# Patient Record
Sex: MALE | Race: WHITE | Employment: OTHER | URBAN - METROPOLITAN AREA
[De-identification: names, ages, dates, MRNs, and addresses within clinical notes are randomized per-mention and may not be internally consistent; named-entity substitution may affect disease eponyms.]

---

## 2017-05-17 ENCOUNTER — HOSPITAL ENCOUNTER (EMERGENCY)
Facility: HOSPITAL | Age: 82
Discharge: HOME | End: 2017-05-17
Attending: EMERGENCY MEDICINE
Payer: MEDICARE

## 2017-05-17 ENCOUNTER — APPOINTMENT (OUTPATIENT)
Dept: GENERAL RADIOLOGY | Facility: HOSPITAL | Age: 82
End: 2017-05-17
Attending: EMERGENCY MEDICINE
Payer: MEDICARE

## 2017-05-17 VITALS
HEART RATE: 60 BPM | SYSTOLIC BLOOD PRESSURE: 128 MMHG | TEMPERATURE: 99 F | DIASTOLIC BLOOD PRESSURE: 53 MMHG | BODY MASS INDEX: 28.14 KG/M2 | RESPIRATION RATE: 18 BRPM | WEIGHT: 190 LBS | HEIGHT: 69 IN | OXYGEN SATURATION: 95 %

## 2017-05-17 DIAGNOSIS — R50.81 FEVER IN OTHER DISEASES: Primary | ICD-10-CM

## 2017-05-17 DIAGNOSIS — R05.9 COUGH: ICD-10-CM

## 2017-05-17 PROCEDURE — 71020 XR CHEST PA + LAT CHEST (CPT=71020): CPT | Performed by: EMERGENCY MEDICINE

## 2017-05-17 PROCEDURE — 99283 EMERGENCY DEPT VISIT LOW MDM: CPT

## 2017-05-17 NOTE — ED INITIAL ASSESSMENT (HPI)
Fever 99.6 at home pta. Pt was told by PMD to come to ED if temp is elevated. Intermittent nonproductive cough for the past two days.

## 2017-05-17 NOTE — ED PROVIDER NOTES
Patient Seen in: [facility] AND [facility] Emergency Department    History   Patient presents with:  Fever    Stated Complaint: elevated temp, cough    HPI    Patient is an 27-year-old male who presents with fever of 99.5 at home today.   Patient denies any body Alcohol Use: Yes                Comment: occasionally      Review of Systems    Positive for stated complaint: elevated temp, cough  Other systems are as noted in HPI. Constitutional and vital signs reviewed.       All other systems reviewed and negative e Plan     Clinical Impression:  Fever in other diseases  (primary encounter diagnosis)  Cough    Disposition:  Discharge    Follow-up:  [facility] AND [facility] Emergency Department  [address]   [address] [address] [phone]    If symptoms worse

## 2017-07-07 ENCOUNTER — HOSPITAL ENCOUNTER (OUTPATIENT)
Age: 82
Discharge: HOME | End: 2017-07-07
Attending: EMERGENCY MEDICINE
Payer: MEDICARE

## 2017-07-07 VITALS
HEART RATE: 76 BPM | SYSTOLIC BLOOD PRESSURE: 129 MMHG | TEMPERATURE: 98 F | WEIGHT: 204 LBS | OXYGEN SATURATION: 96 % | RESPIRATION RATE: 20 BRPM | HEIGHT: 70 IN | DIASTOLIC BLOOD PRESSURE: 51 MMHG | BODY MASS INDEX: 29.2 KG/M2

## 2017-07-07 DIAGNOSIS — L03.019 ONYCHIA AND PARONYCHIA OF FINGER: Primary | ICD-10-CM

## 2017-07-07 PROCEDURE — 26010 DRAINAGE OF FINGER ABSCESS: CPT

## 2017-07-07 PROCEDURE — 99213 OFFICE O/P EST LOW 20 MIN: CPT

## 2017-07-07 PROCEDURE — 99212 OFFICE O/P EST SF 10 MIN: CPT

## 2017-07-07 NOTE — ED PROVIDER NOTES
[facility]    History   Patient presents with:  Rash Skin Problem (integumentary)    Stated Complaint: rt. thumb redness    HPI    16-year-old male with history of BPH, kidney stones, hyperlipidemia, hype Associated Father    • Heart Disorder Father    • Depression Father    • Heart Disorder Mother        Smoking status: Former Smoker                                                              Packs/day: 0.00      Years: 3.00         Quit date: 8/20/1960 Cardiovascular: Normal rate and regular rhythm. No murmur heard. 2+ radial and DP pulses b/l, no leg swelling   Pulmonary/Chest: Effort normal and breath sounds normal. No stridor. No respiratory distress. He has no wheezes. He has no rales.    Abdomi Patient's condition was stable during Emergency Department evaluation.      82yoM with paronychia  - afebrile, hemodynamically stable  - digital block offered to pt - he declined  - I&D performed without complication  - counseled pt on importance of complet

## 2019-10-04 ENCOUNTER — HOSPITAL ENCOUNTER (OUTPATIENT)
Age: 84
Discharge: HOME | End: 2019-10-04
Attending: EMERGENCY MEDICINE
Payer: MEDICARE

## 2019-10-04 VITALS
SYSTOLIC BLOOD PRESSURE: 151 MMHG | WEIGHT: 204 LBS | BODY MASS INDEX: 29.2 KG/M2 | TEMPERATURE: 97 F | RESPIRATION RATE: 20 BRPM | OXYGEN SATURATION: 96 % | HEART RATE: 78 BPM | DIASTOLIC BLOOD PRESSURE: 64 MMHG | HEIGHT: 70 IN

## 2019-10-04 DIAGNOSIS — L03.011 ACUTE PARONYCHIA OF RIGHT THUMB: Primary | ICD-10-CM

## 2019-10-04 PROCEDURE — 99214 OFFICE O/P EST MOD 30 MIN: CPT

## 2019-10-04 PROCEDURE — 10060 I&D ABSCESS SIMPLE/SINGLE: CPT

## 2019-10-04 PROCEDURE — 87205 SMEAR GRAM STAIN: CPT | Performed by: EMERGENCY MEDICINE

## 2019-10-04 PROCEDURE — 87106 FUNGI IDENTIFICATION YEAST: CPT | Performed by: EMERGENCY MEDICINE

## 2019-10-04 PROCEDURE — 87077 CULTURE AEROBIC IDENTIFY: CPT | Performed by: EMERGENCY MEDICINE

## 2019-10-04 PROCEDURE — 87070 CULTURE OTHR SPECIMN AEROBIC: CPT | Performed by: EMERGENCY MEDICINE

## 2019-10-04 RX ORDER — CEPHALEXIN 250 MG/1
250 CAPSULE ORAL 3 TIMES DAILY
Qty: 21 CAPSULE | Refills: 0 | Status: SHIPPED | OUTPATIENT
Start: 2019-10-04 | End: 2019-10-11

## 2019-10-04 NOTE — ED PROVIDER NOTES
[facility]      History   Patient presents with:  Rash Skin Problem (integumentary)    Stated Complaint: THUMB INFECTION    HPI    The patient is an 73-year-old male with past history of hypertension who sclera, no conjunctival injection  ENT: TMs are clear and flat bilaterally.   There is no posterior pharyngeal erythema  Chest: Clear to auscultation, no tenderness  Cardiovascular: Regular rate and rhythm without murmur  Abdomen: Soft, nontender and nondis

## 2019-10-04 NOTE — ED INITIAL ASSESSMENT (HPI)
Patient's wife noted redness around right thumb nail bed today. Patient denies drainage/discharge from site.

## 2019-12-05 NOTE — LETTER
[facility], [place]  Authorization for Invasive Procedures  1. I hereby authorize Dr. Vikas Cartwright , my physician and whomever may be designated as the doctor's assistant, to perform the following operation and/or procedure:  Esophagogastroduodenoscopy with Botox injection on Teressa Walters. at [facility].    2. My physician has explained to me the nature and purpose of the operation or other procedure, possible alternative methods of treatment, the risks involved and the possibility of complications to me. I understand the probable consequences of declining the recommended procedure and the alternative methods of treatment. I acknowledge that no guarantee has been made as to the result that may be obtained. 3. I recognize that during the course of this operation or other procedure, unforeseen conditions may necessitate additional or different procedures than those listed above. I, therefore, further authorize and request that the above-named physician, his/her physician assistants, or designees perform such procedures as are, in his/her professional opinion, necessary and desirable. If I have a Do Not Attempt Resuscitation (DNAR) order in place, that status will be suspended while in the operating room, procedural suite, and during the recovery period unless otherwise explicitly stated by me (or a person authorized to consent on my behalf). The surgeon or my attending physician will determine when the applicable recovery period ends for purposes of reinstating the DNAR order. 4. Should the need arise during my operation or immediate post-operative period; I also consent to the administration of blood and/or blood products.  Further, I understand that despite careful testing and screening of blood and blood products, I may still be subject to ill effects as a result of recieving a blood transfusion an/or blood producst. The following are some, but not all, of the potential risks that can occur: fever and allergic reactions, hemolytic reactions, transmission of disease such as hepatitis, AIDS, cytomegalovirus (CMV), and flluid overload. In the event that I wish to have autologous transfusions of my own blood, or a directed donor transfusion, I will discuss this with my physician. 5. I consent to the photographing of the operations or procedures to be performed for the purposes of advancing medicine, science, and/or education, provided my identity is not revealed. If the procedure has been videotaped, the physician/surgeon will obtain the original videotape. The [facility] will not be responsible for storage or maintenance of this tape. 6. I consent to the presence of a  or observer as deemed necessary by my physician or his designee. 7. Any tissues or organs removed in the operation or other procedure may be disposed of by and at the discretion of [facility].    8. I understand that the physician and his/her physician assistants may not be employees or agents of [facility], [facility], nor [facility], but are independent medical practitioners who have been permitted to use its facilities for the care and treatment of their patients. 9. Patients having a sterilization procedure: I understand that if the procedure is successful the results will be permanent and it will therefore be impossible for me to inseminate, conceive or bear children. I also understand that the procedure is intended to result in sterility, although the result has not been guaranteed. 10. I CERTIFY THAT I HAVE READ AND FULLY UNDERSTAND THE ABOVE CONSENT TO OPERATION and/or OTHER PROCEDURE. 11. I acknowledge that my physician has explained sedation/analgesia administration to me including the risks and benefits.  I consent to the administration of sedation/analgesia as may be necessary or desirable in the judgment of my physician. Signature of Patient:  ________________________________________________ Date: _________Time: _________    Responsible person in case of minor or unconscious: _____________________________Relationship: ____________     Witness Signature: ____________________________________________ Date: __________ Time: ___________    Statement of Physician  My signature below affirms that prior to the time of the procedure, I have explained to the patient and/or his legal representative, the risks and benefits involved in the proposed treatment and any reasonable alternative to the proposed treatment. I have also explained the risks and benefits involved in the refusal of the proposed treatment and have answered the patient's questions. If I have a significant financial interest in this procedure/surgery, I have disclosed this and had a discussion with my patient. Signature of Physician:   ________________________________________Date: _________Time:_______ Patient Name: Sarah Rojas.   : 1935   Printed: May 18, 2022    Medical Record #: [id number] Sepsis, due to unspecified organism, unspecified whether acute organ dysfunction present

## 2020-02-29 ENCOUNTER — HOSPITAL ENCOUNTER (INPATIENT)
Facility: HOSPITAL | Age: 85
LOS: 2 days | Discharge: HOME | DRG: 195 | End: 2020-03-02
Attending: EMERGENCY MEDICINE | Admitting: HOSPITALIST
Payer: MEDICARE

## 2020-02-29 ENCOUNTER — APPOINTMENT (OUTPATIENT)
Dept: GENERAL RADIOLOGY | Facility: HOSPITAL | Age: 85
DRG: 195 | End: 2020-02-29
Attending: EMERGENCY MEDICINE
Payer: MEDICARE

## 2020-02-29 DIAGNOSIS — J18.9 COMMUNITY ACQUIRED PNEUMONIA OF RIGHT LOWER LOBE OF LUNG: Primary | ICD-10-CM

## 2020-02-29 LAB
ANION GAP SERPL CALC-SCNC: 6 MMOL/L (ref 0–18)
BASOPHILS # BLD AUTO: 0.04 X10(3) UL (ref 0–0.2)
BASOPHILS NFR BLD AUTO: 0.3 %
BUN BLD-MCNC: 18 MG/DL (ref 7–18)
BUN/CREAT SERPL: 20 (ref 10–20)
CALCIUM BLD-MCNC: 9.6 MG/DL (ref 8.5–10.1)
CHLORIDE SERPL-SCNC: 104 MMOL/L (ref 98–112)
CO2 SERPL-SCNC: 28 MMOL/L (ref 21–32)
CREAT BLD-MCNC: 0.9 MG/DL (ref 0.7–1.3)
DEPRECATED RDW RBC AUTO: 48.1 FL (ref 35.1–46.3)
EOSINOPHIL # BLD AUTO: 0.05 X10(3) UL (ref 0–0.7)
EOSINOPHIL NFR BLD AUTO: 0.4 %
ERYTHROCYTE [DISTWIDTH] IN BLOOD BY AUTOMATED COUNT: 13 % (ref 11–15)
FLUAV + FLUBV RNA SPEC NAA+PROBE: NEGATIVE
GLUCOSE BLD-MCNC: 145 MG/DL (ref 70–99)
HCT VFR BLD AUTO: 39 % (ref 39–53)
HGB BLD-MCNC: 13.3 G/DL (ref 13–17.5)
IMM GRANULOCYTES # BLD AUTO: 0.06 X10(3) UL (ref 0–1)
IMM GRANULOCYTES NFR BLD: 0.4 %
LYMPHOCYTES # BLD AUTO: 1.52 X10(3) UL (ref 1–4)
LYMPHOCYTES NFR BLD AUTO: 11.2 %
MCH RBC QN AUTO: 34.5 PG (ref 26–34)
MCHC RBC AUTO-ENTMCNC: 34.1 G/DL (ref 31–37)
MCV RBC AUTO: 101.3 FL (ref 80–100)
MONOCYTES # BLD AUTO: 0.95 X10(3) UL (ref 0.1–1)
MONOCYTES NFR BLD AUTO: 7 %
NEUTROPHILS # BLD AUTO: 10.96 X10 (3) UL (ref 1.5–7.7)
NEUTROPHILS # BLD AUTO: 10.96 X10(3) UL (ref 1.5–7.7)
NEUTROPHILS NFR BLD AUTO: 80.7 %
OSMOLALITY SERPL CALC.SUM OF ELEC: 290 MOSM/KG (ref 275–295)
PLATELET # BLD AUTO: 162 10(3)UL (ref 150–450)
POTASSIUM SERPL-SCNC: 4.2 MMOL/L (ref 3.5–5.1)
PROCALCITONIN SERPL-MCNC: 0.3 NG/ML
RBC # BLD AUTO: 3.85 X10(6)UL (ref 3.8–5.8)
SODIUM SERPL-SCNC: 138 MMOL/L (ref 136–145)
WBC # BLD AUTO: 13.6 X10(3) UL (ref 4–11)

## 2020-02-29 PROCEDURE — 87631 RESP VIRUS 3-5 TARGETS: CPT | Performed by: EMERGENCY MEDICINE

## 2020-02-29 PROCEDURE — 71045 X-RAY EXAM CHEST 1 VIEW: CPT | Performed by: EMERGENCY MEDICINE

## 2020-02-29 PROCEDURE — 97162 PT EVAL MOD COMPLEX 30 MIN: CPT

## 2020-02-29 PROCEDURE — 96365 THER/PROPH/DIAG IV INF INIT: CPT

## 2020-02-29 PROCEDURE — 97110 THERAPEUTIC EXERCISES: CPT

## 2020-02-29 PROCEDURE — 85025 COMPLETE CBC W/AUTO DIFF WBC: CPT | Performed by: EMERGENCY MEDICINE

## 2020-02-29 PROCEDURE — 92610 EVALUATE SWALLOWING FUNCTION: CPT

## 2020-02-29 PROCEDURE — 80048 BASIC METABOLIC PNL TOTAL CA: CPT | Performed by: EMERGENCY MEDICINE

## 2020-02-29 PROCEDURE — 99285 EMERGENCY DEPT VISIT HI MDM: CPT

## 2020-02-29 PROCEDURE — 84145 PROCALCITONIN (PCT): CPT | Performed by: EMERGENCY MEDICINE

## 2020-02-29 RX ORDER — ENOXAPARIN SODIUM 100 MG/ML
40 INJECTION SUBCUTANEOUS DAILY
Status: DISCONTINUED | OUTPATIENT
Start: 2020-02-29 | End: 2020-03-02

## 2020-02-29 RX ORDER — DONEPEZIL HYDROCHLORIDE 10 MG/1
10 TABLET, FILM COATED ORAL DAILY
Status: DISCONTINUED | OUTPATIENT
Start: 2020-02-29 | End: 2020-03-02

## 2020-02-29 RX ORDER — MIRTAZAPINE 15 MG/1
15 TABLET, FILM COATED ORAL NIGHTLY
Status: DISCONTINUED | OUTPATIENT
Start: 2020-02-29 | End: 2020-03-02

## 2020-02-29 RX ORDER — METOCLOPRAMIDE HYDROCHLORIDE 5 MG/ML
10 INJECTION INTRAMUSCULAR; INTRAVENOUS EVERY 8 HOURS PRN
Status: DISCONTINUED | OUTPATIENT
Start: 2020-02-29 | End: 2020-03-02

## 2020-02-29 RX ORDER — ARIPIPRAZOLE 2 MG/1
2 TABLET ORAL EVERY OTHER DAY
Status: DISCONTINUED | OUTPATIENT
Start: 2020-03-01 | End: 2020-03-02

## 2020-02-29 RX ORDER — ATORVASTATIN CALCIUM 20 MG/1
20 TABLET, FILM COATED ORAL NIGHTLY
Status: DISCONTINUED | OUTPATIENT
Start: 2020-02-29 | End: 2020-03-02

## 2020-02-29 RX ORDER — SODIUM CHLORIDE 9 MG/ML
INJECTION, SOLUTION INTRAVENOUS CONTINUOUS
Status: DISCONTINUED | OUTPATIENT
Start: 2020-02-29 | End: 2020-02-29

## 2020-02-29 RX ORDER — SODIUM CHLORIDE 0.9 % (FLUSH) 0.9 %
3 SYRINGE (ML) INJECTION AS NEEDED
Status: DISCONTINUED | OUTPATIENT
Start: 2020-02-29 | End: 2020-03-02

## 2020-02-29 RX ORDER — VENLAFAXINE 75 MG/1
75 TABLET ORAL DAILY
Status: DISCONTINUED | OUTPATIENT
Start: 2020-02-29 | End: 2020-03-02

## 2020-02-29 RX ORDER — POTASSIUM CHLORIDE 750 MG/1
10 TABLET, EXTENDED RELEASE ORAL EVERY OTHER DAY
COMMUNITY

## 2020-02-29 RX ORDER — ONDANSETRON 2 MG/ML
4 INJECTION INTRAMUSCULAR; INTRAVENOUS EVERY 4 HOURS PRN
Status: DISCONTINUED | OUTPATIENT
Start: 2020-02-29 | End: 2020-02-29

## 2020-02-29 RX ORDER — ASPIRIN 81 MG/1
81 TABLET ORAL DAILY
Status: DISCONTINUED | OUTPATIENT
Start: 2020-02-29 | End: 2020-03-02

## 2020-02-29 RX ORDER — ONDANSETRON 2 MG/ML
4 INJECTION INTRAMUSCULAR; INTRAVENOUS EVERY 6 HOURS PRN
Status: DISCONTINUED | OUTPATIENT
Start: 2020-02-29 | End: 2020-03-02

## 2020-02-29 RX ORDER — ACETAMINOPHEN 325 MG/1
650 TABLET ORAL EVERY 6 HOURS PRN
Status: DISCONTINUED | OUTPATIENT
Start: 2020-02-29 | End: 2020-03-02

## 2020-02-29 NOTE — PHYSICAL THERAPY NOTE
PHYSICAL THERAPY EVALUATION - INPATIENT     Room Number: 529/529-A  Evaluation Date: 2/29/2020  Type of Evaluation: Initial   Physician Order: PT Eval and Treat    Presenting Problem: pneumonia, weakness, congestion, fever  Reason for Therapy: Mobility Dy NOT safe to return home. He is unsteady on his feet and demonstrates ataxic gait. He is weak. He has 12 stairs he needs to do to get into his house. He is at a high fall risk. At this moment, recommend sub-acute rehab.  Will continue to monitor pt's progres had to be corrected by wife as initially pt said he was independent w/ ADLs and amb w/o AD. Wife clarified that she has to help him get into shower and assists with showers. She has to help with shoes/socks sometimes. He amb w/o AD.  However, in the communi on the side of the bed?: A Little   How much help from another person does the patient currently need. ..   -   Moving to and from a bed to a chair (including a wheelchair)?: A Little   -   Need to walk in hospital room?: A Little   -   Climbing 3-5 steps w

## 2020-02-29 NOTE — SLP NOTE
I. ADULT SWALLOWING EVALUATION    ASSESSMENT    ASSESSMENT/OVERALL IMPRESSION:    This BSE was ordered d/t Pt with admission of CAP (R) lower lobe.       No PMH of dysphagia at [facility]; however, per spouse Pt had \"video swallow three years ago at [facility] with RN. RECOMMENDATIONS   Diet Recommendations - Solids: Regular  Diet Recommendations - Liquid: Thin      Compensatory Strategies Recommended: No straws  Aspiration Precautions: Upright position; Slow rate;Small bites and sips; No straw  Medication A Pharyngeal Phase of Swallow: Impaired  Laryngeal Elevation Timing: Appears impaired        (Please note: Silent aspiration cannot be evaluated clinically.  Videofluoroscopic Swallow Study is required to rule-out silent aspiration.)    GOALS  Goal #1 The

## 2020-02-29 NOTE — ED PROVIDER NOTES
Patient Seen in: [facility] AND [facility] Emergency Department    History   Patient presents with:  Fever      HPI    Patient presents to the ED complaining of a cough with congestion and fever starting last night. Increasing fatigue since onset of symptoms. and Family History elements reviewed from today, pertinent positives to the presenting problem noted.     Physical Exam     ED Triage Vitals [02/29/20 0415]   /59   Pulse 66   Resp 18   Temp 99 °F (37.2 °C)   Temp src Oral   SpO2 93 %   O2 Device None Please view results for these tests on the individual orders.    BASIC METABOLIC PANEL (8)   PROCALCITONIN         Imaging Results Available and Reviewed while in ED:    Preliminary Radiology Report  Vision Radio influenza, RSV    Medical Record Review: I personally reviewed available prior medical records for any recent pertinent discharge summaries, testing, and procedures and reviewed those reports. Complicating Factors:  The patient already has does not have

## 2020-02-29 NOTE — ED NOTES
Orders for admission, patient is aware of plan and ready to go upstairs.   Any questions, please call ED BRYANT Schneider at extension [phone]

## 2020-02-29 NOTE — H&P
[facility] Hospitalist H&P     CC: Patient presents with:  Fever       PCP: Wendy Ruelas    Admission Date: 2/29/2020    ASSESSMENT / PLAN:    is a 81year old male with PMH of BPH, HTN, dementia, HLD who presented with cough starting he morning of arriv Next dose due 3/1/20, Disp: , Rfl:   ARIPIPRAZOLE 2 MG Oral Tab, TAKE ONE TABLET BY MOUTH ONE TIME DAILY  (Patient taking differently: Take 2 mg by mouth every other day.  Next dose due 3/1/20 ), Disp: 90 tablet, Rfl: 0  mirtazapine 15 MG Oral Tab, TAKE ONE edema    Diagnostic Data:    CBC/Chem    Recent Labs   Lab 02/29/20  0525   RBC 3.85   HGB 13.3   HCT 39.0   .3*   MCH 34.5*   MCHC 34.1   RDW 13.0   NEPRELIM 10.96*   WBC 13.6*   .0         Recent Labs   Lab 02/29/20  0525   *   BUN 1

## 2020-02-29 NOTE — ED NOTES
Orders for admission, patient is aware of plan and ready to go upstairs. Any questions, please call ED RN wade at extension [phone]  Pt comes from home, lives with his wife, is aox4, continent of bowel and bladder.  Comes to ER with c/o fever and generalize

## 2020-03-01 LAB
ANION GAP SERPL CALC-SCNC: 5 MMOL/L (ref 0–18)
BUN BLD-MCNC: 11 MG/DL (ref 7–18)
BUN/CREAT SERPL: 15.5 (ref 10–20)
CALCIUM BLD-MCNC: 9.1 MG/DL (ref 8.5–10.1)
CHLORIDE SERPL-SCNC: 107 MMOL/L (ref 98–112)
CO2 SERPL-SCNC: 28 MMOL/L (ref 21–32)
CREAT BLD-MCNC: 0.71 MG/DL (ref 0.7–1.3)
DEPRECATED RDW RBC AUTO: 50.5 FL (ref 35.1–46.3)
ERYTHROCYTE [DISTWIDTH] IN BLOOD BY AUTOMATED COUNT: 13.2 % (ref 11–15)
GLUCOSE BLD-MCNC: 136 MG/DL (ref 70–99)
HAV IGM SER QL: 2.3 MG/DL (ref 1.6–2.6)
HCT VFR BLD AUTO: 38.2 % (ref 39–53)
HGB BLD-MCNC: 12.9 G/DL (ref 13–17.5)
MCH RBC QN AUTO: 34.6 PG (ref 26–34)
MCHC RBC AUTO-ENTMCNC: 33.8 G/DL (ref 31–37)
MCV RBC AUTO: 102.4 FL (ref 80–100)
OSMOLALITY SERPL CALC.SUM OF ELEC: 291 MOSM/KG (ref 275–295)
PLATELET # BLD AUTO: 151 10(3)UL (ref 150–450)
POTASSIUM SERPL-SCNC: 4 MMOL/L (ref 3.5–5.1)
RBC # BLD AUTO: 3.73 X10(6)UL (ref 3.8–5.8)
SODIUM SERPL-SCNC: 140 MMOL/L (ref 136–145)
WBC # BLD AUTO: 6 X10(3) UL (ref 4–11)

## 2020-03-01 PROCEDURE — 85027 COMPLETE CBC AUTOMATED: CPT | Performed by: INTERNAL MEDICINE

## 2020-03-01 PROCEDURE — 97166 OT EVAL MOD COMPLEX 45 MIN: CPT

## 2020-03-01 PROCEDURE — 80048 BASIC METABOLIC PNL TOTAL CA: CPT | Performed by: INTERNAL MEDICINE

## 2020-03-01 PROCEDURE — 92526 ORAL FUNCTION THERAPY: CPT

## 2020-03-01 PROCEDURE — 97530 THERAPEUTIC ACTIVITIES: CPT

## 2020-03-01 PROCEDURE — 83735 ASSAY OF MAGNESIUM: CPT | Performed by: INTERNAL MEDICINE

## 2020-03-01 NOTE — SLP NOTE
SPEECH DAILY NOTE - INPATIENT    ASSESSMENT & PLAN   ASSESSMENT  Per RN, pt is tolerating current diet without overt clinical signs or symptoms of aspiration (e.g., wet vocal quality, coughing, throat clear).  Pt seen sitting upright in bed to monitor marycruz of Visits to Meet Established Goals: 2    Session: 1 following BSE on 2/29/20    If you have any questions, please contact Marbella Sanchez M.S., Mary [place]  Speech-Language Pathologist  Ext. [phone]

## 2020-03-01 NOTE — PROGRESS NOTES
[facility] Hospitalist Progress Note     Reason for Admission:   PCP: Maryse Russ     Assessment/Plan:     Principal Problem:    Community acquired pneumonia of right lower lobe of lung   is a 81year old male with PMH of BPH, HTN, dementia, HLD who prese MCH 34.5* 34.6*   MCHC 34.1 33.8   RDW 13.0 13.2   NEPRELIM 10.96*  --    WBC 13.6* 6.0   .0 151.0         Recent Labs   Lab 02/29/20  0525 03/01/20  0703   * 136*   BUN 18 11   CREATSERUM 0.90 0.71   GFRAA 91 100   GFRNAA 79 87   CA 9.6 9.

## 2020-03-01 NOTE — OCCUPATIONAL THERAPY NOTE
OCCUPATIONAL THERAPY EVALUATION - INPATIENT     Room Number: 529/529-A  Evaluation Date: 3/1/2020  Type of Evaluation: Initial  Presenting Problem: (weakness;pneumonia)    Physician Order: IP Consult to Occupational Therapy  Reason for Therapy: ADL/IADL Dy Daily Activity Short Form. Research supports that patients with this level of impairment may benefit from Home w/ HH.   Pending progress pt may benefit from continued IP rehab in a subacute setting to maximize independence and mobility prior to returning h Bed/chair alarm  Fall Risk: High fall risk    PAIN ASSESSMENT  Ratin    ACTIVITY TOLERANCE  Good    COGNITION  Alert and oriented    Behavioral/Emotional/Social: pt pleasant and cooperative    RANGE OF MOTION   Upper extremity ROM is within functional functional transfer with mod i  Comment:     Patient will complete toileting with mod i  Comment:     Patient will tolerate standing for 3 minutes w/ supervision to complete self care task at sink level  Comment:              Goals  on:3/8/20   Frequ

## 2020-03-01 NOTE — CM/SW NOTE
SW self-referred to meet w/ pt due to case finding and diagnosis. Pt's wife also present. Pt lives at home w/ his wife in [place]. Pt lives in a condo w/ 12 stairs. Pt reports to be independent w/ most ADL's, but has not driven in awhile.  Pt h

## 2020-03-01 NOTE — PLAN OF CARE
Pt alert. VS monitored. Pt denies pain, sob, chest pain, n/v. Medication reviewed. Safety maintained, call light in reach. We will continue to monitor.   Problem: Patient Centered Care  Goal: Patient preferences are identified and integrated in the patient' FALL  Goal: Free from fall injury  Description  INTERVENTIONS:  - Assess pt frequently for physical needs  - Identify cognitive and physical deficits and behaviors that affect risk of falls.   - [place] fall precautions as indicated by assessment.  - Educ responsible for managing their own health  - Refer to Case Management Department for coordinating discharge planning if the patient needs post-hospital services based on physician/LIP order or complex needs related to functional status, cognitive ability o

## 2020-03-01 NOTE — PLAN OF CARE
Problem: Patient Centered Care  Goal: Patient preferences are identified and integrated in the patient's plan of care  Description  Interventions:  - What would you like us to know as we care for you?  \"I live at home with my wife\"  - Provide timely, co appropriate pain scale  - Administer analgesics based on type and severity of pain and evaluate response  - Implement non-pharmacological measures as appropriate and evaluate response  - Consider cultural and social influences on pain and pain management

## 2020-03-02 ENCOUNTER — APPOINTMENT (OUTPATIENT)
Dept: GENERAL RADIOLOGY | Facility: HOSPITAL | Age: 85
DRG: 195 | End: 2020-03-02
Attending: INTERNAL MEDICINE
Payer: MEDICARE

## 2020-03-02 VITALS
TEMPERATURE: 98 F | WEIGHT: 201.81 LBS | OXYGEN SATURATION: 94 % | HEIGHT: 70 IN | DIASTOLIC BLOOD PRESSURE: 64 MMHG | BODY MASS INDEX: 28.89 KG/M2 | RESPIRATION RATE: 18 BRPM | HEART RATE: 63 BPM | SYSTOLIC BLOOD PRESSURE: 132 MMHG

## 2020-03-02 PROCEDURE — 97530 THERAPEUTIC ACTIVITIES: CPT

## 2020-03-02 PROCEDURE — 97116 GAIT TRAINING THERAPY: CPT

## 2020-03-02 PROCEDURE — 74230 X-RAY XM SWLNG FUNCJ C+: CPT | Performed by: INTERNAL MEDICINE

## 2020-03-02 PROCEDURE — 92611 MOTION FLUOROSCOPY/SWALLOW: CPT

## 2020-03-02 RX ORDER — AMLODIPINE BESYLATE 5 MG/1
5 TABLET ORAL DAILY
Status: DISCONTINUED | OUTPATIENT
Start: 2020-03-02 | End: 2020-03-02

## 2020-03-02 RX ORDER — CEFUROXIME AXETIL 500 MG/1
500 TABLET ORAL 2 TIMES DAILY
Qty: 8 TABLET | Refills: 0 | Status: SHIPPED | OUTPATIENT
Start: 2020-03-02 | End: 2020-03-06

## 2020-03-02 NOTE — PLAN OF CARE
Pt's wife by the bedside. Pt denies pain, dizziness, N/V. VS monitored, Medication reviewed. Call light in reach. We will continue to monitor.   Problem: Patient Centered Care  Goal: Patient preferences are identified and integrated in the patient's plan of level or patient's stated pain goal  Description  INTERVENTIONS:  - Encourage pt to monitor pain and request assistance  - Assess pain using appropriate pain scale  - Administer analgesics based on type and severity of pain and evaluate response  - Impleme

## 2020-03-02 NOTE — SLP NOTE
ADULT VIDEOFLUOROSCOPIC SWALLOWING STUDY    Admission Date: 2/29/2020  Evaluation Date: 03/02/20  Radiologist: Dr. Mecca Sheikh: Regular  Diet Recommendations - Liquid: Thin(no straws)    Further Follow-up:  Jelly Ballard assess for compensatory strategies to improve safe swallow function. THIN LIQUIDS  Method of Presentation: Teaspoon;Cup  Oral Phase of Swallow (VFSS - Thin Liquids):  Within Functional Limits  Triggered at: Valleculae  Premature Spillage to: Valleculae to: Valleculae  Delay (seconds): 2-3  Residue Severity, Location: Mild;Valleculae  Cleared/Reduced with: Secondary swallow  Laryngeal Penetration: None  Tracheal Aspiration: None  Cough/Throat Clear Response: No  Strategy(ies) Implemented (Puree):  Slow rat FCM Score: 6         GOALS  Goal #1 The patient will tolerate solid consistency and thin liquids without overt signs or symptoms of aspiration with 100 % accuracy over 1-2 session(s).   In Progress   Goal #2 VFSS to be completed if MD desires to r/o summer

## 2020-03-02 NOTE — DISCHARGE SUMMARY
[facility] Hospitalist Discharge Summary   Patient ID:  Cyrus Wilson [id number]  58 year old 4/13/1935    Admit date: 2/29/2020  Discharge date: 3/2/2020  Risk of Readmission Lace+ Score: 53  59-90 High Risk  29-58 Medium Risk  0-28   Low Risk    Primary Care P Portable  (cpt=71045)    Result Date: 2/29/2020  CONCLUSION:  1. Patchy bibasilar airspace disease may suggest bibasilar pneumonia or atelectasis. Correlate clinically and follow-up studies are advised.  The examination was read on call by [facility] radiology week.    Specialties:  Cardiovascular Diseases, CARDIOLOGY                 Disposition: home  Discharged Condition: good      Patient had opportunity to ask questions and state understand and agree with therapeutic plan as outlined    Candice Morrow MD  [facility]

## 2020-03-02 NOTE — PHYSICAL THERAPY NOTE
PHYSICAL THERAPY TREATMENT NOTE - INPATIENT     Room Number: 529/529-A       Presenting Problem: pneumonia, weakness, congestion, fever    Problem List  Principal Problem:    Community acquired pneumonia of right lower lobe of lung      PHYSICAL THERAPY AS +    ACTIVITY TOLERANCE                         O2 WALK                  AM-PAC '6-Clicks' INPATIENT SHORT FORM - BASIC MOBILITY  How much difficulty does the patient currently have. ..  -   Turning over in bed (including adjusting bedclothes, sheets and bl

## 2020-03-02 NOTE — CM/SW NOTE
SW met with pt and wife at bedside to discuss d/c planning and PT/OT SNF recommendation.   Pt see's a  1x a [facility] and feels as though it is working well for him  Pt wife states that if pt requires more training, they will o

## 2020-03-02 NOTE — PROGRESS NOTES
Patient dc home. IV removed. Understands to follow up with PCP in 1 week. Patient understands that he needs to get script from pharmacy. Pt wife at bedside and aware.  Per video swallow recs pt to have no straws, wife is also a speech therapist and aware an

## 2020-03-04 ENCOUNTER — PATIENT OUTREACH (OUTPATIENT)
Dept: CASE MANAGEMENT | Age: 85
End: 2020-03-04

## 2020-08-05 ENCOUNTER — HOSPITAL ENCOUNTER (EMERGENCY)
Facility: HOSPITAL | Age: 85
Discharge: HOME | End: 2020-08-05
Attending: EMERGENCY MEDICINE
Payer: MEDICARE

## 2020-08-05 ENCOUNTER — APPOINTMENT (OUTPATIENT)
Dept: CT IMAGING | Facility: HOSPITAL | Age: 85
End: 2020-08-05
Attending: EMERGENCY MEDICINE
Payer: MEDICARE

## 2020-08-05 VITALS
RESPIRATION RATE: 20 BRPM | HEART RATE: 69 BPM | DIASTOLIC BLOOD PRESSURE: 70 MMHG | SYSTOLIC BLOOD PRESSURE: 124 MMHG | OXYGEN SATURATION: 98 % | TEMPERATURE: 98 F

## 2020-08-05 DIAGNOSIS — R25.1 TREMOR: Primary | ICD-10-CM

## 2020-08-05 DIAGNOSIS — K62.89 IRRITATION OF PERIRECTAL SKIN: ICD-10-CM

## 2020-08-05 LAB
ANION GAP SERPL CALC-SCNC: 6 MMOL/L (ref 0–18)
BASOPHILS # BLD AUTO: 0.05 X10(3) UL (ref 0–0.2)
BASOPHILS NFR BLD AUTO: 0.7 %
BUN BLD-MCNC: 12 MG/DL (ref 7–18)
BUN/CREAT SERPL: 13.8 (ref 10–20)
CALCIUM BLD-MCNC: 9.6 MG/DL (ref 8.5–10.1)
CHLORIDE SERPL-SCNC: 106 MMOL/L (ref 98–112)
CO2 SERPL-SCNC: 29 MMOL/L (ref 21–32)
CREAT BLD-MCNC: 0.87 MG/DL (ref 0.7–1.3)
DEPRECATED RDW RBC AUTO: 47.8 FL (ref 35.1–46.3)
EOSINOPHIL # BLD AUTO: 0.16 X10(3) UL (ref 0–0.7)
EOSINOPHIL NFR BLD AUTO: 2.3 %
ERYTHROCYTE [DISTWIDTH] IN BLOOD BY AUTOMATED COUNT: 12.7 % (ref 11–15)
GLUCOSE BLD-MCNC: 143 MG/DL (ref 70–99)
HCT VFR BLD AUTO: 40.8 % (ref 39–53)
HGB BLD-MCNC: 14.1 G/DL (ref 13–17.5)
IMM GRANULOCYTES # BLD AUTO: 0.03 X10(3) UL (ref 0–1)
IMM GRANULOCYTES NFR BLD: 0.4 %
LYMPHOCYTES # BLD AUTO: 2.37 X10(3) UL (ref 1–4)
LYMPHOCYTES NFR BLD AUTO: 33.9 %
MCH RBC QN AUTO: 34.9 PG (ref 26–34)
MCHC RBC AUTO-ENTMCNC: 34.6 G/DL (ref 31–37)
MCV RBC AUTO: 101 FL (ref 80–100)
MONOCYTES # BLD AUTO: 0.66 X10(3) UL (ref 0.1–1)
MONOCYTES NFR BLD AUTO: 9.4 %
NEUTROPHILS # BLD AUTO: 3.73 X10 (3) UL (ref 1.5–7.7)
NEUTROPHILS # BLD AUTO: 3.73 X10(3) UL (ref 1.5–7.7)
NEUTROPHILS NFR BLD AUTO: 53.3 %
OSMOLALITY SERPL CALC.SUM OF ELEC: 294 MOSM/KG (ref 275–295)
PLATELET # BLD AUTO: 179 10(3)UL (ref 150–450)
POTASSIUM SERPL-SCNC: 3.8 MMOL/L (ref 3.5–5.1)
RBC # BLD AUTO: 4.04 X10(6)UL (ref 3.8–5.8)
SODIUM SERPL-SCNC: 141 MMOL/L (ref 136–145)
WBC # BLD AUTO: 7 X10(3) UL (ref 4–11)

## 2020-08-05 PROCEDURE — 36415 COLL VENOUS BLD VENIPUNCTURE: CPT

## 2020-08-05 PROCEDURE — 99284 EMERGENCY DEPT VISIT MOD MDM: CPT

## 2020-08-05 PROCEDURE — 85025 COMPLETE CBC W/AUTO DIFF WBC: CPT | Performed by: EMERGENCY MEDICINE

## 2020-08-05 PROCEDURE — 80048 BASIC METABOLIC PNL TOTAL CA: CPT | Performed by: EMERGENCY MEDICINE

## 2020-08-05 PROCEDURE — 70450 CT HEAD/BRAIN W/O DYE: CPT | Performed by: EMERGENCY MEDICINE

## 2020-08-05 NOTE — ED INITIAL ASSESSMENT (HPI)
Shaking episode lasting about 3 seconds today. Wife concerned may be related to depression meds. Pt denies complaints reports otherwise in his normal state of health.

## 2020-08-05 NOTE — CM/SW NOTE
Per Dr Jacky Boyer order placed for wound care clinic    Order given to patients wife, wound clinic should call patient for appointment time and date    CM team phone number provided to patients wife if needs assistance

## 2020-08-05 NOTE — ED PROVIDER NOTES
Patient Seen in: [facility] AND [facility] Emergency Department      History   Patient presents with:  Tremors    Stated Complaint: Seizure episodes    HPI  81-year-old male with depression, BPH, hypertension, dementia, presenting for evaluation of tremors.   In Physical Exam  Vitals signs and nursing note reviewed. Constitutional:       Appearance: He is well-developed. HENT:      Head: Normocephalic and atraumatic. Neck:      Musculoskeletal: Normal range of motion and neck supple.  No muscular tend RAINBOW DRAW BLUE   RAINBOW DRAW LAVENDER   RAINBOW DRAW LIGHT GREEN   RAINBOW DRAW GOLD          Ct Brain Or Head (24811)    Result Date: 8/5/2020  CONCLUSION:   No evidence of acute intracranial abnormality.   Moderate chronic microvascular white matter

## 2020-08-18 ENCOUNTER — APPOINTMENT (OUTPATIENT)
Dept: WOUND CARE | Facility: HOSPITAL | Age: 85
End: 2020-08-18
Attending: NURSE PRACTITIONER
Payer: MEDICARE

## 2020-08-21 ENCOUNTER — OFFICE VISIT (OUTPATIENT)
Dept: WOUND CARE | Facility: HOSPITAL | Age: 85
End: 2020-08-21
Attending: NURSE PRACTITIONER
Payer: MEDICARE

## 2020-08-21 DIAGNOSIS — R21 RASH AND NONSPECIFIC SKIN ERUPTION: ICD-10-CM

## 2020-08-21 DIAGNOSIS — K62.89 ANAL OR RECTAL PAIN: Primary | ICD-10-CM

## 2020-08-21 DIAGNOSIS — L89.301 PRESSURE INJURY OF BUTTOCK, STAGE 1, UNSPECIFIED LATERALITY: ICD-10-CM

## 2020-08-21 PROCEDURE — 99214 OFFICE O/P EST MOD 30 MIN: CPT

## 2020-08-21 NOTE — PROGRESS NOTES
Subjective    Chief Complaint  This information was obtained from the patient  The patient is new to the [address] [facility] here for an initial visit for the evaluation and management of non-healing wound(s).  Patient have incontient breakdown and shearing    All Neurological: Abnormal Gait (needs assistance with spouse)  Endocrine:  Other (HX; Type 2 DM)  Hematologic/Lymphatic: Other (Denies HX of anemia)  Psychiatric: Depression (Hx: Depression)    Patient denies complaints or symptoms related to:  Constitutional Wound #2 Left Ischial is a Partial Thickness Abrasion and has received a status of Not Healed. Initial wound encounter measurements are 0.5cm length x 1cm width x 0.1cm depth, with an area of 0.5 sq cm and a volume of 0.05 cubic cm.  There is a scant amount Patient presents to outpatient wound clinic with spouse. Patient here for incontinence issues that has caused erythema rash and shearing when scooting to standing position. No open wound present. Patient wears incontinence pads.  Spouse uses wipes to cleans

## 2020-09-11 ENCOUNTER — OFFICE VISIT (OUTPATIENT)
Dept: WOUND CARE | Facility: HOSPITAL | Age: 85
End: 2020-09-11
Attending: CLINICAL NURSE SPECIALIST
Payer: MEDICARE

## 2020-09-11 DIAGNOSIS — R21 RASH AND OTHER NONSPECIFIC SKIN ERUPTION: Primary | ICD-10-CM

## 2020-09-11 PROCEDURE — 99213 OFFICE O/P EST LOW 20 MIN: CPT

## 2020-09-11 NOTE — PROGRESS NOTES
Subjective    Chief Complaint  This information was obtained from the patient  The patient is new to the [address] [facility] here for an initial visit for the evaluation and management of non-healing wound(s).  Patient have incontient breakdown and shearing 9/11/2 normal. The periwound skin color is normal.    Wound #2 Left Ischial is an Abrasion and has received an outcome of Resolved.   The periwound skin texture is normal. The periwound skin moisture is normal. The periwound skin color is normal.    Vitals  Height for pressure prevention. Follow-Up Appointments:  A follow-up appointment should be scheduled.             Entered By: Ruby Pino NP on 09/11/2020 2:54:43 PM  Signature(s): Date(s):

## 2020-10-07 ENCOUNTER — APPOINTMENT (OUTPATIENT)
Dept: GENERAL RADIOLOGY | Facility: HOSPITAL | Age: 85
DRG: 690 | End: 2020-10-07
Attending: EMERGENCY MEDICINE
Payer: MEDICARE

## 2020-10-07 ENCOUNTER — HOSPITAL ENCOUNTER (INPATIENT)
Facility: HOSPITAL | Age: 85
LOS: 3 days | Discharge: NURSING, REHAB OR LONG-TERM CARE | DRG: 690 | End: 2020-10-10
Attending: EMERGENCY MEDICINE | Admitting: INTERNAL MEDICINE
Payer: MEDICARE

## 2020-10-07 ENCOUNTER — APPOINTMENT (OUTPATIENT)
Dept: MRI IMAGING | Facility: HOSPITAL | Age: 85
DRG: 690 | End: 2020-10-07
Attending: EMERGENCY MEDICINE
Payer: MEDICARE

## 2020-10-07 DIAGNOSIS — R29.898 WEAKNESS OF BOTH LOWER EXTREMITIES: Primary | ICD-10-CM

## 2020-10-07 DIAGNOSIS — N30.90 CYSTITIS: ICD-10-CM

## 2020-10-07 PROCEDURE — 99223 1ST HOSP IP/OBS HIGH 75: CPT | Performed by: OTHER

## 2020-10-07 PROCEDURE — 71045 X-RAY EXAM CHEST 1 VIEW: CPT | Performed by: EMERGENCY MEDICINE

## 2020-10-07 RX ORDER — DONEPEZIL HYDROCHLORIDE 10 MG/1
10 TABLET, FILM COATED ORAL NIGHTLY
Status: DISCONTINUED | OUTPATIENT
Start: 2020-10-08 | End: 2020-10-10

## 2020-10-07 RX ORDER — SULFAMETHOXAZOLE AND TRIMETHOPRIM 800; 160 MG/1; MG/1
1 TABLET ORAL ONCE
Status: COMPLETED | OUTPATIENT
Start: 2020-10-07 | End: 2020-10-07

## 2020-10-07 RX ORDER — SODIUM CHLORIDE 0.9 % (FLUSH) 0.9 %
3 SYRINGE (ML) INJECTION AS NEEDED
Status: DISCONTINUED | OUTPATIENT
Start: 2020-10-07 | End: 2020-10-10

## 2020-10-07 RX ORDER — HYDROCHLOROTHIAZIDE 12.5 MG/1
12.5 CAPSULE, GELATIN COATED ORAL DAILY
Status: DISCONTINUED | OUTPATIENT
Start: 2020-10-08 | End: 2020-10-10

## 2020-10-07 RX ORDER — HEPARIN SODIUM 5000 [USP'U]/ML
5000 INJECTION, SOLUTION INTRAVENOUS; SUBCUTANEOUS EVERY 8 HOURS SCHEDULED
Status: DISCONTINUED | OUTPATIENT
Start: 2020-10-07 | End: 2020-10-10

## 2020-10-07 RX ORDER — MORPHINE SULFATE 2 MG/ML
2 INJECTION, SOLUTION INTRAMUSCULAR; INTRAVENOUS EVERY 2 HOUR PRN
Status: DISCONTINUED | OUTPATIENT
Start: 2020-10-07 | End: 2020-10-10

## 2020-10-07 RX ORDER — MORPHINE SULFATE 2 MG/ML
1 INJECTION, SOLUTION INTRAMUSCULAR; INTRAVENOUS EVERY 2 HOUR PRN
Status: DISCONTINUED | OUTPATIENT
Start: 2020-10-07 | End: 2020-10-10

## 2020-10-07 RX ORDER — MIRTAZAPINE 15 MG/1
15 TABLET, FILM COATED ORAL NIGHTLY
Status: DISCONTINUED | OUTPATIENT
Start: 2020-10-07 | End: 2020-10-10

## 2020-10-07 RX ORDER — AMLODIPINE BESYLATE 5 MG/1
5 TABLET ORAL DAILY
Status: DISCONTINUED | OUTPATIENT
Start: 2020-10-08 | End: 2020-10-10

## 2020-10-07 RX ORDER — ACETAMINOPHEN 325 MG/1
650 TABLET ORAL EVERY 6 HOURS PRN
Status: DISCONTINUED | OUTPATIENT
Start: 2020-10-07 | End: 2020-10-10

## 2020-10-07 RX ORDER — ATORVASTATIN CALCIUM 20 MG/1
20 TABLET, FILM COATED ORAL NIGHTLY
Status: DISCONTINUED | OUTPATIENT
Start: 2020-10-07 | End: 2020-10-09

## 2020-10-07 RX ORDER — HYDROCHLOROTHIAZIDE 12.5 MG/1
12.5 CAPSULE, GELATIN COATED ORAL DAILY
COMMUNITY

## 2020-10-07 RX ORDER — ASPIRIN 81 MG/1
81 TABLET ORAL DAILY
Status: DISCONTINUED | OUTPATIENT
Start: 2020-10-07 | End: 2020-10-10

## 2020-10-07 RX ORDER — ARIPIPRAZOLE 2 MG/1
2 TABLET ORAL DAILY
Status: DISCONTINUED | OUTPATIENT
Start: 2020-10-08 | End: 2020-10-10

## 2020-10-07 RX ORDER — MORPHINE SULFATE 4 MG/ML
4 INJECTION, SOLUTION INTRAMUSCULAR; INTRAVENOUS EVERY 2 HOUR PRN
Status: DISCONTINUED | OUTPATIENT
Start: 2020-10-07 | End: 2020-10-10

## 2020-10-07 RX ORDER — VENLAFAXINE 75 MG/1
75 TABLET ORAL DAILY
Status: DISCONTINUED | OUTPATIENT
Start: 2020-10-08 | End: 2020-10-10

## 2020-10-07 RX ORDER — METOCLOPRAMIDE HYDROCHLORIDE 5 MG/ML
10 INJECTION INTRAMUSCULAR; INTRAVENOUS EVERY 8 HOURS PRN
Status: DISCONTINUED | OUTPATIENT
Start: 2020-10-07 | End: 2020-10-10

## 2020-10-07 RX ORDER — ENALAPRIL MALEATE 10 MG/1
20 TABLET ORAL DAILY
Status: DISCONTINUED | OUTPATIENT
Start: 2020-10-08 | End: 2020-10-10

## 2020-10-07 RX ORDER — ONDANSETRON 2 MG/ML
4 INJECTION INTRAMUSCULAR; INTRAVENOUS EVERY 6 HOURS PRN
Status: DISCONTINUED | OUTPATIENT
Start: 2020-10-07 | End: 2020-10-10

## 2020-10-07 RX ORDER — POTASSIUM CHLORIDE 750 MG/1
10 TABLET, EXTENDED RELEASE ORAL EVERY OTHER DAY
Status: DISCONTINUED | OUTPATIENT
Start: 2020-10-09 | End: 2020-10-10

## 2020-10-07 NOTE — ED NOTES
Pt and pt's wife requesting to have MRI tomorrow because pt is \"getting very tired\". Ok for MRI to be done tomorrow per ER MD DR Aleksey Aguayo.

## 2020-10-07 NOTE — ED PROVIDER NOTES
Patient Seen in: [facility] AND [facility] Emergency Department      History   Patient presents with:  Weakness    Stated Complaint: leg weakness    HPI    Patient is an 56-year-old man with history listed below who has had problems with lower extremity weakness 142/46   Pulse 64   Resp 15   Temp 98.1 °F (36.7 °C)   Temp src Oral   SpO2 98 %   O2 Device None (Room air)       Current:/70   Pulse 75   Temp 98.1 °F (36.7 °C) (Oral)   Resp 16   SpO2 97%         Physical Exam    Constitutional: Oriented to person other components within normal limits   HEPATIC FUNCTION PANEL (7) - Abnormal; Notable for the following components:    AST 94 (*)     All other components within normal limits   CBC W/ DIFFERENTIAL - Abnormal; Notable for the following components:    RBC three months to obtain basic health screening including reassessment of your blood pressure.       Medications Prescribed:  Current Discharge Medication List                       Present on Admission  Date Reviewed: 10/4/2019          ICD-10-CM Noted POA

## 2020-10-07 NOTE — ED NOTES
Orders for admission, patient is aware of plan and ready to go upstairs. Any questions, please call ED RN Mayra Castaneda  at extension [phone]. Pt comes from home with c/o alyssia leg weakness x 1 week and frequent falls.  Pt lives with his wife, is aox3 and forgetful,

## 2020-10-08 ENCOUNTER — APPOINTMENT (OUTPATIENT)
Dept: MRI IMAGING | Facility: HOSPITAL | Age: 85
DRG: 690 | End: 2020-10-08
Attending: EMERGENCY MEDICINE
Payer: MEDICARE

## 2020-10-08 ENCOUNTER — APPOINTMENT (OUTPATIENT)
Dept: MRI IMAGING | Facility: HOSPITAL | Age: 85
DRG: 690 | End: 2020-10-08
Attending: Other
Payer: MEDICARE

## 2020-10-08 PROCEDURE — 72146 MRI CHEST SPINE W/O DYE: CPT | Performed by: OTHER

## 2020-10-08 PROCEDURE — 72148 MRI LUMBAR SPINE W/O DYE: CPT | Performed by: OTHER

## 2020-10-08 PROCEDURE — 99233 SBSQ HOSP IP/OBS HIGH 50: CPT | Performed by: OTHER

## 2020-10-08 RX ORDER — CHOLECALCIFEROL (VITAMIN D3) 50 MCG
TABLET ORAL NIGHTLY
COMMUNITY

## 2020-10-08 RX ORDER — LORAZEPAM 1 MG/1
2 TABLET ORAL ONCE
Status: COMPLETED | OUTPATIENT
Start: 2020-10-08 | End: 2020-10-08

## 2020-10-08 RX ORDER — POTASSIUM CHLORIDE 20 MEQ/1
40 TABLET, EXTENDED RELEASE ORAL EVERY 4 HOURS
Status: COMPLETED | OUTPATIENT
Start: 2020-10-08 | End: 2020-10-08

## 2020-10-08 NOTE — OCCUPATIONAL THERAPY NOTE
OCCUPATIONAL THERAPY EVALUATION - INPATIENT     Room Number: 555/555-A  Evaluation Date: 10/8/2020  Type of Evaluation: Initial  Presenting Problem: ble weakness,uti,frequent falls    Physician Order: IP Consult to Occupational Therapy  Reason for Therapy: at this time is for continued IP rehab in a subacute setting     The patient's Approx Degree of Impairment: 85.69% has been calculated based on documentation in the [facility] '6 clicks' Inpatient Daily Activity Short Form.   Research supports that patients with grossly fair-    COORDINATION  Gross Motor:significantly limited   Fine Motor: moderately limited    ACTIVITIES OF DAILY LIVING ASSESSMENT  AM-PAC ‘6-Clicks’ Inpatient Daily Activity Short Form  How much help from another person does the patient currently

## 2020-10-08 NOTE — CONSULTS
This is a pleasant 52-year-old gentleman who relates several month history of lower extremity weakness. The weakness in the legs is much more severe in the last week. He denies upper extremity weakness. No headache. No visual symptoms.   No cranial nerv

## 2020-10-08 NOTE — PLAN OF CARE
Problem: Patient Centered Care  Goal: Patient preferences are identified and integrated in the patient's plan of care  Description: Interventions:  - What would you like us to know as we care for you? \"I live with my wife. \"  - Provide timely, complete, Progressing     Problem: Impaired Functional Mobility  Goal: Achieve highest/safest level of mobility/gait  Description: Interventions:  - Assess patient's functional ability and stability  - Promote increasing activity/tolerance for mobility and gait  - E

## 2020-10-08 NOTE — PHYSICAL THERAPY NOTE
PHYSICAL THERAPY EVALUATION - INPATIENT     Room Number: 555/555-A  Evaluation Date: 10/8/2020  Type of Evaluation: Initial   Physician Order: PT Eval and Treat    Presenting Problem: weakness, fall, UTI  Reason for Therapy: Mobility Dysfunction and Disch decline in function d/t quarantine. Pt was ambulatory with wife assist until recently d/t weakness. Anticipate pt would progress in a rehab setting. PT recommendation sub acute rehab at d/c.      Patient will benefit from continued IP PT services to address distances. \"    SUBJECTIVE  \"We need to fix the 3 doors on the car. \"    PHYSICAL THERAPY EXAMINATION     OBJECTIVE  Precautions: Bed/chair alarm  Fall Risk: High fall risk    WEIGHT BEARING RESTRICTION  Weight Bearing Restriction: None    PAIN ASSESSMENT lying on back to sitting on the side of the bed?: A Lot   How much help from another person does the patient currently need. ..   -   Moving to and from a bed to a chair (including a wheelchair)?: A Lot   -   Need to walk in hospital room?: Total   -   Clim #6  Current Status

## 2020-10-08 NOTE — PROGRESS NOTES
[facility]    Progress Note    Omar Lafleur Patient Status:  Inpatient    1935 MRN [id number]   Location [facility] 5SW/SE Attending Raj Steele MD   Hosp Day # 1 PCP Jose Pulse     SUBJECTIVE:  Pt seen and exami mg, 4 mg, Intravenous, Q2H PRN    •  ondansetron HCl (ZOFRAN) injection 4 mg, 4 mg, Intravenous, Q6H PRN    •  Metoclopramide HCl (REGLAN) injection 10 mg, 10 mg, Intravenous, Q8H PRN    •  amLODIPine Besylate (NORVASC) tab 5 mg, 5 mg, Oral, Daily    •  AR

## 2020-10-08 NOTE — H&P
[address] Patient Status:  Inpatient    1935 MRN [id number]   Location [facility] 5SW/SE Attending Zoë Mac MD   Hosp Day # 0 TRACI Keen     Date:  10/7/2020  Date of Tab, Take 81 mg by mouth daily. , Disp: , Rfl: , 10/6/2020    •  Potassium Chloride ER 10 MEQ Oral Tab CR, Take 10 mEq by mouth every other day.  Next dose due 3/1/20, Disp: , Rfl: , 10/7/2020 at Unknown time    •  ARIPIPRAZOLE 2 MG Oral Tab, TAKE ONE TABLET General Appearance:  Alert, Awake, not in acute distress.   HEENT: PERRLA  Neck: Supple, no carotid bruit or JVD  Lungs: Clear to auscultation bilaterally, No wheezes, crackles  Heart: Regular rate and rhythm, S1 and S2 normal, no murmur, rub or gallop

## 2020-10-08 NOTE — PLAN OF CARE
Problem: Patient Centered Care  Goal: Patient preferences are identified and integrated in the patient's plan of care  Description: Interventions:  - What would you like us to know as we care for you? \"I live with my wife. \"  - Provide timely, complete, Progressing     Problem: Impaired Functional Mobility  Goal: Achieve highest/safest level of mobility/gait  Description: Interventions:  - Assess patient's functional ability and stability  - Promote increasing activity/tolerance for mobility and gait  - E precautions  - See additional Care Plan goals for specific interventions  Outcome: Progressing  Goal: Patient/Family Short Term Goal  Description: Patient's Short Term Goal: Improvement in strength    Interventions:   - OT/OT eval  - MRI of LS spine  - See

## 2020-10-08 NOTE — SLP NOTE
ADULT SWALLOWING EVALUATION    ASSESSMENT    ASSESSMENT/OVERALL IMPRESSION:      PPE REQUIRED. THIS SLP WORE GLOVES AND DROPLET MASK. HANDS SANITIZED/WASHED UPON ENTRANCE/EXIT.     This BSE was ordered d/t Pt on modified consistency with pills at home (take \"softer foods\")/NECTAR-thick liquids/no straw. BSE results/recommendations discussed with Pt and spouse; good understanding (spouse is SLP). Swallowing precautions written on white board in Pt room.             PLAN:    To f/u x3 sessions/meals for dyspha Limits  Range of Motion: Within Functional Limits  Rate of Motion: Within Functional Limits    Voice Quality: Weak  Respiratory Status: Unlabored  Consistencies Trialed: Thin liquids; Nectar thick liquids; Hard solid  Method of Presentation: Staff/Clinician

## 2020-10-08 NOTE — PROGRESS NOTES
[facility]    Progress Note    Juliet Beltran Patient Status:  Inpatient    1935 MRN [id number]   Location [facility] 5SW/SE Attending Linda Bains MD   Hosp Day # 1 PCP Maryse Russ       Subjective:   Mary Lott injection 1 mg, 1 mg, Intravenous, Q2H PRN    Or    •  morphINE sulfate (PF) 2 MG/ML injection 2 mg, 2 mg, Intravenous, Q2H PRN    Or    •  morphINE sulfate (PF) 4 MG/ML injection 4 mg, 4 mg, Intravenous, Q2H PRN    •  ondansetron HCl (ZOFRAN) injection 4 10/08/2020    ALB 3.3 (L) 10/08/2020    ALKPHO 81 10/08/2020    BILT 0.5 10/08/2020    TP 7.2 10/08/2020     (H) 10/08/2020    ALT 56 10/08/2020    TSH 2.540 10/08/2020    MG 2.4 10/08/2020       Mri Spine Thoracic (cpt=72146)    Result Date: 10/8/2 cardiopulmonary disease.     Dictated by (CST): Villa Gilbert MD on 10/07/2020 at 4:11 PM     Finalized by (CST): Villa Gilbert MD on 10/07/2020 at 4:12 PM          Ekg 12-lead    Result Date: 10/7/2020  ECG Report  Interpretation  -----------------

## 2020-10-08 NOTE — CM/SW NOTE
10/08/20 1200   CM/SW Screening   Referral [id number] staff; Chart review;Nursing rounds   Patient's Mental Status Confused   Patient's Home Environment Condo/Apt no elevator   Patient lives with Spouse   MDO for AD.

## 2020-10-08 NOTE — PROGRESS NOTES
Pharmacy Note: Dietary Supplement Discontinuation Per Policy    BWA-68 has been discontinued on Adebayo Rendon per policy. This supplement may be restarted upon discharge using the medication reconciliation process.     Thank you,   Mk Chavarria, PharmD

## 2020-10-09 PROCEDURE — 99233 SBSQ HOSP IP/OBS HIGH 50: CPT | Performed by: OTHER

## 2020-10-09 NOTE — CM/SW NOTE
CM spoke with wife to provide choice. She is agreeable with PP. Per MD anticipate dc tm, wife aware, is agreeable to ambulance for xfer. / to remain available for support and/or discharge planning.      Anu Covarrubias RN  Ca

## 2020-10-09 NOTE — PROGRESS NOTES
[facility]    Progress Note    Adebayo Rendon Patient Status:  Inpatient    1935 MRN [id number]   Location [facility] 5SW/SE Attending Brett Aase, MD   Hosp Day # 2 PCP Reshma Melton     SUBJECTIVE:  Pt seen and exami Donepezil HCl (ARICEPT) tab 10 mg, 10 mg, Oral, Nightly    •  Enalapril Maleate (VASOTEC) tab 20 mg, 20 mg, Oral, Daily    •  hydrochlorothiazide (MICROZIDE) cap 12.5 mg, 12.5 mg, Oral, Daily    •  mirtazapine (REMERON) tab 15 mg, 15 mg, Oral, Nightly    •

## 2020-10-09 NOTE — PROGRESS NOTES
[facility]    Progress Note    Chris Giles Patient Status:  Inpatient    1935 MRN [id number]   Location [facility] 5SW/SE Attending Antonieta Page MD   Hosp Day # 2 PCP Kerri Lira       Subjective:   Mamadou Chanel Subcutaneous, Q8H [address]    •  acetaminophen (TYLENOL) tab 650 mg, 650 mg, Oral, Q6H PRN    •  morphINE sulfate (PF) 2 MG/ML injection 1 mg, 1 mg, Intravenous, Q2H PRN    Or    •  morphINE sulfate (PF) 2 MG/ML injection 2 mg, 2 mg, Intravenous, Q2H PRN    Or K 4.7 10/08/2020     10/08/2020    CO2 28.0 10/08/2020     (H) 10/08/2020    CA 9.6 10/08/2020    ALB 3.3 (L) 10/08/2020    ALKPHO 81 10/08/2020    BILT 0.5 10/08/2020    TP 7.2 10/08/2020     (H) 10/08/2020    ALT 56 10/08/2020    T (CST): Segre Breen MD on 10/08/2020 at 9:25 AM                  Betsy Mohan MD, MD  10/9/2020

## 2020-10-10 VITALS
OXYGEN SATURATION: 92 % | WEIGHT: 195.81 LBS | BODY MASS INDEX: 28 KG/M2 | HEART RATE: 81 BPM | DIASTOLIC BLOOD PRESSURE: 72 MMHG | TEMPERATURE: 98 F | RESPIRATION RATE: 18 BRPM | SYSTOLIC BLOOD PRESSURE: 147 MMHG

## 2020-10-10 RX ORDER — ARIPIPRAZOLE 2 MG/1
2 TABLET ORAL EVERY OTHER DAY
Qty: 1 TABLET | Refills: 0 | Status: SHIPPED | OUTPATIENT
Start: 2020-10-10

## 2020-10-10 NOTE — DISCHARGE SUMMARY
[facility]    Discharge Summary    Carmelina Villeda Patient Status:  Inpatient    1935 MRN [id number]   Location [facility] 5SW/SE Attending Reddy Kent MD   [facility] Day # 3 TRACI Hurley     Date of Admission: 10/7/ by neurology service. MRI of lumbar, thoracic spine was done which was without acute changes. Recommended to have PT/OT, and discharged to subacute rehab. If strength does not return to normal EMG was recommended.   Patient was discharged to [facility] s Visits: Follow-up with PCP in 1 to 2 weeks.       Marielos Saldivar  10/10/2020  9:09 AM

## 2020-10-10 NOTE — CM/SW NOTE
10/10/20 0900   Discharge disposition   Expected discharge disposition Skilled Nurs   Name of Facillity/Home Care/Hospice PP SNF   Discharge transportation [facility]   MDO for dc.     Per Brenda Gavin RN requesting 1:30PM. DC le for liaison Kayla Chen

## 2020-10-10 NOTE — PLAN OF CARE
Problem: Patient Centered Care  Goal: Patient preferences are identified and integrated in the patient's plan of care  Description: Interventions:  - What would you like us to know as we care for you? \"I live with my wife. \"  - Provide timely, complete, Progressing     Problem: Impaired Functional Mobility  Goal: Achieve highest/safest level of mobility/gait  Description: Interventions:  - Assess patient's functional ability and stability  - Promote increasing activity/tolerance for mobility and gait  - E consult  - PT/OT eval and treat  - fall risk precautions  - See additional Care Plan goals for specific interventions  Outcome: Progressing  Goal: Patient/Family Short Term Goal  Description: Patient's Short Term Goal: Improvement in strength    Interventi

## 2020-10-10 NOTE — PLAN OF CARE
A/Ox2-3: confused. Fall risk precautions appropriate for pt: bed in lowest position, call light within reach, spouse at bedside. Q2 turns. Pt ok'd to be discharged: discharge instructions provided to pt and spouse( Serena)-voice understanding.    Report mobilization  - Obtain PT/OT consults as needed  - Advance activity as appropriate  - Communicate ordered activity level and limitations with patient/family  Outcome: Adequate for Discharge     Problem: NEUROLOGICAL - ADULT  Goal: Achieves maximal function Identify cognitive and physical deficits and behaviors that affect risk of falls.   - [place] fall precautions as indicated by assessment.  - Educate pt/family on patient safety including physical limitations  - Instruct pt to call for assistance with act

## 2020-10-13 ENCOUNTER — SNF ADMIT/H&P (OUTPATIENT)
Dept: INTERNAL MEDICINE CLINIC | Facility: SKILLED NURSING FACILITY | Age: 85
End: 2020-10-13

## 2020-10-13 DIAGNOSIS — R29.898 WEAKNESS OF BOTH LOWER EXTREMITIES: ICD-10-CM

## 2020-10-13 DIAGNOSIS — N30.90 CYSTITIS: ICD-10-CM

## 2020-10-13 DIAGNOSIS — I10 ESSENTIAL HYPERTENSION: ICD-10-CM

## 2020-10-13 PROCEDURE — 1111F DSCHRG MED/CURRENT MED MERGE: CPT | Performed by: NURSE PRACTITIONER

## 2020-10-13 PROCEDURE — 1123F ACP DISCUSS/DSCN MKR DOCD: CPT | Performed by: NURSE PRACTITIONER

## 2020-10-13 PROCEDURE — 99309 SBSQ NF CARE MODERATE MDM 30: CPT | Performed by: NURSE PRACTITIONER

## 2020-10-13 NOTE — PROGRESS NOTES
HPI: Daphne Lindo  Is an 80 yo male with HTN, BPH, hyperlipidemia, depression   Who was admitted to [facility] with UTI and ADL dysfunction, gait imbalance, generalized weakness. Neurology consulted. Treated with IV ceftriaxone.  MRI of thoracic and lumbar spi rehab 10/27       1.  UTI. Resolved.   S/p IV ceftriaxone  2.  ADL dysfunction, gait imbalance, generalized weakness  PT/OT  MRI thoracic, lumbar spine- negative for acute finding  3.  History of depression  Psych consult  On mirtazapine, venlafaxine  4.  H

## 2020-10-16 ENCOUNTER — SNF VISIT (OUTPATIENT)
Dept: INTERNAL MEDICINE CLINIC | Facility: SKILLED NURSING FACILITY | Age: 85
End: 2020-10-16

## 2020-10-16 DIAGNOSIS — R29.898 WEAKNESS OF BOTH LOWER EXTREMITIES: ICD-10-CM

## 2020-10-16 DIAGNOSIS — F03.90 DEMENTIA WITHOUT BEHAVIORAL DISTURBANCE, UNSPECIFIED DEMENTIA TYPE (HCC): ICD-10-CM

## 2020-10-16 DIAGNOSIS — I10 ESSENTIAL HYPERTENSION: ICD-10-CM

## 2020-10-16 PROCEDURE — 99308 SBSQ NF CARE LOW MDM 20: CPT | Performed by: NURSE PRACTITIONER

## 2020-10-16 NOTE — PROGRESS NOTES
HPI: Norm Burroughs  Is an 82 yo male with HTN, BPH, hyperlipidemia, depression   Who was admitted to [address] [place] [address] with UTI and ADL dysfunction, gait imbalance, generalized weakness. Neurology consulted. Treated with IV ceftriaxone.  MRI of thoracic and lumbar spi venlafaxine  4.  Hypertension/controlled  Cont lisinopril, norvasc, hctz  5.  Hyperlipidemia  Cont simvastatin   6.  Dementia   Cont donepezil,   On aripiprazole  Psych consult

## 2020-10-19 ENCOUNTER — SNF VISIT (OUTPATIENT)
Dept: INTERNAL MEDICINE CLINIC | Facility: SKILLED NURSING FACILITY | Age: 85
End: 2020-10-19

## 2020-10-19 DIAGNOSIS — R29.898 WEAKNESS OF BOTH LOWER EXTREMITIES: ICD-10-CM

## 2020-10-19 DIAGNOSIS — I10 ESSENTIAL HYPERTENSION: ICD-10-CM

## 2020-10-19 DIAGNOSIS — N30.90 CYSTITIS: ICD-10-CM

## 2020-10-19 PROCEDURE — 99308 SBSQ NF CARE LOW MDM 20: CPT | Performed by: NURSE PRACTITIONER

## 2020-10-19 NOTE — PROGRESS NOTES
HPI: Linsey Chen  Is an 82 yo male with HTN, BPH, hyperlipidemia, depression   Who was admitted to [facility] with UTI and ADL dysfunction, gait imbalance, generalized weakness. Neurology consulted. Treated with IV ceftriaxone.  MRI of thoracic and lumbar spi  Hypertension/controlled  Cont lisinopril, norvasc, hctz  5.  Hyperlipidemia  Cont simvastatin   6.  Dementia   Cont donepezil,   On aripiprazole  Psych consult

## 2020-10-23 ENCOUNTER — SNF VISIT (OUTPATIENT)
Dept: INTERNAL MEDICINE CLINIC | Facility: SKILLED NURSING FACILITY | Age: 85
End: 2020-10-23

## 2020-10-23 DIAGNOSIS — R29.898 WEAKNESS OF BOTH LOWER EXTREMITIES: ICD-10-CM

## 2020-10-23 DIAGNOSIS — R53.1 GENERALIZED WEAKNESS: Primary | ICD-10-CM
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
## 2020-10-23 PROCEDURE — 99309 SBSQ NF CARE MODERATE MDM 30: CPT | Performed by: NURSE PRACTITIONER

## 2020-10-26 ENCOUNTER — SNF VISIT (OUTPATIENT)
Dept: INTERNAL MEDICINE CLINIC | Facility: SKILLED NURSING FACILITY | Age: 85
End: 2020-10-26

## 2020-10-26 DIAGNOSIS — R29.898 WEAKNESS OF BOTH LOWER EXTREMITIES: ICD-10-CM
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
## 2020-10-26 PROCEDURE — 99308 SBSQ NF CARE LOW MDM 20: CPT | Performed by: NURSE PRACTITIONER

## 2020-10-26 NOTE — PROGRESS NOTES
HPI: Evonne Cortes  Is an 82 yo male with HTN, BPH, hyperlipidemia, depression   Who was admitted to [address] [place] [address] with UTI and ADL dysfunction, gait imbalance, generalized weakness. Neurology consulted. Treated with IV ceftriaxone.  MRI of thoracic and lumbar spi simvastatin   6. Dementia   Cont donepezil,   On aripiprazole  Psych consult in rehab managing medicaitons  7.  Fall  Neuro checks, fall precautions

## 2020-11-04 ENCOUNTER — HOSPITAL ENCOUNTER (OUTPATIENT)
Dept: PHYSICAL MEDICINE AND REHAB | Age: 85
Discharge: STILL ADMITTED OR ADMINISTRATIVE | End: 2020-11-04
Attending: INTERNAL MEDICINE

## 2020-11-04 DIAGNOSIS — R53.1 GENERALIZED WEAKNESS: ICD-10-CM

## 2020-11-04 PROCEDURE — 97110 THERAPEUTIC EXERCISES: CPT | Performed by: PHYSICAL THERAPIST

## 2020-11-04 PROCEDURE — 97162 PT EVAL MOD COMPLEX 30 MIN: CPT | Performed by: PHYSICAL THERAPIST

## 2020-11-05 ENCOUNTER — HOSPITAL ENCOUNTER (OUTPATIENT)
Age: 85
Discharge: HOME | End: 2020-11-05
Payer: MEDICARE

## 2020-11-05 VITALS
SYSTOLIC BLOOD PRESSURE: 130 MMHG | HEART RATE: 71 BPM | RESPIRATION RATE: 18 BRPM | DIASTOLIC BLOOD PRESSURE: 58 MMHG | OXYGEN SATURATION: 100 % | TEMPERATURE: 98 F

## 2020-11-05 DIAGNOSIS — L03.019 ONYCHIA AND PARONYCHIA OF FINGER: Primary | ICD-10-CM

## 2020-11-05 PROCEDURE — 10060 I&D ABSCESS SIMPLE/SINGLE: CPT

## 2020-11-05 PROCEDURE — 99212 OFFICE O/P EST SF 10 MIN: CPT

## 2020-11-05 PROCEDURE — 99213 OFFICE O/P EST LOW 20 MIN: CPT

## 2020-11-05 NOTE — ED PROVIDER NOTES
Patient Seen in: [facility]    History   Patient presents with:  Skin    Stated Complaint: left thumb infected    HPI    27-year-old male with a history of paronychia as to the emergency department with a paronychia to the left thumb noted 2 Smoker        Years: 3.00        Quit date: 1960        Years since quittin.2      Smokeless tobacco: Never Used    Alcohol use: Yes      Comment: occasionally    Drug use: Not on file      Review of Systems    Positive for stated complaint: left encounter diagnosis)    Disposition:  Discharge    Follow-up:  Stacey Mckinney    In 2 days        Medications Prescribed:  Discharge Medication List as of 11/5/2020  2:09 PM

## 2020-11-11 ASSESSMENT — ENCOUNTER SYMPTOMS
PAIN SCALE AT HIGHEST: 2
QUALITY: STIFF
PAIN LOCATION: LOW BACK PAIN
PAIN SEVERITY NOW: 2
PAIN SCALE AT LOWEST: 2
ALLEVIATING FACTORS: REST
QUALITY: THROBBING

## 2020-11-12 ENCOUNTER — HOSPITAL ENCOUNTER (OUTPATIENT)
Dept: PHYSICAL MEDICINE AND REHAB | Age: 85
Discharge: STILL ADMITTED OR ADMINISTRATIVE | End: 2020-11-12
Attending: INTERNAL MEDICINE

## 2020-11-12 DIAGNOSIS — R26.89 BALANCE PROBLEM: ICD-10-CM

## 2020-11-12 DIAGNOSIS — M54.50 ACUTE BILATERAL LOW BACK PAIN WITHOUT SCIATICA: ICD-10-CM

## 2020-11-12 DIAGNOSIS — R26.9 GAIT ABNORMALITY: ICD-10-CM

## 2020-11-12 PROCEDURE — 97140 MANUAL THERAPY 1/> REGIONS: CPT | Performed by: PHYSICAL THERAPIST

## 2020-11-12 PROCEDURE — 97530 THERAPEUTIC ACTIVITIES: CPT | Performed by: PHYSICAL THERAPIST

## 2020-11-16 ENCOUNTER — HOSPITAL ENCOUNTER (OUTPATIENT)
Dept: PHYSICAL MEDICINE AND REHAB | Age: 85
Discharge: STILL ADMITTED OR ADMINISTRATIVE | End: 2020-11-16

## 2020-11-16 PROCEDURE — 97110 THERAPEUTIC EXERCISES: CPT | Performed by: PHYSICAL THERAPIST

## 2020-11-16 PROCEDURE — 97116 GAIT TRAINING THERAPY: CPT | Performed by: PHYSICAL THERAPIST

## 2020-11-16 PROCEDURE — 97140 MANUAL THERAPY 1/> REGIONS: CPT | Performed by: PHYSICAL THERAPIST

## 2020-11-20 ENCOUNTER — HOSPITAL ENCOUNTER (OUTPATIENT)
Dept: PHYSICAL MEDICINE AND REHAB | Age: 85
Discharge: STILL ADMITTED OR ADMINISTRATIVE | End: 2020-11-20

## 2020-11-20 PROCEDURE — 97110 THERAPEUTIC EXERCISES: CPT | Performed by: PHYSICAL THERAPY ASSISTANT

## 2020-11-23 ENCOUNTER — HOSPITAL ENCOUNTER (OUTPATIENT)
Dept: PHYSICAL MEDICINE AND REHAB | Age: 85
Discharge: STILL ADMITTED OR ADMINISTRATIVE | End: 2020-11-23

## 2020-11-23 PROCEDURE — 97140 MANUAL THERAPY 1/> REGIONS: CPT | Performed by: PHYSICAL THERAPIST

## 2020-11-23 PROCEDURE — 97110 THERAPEUTIC EXERCISES: CPT | Performed by: PHYSICAL THERAPIST

## 2020-11-27 ENCOUNTER — HOSPITAL ENCOUNTER (OUTPATIENT)
Dept: PHYSICAL MEDICINE AND REHAB | Age: 85
Discharge: STILL ADMITTED OR ADMINISTRATIVE | End: 2020-11-27

## 2020-11-27 PROCEDURE — 97140 MANUAL THERAPY 1/> REGIONS: CPT | Performed by: PHYSICAL THERAPIST

## 2020-11-27 PROCEDURE — 97110 THERAPEUTIC EXERCISES: CPT | Performed by: PHYSICAL THERAPIST

## 2020-11-30 ENCOUNTER — HOSPITAL ENCOUNTER (OUTPATIENT)
Dept: PHYSICAL MEDICINE AND REHAB | Age: 85
Discharge: STILL ADMITTED OR ADMINISTRATIVE | End: 2020-11-30

## 2020-11-30 PROCEDURE — 97110 THERAPEUTIC EXERCISES: CPT | Performed by: PHYSICAL THERAPY ASSISTANT

## 2020-12-03 ENCOUNTER — HOSPITAL ENCOUNTER (OUTPATIENT)
Dept: PHYSICAL MEDICINE AND REHAB | Age: 85
Discharge: STILL ADMITTED OR ADMINISTRATIVE | End: 2020-12-03
Attending: INTERNAL MEDICINE

## 2020-12-03 PROCEDURE — 97110 THERAPEUTIC EXERCISES: CPT | Performed by: PHYSICAL THERAPIST

## 2020-12-03 ASSESSMENT — ENCOUNTER SYMPTOMS: PAIN SEVERITY NOW: 0

## 2020-12-07 ENCOUNTER — HOSPITAL ENCOUNTER (OUTPATIENT)
Dept: PHYSICAL MEDICINE AND REHAB | Age: 85
Discharge: STILL ADMITTED OR ADMINISTRATIVE | End: 2020-12-07

## 2020-12-07 PROCEDURE — 97110 THERAPEUTIC EXERCISES: CPT | Performed by: PHYSICAL THERAPY ASSISTANT

## 2020-12-10 ENCOUNTER — HOSPITAL ENCOUNTER (OUTPATIENT)
Dept: PHYSICAL MEDICINE AND REHAB | Age: 85
Discharge: STILL ADMITTED OR ADMINISTRATIVE | End: 2020-12-10

## 2020-12-10 PROCEDURE — 97110 THERAPEUTIC EXERCISES: CPT | Performed by: PHYSICAL THERAPIST

## 2020-12-10 ASSESSMENT — ENCOUNTER SYMPTOMS
PAIN SCALE AT LOWEST: 0
PAIN SCALE AT HIGHEST: 0
PAIN SEVERITY NOW: 0

## 2021-02-09 DIAGNOSIS — R53.1 WEAKNESS: Primary | ICD-10-CM

## 2021-02-13 ENCOUNTER — APPOINTMENT (OUTPATIENT)
Dept: GENERAL RADIOLOGY | Facility: HOSPITAL | Age: 86
End: 2021-02-13
Attending: EMERGENCY MEDICINE
Payer: MEDICARE

## 2021-02-13 ENCOUNTER — HOSPITAL ENCOUNTER (OUTPATIENT)
Facility: HOSPITAL | Age: 86
Setting detail: OBSERVATION
Discharge: HOME | End: 2021-02-14
Attending: EMERGENCY MEDICINE | Admitting: INTERNAL MEDICINE
Payer: MEDICARE

## 2021-02-13 DIAGNOSIS — R53.1 WEAKNESS GENERALIZED: Primary | ICD-10-CM

## 2021-02-13 DIAGNOSIS — R50.9 FEBRILE ILLNESS: ICD-10-CM

## 2021-02-13 PROBLEM — I10 HTN (HYPERTENSION): Status: ACTIVE | Noted: 2021-02-13

## 2021-02-13 PROBLEM — R73.9 HYPERGLYCEMIA: Status: ACTIVE | Noted: 2021-02-13

## 2021-02-13 LAB
ADENOVIRUS PCR:: NEGATIVE
ANION GAP SERPL CALC-SCNC: 6 MMOL/L (ref 0–18)
B PERT DNA SPEC QL NAA+PROBE: NEGATIVE
BACTERIA UR QL AUTO: NEGATIVE /HPF
BASOPHILS # BLD AUTO: 0.03 X10(3) UL (ref 0–0.2)
BASOPHILS NFR BLD AUTO: 0.3 %
BILIRUB UR QL: NEGATIVE
BUN BLD-MCNC: 12 MG/DL (ref 7–18)
BUN/CREAT SERPL: 16.2 (ref 10–20)
C PNEUM DNA SPEC QL NAA+PROBE: NEGATIVE
CALCIUM BLD-MCNC: 9.4 MG/DL (ref 8.5–10.1)
CHLORIDE SERPL-SCNC: 106 MMOL/L (ref 98–112)
CO2 SERPL-SCNC: 27 MMOL/L (ref 21–32)
COLOR UR: YELLOW
CORONAVIRUS 229E PCR:: NEGATIVE
CORONAVIRUS HKU1 PCR:: NEGATIVE
CORONAVIRUS NL63 PCR:: NEGATIVE
CORONAVIRUS OC43 PCR:: NEGATIVE
CREAT BLD-MCNC: 0.74 MG/DL
DEPRECATED RDW RBC AUTO: 47.7 FL (ref 35.1–46.3)
EOSINOPHIL # BLD AUTO: 0.03 X10(3) UL (ref 0–0.7)
EOSINOPHIL NFR BLD AUTO: 0.3 %
ERYTHROCYTE [DISTWIDTH] IN BLOOD BY AUTOMATED COUNT: 12.9 % (ref 11–15)
FLUAV RNA SPEC QL NAA+PROBE: NEGATIVE
FLUBV RNA SPEC QL NAA+PROBE: NEGATIVE
GLUCOSE BLD-MCNC: 157 MG/DL (ref 70–99)
GLUCOSE UR-MCNC: NEGATIVE MG/DL
HCT VFR BLD AUTO: 39.2 %
HGB BLD-MCNC: 13.3 G/DL
IMM GRANULOCYTES # BLD AUTO: 0.05 X10(3) UL (ref 0–1)
IMM GRANULOCYTES NFR BLD: 0.4 %
LEUKOCYTE ESTERASE UR QL STRIP.AUTO: NEGATIVE
LYMPHOCYTES # BLD AUTO: 0.68 X10(3) UL (ref 1–4)
LYMPHOCYTES NFR BLD AUTO: 6.1 %
MCH RBC QN AUTO: 34.1 PG (ref 26–34)
MCHC RBC AUTO-ENTMCNC: 33.9 G/DL (ref 31–37)
MCV RBC AUTO: 100.5 FL
METAPNEUMOVIRUS PCR:: NEGATIVE
MONOCYTES # BLD AUTO: 0.77 X10(3) UL (ref 0.1–1)
MONOCYTES NFR BLD AUTO: 6.9 %
MYCOPLASMA PNEUMONIA PCR:: NEGATIVE
NEUTROPHILS # BLD AUTO: 9.58 X10 (3) UL (ref 1.5–7.7)
NEUTROPHILS # BLD AUTO: 9.58 X10(3) UL (ref 1.5–7.7)
NEUTROPHILS NFR BLD AUTO: 86 %
NITRITE UR QL STRIP.AUTO: NEGATIVE
OSMOLALITY SERPL CALC.SUM OF ELEC: 291 MOSM/KG (ref 275–295)
PARAINFLUENZA 1 PCR:: NEGATIVE
PARAINFLUENZA 2 PCR:: NEGATIVE
PARAINFLUENZA 3 PCR:: NEGATIVE
PARAINFLUENZA 4 PCR:: NEGATIVE
PH UR: 5 [PH] (ref 5–8)
PLATELET # BLD AUTO: 168 10(3)UL (ref 150–450)
POTASSIUM SERPL-SCNC: 3.5 MMOL/L (ref 3.5–5.1)
PROCALCITONIN SERPL-MCNC: 0.61 NG/ML (ref ?–0.16)
PROT UR-MCNC: 30 MG/DL
RBC # BLD AUTO: 3.9 X10(6)UL
RBC #/AREA URNS AUTO: 89 /HPF
RHINOVIRUS/ENTERO PCR:: NEGATIVE
RSV RNA SPEC QL NAA+PROBE: NEGATIVE
SARS-COV-2 RNA RESP QL NAA+PROBE: NOT DETECTED
SODIUM SERPL-SCNC: 139 MMOL/L (ref 136–145)
SP GR UR STRIP: 1.01 (ref 1–1.03)
UROBILINOGEN UR STRIP-ACNC: <2
WBC # BLD AUTO: 11.1 X10(3) UL (ref 4–11)
WBC #/AREA URNS AUTO: 6 /HPF

## 2021-02-13 PROCEDURE — 71045 X-RAY EXAM CHEST 1 VIEW: CPT | Performed by: EMERGENCY MEDICINE

## 2021-02-13 PROCEDURE — 99220 INITIAL OBSERVATION CARE,LEVL III: CPT | Performed by: INTERNAL MEDICINE

## 2021-02-13 RX ORDER — ARIPIPRAZOLE 2 MG/1
2 TABLET ORAL EVERY OTHER DAY
Status: DISCONTINUED | OUTPATIENT
Start: 2021-02-14 | End: 2021-02-14

## 2021-02-13 RX ORDER — VENLAFAXINE 75 MG/1
75 TABLET ORAL 2 TIMES DAILY
Status: DISCONTINUED | OUTPATIENT
Start: 2021-02-13 | End: 2021-02-14

## 2021-02-13 RX ORDER — ACETAMINOPHEN 325 MG/1
650 TABLET ORAL EVERY 6 HOURS PRN
Status: DISCONTINUED | OUTPATIENT
Start: 2021-02-13 | End: 2021-02-14

## 2021-02-13 RX ORDER — NITROGLYCERIN 0.4 MG/1
0.4 TABLET SUBLINGUAL EVERY 5 MIN PRN
Status: DISCONTINUED | OUTPATIENT
Start: 2021-02-13 | End: 2021-02-14

## 2021-02-13 RX ORDER — ATORVASTATIN CALCIUM 20 MG/1
20 TABLET, FILM COATED ORAL NIGHTLY
Status: DISCONTINUED | OUTPATIENT
Start: 2021-02-13 | End: 2021-02-14

## 2021-02-13 RX ORDER — HYDROCHLOROTHIAZIDE 12.5 MG/1
12.5 CAPSULE, GELATIN COATED ORAL DAILY
Status: DISCONTINUED | OUTPATIENT
Start: 2021-02-14 | End: 2021-02-14

## 2021-02-13 RX ORDER — AMLODIPINE BESYLATE 5 MG/1
5 TABLET ORAL DAILY
Status: DISCONTINUED | OUTPATIENT
Start: 2021-02-14 | End: 2021-02-14

## 2021-02-13 RX ORDER — MIRTAZAPINE 7.5 MG/1
7.5 TABLET, FILM COATED ORAL NIGHTLY
Status: DISCONTINUED | OUTPATIENT
Start: 2021-02-13 | End: 2021-02-14

## 2021-02-13 RX ORDER — ENALAPRIL MALEATE 20 MG/1
20 TABLET ORAL DAILY
Status: DISCONTINUED | OUTPATIENT
Start: 2021-02-14 | End: 2021-02-14

## 2021-02-13 RX ORDER — ASPIRIN 81 MG/1
81 TABLET ORAL DAILY
Status: DISCONTINUED | OUTPATIENT
Start: 2021-02-14 | End: 2021-02-14

## 2021-02-13 RX ORDER — DONEPEZIL HYDROCHLORIDE 10 MG/1
10 TABLET, FILM COATED ORAL NIGHTLY
Status: DISCONTINUED | OUTPATIENT
Start: 2021-02-13 | End: 2021-02-14

## 2021-02-13 RX ORDER — HEPARIN SODIUM 5000 [USP'U]/ML
5000 INJECTION, SOLUTION INTRAVENOUS; SUBCUTANEOUS EVERY 8 HOURS SCHEDULED
Status: DISCONTINUED | OUTPATIENT
Start: 2021-02-13 | End: 2021-02-14

## 2021-02-13 NOTE — ED PROVIDER NOTES
Patient Seen in: [facility] AND [facility] Emergency Department      History   Patient presents with:  Fever    Stated Complaint: fever     HPI/Subjective:   HPI    81-year-old male from home reportedly with some dementia his wife called 46 today with concerns normal. Pupils are equal, round, and reactive to light. Neck: Normal range of motion. Neck supple. No evidence of neck stiffness  Cardiovascular: Normal rate, regular rhythm and intact distal pulses.     Pulmonary/Chest: Effort normal. No respiratory dis ------                     CBC W/ DIFFERENTIAL[[id number]]          Abnormal            Final result                 Please view results for these tests on the individual orders.    RAINBOW DRAW BLUE   RAINBOW DRAW LAVENDER   RAINBOW DRAW LIGHT GREEN pm    Follow-up:  No follow-up provider specified. We recommend that you schedule follow up care with a primary care provider within the next three months to obtain basic health screening including reassessment of your blood pressure.       Medications Pre

## 2021-02-14 VITALS
SYSTOLIC BLOOD PRESSURE: 135 MMHG | DIASTOLIC BLOOD PRESSURE: 65 MMHG | WEIGHT: 197.38 LBS | RESPIRATION RATE: 18 BRPM | TEMPERATURE: 98 F | HEART RATE: 64 BPM | HEIGHT: 68 IN | BODY MASS INDEX: 29.91 KG/M2 | OXYGEN SATURATION: 99 %

## 2021-02-14 LAB
ALBUMIN SERPL-MCNC: 3 G/DL (ref 3.4–5)
ALBUMIN/GLOB SERPL: 0.8 {RATIO} (ref 1–2)
ALP LIVER SERPL-CCNC: 82 U/L
ALT SERPL-CCNC: 43 U/L
ANION GAP SERPL CALC-SCNC: 3 MMOL/L (ref 0–18)
AST SERPL-CCNC: 36 U/L (ref 15–37)
BASOPHILS # BLD AUTO: 0.03 X10(3) UL (ref 0–0.2)
BASOPHILS NFR BLD AUTO: 0.3 %
BILIRUB SERPL-MCNC: 0.6 MG/DL (ref 0.1–2)
BUN BLD-MCNC: 14 MG/DL (ref 7–18)
BUN/CREAT SERPL: 19.4 (ref 10–20)
CALCIUM BLD-MCNC: 9.2 MG/DL (ref 8.5–10.1)
CHLORIDE SERPL-SCNC: 107 MMOL/L (ref 98–112)
CO2 SERPL-SCNC: 30 MMOL/L (ref 21–32)
CREAT BLD-MCNC: 0.72 MG/DL
DEPRECATED RDW RBC AUTO: 50 FL (ref 35.1–46.3)
EOSINOPHIL # BLD AUTO: 0.04 X10(3) UL (ref 0–0.7)
EOSINOPHIL NFR BLD AUTO: 0.3 %
ERYTHROCYTE [DISTWIDTH] IN BLOOD BY AUTOMATED COUNT: 13.2 % (ref 11–15)
GLOBULIN PLAS-MCNC: 3.7 G/DL (ref 2.8–4.4)
GLUCOSE BLD-MCNC: 111 MG/DL (ref 70–99)
HCT VFR BLD AUTO: 36 %
HGB BLD-MCNC: 12.2 G/DL
IMM GRANULOCYTES # BLD AUTO: 0.06 X10(3) UL (ref 0–1)
IMM GRANULOCYTES NFR BLD: 0.5 %
LYMPHOCYTES # BLD AUTO: 1.57 X10(3) UL (ref 1–4)
LYMPHOCYTES NFR BLD AUTO: 13.5 %
M PROTEIN MFR SERPL ELPH: 6.7 G/DL (ref 6.4–8.2)
MCH RBC QN AUTO: 34.3 PG (ref 26–34)
MCHC RBC AUTO-ENTMCNC: 33.9 G/DL (ref 31–37)
MCV RBC AUTO: 101.1 FL
MONOCYTES # BLD AUTO: 0.89 X10(3) UL (ref 0.1–1)
MONOCYTES NFR BLD AUTO: 7.7 %
NEUTROPHILS # BLD AUTO: 9 X10 (3) UL (ref 1.5–7.7)
NEUTROPHILS # BLD AUTO: 9 X10(3) UL (ref 1.5–7.7)
NEUTROPHILS NFR BLD AUTO: 77.7 %
OSMOLALITY SERPL CALC.SUM OF ELEC: 291 MOSM/KG (ref 275–295)
PLATELET # BLD AUTO: 157 10(3)UL (ref 150–450)
POTASSIUM SERPL-SCNC: 3.8 MMOL/L (ref 3.5–5.1)
RBC # BLD AUTO: 3.56 X10(6)UL
SARS-COV-2 RNA RESP QL NAA+PROBE: NOT DETECTED
SODIUM SERPL-SCNC: 140 MMOL/L (ref 136–145)
WBC # BLD AUTO: 11.6 X10(3) UL (ref 4–11)

## 2021-02-14 PROCEDURE — 99217 OBSERVATION CARE DISCHARGE: CPT | Performed by: INTERNAL MEDICINE

## 2021-02-14 RX ORDER — POTASSIUM CHLORIDE 20 MEQ/1
40 TABLET, EXTENDED RELEASE ORAL ONCE
Status: COMPLETED | OUTPATIENT
Start: 2021-02-14 | End: 2021-02-14

## 2021-02-14 NOTE — H&P
[address] Jennifer Ramos Patient Status:  Emergency    1935  81year old [facility] [id number]   Location  Attending MD TRACI Ty OF ADMISSION: 21    C CAP    Take by mouth 2 (two) times a day. DONEPEZIL HCL 10 MG ORAL TAB    TAKE ONE TABLET BY MOUTH EVERY MORNING    ENALAPRIL MALEATE 20 MG ORAL TAB    Take 20 mg by mouth daily. HYDROCHLOROTHIAZIDE 12.5 MG ORAL CAP    Take 12.5 mg by mouth daily. non-tender. Bowel sounds were present. MUSCULOSKELETAL:  No obvious abnormalities noted  EXTREMITIES: Trace bilateral lower extremity edema appreciated  NEUROLOGICAL: AAO x2 (person, hospital ) CAM negative, generalized weakness, but no focal deficit. exist

## 2021-02-14 NOTE — CONSULTS
Kemar Ramos Patient Status:  Observation    1935 MRN [id number]   Location [facility] 5SW/SE Attending Robin Parmar MD   Hosp Day # 0 PCP Brijesh Bajwa       Reason f Daily  •  Donepezil HCl (ARICEPT) tab 10 mg, 10 mg, Oral, Nightly  •  Enalapril Maleate (VASOTEC) tab 20 mg, 20 mg, Oral, Daily  •  hydrochlorothiazide (MICROZIDE) cap 12.5 mg, 12.5 mg, Oral, Daily  •  mirtazapine (REMERON) tab 7.5 mg, 7.5 mg, Oral, Nightl ND. RVP ND. Blood and ucx sent. ID consulted. # Febrile illness- ?  Source   - Blood cx pending   - UA w/6 wbc, cx pending   - Rapid COVID, RVP ND, COVID PCR ordered  # Weakness  # Lymphopenia  # BPH, nephrolithasis  # Dementia    PLAN:    -  Continue of

## 2021-02-14 NOTE — PROGRESS NOTES
PHYSICAL THERAPY EVALUATION - INPATIENT     Room Number: 526/526-A  Evaluation Date: 2/14/2021  Type of Evaluation: initial  Physician Order: PT Eval and Treat    Presenting Problem: Pt admitted to ER w/ progressive generalized weakness, fever and chills. activity jovita, which are near his baseline. The patient's Approx Degree of Impairment: 46.58% has been calculated based on documentation in the [facility] '6 clicks' Inpatient Basic Mobility Short Form.   Research supports that patients with this level of impa speech therapist, available/ able to provide A )  Drives: No  Patient Owned Equipment: Rolling walker;Cane(Pt did not like to use ad, per spouse. )  Patient Regularly Uses: Glasses    Prior Level of [place]: Pt lived w/ spouse who was speech therapist AM-PAC Score:  Raw Score: 18   Approx Degree of Impairment: 46.58%   Standardized Score (AM-PAC Scale): 43.63   CMS Modifier (G-Code): CK    FUNCTIONAL ABILITY STATUS  Gait Assessment   Gait Assistance: Contact guard assist;Minimum assistance  Distance

## 2021-02-14 NOTE — PLAN OF CARE
Pt denies pain, sob, chills, fever, nausea, vomiting,. Medication reviewed. Vital signs monitored. Safety maintained. Call light in reach. We will continue to monitor.   Problem: Patient Centered Care  Goal: Patient preferences are identified and integrated

## 2021-02-14 NOTE — OCCUPATIONAL THERAPY NOTE
OCCUPATIONAL THERAPY EVALUATION - INPATIENT     Room Number: 526/526-A  Evaluation Date: 2/14/2021  Type of Evaluation: Initial  Presenting Problem: (Fever, weakness)    Physician Order: IP Consult to Occupational Therapy  Reason for Therapy: ADL/IADL Dysf calculated based on documentation in the [facility] '6 clicks' Inpatient Daily Activity Short Form. Research supports that patients with this level of impairment may benefit from SIVA - however he has a safe 24/7 caregiver setup for home.  They are hopeful that want to go home\"    OCCUPATIONAL THERAPY EXAMINATION      OBJECTIVE     Fall Risk: High fall risk    PAIN ASSESSMENT  Ratin          ACTIVITY TOLERANCE   WNL                      O2 SATURATIONS      WNL          COGNITION  Orientation Level:  oriented Dressing: NT  Lower Extremity Dressing: mod a     Education Provided: Educated patient and wife in role of OT and POC  Patient End of Session: Up in chair;Needs met;Call light within reach;RN aware of session/findings; All patient questions and concerns add

## 2021-02-14 NOTE — ED NOTES
Orders for admission, patient is aware of plan and ready to go upstairs. Any questions, please call ED RN violeta  at extension [phone].    Type of COVID test sent: rapid  COVID Suspicion level: negative    LOC at time of transport: a/o x2/3

## 2021-02-14 NOTE — DISCHARGE SUMMARY
[facility]    Discharge Summary    Jackson Jamison Patient Status:  Observation    1935 MRN [id number]   Location [facility] 5SW/SE Attending Paul Garcia MD   Hosp Day # 0 PCP Josh Reese     Date of Admission: Oral    SpO2:  96% 94%    Weight:       Height:         Patient Weight for the past 72 hrs:   Weight   02/13/21 1611 194 lb 0.1 oz (88 kg)   02/13/21 2003 197 lb 6.4 oz (89.5 kg)     No intake or output data in the 24 hours ending 02/14/21 1500      GENERA Lab 02/13/21  1619 02/14/21  0550   RBC 3.90 3.56*   HGB 13.3 12.2*   HCT 39.2 36.0*   .5* 101.1*   MCH 34.1* 34.3*   MCHC 33.9 33.9   RDW 12.9 13.2   NEPRELIM 9.58* 9.00*   WBC 11.1* 11.6*   .0 157.0     Recent Labs   Lab 02/13/21  1619 02 as: K-DUR      Take 10 mEq by mouth every other day. Next dose due 02/14/21   Refills: 0     simvastatin 40 MG Tabs  Commonly known as: ZOCOR      Take 40 mg by mouth nightly. Refills: 0     Vitamin D 50 MCG (2000 UT) Tabs      Take by mouth nightly.    R

## 2021-02-14 NOTE — PLAN OF CARE
Patient ready for discharge. Vital signs stable. Afebrile. No pain.  Wife at bedside    Problem: Patient Centered Care  Goal: Patient preferences are identified and integrated in the patient's plan of care  Description: Interventions:  - What would you like

## 2021-02-14 NOTE — PLAN OF CARE
Patient alert, oriented with some confusion. Wife at bedside. Worked with PT. Up to chair. Denies pain. No further fevers. Anticipating discharge to home today.     Problem: Patient Centered Care  Goal: Patient preferences are identified and integrated in t

## 2021-02-18 ENCOUNTER — HOSPITAL ENCOUNTER (OUTPATIENT)
Dept: PHYSICAL MEDICINE AND REHAB | Age: 86
Discharge: STILL ADMITTED OR ADMINISTRATIVE | End: 2021-02-18
Attending: INTERNAL MEDICINE

## 2021-02-18 PROCEDURE — 97110 THERAPEUTIC EXERCISES: CPT | Performed by: PHYSICAL THERAPIST

## 2021-02-18 PROCEDURE — 97162 PT EVAL MOD COMPLEX 30 MIN: CPT | Performed by: PHYSICAL THERAPIST

## 2021-02-18 ASSESSMENT — ENCOUNTER SYMPTOMS
ALLEVIATING FACTORS: AVOIDING MOVEMENT IN INVOLVED AREA
PAIN SCALE AT HIGHEST: 3
PAIN SCALE AT LOWEST: 0
ALLEVIATING FACTORS: REST
QUALITY: ACHE
PAIN SEVERITY NOW: 0
QUALITY: SORE

## 2021-02-18 ASSESSMENT — MOVEMENT AND STRENGTH ASSESSMENTS: LEFS TYPE SCORE: 35

## 2021-02-26 ENCOUNTER — HOSPITAL ENCOUNTER (OUTPATIENT)
Dept: PHYSICAL MEDICINE AND REHAB | Age: 86
Discharge: STILL ADMITTED OR ADMINISTRATIVE | End: 2021-02-26
Attending: INTERNAL MEDICINE

## 2021-02-26 PROCEDURE — 97140 MANUAL THERAPY 1/> REGIONS: CPT | Performed by: PHYSICAL THERAPIST

## 2021-02-26 PROCEDURE — 97110 THERAPEUTIC EXERCISES: CPT | Performed by: PHYSICAL THERAPIST

## 2021-03-05 ENCOUNTER — HOSPITAL ENCOUNTER (OUTPATIENT)
Dept: PHYSICAL MEDICINE AND REHAB | Age: 86
Discharge: STILL ADMITTED OR ADMINISTRATIVE | End: 2021-03-05
Attending: INTERNAL MEDICINE

## 2021-03-05 PROCEDURE — 97110 THERAPEUTIC EXERCISES: CPT | Performed by: PHYSICAL THERAPIST

## 2021-04-18 ENCOUNTER — IMMUNIZATION (OUTPATIENT)
Dept: LAB | Age: 86
End: 2021-04-18

## 2021-04-18 DIAGNOSIS — Z23 NEED FOR VACCINATION: Primary | ICD-10-CM

## 2021-04-18 PROCEDURE — 0011A COVID-19 MODERNA VACCINE: CPT

## 2021-04-18 PROCEDURE — 91301 COVID-19 MODERNA VACCINE: CPT

## 2021-05-03 ENCOUNTER — LAB ENCOUNTER (OUTPATIENT)
Dept: LAB | Facility: HOSPITAL | Age: 86
End: 2021-05-03
Attending: CLINICAL NURSE SPECIALIST
Payer: MEDICARE

## 2021-05-03 ENCOUNTER — OFFICE VISIT (OUTPATIENT)
Dept: WOUND CARE | Facility: HOSPITAL | Age: 86
End: 2021-05-03
Attending: CLINICAL NURSE SPECIALIST
Payer: MEDICARE

## 2021-05-03 ENCOUNTER — HOSPITAL ENCOUNTER (OUTPATIENT)
Dept: GENERAL RADIOLOGY | Facility: HOSPITAL | Age: 86
Discharge: HOME | End: 2021-05-03
Attending: CLINICAL NURSE SPECIALIST
Payer: MEDICARE

## 2021-05-03 DIAGNOSIS — L89.624 PRESSURE INJURY OF LEFT HEEL, STAGE 4 (HCC): ICD-10-CM

## 2021-05-03 DIAGNOSIS — L89.624 PRESSURE INJURY OF LEFT HEEL, STAGE 4 (HCC): Primary | ICD-10-CM

## 2021-05-03 PROCEDURE — 73630 X-RAY EXAM OF FOOT: CPT | Performed by: CLINICAL NURSE SPECIALIST

## 2021-05-03 PROCEDURE — 85652 RBC SED RATE AUTOMATED: CPT

## 2021-05-03 PROCEDURE — 36415 COLL VENOUS BLD VENIPUNCTURE: CPT

## 2021-05-03 PROCEDURE — 86140 C-REACTIVE PROTEIN: CPT

## 2021-05-03 PROCEDURE — 99215 OFFICE O/P EST HI 40 MIN: CPT

## 2021-05-05 ENCOUNTER — TELEPHONE (OUTPATIENT)
Dept: WOUND CARE | Facility: HOSPITAL | Age: 86
End: 2021-05-05

## 2021-05-05 NOTE — TELEPHONE ENCOUNTER
Notified patient's spouse regarding patient's Xray results right foot on 5-3-21 Negative for osteomyelitis and Labs: Sed Rate elevated and CRP elevated informed to follow up with Primary Care Physician. Also reviewed wound care/dressing changed.  Patient to

## 2021-05-06 ENCOUNTER — TELEPHONE (OUTPATIENT)
Dept: WOUND CARE | Facility: HOSPITAL | Age: 86
End: 2021-05-06

## 2021-05-16 ENCOUNTER — IMMUNIZATION (OUTPATIENT)
Dept: LAB | Age: 86
End: 2021-05-16
Attending: HOSPITALIST

## 2021-05-16 DIAGNOSIS — Z23 NEED FOR VACCINATION: Primary | ICD-10-CM

## 2021-05-16 PROCEDURE — 0012A COVID-19 MODERNA VACCINE: CPT | Performed by: HOSPITALIST

## 2021-05-16 PROCEDURE — 91301 COVID-19 MODERNA VACCINE: CPT | Performed by: HOSPITALIST

## 2021-05-21 ENCOUNTER — OFFICE VISIT (OUTPATIENT)
Dept: WOUND CARE | Facility: HOSPITAL | Age: 86
End: 2021-05-21
Attending: CLINICAL NURSE SPECIALIST
Payer: MEDICARE

## 2021-05-21 VITALS
TEMPERATURE: 98 F | SYSTOLIC BLOOD PRESSURE: 112 MMHG | DIASTOLIC BLOOD PRESSURE: 68 MMHG | RESPIRATION RATE: 18 BRPM | OXYGEN SATURATION: 96 %

## 2021-05-21 DIAGNOSIS — L89.614 PRESSURE INJURY OF RIGHT HEEL, STAGE 4 (HCC): Primary | ICD-10-CM

## 2021-05-21 PROCEDURE — 99213 OFFICE O/P EST LOW 20 MIN: CPT | Performed by: CLINICAL NURSE SPECIALIST

## 2021-05-21 NOTE — PROGRESS NOTES
Scarlet Beavers. is a 81year old male. Patient presents with:  Wound Recheck: Patient is here for a follow-up and management of his right heel pressure ulcer and right dorsal foot wound.      8/21/2020: Patient is an 81-year-old male wi erythema, lesion, rash or wound. Neurological:      Mental Status: He is alert. Motor: Weakness present.       Gait: Gait abnormal.         Wound Assessment  Wound 05/03/21 1 Pressure Injury Heel Right (Active)   Wound Image   05/21/21 0667   Site As Vital Signs   05/21/21  1355   BP: 112/68   Resp: 18   Temp: 98 °F (36.7 °C)       Allergies  No Known Allergies    Assessment     Patient's right posterior heel pressure ulcer healed with epithelialization present.      Encounter Diagnosis  Pressure injury

## 2021-06-03 ENCOUNTER — APPOINTMENT (OUTPATIENT)
Dept: WOUND CARE | Facility: HOSPITAL | Age: 86
End: 2021-06-03
Attending: CLINICAL NURSE SPECIALIST
Payer: MEDICARE

## 2021-07-23 ENCOUNTER — ANESTHESIA (OUTPATIENT)
Dept: ENDOSCOPY | Facility: HOSPITAL | Age: 86
End: 2021-07-23
Payer: MEDICARE

## 2021-07-23 ENCOUNTER — HOSPITAL ENCOUNTER (OUTPATIENT)
Facility: HOSPITAL | Age: 86
Setting detail: OBSERVATION
Discharge: HOME | End: 2021-07-26
Attending: EMERGENCY MEDICINE | Admitting: HOSPITALIST
Payer: MEDICARE

## 2021-07-23 ENCOUNTER — APPOINTMENT (OUTPATIENT)
Dept: CT IMAGING | Facility: HOSPITAL | Age: 86
End: 2021-07-23
Attending: EMERGENCY MEDICINE
Payer: MEDICARE

## 2021-07-23 ENCOUNTER — ANESTHESIA EVENT (OUTPATIENT)
Dept: ENDOSCOPY | Facility: HOSPITAL | Age: 86
End: 2021-07-23
Payer: MEDICARE

## 2021-07-23 DIAGNOSIS — K22.2 ESOPHAGEAL OBSTRUCTION DUE TO FOOD IMPACTION: Primary | ICD-10-CM

## 2021-07-23 DIAGNOSIS — T18.128A ESOPHAGEAL OBSTRUCTION DUE TO FOOD IMPACTION: Primary | ICD-10-CM
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
## 2021-07-23 LAB
ANION GAP SERPL CALC-SCNC: 4 MMOL/L (ref 0–18)
BASOPHILS # BLD AUTO: 0.03 X10(3) UL (ref 0–0.2)
BASOPHILS NFR BLD AUTO: 0.2 %
BUN BLD-MCNC: 16 MG/DL (ref 7–18)
BUN/CREAT SERPL: 19.3 (ref 10–20)
CALCIUM BLD-MCNC: 10.2 MG/DL (ref 8.5–10.1)
CHLORIDE SERPL-SCNC: 107 MMOL/L (ref 98–112)
CO2 SERPL-SCNC: 30 MMOL/L (ref 21–32)
CREAT BLD-MCNC: 0.83 MG/DL
DEPRECATED RDW RBC AUTO: 51.1 FL (ref 35.1–46.3)
EOSINOPHIL # BLD AUTO: 0.05 X10(3) UL (ref 0–0.7)
EOSINOPHIL NFR BLD AUTO: 0.4 %
ERYTHROCYTE [DISTWIDTH] IN BLOOD BY AUTOMATED COUNT: 13.3 % (ref 11–15)
GLUCOSE BLD-MCNC: 127 MG/DL (ref 70–99)
HCT VFR BLD AUTO: 41.6 %
HGB BLD-MCNC: 13.7 G/DL
IMM GRANULOCYTES # BLD AUTO: 0.05 X10(3) UL (ref 0–1)
IMM GRANULOCYTES NFR BLD: 0.4 %
LYMPHOCYTES # BLD AUTO: 1.44 X10(3) UL (ref 1–4)
LYMPHOCYTES NFR BLD AUTO: 10.5 %
MCH RBC QN AUTO: 34.1 PG (ref 26–34)
MCHC RBC AUTO-ENTMCNC: 32.9 G/DL (ref 31–37)
MCV RBC AUTO: 103.5 FL
MONOCYTES # BLD AUTO: 1.13 X10(3) UL (ref 0.1–1)
MONOCYTES NFR BLD AUTO: 8.2 %
NEUTROPHILS # BLD AUTO: 11.04 X10 (3) UL (ref 1.5–7.7)
NEUTROPHILS # BLD AUTO: 11.04 X10(3) UL (ref 1.5–7.7)
NEUTROPHILS NFR BLD AUTO: 80.3 %
OSMOLALITY SERPL CALC.SUM OF ELEC: 295 MOSM/KG (ref 275–295)
PLATELET # BLD AUTO: 175 10(3)UL (ref 150–450)
POTASSIUM SERPL-SCNC: 4.2 MMOL/L (ref 3.5–5.1)
RBC # BLD AUTO: 4.02 X10(6)UL
SODIUM SERPL-SCNC: 141 MMOL/L (ref 136–145)
WBC # BLD AUTO: 13.7 X10(3) UL (ref 4–11)

## 2021-07-23 PROCEDURE — 43247 EGD REMOVE FOREIGN BODY: CPT | Performed by: INTERNAL MEDICINE

## 2021-07-23 PROCEDURE — 70491 CT SOFT TISSUE NECK W/DYE: CPT | Performed by: EMERGENCY MEDICINE

## 2021-07-23 PROCEDURE — 0DC18ZZ EXTIRPATION OF MATTER FROM UPPER ESOPHAGUS, VIA NATURAL OR ARTIFICIAL OPENING ENDOSCOPIC: ICD-10-PCS | Performed by: INTERNAL MEDICINE

## 2021-07-23 PROCEDURE — 99204 OFFICE O/P NEW MOD 45 MIN: CPT | Performed by: INTERNAL MEDICINE

## 2021-07-23 RX ORDER — DEXAMETHASONE SODIUM PHOSPHATE 4 MG/ML
VIAL (ML) INJECTION AS NEEDED
Status: DISCONTINUED | OUTPATIENT
Start: 2021-07-23 | End: 2021-07-24 | Stop reason: SURG

## 2021-07-23 RX ORDER — ONDANSETRON 2 MG/ML
INJECTION INTRAMUSCULAR; INTRAVENOUS AS NEEDED
Status: DISCONTINUED | OUTPATIENT
Start: 2021-07-23 | End: 2021-07-24 | Stop reason: SURG

## 2021-07-23 RX ORDER — SODIUM CHLORIDE, SODIUM LACTATE, POTASSIUM CHLORIDE, CALCIUM CHLORIDE 600; 310; 30; 20 MG/100ML; MG/100ML; MG/100ML; MG/100ML
INJECTION, SOLUTION INTRAVENOUS CONTINUOUS PRN
Status: DISCONTINUED | OUTPATIENT
Start: 2021-07-23 | End: 2021-07-24 | Stop reason: SURG

## 2021-07-23 RX ORDER — SODIUM CHLORIDE 9 MG/ML
INJECTION, SOLUTION INTRAVENOUS CONTINUOUS
Status: DISCONTINUED | OUTPATIENT
Start: 2021-07-23 | End: 2021-07-25

## 2021-07-23 RX ADMIN — SODIUM CHLORIDE, SODIUM LACTATE, POTASSIUM CHLORIDE, CALCIUM CHLORIDE: 600; 310; 30; 20 INJECTION, SOLUTION INTRAVENOUS at 21:39:00

## 2021-07-23 RX ADMIN — ONDANSETRON 4 MG: 2 INJECTION INTRAMUSCULAR; INTRAVENOUS at 21:56:00

## 2021-07-23 RX ADMIN — DEXAMETHASONE SODIUM PHOSPHATE 4 MG: 4 MG/ML VIAL (ML) INJECTION at 21:56:00

## 2021-07-23 NOTE — ED INITIAL ASSESSMENT (HPI)
Patient complains of having steak a few days ago, choked on it and states he did not get it back out, states he is unable to take in food at this time, tolerating oral secretions

## 2021-07-23 NOTE — ED PROVIDER NOTES
[facility]en in: [facility]      History   Patient presents with:  Swallowing Problem    Stated Complaint: Food Bolus    HPI/Subjective:   HPI    28-year-old male with past medical history significant for high blood pressure, 98 °F (36.7 °C)   Resp 20   Wt 81.6 kg   SpO2 93%   BMI 27.37 kg/m²         Physical Exam    Physical Exam   Constitutional: AAOx3, well nourished, NAD  Head: Normocephalic and atraumatic.    Ears: TM's clear b/l  Nose: Nose normal.   Mouth/Throat: MMM, pos 7/23/2021  CONCLUSION:  1. Moderate to severe distention of the upper thoracic esophagus with partially imaged ingested contents/debris in the midthoracic esophagus at and just above the level of the maris.   These findings are compatible with clinical his

## 2021-07-24 ENCOUNTER — APPOINTMENT (OUTPATIENT)
Dept: GENERAL RADIOLOGY | Facility: HOSPITAL | Age: 86
End: 2021-07-24
Attending: HOSPITALIST
Payer: MEDICARE

## 2021-07-24 LAB
ANION GAP SERPL CALC-SCNC: 5 MMOL/L (ref 0–18)
BASOPHILS # BLD AUTO: 0.04 X10(3) UL (ref 0–0.2)
BASOPHILS NFR BLD AUTO: 0.3 %
BILIRUB UR QL: NEGATIVE
BUN BLD-MCNC: 13 MG/DL (ref 7–18)
BUN/CREAT SERPL: 20.3 (ref 10–20)
CALCIUM BLD-MCNC: 9.3 MG/DL (ref 8.5–10.1)
CHLORIDE SERPL-SCNC: 109 MMOL/L (ref 98–112)
CO2 SERPL-SCNC: 29 MMOL/L (ref 21–32)
COLOR UR: YELLOW
CREAT BLD-MCNC: 0.64 MG/DL
DEPRECATED RDW RBC AUTO: 52.3 FL (ref 35.1–46.3)
EOSINOPHIL # BLD AUTO: 0.05 X10(3) UL (ref 0–0.7)
EOSINOPHIL NFR BLD AUTO: 0.4 %
ERYTHROCYTE [DISTWIDTH] IN BLOOD BY AUTOMATED COUNT: 13.6 % (ref 11–15)
GLUCOSE BLD-MCNC: 131 MG/DL (ref 70–99)
GLUCOSE UR-MCNC: NEGATIVE MG/DL
HCT VFR BLD AUTO: 37.2 %
HGB BLD-MCNC: 12.3 G/DL
IMM GRANULOCYTES # BLD AUTO: 0.1 X10(3) UL (ref 0–1)
IMM GRANULOCYTES NFR BLD: 0.7 %
LYMPHOCYTES # BLD AUTO: 2.11 X10(3) UL (ref 1–4)
LYMPHOCYTES NFR BLD AUTO: 14.9 %
MCH RBC QN AUTO: 34.1 PG (ref 26–34)
MCHC RBC AUTO-ENTMCNC: 33.1 G/DL (ref 31–37)
MCV RBC AUTO: 103 FL
MONOCYTES # BLD AUTO: 1.05 X10(3) UL (ref 0.1–1)
MONOCYTES NFR BLD AUTO: 7.4 %
NEUTROPHILS # BLD AUTO: 10.82 X10 (3) UL (ref 1.5–7.7)
NEUTROPHILS # BLD AUTO: 10.82 X10(3) UL (ref 1.5–7.7)
NEUTROPHILS NFR BLD AUTO: 76.3 %
NITRITE UR QL STRIP.AUTO: NEGATIVE
OSMOLALITY SERPL CALC.SUM OF ELEC: 298 MOSM/KG (ref 275–295)
PH UR: 6 [PH] (ref 5–8)
PLATELET # BLD AUTO: 168 10(3)UL (ref 150–450)
POTASSIUM SERPL-SCNC: 3.6 MMOL/L (ref 3.5–5.1)
PROCALCITONIN SERPL-MCNC: 0.09 NG/ML (ref ?–0.16)
PROT UR-MCNC: 30 MG/DL
RBC # BLD AUTO: 3.61 X10(6)UL
RBC #/AREA URNS AUTO: >10 /HPF
SODIUM SERPL-SCNC: 143 MMOL/L (ref 136–145)
SP GR UR STRIP: 1.03 (ref 1–1.03)
UROBILINOGEN UR STRIP-ACNC: <2
WBC # BLD AUTO: 14.2 X10(3) UL (ref 4–11)

## 2021-07-24 PROCEDURE — 99226 SUBSEQUENT OBSERVATION CARE: CPT | Performed by: HOSPITALIST

## 2021-07-24 PROCEDURE — 99213 OFFICE O/P EST LOW 20 MIN: CPT | Performed by: INTERNAL MEDICINE

## 2021-07-24 PROCEDURE — 71045 X-RAY EXAM CHEST 1 VIEW: CPT | Performed by: HOSPITALIST

## 2021-07-24 RX ORDER — HEPARIN SODIUM 5000 [USP'U]/ML
5000 INJECTION, SOLUTION INTRAVENOUS; SUBCUTANEOUS EVERY 12 HOURS SCHEDULED
Status: DISCONTINUED | OUTPATIENT
Start: 2021-07-24 | End: 2021-07-26

## 2021-07-24 RX ORDER — NALOXONE HYDROCHLORIDE 0.4 MG/ML
80 INJECTION, SOLUTION INTRAMUSCULAR; INTRAVENOUS; SUBCUTANEOUS AS NEEDED
Status: DISCONTINUED | OUTPATIENT
Start: 2021-07-24 | End: 2021-07-24 | Stop reason: HOSPADM

## 2021-07-24 RX ORDER — HYDROMORPHONE HYDROCHLORIDE 1 MG/ML
0.4 INJECTION, SOLUTION INTRAMUSCULAR; INTRAVENOUS; SUBCUTANEOUS EVERY 5 MIN PRN
Status: DISCONTINUED | OUTPATIENT
Start: 2021-07-24 | End: 2021-07-24 | Stop reason: HOSPADM

## 2021-07-24 RX ORDER — SODIUM CHLORIDE, SODIUM LACTATE, POTASSIUM CHLORIDE, CALCIUM CHLORIDE 600; 310; 30; 20 MG/100ML; MG/100ML; MG/100ML; MG/100ML
INJECTION, SOLUTION INTRAVENOUS CONTINUOUS
Status: DISCONTINUED | OUTPATIENT
Start: 2021-07-24 | End: 2021-07-24 | Stop reason: HOSPADM

## 2021-07-24 RX ORDER — MORPHINE SULFATE 10 MG/ML
6 INJECTION, SOLUTION INTRAMUSCULAR; INTRAVENOUS EVERY 10 MIN PRN
Status: DISCONTINUED | OUTPATIENT
Start: 2021-07-24 | End: 2021-07-24 | Stop reason: HOSPADM

## 2021-07-24 RX ORDER — VENLAFAXINE 75 MG/1
75 TABLET ORAL DAILY
Status: DISCONTINUED | OUTPATIENT
Start: 2021-07-24 | End: 2021-07-26

## 2021-07-24 RX ORDER — DONEPEZIL HYDROCHLORIDE 10 MG/1
10 TABLET, FILM COATED ORAL NIGHTLY
Status: DISCONTINUED | OUTPATIENT
Start: 2021-07-24 | End: 2021-07-26

## 2021-07-24 RX ORDER — ONDANSETRON 2 MG/ML
4 INJECTION INTRAMUSCULAR; INTRAVENOUS EVERY 6 HOURS PRN
Status: DISCONTINUED | OUTPATIENT
Start: 2021-07-24 | End: 2021-07-26

## 2021-07-24 RX ORDER — SODIUM CHLORIDE 9 MG/ML
INJECTION, SOLUTION INTRAVENOUS CONTINUOUS
Status: DISCONTINUED | OUTPATIENT
Start: 2021-07-24 | End: 2021-07-25

## 2021-07-24 RX ORDER — MORPHINE SULFATE 4 MG/ML
2 INJECTION, SOLUTION INTRAMUSCULAR; INTRAVENOUS EVERY 10 MIN PRN
Status: DISCONTINUED | OUTPATIENT
Start: 2021-07-24 | End: 2021-07-24 | Stop reason: HOSPADM

## 2021-07-24 RX ORDER — HYDROCODONE BITARTRATE AND ACETAMINOPHEN 5; 325 MG/1; MG/1
2 TABLET ORAL AS NEEDED
Status: DISCONTINUED | OUTPATIENT
Start: 2021-07-24 | End: 2021-07-24 | Stop reason: HOSPADM

## 2021-07-24 RX ORDER — HYDROMORPHONE HYDROCHLORIDE 1 MG/ML
0.6 INJECTION, SOLUTION INTRAMUSCULAR; INTRAVENOUS; SUBCUTANEOUS EVERY 5 MIN PRN
Status: DISCONTINUED | OUTPATIENT
Start: 2021-07-24 | End: 2021-07-24 | Stop reason: HOSPADM

## 2021-07-24 RX ORDER — MAGNESIUM OXIDE 400 MG (241.3 MG MAGNESIUM) TABLET
1 TABLET NIGHTLY
Status: DISCONTINUED | OUTPATIENT
Start: 2021-07-24 | End: 2021-07-26

## 2021-07-24 RX ORDER — MAGNESIUM OXIDE 400 MG (241.3 MG MAGNESIUM) TABLET
2 TABLET NIGHTLY
COMMUNITY

## 2021-07-24 RX ORDER — PROCHLORPERAZINE EDISYLATE 5 MG/ML
5 INJECTION INTRAMUSCULAR; INTRAVENOUS ONCE AS NEEDED
Status: DISCONTINUED | OUTPATIENT
Start: 2021-07-24 | End: 2021-07-24 | Stop reason: HOSPADM

## 2021-07-24 RX ORDER — HYDROCODONE BITARTRATE AND ACETAMINOPHEN 5; 325 MG/1; MG/1
1 TABLET ORAL AS NEEDED
Status: DISCONTINUED | OUTPATIENT
Start: 2021-07-24 | End: 2021-07-24 | Stop reason: HOSPADM

## 2021-07-24 RX ORDER — ONDANSETRON 2 MG/ML
4 INJECTION INTRAMUSCULAR; INTRAVENOUS ONCE AS NEEDED
Status: DISCONTINUED | OUTPATIENT
Start: 2021-07-24 | End: 2021-07-24 | Stop reason: HOSPADM

## 2021-07-24 RX ORDER — MORPHINE SULFATE 4 MG/ML
4 INJECTION, SOLUTION INTRAMUSCULAR; INTRAVENOUS EVERY 10 MIN PRN
Status: DISCONTINUED | OUTPATIENT
Start: 2021-07-24 | End: 2021-07-24 | Stop reason: HOSPADM

## 2021-07-24 RX ORDER — HALOPERIDOL 5 MG/ML
0.25 INJECTION INTRAMUSCULAR ONCE AS NEEDED
Status: DISCONTINUED | OUTPATIENT
Start: 2021-07-24 | End: 2021-07-24 | Stop reason: HOSPADM

## 2021-07-24 RX ORDER — HYDROMORPHONE HYDROCHLORIDE 1 MG/ML
0.2 INJECTION, SOLUTION INTRAMUSCULAR; INTRAVENOUS; SUBCUTANEOUS EVERY 5 MIN PRN
Status: DISCONTINUED | OUTPATIENT
Start: 2021-07-24 | End: 2021-07-24 | Stop reason: HOSPADM

## 2021-07-24 RX ORDER — ATORVASTATIN CALCIUM 20 MG/1
20 TABLET, FILM COATED ORAL NIGHTLY
Status: DISCONTINUED | OUTPATIENT
Start: 2021-07-24 | End: 2021-07-26

## 2021-07-24 NOTE — INTERVAL H&P NOTE
Pre-op Diagnosis: Food Impaction    The above referenced H&P was reviewed by Carlos Moore MD on 7/23/2021, the patient was examined and no significant changes have occurred in the patient's condition since the H&P was performed.   I discussed with the pat

## 2021-07-24 NOTE — PROGRESS NOTES
[facility] HOSP - [facility]    Progress Note    Ji Plana.  Patient Status:  Observation    1935 MRN [id number]   Location [facility] 1W Attending Joseph Goldstein MD   Hosp Day # 0 PCP Maryse Russ       Subjective:   Jolly Osler tab 75 mg, 75 mg, Oral, Daily  •  [COMPLETED] sodium chloride 0.9% IV bolus 500 mL, 500 mL, Intravenous, Once **FOLLOWED BY** 0.9% NaCl infusion, , Intravenous, Continuous    Assessment and Plan:     Esophageal obstruction due to food impaction  Evaluated cervical lymphadenopathy. 3. Small subcentimeter cutaneous/subcutaneous lesion along the right lateral neck at the level of the mandibular angle. This most likely relates to a sebaceous cyst or other dermatologic abnormality.  4. Lesser incidental findings

## 2021-07-24 NOTE — PROGRESS NOTES
Fat[facility] Patel 89     Gastroenterology Progress Note    Renee Cortes.  Patient Status:  Observation    1935 MRN [id number]   Location [facility] 1W Attending Fabiana Gramajo MD   Hosp Day # 0 PCP Haresh Boo 07/24/2021    CREATSERUM 0.64 (L) 07/24/2021    BUN 13 07/24/2021     07/24/2021    K 3.6 07/24/2021     07/24/2021    CO2 29.0 07/24/2021     (H) 07/24/2021    CA 9.3 07/24/2021    ALB 3.0 (L) 02/14/2021    ALKPHO 82 02/14/2021    BILT

## 2021-07-24 NOTE — ANESTHESIA PREPROCEDURE EVALUATION
Anesthesia PreOp Note    HPI:     Natalya George. is a 81year old male who presents for preoperative consultation requested by:  Shade Morrissey MD    Date of Surgery: 7/23/2021    Procedure(s):  ESOPHAGOGASTRODUODENOSCOPY (EGD)  Indication: Food Impa MG Oral Cap, Take 12.5 mg by mouth daily. , Disp: , Rfl:   aspirin 81 MG Oral Tab, Take 81 mg by mouth daily. , Disp: , Rfl:   Potassium Chloride ER 10 MEQ Oral Tab CR, Take 10 mEq by mouth every other day.  Next dose due 02/14/21 , Disp: , Rfl:   mirtazapine use: Yes        Comment: occasionally      Drug use: Never      Sexual activity: Not on file    Other Topics      Concerns:        Not on file    Social History Narrative      Not on file    Social Determinants of Health  Financial Resource Strain:       D Pulse: 69 70 72 69   Resp:   20 18   Temp:       SpO2: 94%  93% 95%   Weight:    79.4 kg (175 lb)   Height:    1.727 m (5' 8\")        Anesthesia Evaluation     Patient summary reviewed and Nursing notes reviewed    Airway   Mallampati: II  TM distance: >3

## 2021-07-24 NOTE — SLP NOTE
ADULT SWALLOWING EVALUATION    ASSESSMENT    ASSESSMENT/OVERALL IMPRESSION:    Proper PPE worn. Hands sanitized upon entrance/exit Pt room. This BSE was ordered d/t RN dysphagia screen. Pt admitted with esophageal obstruction d/t food impaction.  Spou presents with mild to moderate oral dysphagia and probable pharyngeal dysfunction. Per [facility] - ROSHAN Scale, Pt's swallow function is Level 4 of 7. Collaborated with RN regarding Pt's swallowing plan of care.  RN to place diet orders for minced & moist/mildly thick li reduced ROM)    Voice Quality: Weak  Respiratory Status: Supplemental O2;Nasal cannula  Consistencies Trialed: Thin liquids; Nectar thick liquids;Puree;Hard solid  Method of Presentation: Staff/Clinician assistance;Spoon;Cup  Patient Positioning: Upright;Mi

## 2021-07-24 NOTE — OCCUPATIONAL THERAPY NOTE
OCCUPATIONAL THERAPY EVALUATION - INPATIENT     Room Number: 103/103-A  Evaluation Date: 7/24/2021  Type of Evaluation: Initial  Presenting Problem: food impaction    Physician Order: IP Consult to Occupational Therapy  Reason for Therapy: ADL/IADL Dysfunc subacute rehab. Pt does not appear safe to DC home today. Wife reports she can take him to a nearby hotel if needed. At this time, rec SIVA as pt is significantly below his baseline.  Pt may improve once he becomes more alert -therefore OT recs pending progr Toilet and Equipment: Comfort height toilet  Shower/Tub and Equipment: Tub-shower combo;Grab bar; Shower chair  Other Equipment:  (RW, transport w/c)       Hand Dominance: Right  Drives: No       Stairs in Home: 14 stairs  Use of Assistive Device(s): flower urinal? : A Lot  -   Putting on and taking off regular upper body clothing?: A Lot  -   Taking care of personal grooming such as brushing teeth?: A Little  -   Eating meals?: A Little    AM-PAC Score:  Score: 14  Approx Degree of Impairment: 59.67%  Lorri Valdez

## 2021-07-24 NOTE — OPERATIVE REPORT
ESOPHAGOGASTRODUODENOSCOPY (EGD) REPORT    Lin Lauren.      1935 Age 81year old   PCP Devendra Crane MD     Date of procedure: 21    Procedure: EGD w/removal of foreign body    Pre-operative diagnosis: food im There was a medium sized diverticulum in the distal esophagus. No focal stricture or ring or web seen. There was tightness when advancing the scope through the GE junction which may be suggestive of achalasia. 2. Stomach: We then entered the stomach.  G

## 2021-07-24 NOTE — PLAN OF CARE
Pt received from PACU. He is oriented to self and time. He is drowsy and lethargic. He frequently clears throat and coughs up small amounts of thin clear sputum. He swallows sputum easily. IVF infusing as ordered. Call light within easy reach.   Bed a

## 2021-07-24 NOTE — PLAN OF CARE
Patient drowsy but easily arousable. Seen by PT/OT and speech therapist today. VSS within normal range. Aspiration precaution maintained. No nausea or vomiting noted. Able to tolerate nectar thick liquid and moist, minced diet.  Bed in low position, call li (undernourished)  Description: INTERVENTIONS:  - Monitor percentage of each meal consumed  - Identify factors contributing to decreased intake, treat as appropriate  - Assist with meals as needed  - Monitor I&O, WT and lab values  - Obtain nutritional cons

## 2021-07-24 NOTE — ANESTHESIA PROCEDURE NOTES
Airway  Date/Time: 7/23/2021 9:54 PM  Urgency: elective    Airway not difficult    General Information and Staff    Patient location during procedure: OR  Anesthesiologist: Syl Martines MD  Performed: anesthesiologist     Indications and Patient Condition

## 2021-07-24 NOTE — ANESTHESIA PREPROCEDURE EVALUATION
Anesthesia PreOp Note    HPI:     Natalya George. is a 81year old male who presents for preoperative consultation requested by:  Shade Morrissey MD    Date of Surgery: 7/23/2021    Procedure(s):  ESOPHAGOGASTRODUODENOSCOPY (EGD)  Indication: Food Impa MG Oral Cap, Take 12.5 mg by mouth daily. , Disp: , Rfl:   aspirin 81 MG Oral Tab, Take 81 mg by mouth daily. , Disp: , Rfl:   Potassium Chloride ER 10 MEQ Oral Tab CR, Take 10 mEq by mouth every other day.  Next dose due 02/14/21 , Disp: , Rfl:   mirtazapine use: Yes        Comment: occasionally      Drug use: Never      Sexual activity: Not on file    Other Topics      Concerns:        Not on file    Social History Narrative      Not on file    Social Determinants of Health  Financial Resource Strain:       D 20   Temp: 98 °F (36.7 °C)      SpO2: 92% 94%  93%   Weight: 81.6 kg (180 lb)           Anesthesia Evaluation     Patient summary reviewed and Nursing notes reviewed    Airway   Dental      Pulmonary - negative ROS   Cardiovascular   Exercise tolerance:

## 2021-07-24 NOTE — CONSULTS
Fat[facility] Patel 89   Gastroenterology Consultation Note    Juanis Pederson.   Patient Status:    Emergency  Date of Admission:         7/23/2021, Hospital day #0  Attending:   Antonio Khanna MD  PCP:     Reshma Melton    [facility], [facility] ([facility]) rigors  EYES:  negative for diplopia   RESPIRATORY:  negative for severe shortness of breath  CARDIOVASCULAR:  negative for crushing sub-sternal chest pain  GASTROINTESTINAL:  see HPI  GENITOURINARY:  negative for dysuria or gross hematuria  INTEGUMENT/SHERIE 3. Small subcentimeter cutaneous/subcutaneous lesion along the right lateral neck at the level of the mandibular angle. This most likely relates to a sebaceous cyst or other dermatologic abnormality. 4. Lesser incidental findings as above.    Dictated by (

## 2021-07-24 NOTE — PHYSICAL THERAPY NOTE
PHYSICAL THERAPY EVALUATION - INPATIENT     Room Number: 103/103-A  Evaluation Date: 7/24/2021  Type of Evaluation: Initial   Physician Order: PT Eval and Treat    Presenting Problem: Esophageal obstruction due to food impaction  Reason for Therapy: Mobil Form.  Research supports that patients with this level of impairment may benefit from SIVA. Wife has preference for OP PT services if pt is able to progress with PT however she is open to SIVA if this is necessary.     Patient will benefit from continued IP Impaired  · Arousal/Alertness:  delayed responses to stimuli  · Orientation Level:  oriented to person, disoriented to place, disoriented to time and disoriented to situation  · Following Commands:  follows one step commands with increased time and follows Recommendations of IP PT, safety precautions. Patient End of Session: In bed;Needs met;Call light within reach;RN aware of session/findings; All patient questions and concerns addressed; Family present    CURRENT GOALS    Goals to be met by: 7/31/21  Jessica Etienne

## 2021-07-24 NOTE — ANESTHESIA POSTPROCEDURE EVALUATION
Patient: Manas Slaughter.     Procedure Summary     Date: 07/23/21 Room / Location: [facility] ENDOSCOPY 01 / [facility] ENDOSCOPY    Anesthesia Start: 2142 Anesthesia Stop:     Procedure: ESOPHAGOGASTRODUODENOSCOPY (EGD) (N/A ) Diagnosis:       Food impaction of eso

## 2021-07-24 NOTE — H&P (VIEW-ONLY)
Fat[facility] Patel 89   Gastroenterology Consultation Note    Reyna Landa.   Patient Status:    Emergency  Date of Admission:         7/23/2021, Hospital day #0  Attending:   Emile Thomas MD  PCP:     Axel Zamorano    [facility], [facility] ([facility]) rigors  EYES:  negative for diplopia   RESPIRATORY:  negative for severe shortness of breath  CARDIOVASCULAR:  negative for crushing sub-sternal chest pain  GASTROINTESTINAL:  see HPI  GENITOURINARY:  negative for dysuria or gross hematuria  INTEGUMENT/SHERIE 3. Small subcentimeter cutaneous/subcutaneous lesion along the right lateral neck at the level of the mandibular angle. This most likely relates to a sebaceous cyst or other dermatologic abnormality. 4. Lesser incidental findings as above.    Dictated by (

## 2021-07-25 LAB
ALBUMIN SERPL-MCNC: 3.1 G/DL (ref 3.4–5)
ANION GAP SERPL CALC-SCNC: 5 MMOL/L (ref 0–18)
BASOPHILS # BLD AUTO: 0.03 X10(3) UL (ref 0–0.2)
BASOPHILS NFR BLD AUTO: 0.4 %
BUN BLD-MCNC: 13 MG/DL (ref 7–18)
BUN/CREAT SERPL: 22.4 (ref 10–20)
CALCIUM BLD-MCNC: 8.9 MG/DL (ref 8.5–10.1)
CHLORIDE SERPL-SCNC: 112 MMOL/L (ref 98–112)
CO2 SERPL-SCNC: 30 MMOL/L (ref 21–32)
CREAT BLD-MCNC: 0.58 MG/DL
DEPRECATED RDW RBC AUTO: 51 FL (ref 35.1–46.3)
EOSINOPHIL # BLD AUTO: 0.23 X10(3) UL (ref 0–0.7)
EOSINOPHIL NFR BLD AUTO: 3.3 %
ERYTHROCYTE [DISTWIDTH] IN BLOOD BY AUTOMATED COUNT: 13.3 % (ref 11–15)
GLUCOSE BLD-MCNC: 118 MG/DL (ref 70–99)
HCT VFR BLD AUTO: 36 %
HGB BLD-MCNC: 11.8 G/DL
IMM GRANULOCYTES # BLD AUTO: 0.03 X10(3) UL (ref 0–1)
IMM GRANULOCYTES NFR BLD: 0.4 %
LYMPHOCYTES # BLD AUTO: 1.88 X10(3) UL (ref 1–4)
LYMPHOCYTES NFR BLD AUTO: 27.3 %
MCH RBC QN AUTO: 34.2 PG (ref 26–34)
MCHC RBC AUTO-ENTMCNC: 32.8 G/DL (ref 31–37)
MCV RBC AUTO: 104.3 FL
MONOCYTES # BLD AUTO: 0.69 X10(3) UL (ref 0.1–1)
MONOCYTES NFR BLD AUTO: 10 %
NEUTROPHILS # BLD AUTO: 4.02 X10 (3) UL (ref 1.5–7.7)
NEUTROPHILS # BLD AUTO: 4.02 X10(3) UL (ref 1.5–7.7)
NEUTROPHILS NFR BLD AUTO: 58.6 %
OSMOLALITY SERPL CALC.SUM OF ELEC: 305 MOSM/KG (ref 275–295)
PHOSPHATE SERPL-MCNC: 2 MG/DL (ref 2.5–4.9)
PLATELET # BLD AUTO: 147 10(3)UL (ref 150–450)
POTASSIUM SERPL-SCNC: 3.5 MMOL/L (ref 3.5–5.1)
PROCALCITONIN SERPL-MCNC: 0.06 NG/ML (ref ?–0.16)
RBC # BLD AUTO: 3.45 X10(6)UL
SODIUM SERPL-SCNC: 147 MMOL/L (ref 136–145)
WBC # BLD AUTO: 6.9 X10(3) UL (ref 4–11)

## 2021-07-25 PROCEDURE — 99225 SUBSEQUENT OBSERVATION CARE: CPT | Performed by: HOSPITALIST

## 2021-07-25 RX ORDER — PANTOPRAZOLE SODIUM 40 MG/1
40 TABLET, DELAYED RELEASE ORAL
Status: DISCONTINUED | OUTPATIENT
Start: 2021-07-26 | End: 2021-07-26

## 2021-07-25 NOTE — PROGRESS NOTES
[facility]    Progress Note    Lin Lauren.  Patient Status:  Observation    1935 MRN [id number]   Location [facility] 1W Attending Cristo Villanueva MD   Hosp Day # 0 PCP Dulce Arango       Subjective:   Marie Vazquez Nightly  •  atorvastatin (LIPITOR) tab 20 mg, 20 mg, Oral, Nightly  •  Venlafaxine HCl (EFFEXOR) tab 75 mg, 75 mg, Oral, Daily    Assessment and Plan:     Esophageal obstruction due to food impaction  Evaluated by GI and underwent EGD  Found to have large Gastroenterology evaluation is suggested. 2. No suspicious cervical lymphadenopathy. 3. Small subcentimeter cutaneous/subcutaneous lesion along the right lateral neck at the level of the mandibular angle.   This most likely relates to a sebaceous cyst or ot

## 2021-07-25 NOTE — PLAN OF CARE
Received patient from [facility], no complaints of pain wife at bedside, VSS, IV fluids infusing.     Problem: Patient Centered Care  Goal: Patient preferences are identified and integrated in the patient's plan of care  Description: Interventions:  - What would y Monitor I&O, WT and lab values  - Obtain nutritional consult as needed  - Optimize oral hygiene and moisture  - Encourage food from home; allow for food preferences  - Enhance eating environment  Outcome: Progressing

## 2021-07-25 NOTE — PLAN OF CARE
Problem: Patient Centered Care  Goal: Patient preferences are identified and integrated in the patient's plan of care  Description: Interventions:  - What would you like us to know as we care for you? Live at home with wife. Use wheelchair at times.   - and moisture  - Encourage food from home; allow for food preferences  - Enhance eating environment  Outcome: Progressing     Problem: SAFETY ADULT - FALL  Goal: Free from fall injury  Description: INTERVENTIONS:  - Assess pt frequently for physical needs precautions in place , frequent rounding by nursing staff , fall precautions in place

## 2021-07-25 NOTE — CM/SW NOTE
SW received notification from RN/Tea, family is interested in SNF placement for [facility] as pt has history there, possible DC on 7/26 if medically stable. PT/OT evaluations entered into Epic, recommending SNF.      ECTOR sent a number of referrals via

## 2021-07-25 NOTE — PLAN OF CARE
VSS, IV fluids discontinued, IV sodium phosphate given, patient is 2-person max assist. Wife at bedside, no acute changes. Spoke with social work, patient possible discharge to [facility] tomorrow. PT/OT reevaluation tomorrow.     Problem: Patient Claudia percentage of each meal consumed  - Identify factors contributing to decreased intake, treat as appropriate  - Assist with meals as needed  - Monitor I&O, WT and lab values  - Obtain nutritional consult as needed  - Optimize oral hygiene and moisture  - En preventative oral hygiene regimen  - Implement oral medicated treatments as ordered  Outcome: Progressing

## 2021-07-26 VITALS
HEIGHT: 68 IN | DIASTOLIC BLOOD PRESSURE: 61 MMHG | BODY MASS INDEX: 26.52 KG/M2 | HEART RATE: 73 BPM | WEIGHT: 175 LBS | RESPIRATION RATE: 18 BRPM | TEMPERATURE: 98 F | SYSTOLIC BLOOD PRESSURE: 160 MMHG | OXYGEN SATURATION: 94 %

## 2021-07-26 LAB
PHOSPHATE SERPL-MCNC: 2.2 MG/DL (ref 2.5–4.9)
SARS-COV-2 RNA RESP QL NAA+PROBE: NOT DETECTED

## 2021-07-26 PROCEDURE — 99217 OBSERVATION CARE DISCHARGE: CPT | Performed by: HOSPITALIST

## 2021-07-26 RX ORDER — PANTOPRAZOLE SODIUM 40 MG/1
40 TABLET, DELAYED RELEASE ORAL
Qty: 30 TABLET | Refills: 0 | Status: SHIPPED | OUTPATIENT
Start: 2021-07-27

## 2021-07-26 NOTE — CM/SW NOTE
SW following for discharge planning. Per chart review, plan is SIVA. SW attempted to contact patient's wife to provide list of available SIVA. Left  w/ SW contact information for return call. SW met with patient and his wife at bedside.  SW provided A

## 2021-07-26 NOTE — DISCHARGE SUMMARY
[facility]    Discharge Summary    Andrés Whiting.  Patient Status:  Observation    1935 MRN [id number]   Location [facility] 5SW/SE Attending Kobi Samayoa MD   Hosp Day # 0 PCP Noah Kraus     Date of Admis edema  NEUROLOGIC: Cranial nerves II-XII intact, no focal defecits      History of Present Illness:   Per Dr. Paul Mcneill. is a a(n) 81year old male w/ a history of HTN, BPH, kidney stones, who presents with a food impaction.  States he ate priya Instructions Prescription details   Donepezil HCl 10 MG Tabs  Commonly known as: ARICEPT  What changed:   · how much to take  · how to take this  · when to take this      TAKE ONE TABLET BY MOUTH EVERY MORNING   Quantity: 90 tablet  Refills: 3     Venl treatment plan and workup    Marisela Etienne  7/26/2021

## 2021-07-26 NOTE — PLAN OF CARE
Problem: Patient Centered Care  Goal: Patient preferences are identified and integrated in the patient's plan of care  Description: Interventions:  - What would you like us to know as we care for you? Live at home with wife. Use wheelchair at times.   - and moisture  - Encourage food from home; allow for food preferences  - Enhance eating environment  Outcome: Progressing     Problem: SKIN/TISSUE INTEGRITY - ADULT  Goal: Skin integrity remains intact  Description: INTERVENTIONS  - Assess and document risk

## 2021-07-26 NOTE — PLAN OF CARE
Problem: Patient Centered Care  Goal: Patient preferences are identified and integrated in the patient's plan of care  Description: Interventions:  - What would you like us to know as we care for you? Live at home with wife. Use wheelchair at times.   - consult as needed  - Optimize oral hygiene and moisture  - Encourage food from home; allow for food preferences  - Enhance eating environment  Outcome: Adequate for Discharge     Problem: SAFETY ADULT - FALL  Goal: Free from fall injury  Description: INTER

## 2021-07-27 ENCOUNTER — INITIAL APN SNF VISIT (OUTPATIENT)
Dept: INTERNAL MEDICINE CLINIC | Facility: SKILLED NURSING FACILITY | Age: 86
End: 2021-07-27

## 2021-07-27 DIAGNOSIS — I10 ESSENTIAL HYPERTENSION: ICD-10-CM

## 2021-07-27 DIAGNOSIS — R53.1 WEAKNESS GENERALIZED: ICD-10-CM

## 2021-07-27 DIAGNOSIS — K22.2 ESOPHAGEAL OBSTRUCTION DUE TO FOOD IMPACTION: ICD-10-CM

## 2021-07-27 DIAGNOSIS — T18.128A ESOPHAGEAL OBSTRUCTION DUE TO FOOD IMPACTION: ICD-10-CM

## 2021-07-27 PROCEDURE — 99310 SBSQ NF CARE HIGH MDM 45: CPT | Performed by: NURSE PRACTITIONER

## 2021-07-27 PROCEDURE — 1111F DSCHRG MED/CURRENT MED MERGE: CPT | Performed by: NURSE PRACTITIONER

## 2021-07-27 PROCEDURE — 1123F ACP DISCUSS/DSCN MKR DOCD: CPT | Performed by: NURSE PRACTITIONER

## 2021-07-27 NOTE — PROGRESS NOTES
HPI: Odell Vickers Is an 80 yo male with HTN, BPH, kidney stones hyperlipidemia, depression, dementia, chronic intermittent dysphagia to solids who was admitted to [address] [place] [address] with a food impaction.  CT neck in ER with debris in midthoracic esophagus with p diverticulum.   -ST consult  -nectar thick liquids/pureed diet   -Likely will need VFSS, esophageal manometry evaluation as OP  - Cont PPI     L pleural effusion  -Also with bibasilar patchy infiltrates on inpatient cxr  Not on antibiotics  Monitor   No cou

## 2021-08-03 ENCOUNTER — SNF VISIT (OUTPATIENT)
Dept: INTERNAL MEDICINE CLINIC | Facility: SKILLED NURSING FACILITY | Age: 86
End: 2021-08-03

## 2021-08-03 DIAGNOSIS — I10 ESSENTIAL HYPERTENSION: ICD-10-CM

## 2021-08-03 DIAGNOSIS — T18.128A ESOPHAGEAL OBSTRUCTION DUE TO FOOD IMPACTION: ICD-10-CM

## 2021-08-03 DIAGNOSIS — R53.1 WEAKNESS GENERALIZED: ICD-10-CM

## 2021-08-03 DIAGNOSIS — K22.2 ESOPHAGEAL OBSTRUCTION DUE TO FOOD IMPACTION: ICD-10-CM

## 2021-08-03 PROCEDURE — 99308 SBSQ NF CARE LOW MDM 20: CPT | Performed by: NURSE PRACTITIONER

## 2021-08-03 NOTE — PROGRESS NOTES
HPI: Timothy Mendez. Is an 80 yo male with HTN, BPH, kidney stones hyperlipidemia, depression, dementia, chronic intermittent dysphagia to solids who was admitted to [address] [place] [address] with a food impaction.  CT neck in ER with debris in midthoracic esophagus with p underwent EGD  -Found to have large food impaction in mid and distal esophagus which was cleared. GE junction with findings suggestive of achalasia. A found to have distal esophageal diverticulum.   -ST consult  -nectar thick liquids/pureed diet   -Likely wi

## 2021-08-17 ENCOUNTER — TELEPHONE (OUTPATIENT)
Dept: GASTROENTEROLOGY | Facility: CLINIC | Age: 86
End: 2021-08-17

## 2021-08-17 NOTE — TELEPHONE ENCOUNTER
Pts wife Flory Read called to ask Dr. Melyssa Sabillon which doctor pt should see for esophagus issues that he is having. Please call.

## 2021-08-24 NOTE — TELEPHONE ENCOUNTER
I spoke to the pt's wife Shanda Thomson and we scheduled a f/u appt w/Natalie.  Date, time and location verified with the pt's wife    Future Appointments   Date Time Provider Frederick De La Torre   10/6/2021 11:00 AM [facility] Bhargav RAMOS

## 2021-09-11 ENCOUNTER — HOSPITAL ENCOUNTER (EMERGENCY)
Facility: HOSPITAL | Age: 86
Discharge: HOME | End: 2021-09-11
Attending: EMERGENCY MEDICINE
Payer: MEDICARE

## 2021-09-11 VITALS
HEART RATE: 66 BPM | HEIGHT: 70 IN | DIASTOLIC BLOOD PRESSURE: 84 MMHG | RESPIRATION RATE: 18 BRPM | SYSTOLIC BLOOD PRESSURE: 131 MMHG | WEIGHT: 180 LBS | OXYGEN SATURATION: 98 % | TEMPERATURE: 98 F | BODY MASS INDEX: 25.77 KG/M2

## 2021-09-11 DIAGNOSIS — R13.19 ESOPHAGEAL DYSPHAGIA: Primary | ICD-10-CM

## 2021-09-11 PROCEDURE — 99282 EMERGENCY DEPT VISIT SF MDM: CPT

## 2021-09-11 NOTE — ED QUICK NOTES
Pt tolerating pureed chicken, mashed potatoes, and applesauce w/o difficulty. No coughing noted. Dr. Etelvina Fuentes notified.

## 2021-09-11 NOTE — ED INITIAL ASSESSMENT (HPI)
Pt to ED with c/o possible FB in throat since yesterday. Pt unable to tolerate po intake at home today. No respiratory distress noted.
home

## 2021-09-11 NOTE — ED QUICK NOTES
Discharge instructions given to pt. Pt verbalized understanding of home care, and to follow up with GI. Pt denied further questions or concerns. Pt ambulatory out of ED with wife, discharged in stable condition.

## 2021-11-17 ENCOUNTER — ORDER TRANSCRIPTION (OUTPATIENT)
Dept: PHYSICAL THERAPY | Facility: HOSPITAL | Age: 86
End: 2021-11-17

## 2021-11-17 DIAGNOSIS — R13.10 PROBLEMS WITH SWALLOWING AND MASTICATION: Primary | ICD-10-CM

## 2021-12-07 NOTE — H&P
[facility] - Gastroenterology                                                                                                               Reason for consult: f/u    Requesting physician or provider: Cinthia Miguel foods/liquids should be.      NSAIDS: asa 81 mg  Tobacco: no  Alcohol: no  Marijuana: no  Illicit drugs: no    No FH GI malignancy    No history of adverse reaction to sedation  No ESMER  No anticoagulants  No pacemaker/defibrillator  No pain medications and/ 12.5 MG Oral Cap Take 12.5 mg by mouth daily. • aspirin 81 MG Oral Tab Take 81 mg by mouth daily. • Potassium Chloride ER 10 MEQ Oral Tab CR Take 10 mEq by mouth every other day.  Next dose due 02/14/21      • mirtazapine 15 MG Oral Tab TAKE ONE TAB hepatomegaly  MSK: No redness, no warmth, no swelling of joints  SKIN: No jaundice, no erythema, no rashes  HEMATOLOGIC: No bleeding, no bruising  NEURO: Alert and interactive, normal gait    Labs/Imaging/Procedures:     Recent Results (from the past 4380 0.70 x10(3) uL    Basophil Absolute 0.04 0.00 - 0.20 x10(3) uL    Immature Granulocyte Absolute 0.02 0.00 - 1.00 x10(3) uL    Neutrophil % 44.1 %    Lymphocyte % 39.8 %    Monocyte % 10.2 %    Eosinophil % 4.8 %    Basophil % 0.7 %    Immature Granulocyte htn:    #gerd  Reflux well controlled with use of pantoprazole. Continue current dose. #dysphagia  He is here today for f/u. S/p VFSS 2020. EGD 7/2021 w/o focal stricture, web, mass.  Notable for resistance at ge junction and recommendation to consid to correct errors during dictation, discrepancies may still exist.

## 2021-12-14 ENCOUNTER — OFFICE VISIT (OUTPATIENT)
Dept: GASTROENTEROLOGY | Facility: CLINIC | Age: 86
End: 2021-12-14
Payer: MEDICARE

## 2021-12-14 VITALS
WEIGHT: 176 LBS | BODY MASS INDEX: 25.2 KG/M2 | TEMPERATURE: 99 F | HEART RATE: 87 BPM | DIASTOLIC BLOOD PRESSURE: 84 MMHG | SYSTOLIC BLOOD PRESSURE: 130 MMHG | HEIGHT: 70 IN

## 2021-12-14 DIAGNOSIS — K21.9 GASTROESOPHAGEAL REFLUX DISEASE, UNSPECIFIED WHETHER ESOPHAGITIS PRESENT: ICD-10-CM

## 2021-12-14 DIAGNOSIS — K59.00 CONSTIPATION, UNSPECIFIED CONSTIPATION TYPE: ICD-10-CM

## 2021-12-14 DIAGNOSIS — R13.19 ESOPHAGEAL DYSPHAGIA: Primary | ICD-10-CM

## 2021-12-14 PROCEDURE — 99214 OFFICE O/P EST MOD 30 MIN: CPT | Performed by: NURSE PRACTITIONER

## 2021-12-14 PROCEDURE — 1111F DSCHRG MED/CURRENT MED MERGE: CPT | Performed by: NURSE PRACTITIONER

## 2021-12-14 NOTE — PATIENT INSTRUCTIONS
-speech therapy referral  -er if condition decline  -current dose of pantoprazole  -prune juice or kiwi for constipation  -consider manometry vs vfss if needed

## 2021-12-27 ENCOUNTER — TELEPHONE (OUTPATIENT)
Dept: PHYSICAL THERAPY | Facility: HOSPITAL | Age: 86
End: 2021-12-27

## 2022-01-04 ENCOUNTER — ORDER TRANSCRIPTION (OUTPATIENT)
Dept: ADMINISTRATIVE | Facility: HOSPITAL | Age: 87
End: 2022-01-04

## 2022-01-04 DIAGNOSIS — Z01.818 PREOP EXAMINATION: Primary | ICD-10-CM

## 2022-01-11 ENCOUNTER — HOSPITAL ENCOUNTER (EMERGENCY)
Facility: HOSPITAL | Age: 87
Discharge: HOME | End: 2022-01-11
Attending: EMERGENCY MEDICINE
Payer: MEDICARE

## 2022-01-11 ENCOUNTER — APPOINTMENT (OUTPATIENT)
Dept: MRI IMAGING | Facility: HOSPITAL | Age: 87
End: 2022-01-11
Attending: EMERGENCY MEDICINE
Payer: MEDICARE

## 2022-01-11 VITALS
SYSTOLIC BLOOD PRESSURE: 110 MMHG | TEMPERATURE: 97 F | OXYGEN SATURATION: 96 % | RESPIRATION RATE: 14 BRPM | HEIGHT: 70 IN | BODY MASS INDEX: 23.62 KG/M2 | DIASTOLIC BLOOD PRESSURE: 69 MMHG | WEIGHT: 165 LBS | HEART RATE: 58 BPM

## 2022-01-11 DIAGNOSIS — N30.00 ACUTE CYSTITIS WITHOUT HEMATURIA: ICD-10-CM

## 2022-01-11 DIAGNOSIS — G25.2 COARSE TREMORS: Primary | ICD-10-CM

## 2022-01-11 LAB
ANION GAP SERPL CALC-SCNC: 2 MMOL/L (ref 0–18)
BASOPHILS # BLD AUTO: 0.04 X10(3) UL (ref 0–0.2)
BASOPHILS NFR BLD AUTO: 0.8 %
BILIRUB UR QL: NEGATIVE
BUN BLD-MCNC: 10 MG/DL (ref 7–18)
BUN/CREAT SERPL: 11.4 (ref 10–20)
CALCIUM BLD-MCNC: 10.1 MG/DL (ref 8.5–10.1)
CHLORIDE SERPL-SCNC: 111 MMOL/L (ref 98–112)
CLARITY UR: CLEAR
CO2 SERPL-SCNC: 28 MMOL/L (ref 21–32)
COLOR UR: YELLOW
CREAT BLD-MCNC: 0.88 MG/DL
DEPRECATED RDW RBC AUTO: 51.9 FL (ref 35.1–46.3)
EOSINOPHIL # BLD AUTO: 0.22 X10(3) UL (ref 0–0.7)
EOSINOPHIL NFR BLD AUTO: 4.3 %
ERYTHROCYTE [DISTWIDTH] IN BLOOD BY AUTOMATED COUNT: 13.4 % (ref 11–15)
GLUCOSE BLD-MCNC: 127 MG/DL (ref 70–99)
GLUCOSE UR-MCNC: NEGATIVE MG/DL
HCT VFR BLD AUTO: 41.1 %
HGB BLD-MCNC: 13.2 G/DL
IMM GRANULOCYTES # BLD AUTO: 0.02 X10(3) UL (ref 0–1)
IMM GRANULOCYTES NFR BLD: 0.4 %
KETONES UR-MCNC: NEGATIVE MG/DL
LYMPHOCYTES # BLD AUTO: 1.75 X10(3) UL (ref 1–4)
LYMPHOCYTES NFR BLD AUTO: 34 %
MCH RBC QN AUTO: 33.4 PG (ref 26–34)
MCHC RBC AUTO-ENTMCNC: 32.1 G/DL (ref 31–37)
MCV RBC AUTO: 104.1 FL
MONOCYTES # BLD AUTO: 0.76 X10(3) UL (ref 0.1–1)
MONOCYTES NFR BLD AUTO: 14.8 %
NEUTROPHILS # BLD AUTO: 2.36 X10 (3) UL (ref 1.5–7.7)
NEUTROPHILS # BLD AUTO: 2.36 X10(3) UL (ref 1.5–7.7)
NEUTROPHILS NFR BLD AUTO: 45.7 %
NITRITE UR QL STRIP.AUTO: NEGATIVE
OSMOLALITY SERPL CALC.SUM OF ELEC: 293 MOSM/KG (ref 275–295)
PH UR: 5 [PH] (ref 5–8)
PLATELET # BLD AUTO: 188 10(3)UL (ref 150–450)
POTASSIUM SERPL-SCNC: 4.3 MMOL/L (ref 3.5–5.1)
PROT UR-MCNC: NEGATIVE MG/DL
RBC # BLD AUTO: 3.95 X10(6)UL
RBC #/AREA URNS AUTO: >10 /HPF
SODIUM SERPL-SCNC: 141 MMOL/L (ref 136–145)
SP GR UR STRIP: 1.01 (ref 1–1.03)
TROPONIN I HIGH SENSITIVITY: 20 NG/L
UROBILINOGEN UR STRIP-ACNC: <2
WBC # BLD AUTO: 5.2 X10(3) UL (ref 4–11)

## 2022-01-11 PROCEDURE — 84484 ASSAY OF TROPONIN QUANT: CPT | Performed by: EMERGENCY MEDICINE

## 2022-01-11 PROCEDURE — 70551 MRI BRAIN STEM W/O DYE: CPT | Performed by: EMERGENCY MEDICINE

## 2022-01-11 PROCEDURE — 80048 BASIC METABOLIC PNL TOTAL CA: CPT | Performed by: EMERGENCY MEDICINE

## 2022-01-11 PROCEDURE — 87086 URINE CULTURE/COLONY COUNT: CPT | Performed by: EMERGENCY MEDICINE

## 2022-01-11 PROCEDURE — 36415 COLL VENOUS BLD VENIPUNCTURE: CPT

## 2022-01-11 PROCEDURE — 93005 ELECTROCARDIOGRAM TRACING: CPT

## 2022-01-11 PROCEDURE — 85025 COMPLETE CBC W/AUTO DIFF WBC: CPT | Performed by: EMERGENCY MEDICINE

## 2022-01-11 PROCEDURE — 81001 URINALYSIS AUTO W/SCOPE: CPT | Performed by: EMERGENCY MEDICINE

## 2022-01-11 PROCEDURE — 93010 ELECTROCARDIOGRAM REPORT: CPT | Performed by: EMERGENCY MEDICINE

## 2022-01-11 PROCEDURE — 99285 EMERGENCY DEPT VISIT HI MDM: CPT

## 2022-01-11 RX ORDER — CEPHALEXIN 500 MG/1
500 CAPSULE ORAL 2 TIMES DAILY
Qty: 10 CAPSULE | Refills: 0 | Status: SHIPPED | OUTPATIENT
Start: 2022-01-11 | End: 2022-01-16

## 2022-01-11 NOTE — ED INITIAL ASSESSMENT (HPI)
Patient presents from Home with wife, wife as historian- states patient is more unresponsive and confused than normal  \"woke up like this\" +tremors

## 2022-01-11 NOTE — ED PROVIDER NOTES
Patient Seen in: [facility] AND [facility] Emergency Department      History   Patient presents with:  Altered Mental Status    Stated Complaint: AMS, tremors    Subjective:   HPI  81 yoM with BPH, HTN, anxiety, presenting for evaluation of altered mental status tremors  Other systems are as noted in HPI. Constitutional and vital signs reviewed. All other systems reviewed and negative except as noted above.     Physical Exam     ED Triage Vitals [01/11/22 1147]   /67   Pulse 120   Resp 18   Temp 97.3 °F 127 (*)     All other components within normal limits   URINALYSIS WITH CULTURE REFLEX - Abnormal; Notable for the following components:    Blood Urine Moderate (*)     Leukocyte Esterase Urine Trace (*)     WBC Urine 6-10 (*)     RBC Urine >10 (*)     Remberto Urinalysis with mild infection, will treat with antibiotics as this may be worsening his tremors and causing some drowsiness. In addition MRI negative for acute stroke.  involved  To help facilitate close neurology follow-up.   Return precauti

## 2022-01-11 NOTE — ED QUICK NOTES
Patient discharged home in no acute distress in care of spouse. A&Ox4, skin p/w/d, denies cp/sob. Ambulating with steady gait and verbalized understanding of d/c instructions and prescriptions. Patient provided wheelchair to ED exit.

## 2022-01-11 NOTE — CM/SW NOTE
The pt's wife is asking for assistance in getting an appointment with neurology at [facility]. The pt has had to wait months to get into neurology with [facility] and they would rath come to [address] [place] [address] Road Sw will contact neurology group with [facility] tomorro

## 2022-01-11 NOTE — ED QUICK NOTES
Rounding Completed    Plan of Care reviewed. Waiting for urine sample. Elimination needs assessed. Provided urinal    Bed is locked and in lowest position. Call light within reach.

## 2022-01-12 NOTE — CM/SW NOTE
CM contacted Neurology/EMH to arranged an appointment f/u post ER visit.   Per office able to give appointment with Dr. Jesica Edward on 1/13/22 at 8:15am.    [facility]  Anitra Avery [phone][phone]

## 2022-01-13 ENCOUNTER — OFFICE VISIT (OUTPATIENT)
Dept: NEUROLOGY | Facility: CLINIC | Age: 87
End: 2022-01-13
Payer: MEDICARE

## 2022-01-13 VITALS — HEIGHT: 70 IN | WEIGHT: 165 LBS | BODY MASS INDEX: 23.62 KG/M2

## 2022-01-13 DIAGNOSIS — R25.1 TREMOR: Primary | ICD-10-CM

## 2022-01-13 PROCEDURE — 99214 OFFICE O/P EST MOD 30 MIN: CPT | Performed by: OTHER

## 2022-01-13 RX ORDER — VENLAFAXINE HYDROCHLORIDE 75 MG/1
75 CAPSULE, EXTENDED RELEASE ORAL DAILY
COMMUNITY
Start: 2022-01-03

## 2022-01-13 NOTE — PROGRESS NOTES
Neurology Initial Visit     Referred By: Dr. Dobbins ref. provider found    Chief Complaint: Patient presents with:  Neurologic Problem: Patient presents today with wife who states that patient has a history of depression and is on some anti-depressants.   Wif Smokeless tobacco: Never Used       Alcohol use Yes   Comment: occasionally       Drug use: Unknown       Family History   Problem Relation Age of Onset   • Alcohol and Other Disorders Associated Father    • Heart Disorder Father    • Depression Father was done and was negative      Physical Exam:   01/13/22  0842   Weight: 165 lb (74.8 kg)   Height: 70\"       General: No apparent distress, well nourished, well groomed.   Head- Normocephalic, atraumatic  Eyes- No redness or swelling  ENT- Hearing intake, disease as well. Does not appear to have any evidence of Parkinson's disease itself at this point. However some secondary parkinsonism versus drug-induced tremors could definitely consider. Does not appear to be dyskinesia either.   We discussed antipsyc

## 2022-02-08 ENCOUNTER — LAB ENCOUNTER (OUTPATIENT)
Dept: LAB | Facility: HOSPITAL | Age: 87
End: 2022-02-08
Attending: TRANSPLANT SURGERY
Payer: MEDICARE

## 2022-02-08 DIAGNOSIS — Z01.818 PREOP EXAMINATION: ICD-10-CM

## 2022-02-08 LAB — SARS-COV-2 RNA RESP QL NAA+PROBE: NOT DETECTED

## 2022-02-12 ENCOUNTER — APPOINTMENT (OUTPATIENT)
Dept: CT IMAGING | Facility: HOSPITAL | Age: 87
End: 2022-02-12
Attending: EMERGENCY MEDICINE
Payer: MEDICARE

## 2022-02-12 ENCOUNTER — HOSPITAL ENCOUNTER (EMERGENCY)
Facility: HOSPITAL | Age: 87
Discharge: HOME | End: 2022-02-12
Attending: EMERGENCY MEDICINE
Payer: MEDICARE

## 2022-02-12 VITALS
BODY MASS INDEX: 22.9 KG/M2 | SYSTOLIC BLOOD PRESSURE: 124 MMHG | HEIGHT: 70 IN | RESPIRATION RATE: 12 BRPM | DIASTOLIC BLOOD PRESSURE: 96 MMHG | WEIGHT: 160 LBS | TEMPERATURE: 98 F | HEART RATE: 71 BPM | OXYGEN SATURATION: 100 %

## 2022-02-12 DIAGNOSIS — R19.7 DIARRHEA, UNSPECIFIED TYPE: ICD-10-CM

## 2022-02-12 DIAGNOSIS — N30.00 ACUTE CYSTITIS WITHOUT HEMATURIA: ICD-10-CM

## 2022-02-12 DIAGNOSIS — R55 SYNCOPE, NEAR: Primary | ICD-10-CM

## 2022-02-12 LAB
ANION GAP SERPL CALC-SCNC: 11 MMOL/L (ref 0–18)
BASOPHILS # BLD AUTO: 0.02 X10(3) UL (ref 0–0.2)
BASOPHILS NFR BLD AUTO: 0.4 %
BILIRUB UR QL: NEGATIVE
BUN BLD-MCNC: 18 MG/DL (ref 7–18)
BUN/CREAT SERPL: 19.1 (ref 10–20)
CALCIUM BLD-MCNC: 9.9 MG/DL (ref 8.5–10.1)
CHLORIDE SERPL-SCNC: 106 MMOL/L (ref 98–112)
CO2 SERPL-SCNC: 21 MMOL/L (ref 21–32)
COLOR UR: YELLOW
CREAT BLD-MCNC: 0.94 MG/DL
DEPRECATED RDW RBC AUTO: 47.5 FL (ref 35.1–46.3)
EOSINOPHIL # BLD AUTO: 0.06 X10(3) UL (ref 0–0.7)
EOSINOPHIL NFR BLD AUTO: 1.1 %
ERYTHROCYTE [DISTWIDTH] IN BLOOD BY AUTOMATED COUNT: 13.2 % (ref 11–15)
GLUCOSE BLD-MCNC: 158 MG/DL (ref 70–99)
GLUCOSE UR-MCNC: NEGATIVE MG/DL
HCT VFR BLD AUTO: 36.2 %
HGB BLD-MCNC: 12.6 G/DL
HYALINE CASTS #/AREA URNS AUTO: PRESENT /LPF
IMM GRANULOCYTES # BLD AUTO: 0.02 X10(3) UL (ref 0–1)
IMM GRANULOCYTES NFR BLD: 0.4 %
KETONES UR-MCNC: 80 MG/DL
LYMPHOCYTES # BLD AUTO: 1.69 X10(3) UL (ref 1–4)
LYMPHOCYTES NFR BLD AUTO: 32.1 %
MCH RBC QN AUTO: 34.5 PG (ref 26–34)
MCHC RBC AUTO-ENTMCNC: 34.8 G/DL (ref 31–37)
MCV RBC AUTO: 99.2 FL
MONOCYTES NFR BLD AUTO: 11.4 %
NEUTROPHILS # BLD AUTO: 2.87 X10 (3) UL (ref 1.5–7.7)
NEUTROPHILS # BLD AUTO: 2.87 X10(3) UL (ref 1.5–7.7)
NEUTROPHILS NFR BLD AUTO: 54.6 %
NITRITE UR QL STRIP.AUTO: NEGATIVE
OSMOLALITY SERPL CALC.SUM OF ELEC: 291 MOSM/KG (ref 275–295)
PH UR: 6 [PH] (ref 5–8)
PLATELET # BLD AUTO: 192 10(3)UL (ref 150–450)
POTASSIUM SERPL-SCNC: 3.1 MMOL/L (ref 3.5–5.1)
PROT UR-MCNC: 100 MG/DL
RBC # BLD AUTO: 3.65 X10(6)UL
RBC #/AREA URNS AUTO: >10 /HPF
SARS-COV-2 RNA RESP QL NAA+PROBE: NOT DETECTED
SODIUM SERPL-SCNC: 138 MMOL/L (ref 136–145)
TROPONIN I HIGH SENSITIVITY: 20 NG/L
UROBILINOGEN UR STRIP-ACNC: <2
WBC # BLD AUTO: 5.3 X10(3) UL (ref 4–11)
WBC #/AREA URNS AUTO: >50 /HPF

## 2022-02-12 PROCEDURE — 96360 HYDRATION IV INFUSION INIT: CPT

## 2022-02-12 PROCEDURE — 93005 ELECTROCARDIOGRAM TRACING: CPT

## 2022-02-12 PROCEDURE — 96361 HYDRATE IV INFUSION ADD-ON: CPT

## 2022-02-12 PROCEDURE — 93010 ELECTROCARDIOGRAM REPORT: CPT | Performed by: EMERGENCY MEDICINE

## 2022-02-12 PROCEDURE — 80048 BASIC METABOLIC PNL TOTAL CA: CPT | Performed by: EMERGENCY MEDICINE

## 2022-02-12 PROCEDURE — 84484 ASSAY OF TROPONIN QUANT: CPT | Performed by: EMERGENCY MEDICINE

## 2022-02-12 PROCEDURE — 99285 EMERGENCY DEPT VISIT HI MDM: CPT

## 2022-02-12 PROCEDURE — 87086 URINE CULTURE/COLONY COUNT: CPT | Performed by: EMERGENCY MEDICINE

## 2022-02-12 PROCEDURE — 70450 CT HEAD/BRAIN W/O DYE: CPT | Performed by: EMERGENCY MEDICINE

## 2022-02-12 PROCEDURE — 85025 COMPLETE CBC W/AUTO DIFF WBC: CPT | Performed by: EMERGENCY MEDICINE

## 2022-02-12 PROCEDURE — 81001 URINALYSIS AUTO W/SCOPE: CPT | Performed by: EMERGENCY MEDICINE

## 2022-02-12 RX ORDER — CEPHALEXIN 500 MG/1
500 CAPSULE ORAL 2 TIMES DAILY
Qty: 10 CAPSULE | Refills: 0 | Status: SHIPPED | OUTPATIENT
Start: 2022-02-12 | End: 2022-02-17

## 2022-02-12 NOTE — ED QUICK NOTES
Patient had one episode of diarrhea. Per patient's wife, he has diarrhea on and off with constipation, she gave him a stool softener yesterday. Patient is on a thickened soft diet for swallowing difficulty.

## 2022-02-12 NOTE — Clinical Note
Discharge instruction provided and reviewed with patient. All questions answered. Patient instructed to return with worsening symptoms.  Patient verbalized understanding

## 2022-02-12 NOTE — ED INITIAL ASSESSMENT (HPI)
Patient brought in via [address] [place] [address] for syncope. Patient had episode while using the rest room. Patient from home lives with his wife. Aaox1.  Seen 2 weeks ago for bladder infection

## 2022-02-14 ENCOUNTER — ORDER TRANSCRIPTION (OUTPATIENT)
Dept: ADMINISTRATIVE | Facility: HOSPITAL | Age: 87
End: 2022-02-14

## 2022-02-22 ENCOUNTER — LAB ENCOUNTER (OUTPATIENT)
Dept: LAB | Facility: HOSPITAL | Age: 87
End: 2022-02-22
Payer: MEDICARE

## 2022-02-22 ENCOUNTER — HOSPITAL ENCOUNTER (EMERGENCY)
Facility: HOSPITAL | Age: 87
Discharge: HOME | End: 2022-02-22
Attending: EMERGENCY MEDICINE
Payer: MEDICARE

## 2022-02-22 ENCOUNTER — APPOINTMENT (OUTPATIENT)
Dept: GENERAL RADIOLOGY | Facility: HOSPITAL | Age: 87
End: 2022-02-22
Attending: EMERGENCY MEDICINE
Payer: MEDICARE

## 2022-02-22 VITALS
WEIGHT: 182 LBS | TEMPERATURE: 97 F | SYSTOLIC BLOOD PRESSURE: 117 MMHG | HEART RATE: 72 BPM | HEIGHT: 70 IN | OXYGEN SATURATION: 97 % | DIASTOLIC BLOOD PRESSURE: 62 MMHG | RESPIRATION RATE: 14 BRPM | BODY MASS INDEX: 26.05 KG/M2

## 2022-02-22 DIAGNOSIS — Z01.818 PRE-PROCEDURAL EXAMINATION: ICD-10-CM

## 2022-02-22 DIAGNOSIS — Z11.59 ENCOUNTER FOR SCREENING FOR OTHER VIRAL DISEASES: ICD-10-CM

## 2022-02-22 DIAGNOSIS — I95.1 ORTHOSTATIC HYPOTENSION: Primary | ICD-10-CM

## 2022-02-22 DIAGNOSIS — K59.00 CONSTIPATION, UNSPECIFIED CONSTIPATION TYPE: ICD-10-CM

## 2022-02-22 LAB
ANION GAP SERPL CALC-SCNC: 7 MMOL/L (ref 0–18)
BASOPHILS # BLD AUTO: 0.02 X10(3) UL (ref 0–0.2)
BASOPHILS NFR BLD AUTO: 0.4 %
BILIRUB UR QL: NEGATIVE
BUN/CREAT SERPL: 12.5 (ref 10–20)
CALCIUM BLD-MCNC: 10.2 MG/DL (ref 8.5–10.1)
CHLORIDE SERPL-SCNC: 108 MMOL/L (ref 98–112)
CO2 SERPL-SCNC: 25 MMOL/L (ref 21–32)
COLOR UR: YELLOW
CREAT BLD-MCNC: 0.96 MG/DL
DEPRECATED RDW RBC AUTO: 53.6 FL (ref 35.1–46.3)
EOSINOPHIL # BLD AUTO: 0.1 X10(3) UL (ref 0–0.7)
EOSINOPHIL NFR BLD AUTO: 1.9 %
ERYTHROCYTE [DISTWIDTH] IN BLOOD BY AUTOMATED COUNT: 14.3 % (ref 11–15)
GLUCOSE BLD-MCNC: 111 MG/DL (ref 70–99)
GLUCOSE UR-MCNC: NEGATIVE MG/DL
HCT VFR BLD AUTO: 40.8 %
HGB BLD-MCNC: 13.6 G/DL
HGB UR QL STRIP.AUTO: NEGATIVE
HYALINE CASTS #/AREA URNS AUTO: PRESENT /LPF
IMM GRANULOCYTES # BLD AUTO: 0.02 X10(3) UL (ref 0–1)
IMM GRANULOCYTES NFR BLD: 0.4 %
KETONES UR-MCNC: NEGATIVE MG/DL
LEUKOCYTE ESTERASE UR QL STRIP.AUTO: NEGATIVE
LYMPHOCYTES # BLD AUTO: 1.01 X10(3) UL (ref 1–4)
LYMPHOCYTES NFR BLD AUTO: 19.2 %
MAGNESIUM SERPL-MCNC: 2.3 MG/DL (ref 1.6–2.6)
MCH RBC QN AUTO: 34.3 PG (ref 26–34)
MCHC RBC AUTO-ENTMCNC: 33.3 G/DL (ref 31–37)
MCV RBC AUTO: 102.8 FL
MONOCYTES # BLD AUTO: 0.67 X10(3) UL (ref 0.1–1)
MONOCYTES NFR BLD AUTO: 12.7 %
NEUTROPHILS # BLD AUTO: 3.44 X10 (3) UL (ref 1.5–7.7)
NEUTROPHILS # BLD AUTO: 3.44 X10(3) UL (ref 1.5–7.7)
NEUTROPHILS NFR BLD AUTO: 65.4 %
NITRITE UR QL STRIP.AUTO: NEGATIVE
OSMOLALITY SERPL CALC.SUM OF ELEC: 290 MOSM/KG (ref 275–295)
PH UR: 5 [PH] (ref 5–8)
PLATELET # BLD AUTO: 183 10(3)UL (ref 150–450)
POTASSIUM SERPL-SCNC: 4.2 MMOL/L (ref 3.5–5.1)
PROT UR-MCNC: 30 MG/DL
RBC # BLD AUTO: 3.97 X10(6)UL
RBC #/AREA URNS AUTO: >10 /HPF
SODIUM SERPL-SCNC: 140 MMOL/L (ref 136–145)
SP GR UR STRIP: 1.02 (ref 1–1.03)
UROBILINOGEN UR STRIP-ACNC: <2
WBC # BLD AUTO: 5.3 X10(3) UL (ref 4–11)

## 2022-02-22 PROCEDURE — 99284 EMERGENCY DEPT VISIT MOD MDM: CPT

## 2022-02-22 PROCEDURE — 81001 URINALYSIS AUTO W/SCOPE: CPT | Performed by: EMERGENCY MEDICINE

## 2022-02-22 PROCEDURE — 83735 ASSAY OF MAGNESIUM: CPT | Performed by: EMERGENCY MEDICINE

## 2022-02-22 PROCEDURE — 96360 HYDRATION IV INFUSION INIT: CPT

## 2022-02-22 PROCEDURE — 74018 RADEX ABDOMEN 1 VIEW: CPT | Performed by: EMERGENCY MEDICINE

## 2022-02-22 PROCEDURE — 85025 COMPLETE CBC W/AUTO DIFF WBC: CPT | Performed by: EMERGENCY MEDICINE

## 2022-02-22 PROCEDURE — 80048 BASIC METABOLIC PNL TOTAL CA: CPT | Performed by: EMERGENCY MEDICINE

## 2022-02-22 RX ORDER — DOCUSATE SODIUM 100 MG/1
100 CAPSULE, LIQUID FILLED ORAL 2 TIMES DAILY
Qty: 60 CAPSULE | Refills: 0 | Status: ON HOLD | OUTPATIENT
Start: 2022-02-22 | End: 2022-03-25

## 2022-02-22 RX ORDER — POLYETHYLENE GLYCOL 3350 17 G/17G
17 POWDER, FOR SOLUTION ORAL DAILY PRN
Qty: 12 EACH | Refills: 0 | Status: SHIPPED | OUTPATIENT
Start: 2022-02-22 | End: 2022-03-25

## 2022-02-22 NOTE — ED QUICK NOTES
Wife also reports pt will be seen for dysphagia study soon and at this time she is managing his dietary needs at home.   Pt is incontinent of urine, straight cath order placed for obtaining urine sample

## 2022-02-22 NOTE — ED INITIAL ASSESSMENT (HPI)
Pt has weakness bilaterally in lower extremities. Pt was trying to get into vehicle and legs began to give out. Pt was seen on Friday and completed antibiotics for bladder infection.

## 2022-02-22 NOTE — ED QUICK NOTES
Per wife, pt was attempting to get in the car this afternoon and was unable to lift his legs enough leading him to slump down to his knees before she could cath him. Pt has abrasions/redness noted to both knees. Denies pain anywhere. Pt is oriented to baseline, per wife. Was recently treated for bladder infection x 2 and completed antibiotics. Denies v/d or fevers at home.  Wife states pt often becomes weak and has episodes like this but she was concerned his bladder infection may not be fully treated and would like him evaluated today

## 2022-02-23 LAB — SARS-COV-2 RNA RESP QL NAA+PROBE: NOT DETECTED

## 2022-03-11 ENCOUNTER — TELEPHONE (OUTPATIENT)
Dept: GASTROENTEROLOGY | Facility: CLINIC | Age: 87
End: 2022-03-11

## 2022-03-11 NOTE — TELEPHONE ENCOUNTER
Dr. Timothy Chilel spoke with patients wife 2/2 below message. Refer to alternative telephone encounter dated 03/11/2022.

## 2022-03-11 NOTE — TELEPHONE ENCOUNTER
Discussed with patient's wife. Ongoing dysphagia to solids. Ok with liquids and supplements. He is losing weight however. Was advised by pcp of botox. Suspect he is referring to EGD with botox of LES which is reasonable given suspected achalasia based on last EGD findings in 7/2021 when he presented with a food impaction. GI schedulers --   Please schedule for EGD/MAC with botox injection. Please make sure endo has it available that day. NPO after midnight    After this EGD he needs follow-up with [facility] in clinic.

## 2022-03-11 NOTE — TELEPHONE ENCOUNTER
Wife called stating she called a few times requesting a page from Dr. Joie Nam but received a call from someone else. Wife asking for Dr. Joie Nam to call her back regarding patient. Explained Dr. Joie Nam preformed emergency procedure a few months ago and is requesting a recommendation for a GI provider that also does Botox.

## 2022-03-17 ENCOUNTER — HOSPITAL ENCOUNTER (EMERGENCY)
Facility: HOSPITAL | Age: 87
Discharge: HOME | End: 2022-03-17
Attending: EMERGENCY MEDICINE
Payer: MEDICARE

## 2022-03-17 VITALS
DIASTOLIC BLOOD PRESSURE: 60 MMHG | RESPIRATION RATE: 20 BRPM | WEIGHT: 140 LBS | BODY MASS INDEX: 20 KG/M2 | OXYGEN SATURATION: 99 % | TEMPERATURE: 99 F | SYSTOLIC BLOOD PRESSURE: 112 MMHG | HEART RATE: 97 BPM

## 2022-03-17 DIAGNOSIS — R13.19 ESOPHAGEAL DYSPHAGIA: Primary | ICD-10-CM

## 2022-03-17 DIAGNOSIS — R53.1 WEAKNESS GENERALIZED: ICD-10-CM

## 2022-03-17 LAB
ANION GAP SERPL CALC-SCNC: 4 MMOL/L (ref 0–18)
BASOPHILS # BLD AUTO: 0.02 X10(3) UL (ref 0–0.2)
BASOPHILS NFR BLD AUTO: 0.3 %
BILIRUB UR QL: NEGATIVE
BUN BLD-MCNC: 21 MG/DL (ref 7–18)
BUN/CREAT SERPL: 21.4 (ref 10–20)
CALCIUM BLD-MCNC: 9.8 MG/DL (ref 8.5–10.1)
CHLORIDE SERPL-SCNC: 111 MMOL/L (ref 98–112)
CO2 SERPL-SCNC: 29 MMOL/L (ref 21–32)
COLOR UR: YELLOW
CREAT BLD-MCNC: 0.98 MG/DL
DEPRECATED RDW RBC AUTO: 56.7 FL (ref 35.1–46.3)
EOSINOPHIL # BLD AUTO: 0.06 X10(3) UL (ref 0–0.7)
EOSINOPHIL NFR BLD AUTO: 1 %
ERYTHROCYTE [DISTWIDTH] IN BLOOD BY AUTOMATED COUNT: 14.9 % (ref 11–15)
GLUCOSE BLD-MCNC: 83 MG/DL (ref 70–99)
GLUCOSE UR-MCNC: NEGATIVE MG/DL
HCT VFR BLD AUTO: 36.7 %
HGB BLD-MCNC: 11.9 G/DL
IMM GRANULOCYTES # BLD AUTO: 0.03 X10(3) UL (ref 0–1)
IMM GRANULOCYTES NFR BLD: 0.5 %
KETONES UR-MCNC: NEGATIVE MG/DL
LYMPHOCYTES # BLD AUTO: 0.97 X10(3) UL (ref 1–4)
LYMPHOCYTES NFR BLD AUTO: 15.4 %
MCH RBC QN AUTO: 33.8 PG (ref 26–34)
MCHC RBC AUTO-ENTMCNC: 32.4 G/DL (ref 31–37)
MCV RBC AUTO: 104.3 FL
MONOCYTES # BLD AUTO: 0.64 X10(3) UL (ref 0.1–1)
MONOCYTES NFR BLD AUTO: 10.1 %
NEUTROPHILS # BLD AUTO: 4.59 X10 (3) UL (ref 1.5–7.7)
NEUTROPHILS # BLD AUTO: 4.59 X10(3) UL (ref 1.5–7.7)
NEUTROPHILS NFR BLD AUTO: 72.7 %
NITRITE UR QL STRIP.AUTO: NEGATIVE
OSMOLALITY SERPL CALC.SUM OF ELEC: 300 MOSM/KG (ref 275–295)
PH UR: 7 [PH] (ref 5–8)
PLATELET # BLD AUTO: 153 10(3)UL (ref 150–450)
POTASSIUM SERPL-SCNC: 4.3 MMOL/L (ref 3.5–5.1)
PROT UR-MCNC: 100 MG/DL
RBC # BLD AUTO: 3.52 X10(6)UL
RBC #/AREA URNS AUTO: >10 /HPF
SARS-COV-2 RNA RESP QL NAA+PROBE: NOT DETECTED
SP GR UR STRIP: 1.02 (ref 1–1.03)
TSI SER-ACNC: 3.3 MIU/ML (ref 0.36–3.74)
UROBILINOGEN UR STRIP-ACNC: <2
VIT C UR-MCNC: 40 MG/DL
WBC # BLD AUTO: 6.3 X10(3) UL (ref 4–11)

## 2022-03-17 PROCEDURE — 87086 URINE CULTURE/COLONY COUNT: CPT | Performed by: EMERGENCY MEDICINE

## 2022-03-17 PROCEDURE — 93010 ELECTROCARDIOGRAM REPORT: CPT | Performed by: EMERGENCY MEDICINE

## 2022-03-17 PROCEDURE — 93005 ELECTROCARDIOGRAM TRACING: CPT

## 2022-03-17 PROCEDURE — 84443 ASSAY THYROID STIM HORMONE: CPT | Performed by: EMERGENCY MEDICINE

## 2022-03-17 PROCEDURE — 80048 BASIC METABOLIC PNL TOTAL CA: CPT | Performed by: EMERGENCY MEDICINE

## 2022-03-17 PROCEDURE — 96360 HYDRATION IV INFUSION INIT: CPT

## 2022-03-17 PROCEDURE — 81001 URINALYSIS AUTO W/SCOPE: CPT | Performed by: EMERGENCY MEDICINE

## 2022-03-17 PROCEDURE — 96361 HYDRATE IV INFUSION ADD-ON: CPT

## 2022-03-17 PROCEDURE — 85025 COMPLETE CBC W/AUTO DIFF WBC: CPT | Performed by: EMERGENCY MEDICINE

## 2022-03-17 PROCEDURE — 99285 EMERGENCY DEPT VISIT HI MDM: CPT

## 2022-03-17 RX ORDER — SODIUM CHLORIDE 9 MG/ML
INJECTION, SOLUTION INTRAVENOUS CONTINUOUS
Status: DISCONTINUED | OUTPATIENT
Start: 2022-03-17 | End: 2022-03-18

## 2022-03-17 NOTE — CM/SW NOTE
Per DR Brenda Reyes request to assist with [facility] referrals. Pt has hx at [facility]. CM met w/patient and spouse at bedside. Per spouse pt is unable to ambulate on own or with assistance. Per wife pt's legs give out and needs to use wheelchair. Pt has weakness to lower extremities. CM spoke w/[place] liaison of [address] who stated has not beds until next week and has a waiting list.    CM informed wife of no beds at [facility] but, possibly [facility] in [place] may have beds. Per wife is open to [facility] and CM will also provide SNF listing upon availability. Pt is vaccinated and received booster for covid, per wife . CM uploaded SNF referral in aidin.

## 2022-03-18 NOTE — CM/SW NOTE
JAYY called [address] @ [facility] to check on their bed availability for SNF placement tonight. Per [address] \"I will have to check and call you back\" - JAYY provided [address] at [facility] with [facility] direct # - will await Mckinley's call back. Dr. Babita Fitzpatrick updated on the above.

## 2022-03-18 NOTE — CM/SW NOTE
BLS arranged to [address] [place] [address]. PCS completed in [facility]. CADEN Renae RN will update patient and wife on BLS ETA.

## 2022-03-18 NOTE — CM/SW NOTE
CM informed wife that [facility] in [address] are available however, wife declined. Wife wants to wait for [facility]. CM left [facility] message to call back with availability as pt is awaiting in the ER. CM updated [place]    CM to remain available for support and/or discharge planning.     Garfield Jha RN, [facility]  ER   Ext. [phone]

## 2022-03-18 NOTE — CM/SW NOTE
[facility]. sent ER Encounter Summary, IP Transfer Report, pt's COVID vaccine record, pt's 3/17 negative COVID and all ERCM notes via Aidin with pt's SNF referral.    [facility] reserved in [employer].

## 2022-03-18 NOTE — CM/SW NOTE
[address] from [facility] called [facility], [facility]. @ [phone] requesting pt's med list be faxed to her @ [phone]. ERCM faxed pt's IP Transfer Report which includes pt's med list to Sushant Nguyen @ [phone] - confirmation hang.

## 2022-03-18 NOTE — CM/SW NOTE
This ERCM received hand off report from [facility] patient requiring SNF placement. Per JAYY Eldridge pt's wife would only like for patient to go to [facility]. Per JAYY Eldridge she spoke with [facility] and [facility] does not have any availability until next week, [facility] is checking on [facility] availability and is to call ERCM back. Per JAYY Eldridge she completed PASRR screen on patient. This ERCM checked pt's SNF referral in [facility] and [address] [facility] both available. JAYY met with pt and wife at bedside - informed wife of the above - wife declines patient going to either of the [facility] locations. Informed pt's wife JAYY can contact [facility] to check on their bed availability. Wife agreeable to [facility].

## 2022-03-18 NOTE — ED QUICK NOTES
Pt given report to Mckinley HURTADO at [facility]. [facility] ambulance at bedside for transport. Pt in no distress, wife at bedside.

## 2022-03-18 NOTE — ED QUICK NOTES
Pt waiting for acceptance from [facility]. Pt resting comfortable in bed, pt in not distress. Wife at bedside  Case Management working on placement.

## 2022-03-18 NOTE — CM/SW NOTE
[address] from [facility] called ERCM back - they have a bed for patient and are able to take patient tonight. Per Minnie RN to call RN to RN report to [phone]. ER RN Amber Farah notified of all of the above. Notified Jane Ear will arrange BLS to [facility] and complete PCS. Wife updated on the above. Dr. Javier Mondragon updated on the above.

## 2022-03-19 NOTE — TELEPHONE ENCOUNTER
Scheduled for:  EGD w/botox injection [phone]  Provider Name:  Dr. Gust Galeazzi  Date:  4/21/22  Location[facility]    [facility]  Sedation:  MAC  Time:  2828 (pt is aware to arrive at 1030)   Prep:  NPO after midnight, sent via [employer] on 3/19/22  Meds/Allergies Reconciled?:  Physician reviewed   Diagnosis with codes:  Dysphagia R13.10  Was patient informed to call insurance with codes (Y/N):  Yes, I confirmed Medicare/BCBS insurance with the patient. Referral sent?:  Referral was sent at the time of electronic surgical scheduling. [facility] or [facility] notified?:  I sent an electronic request to Endo Scheduling and received a confirmation today. Medication Orders: This patient verbally confirmed that he is not taking:   Iron, blood thinners and is not diabetic   Not latex allergy, Not PCN allergy and does not have a pacemaker   This patient is aware to HOLD his BP meds (Enalapril--PM) the night before the procedure     This patient is aware to HOLD all supplements x 7 days before the procedure. Misc Orders:       Further instructions given by staff:   I discussed the prep instructions with the patients wife which she verbally understood and is aware that I will send the instructions via [employer].

## 2022-03-21 ENCOUNTER — APPOINTMENT (OUTPATIENT)
Dept: GENERAL RADIOLOGY | Facility: HOSPITAL | Age: 87
DRG: 522 | End: 2022-03-21
Attending: EMERGENCY MEDICINE
Payer: MEDICARE

## 2022-03-21 ENCOUNTER — APPOINTMENT (OUTPATIENT)
Dept: CT IMAGING | Facility: HOSPITAL | Age: 87
DRG: 522 | End: 2022-03-21
Attending: EMERGENCY MEDICINE
Payer: MEDICARE

## 2022-03-21 ENCOUNTER — HOSPITAL ENCOUNTER (INPATIENT)
Facility: HOSPITAL | Age: 87
LOS: 4 days | Discharge: NURSING, REHAB OR LONG-TERM CARE | DRG: 522 | End: 2022-03-25
Attending: EMERGENCY MEDICINE | Admitting: INTERNAL MEDICINE
Payer: MEDICARE

## 2022-03-21 DIAGNOSIS — S72.009A HIP FX (HCC): ICD-10-CM

## 2022-03-21 DIAGNOSIS — S72.001A CLOSED FRACTURE OF NECK OF RIGHT FEMUR, INITIAL ENCOUNTER (HCC): Primary | ICD-10-CM

## 2022-03-21 LAB
ANION GAP SERPL CALC-SCNC: 1 MMOL/L (ref 0–18)
ANTIBODY SCREEN: NEGATIVE
BASOPHILS # BLD AUTO: 0.03 X10(3) UL (ref 0–0.2)
BASOPHILS NFR BLD AUTO: 0.4 %
BUN BLD-MCNC: 20 MG/DL (ref 7–18)
BUN/CREAT SERPL: 27.4 (ref 10–20)
CALCIUM BLD-MCNC: 9.5 MG/DL (ref 8.5–10.1)
CHLORIDE SERPL-SCNC: 112 MMOL/L (ref 98–112)
CO2 SERPL-SCNC: 32 MMOL/L (ref 21–32)
CREAT BLD-MCNC: 0.73 MG/DL
DEPRECATED RDW RBC AUTO: 58.4 FL (ref 35.1–46.3)
EOSINOPHIL # BLD AUTO: 0.35 X10(3) UL (ref 0–0.7)
EOSINOPHIL NFR BLD AUTO: 4.3 %
ERYTHROCYTE [DISTWIDTH] IN BLOOD BY AUTOMATED COUNT: 14.9 % (ref 11–15)
GLUCOSE BLD-MCNC: 82 MG/DL (ref 70–99)
HCT VFR BLD AUTO: 35.6 %
HGB BLD-MCNC: 11.3 G/DL
IMM GRANULOCYTES # BLD AUTO: 0.04 X10(3) UL (ref 0–1)
IMM GRANULOCYTES NFR BLD: 0.5 %
INR BLD: 1.12 (ref 0.8–1.2)
LYMPHOCYTES # BLD AUTO: 1.26 X10(3) UL (ref 1–4)
LYMPHOCYTES NFR BLD AUTO: 15.5 %
MCH RBC QN AUTO: 33.9 PG (ref 26–34)
MCHC RBC AUTO-ENTMCNC: 31.7 G/DL (ref 31–37)
MCV RBC AUTO: 106.9 FL
MONOCYTES # BLD AUTO: 1.2 X10(3) UL (ref 0.1–1)
NEUTROPHILS # BLD AUTO: 5.27 X10 (3) UL (ref 1.5–7.7)
NEUTROPHILS # BLD AUTO: 5.27 X10(3) UL (ref 1.5–7.7)
NEUTROPHILS NFR BLD AUTO: 64.6 %
OSMOLALITY SERPL CALC.SUM OF ELEC: 302 MOSM/KG (ref 275–295)
PLATELET # BLD AUTO: 147 10(3)UL (ref 150–450)
POTASSIUM SERPL-SCNC: 4.1 MMOL/L (ref 3.5–5.1)
PROTHROMBIN TIME: 14.6 SECONDS (ref 11.6–14.8)
RBC # BLD AUTO: 3.33 X10(6)UL
RH BLOOD TYPE: POSITIVE
RH BLOOD TYPE: POSITIVE
SARS-COV-2 RNA RESP QL NAA+PROBE: NOT DETECTED
SODIUM SERPL-SCNC: 145 MMOL/L (ref 136–145)
WBC # BLD AUTO: 8.2 X10(3) UL (ref 4–11)

## 2022-03-21 PROCEDURE — 36415 COLL VENOUS BLD VENIPUNCTURE: CPT

## 2022-03-21 PROCEDURE — 85025 COMPLETE CBC W/AUTO DIFF WBC: CPT | Performed by: EMERGENCY MEDICINE

## 2022-03-21 PROCEDURE — 72170 X-RAY EXAM OF PELVIS: CPT | Performed by: EMERGENCY MEDICINE

## 2022-03-21 PROCEDURE — 86850 RBC ANTIBODY SCREEN: CPT | Performed by: EMERGENCY MEDICINE

## 2022-03-21 PROCEDURE — 70450 CT HEAD/BRAIN W/O DYE: CPT | Performed by: EMERGENCY MEDICINE

## 2022-03-21 PROCEDURE — 99285 EMERGENCY DEPT VISIT HI MDM: CPT

## 2022-03-21 PROCEDURE — 71045 X-RAY EXAM CHEST 1 VIEW: CPT | Performed by: EMERGENCY MEDICINE

## 2022-03-21 PROCEDURE — 80048 BASIC METABOLIC PNL TOTAL CA: CPT | Performed by: EMERGENCY MEDICINE

## 2022-03-21 PROCEDURE — 86900 BLOOD TYPING SEROLOGIC ABO: CPT | Performed by: EMERGENCY MEDICINE

## 2022-03-21 PROCEDURE — 86920 COMPATIBILITY TEST SPIN: CPT

## 2022-03-21 PROCEDURE — 86901 BLOOD TYPING SEROLOGIC RH(D): CPT | Performed by: EMERGENCY MEDICINE

## 2022-03-21 PROCEDURE — 87641 MR-STAPH DNA AMP PROBE: CPT | Performed by: EMERGENCY MEDICINE

## 2022-03-21 PROCEDURE — 72192 CT PELVIS W/O DYE: CPT | Performed by: EMERGENCY MEDICINE

## 2022-03-21 PROCEDURE — 85610 PROTHROMBIN TIME: CPT | Performed by: EMERGENCY MEDICINE

## 2022-03-21 RX ORDER — POLYETHYLENE GLYCOL 3350 17 G/17G
17 POWDER, FOR SOLUTION ORAL DAILY PRN
Status: DISCONTINUED | OUTPATIENT
Start: 2022-03-21 | End: 2022-03-22

## 2022-03-21 RX ORDER — ACETAMINOPHEN 325 MG/1
650 TABLET ORAL EVERY 4 HOURS PRN
Status: DISCONTINUED | OUTPATIENT
Start: 2022-03-21 | End: 2022-03-25

## 2022-03-21 RX ORDER — AMLODIPINE BESYLATE 5 MG/1
5 TABLET ORAL DAILY
Status: DISCONTINUED | OUTPATIENT
Start: 2022-03-21 | End: 2022-03-22

## 2022-03-21 RX ORDER — PANTOPRAZOLE SODIUM 40 MG/1
40 TABLET, DELAYED RELEASE ORAL
Status: DISCONTINUED | OUTPATIENT
Start: 2022-03-22 | End: 2022-03-25

## 2022-03-21 RX ORDER — MAGNESIUM OXIDE 400 MG (241.3 MG MAGNESIUM) TABLET
2 TABLET NIGHTLY
Status: DISCONTINUED | OUTPATIENT
Start: 2022-03-21 | End: 2022-03-25

## 2022-03-21 RX ORDER — BISACODYL 10 MG
10 SUPPOSITORY, RECTAL RECTAL
Status: DISCONTINUED | OUTPATIENT
Start: 2022-03-21 | End: 2022-03-22

## 2022-03-21 RX ORDER — ONDANSETRON 2 MG/ML
4 INJECTION INTRAMUSCULAR; INTRAVENOUS EVERY 6 HOURS PRN
Status: DISCONTINUED | OUTPATIENT
Start: 2022-03-21 | End: 2022-03-22

## 2022-03-21 RX ORDER — SENNOSIDES 8.6 MG
17.2 TABLET ORAL NIGHTLY PRN
Status: DISCONTINUED | OUTPATIENT
Start: 2022-03-21 | End: 2022-03-25

## 2022-03-21 RX ORDER — DEXTROSE, SODIUM CHLORIDE, AND POTASSIUM CHLORIDE 5; .9; .15 G/100ML; G/100ML; G/100ML
INJECTION INTRAVENOUS CONTINUOUS
Status: DISCONTINUED | OUTPATIENT
Start: 2022-03-21 | End: 2022-03-22

## 2022-03-21 RX ORDER — ARIPIPRAZOLE 2 MG/1
2 TABLET ORAL EVERY OTHER DAY
Status: DISCONTINUED | OUTPATIENT
Start: 2022-03-22 | End: 2022-03-25

## 2022-03-21 RX ORDER — MIRTAZAPINE 7.5 MG/1
15 TABLET, FILM COATED ORAL NIGHTLY
Status: DISCONTINUED | OUTPATIENT
Start: 2022-03-21 | End: 2022-03-25

## 2022-03-21 RX ORDER — VENLAFAXINE HYDROCHLORIDE 37.5 MG/1
37.5 CAPSULE, EXTENDED RELEASE ORAL 2 TIMES DAILY
Status: DISCONTINUED | OUTPATIENT
Start: 2022-03-21 | End: 2022-03-25

## 2022-03-21 RX ORDER — CEFAZOLIN SODIUM/WATER 2 G/20 ML
2 SYRINGE (ML) INTRAVENOUS ONCE
Status: COMPLETED | OUTPATIENT
Start: 2022-03-22 | End: 2022-03-22

## 2022-03-21 RX ORDER — CEFAZOLIN SODIUM/WATER 2 G/20 ML
2 SYRINGE (ML) INTRAVENOUS ONCE
Status: DISCONTINUED | OUTPATIENT
Start: 2022-03-22 | End: 2022-03-21

## 2022-03-21 RX ORDER — ATORVASTATIN CALCIUM 40 MG/1
40 TABLET, FILM COATED ORAL NIGHTLY
Status: DISCONTINUED | OUTPATIENT
Start: 2022-03-21 | End: 2022-03-25

## 2022-03-21 RX ORDER — ENALAPRIL MALEATE 10 MG/1
20 TABLET ORAL NIGHTLY
Status: DISCONTINUED | OUTPATIENT
Start: 2022-03-21 | End: 2022-03-22

## 2022-03-21 RX ORDER — SODIUM PHOSPHATE, DIBASIC AND SODIUM PHOSPHATE, MONOBASIC 7; 19 G/133ML; G/133ML
1 ENEMA RECTAL ONCE AS NEEDED
Status: DISCONTINUED | OUTPATIENT
Start: 2022-03-21 | End: 2022-03-22

## 2022-03-21 RX ORDER — METOCLOPRAMIDE HYDROCHLORIDE 5 MG/ML
10 INJECTION INTRAMUSCULAR; INTRAVENOUS EVERY 8 HOURS PRN
Status: DISCONTINUED | OUTPATIENT
Start: 2022-03-21 | End: 2022-03-21

## 2022-03-21 RX ORDER — DONEPEZIL HYDROCHLORIDE 10 MG/1
10 TABLET, FILM COATED ORAL NIGHTLY
Status: DISCONTINUED | OUTPATIENT
Start: 2022-03-21 | End: 2022-03-25

## 2022-03-21 RX ORDER — HYDROCODONE BITARTRATE AND ACETAMINOPHEN 5; 325 MG/1; MG/1
2 TABLET ORAL EVERY 4 HOURS PRN
Status: DISCONTINUED | OUTPATIENT
Start: 2022-03-21 | End: 2022-03-22

## 2022-03-21 RX ORDER — HYDROCODONE BITARTRATE AND ACETAMINOPHEN 5; 325 MG/1; MG/1
1 TABLET ORAL EVERY 4 HOURS PRN
Status: DISCONTINUED | OUTPATIENT
Start: 2022-03-21 | End: 2022-03-22

## 2022-03-22 ENCOUNTER — APPOINTMENT (OUTPATIENT)
Dept: GENERAL RADIOLOGY | Facility: HOSPITAL | Age: 87
DRG: 522 | End: 2022-03-22
Attending: STUDENT IN AN ORGANIZED HEALTH CARE EDUCATION/TRAINING PROGRAM
Payer: MEDICARE

## 2022-03-22 ENCOUNTER — ANESTHESIA (OUTPATIENT)
Dept: SURGERY | Facility: HOSPITAL | Age: 87
DRG: 522 | End: 2022-03-22
Payer: MEDICARE

## 2022-03-22 ENCOUNTER — ANESTHESIA EVENT (OUTPATIENT)
Dept: SURGERY | Facility: HOSPITAL | Age: 87
DRG: 522 | End: 2022-03-22
Payer: MEDICARE

## 2022-03-22 LAB
ANION GAP SERPL CALC-SCNC: 1 MMOL/L (ref 0–18)
BASOPHILS # BLD AUTO: 0.03 X10(3) UL (ref 0–0.2)
BASOPHILS NFR BLD AUTO: 0.4 %
BUN BLD-MCNC: 21 MG/DL (ref 7–18)
BUN/CREAT SERPL: 32.8 (ref 10–20)
CALCIUM BLD-MCNC: 9 MG/DL (ref 8.5–10.1)
CHLORIDE SERPL-SCNC: 113 MMOL/L (ref 98–112)
CO2 SERPL-SCNC: 32 MMOL/L (ref 21–32)
CREAT BLD-MCNC: 0.64 MG/DL
DEPRECATED HBV CORE AB SER IA-ACNC: 1312.3 NG/ML
DEPRECATED RDW RBC AUTO: 56.4 FL (ref 35.1–46.3)
DEPRECATED RDW RBC AUTO: 57.6 FL (ref 35.1–46.3)
DEPRECATED RDW RBC AUTO: 59.3 FL (ref 35.1–46.3)
EOSINOPHIL # BLD AUTO: 0.23 X10(3) UL (ref 0–0.7)
EOSINOPHIL NFR BLD AUTO: 3.3 %
ERYTHROCYTE [DISTWIDTH] IN BLOOD BY AUTOMATED COUNT: 14.8 % (ref 11–15)
ERYTHROCYTE [DISTWIDTH] IN BLOOD BY AUTOMATED COUNT: 14.9 % (ref 11–15)
ERYTHROCYTE [DISTWIDTH] IN BLOOD BY AUTOMATED COUNT: 15 % (ref 11–15)
HCT VFR BLD AUTO: 31.2 %
HCT VFR BLD AUTO: 32.7 %
HCT VFR BLD AUTO: 35.8 %
HGB BLD-MCNC: 10.2 G/DL
HGB BLD-MCNC: 10.5 G/DL
HGB BLD-MCNC: 11.4 G/DL
IMM GRANULOCYTES # BLD AUTO: 0.03 X10(3) UL (ref 0–1)
IMM GRANULOCYTES NFR BLD: 0.4 %
INR BLD: 1.14 (ref 0.8–1.2)
IRON SATN MFR SERPL: 18 %
IRON SERPL-MCNC: 39 UG/DL
LYMPHOCYTES # BLD AUTO: 1.06 X10(3) UL (ref 1–4)
LYMPHOCYTES NFR BLD AUTO: 15.1 %
MAGNESIUM SERPL-MCNC: 2.3 MG/DL (ref 1.6–2.6)
MCH RBC QN AUTO: 34.2 PG (ref 26–34)
MCH RBC QN AUTO: 34.7 PG (ref 26–34)
MCH RBC QN AUTO: 34.8 PG (ref 26–34)
MCHC RBC AUTO-ENTMCNC: 31.8 G/DL (ref 31–37)
MCHC RBC AUTO-ENTMCNC: 32.1 G/DL (ref 31–37)
MCHC RBC AUTO-ENTMCNC: 32.7 G/DL (ref 31–37)
MCV RBC AUTO: 106.5 FL
MCV RBC AUTO: 106.5 FL
MCV RBC AUTO: 108.8 FL
MONOCYTES # BLD AUTO: 0.84 X10(3) UL (ref 0.1–1)
MONOCYTES NFR BLD AUTO: 11.9 %
NEUTROPHILS # BLD AUTO: 4.85 X10 (3) UL (ref 1.5–7.7)
NEUTROPHILS # BLD AUTO: 4.85 X10(3) UL (ref 1.5–7.7)
NEUTROPHILS NFR BLD AUTO: 68.9 %
OSMOLALITY SERPL CALC.SUM OF ELEC: 307 MOSM/KG (ref 275–295)
PLATELET # BLD AUTO: 140 10(3)UL (ref 150–450)
PLATELET # BLD AUTO: 144 10(3)UL (ref 150–450)
PLATELET # BLD AUTO: 150 10(3)UL (ref 150–450)
POTASSIUM SERPL-SCNC: 3.6 MMOL/L (ref 3.5–5.1)
PROTHROMBIN TIME: 14.8 SECONDS (ref 11.6–14.8)
RBC # BLD AUTO: 2.93 X10(6)UL
RBC # BLD AUTO: 3.07 X10(6)UL
RBC # BLD AUTO: 3.29 X10(6)UL
SODIUM SERPL-SCNC: 146 MMOL/L (ref 136–145)
TIBC SERPL-MCNC: 219 UG/DL (ref 240–450)
TRANSFERRIN SERPL-MCNC: 147 MG/DL (ref 200–360)
WBC # BLD AUTO: 6.3 X10(3) UL (ref 4–11)
WBC # BLD AUTO: 7 X10(3) UL (ref 4–11)

## 2022-03-22 PROCEDURE — 82728 ASSAY OF FERRITIN: CPT | Performed by: INTERNAL MEDICINE

## 2022-03-22 PROCEDURE — 88305 TISSUE EXAM BY PATHOLOGIST: CPT | Performed by: STUDENT IN AN ORGANIZED HEALTH CARE EDUCATION/TRAINING PROGRAM

## 2022-03-22 PROCEDURE — BQ13ZZZ FLUOROSCOPY OF RIGHT FEMUR: ICD-10-PCS | Performed by: STUDENT IN AN ORGANIZED HEALTH CARE EDUCATION/TRAINING PROGRAM

## 2022-03-22 PROCEDURE — 80048 BASIC METABOLIC PNL TOTAL CA: CPT | Performed by: INTERNAL MEDICINE

## 2022-03-22 PROCEDURE — 83540 ASSAY OF IRON: CPT | Performed by: INTERNAL MEDICINE

## 2022-03-22 PROCEDURE — 36415 COLL VENOUS BLD VENIPUNCTURE: CPT | Performed by: STUDENT IN AN ORGANIZED HEALTH CARE EDUCATION/TRAINING PROGRAM

## 2022-03-22 PROCEDURE — 84466 ASSAY OF TRANSFERRIN: CPT | Performed by: INTERNAL MEDICINE

## 2022-03-22 PROCEDURE — 76000 FLUOROSCOPY <1 HR PHYS/QHP: CPT | Performed by: STUDENT IN AN ORGANIZED HEALTH CARE EDUCATION/TRAINING PROGRAM

## 2022-03-22 PROCEDURE — 99212 OFFICE O/P EST SF 10 MIN: CPT

## 2022-03-22 PROCEDURE — 85025 COMPLETE CBC W/AUTO DIFF WBC: CPT | Performed by: INTERNAL MEDICINE

## 2022-03-22 PROCEDURE — 85027 COMPLETE CBC AUTOMATED: CPT | Performed by: INTERNAL MEDICINE

## 2022-03-22 PROCEDURE — 85610 PROTHROMBIN TIME: CPT | Performed by: INTERNAL MEDICINE

## 2022-03-22 PROCEDURE — 85027 COMPLETE CBC AUTOMATED: CPT | Performed by: STUDENT IN AN ORGANIZED HEALTH CARE EDUCATION/TRAINING PROGRAM

## 2022-03-22 PROCEDURE — 0SRR0J9 REPLACEMENT OF RIGHT HIP JOINT, FEMORAL SURFACE WITH SYNTHETIC SUBSTITUTE, CEMENTED, OPEN APPROACH: ICD-10-PCS | Performed by: STUDENT IN AN ORGANIZED HEALTH CARE EDUCATION/TRAINING PROGRAM

## 2022-03-22 PROCEDURE — 73552 X-RAY EXAM OF FEMUR 2/>: CPT | Performed by: STUDENT IN AN ORGANIZED HEALTH CARE EDUCATION/TRAINING PROGRAM

## 2022-03-22 PROCEDURE — 72170 X-RAY EXAM OF PELVIS: CPT | Performed by: STUDENT IN AN ORGANIZED HEALTH CARE EDUCATION/TRAINING PROGRAM

## 2022-03-22 PROCEDURE — 88311 DECALCIFY TISSUE: CPT | Performed by: STUDENT IN AN ORGANIZED HEALTH CARE EDUCATION/TRAINING PROGRAM

## 2022-03-22 PROCEDURE — 83735 ASSAY OF MAGNESIUM: CPT | Performed by: INTERNAL MEDICINE

## 2022-03-22 DEVICE — CEMENT BONE RADIOPAQ HOWMEDICA: Type: IMPLANTABLE DEVICE | Site: HIP | Status: FUNCTIONAL

## 2022-03-22 DEVICE — M-SPEC METAL FEMORAL HEAD 12/14 TAPER DIAMETER 28MM +5: Type: IMPLANTABLE DEVICE | Site: HIP | Status: FUNCTIONAL

## 2022-03-22 RX ORDER — CELECOXIB 200 MG/1
200 CAPSULE ORAL 2 TIMES DAILY
Status: DISCONTINUED | OUTPATIENT
Start: 2022-03-22 | End: 2022-03-25

## 2022-03-22 RX ORDER — MORPHINE SULFATE 4 MG/ML
2 INJECTION, SOLUTION INTRAMUSCULAR; INTRAVENOUS EVERY 10 MIN PRN
Status: DISCONTINUED | OUTPATIENT
Start: 2022-03-22 | End: 2022-03-22 | Stop reason: HOSPADM

## 2022-03-22 RX ORDER — ONDANSETRON 2 MG/ML
4 INJECTION INTRAMUSCULAR; INTRAVENOUS EVERY 4 HOURS PRN
Status: DISCONTINUED | OUTPATIENT
Start: 2022-03-22 | End: 2022-03-25

## 2022-03-22 RX ORDER — FAMOTIDINE 10 MG/ML
20 INJECTION, SOLUTION INTRAVENOUS 2 TIMES DAILY
Status: DISCONTINUED | OUTPATIENT
Start: 2022-03-22 | End: 2022-03-22 | Stop reason: ALTCHOICE

## 2022-03-22 RX ORDER — DOCUSATE SODIUM 100 MG/1
100 CAPSULE, LIQUID FILLED ORAL 2 TIMES DAILY
Status: DISCONTINUED | OUTPATIENT
Start: 2022-03-22 | End: 2022-03-22

## 2022-03-22 RX ORDER — PROCHLORPERAZINE EDISYLATE 5 MG/ML
5 INJECTION INTRAMUSCULAR; INTRAVENOUS ONCE AS NEEDED
Status: DISCONTINUED | OUTPATIENT
Start: 2022-03-22 | End: 2022-03-22 | Stop reason: HOSPADM

## 2022-03-22 RX ORDER — BISACODYL 10 MG
10 SUPPOSITORY, RECTAL RECTAL
Status: DISCONTINUED | OUTPATIENT
Start: 2022-03-22 | End: 2022-03-25

## 2022-03-22 RX ORDER — SODIUM CHLORIDE 9 MG/ML
INJECTION, SOLUTION INTRAVENOUS CONTINUOUS
Status: DISCONTINUED | OUTPATIENT
Start: 2022-03-22 | End: 2022-03-23

## 2022-03-22 RX ORDER — HYDROCODONE BITARTRATE AND ACETAMINOPHEN 5; 325 MG/1; MG/1
2 TABLET ORAL AS NEEDED
Status: DISCONTINUED | OUTPATIENT
Start: 2022-03-22 | End: 2022-03-22 | Stop reason: HOSPADM

## 2022-03-22 RX ORDER — HYDROMORPHONE HYDROCHLORIDE 1 MG/ML
0.4 INJECTION, SOLUTION INTRAMUSCULAR; INTRAVENOUS; SUBCUTANEOUS EVERY 5 MIN PRN
Status: DISCONTINUED | OUTPATIENT
Start: 2022-03-22 | End: 2022-03-22 | Stop reason: HOSPADM

## 2022-03-22 RX ORDER — SODIUM CHLORIDE, SODIUM LACTATE, POTASSIUM CHLORIDE, CALCIUM CHLORIDE 600; 310; 30; 20 MG/100ML; MG/100ML; MG/100ML; MG/100ML
INJECTION, SOLUTION INTRAVENOUS CONTINUOUS
Status: DISCONTINUED | OUTPATIENT
Start: 2022-03-22 | End: 2022-03-22 | Stop reason: HOSPADM

## 2022-03-22 RX ORDER — HYDROMORPHONE HYDROCHLORIDE 1 MG/ML
0.2 INJECTION, SOLUTION INTRAMUSCULAR; INTRAVENOUS; SUBCUTANEOUS EVERY 5 MIN PRN
Status: DISCONTINUED | OUTPATIENT
Start: 2022-03-22 | End: 2022-03-22 | Stop reason: HOSPADM

## 2022-03-22 RX ORDER — MORPHINE SULFATE 4 MG/ML
4 INJECTION, SOLUTION INTRAMUSCULAR; INTRAVENOUS EVERY 10 MIN PRN
Status: DISCONTINUED | OUTPATIENT
Start: 2022-03-22 | End: 2022-03-22 | Stop reason: HOSPADM

## 2022-03-22 RX ORDER — FAMOTIDINE 20 MG/1
20 TABLET, FILM COATED ORAL 2 TIMES DAILY
Status: DISCONTINUED | OUTPATIENT
Start: 2022-03-22 | End: 2022-03-22 | Stop reason: ALTCHOICE

## 2022-03-22 RX ORDER — HYDROCODONE BITARTRATE AND ACETAMINOPHEN 5; 325 MG/1; MG/1
1 TABLET ORAL AS NEEDED
Status: DISCONTINUED | OUTPATIENT
Start: 2022-03-22 | End: 2022-03-22 | Stop reason: HOSPADM

## 2022-03-22 RX ORDER — VANCOMYCIN HYDROCHLORIDE 1 G/20ML
INJECTION, POWDER, LYOPHILIZED, FOR SOLUTION INTRAVENOUS AS NEEDED
Status: DISCONTINUED | OUTPATIENT
Start: 2022-03-22 | End: 2022-03-22 | Stop reason: HOSPADM

## 2022-03-22 RX ORDER — DEXAMETHASONE SODIUM PHOSPHATE 4 MG/ML
VIAL (ML) INJECTION AS NEEDED
Status: DISCONTINUED | OUTPATIENT
Start: 2022-03-22 | End: 2022-03-22 | Stop reason: SURG

## 2022-03-22 RX ORDER — HALOPERIDOL 5 MG/ML
0.25 INJECTION INTRAMUSCULAR ONCE AS NEEDED
Status: DISCONTINUED | OUTPATIENT
Start: 2022-03-22 | End: 2022-03-22 | Stop reason: HOSPADM

## 2022-03-22 RX ORDER — DOCUSATE SODIUM 100 MG/1
100 CAPSULE, LIQUID FILLED ORAL 2 TIMES DAILY
Status: DISCONTINUED | OUTPATIENT
Start: 2022-03-22 | End: 2022-03-25

## 2022-03-22 RX ORDER — SODIUM CHLORIDE, SODIUM LACTATE, POTASSIUM CHLORIDE, CALCIUM CHLORIDE 600; 310; 30; 20 MG/100ML; MG/100ML; MG/100ML; MG/100ML
INJECTION, SOLUTION INTRAVENOUS CONTINUOUS PRN
Status: DISCONTINUED | OUTPATIENT
Start: 2022-03-22 | End: 2022-03-22 | Stop reason: SURG

## 2022-03-22 RX ORDER — TRANEXAMIC ACID 10 MG/ML
INJECTION, SOLUTION INTRAVENOUS AS NEEDED
Status: DISCONTINUED | OUTPATIENT
Start: 2022-03-22 | End: 2022-03-22 | Stop reason: SURG

## 2022-03-22 RX ORDER — MORPHINE SULFATE 10 MG/ML
6 INJECTION, SOLUTION INTRAMUSCULAR; INTRAVENOUS EVERY 10 MIN PRN
Status: DISCONTINUED | OUTPATIENT
Start: 2022-03-22 | End: 2022-03-22 | Stop reason: HOSPADM

## 2022-03-22 RX ORDER — ONDANSETRON 2 MG/ML
4 INJECTION INTRAMUSCULAR; INTRAVENOUS ONCE AS NEEDED
Status: DISCONTINUED | OUTPATIENT
Start: 2022-03-22 | End: 2022-03-22 | Stop reason: HOSPADM

## 2022-03-22 RX ORDER — CEFAZOLIN SODIUM/WATER 2 G/20 ML
2 SYRINGE (ML) INTRAVENOUS ONCE
Status: DISCONTINUED | OUTPATIENT
Start: 2022-03-22 | End: 2022-03-22 | Stop reason: ALTCHOICE

## 2022-03-22 RX ORDER — NALOXONE HYDROCHLORIDE 0.4 MG/ML
80 INJECTION, SOLUTION INTRAMUSCULAR; INTRAVENOUS; SUBCUTANEOUS AS NEEDED
Status: DISCONTINUED | OUTPATIENT
Start: 2022-03-22 | End: 2022-03-22 | Stop reason: HOSPADM

## 2022-03-22 RX ORDER — EPHEDRINE SULFATE 50 MG/ML
INJECTION, SOLUTION INTRAVENOUS AS NEEDED
Status: DISCONTINUED | OUTPATIENT
Start: 2022-03-22 | End: 2022-03-22 | Stop reason: SURG

## 2022-03-22 RX ORDER — ONDANSETRON 2 MG/ML
INJECTION INTRAMUSCULAR; INTRAVENOUS AS NEEDED
Status: DISCONTINUED | OUTPATIENT
Start: 2022-03-22 | End: 2022-03-22 | Stop reason: SURG

## 2022-03-22 RX ORDER — HYDROMORPHONE HYDROCHLORIDE 1 MG/ML
0.6 INJECTION, SOLUTION INTRAMUSCULAR; INTRAVENOUS; SUBCUTANEOUS EVERY 5 MIN PRN
Status: DISCONTINUED | OUTPATIENT
Start: 2022-03-22 | End: 2022-03-22 | Stop reason: HOSPADM

## 2022-03-22 RX ORDER — SENNOSIDES 8.6 MG
17.2 TABLET ORAL NIGHTLY
Status: DISCONTINUED | OUTPATIENT
Start: 2022-03-22 | End: 2022-03-25

## 2022-03-22 RX ORDER — CEFAZOLIN SODIUM/WATER 2 G/20 ML
2 SYRINGE (ML) INTRAVENOUS EVERY 8 HOURS
Status: COMPLETED | OUTPATIENT
Start: 2022-03-23 | End: 2022-03-23

## 2022-03-22 RX ORDER — MIDAZOLAM HYDROCHLORIDE 1 MG/ML
INJECTION INTRAMUSCULAR; INTRAVENOUS AS NEEDED
Status: DISCONTINUED | OUTPATIENT
Start: 2022-03-22 | End: 2022-03-22 | Stop reason: SURG

## 2022-03-22 RX ORDER — OXYCODONE HYDROCHLORIDE 5 MG/1
5 TABLET ORAL EVERY 4 HOURS PRN
Status: DISCONTINUED | OUTPATIENT
Start: 2022-03-22 | End: 2022-03-25

## 2022-03-22 RX ORDER — PROCHLORPERAZINE EDISYLATE 5 MG/ML
10 INJECTION INTRAMUSCULAR; INTRAVENOUS EVERY 6 HOURS PRN
Status: ACTIVE | OUTPATIENT
Start: 2022-03-22 | End: 2022-03-24

## 2022-03-22 RX ORDER — SODIUM PHOSPHATE, DIBASIC AND SODIUM PHOSPHATE, MONOBASIC 7; 19 G/133ML; G/133ML
1 ENEMA RECTAL ONCE AS NEEDED
Status: DISCONTINUED | OUTPATIENT
Start: 2022-03-22 | End: 2022-03-25

## 2022-03-22 RX ORDER — POLYETHYLENE GLYCOL 3350 17 G/17G
17 POWDER, FOR SOLUTION ORAL DAILY PRN
Status: DISCONTINUED | OUTPATIENT
Start: 2022-03-22 | End: 2022-03-25

## 2022-03-22 RX ORDER — ENOXAPARIN SODIUM 100 MG/ML
40 INJECTION SUBCUTANEOUS DAILY
Status: DISCONTINUED | OUTPATIENT
Start: 2022-03-23 | End: 2022-03-25

## 2022-03-22 RX ADMIN — MIDAZOLAM HYDROCHLORIDE 2 MG: 1 INJECTION INTRAMUSCULAR; INTRAVENOUS at 18:42:00

## 2022-03-22 RX ADMIN — EPHEDRINE SULFATE 5 MG: 50 INJECTION, SOLUTION INTRAVENOUS at 19:22:00

## 2022-03-22 RX ADMIN — TRANEXAMIC ACID 1000 MG: 10 INJECTION, SOLUTION INTRAVENOUS at 20:29:00

## 2022-03-22 RX ADMIN — EPHEDRINE SULFATE 5 MG: 50 INJECTION, SOLUTION INTRAVENOUS at 19:16:00

## 2022-03-22 RX ADMIN — EPHEDRINE SULFATE 5 MG: 50 INJECTION, SOLUTION INTRAVENOUS at 19:19:00

## 2022-03-22 RX ADMIN — TRANEXAMIC ACID 1000 MG: 10 INJECTION, SOLUTION INTRAVENOUS at 19:03:00

## 2022-03-22 RX ADMIN — DEXAMETHASONE SODIUM PHOSPHATE 4 MG: 4 MG/ML VIAL (ML) INJECTION at 19:03:00

## 2022-03-22 RX ADMIN — SODIUM CHLORIDE, SODIUM LACTATE, POTASSIUM CHLORIDE, CALCIUM CHLORIDE: 600; 310; 30; 20 INJECTION, SOLUTION INTRAVENOUS at 18:38:00

## 2022-03-22 RX ADMIN — CEFAZOLIN SODIUM/WATER 2 G: 2 G/20 ML SYRINGE (ML) INTRAVENOUS at 19:03:00

## 2022-03-22 RX ADMIN — ONDANSETRON 4 MG: 2 INJECTION INTRAMUSCULAR; INTRAVENOUS at 20:38:00

## 2022-03-22 NOTE — PLAN OF CARE
Pt fell on and sustained a right femoral neck fracture. Plan is OR this evening for right hip hemiarthroplasty. He has been NPO since midnight and IV fluids. Speech will evaluate tomorrow. Pt is A&Ox1. He has been bedrest. Primofit and tele are on. Wound nurse saw pt and changed bilateral heel wound dressing. Heel elevators and SCDs are on. Q2 turns to help prevent any skin breakdowns. Problem: Patient Centered Care  Goal: Patient preferences are identified and integrated in the patient's plan of care  Description: Interventions:  - What would you like us to know as we care for you? Lubna Kendall has dementia & has esophageal dysphagia. He is on a pureed diet with nectar thick liquids. - Provide timely, complete, and accurate information to patient/family  - Incorporate patient and family knowledge, values, beliefs, and cultural backgrounds into the planning and delivery of care  - Encourage patient/family to participate in care and decision-making at the level they choose  - Honor patient and family perspectives and choices  Outcome: Progressing     Problem: Patient/Family Goals  Goal: Patient/Family Long Term Goal  Description: Patient's [address] [place] [address] home. Interventions:    - See additional Care Plan goals for specific interventions  Outcome: Progressing  Goal: Patient/Family Short Term Goal  Description: Patient's Short Term Goal: To get the fractures hip repaired & start rehab. Interventions:   NPO for possible surgery today.   Pt's wife would like pt to go to [facility] for rehab  Social service to start discharge planning.  - See additional Care Plan goals for specific interventions  Outcome: Progressing     Problem: PAIN - ADULT  Goal: Verbalizes/displays adequate comfort level or patient's stated pain goal  Description: INTERVENTIONS:  - Encourage pt to monitor pain and request assistance  - Assess pain using appropriate pain scale  - Administer analgesics based on type and severity of pain and evaluate response  - Implement non-pharmacological measures as appropriate and evaluate response  - Consider cultural and social influences on pain and pain management  - Manage/alleviate anxiety  - Utilize distraction and/or relaxation techniques  - Monitor for opioid side effects  - Notify MD/LIP if interventions unsuccessful or patient reports new pain  - Anticipate increased pain with activity and pre-medicate as appropriate  Outcome: Progressing     Problem: RISK FOR INFECTION - ADULT  Goal: Absence of fever/infection during anticipated neutropenic period  Description: INTERVENTIONS  - Monitor WBC  - Administer growth factors as ordered  - Implement neutropenic guidelines  Outcome: Progressing     Problem: SAFETY ADULT - FALL  Goal: Free from fall injury  Description: INTERVENTIONS:  - Assess pt frequently for physical needs  - Identify cognitive and physical deficits and behaviors that affect risk of falls.   - [place] fall precautions as indicated by assessment.  - Educate pt/family on patient safety including physical limitations  - Instruct pt to call for assistance with activity based on assessment  - Modify environment to reduce risk of injury  - Provide assistive devices as appropriate  - Consider OT/PT consult to assist with strengthening/mobility  - Encourage toileting schedule  Outcome: Progressing     Problem: DISCHARGE PLANNING  Goal: Discharge to home or other facility with appropriate resources  Description: INTERVENTIONS:  - Identify barriers to discharge w/pt and caregiver  - Include patient/family/discharge partner in discharge planning  - Arrange for needed discharge resources and transportation as appropriate  - Identify discharge learning needs (meds, wound care, etc)  - Arrange for interpreters to assist at discharge as needed  - Consider post-discharge preferences of patient/family/discharge partner  - Complete POLST form as appropriate  - Assess patient's ability to be responsible for managing their own health  - Refer to Case Management Department for coordinating discharge planning if the patient needs post-hospital services based on physician/LIP order or complex needs related to functional status, cognitive ability or social support system  Outcome: Progressing     Problem: Integumentary status not within defined limits  Goal: Pt's integumentary status will be adequate for discharge  Outcome: Progressing

## 2022-03-22 NOTE — PHYSICAL THERAPY NOTE
Chart reviewed, patient to have surgery today. Discussed with RN, will need new orders and activity allowances from surgical MD once patient is appropriate for skilled physical therapy. RN aware and agreeable. Patient placed on hold, current orders acknowledged and completed.

## 2022-03-22 NOTE — PROGRESS NOTES
Speech Pathology Service    Orders received, chart reviewed, clinical bedside swallow evaluation deferred pending decision re: possible hip surgery today. Discussed with RN. Addendum 11:00 Patient to remain NPO for hip surgery today per Dr. Joyce Mace. Will return 3/23/22 as appropriate.      Casie Molina M.S. Theodora Stroud Pathologist  [id number]

## 2022-03-22 NOTE — OPERATIVE REPORT
[facility] [address]: 3/22/2022    PREOPERATIVE DIAGNOSIS:   1. Right Displaced Femoral Neck Fracture    POST-OPERATIVE DIAGNOSIS:   1. Right Displaced Femoral Neck Fracture    PROCEDURE:  1. Right Cemented Hemiarthroplasty  2. Intra-operative Interpretation of Fluoroscopy    Location: [address]: Shameka Hernandez MD  Assistant(s): none    Anesthesia type: General  Estimated Blood Loss: 250cc    Drains: None    Findings: preserved acetabular cartilage. Superolateral acetabular ossific fragment. Diminutive labrum with some areas of ossification. Osteoporotic bone quality. Posteromedial neck comminution down to the level of the lesser trochanter. Complications: None immediately apparent    Implants:  Femoral Component: Cemented size 1, hi offset, depuy C-stem femoral component  28 mm + 5 inner head, with a 54 mm metal outer femoral head    Indication: This is an 81year old man, complex medical history, who presented on 3/21/2022 with hip pain following an unwitnessed fall 3 days prior at [facility]. Radiographic evaluation demonstrated a displaced femoral neck fracture with mild osteoarthritic changes but no antecedent hip pain. Surgical verus non-surgical options were discussed with the patient's wife, and together we decided it is best to proceed with a hip hemiarthroplasty with the goals of pain control, hip stability, and early mobilization, accepting the risks of arthritis progression and possible pain in the future, given his minimally ambulatory functional status at baseline. The medical service was consulted for pre-operative risk stratification, optimization and co-management.  All risks and benefits of surgery including bleeding, infection, dislocation, liliane-prosthetic fracture, neurovascular injury, leg length or offset discrepancy, as well as medical and anesthesia-related complications were discussed with the patient / family, who elected to proceed with surgery. Procedure in detail:    The patient was brought to the operating room, and underwent the above anesthesia. The patient was placed in a supine position with both feet secured in boots on the [place] table. The operative hip was sterilely prepped and draped in a usual orthopedic fashion. A surgical pause was conducted to reconfirm the correct patient, site, side, and procedure to be performed. Perioperative antibiotics were given within one hour prior to the procedure. An approximately  8 cm long incision was made beginning proximally 2 cm lateral and 2 cm distal to the anterior-superior iliac spine and extending distally 10 cm toward the ipsilateral lateral epicondyle of the knee. Electrocautery was used to dissect through the subcutaneous tissue. The fascia overlying the TFL was incised in line with its fibers. A Rose elevator was used to separate the fascia from the TFL. A finger then was placed along the superior femoral neck, and a cobra retractor was placed. A cerebellar retractor was placed distally between the rectus and the tensor muscle. Using electrocautery and a Schnidt tonsil clamp, the ascending branches of the lateral circumflex artery were ligated. The investing fascia over the capsule was divided, and another cobra retractor was placed inferior to the femoral neck. The pericapsular fat was then excised from around the anterior hip capsule. The anterior hip capsule was then incised with electrocautery starting at the edge of the acetabulum in line with the anterosuperior edge of the femoral neck and extending distally to the anterior intertrochanteric line, dividing the capsule at about a 135-degree angle. The labrum was spared. The medial and lateral capsule flaps were elevated off the of the proximal femur intertrochanteric line, and No. 5 Ethibond tagging sutures were placed in the both flaps of the capsule.  A Hohmann retractor was placed posterosuperiorly over the acetabulum and the capsule was elevated a few millimeters off of the acetabular rim and ilium. The hip was externally rotated to 90 degrees, allowing an inferomedial split of the capsule. The hip was rotated back and a double pronged retractor was placed inferomedially instead of the cobra. The superior cobra was placed inside the capsule. A ibis was made on the femoral neck in line with the planned femoral neck resection at the level of the fracture. A reciprocating saw was used to complete the femoral neck osteotomy. The hip was externally rotated to 70 degrees, and traction was applied. A spiral femoral hip screw was placed in the femoral head, and the femoral head was removed from the wound. A No. 1 retractor over the anterior wall of the acetabulum and the No. 2 retractor was placed postero-superiorly. This exposed the acetabulum. The labrum and cartilage of the acetabulum were examined and noted to be intact. Head trials were used to size the acetabulum and a size 54mm head achieved appropriate size and suction seal. The retractors were removed. Traction was released, and then we turned our attention to the femur. The femoral hook from the [place] [address] bed was placed around the vastus ridge. The leg was externally rotated 120 degrees. An asymmetric double pronged retractor was placed distally medially over the calcar, and the leg was then extended and adducted. A #1 retractor was placed posteriorly between the superolateral capsule and the gluteus minimus. The lateral capsule was then released off the femur, allowing for improved elevation of the femur. The conjoined and piriformis tendons were not released. The hook was elevated, a double pronged retractor was placed over the greater trochanter and appropriate exposure of the femur was achieved. A box osteotome was used to identify the starting location.  A rongeur was used to remove posterolateral cortical bone to allow neutral broaching. The femoral canal was then broached sequentially to a size 1 cemented stem. We then elected to implant these components. The hip was re-dislocated. The trial components were removed. The femur was exposed again, and the femoral canal was irrigated and suctioned. An appropriately-sized cement restrictor was placed at the appropriate depth. A size 1, high offset femoral component was cemented into place with 3 batches of cement (simplex with 1g vanco total) using vacuum mixing. Correct version was ensured throughout the entire process. A trial 28+5 head was reduced and  fluoroscopic imaging demonstrated appropriate leg length and offset. The hip was found to have appropriate soft tissue tension. Hip range of motion was tested and noted to be stable throughout. The hip was dislocated and the trials head remove. The final 28 mm + 5  femoral head was mated to a 54 outer head then impacted onto a cleaned/dried trunnion. The hip was then reduced. The wound was thoroughly irrigated with normal saline. Pain cocktail injection was delivered to the soft tissues. The capsule was closed with No. 1 Vicryl interrupted sutures. The fascia over the TFL was closed with a running Quill suture. The subcutaneous tissues were then closed with 2-0 vicryl. The skin was closed with a running 3-0 monocryl suture. Dermabond was applied to the skin edges and a dry sterile dressing was placed. The patient was awakened from anesthesia and taken to the PACU in stable condition. There were no immediate complications. I was scrubbed throughout the entire surgery    POST-OPERATIVE PLAN  1. The patient will be permitted weight bearing as tolerated on the operative extremity  2. The patient will be permitted range of motion as tolerated  3. The patient will receive 24 hours of perioperative antibiotics.   4. Venous thromboembolic prophylaxis will consist of early mobilization, mechanical compressive devices, and lovenox. 5. The dressing may be removed at 12 days post-operatively  6. The patient should be scheduled for follow up in 2 weeks for wound and radiographic evaluation.

## 2022-03-22 NOTE — PLAN OF CARE
Pt admitted from ER with right hip fracture. He is oriented to self & place. Follows commands. Fall precautions in place. Call light in reach ,bed alarm on & fall risk band on. NPO for possible surgery. today,  Primofit placed for incontinence. Tip of penis red. Sacrum & elbows also red. Mepilex applied. Has bruising & scabs on legs from previous falls per wife. Remote tele shows NSR. Problem: Patient Centered Care  Goal: Patient preferences are identified and integrated in the patient's plan of care  Description: Interventions:  - What would you like us to know as we care for you? Antonino Ennis has dementia & has esophageal dysphagia. He is on a pureed diet with nectar thick liquids. - Provide timely, complete, and accurate information to patient/family  - Incorporate patient and family knowledge, values, beliefs, and cultural backgrounds into the planning and delivery of care  - Encourage patient/family to participate in care and decision-making at the level they choose  - Honor patient and family perspectives and choices  Outcome: Progressing     Problem: Patient/Family Goals  Goal: Patient/Family Long Term Goal  Description: Patient's [address] [place] [address] home. Interventions:    - See additional Care Plan goals for specific interventions  Outcome: Progressing  Goal: Patient/Family Short Term Goal  Description: Patient's Short Term Goal: To get the fractures hip repaired & start rehab. Interventions:   NPO for possible surgery today.   Pt's wife would like pt to go to [facility] for rehab  Social service to start discharge planning.  - See additional Care Plan goals for specific interventions  Outcome: Progressing     Problem: PAIN - ADULT  Goal: Verbalizes/displays adequate comfort level or patient's stated pain goal  Description: INTERVENTIONS:  - Encourage pt to monitor pain and request assistance  - Assess pain using appropriate pain scale  - Administer analgesics based on type and severity of pain and evaluate response  - Implement non-pharmacological measures as appropriate and evaluate response  - Consider cultural and social influences on pain and pain management  - Manage/alleviate anxiety  - Utilize distraction and/or relaxation techniques  - Monitor for opioid side effects  - Notify MD/LIP if interventions unsuccessful or patient reports new pain  - Anticipate increased pain with activity and pre-medicate as appropriate  Outcome: Progressing     Problem: RISK FOR INFECTION - ADULT  Goal: Absence of fever/infection during anticipated neutropenic period  Description: INTERVENTIONS  - Monitor WBC  - Administer growth factors as ordered  - Implement neutropenic guidelines  Outcome: Progressing     Problem: SAFETY ADULT - FALL  Goal: Free from fall injury  Description: INTERVENTIONS:  - Assess pt frequently for physical needs  - Identify cognitive and physical deficits and behaviors that affect risk of falls.   - [place] fall precautions as indicated by assessment.  - Educate pt/family on patient safety including physical limitations  - Instruct pt to call for assistance with activity based on assessment  - Modify environment to reduce risk of injury  - Provide assistive devices as appropriate  - Consider OT/PT consult to assist with strengthening/mobility  - Encourage toileting schedule  Outcome: Progressing     Problem: DISCHARGE PLANNING  Goal: Discharge to home or other facility with appropriate resources  Description: INTERVENTIONS:  - Identify barriers to discharge w/pt and caregiver  - Include patient/family/discharge partner in discharge planning  - Arrange for needed discharge resources and transportation as appropriate  - Identify discharge learning needs (meds, wound care, etc)  - Arrange for interpreters to assist at discharge as needed  - Consider post-discharge preferences of patient/family/discharge partner  - Complete POLST form as appropriate  - Assess patient's ability to be responsible for managing their own health  - Refer to Case Management Department for coordinating discharge planning if the patient needs post-hospital services based on physician/LIP order or complex needs related to functional status, cognitive ability or social support system  Outcome: Progressing     Problem: Integumentary status not within defined limits  Goal: Pt's integumentary status will be adequate for discharge  Outcome: Progressing

## 2022-03-22 NOTE — ED INITIAL ASSESSMENT (HPI)
Pt presents via [facility] critical care transport for right hip fracture. Per ems, pt was admitted to [facility] on 03/17. Staff endorsed that pt had unwitnessed fall on the 17th (unknown time or mechanism). Ems state that staff allege that at that time the pt appeared fine, picked him up. Thereafter, pt started to endorse pain to right hip area. Outpatient xr was taken today (?) which noted a right hip fracture. Pt brought [facility] ed for additional treatment and continuity of care.

## 2022-03-22 NOTE — ED QUICK NOTES
Orders for admission, patient is aware of plan and ready to go upstairs. Any questions, please call ED RN dottie at extension [phone]. Patient Covid vaccination status: Fully vaccinated     COVID Test Ordered in ED: Rapid SARS-CoV-2 by PCR    COVID Suspicion at Admission: N/A    Running Infusions:  None    Mental Status/LOC at time of transport: a&ox1 per his normal    Other pertinent information: pt has hx of alzheimer's.    CIWA score: N/A   NIH score:  N/A

## 2022-03-23 LAB
ANION GAP SERPL CALC-SCNC: 3 MMOL/L (ref 0–18)
BUN BLD-MCNC: 21 MG/DL (ref 7–18)
BUN/CREAT SERPL: 33.3 (ref 10–20)
CALCIUM BLD-MCNC: 8.9 MG/DL (ref 8.5–10.1)
CHLORIDE SERPL-SCNC: 115 MMOL/L (ref 98–112)
CO2 SERPL-SCNC: 29 MMOL/L (ref 21–32)
CREAT BLD-MCNC: 0.63 MG/DL
DEPRECATED RDW RBC AUTO: 57.6 FL (ref 35.1–46.3)
ERYTHROCYTE [DISTWIDTH] IN BLOOD BY AUTOMATED COUNT: 14.7 % (ref 11–15)
GLUCOSE BLD-MCNC: 124 MG/DL (ref 70–99)
HCT VFR BLD AUTO: 31.4 %
HCT VFR BLD AUTO: 31.4 %
HGB BLD-MCNC: 9.8 G/DL
HGB BLD-MCNC: 9.8 G/DL
MCH RBC QN AUTO: 34 PG (ref 26–34)
MCHC RBC AUTO-ENTMCNC: 31.2 G/DL (ref 31–37)
MCV RBC AUTO: 109 FL
OSMOLALITY SERPL CALC.SUM OF ELEC: 308 MOSM/KG (ref 275–295)
PLATELET # BLD AUTO: 148 10(3)UL (ref 150–450)
POTASSIUM SERPL-SCNC: 4.5 MMOL/L (ref 3.5–5.1)
RBC # BLD AUTO: 2.88 X10(6)UL
SODIUM SERPL-SCNC: 147 MMOL/L (ref 136–145)
WBC # BLD AUTO: 11.9 X10(3) UL (ref 4–11)

## 2022-03-23 PROCEDURE — 97110 THERAPEUTIC EXERCISES: CPT

## 2022-03-23 PROCEDURE — 92526 ORAL FUNCTION THERAPY: CPT

## 2022-03-23 PROCEDURE — 85027 COMPLETE CBC AUTOMATED: CPT | Performed by: STUDENT IN AN ORGANIZED HEALTH CARE EDUCATION/TRAINING PROGRAM

## 2022-03-23 PROCEDURE — 97530 THERAPEUTIC ACTIVITIES: CPT

## 2022-03-23 PROCEDURE — 92610 EVALUATE SWALLOWING FUNCTION: CPT

## 2022-03-23 PROCEDURE — 85018 HEMOGLOBIN: CPT | Performed by: STUDENT IN AN ORGANIZED HEALTH CARE EDUCATION/TRAINING PROGRAM

## 2022-03-23 PROCEDURE — 97162 PT EVAL MOD COMPLEX 30 MIN: CPT

## 2022-03-23 PROCEDURE — 97166 OT EVAL MOD COMPLEX 45 MIN: CPT

## 2022-03-23 PROCEDURE — 80048 BASIC METABOLIC PNL TOTAL CA: CPT | Performed by: STUDENT IN AN ORGANIZED HEALTH CARE EDUCATION/TRAINING PROGRAM

## 2022-03-23 PROCEDURE — 85014 HEMATOCRIT: CPT | Performed by: STUDENT IN AN ORGANIZED HEALTH CARE EDUCATION/TRAINING PROGRAM

## 2022-03-23 RX ORDER — DEXTROSE AND SODIUM CHLORIDE 5; .45 G/100ML; G/100ML
INJECTION, SOLUTION INTRAVENOUS CONTINUOUS
Status: DISCONTINUED | OUTPATIENT
Start: 2022-03-23 | End: 2022-03-24

## 2022-03-23 RX ORDER — AMLODIPINE BESYLATE 5 MG/1
5 TABLET ORAL DAILY
Status: DISCONTINUED | OUTPATIENT
Start: 2022-03-24 | End: 2022-03-25

## 2022-03-23 NOTE — PHYSICAL THERAPY NOTE
RN approves participation, patient presents in chair with wife at bedside. Pt is nodding off in chair and wife reports he just finished lunch and is getting sleepy. Will follow up later today.

## 2022-03-23 NOTE — PHYSICAL THERAPY NOTE
RN approves participation in therapy session, wife present throughout. Patient presents sleeping in chair and agreeable to therapy, needs encouragement to participate in standing as hip is painful. He performs seated LAQ, AP, QS, hip abd/add, heel slides with min A. LLE exs as well with cues only. He needs mod A x 2 for sit to stand x 2 reps, stands for 90 seconds each time. He has mild knee instability so worked on standing quad sets and mini squats w/focus on extension x 10 reps. He is returned to chair with all needs in reach. Wife has questions about repair discussed what gopal-arthroplasty means and that there are no hip precautions. Wife in room, RN/PCT aware of session.

## 2022-03-23 NOTE — PROGRESS NOTES
[facility] note: Duplicate Proton Pump Inhibitor with Histamine 2 blocker. Dennise Jacob. is a 81year old patient who has been prescribed both famotidine (Pepcid)  And pantoprazole (Protonix). Pepcid was discontinued per [employer] approved protocol for duplicate therapy in adult patients for medications not ordered by gastroenterology.     Thank you,  Yvette Carroll, PharmD  3/22/2022

## 2022-03-23 NOTE — CM/SW NOTE
Department  notified of request for cesar PANIAGUA referrals started. Assigned CM/SW to follow up with pt/family on further discharge planning.      Todd Liu   March 23, 2022   14:55

## 2022-03-23 NOTE — PLAN OF CARE
Pt A&O X1-2. Puree and nectar thick per speech. 1x1 feed. Good appetite. Stand up by 2 max assist or lift. Premofit. Tylenol for pain. Wound care per orders completed. Wife visited pt. Bed/chair alarm. Plan to DC to [facility] in process. Problem: Patient Centered Care  Goal: Patient preferences are identified and integrated in the patient's plan of care  Description: Interventions:  - What would you like us to know as we care for you? Rashard has dementia & has esophageal dysphagia. He is on a pureed diet with nectar thick liquids. - Provide timely, complete, and accurate information to patient/family  - Incorporate patient and family knowledge, values, beliefs, and cultural backgrounds into the planning and delivery of care  - Encourage patient/family to participate in care and decision-making at the level they choose  - Honor patient and family perspectives and choices  Outcome: Progressing     Problem: Patient/Family Goals  Goal: Patient/Family Long Term Goal  Description: Patient's [address] [place] [address] home. Interventions:    - See additional Care Plan goals for specific interventions  Outcome: Progressing  Goal: Patient/Family Short Term Goal  Description: Patient's Short Term Goal: To get the fractures hip repaired & start rehab. Interventions:   NPO for possible surgery today.   Pt's wife would like pt to go to [facility] for rehab  Social service to start discharge planning.  - See additional Care Plan goals for specific interventions  Outcome: Progressing     Problem: PAIN - ADULT  Goal: Verbalizes/displays adequate comfort level or patient's stated pain goal  Description: INTERVENTIONS:  - Encourage pt to monitor pain and request assistance  - Assess pain using appropriate pain scale  - Administer analgesics based on type and severity of pain and evaluate response  - Implement non-pharmacological measures as appropriate and evaluate response  - Consider cultural and social influences on pain and pain management  - Manage/alleviate anxiety  - Utilize distraction and/or relaxation techniques  - Monitor for opioid side effects  - Notify MD/LIP if interventions unsuccessful or patient reports new pain  - Anticipate increased pain with activity and pre-medicate as appropriate  Outcome: Progressing     Problem: RISK FOR INFECTION - ADULT  Goal: Absence of fever/infection during anticipated neutropenic period  Description: INTERVENTIONS  - Monitor WBC  - Administer growth factors as ordered  - Implement neutropenic guidelines  Outcome: Progressing     Problem: SAFETY ADULT - FALL  Goal: Free from fall injury  Description: INTERVENTIONS:  - Assess pt frequently for physical needs  - Identify cognitive and physical deficits and behaviors that affect risk of falls.   - [place] fall precautions as indicated by assessment.  - Educate pt/family on patient safety including physical limitations  - Instruct pt to call for assistance with activity based on assessment  - Modify environment to reduce risk of injury  - Provide assistive devices as appropriate  - Consider OT/PT consult to assist with strengthening/mobility  - Encourage toileting schedule  Outcome: Progressing     Problem: DISCHARGE PLANNING  Goal: Discharge to home or other facility with appropriate resources  Description: INTERVENTIONS:  - Identify barriers to discharge w/pt and caregiver  - Include patient/family/discharge partner in discharge planning  - Arrange for needed discharge resources and transportation as appropriate  - Identify discharge learning needs (meds, wound care, etc)  - Arrange for interpreters to assist at discharge as needed  - Consider post-discharge preferences of patient/family/discharge partner  - Complete POLST form as appropriate  - Assess patient's ability to be responsible for managing their own health  - Refer to Case Management Department for coordinating discharge planning if the patient needs post-hospital services based on physician/LIP order or complex needs related to functional status, cognitive ability or social support system  Outcome: Progressing     Problem: Integumentary status not within defined limits  Goal: Pt's integumentary status will be adequate for discharge  Outcome: Progressing

## 2022-03-23 NOTE — CM/SW NOTE
CM followed up with pt and wife at bedside to discuss dc plan. Pt admitted to [address] [place] [address] from [facility]. Per pts wife she does not want pt to return there. She is requesting pt go to [facility]. [facility] referrals sent via [facility]. Don screen w/in 90 days. Pts wife willing to pay private room rate of $75 a day and [facility]. [facility] liaison Mena notified via Telephone call that she is 1st  Choice. Plan: [facility] pending bed availability. / to remain available for support and/or discharge planning. Willie Raygoza.  Jennifer Del Rio RN, BSN  Nurse   [phone]

## 2022-03-23 NOTE — ANESTHESIA PROCEDURE NOTES
Airway  Date/Time: 3/22/2022 6:50 PM  Urgency: elective    Airway not difficult    General Information and Staff    Patient location during procedure: OR  Anesthesiologist: [place] MD  Performed: anesthesiologist     Indications and Patient Condition  Indications for airway management: anesthesia  Spontaneous Ventilation: absent  Sedation level: deep  Preoxygenated: yes  Patient position: sniffing  MILS maintained throughout  Mask difficulty assessment: 2 - vent by mask + OA or adjuvant +/- NMBA  Planned trial extubation    Final Airway Details  Final airway type: endotracheal airway      Successful airway: ETT  Cuffed: yes   Successful intubation technique: direct laryngoscopy  Facilitating devices/methods: intubating stylet, cricoid pressure and rapid sequence intubation  Endotracheal tube insertion site: oral  Blade: Jocelynn  Blade size: #4  ETT size (mm): 7.0    Cormack-Lehane Classification: grade IIA - partial view of glottis  Placement verified by: chest auscultation and capnometry   Measured from: gums  ETT to gums (cm): 23  Number of attempts at approach: 1  Ventilation between attempts: none  Number of other approaches attempted: 0    Additional Comments  Teeth are intact  Neck is stiff so no extension applied

## 2022-03-23 NOTE — CM/SW NOTE
Department  notified of request for cesar PANIAGUA referrals started. Assigned CM/SW to follow up with pt/family on further discharge planning.      Irvin Galicia   March 23, 2022   14:53

## 2022-03-23 NOTE — PLAN OF CARE
POD#1. Oriented to self only. Slept most of the night. Easily arousable to name. Denies pain. Aquacel dressing to anterior right thigh. Primofit applied. Tip of penis remains red. Remote tele in place. Discharge planning in progress. Pt's wife would like him to go to [facility] for hehab. Problem: Patient Centered Care  Goal: Patient preferences are identified and integrated in the patient's plan of care  Description: Interventions:  - What would you like us to know as we care for you? Kendal Knowles has dementia & has esophageal dysphagia. He is on a pureed diet with nectar thick liquids. - Provide timely, complete, and accurate information to patient/family  - Incorporate patient and family knowledge, values, beliefs, and cultural backgrounds into the planning and delivery of care  - Encourage patient/family to participate in care and decision-making at the level they choose  - Honor patient and family perspectives and choices  Outcome: Progressing     Problem: Patient/Family Goals  Goal: Patient/Family Long Term Goal  Description: Patient's [address] [place] [address] home. Interventions:    - See additional Care Plan goals for specific interventions  Outcome: Progressing  Goal: Patient/Family Short Term Goal  Description: Patient's Short Term Goal: To get the fractures hip repaired & start rehab. Interventions:   NPO for possible surgery today.   Pt's wife would like pt to go to [facility] for rehab  Social service to start discharge planning.  - See additional Care Plan goals for specific interventions  Outcome: Progressing     Problem: PAIN - ADULT  Goal: Verbalizes/displays adequate comfort level or patient's stated pain goal  Description: INTERVENTIONS:  - Encourage pt to monitor pain and request assistance  - Assess pain using appropriate pain scale  - Administer analgesics based on type and severity of pain and evaluate response  - Implement non-pharmacological measures as appropriate and evaluate response  - Consider cultural and social influences on pain and pain management  - Manage/alleviate anxiety  - Utilize distraction and/or relaxation techniques  - Monitor for opioid side effects  - Notify MD/LIP if interventions unsuccessful or patient reports new pain  - Anticipate increased pain with activity and pre-medicate as appropriate  Outcome: Progressing     Problem: RISK FOR INFECTION - ADULT  Goal: Absence of fever/infection during anticipated neutropenic period  Description: INTERVENTIONS  - Monitor WBC  - Administer growth factors as ordered  - Implement neutropenic guidelines  Outcome: Progressing     Problem: SAFETY ADULT - FALL  Goal: Free from fall injury  Description: INTERVENTIONS:  - Assess pt frequently for physical needs  - Identify cognitive and physical deficits and behaviors that affect risk of falls.   - [place] fall precautions as indicated by assessment.  - Educate pt/family on patient safety including physical limitations  - Instruct pt to call for assistance with activity based on assessment  - Modify environment to reduce risk of injury  - Provide assistive devices as appropriate  - Consider OT/PT consult to assist with strengthening/mobility  - Encourage toileting schedule  Outcome: Progressing     Problem: DISCHARGE PLANNING  Goal: Discharge to home or other facility with appropriate resources  Description: INTERVENTIONS:  - Identify barriers to discharge w/pt and caregiver  - Include patient/family/discharge partner in discharge planning  - Arrange for needed discharge resources and transportation as appropriate  - Identify discharge learning needs (meds, wound care, etc)  - Arrange for interpreters to assist at discharge as needed  - Consider post-discharge preferences of patient/family/discharge partner  - Complete POLST form as appropriate  - Assess patient's ability to be responsible for managing their own health  - Refer to Case Management Department for coordinating discharge planning if the patient needs post-hospital services based on physician/LIP order or complex needs related to functional status, cognitive ability or social support system  Outcome: Progressing     Problem: Integumentary status not within defined limits  Goal: Pt's integumentary status will be adequate for discharge  Outcome: Progressing

## 2022-03-24 LAB
ANION GAP SERPL CALC-SCNC: 3 MMOL/L (ref 0–18)
ANION GAP SERPL CALC-SCNC: 5 MMOL/L (ref 0–18)
BUN BLD-MCNC: 25 MG/DL (ref 7–18)
BUN BLD-MCNC: 29 MG/DL (ref 7–18)
BUN/CREAT SERPL: 41.7 (ref 10–20)
BUN/CREAT SERPL: 63 (ref 10–20)
CALCIUM BLD-MCNC: 8.8 MG/DL (ref 8.5–10.1)
CALCIUM BLD-MCNC: 9.1 MG/DL (ref 8.5–10.1)
CHLORIDE SERPL-SCNC: 113 MMOL/L (ref 98–112)
CHLORIDE SERPL-SCNC: 117 MMOL/L (ref 98–112)
CO2 SERPL-SCNC: 27 MMOL/L (ref 21–32)
CO2 SERPL-SCNC: 30 MMOL/L (ref 21–32)
CREAT BLD-MCNC: 0.46 MG/DL
CREAT BLD-MCNC: 0.6 MG/DL
DEPRECATED RDW RBC AUTO: 55.9 FL (ref 35.1–46.3)
ERYTHROCYTE [DISTWIDTH] IN BLOOD BY AUTOMATED COUNT: 14.9 % (ref 11–15)
GLUCOSE BLD-MCNC: 141 MG/DL (ref 70–99)
GLUCOSE BLD-MCNC: 91 MG/DL (ref 70–99)
HCT VFR BLD AUTO: 25 %
HGB BLD-MCNC: 8.2 G/DL
MCH RBC QN AUTO: 34.7 PG (ref 26–34)
MCHC RBC AUTO-ENTMCNC: 32.8 G/DL (ref 31–37)
OSMOLALITY SERPL CALC.SUM OF ELEC: 304 MOSM/KG (ref 275–295)
OSMOLALITY SERPL CALC.SUM OF ELEC: 318 MOSM/KG (ref 275–295)
PLATELET # BLD AUTO: 147 10(3)UL (ref 150–450)
POTASSIUM SERPL-SCNC: 3.4 MMOL/L (ref 3.5–5.1)
POTASSIUM SERPL-SCNC: 3.5 MMOL/L (ref 3.5–5.1)
RBC # BLD AUTO: 2.36 X10(6)UL
SODIUM SERPL-SCNC: 145 MMOL/L (ref 136–145)
SODIUM SERPL-SCNC: 150 MMOL/L (ref 136–145)
WBC # BLD AUTO: 8.4 X10(3) UL (ref 4–11)

## 2022-03-24 PROCEDURE — 92526 ORAL FUNCTION THERAPY: CPT

## 2022-03-24 PROCEDURE — 97530 THERAPEUTIC ACTIVITIES: CPT

## 2022-03-24 PROCEDURE — 80048 BASIC METABOLIC PNL TOTAL CA: CPT | Performed by: STUDENT IN AN ORGANIZED HEALTH CARE EDUCATION/TRAINING PROGRAM

## 2022-03-24 PROCEDURE — 80048 BASIC METABOLIC PNL TOTAL CA: CPT | Performed by: INTERNAL MEDICINE

## 2022-03-24 PROCEDURE — 85027 COMPLETE CBC AUTOMATED: CPT | Performed by: STUDENT IN AN ORGANIZED HEALTH CARE EDUCATION/TRAINING PROGRAM

## 2022-03-24 RX ORDER — DEXTROSE MONOHYDRATE 50 MG/ML
INJECTION, SOLUTION INTRAVENOUS CONTINUOUS
Status: DISCONTINUED | OUTPATIENT
Start: 2022-03-24 | End: 2022-03-25

## 2022-03-24 NOTE — PLAN OF CARE
Plan is SNF. [facility]st choice was [facility], but no beds. Pt needs choice, leaning towards [facility]. Baseline OX1-2, but follows commands. Dentures. RA. Tele maintained. [place] and scd. Primofit in place. 2 person mod/max assist to stand pivot. Heel injuries redresses per orders. Elevated w/ pillow in bed. Change of positions and/or turning maintained. Problem: PAIN - ADULT  Goal: Verbalizes/displays adequate comfort level or patient's stated pain goal  Description: INTERVENTIONS:  - Encourage pt to monitor pain and request assistance  - Assess pain using appropriate pain scale  - Administer analgesics based on type and severity of pain and evaluate response  - Implement non-pharmacological measures as appropriate and evaluate response  - Consider cultural and social influences on pain and pain management  - Manage/alleviate anxiety  - Utilize distraction and/or relaxation techniques  - Monitor for opioid side effects  - Notify MD/LIP if interventions unsuccessful or patient reports new pain  - Anticipate increased pain with activity and pre-medicate as appropriate  Outcome: Progressing     Problem: RISK FOR INFECTION - ADULT  Goal: Absence of fever/infection during anticipated neutropenic period  Description: INTERVENTIONS  - Monitor WBC  - Administer growth factors as ordered  - Implement neutropenic guidelines  Outcome: Progressing     Problem: SAFETY ADULT - FALL  Goal: Free from fall injury  Description: INTERVENTIONS:  - Assess pt frequently for physical needs  - Identify cognitive and physical deficits and behaviors that affect risk of falls.   - [place] fall precautions as indicated by assessment.  - Educate pt/family on patient safety including physical limitations  - Instruct pt to call for assistance with activity based on assessment  - Modify environment to reduce risk of injury  - Provide assistive devices as appropriate  - Consider OT/PT consult to assist with strengthening/mobility  - Encourage toileting schedule  Outcome: Progressing     Problem: DISCHARGE PLANNING  Goal: Discharge to home or other facility with appropriate resources  Description: INTERVENTIONS:  - Identify barriers to discharge w/pt and caregiver  - Include patient/family/discharge partner in discharge planning  - Arrange for needed discharge resources and transportation as appropriate  - Identify discharge learning needs (meds, wound care, etc)  - Arrange for interpreters to assist at discharge as needed  - Consider post-discharge preferences of patient/family/discharge partner  - Complete POLST form as appropriate  - Assess patient's ability to be responsible for managing their own health  - Refer to Case Management Department for coordinating discharge planning if the patient needs post-hospital services based on physician/LIP order or complex needs related to functional status, cognitive ability or social support system  Outcome: Progressing     Problem: Integumentary status not within defined limits  Goal: Pt's integumentary status will be adequate for discharge  Outcome: Progressing

## 2022-03-24 NOTE — SLP NOTE
SPEECH DAILY NOTE - INPATIENT    ASSESSMENT & PLAN   ASSESSMENT  Pt seen for dysphagia therapy to f/u on recommendations from initial clinical evaluation completed on 3/21/22. Pt's spouse is a retired SLP who requested to be present for this therapy session. Spouse called and informed of prospective therapy time, and was able to attend the entire session. The pt demonstrated fatigue and fluctuations in alertness, benefiting from verbal cues to maintain awake/alert state. PO trials completed with pt sitting in chair next to hospital bed. Pillow props used to assisting to maintain midline upright position. Trials of puree and moderate thick liquids provided in alternating fashion with no overt clinical s/s of aspiration or respiratory compromise. Pt demonstrated clear vocalizations between trials. He requested additional trials and preparedness by opening mouth in anticipation of PO trial vs asking verbally. Pt demonstrated a weak cough response, nonproductive in nature. Suspect delayed pharyngeal swallow response given hyolaryngeal elevation palpation across trials and consistencies. Trials of mild thick liquid provided via tsp in alternating fashion with purees. Pt reported no sensation of pharyngeal residue, and there was no evidence or clinical s/s of aspiration assessed across 3 oz.  1:1 assistance and slow rate of presentation were essential in maintaining safety with these trials. Attempted to trial solids and assess oral phase mastication and bolus preparation. Pt's spouse reported no desire for solid trials at this time. She feels pt is eating more quantity of nutrition at meals and better quality. She feels she is learning from the meal trays and finding foods and recipes she wants to try at home. Solid trials deferred at this time.       Recommend upgrade in diet to Puree foods with Mild thick liquids    -alternate consistencies   -1:1 assistance with PO   -small sips/bites   -no straws    Recommend f/u to assess candidacy for diet upgrade or need for objective VFSS. Diet Recommendations - Solids: Puree  Diet Recommendations - Liquids: Nectar thick liquids/ Mildly thick    Compensatory Strategies Recommended: No straws; Slow rate;Small bites and sips;Multiple swallows (2)  Aspiration Precautions: Upright position; Slow rate;Small bites and sips; No straw  Medication Administration Recommendations: Crushed in puree    Patient Experiencing Pain: No           Discharge Recommendations  Discharge Recommendations/Plan: Skilled nursing facility    Treatment Plan  Treatment Plan/Recommendations: Dysphagia therapy; Aspiration precautions    Interdisciplinary Communication: Discussed with RN  Disussed with other staff              GOALS  Goal #1 The patient will tolerate Puree consistency and Moderately thick liquids without overt signs or symptoms of aspiration with 100 % accuracy over 2 session(s). Pt tolerated x10 trials of puree and x10 trials of moderate thick cranberry juice via tsp in alternating fashion with no overt clinical s/s of aspiration or respiratory compromise. Secondary swallows were assessed inconsistently. Clear vocalizations assessed after each trials    New goal as of 3/24: The patient will tolerate a Puree diet with Mild thick liquids without overt s/s of aspiration with 100% accuracy over 2 sessions. Partially met. Goal upgraded 3/24   Goal #2 The patient/family/caregiver will demonstrate understanding and implementation of aspiration precautions and swallow strategies independently over 2 session(s). Pt's wife reported adequate comprehension of aspiration precautions and swallowing guidelines/recommendations given verbal education and handouts from SLP. Pt required 1:1 assistance with all PO, therefore strategies and compensations are in place per spouse.      In Progress   Goal #3 The patient will tolerate trial upgrade of minced moist consistency and mildly thick liquids without overt signs or symptoms of aspiration with 100 % accuracy over 2 session(s). Pt tolerated x15 trials of mild thick liquid via tsp and open cup sips with no s/s of aspiration and no changes in RR or WOB. Oxygen saturation rates remained at 96% across all trials presented. Recommend upgrade to mild thick liquids. Pt's wife reported no desire to upgrade to solids, as pt demonstrates significant motor and coordination deficits with oral preparation phase, and he is tolerating it well and giving her ideas for home. New goal as of 3/24: The patient will tolerate trial upgrade of minced and moist consistency and mild thick liquids via cup or spoon without overt s/s of aspiration with 100% accuracy over 2 sessions. Partially met and upgraded on 3/24   Goal #4 The patient will utilize compensatory strategies as outlined by  BSSE (clinical evaluation) including Slow rate, Small bites, Small sips, Multiple swallows, Alternate liquids/solids, No straws, Upright 90 degrees, Feed patient, medicaitons crushed in puree with moderate-max assistance 90 % of the time across 2 sessions. SLP provided verbal review of aspiration precautions and recommendations for dysphagia management and rehabilitation. Pt and spouse demonstrated comprehension by employing strategies for a number of PO trials. RN reported medication toleration.    In Progress       FOLLOW UP  Follow Up Needed (Documentation Required): Yes  SLP Follow-up Date: 03/25/22  Number of Visits to Meet Established Goals: 2        If you have any questions, please contact Shan Worley, SLP

## 2022-03-24 NOTE — PLAN OF CARE
VSS, no acute events overnight. Pt is aox1-2 at baseline, ambulating x2a to stand-pivot or Lift. Voiding per primofit. SCD's and Lovenox for DVT prophylaxis. Dressing Aquacel, CDI. Tylenol for pain. Meds crushed in applesauce. 1:1 feed assistance. Pureed/nectar thick diet. Pt plans to d/c to rehab [facility] pending bed availability. Bed in lowest position, call light and personal possessions within reach, bed alarm and chair alarm activated. Pt instructed to call for assistance before getting up. Problem: Patient Centered Care  Goal: Patient preferences are identified and integrated in the patient's plan of care  Description: Interventions:  - What would you like us to know as we care for you? Anna Miller has dementia & has esophageal dysphagia. He is on a pureed diet with nectar thick liquids. - Provide timely, complete, and accurate information to patient/family  - Incorporate patient and family knowledge, values, beliefs, and cultural backgrounds into the planning and delivery of care  - Encourage patient/family to participate in care and decision-making at the level they choose  - Honor patient and family perspectives and choices  Outcome: Progressing     Problem: Patient/Family Goals  Goal: Patient/Family Long Term Goal  Description: Patient's [address] [facility] home. Interventions:    - See additional Care Plan goals for specific interventions  Outcome: Progressing  Goal: Patient/Family Short Term Goal  Description: Patient's Short Term Goal: To get the fractures hip repaired & start rehab. Interventions:   NPO for possible surgery today.   Pt's wife would like pt to go to [facility] for rehab  Social service to start discharge planning.  - See additional Care Plan goals for specific interventions  Outcome: Progressing     Problem: PAIN - ADULT  Goal: Verbalizes/displays adequate comfort level or patient's stated pain goal  Description: INTERVENTIONS:  - Encourage pt to monitor pain and request assistance  - Assess pain using appropriate pain scale  - Administer analgesics based on type and severity of pain and evaluate response  - Implement non-pharmacological measures as appropriate and evaluate response  - Consider cultural and social influences on pain and pain management  - Manage/alleviate anxiety  - Utilize distraction and/or relaxation techniques  - Monitor for opioid side effects  - Notify MD/LIP if interventions unsuccessful or patient reports new pain  - Anticipate increased pain with activity and pre-medicate as appropriate  Outcome: Progressing     Problem: RISK FOR INFECTION - ADULT  Goal: Absence of fever/infection during anticipated neutropenic period  Description: INTERVENTIONS  - Monitor WBC  - Administer growth factors as ordered  - Implement neutropenic guidelines  Outcome: Progressing     Problem: SAFETY ADULT - FALL  Goal: Free from fall injury  Description: INTERVENTIONS:  - Assess pt frequently for physical needs  - Identify cognitive and physical deficits and behaviors that affect risk of falls.   - [place] fall precautions as indicated by assessment.  - Educate pt/family on patient safety including physical limitations  - Instruct pt to call for assistance with activity based on assessment  - Modify environment to reduce risk of injury  - Provide assistive devices as appropriate  - Consider OT/PT consult to assist with strengthening/mobility  - Encourage toileting schedule  Outcome: Progressing     Problem: DISCHARGE PLANNING  Goal: Discharge to home or other facility with appropriate resources  Description: INTERVENTIONS:  - Identify barriers to discharge w/pt and caregiver  - Include patient/family/discharge partner in discharge planning  - Arrange for needed discharge resources and transportation as appropriate  - Identify discharge learning needs (meds, wound care, etc)  - Arrange for interpreters to assist at discharge as needed  - Consider post-discharge preferences of patient/family/discharge partner  - Complete POLST form as appropriate  - Assess patient's ability to be responsible for managing their own health  - Refer to Case Management Department for coordinating discharge planning if the patient needs post-hospital services based on physician/LIP order or complex needs related to functional status, cognitive ability or social support system  Outcome: Progressing     Problem: Integumentary status not within defined limits  Goal: Pt's integumentary status will be adequate for discharge  Outcome: Progressing

## 2022-03-24 NOTE — CM/SW NOTE
SW followed up on DC planning. SW spoke with pt's spouse at bedside to present SNF list. Spouse aware [facility] currently has no beds    Spouse will pick back up choice for pt    PLAN: DC SNF pending choice/ med clear    JULIA CarmichaelW, MSW ext.  [phone]

## 2022-03-24 NOTE — PHYSICAL THERAPY NOTE
PHYSICAL THERAPY TREATMENT NOTE - INPATIENT     Room Number: 198/802-C       Presenting Problem: fall with hip fracture s/p right anterior approach hemiarthroplasty, WBAT;  NO HIP PREC    Problem List  Principal Problem:    Closed fracture of neck of right femur, initial encounter ([facility] [address]  Active Problems:    Displaced fracture of right femoral neck (HCC)      PHYSICAL THERAPY ASSESSMENT   Chart reviewed. RN Elton Barone approved participation in physical therapy. PPE worn by therapist: mask and gloves. Patient was not wearing a mask during session. Patient presented in bed and alarm activated with 0/10 pain. Patient with fair  progress towards goals during this session. Education provided on Physical therapy plan of care and physiological benefits of out of bed mobility. Patient with poor carryover. Pt is received in the bed and was cleared for therapy session. Pt is A&O x1-2 but is able to follow some directions but with little carryover. Pt is max A with bed mobility and to transfer to the EOB. Pt required assist with his B LE's and his upper trunk to sit up. Pt was able to sit EOB for a few minutes and denied any dizziness. Pt then was able to perform sit<>stand transfers while holding onto the therapist with mod A. Pt was then able to perform a few shuffling steps from the bed to the chair while holding onto the therapist. Pt then was able to stand for about 1 minute with min A for balance and safety. Returned pt to sitting in the chair. Pt is repositioned and left in the chair with all needs within reach and alarm system activated. Reported to the RN on the status of the pt. Pt is on track to dc to a rehab facility once medically cleared. Bed mobility: Mod assist and Max assist  Transfers: Mod assist  Gait Assistance: Moderate assistance  Distance (ft): few steps to the chair  Assistive Device: None  Pattern: Shuffle          .  Patient was left in bedside chair and alarm activated at end of session with all needs in reach. The patient's Approx Degree of Impairment: 72.57% has been calculated based on documentation in the [facility] '6 clicks' Inpatient Basic Mobility Short Form. Research supports that patients with this level of impairment may benefit from Subacute Rehab. RN aware of patient status post session. DISCHARGE RECOMMENDATIONS  PT Discharge Recommendations: Sub-acute rehabilitation     PLAN  PT Treatment Plan: Bed mobility; Body mechanics; Coordination;Patient education;Gait training;Strengthening;Transfer training;Balance training    SUBJECTIVE  Pt was agreeable to therapy session. OBJECTIVE  Precautions: Limb alert - right;Aspiration;Bed/chair alarm    WEIGHT BEARING RESTRICTION  Weight Bearing Restriction: R lower extremity        R Lower Extremity: Weight Bearing as Tolerated       PAIN ASSESSMENT   Ratin  Location: right hip during transitional movements  Management Techniques: Activity promotion; Body mechanics; Relaxation;Repositioning    BALANCE                                                                                                                       Static Sitting: Fair +  Dynamic Sitting: Fair -           Static Standing: Poor  Dynamic Standing: Poor -    ACTIVITY TOLERANCE                         O2 WALK       AM-PAC '6-Clicks' INPATIENT SHORT FORM - BASIC MOBILITY  How much difficulty does the patient currently have. .. Patient Difficulty: Turning over in bed (including adjusting bedclothes, sheets and blankets)?: A Lot   Patient Difficulty: Sitting down on and standing up from a chair with arms (e.g., wheelchair, bedside commode, etc.): A Lot   Patient Difficulty: Moving from lying on back to sitting on the side of the bed?: A Lot   How much help from another person does the patient currently need. ..    Help from Another: Moving to and from a bed to a chair (including a wheelchair)?: A Lot   Help from Another: Need to walk in hospital room?: A Lot   Help from Another: Climbing 3-5 steps with a railing?: Total     AM-PAC Score:  Raw Score: 11   Approx Degree of Impairment: 72.57%   Standardized Score (AM-PAC Scale): 33.86   CMS Modifier (G-Code): CL      Patient End of Session: Up in chair;Needs met;Call light within reach;RN aware of session/findings; All patient questions and concerns addressed; Alarm set    CURRENT GOALS     Goals to be met by: 4/5/22  Patient Goal Patient's self-stated goal is: not stated.  Wife would like patient to go to [facility] Patient is able to demonstrate supine - sit EOB @ level: minimal assistance   Goal #1   Current Status Max A    Goal #2 Patient is able to demonstrate transfers Sit to/from Stand at assistance level: minimal assistance   Goal #2  Current Status Mod A while holding onto the therapist   Goal #3 Patient is able to ambulate 50 feet with assistive device at assistance level: minimal assistance   Goal #3   Current Status SPT mod A while holding onto the therapist   Goal #4 Patient will negotiate bed to chair transfers with RW with minimal assistance   Goal #4   Current Status Mod A    Goal #5 Patient verbalizes and/or demonstrates all precautions and safety concerns with cues and assistance   Goal #5   Current Status IN PROGRESS   Goal #6 Patient performs home exercise program for ROM/strengthening per the instructions provided in preparation for discharge with assistance   Goal #6  Current Status IN PROGRESS

## 2022-03-25 VITALS
RESPIRATION RATE: 18 BRPM | TEMPERATURE: 98 F | OXYGEN SATURATION: 96 % | DIASTOLIC BLOOD PRESSURE: 51 MMHG | HEIGHT: 69 IN | SYSTOLIC BLOOD PRESSURE: 114 MMHG | HEART RATE: 72 BPM | BODY MASS INDEX: 20.9 KG/M2 | WEIGHT: 141.13 LBS

## 2022-03-25 LAB
ANION GAP SERPL CALC-SCNC: 1 MMOL/L (ref 0–18)
BLOOD TYPE BARCODE: 5100
BUN BLD-MCNC: 18 MG/DL (ref 7–18)
BUN/CREAT SERPL: 46.2 (ref 10–20)
CALCIUM BLD-MCNC: 8.7 MG/DL (ref 8.5–10.1)
CHLORIDE SERPL-SCNC: 111 MMOL/L (ref 98–112)
CO2 SERPL-SCNC: 32 MMOL/L (ref 21–32)
CREAT BLD-MCNC: 0.39 MG/DL
DEPRECATED RDW RBC AUTO: 56.3 FL (ref 35.1–46.3)
ERYTHROCYTE [DISTWIDTH] IN BLOOD BY AUTOMATED COUNT: 14.7 % (ref 11–15)
GLUCOSE BLD-MCNC: 115 MG/DL (ref 70–99)
HGB BLD-MCNC: 8.3 G/DL
MCH RBC QN AUTO: 34.6 PG (ref 26–34)
MCHC RBC AUTO-ENTMCNC: 32.8 G/DL (ref 31–37)
MCV RBC AUTO: 105.4 FL
OSMOLALITY SERPL CALC.SUM OF ELEC: 301 MOSM/KG (ref 275–295)
PLATELET # BLD AUTO: 163 10(3)UL (ref 150–450)
POTASSIUM SERPL-SCNC: 3.7 MMOL/L (ref 3.5–5.1)
POTASSIUM SERPL-SCNC: 3.7 MMOL/L (ref 3.5–5.1)
SARS-COV-2 RNA RESP QL NAA+PROBE: NOT DETECTED
SODIUM SERPL-SCNC: 144 MMOL/L (ref 136–145)
WBC # BLD AUTO: 7.1 X10(3) UL (ref 4–11)

## 2022-03-25 PROCEDURE — 97530 THERAPEUTIC ACTIVITIES: CPT

## 2022-03-25 PROCEDURE — 85027 COMPLETE CBC AUTOMATED: CPT | Performed by: STUDENT IN AN ORGANIZED HEALTH CARE EDUCATION/TRAINING PROGRAM

## 2022-03-25 PROCEDURE — 84132 ASSAY OF SERUM POTASSIUM: CPT | Performed by: INTERNAL MEDICINE

## 2022-03-25 PROCEDURE — 80048 BASIC METABOLIC PNL TOTAL CA: CPT | Performed by: STUDENT IN AN ORGANIZED HEALTH CARE EDUCATION/TRAINING PROGRAM

## 2022-03-25 PROCEDURE — 92526 ORAL FUNCTION THERAPY: CPT

## 2022-03-25 RX ORDER — POTASSIUM CHLORIDE 1.5 G/1.77G
40 POWDER, FOR SOLUTION ORAL ONCE
Status: COMPLETED | OUTPATIENT
Start: 2022-03-25 | End: 2022-03-25

## 2022-03-25 RX ORDER — DOCUSATE SODIUM 100 MG/1
100 CAPSULE, LIQUID FILLED ORAL 2 TIMES DAILY
Qty: 60 CAPSULE | Refills: 0 | Status: SHIPPED | OUTPATIENT
Start: 2022-03-25 | End: 2022-04-24

## 2022-03-25 RX ORDER — ENOXAPARIN SODIUM 100 MG/ML
40 INJECTION SUBCUTANEOUS DAILY
Qty: 7 EACH | Refills: 0 | Status: SHIPPED | COMMUNITY
Start: 2022-03-26 | End: 2022-04-01

## 2022-03-25 NOTE — PHYSICAL THERAPY NOTE
PHYSICAL THERAPY TREATMENT NOTE - INPATIENT     Room Number: 265/554-J       Presenting Problem: fall with hip fracture s/p right anterior approach hemiarthroplasty, WBAT;  NO HIP PREC    Problem List  Principal Problem:    Closed fracture of neck of right femur, initial encounter ([facility] [address]  Active Problems:    Displaced fracture of right femoral neck (HCC)      PHYSICAL THERAPY ASSESSMENT   Chart reviewed. RN Jade Bryan approved participation in physical therapy. PPE worn by therapist: mask and gloves. Patient was not wearing a mask during session. Patient presented in bed and alarm activated with 0/10 pain. Patient with fair  progress towards goals during this session. Education provided on Physical therapy plan of care and physiological benefits of out of bed mobility. Patient with poor carryover. Pt is received in the bed and was cleared for therapy session. Co treat session with OT Ju. Pt's wife Beth Rivera was also present throughout the session. Pt is max A with bed mobility and to transfer to the EOB. Pt with cognitive deficits but is able to follow some directions. Pt required assist with his upper trunk to sit up. Pt was able to sit EOB for a few minutes and denied any dizziness and light headedness. Pt is mod A of 1-2 with the RW for sit<>stand transfers. Pt was able take a few steps from the bed to the bs chair with the RW mod A for balance and safety. Pt with forward flexed posture and shuffling steps. Pt then sat in the chair for a few minutes then performed another sit<>stand transfer with the RW mod A x2. Pt was able to static stand for about 1 minute with min A for balance and safety. Pt reported no pain during activity. Pt is repositioned and left in the chair with all needs within reach and alarm system activated. Reported to the RN on the status of the pt. Bed mobility: Max assist  Transfers: Mod assist 1-2  Gait Assistance:  Moderate assistance  Distance (ft): few steps ton the chair   Assistive Device: Rolling walker  Pattern: Shuffle          . Patient was left in bedside chair and alarm activated at end of session with all needs in reach. The patient's Approx Degree of Impairment: 72.57% has been calculated based on documentation in the [facility] '6 clicks' Inpatient Basic Mobility Short Form. Research supports that patients with this level of impairment may benefit from Subacute Rehab. RN aware of patient status post session. DISCHARGE RECOMMENDATIONS  PT Discharge Recommendations: Sub-acute rehabilitation     PLAN  PT Treatment Plan: Bed mobility; Body mechanics; Coordination; Endurance; Patient education;Gait training;Strengthening;Transfer training;Balance training    SUBJECTIVE  Pt was agreeable to therapy session. OBJECTIVE  Precautions: Limb alert - right;Aspiration;Bed/chair alarm    WEIGHT BEARING RESTRICTION  Weight Bearing Restriction: R lower extremity        R Lower Extremity: Weight Bearing as Tolerated       PAIN ASSESSMENT   Ratin  Location: right hip during transitional movements  Management Techniques: Activity promotion; Body mechanics; Relaxation;Repositioning    BALANCE                                                                                                                       Static Sitting: Fair +  Dynamic Sitting: Fair           Static Standing: Poor  Dynamic Standing: Poor    ACTIVITY TOLERANCE                         O2 WALK       AM-PAC '6-Clicks' INPATIENT SHORT FORM - BASIC MOBILITY  How much difficulty does the patient currently have. .. Patient Difficulty: Turning over in bed (including adjusting bedclothes, sheets and blankets)?: A Lot   Patient Difficulty: Sitting down on and standing up from a chair with arms (e.g., wheelchair, bedside commode, etc.): A Lot   Patient Difficulty: Moving from lying on back to sitting on the side of the bed?: A Lot   How much help from another person does the patient currently need. ..    Help from Another: Moving to and from a bed to a chair (including a wheelchair)?: A Lot   Help from Another: Need to walk in hospital room?: A Lot   Help from Another: Climbing 3-5 steps with a railing?: Total     AM-PAC Score:  Raw Score: 11   Approx Degree of Impairment: 72.57%   Standardized Score (AM-PAC Scale): 33.86   CMS Modifier (G-Code): CL      Patient End of Session: Up in chair;Needs met;Call light within reach;RN aware of session/findings; All patient questions and concerns addressed; Alarm set    CURRENT GOALS   Goals to be met by: 4/5/22  Patient Goal Patient's self-stated goal is: not stated.  Wife would like patient to go to [facility] #1 Patient is able to demonstrate supine - sit EOB @ level: minimal assistance   Goal #1   Current Status Max A     Goal #2 Patient is able to demonstrate transfers Sit to/from Stand at assistance level: minimal assistance   Goal #2  Current Status Mod A x1-2 with the RW   Goal #3 Patient is able to ambulate 50 feet with assistive device at assistance level: minimal assistance   Goal #3   Current Status Took a few steps to the chair with the RW mod A    Goal #4 Patient will negotiate bed to chair transfers with RW with minimal assistance   Goal #4   Current Status Mod A with the RW    Goal #5 Patient verbalizes and/or demonstrates all precautions and safety concerns with cues and assistance   Goal #5   Current Status IN PROGRESS   Goal #6 Patient performs home exercise program for ROM/strengthening per the instructions provided in preparation for discharge with assistance   Goal #6  Current Status IN PROGRESS

## 2022-03-25 NOTE — SLP NOTE
SPEECH DAILY NOTE - INPATIENT    ASSESSMENT & PLAN   ASSESSMENT  Pt seen for dysphagia tx to assess tolerance with recommended diet, ensure proper utilization of aspiration precautions and provide pt/family education. Contacted patient with wife at the bedside feeding him lunch. His wife was providing verbal cues to patient to utilize aspiration precautions. Therapist reviewed aspiration precautions and diet recommendations with patient and wife with reasoning. They expressed an understanding and agreed. Patient seated upright and midline in chair. He required cues for oral retrieval of liquids as patient would slurp liquids from cup, encouraged patient to take small amount, use bolus hold and then effortful swallow in order to improve the oral control of liquids. Patient able to demonstrate bolus hold and effortful swallow following several trials and verbal cues. Patient utilized alternating liquids and solids, small sips and bites, no straws, and slow rate with cues and therapist/wife facilitating utilization of aspiration precautions. Educated wife on need for continued dysphagia therapy at [facility] with focus on swallowing driven exercises to improve oral and pharyngeal musculature strengthening. Patient and wife were in agreement and expressed an understanding. Skilled dysphagia therapy to continue at [facility] targeting oral and pharyngeal musculature exercises to help strengthen and coordinate the muscles involved in swallowing. Diet Recommendations - Solids: Puree  Diet Recommendations - Liquids: Nectar thick liquids/ Mildly thick    Compensatory Strategies Recommended: No straws; Slow rate;Small bites and sips;Multiple swallows  Aspiration Precautions: Upright position; Slow rate;Small bites and sips; No straw  Medication Administration Recommendations: Crushed in puree    Patient Experiencing Pain: No                Discharge Recommendations  Discharge Recommendations/Plan: Skilled nursing facility    Treatment Plan  Treatment Plan/Recommendations: Dysphagia therapy; Aspiration precautions    Interdisciplinary Communication: Discussed with RN        GOALS  Goal #1 The patient will tolerate Puree consistency and Moderately thick liquids without overt signs or symptoms of aspiration with 100 % accuracy over 2 session(s). New goal as of 3/24: The patient will tolerate a Puree diet with Mild thick liquids without overt s/s of aspiration with 100% accuracy over 2 sessions. Pt tolerated x10 trials of puree and x10 trials of mildly thick cranberry juice via tsp in alternating fashion with no overt clinical s/s of aspiration or respiratory compromise. Clear vocalizations assessed after each trials     Ongoing progress   Goal #2 The patient/family/caregiver will demonstrate understanding and implementation of aspiration precautions and swallow strategies independently over 2 session(s). Pt's wife reported adequate comprehension of aspiration precautions and swallowing guidelines/recommendations given verbal education and handouts from SLP. Pt required 1:1 assistance with all PO, therefore strategies and compensations are in place per spouse. In Progress   Goal #3 The patient will tolerate trial upgrade of minced moist consistency and mildly thick liquids without overt signs or symptoms of aspiration with 100 % accuracy over 2 session(s). Pt tolerated x15 trials of mild thick liquid via tsp and open cup sips with no s/s of aspiration and no changes in RR or WOB. Oxygen saturation rates remained at 96% across all trials presented. Recommend upgrade to mild thick liquids. New goal as of 3/24: The patient will tolerate trial upgrade of minced and moist consistency and mild thick liquids via cup or spoon without overt s/s of aspiration with 100% accuracy over 2 sessions. Did not address as patient with increased oral transit times therefore demonstrates efficiency with pureed. Partially met and upgraded on 3/24   Goal #4 The patient will utilize compensatory strategies as outlined by  BSSE (clinical evaluation) including Slow rate, Small bites, Small sips, Multiple swallows, Alternate liquids/solids, No straws, Upright 90 degrees, Feed patient, medicaitons crushed in puree with moderate-max assistance 90 % of the time across 2 sessions. SLP provided verbal review of aspiration precautions and recommendations for dysphagia management and rehabilitation. Pt and spouse demonstrated comprehension by employing strategies for a number of PO trials. RN reported medication toleration. In Progress             FOLLOW UP  Follow Up Needed (Documentation Required): Yes  SLP Follow-up Date: 03/27/22  Number of Visits to Meet Established Goals: 1    Session: 2 of 2 Recommend ongoing therapy at [facility].      If you have any questions, please contact SULEMA Mcdonald

## 2022-03-25 NOTE — PLAN OF CARE
Patient cleared for discharge. Problem: Patient Centered Care  Goal: Patient preferences are identified and integrated in the patient's plan of care  Description: Interventions:  - What would you like us to know as we care for you? Stefania Romo has dementia & has esophageal dysphagia. He is on a pureed diet with nectar thick liquids. - Provide timely, complete, and accurate information to patient/family  - Incorporate patient and family knowledge, values, beliefs, and cultural backgrounds into the planning and delivery of care  - Encourage patient/family to participate in care and decision-making at the level they choose  - Honor patient and family perspectives and choices  Outcome: Adequate for Discharge     Problem: Patient/Family Goals  Goal: Patient/Family Long Term Goal  Description: Patient's [address] [facility] home. Interventions:    - See additional Care Plan goals for specific interventions  Outcome: Adequate for Discharge  Goal: Patient/Family Short Term Goal  Description: Patient's Short Term Goal: To get the fractures hip repaired & start rehab. Interventions:   NPO for possible surgery today.   Pt's wife would like pt to go to [facility] for rehab  Social service to start discharge planning.  - See additional Care Plan goals for specific interventions  Outcome: Adequate for Discharge     Problem: PAIN - ADULT  Goal: Verbalizes/displays adequate comfort level or patient's stated pain goal  Description: INTERVENTIONS:  - Encourage pt to monitor pain and request assistance  - Assess pain using appropriate pain scale  - Administer analgesics based on type and severity of pain and evaluate response  - Implement non-pharmacological measures as appropriate and evaluate response  - Consider cultural and social influences on pain and pain management  - Manage/alleviate anxiety  - Utilize distraction and/or relaxation techniques  - Monitor for opioid side effects  - Notify MD/LIP if interventions unsuccessful or patient reports new pain  - Anticipate increased pain with activity and pre-medicate as appropriate  Outcome: Adequate for Discharge     Problem: RISK FOR INFECTION - ADULT  Goal: Absence of fever/infection during anticipated neutropenic period  Description: INTERVENTIONS  - Monitor WBC  - Administer growth factors as ordered  - Implement neutropenic guidelines  Outcome: Adequate for Discharge     Problem: SAFETY ADULT - FALL  Goal: Free from fall injury  Description: INTERVENTIONS:  - Assess pt frequently for physical needs  - Identify cognitive and physical deficits and behaviors that affect risk of falls.   - [place] fall precautions as indicated by assessment.  - Educate pt/family on patient safety including physical limitations  - Instruct pt to call for assistance with activity based on assessment  - Modify environment to reduce risk of injury  - Provide assistive devices as appropriate  - Consider OT/PT consult to assist with strengthening/mobility  - Encourage toileting schedule  Outcome: Adequate for Discharge     Problem: DISCHARGE PLANNING  Goal: Discharge to home or other facility with appropriate resources  Description: INTERVENTIONS:  - Identify barriers to discharge w/pt and caregiver  - Include patient/family/discharge partner in discharge planning  - Arrange for needed discharge resources and transportation as appropriate  - Identify discharge learning needs (meds, wound care, etc)  - Arrange for interpreters to assist at discharge as needed  - Consider post-discharge preferences of patient/family/discharge partner  - Complete POLST form as appropriate  - Assess patient's ability to be responsible for managing their own health  - Refer to Case Management Department for coordinating discharge planning if the patient needs post-hospital services based on physician/LIP order or complex needs related to functional status, cognitive ability or social support system  Outcome: Adequate for Discharge     Problem: Integumentary status not within defined limits  Goal: Pt's integumentary status will be adequate for discharge  Outcome: Adequate for Discharge

## 2022-03-25 NOTE — CM/SW NOTE
ECTOR followed up on DC planning. SW received message from RN stating pt's choice is [facility]. SW received confirmation of insurance authorization. SW confirmed with RN that pt is medically ready for discharge today. SW called and spoke with   to arrange a time for discharge. RN is aware of discharge time and location and will inform family. RN to attach IP Transfer Report to After Visit Summary packet for transfer to [facility]. SW left  for DCSS at [id number] to inform of discharge location. No PASR screen needed as it was current from 90 days. Pt requires an ambulance according to the RN due to being confused, only alert to self ECTOR called and ordered an Ambulance from [facility] to transport pt to [facility]  at 2:00 PM.  PCS flow sheet completed. RN to attached to AVS and print out. ECTOR/DC to remain available for support and/or discharge planning. PLAN: DC to [facility] via [facility] at 2:00 PM     RN to call report to [address] [place] [address], [facility], MSW ext.  [phone]

## 2022-03-25 NOTE — PLAN OF CARE
VSS, no acute events overnight. Pt is aox1-2, ambulating x2a with RW and gait belt. Voiding per primofit. SCD's and Lovenox for DVT prophylaxis. Dressing Aquacel to incision, CDI. Aquacel to R elbow removed for site care, skin beneath aquacel intact at the time of this writing. No complaints of pain. Pt pulled IV's several times overnight, likely due to IV infusion of potassium causing discomfort at the site. Bed in lowest position, call light and personal possessions within reach. Pt instructed to call for assistance before getting up. Problem: Patient Centered Care  Goal: Patient preferences are identified and integrated in the patient's plan of care  Description: Interventions:  - What would you like us to know as we care for you? Rashard has dementia & has esophageal dysphagia. He is on a pureed diet with nectar thick liquids. - Provide timely, complete, and accurate information to patient/family  - Incorporate patient and family knowledge, values, beliefs, and cultural backgrounds into the planning and delivery of care  - Encourage patient/family to participate in care and decision-making at the level they choose  - Honor patient and family perspectives and choices  Outcome: Progressing     Problem: Patient/Family Goals  Goal: Patient/Family Long Term Goal  Description: Patient's [address] [facility] home. Interventions:    - See additional Care Plan goals for specific interventions  Outcome: Progressing  Goal: Patient/Family Short Term Goal  Description: Patient's Short Term Goal: To get the fractures hip repaired & start rehab. Interventions:   NPO for possible surgery today.   Pt's wife would like pt to go to [facility] for rehab  Social service to start discharge planning.  - See additional Care Plan goals for specific interventions  Outcome: Progressing     Problem: PAIN - ADULT  Goal: Verbalizes/displays adequate comfort level or patient's stated pain goal  Description: INTERVENTIONS:  - Encourage pt to monitor pain and request assistance  - Assess pain using appropriate pain scale  - Administer analgesics based on type and severity of pain and evaluate response  - Implement non-pharmacological measures as appropriate and evaluate response  - Consider cultural and social influences on pain and pain management  - Manage/alleviate anxiety  - Utilize distraction and/or relaxation techniques  - Monitor for opioid side effects  - Notify MD/LIP if interventions unsuccessful or patient reports new pain  - Anticipate increased pain with activity and pre-medicate as appropriate  Outcome: Progressing     Problem: RISK FOR INFECTION - ADULT  Goal: Absence of fever/infection during anticipated neutropenic period  Description: INTERVENTIONS  - Monitor WBC  - Administer growth factors as ordered  - Implement neutropenic guidelines  Outcome: Progressing     Problem: SAFETY ADULT - FALL  Goal: Free from fall injury  Description: INTERVENTIONS:  - Assess pt frequently for physical needs  - Identify cognitive and physical deficits and behaviors that affect risk of falls.   - [place] fall precautions as indicated by assessment.  - Educate pt/family on patient safety including physical limitations  - Instruct pt to call for assistance with activity based on assessment  - Modify environment to reduce risk of injury  - Provide assistive devices as appropriate  - Consider OT/PT consult to assist with strengthening/mobility  - Encourage toileting schedule  Outcome: Progressing     Problem: DISCHARGE PLANNING  Goal: Discharge to home or other facility with appropriate resources  Description: INTERVENTIONS:  - Identify barriers to discharge w/pt and caregiver  - Include patient/family/discharge partner in discharge planning  - Arrange for needed discharge resources and transportation as appropriate  - Identify discharge learning needs (meds, wound care, etc)  - Arrange for interpreters to assist at discharge as needed  - Consider post-discharge preferences of patient/family/discharge partner  - Complete POLST form as appropriate  - Assess patient's ability to be responsible for managing their own health  - Refer to Case Management Department for coordinating discharge planning if the patient needs post-hospital services based on physician/LIP order or complex needs related to functional status, cognitive ability or social support system  Outcome: Progressing     Problem: Integumentary status not within defined limits  Goal: Pt's integumentary status will be adequate for discharge  Outcome: Progressing

## 2022-03-25 NOTE — PROGRESS NOTES
Patient discharged to [facility]. Voiding freely and tolerating diet. Pain managed. IV and tele removed. RN report given to Heriberto. Discharge instructions printed and placed in envelope given to . Surgery scheduled for 9/13/2021 with Dr Burns in [place]  Patient will go to [facility] for Covid Testing /Orders faxed per RN

## 2022-03-28 NOTE — CM/SW NOTE
SW received a call from the pt's wife Maryana Roqeu regarding how crowded it is at [facility]. SW spoke with Maryana Roque via telephone and she stated she would like to see if there is any availability at [facility]. SW notified Susana Adair via Aidin to follow up with the pt's wife regarding the possibility of a transfer. Maryana Roque is aware SW could not guarantee bed availability at [facility]. Reynolds to follow up with the wife directly. Maryana Roque is aware and agreeable.      Rosalva Diamond, [facility] ext [phone]

## 2022-03-29 ENCOUNTER — SNF ADMIT/H&P (OUTPATIENT)
Dept: INTERNAL MEDICINE CLINIC | Facility: SKILLED NURSING FACILITY | Age: 87
End: 2022-03-29

## 2022-03-29 PROCEDURE — 99310 SBSQ NF CARE HIGH MDM 45: CPT | Performed by: NURSE PRACTITIONER

## 2022-03-29 RX ORDER — ACETAMINOPHEN 500 MG
1000 TABLET ORAL EVERY 8 HOURS PRN
COMMUNITY

## 2022-03-29 RX ORDER — POLYETHYLENE GLYCOL 3350 17 G/17G
17 POWDER, FOR SOLUTION ORAL EVERY OTHER DAY
COMMUNITY

## 2022-04-01 ENCOUNTER — SNF VISIT (OUTPATIENT)
Dept: INTERNAL MEDICINE CLINIC | Facility: SKILLED NURSING FACILITY | Age: 87
End: 2022-04-01

## 2022-04-01 PROCEDURE — 1123F ACP DISCUSS/DSCN MKR DOCD: CPT | Performed by: NURSE PRACTITIONER

## 2022-04-01 PROCEDURE — 99309 SBSQ NF CARE MODERATE MDM 30: CPT | Performed by: NURSE PRACTITIONER

## 2022-04-04 ENCOUNTER — SNF VISIT (OUTPATIENT)
Dept: INTERNAL MEDICINE CLINIC | Facility: SKILLED NURSING FACILITY | Age: 87
End: 2022-04-04

## 2022-04-04 PROCEDURE — 99308 SBSQ NF CARE LOW MDM 20: CPT | Performed by: NURSE PRACTITIONER

## 2022-04-06 ENCOUNTER — SNF VISIT (OUTPATIENT)
Dept: INTERNAL MEDICINE CLINIC | Facility: SKILLED NURSING FACILITY | Age: 87
End: 2022-04-06

## 2022-04-06 PROCEDURE — 99309 SBSQ NF CARE MODERATE MDM 30: CPT | Performed by: NURSE PRACTITIONER

## 2022-04-11 ENCOUNTER — ORDER TRANSCRIPTION (OUTPATIENT)
Dept: ADMINISTRATIVE | Facility: HOSPITAL | Age: 87
End: 2022-04-11

## 2022-04-11 ENCOUNTER — SNF VISIT (OUTPATIENT)
Dept: INTERNAL MEDICINE CLINIC | Facility: SKILLED NURSING FACILITY | Age: 87
End: 2022-04-11

## 2022-04-11 PROCEDURE — 99308 SBSQ NF CARE LOW MDM 20: CPT | Performed by: NURSE PRACTITIONER

## 2022-04-18 ENCOUNTER — TELEPHONE (OUTPATIENT)
Dept: GASTROENTEROLOGY | Facility: CLINIC | Age: 87
End: 2022-04-18

## 2022-04-18 NOTE — TELEPHONE ENCOUNTER
Received call from Endo PAT Dept regarding patient's upcoming EGD w/ botox scheduled for 4-21-22 w/ Dr. Joie Nam.    Notes that patient is s/p R hemiarthroplasty 3/22/22 w/ Dr. Michael Mulligan. Inquiring if patient should be placed on antibiotic therapy prior to procedure.

## 2022-04-18 NOTE — PAT NURSING NOTE
This RN spoke with patient's wife about upcoming EGD with Dr. Shahid Mcnamara on 4/21/22. Patient just got home yesterday (4/17) from rehab after having hip surgery on 3/22/22. He is still using a walker and is unable to move around fully, so to limit the amount of times in and out of cars, down stairs, ect. We will do a rapid covid test day of procedure. Arrival time will now be 10:00 AM.     This RN contacted MD office to see if we would also need any abx since it has been less than a month since ortho sx was completed. Office stated they will contact MD to find out.

## 2022-04-19 NOTE — TELEPHONE ENCOUNTER
I spoke to Chloe in [place]. I let her know that Dr Shanika Camarillo said no pre op antibiotic needed.  She voiced understanding

## 2022-04-20 NOTE — TELEPHONE ENCOUNTER
Pt's wife wondering if she can give him hydroxyzine prior to his procedure. This is a new medication for him. This was prescribed for anxiety.     I advised the wife it is ok to give him this medication    She voices understanding

## 2022-04-20 NOTE — TELEPHONE ENCOUNTER
Patients wife Selma Mouton calling regarding medication question for tomorrows EGD, please call at [phone].

## 2022-04-21 ENCOUNTER — HOSPITAL ENCOUNTER (OUTPATIENT)
Facility: HOSPITAL | Age: 87
Setting detail: HOSPITAL OUTPATIENT SURGERY
End: 2022-04-21
Attending: INTERNAL MEDICINE | Admitting: INTERNAL MEDICINE
Payer: MEDICARE

## 2022-04-21 ENCOUNTER — TELEPHONE (OUTPATIENT)
Dept: GASTROENTEROLOGY | Facility: CLINIC | Age: 87
End: 2022-04-21

## 2022-04-21 DIAGNOSIS — Z01.818 PRE-OP TESTING: Primary | ICD-10-CM

## 2022-04-21 DIAGNOSIS — R26.2 DIFFICULTY IN WALKING: Primary | ICD-10-CM

## 2022-04-21 DIAGNOSIS — R13.10 DYSPHAGIA, UNSPECIFIED TYPE: ICD-10-CM

## 2022-04-21 RX ORDER — SODIUM CHLORIDE, SODIUM LACTATE, POTASSIUM CHLORIDE, CALCIUM CHLORIDE 600; 310; 30; 20 MG/100ML; MG/100ML; MG/100ML; MG/100ML
INJECTION, SOLUTION INTRAVENOUS CONTINUOUS
Status: ACTIVE | OUTPATIENT
Start: 2022-04-21

## 2022-04-21 NOTE — TELEPHONE ENCOUNTER
Schedule for egd/botox today  Arrived in endo  Refused rapid covid test  Discussed with charge nurse, case cancelled    GI RN -- please reach out to them to schedule f/u office appt

## 2022-04-21 NOTE — TELEPHONE ENCOUNTER
Dr León Petersen,    I spoke to the pt's wife    She would like to go ahead and reschedule the EGD    She thinks if her  gets the COVID test 3 days before the procedure and she gives him his antianxiety medication beforehand he will do much better

## 2022-04-21 NOTE — TELEPHONE ENCOUNTER
Dr Timothy Chilel,    These are your orders from 03/11/2022 TE    [id number] Yuni Soliz to use these orders again? GI schedulers --   Please schedule for EGD/MAC with botox injection. Please make sure endo has it available that day.    NPO after midnight    Dx R13.10 dysphagia

## 2022-04-22 RX ORDER — ENALAPRIL MALEATE 20 MG/1
20 TABLET ORAL DAILY
Status: ON HOLD | COMMUNITY
Start: 2022-03-30

## 2022-04-22 RX ORDER — VENLAFAXINE HYDROCHLORIDE 37.5 MG/1
37.5 CAPSULE, EXTENDED RELEASE ORAL EVERY OTHER DAY
Status: ON HOLD | COMMUNITY
Start: 2022-01-19

## 2022-04-22 RX ORDER — HYDROXYZINE PAMOATE 25 MG/1
CAPSULE ORAL
Status: ON HOLD | COMMUNITY

## 2022-04-22 NOTE — TELEPHONE ENCOUNTER
Scheduled for: EGD 12136 w/ botox injection  Provider Name: Dr Nima Orta   Date: Mon 5/23/2022   Location: [facility]   Sedation: MAC   Time: 9 am  Prep: Nothing after midnight the day before procedure and NPO 3 hrs prior procedure   Meds/Allergies Reconciled?: NKDA   Diagnosis with codes: Dysphagia R13.10  Was patient informed to call insurance with codes (Y/N): Yes   Referral sent?:  Yes  [facility] or [facility] [address] [place] [address] notified?: I sent an electronic request to Endo Scheduling and received a confirmation today. Medication Orders: Per ACE Inhibitors and ARBs protocol, do not take the night before and/or day of procedure: Enalapril    Pt is aware to NOT take iron pills, herbal meds and diet supplements for 7 days before exam. Also to NOT take any form of alcohol, recreational drugs and any forms of ED meds 24 hours before exam.      Misc Orders: Patient was informed that they will need a COVID 19 test prior to their procedure. Patient verbally understood & will await a phone call from [facility] to schedule. Further instructions given by staff:   Instructions given and pt verbalized understanding    [facility]

## 2022-05-02 ENCOUNTER — HOSPITAL ENCOUNTER (OUTPATIENT)
Dept: PHYSICAL MEDICINE AND REHAB | Age: 87
Discharge: STILL ADMITTED OR ADMINISTRATIVE | End: 2022-05-02
Attending: INTERNAL MEDICINE

## 2022-05-02 PROCEDURE — 97162 PT EVAL MOD COMPLEX 30 MIN: CPT

## 2022-05-02 PROCEDURE — 97530 THERAPEUTIC ACTIVITIES: CPT

## 2022-05-02 ASSESSMENT — MOVEMENT AND STRENGTH ASSESSMENTS
SQUATTING: QUITE A BIT OF DIFFICULTY
RUNNING ON EVEN GROUND: EXTREME DIFFICULTY OR UNABLE TO PERFORM ACTIVITY
WALKING 2 BLOCKS: EXTREME DIFFICULTY OR UNABLE TO PERFORM ACTIVITY
GETTING INTO OR OUT OF A CAR: MODERATE DIFFICULTY
YOUR USUAL HOBBIES, RECREATIONAL OR SPORTING ACTIVIITIES: EXTREME DIFFICULTY OR UNABLE TO PERFORM ACTIVITY
WALKING A MILE: EXTREME DIFFICULTY OR UNABLE TO PERFORM ACTIVITY
PUTTING ON YOUR SHOES OR SOCKS: MODERATE DIFFICULTY
SITTING FOR 1 HOUR: NO DIFFICULTY
PERFORMING HEAVY ACTIVITIES AROUND YOUR HOME: QUITE A BIT OF DIFFICULTY
ANY OF YOUR USUAL WORK, HOUSEWORK OR SCHOOL ACTIVITIES: QUITE A BIT OF DIFFICULTY
RUNNING ON UNEVEN GROUND: EXTREME DIFFICULTY OR UNABLE TO PERFORM ACTIVITY
HOPPING: EXTREME DIFFICULTY OR UNABLE TO PERFORM ACTIVITY
GOING UP OR DOWN 10 STAIRS (ABOUT 1 FLIGHT OF STAIRS): A LITTLE BIT OF DIFFICULTY
ROLLING OVER IN BED: A LITTLE BIT OF DIFFICULTY
STANDING FOR 1 HOUR: EXTREME DIFFICULTY OR UNABLE TO PERFORM ACTIVITY
MAKING SHARP TURNS WHILE RUNNING FAST: EXTREME DIFFICULTY OR UNABLE TO PERFORM ACTIVITY
TOTAL SCORE: 26.25
PERFORMING LIGHT ACTIVITES AROUND YOUR HOME: MODERATE DIFFICULTY
GETTING INTO OR OUT OF THE BATH: EXTREME DIFFICULTY OR UNABLE TO PERFORM ACTIVITY
WALKING BETWEEN ROOMS: MODERATE DIFFICULTY
LIFTING AN OBJECT, LIKE A BAG OF GROCERIES, FROM THE FLOOR: EXTREME DIFFICULTY OR UNABLE TO PERFORM ACTIVITY

## 2022-05-02 ASSESSMENT — GAIT ASSESSMENTS
TUG TIME: 80-89 YEARS MALE 10 SECONDS, FEMALE 11 SECONDS
TUG TURNS: UNSTABLE ON TURNS
TUG TIME: GAIT BELT USED
TUG TIME: 33

## 2022-05-02 ASSESSMENT — ENCOUNTER SYMPTOMS
PAIN SCALE AT LOWEST: 0
ALLEVIATING FACTORS: UNABLE TO STATE ANYTHING THAT HELPS REDUCE THE PAIN
SUBJECTIVE PAIN PROGRESSION: IMPROVED
PAIN SEVERITY NOW: 0
PAIN SCALE AT HIGHEST: 7

## 2022-05-09 ENCOUNTER — HOSPITAL ENCOUNTER (OUTPATIENT)
Dept: PHYSICAL MEDICINE AND REHAB | Age: 87
Discharge: STILL ADMITTED OR ADMINISTRATIVE | End: 2022-05-09
Attending: INTERNAL MEDICINE

## 2022-05-09 PROCEDURE — 97110 THERAPEUTIC EXERCISES: CPT

## 2022-05-09 PROCEDURE — 97112 NEUROMUSCULAR REEDUCATION: CPT

## 2022-05-13 ENCOUNTER — APPOINTMENT (OUTPATIENT)
Dept: PHYSICAL MEDICINE AND REHAB | Age: 87
End: 2022-05-13
Attending: INTERNAL MEDICINE

## 2022-05-16 ENCOUNTER — HOSPITAL ENCOUNTER (OUTPATIENT)
Dept: PHYSICAL MEDICINE AND REHAB | Age: 87
Discharge: STILL ADMITTED OR ADMINISTRATIVE | End: 2022-05-16
Attending: INTERNAL MEDICINE

## 2022-05-16 PROCEDURE — 97110 THERAPEUTIC EXERCISES: CPT

## 2022-05-16 PROCEDURE — 97112 NEUROMUSCULAR REEDUCATION: CPT

## 2022-05-20 ENCOUNTER — LAB ENCOUNTER (OUTPATIENT)
Dept: LAB | Age: 87
End: 2022-05-20
Attending: INTERNAL MEDICINE
Payer: MEDICARE

## 2022-05-20 DIAGNOSIS — Z01.818 PRE-OP TESTING: ICD-10-CM

## 2022-05-21 LAB — SARS-COV-2 RNA RESP QL NAA+PROBE: NOT DETECTED

## 2022-05-23 ENCOUNTER — ANESTHESIA (OUTPATIENT)
Dept: ENDOSCOPY | Facility: HOSPITAL | Age: 87
End: 2022-05-23
Payer: MEDICARE

## 2022-05-23 ENCOUNTER — ANESTHESIA EVENT (OUTPATIENT)
Dept: ENDOSCOPY | Facility: HOSPITAL | Age: 87
End: 2022-05-23
Payer: MEDICARE

## 2022-05-23 ENCOUNTER — HOSPITAL ENCOUNTER (OUTPATIENT)
Facility: HOSPITAL | Age: 87
Setting detail: HOSPITAL OUTPATIENT SURGERY
Discharge: HOME | End: 2022-05-23
Attending: INTERNAL MEDICINE | Admitting: INTERNAL MEDICINE
Payer: MEDICARE

## 2022-05-23 VITALS
HEIGHT: 69 IN | OXYGEN SATURATION: 100 % | TEMPERATURE: 97 F | WEIGHT: 140 LBS | RESPIRATION RATE: 20 BRPM | BODY MASS INDEX: 20.73 KG/M2 | SYSTOLIC BLOOD PRESSURE: 122 MMHG | DIASTOLIC BLOOD PRESSURE: 73 MMHG | HEART RATE: 54 BPM

## 2022-05-23 DIAGNOSIS — Z01.818 PRE-OP TESTING: Primary | ICD-10-CM

## 2022-05-23 DIAGNOSIS — R13.10 DYSPHAGIA, UNSPECIFIED TYPE: ICD-10-CM

## 2022-05-23 LAB
ANION GAP SERPL CALC-SCNC: 1 MMOL/L (ref 0–18)
BUN BLD-MCNC: 14 MG/DL (ref 7–18)
BUN/CREAT SERPL: 22.6 (ref 10–20)
CALCIUM BLD-MCNC: 9.6 MG/DL (ref 8.5–10.1)
CHLORIDE SERPL-SCNC: 109 MMOL/L (ref 98–112)
CO2 SERPL-SCNC: 34 MMOL/L (ref 21–32)
CREAT BLD-MCNC: 0.62 MG/DL
GLUCOSE BLD-MCNC: 83 MG/DL (ref 70–99)
MAGNESIUM SERPL-MCNC: 2.4 MG/DL (ref 1.6–2.6)
OSMOLALITY SERPL CALC.SUM OF ELEC: 298 MOSM/KG (ref 275–295)
PHOSPHATE SERPL-MCNC: 2.3 MG/DL (ref 2.5–4.9)
POTASSIUM SERPL-SCNC: 4 MMOL/L (ref 3.5–5.1)
SODIUM SERPL-SCNC: 144 MMOL/L (ref 136–145)

## 2022-05-23 PROCEDURE — 3E0G8GC INTRODUCTION OF OTHER THERAPEUTIC SUBSTANCE INTO UPPER GI, VIA NATURAL OR ARTIFICIAL OPENING ENDOSCOPIC: ICD-10-PCS | Performed by: INTERNAL MEDICINE

## 2022-05-23 PROCEDURE — 43236 UPPR GI SCOPE W/SUBMUC INJ: CPT | Performed by: INTERNAL MEDICINE

## 2022-05-23 RX ORDER — SODIUM CHLORIDE, SODIUM LACTATE, POTASSIUM CHLORIDE, CALCIUM CHLORIDE 600; 310; 30; 20 MG/100ML; MG/100ML; MG/100ML; MG/100ML
INJECTION, SOLUTION INTRAVENOUS CONTINUOUS
OUTPATIENT
Start: 2022-05-23

## 2022-05-23 RX ORDER — SODIUM CHLORIDE, SODIUM LACTATE, POTASSIUM CHLORIDE, CALCIUM CHLORIDE 600; 310; 30; 20 MG/100ML; MG/100ML; MG/100ML; MG/100ML
INJECTION, SOLUTION INTRAVENOUS CONTINUOUS
Status: DISCONTINUED | OUTPATIENT
Start: 2022-05-23 | End: 2022-05-23

## 2022-05-23 RX ORDER — NALOXONE HYDROCHLORIDE 0.4 MG/ML
80 INJECTION, SOLUTION INTRAMUSCULAR; INTRAVENOUS; SUBCUTANEOUS AS NEEDED
OUTPATIENT
Start: 2022-05-23 | End: 2022-05-23

## 2022-05-23 RX ADMIN — SODIUM CHLORIDE, SODIUM LACTATE, POTASSIUM CHLORIDE, CALCIUM CHLORIDE: 600; 310; 30; 20 INJECTION, SOLUTION INTRAVENOUS at 12:07:00

## 2022-05-23 RX ADMIN — SODIUM CHLORIDE, SODIUM LACTATE, POTASSIUM CHLORIDE, CALCIUM CHLORIDE: 600; 310; 30; 20 INJECTION, SOLUTION INTRAVENOUS at 11:49:00

## 2022-05-23 RX ADMIN — Medication 0.5 MG: at 11:50:00

## 2022-05-23 NOTE — ANESTHESIA POSTPROCEDURE EVALUATION
Patient: Arsalan Andrea. Procedure Summary     Date: 05/23/22 Room / Location: [address] [place] [address] ENDOSCOPY 04 / 300 [facility] ENDOSCOPY    Anesthesia Start: 3143 Anesthesia Stop: 4185    Procedure: ESOPHAGOGASTRODUODENOSCOPY with Botox injection (N/A ) Diagnosis:       Dysphagia, unspecified type      (Esophageal diverticulum)    Surgeons: Tanya Cottrell MD Anesthesiologist: Pito Perez MD    Anesthesia Type: MAC ASA Status: 3          Anesthesia Type: MAC    Vitals Value Taken Time   /79 05/23/22 1213   Temp  05/23/22 1214   Pulse 59 05/23/22 1213   Resp 20 05/23/22 1213   SpO2 100 % 05/23/22 1213       [facility] AN Post Evaluation:   Patient Evaluated in Patient location: GI recovery.   Patient Participation: complete - patient participated  Level of Consciousness: sleepy but conscious  Pain Management: adequate  Airway Patency:patent  Dental exam unchanged from preop  Yes    Cardiovascular Status: acceptable and hemodynamically stable  Respiratory Status: acceptable, nasal cannula, nonlabored ventilation and spontaneous ventilation  Postoperative Hydration acceptable      Ricky Mast MD  5/23/2022 12:14 PM

## 2022-05-23 NOTE — OPERATIVE REPORT
ESOPHAGOGASTRODUODENOSCOPY (EGD) REPORT    Teresa Andersen.  1935 Age 81year old   PCP Abdi Shea Endoscopist Leida Eagle MD     Date of procedure: 22    Procedure: EGD w/submucosal injection    Pre-operative diagnosis: dysphagia, weight loss    Post-operative diagnosis: see impression    Medications: MAC    Complications: none    Procedure:  Informed consent was obtained from the patient after the risks of the procedure were discussed, including but not limited to bleeding, perforation, aspiration, infection, or possibility of a missed lesion. After discussions of the risks/benefits and alternatives to this procedure, as well as the planned sedation, the patient was placed in the left lateral decubitus position and begun on continuous blood pressure pulse oximetry and EKG monitoring and this was maintained throughout the procedure. Once an adequate level of sedation was obtained a bite block was placed. Then the lubricated tip of the [id number] diagnostic video upper endoscope was inserted and advanced using direct visualization into the posterior pharynx and ultimately into the esophagus, stomach, and duodenum. Complications: None    Findings:      1. Esophagus: Moderate amount of clear secretions throughout esophagus tract, suctioned upon entry. Mild dilation of entire esophagus with tapering in the distal esophagus. The squamocolumnar junction was noted at 40 cm and appeared normal. The diaphragmatic pinch was noted at 40 cm from the incisors. A medium-large sized diverticulum with a 1 cm mouth, seen in the distal esophagus, at about 36-37 cm from the incisors. Mild resistance in passage of scope through GE junction without focal stricture or mass. No evidence of rings, webs or luminal narrowing. There was no evidence of erosions, ulcerations, varices, or masses.  A total of 100 units of botulinum toxin A (BOTOX) was injected via sclerotherapy needle, 25 units in each quadrants, approximately 1 cm above the Z line. 2. Stomach: We then entered the stomach. Patchy diffuse gastric erythema. No erosions, or ulcerations. There was no evidence of any luminal or submucosal masses. A retroflexed view allowed examination of the angularis, cardia and fundus and these areas also appeared normal with a non-patulous cardia. No hiatal hernia seen. 3. Duodenum: The duodenal mucosa appeared normal in the 1st and 2nd portion of the duodenum. We then withdrew the instrument from the patient who tolerated the procedure well. Impression:   1. Retained clear secretions in esophagus, dilated proximal/mid esophagus, distal esophageal diverticulum, resistance at GE junction without focal stricture or mass. 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 As he was previously deemed poor candidate for aggressive or invasive therapies, and deferred formal esophageal manometry, opted for BOTOX 100 units injection to GE junction via sclerotherapy needle. Recommend:  1. Resume soft diet as tolerated  2. Consider VFSS as outpatient  3. Monitor response to above therapy. A gastrostomy tube can be considered for supplemental enteral nutrition. 4. Follow up gi clinic    >>>If tissue was sampled/removed and you have not received your pathology results either by phone or letter within 2 weeks, please call our office at [phone].     Specimens:  none    Blood loss: <1 ml      Rose Paredes MD  [facility], [facility]

## 2022-05-25 ENCOUNTER — HOSPITAL ENCOUNTER (OUTPATIENT)
Dept: PHYSICAL MEDICINE AND REHAB | Age: 87
Discharge: STILL ADMITTED OR ADMINISTRATIVE | End: 2022-05-25
Attending: INTERNAL MEDICINE

## 2022-05-25 PROCEDURE — 97542 WHEELCHAIR MNGMENT TRAINING: CPT

## 2022-05-27 ENCOUNTER — HOSPITAL ENCOUNTER (OUTPATIENT)
Dept: PHYSICAL MEDICINE AND REHAB | Age: 87
Discharge: STILL ADMITTED OR ADMINISTRATIVE | End: 2022-05-27
Attending: INTERNAL MEDICINE

## 2022-05-27 PROCEDURE — 97110 THERAPEUTIC EXERCISES: CPT

## 2022-06-03 ENCOUNTER — HOSPITAL ENCOUNTER (OUTPATIENT)
Dept: PHYSICAL MEDICINE AND REHAB | Age: 87
Discharge: STILL ADMITTED OR ADMINISTRATIVE | End: 2022-06-03
Attending: INTERNAL MEDICINE

## 2022-06-03 PROCEDURE — 97110 THERAPEUTIC EXERCISES: CPT

## 2022-06-03 PROCEDURE — 97116 GAIT TRAINING THERAPY: CPT

## 2022-06-06 ENCOUNTER — APPOINTMENT (OUTPATIENT)
Dept: PHYSICAL MEDICINE AND REHAB | Age: 87
End: 2022-06-06
Attending: INTERNAL MEDICINE

## 2022-06-13 ENCOUNTER — HOSPITAL ENCOUNTER (OUTPATIENT)
Dept: PHYSICAL MEDICINE AND REHAB | Age: 87
Discharge: STILL ADMITTED OR ADMINISTRATIVE | End: 2022-06-13
Attending: INTERNAL MEDICINE

## 2022-06-13 PROCEDURE — 97116 GAIT TRAINING THERAPY: CPT

## 2022-06-13 PROCEDURE — 97110 THERAPEUTIC EXERCISES: CPT

## 2022-06-20 ENCOUNTER — HOSPITAL ENCOUNTER (OUTPATIENT)
Dept: PHYSICAL MEDICINE AND REHAB | Age: 87
Discharge: STILL ADMITTED OR ADMINISTRATIVE | End: 2022-06-20
Attending: INTERNAL MEDICINE

## 2022-06-20 PROCEDURE — 97116 GAIT TRAINING THERAPY: CPT

## 2022-06-20 PROCEDURE — 97110 THERAPEUTIC EXERCISES: CPT

## 2022-07-18 ENCOUNTER — HOSPITAL ENCOUNTER (OUTPATIENT)
Dept: PHYSICAL MEDICINE AND REHAB | Age: 87
Discharge: STILL ADMITTED OR ADMINISTRATIVE | End: 2022-07-18
Attending: INTERNAL MEDICINE

## 2022-07-18 PROCEDURE — 97116 GAIT TRAINING THERAPY: CPT

## 2022-07-18 PROCEDURE — 97110 THERAPEUTIC EXERCISES: CPT

## 2022-07-18 ASSESSMENT — MOVEMENT AND STRENGTH ASSESSMENTS
GOING UP OR DOWN 10 STAIRS (ABOUT 1 FLIGHT OF STAIRS): A LITTLE BIT OF DIFFICULTY
ANY OF YOUR USUAL WORK, HOUSEWORK OR SCHOOL ACTIVITIES: QUITE A BIT OF DIFFICULTY
GETTING INTO OR OUT OF THE BATH: EXTREME DIFFICULTY OR UNABLE TO PERFORM ACTIVITY
ROLLING OVER IN BED: A LITTLE BIT OF DIFFICULTY
PUTTING ON YOUR SHOES OR SOCKS: MODERATE DIFFICULTY
HOPPING: EXTREME DIFFICULTY OR UNABLE TO PERFORM ACTIVITY
PERFORMING LIGHT ACTIVITES AROUND YOUR HOME: MODERATE DIFFICULTY
LIFTING AN OBJECT, LIKE A BAG OF GROCERIES, FROM THE FLOOR: EXTREME DIFFICULTY OR UNABLE TO PERFORM ACTIVITY
STANDING FOR 1 HOUR: EXTREME DIFFICULTY OR UNABLE TO PERFORM ACTIVITY
WALKING A MILE: EXTREME DIFFICULTY OR UNABLE TO PERFORM ACTIVITY
MAKING SHARP TURNS WHILE RUNNING FAST: EXTREME DIFFICULTY OR UNABLE TO PERFORM ACTIVITY
WALKING 2 BLOCKS: EXTREME DIFFICULTY OR UNABLE TO PERFORM ACTIVITY
TOTAL SCORE: 28.75
PERFORMING HEAVY ACTIVITIES AROUND YOUR HOME: QUITE A BIT OF DIFFICULTY
SQUATTING: MODERATE DIFFICULTY
RUNNING ON UNEVEN GROUND: EXTREME DIFFICULTY OR UNABLE TO PERFORM ACTIVITY
RUNNING ON EVEN GROUND: EXTREME DIFFICULTY OR UNABLE TO PERFORM ACTIVITY
GETTING INTO OR OUT OF A CAR: MODERATE DIFFICULTY
WALKING BETWEEN ROOMS: A LITTLE BIT OF DIFFICULTY
SITTING FOR 1 HOUR: NO DIFFICULTY
YOUR USUAL HOBBIES, RECREATIONAL OR SPORTING ACTIVIITIES: EXTREME DIFFICULTY OR UNABLE TO PERFORM ACTIVITY

## 2022-07-18 ASSESSMENT — GAIT ASSESSMENTS
TUG TIME: GAIT BELT USED
TUG TIME: 26
TUG TURNS: STABLE ON TURNS

## 2022-07-18 ASSESSMENT — ENCOUNTER SYMPTOMS: PAIN: 1

## 2022-08-02 ENCOUNTER — LAB ENCOUNTER (OUTPATIENT)
Dept: LAB | Age: 87
End: 2022-08-02
Attending: INTERNAL MEDICINE
Payer: MEDICARE

## 2022-08-02 DIAGNOSIS — Z01.818 PREOP EXAMINATION: ICD-10-CM

## 2022-08-03 LAB — SARS-COV-2 RNA RESP QL NAA+PROBE: NOT DETECTED

## 2022-08-05 ENCOUNTER — HOSPITAL ENCOUNTER (OUTPATIENT)
Dept: GENERAL RADIOLOGY | Facility: HOSPITAL | Age: 87
Discharge: HOME | End: 2022-08-05
Payer: MEDICARE

## 2022-08-05 DIAGNOSIS — K22.0 ESOPHAGEAL ACHALASIA: ICD-10-CM

## 2022-08-05 PROCEDURE — 92611 MOTION FLUOROSCOPY/SWALLOW: CPT

## 2022-08-05 PROCEDURE — 74230 X-RAY XM SWLNG FUNCJ C+: CPT

## 2022-08-05 NOTE — PATIENT INSTRUCTIONS
Diet Recommendations:  Solids: soft easy to chew  Liquids: mildly thick liquids  Pt may have small amounts of plain water or ice chips via spoon.     Recommended compensatory strategies:   Sit upright  Small sips  Small bites  Eat slowly  Alternate liquids and solids  No straw    Medication Administration:  Take whole in pureed    Further Follow-up:  Recommend 4 sessions of dysphagia therapy, once a week for 4 weeks    Rock Will ZAVALA/GOPAL-SLP  Speech Language Pathologist  [address] [place] [address]  [phone]

## 2022-08-15 ENCOUNTER — ORDER TRANSCRIPTION (OUTPATIENT)
Dept: PHYSICAL THERAPY | Facility: HOSPITAL | Age: 87
End: 2022-08-15

## 2022-08-15 DIAGNOSIS — K22.0 ACHALASIA OF CARDIA: Primary | ICD-10-CM

## 2022-09-26 ENCOUNTER — APPOINTMENT (OUTPATIENT)
Dept: CT IMAGING | Age: 87
End: 2022-09-26
Attending: EMERGENCY MEDICINE

## 2022-09-26 ENCOUNTER — HOSPITAL ENCOUNTER (EMERGENCY)
Age: 87
Discharge: HOME | End: 2022-09-26
Attending: EMERGENCY MEDICINE

## 2022-09-26 VITALS
TEMPERATURE: 97.5 F | RESPIRATION RATE: 16 BRPM | SYSTOLIC BLOOD PRESSURE: 129 MMHG | WEIGHT: 148.81 LBS | DIASTOLIC BLOOD PRESSURE: 62 MMHG | OXYGEN SATURATION: 100 % | HEART RATE: 55 BPM

## 2022-09-26 DIAGNOSIS — S00.81XA ABRASION OF FOREHEAD, INITIAL ENCOUNTER: ICD-10-CM

## 2022-09-26 DIAGNOSIS — S09.90XA INJURY OF HEAD, INITIAL ENCOUNTER: Primary | ICD-10-CM

## 2022-09-26 DIAGNOSIS — S16.1XXA ACUTE STRAIN OF NECK MUSCLE, INITIAL ENCOUNTER: ICD-10-CM

## 2022-09-26 LAB
ATRIAL RATE (BPM): 57
P AXIS (DEGREES): 50
PR-INTERVAL (MSEC): 180
QRS-INTERVAL (MSEC): 88
QT-INTERVAL (MSEC): 438
QTC: 426
R AXIS (DEGREES): -6
RAINBOW EXTRA TUBES HOLD SPECIMEN: NORMAL
REPORT TEXT: NORMAL
T AXIS (DEGREES): 53
VENTRICULAR RATE EKG/MIN (BPM): 57

## 2022-09-26 PROCEDURE — 99285 EMERGENCY DEPT VISIT HI MDM: CPT

## 2022-09-26 PROCEDURE — 36415 COLL VENOUS BLD VENIPUNCTURE: CPT | Performed by: EMERGENCY MEDICINE

## 2022-09-26 PROCEDURE — 10002800 HB RX 250 W HCPCS: Performed by: EMERGENCY MEDICINE

## 2022-09-26 PROCEDURE — G1004 CDSM NDSC: HCPCS

## 2022-09-26 PROCEDURE — 90471 IMMUNIZATION ADMIN: CPT | Performed by: EMERGENCY MEDICINE

## 2022-09-26 PROCEDURE — 70450 CT HEAD/BRAIN W/O DYE: CPT

## 2022-09-26 PROCEDURE — 90715 TDAP VACCINE 7 YRS/> IM: CPT | Performed by: EMERGENCY MEDICINE

## 2022-09-26 PROCEDURE — 72125 CT NECK SPINE W/O DYE: CPT

## 2022-09-26 PROCEDURE — 93005 ELECTROCARDIOGRAM TRACING: CPT | Performed by: EMERGENCY MEDICINE

## 2022-09-26 RX ADMIN — TETANUS TOXOID, REDUCED DIPHTHERIA TOXOID AND ACELLULAR PERTUSSIS VACCINE, ADSORBED 0.5 ML: 5; 2.5; 8; 8; 2.5 SUSPENSION INTRAMUSCULAR at 15:59

## 2022-09-26 ASSESSMENT — PAIN SCALES - GENERAL: PAINLEVEL_OUTOF10: 0

## 2022-09-26 ASSESSMENT — ENCOUNTER SYMPTOMS
FEVER: 0
RESPIRATORY NEGATIVE: 1
HEADACHES: 0
NUMBNESS: 0
CHILLS: 0
GASTROINTESTINAL NEGATIVE: 1

## 2022-10-06 ENCOUNTER — OFFICE VISIT (OUTPATIENT)
Dept: SPEECH THERAPY | Age: 87
End: 2022-10-06
Payer: MEDICARE

## 2022-10-06 DIAGNOSIS — K22.0 ACHALASIA OF CARDIA: ICD-10-CM

## 2022-10-06 PROCEDURE — 92526 ORAL FUNCTION THERAPY: CPT

## 2022-10-06 PROCEDURE — 92610 EVALUATE SWALLOWING FUNCTION: CPT

## 2022-10-07 ENCOUNTER — APPOINTMENT (OUTPATIENT)
Dept: SPEECH THERAPY | Age: 87
End: 2022-10-07
Payer: MEDICARE

## 2022-10-11 ENCOUNTER — OFFICE VISIT (OUTPATIENT)
Dept: SPEECH THERAPY | Age: 87
End: 2022-10-11
Payer: MEDICARE

## 2022-10-11 PROCEDURE — 92526 ORAL FUNCTION THERAPY: CPT

## 2022-10-11 NOTE — PROGRESS NOTES
Diagnosis:   Oropharyngeal dysphagia R13.12      Referring Provider: Uriah Rose  Date of Evaluation:   10/6/2022    Precautions:  Aspiration Next MD visit:   none scheduled  Date of Surgery: n/a   Insurance Primary/Secondary: MEDICARE / Cherelle Gale PPO       # Auth Visits: 10 visits     Date POC Expires: 1/4/2022     Total Treatment time: 40 min  Charges: [id number]         Treatment Number: 2    Subjective: patient attended therapy with his wife. They wore PPE until they were asked to remove it to practice the exercises. The wife does all exercises with him to ensure that he is doing them properly. Pain: 0/10     Objective/Goals: (to be met in 12 visits)   1) Patient will perform oral exercises with 100% accuracy independently. Progress - Patient was able to complete all exercises with 90% accuracy. He needed some adjustment to increase ROM and coordination. 2) Patient will perform pharyngeal exercises with 100% accuracy independently. Progress - all exercises were able to be performed with 70% accuracy. He could not do the Mendelson maneuver so they were encouraged to just keep swallowing. 3) Patient will use aspiration precautions with all oral intake. Progress - he is able to use aspiration precautions with all oral intake. HEP: given to target oral and pharyngeal exercises  Education: provided on aspiration precautions, oral exercises, and pharyngeal exercises. Assessment: Patient was able to improve with all exercises that were given last week. We adjusted some of the exercises to increase ROM, strength, and coordination. Plan: target oral and pharyngeal exercises.

## 2022-10-12 ENCOUNTER — APPOINTMENT (OUTPATIENT)
Dept: PHYSICAL MEDICINE AND REHAB | Age: 87
End: 2022-10-12
Attending: INTERNAL MEDICINE

## 2022-10-12 ENCOUNTER — HOSPITAL ENCOUNTER (OUTPATIENT)
Dept: PHYSICAL MEDICINE AND REHAB | Age: 87
Discharge: STILL ADMITTED OR ADMINISTRATIVE | End: 2022-10-12
Attending: STUDENT IN AN ORGANIZED HEALTH CARE EDUCATION/TRAINING PROGRAM

## 2022-10-12 DIAGNOSIS — Z96.641 PRESENCE OF RIGHT ARTIFICIAL HIP JOINT: Primary | ICD-10-CM

## 2022-10-12 PROCEDURE — 97162 PT EVAL MOD COMPLEX 30 MIN: CPT

## 2022-10-12 PROCEDURE — 97530 THERAPEUTIC ACTIVITIES: CPT

## 2022-10-12 ASSESSMENT — MOVEMENT AND STRENGTH ASSESSMENTS
LIFTING AN OBJECT, LIKE A BAG OF GROCERIES, FROM THE FLOOR: EXTREME DIFFICULTY OR UNABLE TO PERFORM ACTIVITY
GETTING INTO OR OUT OF A CAR: MODERATE DIFFICULTY
WALKING A MILE: EXTREME DIFFICULTY OR UNABLE TO PERFORM ACTIVITY
SQUATTING: QUITE A BIT OF DIFFICULTY
SITTING FOR 1 HOUR: NO DIFFICULTY
PERFORMING HEAVY ACTIVITIES AROUND YOUR HOME: EXTREME DIFFICULTY OR UNABLE TO PERFORM ACTIVITY
GOING UP OR DOWN 10 STAIRS (ABOUT 1 FLIGHT OF STAIRS): A LITTLE BIT OF DIFFICULTY
YOUR USUAL HOBBIES, RECREATIONAL OR SPORTING ACTIVIITIES: EXTREME DIFFICULTY OR UNABLE TO PERFORM ACTIVITY
RUNNING ON UNEVEN GROUND: EXTREME DIFFICULTY OR UNABLE TO PERFORM ACTIVITY
STANDING FOR 1 HOUR: EXTREME DIFFICULTY OR UNABLE TO PERFORM ACTIVITY
HOPPING: EXTREME DIFFICULTY OR UNABLE TO PERFORM ACTIVITY
ANY OF YOUR USUAL WORK, HOUSEWORK OR SCHOOL ACTIVITIES: QUITE A BIT OF DIFFICULTY
MAKING SHARP TURNS WHILE RUNNING FAST: EXTREME DIFFICULTY OR UNABLE TO PERFORM ACTIVITY
WALKING BETWEEN ROOMS: MODERATE DIFFICULTY
TOTAL SCORE: 21.25
GETTING INTO OR OUT OF THE BATH: EXTREME DIFFICULTY OR UNABLE TO PERFORM ACTIVITY
ROLLING OVER IN BED: A LITTLE BIT OF DIFFICULTY
WALKING 2 BLOCKS: EXTREME DIFFICULTY OR UNABLE TO PERFORM ACTIVITY
PUTTING ON YOUR SHOES OR SOCKS: EXTREME DIFFICULTY OR UNABLE TO PERFORM ACTIVITY
RUNNING ON EVEN GROUND: EXTREME DIFFICULTY OR UNABLE TO PERFORM ACTIVITY
PERFORMING LIGHT ACTIVITES AROUND YOUR HOME: QUITE A BIT OF DIFFICULTY

## 2022-10-12 ASSESSMENT — 6 MINUTE WALK TEST (6MWT): TOTAL DISTANCE WALKED (FT): 260

## 2022-10-12 ASSESSMENT — ENCOUNTER SYMPTOMS
ALLEVIATING FACTORS: REST
PAIN SCALE AT LOWEST: 0
PAIN: 1
QUALITY: STIFF
PAIN SEVERITY NOW: 0
PAIN SCALE AT HIGHEST: 0

## 2022-10-13 ENCOUNTER — APPOINTMENT (OUTPATIENT)
Dept: SPEECH THERAPY | Age: 87
End: 2022-10-13
Payer: MEDICARE

## 2022-10-14 ENCOUNTER — APPOINTMENT (OUTPATIENT)
Dept: SPEECH THERAPY | Age: 87
End: 2022-10-14
Payer: MEDICARE

## 2022-10-19 ENCOUNTER — HOSPITAL ENCOUNTER (OUTPATIENT)
Dept: PHYSICAL MEDICINE AND REHAB | Age: 87
Discharge: STILL ADMITTED OR ADMINISTRATIVE | End: 2022-10-19

## 2022-10-19 PROCEDURE — 97116 GAIT TRAINING THERAPY: CPT

## 2022-10-19 PROCEDURE — 97530 THERAPEUTIC ACTIVITIES: CPT

## 2022-10-19 ASSESSMENT — ENCOUNTER SYMPTOMS: PAIN: 1

## 2022-10-20 ENCOUNTER — OFFICE VISIT (OUTPATIENT)
Dept: SPEECH THERAPY | Age: 87
End: 2022-10-20
Payer: MEDICARE

## 2022-10-20 DIAGNOSIS — K22.0 ACHALASIA OF CARDIA: ICD-10-CM

## 2022-10-20 PROCEDURE — 92526 ORAL FUNCTION THERAPY: CPT

## 2022-10-20 NOTE — PROGRESS NOTES
Diagnosis:   Oropharyngeal dysphagia R13.12      Referring Provider: Burt Crockett  Date of Evaluation:   10/6/2022    Precautions:  Aspiration Next MD visit:   none scheduled  Date of Surgery: n/a   Insurance Primary/Secondary: MEDICARE / [employer] PPO       # Auth Visits: 10 visits     Date POC Expires: 1/4/2022     Total Treatment time: 40 min  Charges: [id number]         Treatment Number: 3    Subjective: patient attended therapy with his wife. They wore PPE until they were asked to remove it to practice the exercises. The wife does all exercises with him to ensure that he is doing them properly. Pain: 0/10     Objective/Goals: (to be met in 12 visits)   1) Patient will perform oral exercises with 100% accuracy independently. Progress - Patient was able to complete all exercises with 90% accuracy. He needed some adjustment to increase ROM and coordination. 2) Patient will perform pharyngeal exercises with 100% accuracy independently. Progress - all exercises were able to be performed with 70% accuracy. He could not do the Mendelson maneuver so they were encouraged to just keep swallowing. 3) Patient will use aspiration precautions with all oral intake. Progress - he is able to use aspiration precautions with all oral intake. He ate pudding today and was able to drink an ensure with no s/s of aspiration. Wife was worried that when he slurps the liquid that he is swallowing wrong. He was just getting the liquid out of the cup and it is not a sign of aspiration. HEP: given to target oral and pharyngeal exercises  Education: provided on aspiration precautions, oral exercises, and pharyngeal exercises. Assessment: Patient was able to improve with all exercises. We adjusted some of the exercises to increase ROM, strength, and coordination. He was able to eat pudding and drink ensure with no s/s of aspiration. Plan: target oral and pharyngeal exercises.

## 2022-10-21 ENCOUNTER — APPOINTMENT (OUTPATIENT)
Dept: SPEECH THERAPY | Age: 87
End: 2022-10-21
Payer: MEDICARE

## 2022-10-26 ENCOUNTER — APPOINTMENT (OUTPATIENT)
Dept: PHYSICAL MEDICINE AND REHAB | Age: 87
End: 2022-10-26

## 2022-10-27 ENCOUNTER — APPOINTMENT (OUTPATIENT)
Dept: SPEECH THERAPY | Age: 87
End: 2022-10-27
Payer: MEDICARE

## 2022-10-28 ENCOUNTER — APPOINTMENT (OUTPATIENT)
Dept: SPEECH THERAPY | Age: 87
End: 2022-10-28
Payer: MEDICARE

## 2022-11-02 ENCOUNTER — APPOINTMENT (OUTPATIENT)
Dept: PHYSICAL MEDICINE AND REHAB | Age: 87
End: 2022-11-02

## 2022-11-03 ENCOUNTER — APPOINTMENT (OUTPATIENT)
Dept: SPEECH THERAPY | Age: 87
End: 2022-11-03
Payer: MEDICARE

## 2022-11-04 ENCOUNTER — APPOINTMENT (OUTPATIENT)
Dept: SPEECH THERAPY | Age: 87
End: 2022-11-04
Payer: MEDICARE

## 2022-11-09 ENCOUNTER — APPOINTMENT (OUTPATIENT)
Dept: PHYSICAL MEDICINE AND REHAB | Age: 87
End: 2022-11-09

## 2022-11-10 ENCOUNTER — APPOINTMENT (OUTPATIENT)
Dept: SPEECH THERAPY | Age: 87
End: 2022-11-10
Payer: MEDICARE

## 2022-11-11 ENCOUNTER — APPOINTMENT (OUTPATIENT)
Dept: SPEECH THERAPY | Age: 87
End: 2022-11-11
Payer: MEDICARE

## 2022-12-01 ENCOUNTER — APPOINTMENT (OUTPATIENT)
Dept: SPEECH THERAPY | Age: 87
End: 2022-12-01
Payer: MEDICARE

## 2022-12-02 ENCOUNTER — OFFICE VISIT (OUTPATIENT)
Dept: SPEECH THERAPY | Age: 87
End: 2022-12-02
Attending: INTERNAL MEDICINE
Payer: MEDICARE

## 2022-12-02 PROCEDURE — 92526 ORAL FUNCTION THERAPY: CPT

## 2022-12-02 NOTE — PROGRESS NOTES
Diagnosis:   Oropharyngeal dysphagia R13.12      Referring Provider: Amaris Pelaez  Date of Evaluation:   10/6/2022    Precautions:  Aspiration Next MD visit:   none scheduled  Date of Surgery: n/a   Insurance Primary/Secondary: MEDICARE / [facility] PPO       # Auth Visits: 10 visits     Date POC Expires: 1/4/2022     Total Treatment time: 40 min  Charges: [id number]         Treatment Number: 4    Subjective: patient attended therapy with his wife. They wore PPE until they were asked to remove it to practice the exercises. Pain: 0/10     Objective/Goals: (to be met in 12 visits)   1) Patient will perform oral exercises with 100% accuracy independently. Progress - Patient was able to complete all exercises with 100% accuracy. He needed some adjustment to increase ROM and coordination. 2) Patient will perform pharyngeal exercises with 100% accuracy independently. Progress - all exercises were able to be performed with 100% accuracy. He could not do the Mendelson maneuver so they were encouraged to just keep swallowing. 3) Patient will use aspiration precautions with all oral intake. Progress - he is able to use aspiration precautions with all oral intake. Wife reports less coughing and no choking over the last month. HEP: given to target oral and pharyngeal exercises  Education: provided on aspiration precautions, oral exercises, and pharyngeal exercises. Assessment: Patient was able to improve with all exercises. We adjusted some of the exercises to increase ROM, strength, and coordination. He was able to eat all foods/liquids well over the last month. He coughs occasionally but no choking was reported. Plan: discontinue therapy at this time. His swallow is functional using aspiration precautions and swallowing exercises. He and his wife were encouraged to contact this department in the future if warranted.

## 2022-12-07 ENCOUNTER — HOSPITAL ENCOUNTER (OUTPATIENT)
Dept: PHYSICAL MEDICINE AND REHAB | Age: 87
Discharge: STILL ADMITTED OR ADMINISTRATIVE | End: 2022-12-07

## 2022-12-07 PROCEDURE — 97530 THERAPEUTIC ACTIVITIES: CPT

## 2022-12-07 PROCEDURE — 97116 GAIT TRAINING THERAPY: CPT

## 2022-12-07 ASSESSMENT — ENCOUNTER SYMPTOMS: PAIN: 1

## 2022-12-08 ENCOUNTER — APPOINTMENT (OUTPATIENT)
Dept: SPEECH THERAPY | Age: 87
End: 2022-12-08
Payer: MEDICARE

## 2022-12-14 ENCOUNTER — HOSPITAL ENCOUNTER (OUTPATIENT)
Dept: PHYSICAL MEDICINE AND REHAB | Age: 87
Discharge: STILL ADMITTED OR ADMINISTRATIVE | End: 2022-12-14

## 2022-12-14 PROCEDURE — 97530 THERAPEUTIC ACTIVITIES: CPT

## 2022-12-14 ASSESSMENT — ENCOUNTER SYMPTOMS: PAIN: 1

## 2022-12-21 ENCOUNTER — HOSPITAL ENCOUNTER (OUTPATIENT)
Dept: PHYSICAL MEDICINE AND REHAB | Age: 87
Discharge: STILL ADMITTED OR ADMINISTRATIVE | End: 2022-12-21

## 2022-12-21 PROCEDURE — 97530 THERAPEUTIC ACTIVITIES: CPT

## 2022-12-21 ASSESSMENT — MOVEMENT AND STRENGTH ASSESSMENTS
HOPPING: EXTREME DIFFICULTY OR UNABLE TO PERFORM ACTIVITY
RUNNING ON UNEVEN GROUND: EXTREME DIFFICULTY OR UNABLE TO PERFORM ACTIVITY
RUNNING ON EVEN GROUND: EXTREME DIFFICULTY OR UNABLE TO PERFORM ACTIVITY
SQUATTING: A LITTLE BIT OF DIFFICULTY
TOTAL SCORE: 40
GETTING INTO OR OUT OF THE BATH: A LITTLE BIT OF DIFFICULTY
GOING UP OR DOWN 10 STAIRS (ABOUT 1 FLIGHT OF STAIRS): A LITTLE BIT OF DIFFICULTY
MAKING SHARP TURNS WHILE RUNNING FAST: EXTREME DIFFICULTY OR UNABLE TO PERFORM ACTIVITY
ROLLING OVER IN BED: A LITTLE BIT OF DIFFICULTY
LIFTING AN OBJECT, LIKE A BAG OF GROCERIES, FROM THE FLOOR: QUITE A BIT OF DIFFICULTY
ANY OF YOUR USUAL WORK, HOUSEWORK OR SCHOOL ACTIVITIES: QUITE A BIT OF DIFFICULTY
GETTING INTO OR OUT OF A CAR: A LITTLE BIT OF DIFFICULTY
PERFORMING LIGHT ACTIVITES AROUND YOUR HOME: QUITE A BIT OF DIFFICULTY
WALKING 2 BLOCKS: A LITTLE BIT OF DIFFICULTY
SITTING FOR 1 HOUR: NO DIFFICULTY
WALKING A MILE: QUITE A BIT OF DIFFICULTY
PUTTING ON YOUR SHOES OR SOCKS: QUITE A BIT OF DIFFICULTY
PERFORMING HEAVY ACTIVITIES AROUND YOUR HOME: QUITE A BIT OF DIFFICULTY
YOUR USUAL HOBBIES, RECREATIONAL OR SPORTING ACTIVIITIES: QUITE A BIT OF DIFFICULTY
WALKING BETWEEN ROOMS: A LITTLE BIT OF DIFFICULTY
STANDING FOR 1 HOUR: EXTREME DIFFICULTY OR UNABLE TO PERFORM ACTIVITY

## 2022-12-21 ASSESSMENT — ENCOUNTER SYMPTOMS: PAIN: 1

## 2022-12-22 ENCOUNTER — APPOINTMENT (OUTPATIENT)
Dept: SPEECH THERAPY | Age: 87
End: 2022-12-22
Payer: MEDICARE

## 2022-12-27 ENCOUNTER — APPOINTMENT (OUTPATIENT)
Dept: GENERAL RADIOLOGY | Facility: HOSPITAL | Age: 87
End: 2022-12-27
Attending: EMERGENCY MEDICINE
Payer: MEDICARE

## 2022-12-27 ENCOUNTER — HOSPITAL ENCOUNTER (INPATIENT)
Facility: HOSPITAL | Age: 87
LOS: 6 days | Discharge: NURSING, REHAB OR LONG-TERM CARE | End: 2023-01-03
Attending: EMERGENCY MEDICINE | Admitting: HOSPITALIST
Payer: MEDICARE

## 2022-12-27 DIAGNOSIS — R50.9 ACUTE FEBRILE ILLNESS: ICD-10-CM

## 2022-12-27 DIAGNOSIS — R53.1 WEAKNESS GENERALIZED: Primary | ICD-10-CM

## 2022-12-27 DIAGNOSIS — A41.9 SEPSIS DUE TO URINARY TRACT INFECTION (HCC): ICD-10-CM

## 2022-12-27 DIAGNOSIS — N39.0 SEPSIS DUE TO URINARY TRACT INFECTION (HCC): ICD-10-CM

## 2022-12-27 LAB
ALBUMIN SERPL-MCNC: 3.6 G/DL (ref 3.4–5)
ALBUMIN/GLOB SERPL: 0.9 {RATIO} (ref 1–2)
ALP LIVER SERPL-CCNC: 99 U/L
ALT SERPL-CCNC: 32 U/L
ANION GAP SERPL CALC-SCNC: 12 MMOL/L (ref 0–18)
AST SERPL-CCNC: 17 U/L (ref 15–37)
BASOPHILS # BLD AUTO: 0.02 X10(3) UL (ref 0–0.2)
BASOPHILS NFR BLD AUTO: 0.3 %
BILIRUB SERPL-MCNC: 0.5 MG/DL (ref 0.1–2)
BILIRUB UR QL: NEGATIVE
BUN BLD-MCNC: 13 MG/DL (ref 7–18)
BUN/CREAT SERPL: 14.6 (ref 10–20)
CALCIUM BLD-MCNC: 10 MG/DL (ref 8.5–10.1)
CHLORIDE SERPL-SCNC: 105 MMOL/L (ref 98–112)
CO2 SERPL-SCNC: 26 MMOL/L (ref 21–32)
COLOR UR: YELLOW
CREAT BLD-MCNC: 0.89 MG/DL
DEPRECATED RDW RBC AUTO: 47.4 FL (ref 35.1–46.3)
EOSINOPHIL # BLD AUTO: 0 X10(3) UL (ref 0–0.7)
EOSINOPHIL NFR BLD AUTO: 0 %
ERYTHROCYTE [DISTWIDTH] IN BLOOD BY AUTOMATED COUNT: 12.6 % (ref 11–15)
FLUAV + FLUBV RNA SPEC NAA+PROBE: NEGATIVE
FLUAV + FLUBV RNA SPEC NAA+PROBE: NEGATIVE
GFR SERPLBLD BASED ON 1.73 SQ M-ARVRAT: 83 ML/MIN/1.73M2 (ref 60–?)
GLOBULIN PLAS-MCNC: 4 G/DL (ref 2.8–4.4)
GLUCOSE BLD-MCNC: 142 MG/DL (ref 70–99)
GLUCOSE UR-MCNC: NEGATIVE MG/DL
HCT VFR BLD AUTO: 38.6 %
HGB BLD-MCNC: 12.9 G/DL
IMM GRANULOCYTES # BLD AUTO: 0.02 X10(3) UL (ref 0–1)
IMM GRANULOCYTES NFR BLD: 0.3 %
LACTATE SERPL-SCNC: 1.4 MMOL/L (ref 0.4–2)
LACTATE SERPL-SCNC: 6.3 MMOL/L (ref 0.4–2)
LYMPHOCYTES # BLD AUTO: 0.57 X10(3) UL (ref 1–4)
LYMPHOCYTES NFR BLD AUTO: 9.6 %
MCH RBC QN AUTO: 34 PG (ref 26–34)
MCHC RBC AUTO-ENTMCNC: 33.4 G/DL (ref 31–37)
MCV RBC AUTO: 101.8 FL
MONOCYTES # BLD AUTO: 0.25 X10(3) UL (ref 0.1–1)
MONOCYTES NFR BLD AUTO: 4.2 %
NEUTROPHILS # BLD AUTO: 5.07 X10 (3) UL (ref 1.5–7.7)
NEUTROPHILS # BLD AUTO: 5.07 X10(3) UL (ref 1.5–7.7)
NEUTROPHILS NFR BLD AUTO: 85.6 %
NITRITE UR QL STRIP.AUTO: NEGATIVE
OSMOLALITY SERPL CALC.SUM OF ELEC: 299 MOSM/KG (ref 275–295)
PH UR: 7 [PH] (ref 5–8)
PLATELET # BLD AUTO: 187 10(3)UL (ref 150–450)
POTASSIUM SERPL-SCNC: 4.8 MMOL/L (ref 3.5–5.1)
PROT SERPL-MCNC: 7.6 G/DL (ref 6.4–8.2)
PROT UR-MCNC: 100 MG/DL
RBC # BLD AUTO: 3.79 X10(6)UL
RBC #/AREA URNS AUTO: >10 /HPF
RSV RNA SPEC NAA+PROBE: NEGATIVE
SARS-COV-2 RNA RESP QL NAA+PROBE: NOT DETECTED
SODIUM SERPL-SCNC: 143 MMOL/L (ref 136–145)
SP GR UR STRIP: 1.02 (ref 1–1.03)
UROBILINOGEN UR STRIP-ACNC: 2
VIT C UR-MCNC: NEGATIVE MG/DL
WBC # BLD AUTO: 5.9 X10(3) UL (ref 4–11)

## 2022-12-27 PROCEDURE — 99223 1ST HOSP IP/OBS HIGH 75: CPT | Performed by: INTERNAL MEDICINE

## 2022-12-27 PROCEDURE — 71045 X-RAY EXAM CHEST 1 VIEW: CPT | Performed by: EMERGENCY MEDICINE

## 2022-12-27 RX ORDER — ACETAMINOPHEN 325 MG/1
650 TABLET ORAL ONCE
Status: COMPLETED | OUTPATIENT
Start: 2022-12-27 | End: 2022-12-27

## 2022-12-27 NOTE — ED INITIAL ASSESSMENT (HPI)
Per EMS, patient reported to be weaker with increased agitation. Patient sensitive to touch. Baseline: alert and oriented x4. Patient unable to recall  or where he is. Unsure of why his wife called EMS.

## 2022-12-28 PROBLEM — A41.9 SEPSIS DUE TO URINARY TRACT INFECTION  (HCC): Status: ACTIVE | Noted: 2022-12-28

## 2022-12-28 PROBLEM — N39.0 SEPSIS DUE TO URINARY TRACT INFECTION  (HCC): Status: ACTIVE | Noted: 2022-12-28

## 2022-12-28 PROBLEM — N39.0 SEPSIS DUE TO URINARY TRACT INFECTION (HCC): Status: ACTIVE | Noted: 2022-12-28

## 2022-12-28 PROBLEM — R50.9 ACUTE FEBRILE ILLNESS: Status: ACTIVE | Noted: 2022-12-28

## 2022-12-28 PROBLEM — N39.0 SEPSIS DUE TO URINARY TRACT INFECTION: Status: ACTIVE | Noted: 2022-12-28

## 2022-12-28 PROBLEM — A41.9 SEPSIS DUE TO URINARY TRACT INFECTION (HCC): Status: ACTIVE | Noted: 2022-12-28

## 2022-12-28 PROBLEM — A41.9 SEPSIS DUE TO URINARY TRACT INFECTION: Status: ACTIVE | Noted: 2022-12-28

## 2022-12-28 PROCEDURE — 99233 SBSQ HOSP IP/OBS HIGH 50: CPT | Performed by: HOSPITALIST

## 2022-12-28 RX ORDER — SIMVASTATIN 40 MG
40 TABLET ORAL NIGHTLY
Status: DISCONTINUED | OUTPATIENT
Start: 2022-12-28 | End: 2022-12-28 | Stop reason: RX

## 2022-12-28 RX ORDER — ACETAMINOPHEN 500 MG
1000 TABLET ORAL EVERY 8 HOURS PRN
Status: DISCONTINUED | OUTPATIENT
Start: 2022-12-28 | End: 2023-01-03

## 2022-12-28 RX ORDER — HEPARIN SODIUM 5000 [USP'U]/ML
5000 INJECTION, SOLUTION INTRAVENOUS; SUBCUTANEOUS EVERY 12 HOURS SCHEDULED
Status: DISCONTINUED | OUTPATIENT
Start: 2022-12-28 | End: 2023-01-03

## 2022-12-28 RX ORDER — ATORVASTATIN CALCIUM 10 MG/1
10 TABLET, FILM COATED ORAL NIGHTLY
Status: DISCONTINUED | OUTPATIENT
Start: 2022-12-28 | End: 2023-01-03

## 2022-12-28 RX ORDER — ASPIRIN 81 MG/1
81 TABLET ORAL DAILY
Status: DISCONTINUED | OUTPATIENT
Start: 2022-12-28 | End: 2023-01-03

## 2022-12-28 RX ORDER — MAGNESIUM OXIDE 400 MG (241.3 MG MAGNESIUM) TABLET
2 TABLET NIGHTLY
Status: DISCONTINUED | OUTPATIENT
Start: 2022-12-28 | End: 2023-01-03

## 2022-12-28 RX ORDER — MIRTAZAPINE 15 MG/1
15 TABLET, FILM COATED ORAL NIGHTLY
Status: DISCONTINUED | OUTPATIENT
Start: 2022-12-28 | End: 2023-01-03

## 2022-12-28 RX ORDER — ARIPIPRAZOLE 2 MG/1
2 TABLET ORAL EVERY OTHER DAY
Status: DISCONTINUED | OUTPATIENT
Start: 2022-12-28 | End: 2023-01-03

## 2022-12-28 RX ORDER — ACETAMINOPHEN 500 MG
500 TABLET ORAL EVERY 4 HOURS PRN
Status: DISCONTINUED | OUTPATIENT
Start: 2022-12-28 | End: 2023-01-03

## 2022-12-28 RX ORDER — DONEPEZIL HYDROCHLORIDE 10 MG/1
10 TABLET, FILM COATED ORAL NIGHTLY
Status: DISCONTINUED | OUTPATIENT
Start: 2022-12-28 | End: 2023-01-03

## 2022-12-28 RX ORDER — SODIUM CHLORIDE 9 MG/ML
INJECTION, SOLUTION INTRAVENOUS CONTINUOUS
Status: DISCONTINUED | OUTPATIENT
Start: 2022-12-28 | End: 2023-01-03

## 2022-12-28 RX ORDER — VENLAFAXINE HYDROCHLORIDE 75 MG/1
75 CAPSULE, EXTENDED RELEASE ORAL DAILY
Status: DISCONTINUED | OUTPATIENT
Start: 2022-12-28 | End: 2023-01-03

## 2022-12-28 RX ORDER — PANTOPRAZOLE SODIUM 40 MG/1
40 TABLET, DELAYED RELEASE ORAL
Status: DISCONTINUED | OUTPATIENT
Start: 2022-12-28 | End: 2023-01-03

## 2022-12-28 NOTE — PROGRESS NOTES
Therapeutic interchange from Simvastatin 20 mg  to Atorvasttin 10 mg per P&T approved protocol.     Danyel AdlerD

## 2022-12-28 NOTE — ED INITIAL ASSESSMENT (HPI)
Per wife, patient had increased weakness and was not answering questions appropriately. Slid off the couch and to the floor. Typically able to walk with assistance.

## 2022-12-28 NOTE — CM/SW NOTE
12/28/22 1700   CM/SW Referral Data   Referral Source Social Work (self-referral)   Reason for Referral Discharge planning   Informant Patient;Spouse/Significant Other   Pertinent Medical Hx   Does patient have an established PCP? Yes  (Dr. Jaylon Holguin)   Patient Info   Advanced directives? Yes   Patient's Current Mental Status at Time of Assessment Alert;Oriented   Patient's Home Environment Condo/Apt no elevator   Patient lives with Spouse/Significant other   Patient Status Prior to Admission   Independent with ADLs and Mobility Yes  (For the most part, able to do things, wife is near by to help/assist)   Discharge Needs   Anticipated D/C needs Home health care       SW initiated self referral for discharge planning. SW introduced self and role. SW met with pt and wife at bedside. Pt is alert and oriented at time of assessment. Pt and wife provided information in above assessment. Wife was informed of PT recs for [facility] referrals. Wife asked if it was possible for MD to write a script for outpt therapy services for PT OT. Wife reported that pt was receiving outpt therapy services at [facility], and she wishes to have pt return there due to [facility] (outpt therapist) knows pt very well and knows his baseline. SW informed MD of above request. Wife informed that pt has transport chair, primarily used for long distances. Pt has a walker, which he does not used due to his tremors per wife. Pt has a commode. Plan: Pending medical clearance, DC to Home with outpt therapy services, *need outpt script    SW/CM to remain available for support and/or discharge planning.      Rolly Pair, MSW, LSW   x [phone]

## 2022-12-29 LAB
ALBUMIN SERPL-MCNC: 3 G/DL (ref 3.4–5)
ALBUMIN/GLOB SERPL: 0.9 {RATIO} (ref 1–2)
ALP LIVER SERPL-CCNC: 86 U/L
ALT SERPL-CCNC: 34 U/L
ANION GAP SERPL CALC-SCNC: 7 MMOL/L (ref 0–18)
AST SERPL-CCNC: 25 U/L (ref 15–37)
BASOPHILS # BLD AUTO: 0.04 X10(3) UL (ref 0–0.2)
BASOPHILS NFR BLD AUTO: 0.8 %
BILIRUB SERPL-MCNC: 0.4 MG/DL (ref 0.1–2)
BUN BLD-MCNC: 12 MG/DL (ref 7–18)
BUN/CREAT SERPL: 16.4 (ref 10–20)
CALCIUM BLD-MCNC: 8.8 MG/DL (ref 8.5–10.1)
CHLORIDE SERPL-SCNC: 114 MMOL/L (ref 98–112)
CO2 SERPL-SCNC: 24 MMOL/L (ref 21–32)
CREAT BLD-MCNC: 0.73 MG/DL
DEPRECATED RDW RBC AUTO: 46.9 FL (ref 35.1–46.3)
EOSINOPHIL # BLD AUTO: 0.17 X10(3) UL (ref 0–0.7)
EOSINOPHIL NFR BLD AUTO: 3.4 %
ERYTHROCYTE [DISTWIDTH] IN BLOOD BY AUTOMATED COUNT: 12.9 % (ref 11–15)
GFR SERPLBLD BASED ON 1.73 SQ M-ARVRAT: 89 ML/MIN/1.73M2 (ref 60–?)
GLOBULIN PLAS-MCNC: 3.2 G/DL (ref 2.8–4.4)
GLUCOSE BLD-MCNC: 95 MG/DL (ref 70–99)
HCT VFR BLD AUTO: 31.3 %
HGB BLD-MCNC: 10.7 G/DL
IMM GRANULOCYTES # BLD AUTO: 0.02 X10(3) UL (ref 0–1)
IMM GRANULOCYTES NFR BLD: 0.4 %
LYMPHOCYTES # BLD AUTO: 1.49 X10(3) UL (ref 1–4)
LYMPHOCYTES NFR BLD AUTO: 29.9 %
MAGNESIUM SERPL-MCNC: 2.2 MG/DL (ref 1.6–2.6)
MCH RBC QN AUTO: 34.1 PG (ref 26–34)
MCHC RBC AUTO-ENTMCNC: 34.2 G/DL (ref 31–37)
MCV RBC AUTO: 99.7 FL
MONOCYTES # BLD AUTO: 0.61 X10(3) UL (ref 0.1–1)
MONOCYTES NFR BLD AUTO: 12.2 %
NEUTROPHILS # BLD AUTO: 2.65 X10 (3) UL (ref 1.5–7.7)
NEUTROPHILS # BLD AUTO: 2.65 X10(3) UL (ref 1.5–7.7)
NEUTROPHILS NFR BLD AUTO: 53.3 %
OSMOLALITY SERPL CALC.SUM OF ELEC: 300 MOSM/KG (ref 275–295)
PHOSPHATE SERPL-MCNC: 3.9 MG/DL (ref 2.5–4.9)
PLATELET # BLD AUTO: 143 10(3)UL (ref 150–450)
POTASSIUM SERPL-SCNC: 3.2 MMOL/L (ref 3.5–5.1)
PROT SERPL-MCNC: 6.2 G/DL (ref 6.4–8.2)
RBC # BLD AUTO: 3.14 X10(6)UL
SODIUM SERPL-SCNC: 145 MMOL/L (ref 136–145)
WBC # BLD AUTO: 5 X10(3) UL (ref 4–11)

## 2022-12-29 PROCEDURE — 99233 SBSQ HOSP IP/OBS HIGH 50: CPT | Performed by: HOSPITALIST

## 2022-12-29 RX ORDER — POTASSIUM CHLORIDE 20 MEQ/1
40 TABLET, EXTENDED RELEASE ORAL ONCE
Status: COMPLETED | OUTPATIENT
Start: 2022-12-29 | End: 2022-12-29

## 2022-12-30 LAB
C DIFF TOX B STL QL: NEGATIVE
POTASSIUM SERPL-SCNC: 3.7 MMOL/L (ref 3.5–5.1)

## 2022-12-30 PROCEDURE — 99232 SBSQ HOSP IP/OBS MODERATE 35: CPT | Performed by: HOSPITALIST

## 2022-12-31 LAB
ANION GAP SERPL CALC-SCNC: 4 MMOL/L (ref 0–18)
BASOPHILS # BLD AUTO: 0.04 X10(3) UL (ref 0–0.2)
BASOPHILS NFR BLD AUTO: 0.9 %
BUN BLD-MCNC: 8 MG/DL (ref 7–18)
BUN/CREAT SERPL: 16.7 (ref 10–20)
CALCIUM BLD-MCNC: 9.1 MG/DL (ref 8.5–10.1)
CHLORIDE SERPL-SCNC: 115 MMOL/L (ref 98–112)
CO2 SERPL-SCNC: 27 MMOL/L (ref 21–32)
CREAT BLD-MCNC: 0.48 MG/DL
DEPRECATED RDW RBC AUTO: 49.1 FL (ref 35.1–46.3)
EOSINOPHIL # BLD AUTO: 0.3 X10(3) UL (ref 0–0.7)
EOSINOPHIL NFR BLD AUTO: 6.4 %
ERYTHROCYTE [DISTWIDTH] IN BLOOD BY AUTOMATED COUNT: 13.1 % (ref 11–15)
GFR SERPLBLD BASED ON 1.73 SQ M-ARVRAT: 101 ML/MIN/1.73M2 (ref 60–?)
GLUCOSE BLD-MCNC: 96 MG/DL (ref 70–99)
HCT VFR BLD AUTO: 33 %
HGB BLD-MCNC: 10.9 G/DL
IMM GRANULOCYTES # BLD AUTO: 0.01 X10(3) UL (ref 0–1)
IMM GRANULOCYTES NFR BLD: 0.2 %
LYMPHOCYTES # BLD AUTO: 1.69 X10(3) UL (ref 1–4)
LYMPHOCYTES NFR BLD AUTO: 36 %
MCH RBC QN AUTO: 33.6 PG (ref 26–34)
MCHC RBC AUTO-ENTMCNC: 33 G/DL (ref 31–37)
MCV RBC AUTO: 101.9 FL
MONOCYTES # BLD AUTO: 0.43 X10(3) UL (ref 0.1–1)
MONOCYTES NFR BLD AUTO: 9.2 %
NEUTROPHILS # BLD AUTO: 2.22 X10 (3) UL (ref 1.5–7.7)
NEUTROPHILS # BLD AUTO: 2.22 X10(3) UL (ref 1.5–7.7)
NEUTROPHILS NFR BLD AUTO: 47.3 %
OSMOLALITY SERPL CALC.SUM OF ELEC: 300 MOSM/KG (ref 275–295)
PLATELET # BLD AUTO: 144 10(3)UL (ref 150–450)
POTASSIUM SERPL-SCNC: 3.5 MMOL/L (ref 3.5–5.1)
RBC # BLD AUTO: 3.24 X10(6)UL
SODIUM SERPL-SCNC: 146 MMOL/L (ref 136–145)
WBC # BLD AUTO: 4.7 X10(3) UL (ref 4–11)

## 2022-12-31 PROCEDURE — 99232 SBSQ HOSP IP/OBS MODERATE 35: CPT | Performed by: HOSPITALIST

## 2022-12-31 NOTE — CM/SW NOTE
PT now recommending [facility] at [place], pt and spouse also now agreeable to recommendation. [facility] referrals sent in [address]. List of accepting facilities will be needed for choice. PASRR level 1 screen submitted, 2nd level review pending. 1640: [facility] list provided to patient and spouse at bedside. Both agreeable to review and provide choice tomorrow morning. No 2nd level PASRR needed, screen sent in [address]. Plan: [facility] pending facility choice and medical clearance.     GURJIT Chilel    [phone]

## 2022-12-31 NOTE — PHYSICAL THERAPY NOTE
PHYSICAL THERAPY TREATMENT NOTE - INPATIENT     Room Number: 551/551-A       Presenting Problem: UTI, weakness  Co-Morbidities : tardive dyskinesia, siezure history, dementia, htn    Problem List  Principal Problem:    Weakness generalized  Active Problems:    Acute febrile illness    Sepsis due to urinary tract infection ([facility] [address]      PHYSICAL THERAPY ASSESSMENT     Pt admitted 12/27/22 due to weakness, diagnosed with UTI. Pt seen today to re assess strength, mobility, balance. Pt demonstrating decline in mobility and safety since previous PT visit. Patient will benefit from continued inpatient physical therapy to address above issues so that patient may achieve highest functional mobility level. The patient's Approx Degree of Impairment: 54.16% has been calculated based on documentation in the [facility] '6 clicks' Inpatient Basic Mobility Short Form. Research supports that patients with this level of impairment may benefit from [facility]. This is a change in dc recommendation, pt will need to be at supervision level to return home with supportive spouse. Discussed with pt and spouse, both are in agreement with [facility] recommendation. Pt ok to see per rn. Pt recd in supine, spouse present, educated in goals for session. Functional Mobility:    Bed mobility:  Min a supine to sit with good sitting balance, denies dizziness. Sit to stand: Pt stood without assistive device, unable to fully extend bilat knees, kyphotic posture. Gait:  Ambulated to restroom with mod a, poor balance without device, spouse reporting he cannot use rw due to his tremors. Pt had had significant BM, pct assisting with pericare in restroom. Discussed with spouse recommendation for rw trial due to pt poor balance. Gait training with rw with emphasis on sequencing, upright posture, safe rw use. Pt cont with inability to fully extend bilat knees,                   making him a high fall risk.   Also discussed with spouse weighting rw to assist with use due to pt tremors. Spouse expressed good understanding. Stairs:  Pt educated in use of bilat railings, ascended/descedned 4 stairs with mod a, manual assist to control knee flexion to prevent falling, spouse educated about this. Pt returned to recliner, chair alarm activated, discussed with pt and spouse [facility] recommendation.  alerted. DISCHARGE RECOMMENDATIONS  PT Discharge Recommendations: Sub-acute rehabilitation     PLAN  PT Treatment Plan: Bed mobility; Endurance; Energy conservation;Patient education; Family education;Gait training;Range of motion;Strengthening;Transfer training;Balance training;Stair training;Stoop training  Frequency (Obs): 5x/week    SUBJECTIVE  Pt agreeable to anything therapist suggests, very pleasant    OBJECTIVE       WEIGHT BEARING RESTRICTION                PAIN ASSESSMENT   Ratin  Location: denies at this time  Management Techniques: Activity promotion    BALANCE  Static Sitting: Good  Dynamic Sitting: Fair -  Static Standing: Poor  Dynamic Standing: Poor    AM-PAC '6-Clicks' INPATIENT SHORT FORM - BASIC MOBILITY  How much difficulty does the patient currently have. .. Patient Difficulty: Turning over in bed (including adjusting bedclothes, sheets and blankets)?: A Little   Patient Difficulty: Sitting down on and standing up from a chair with arms (e.g., wheelchair, bedside commode, etc.): A Little   Patient Difficulty: Moving from lying on back to sitting on the side of the bed?: A Little   How much help from another person does the patient currently need. ..    Help from Another: Moving to and from a bed to a chair (including a wheelchair)?: A Little   Help from Another: Need to walk in hospital room?: A Lot   Help from Another: Climbing 3-5 steps with a railing?: A Lot     AM-PAC Score:  Raw Score: 16   Approx Degree of Impairment: 54.16%   Standardized Score (AM-PAC Scale): 40.78   CMS Modifier (G-Code): CK    FUNCTIONAL ABILITY STATUS  Functional Mobility/Gait Assessment  Gait Assistance: Moderate assistance  Distance (ft): 60  Assistive Device: Rolling walker  Pattern: Shuffle;L Flexed knee;R Flexed knee (unable to fully extend bilat knees)  Stairs: Stairs  How Many Stairs: 4  Device: 2 Rails  Assist: Moderate assist        Patient End of Session: Up in chair;Needs met;Call light within reach;RN aware of session/findings; All patient questions and concerns addressed; Alarm set; Family present    CURRENT GOALS   Goals to be met by: 1/20/2023  Patient Goal Patient's self-stated goal is: Return home with spouse assist   Goal #1 Patient is able to demonstrate supine - sit EOB @ level: independent      Goal #1   Current Status Min a   Goal #2 Patient is able to demonstrate transfers Sit to/from Stand at assistance level: minimum assistance with none      Goal #2  Current Status Min A   Goal #3 Patient is able to ambulate 150 feet with assist device: none at assistance level: minimum assistance   Goal #3   Current Status Mod a with rw   Goal #4 Patient will negotiate 14 stairs/one curb w/ assistive device and supervision   Goal #4   Current Status Mod a   Goal #5 Patient to demonstrate independence with home activity/exercise instructions provided to patient in preparation for discharge.    Goal #5   Current Status     Goal #6     Goal #6  Current Status

## 2023-01-01 PROCEDURE — 99232 SBSQ HOSP IP/OBS MODERATE 35: CPT | Performed by: HOSPITALIST

## 2023-01-02 PROCEDURE — 99232 SBSQ HOSP IP/OBS MODERATE 35: CPT | Performed by: HOSPITALIST

## 2023-01-02 NOTE — CM/SW NOTE
Pt discussed during RN rounds. SW followed up with wife in regards to choice. Wife informed SW that pt has been to [facility] before and asked if it is on the referral. SW reviewed the referral. SW extended referral for [facility] to review. In meantime, Wife informed SW that she will be visiting the [facility], which is a possible 2nd choice if [facility] has no beds. 455pm- PP informed SW that there is no beds avail at this time. SW called wife to inform her. SW was informed that wife visited [address] [place] [address]. Wife informed SW that there is one more facility she wishes to visit - [place] [address]- since they are more close by and local. Wife stop into the facility first thing. SW reminded wife that pt is medically cleared, Wife informed SW that she will have a decision by 10am tomorrow. RNs informed. Plan: Pending medical clearance, DC to [facility]    SW/CM to remain available for support and/or discharge planning.      Raciel Escobedo, MSW, LSW   x [phone]

## 2023-01-03 VITALS
HEART RATE: 58 BPM | OXYGEN SATURATION: 100 % | HEIGHT: 69 IN | TEMPERATURE: 97 F | SYSTOLIC BLOOD PRESSURE: 135 MMHG | BODY MASS INDEX: 21.15 KG/M2 | DIASTOLIC BLOOD PRESSURE: 57 MMHG | RESPIRATION RATE: 18 BRPM | WEIGHT: 142.81 LBS

## 2023-01-03 LAB — SARS-COV-2 RNA RESP QL NAA+PROBE: NOT DETECTED

## 2023-01-03 PROCEDURE — 99239 HOSP IP/OBS DSCHRG MGMT >30: CPT | Performed by: HOSPITALIST

## 2023-01-03 NOTE — PROGRESS NOTES
This RN & David Arredondo RN ambulated pt in hallway with x2 assist, gait belt, walker, and wife followed in hallway with rolling chair. Pt ambulated 1/2 lap around SE unit, taking one break just prior to returning to room. Pt returned to chair with chair alarm on, pt's wife at bedside.

## 2023-01-03 NOTE — DISCHARGE PLANNING
MDO for discharge today. Patient chart reviewed for discharge: Medication Reconciliation completed, Specialist/PCP follow up listed, and disease specific Instructions/Education included in After Visit Summary. Discharge RN notified patient's RN of AVS completion and verified all consultants have signed off. Patient's RN to notify DC RN if discharge status changes.       Amanda Mckeon RN, Discharge Leader

## 2023-01-03 NOTE — CM/SW NOTE
Pt discussed in RN rounds. RN informed that pt's wife has picked a place- [facility]. ECTOR called  [facility] admission's office to see if pt is able to be discharged today, *pending bed avail. Left admissions vm, contact information. 1115am- ECTOR confirmed [facility] is willing to accept pt today. SW met with wife at bedside. Sw explained that pt will be going via [address]. SW called [facility] ambulance for [facility]. [facility] arranged for pickup 1/3/23 at 1pm. PCS form completed in Epic, RN to print with AVS. Patient/family notified transport is not covered by insurance. Pt/family are agreeable to the charges. Rn aware of rapid covid test need. 1130am- ECTOR was informed by RN that pt is max a 2, and confused- ambulance would be better. ECTOR called [facility] to change from [facility] to ambulance, PCS updated and change. Wife aware. RN provided report number. [phone]       Plan:  DC to [facility], PCS completed    SW/CM to remain available for support and/or discharge planning.      Claire Jones, MSW, LSW   x [phone]

## 2023-01-05 ENCOUNTER — INITIAL APN SNF VISIT (OUTPATIENT)
Dept: INTERNAL MEDICINE CLINIC | Facility: SKILLED NURSING FACILITY | Age: 88
End: 2023-01-05

## 2023-01-05 DIAGNOSIS — Z79.899 ENCOUNTER FOR MEDICATION REVIEW: ICD-10-CM

## 2023-01-05 DIAGNOSIS — F02.818 ALZHEIMER'S DEMENTIA WITH OTHER BEHAVIORAL DISTURBANCE, UNSPECIFIED DEMENTIA SEVERITY, UNSPECIFIED TIMING OF DEMENTIA ONSET (HCC): ICD-10-CM

## 2023-01-05 DIAGNOSIS — R41.82 ALTERED MENTAL STATUS, UNSPECIFIED ALTERED MENTAL STATUS TYPE: ICD-10-CM

## 2023-01-05 DIAGNOSIS — G30.9 ALZHEIMER'S DEMENTIA WITH OTHER BEHAVIORAL DISTURBANCE, UNSPECIFIED DEMENTIA SEVERITY, UNSPECIFIED TIMING OF DEMENTIA ONSET (HCC): ICD-10-CM

## 2023-01-05 DIAGNOSIS — N30.00 ACUTE CYSTITIS WITHOUT HEMATURIA: ICD-10-CM

## 2023-01-05 DIAGNOSIS — R13.12 OROPHARYNGEAL DYSPHAGIA: ICD-10-CM

## 2023-01-05 DIAGNOSIS — R50.9 ACUTE FEBRILE ILLNESS: ICD-10-CM

## 2023-01-05 DIAGNOSIS — R53.81 PHYSICAL DECONDITIONING: ICD-10-CM

## 2023-01-05 PROCEDURE — 1123F ACP DISCUSS/DSCN MKR DOCD: CPT | Performed by: CLINICAL NURSE SPECIALIST

## 2023-01-05 PROCEDURE — 1111F DSCHRG MED/CURRENT MED MERGE: CPT | Performed by: CLINICAL NURSE SPECIALIST

## 2023-01-05 PROCEDURE — 99309 SBSQ NF CARE MODERATE MDM 30: CPT | Performed by: CLINICAL NURSE SPECIALIST

## 2023-01-09 ENCOUNTER — SNF VISIT (OUTPATIENT)
Dept: INTERNAL MEDICINE CLINIC | Facility: SKILLED NURSING FACILITY | Age: 88
End: 2023-01-09

## 2023-01-09 DIAGNOSIS — R53.81 PHYSICAL DECONDITIONING: ICD-10-CM

## 2023-01-09 DIAGNOSIS — N30.90 CYSTITIS: ICD-10-CM

## 2023-01-09 DIAGNOSIS — F02.818 ALZHEIMER'S DEMENTIA WITH OTHER BEHAVIORAL DISTURBANCE, UNSPECIFIED DEMENTIA SEVERITY, UNSPECIFIED TIMING OF DEMENTIA ONSET (HCC): ICD-10-CM

## 2023-01-09 DIAGNOSIS — R41.82 ALTERED MENTAL STATUS, UNSPECIFIED ALTERED MENTAL STATUS TYPE: ICD-10-CM

## 2023-01-09 DIAGNOSIS — Z09 ENCOUNTER FOR FOLLOW-UP: ICD-10-CM

## 2023-01-09 DIAGNOSIS — R13.10 DYSPHAGIA, UNSPECIFIED TYPE: ICD-10-CM

## 2023-01-09 DIAGNOSIS — G30.9 ALZHEIMER'S DEMENTIA WITH OTHER BEHAVIORAL DISTURBANCE, UNSPECIFIED DEMENTIA SEVERITY, UNSPECIFIED TIMING OF DEMENTIA ONSET (HCC): ICD-10-CM

## 2023-01-09 PROCEDURE — 99308 SBSQ NF CARE LOW MDM 20: CPT | Performed by: CLINICAL NURSE SPECIALIST

## 2023-01-09 PROCEDURE — 1111F DSCHRG MED/CURRENT MED MERGE: CPT | Performed by: CLINICAL NURSE SPECIALIST

## 2023-03-14 ENCOUNTER — APPOINTMENT (OUTPATIENT)
Dept: GENERAL RADIOLOGY | Facility: HOSPITAL | Age: 88
End: 2023-03-14
Attending: EMERGENCY MEDICINE
Payer: MEDICARE

## 2023-03-14 ENCOUNTER — HOSPITAL ENCOUNTER (EMERGENCY)
Facility: HOSPITAL | Age: 88
Discharge: HOME | End: 2023-03-14
Attending: EMERGENCY MEDICINE
Payer: MEDICARE

## 2023-03-14 VITALS
OXYGEN SATURATION: 98 % | BODY MASS INDEX: 21 KG/M2 | RESPIRATION RATE: 16 BRPM | DIASTOLIC BLOOD PRESSURE: 84 MMHG | TEMPERATURE: 98 F | WEIGHT: 142.88 LBS | HEART RATE: 65 BPM | SYSTOLIC BLOOD PRESSURE: 134 MMHG

## 2023-03-14 DIAGNOSIS — S89.92XA LEFT KNEE INJURY, INITIAL ENCOUNTER: ICD-10-CM
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
## 2023-03-14 LAB
ANION GAP SERPL CALC-SCNC: 9 MMOL/L (ref 0–18)
BASOPHILS # BLD AUTO: 0.03 X10(3) UL (ref 0–0.2)
BASOPHILS NFR BLD AUTO: 0.4 %
BILIRUB UR QL: NEGATIVE
BUN BLD-MCNC: 11 MG/DL (ref 7–18)
BUN/CREAT SERPL: 16.2 (ref 10–20)
CALCIUM BLD-MCNC: 10 MG/DL (ref 8.5–10.1)
CHLORIDE SERPL-SCNC: 105 MMOL/L (ref 98–112)
CK SERPL-CCNC: 159 U/L
CO2 SERPL-SCNC: 28 MMOL/L (ref 21–32)
COLOR UR: YELLOW
CREAT BLD-MCNC: 0.68 MG/DL
DEPRECATED RDW RBC AUTO: 51.5 FL (ref 35.1–46.3)
EOSINOPHIL # BLD AUTO: 0.12 X10(3) UL (ref 0–0.7)
EOSINOPHIL NFR BLD AUTO: 1.8 %
ERYTHROCYTE [DISTWIDTH] IN BLOOD BY AUTOMATED COUNT: 13.3 % (ref 11–15)
GFR SERPLBLD BASED ON 1.73 SQ M-ARVRAT: 91 ML/MIN/1.73M2 (ref 60–?)
GLUCOSE BLD-MCNC: 85 MG/DL (ref 70–99)
GLUCOSE UR-MCNC: NEGATIVE MG/DL
HCT VFR BLD AUTO: 38.8 %
HGB BLD-MCNC: 12.9 G/DL
HGB UR QL STRIP.AUTO: NEGATIVE
IMM GRANULOCYTES # BLD AUTO: 0.03 X10(3) UL (ref 0–1)
IMM GRANULOCYTES NFR BLD: 0.4 %
LYMPHOCYTES # BLD AUTO: 1.2 X10(3) UL (ref 1–4)
LYMPHOCYTES NFR BLD AUTO: 18 %
MCH RBC QN AUTO: 34.4 PG (ref 26–34)
MCHC RBC AUTO-ENTMCNC: 33.2 G/DL (ref 31–37)
MCV RBC AUTO: 103.5 FL
MONOCYTES # BLD AUTO: 0.92 X10(3) UL (ref 0.1–1)
MONOCYTES NFR BLD AUTO: 13.8 %
NEUTROPHILS # BLD AUTO: 4.38 X10 (3) UL (ref 1.5–7.7)
NEUTROPHILS # BLD AUTO: 4.38 X10(3) UL (ref 1.5–7.7)
NEUTROPHILS NFR BLD AUTO: 65.6 %
NITRITE UR QL STRIP.AUTO: NEGATIVE
OSMOLALITY SERPL CALC.SUM OF ELEC: 293 MOSM/KG (ref 275–295)
PH UR: 6 [PH] (ref 5–8)
PLATELET # BLD AUTO: 176 10(3)UL (ref 150–450)
POTASSIUM SERPL-SCNC: 5.2 MMOL/L (ref 3.5–5.1)
PROT UR-MCNC: 30 MG/DL
RBC # BLD AUTO: 3.75 X10(6)UL
SODIUM SERPL-SCNC: 142 MMOL/L (ref 136–145)
SP GR UR STRIP: 1.02 (ref 1–1.03)
UROBILINOGEN UR STRIP-ACNC: <2
VIT C UR-MCNC: 40 MG/DL
WBC # BLD AUTO: 6.7 X10(3) UL (ref 4–11)

## 2023-03-14 PROCEDURE — 80048 BASIC METABOLIC PNL TOTAL CA: CPT | Performed by: EMERGENCY MEDICINE

## 2023-03-14 PROCEDURE — 99284 EMERGENCY DEPT VISIT MOD MDM: CPT

## 2023-03-14 PROCEDURE — 87086 URINE CULTURE/COLONY COUNT: CPT | Performed by: EMERGENCY MEDICINE

## 2023-03-14 PROCEDURE — 99285 EMERGENCY DEPT VISIT HI MDM: CPT

## 2023-03-14 PROCEDURE — 81001 URINALYSIS AUTO W/SCOPE: CPT | Performed by: EMERGENCY MEDICINE

## 2023-03-14 PROCEDURE — 73560 X-RAY EXAM OF KNEE 1 OR 2: CPT | Performed by: EMERGENCY MEDICINE

## 2023-03-14 PROCEDURE — 82550 ASSAY OF CK (CPK): CPT | Performed by: EMERGENCY MEDICINE

## 2023-03-14 PROCEDURE — 82272 OCCULT BLD FECES 1-3 TESTS: CPT

## 2023-03-14 PROCEDURE — 73502 X-RAY EXAM HIP UNI 2-3 VIEWS: CPT | Performed by: EMERGENCY MEDICINE

## 2023-03-14 PROCEDURE — 85025 COMPLETE CBC W/AUTO DIFF WBC: CPT | Performed by: EMERGENCY MEDICINE

## 2023-03-14 RX ORDER — LIDOCAINE HYDROCHLORIDE 20 MG/ML
10 JELLY TOPICAL ONCE
Status: COMPLETED | OUTPATIENT
Start: 2023-03-14 | End: 2023-03-14

## 2023-03-14 RX ORDER — LIDOCAINE HYDROCHLORIDE 20 MG/ML
JELLY TOPICAL
Status: COMPLETED
Start: 2023-03-14 | End: 2023-03-14

## 2023-03-14 RX ORDER — NITROFURANTOIN 25; 75 MG/1; MG/1
100 CAPSULE ORAL ONCE
Status: COMPLETED | OUTPATIENT
Start: 2023-03-14 | End: 2023-03-14

## 2023-03-14 RX ORDER — LIDOCAINE 50 MG/G
1 PATCH TOPICAL EVERY 24 HOURS
Qty: 7 PATCH | Refills: 0 | Status: SHIPPED | OUTPATIENT
Start: 2023-03-14 | End: 2023-03-21

## 2023-03-14 RX ORDER — NITROFURANTOIN 25; 75 MG/1; MG/1
100 CAPSULE ORAL 2 TIMES DAILY
Qty: 10 CAPSULE | Refills: 0 | Status: SHIPPED | OUTPATIENT
Start: 2023-03-14 | End: 2023-03-19

## 2023-03-14 RX ORDER — ACETAMINOPHEN 500 MG
1000 TABLET ORAL ONCE
Status: COMPLETED | OUTPATIENT
Start: 2023-03-14 | End: 2023-03-14

## 2023-03-14 NOTE — ED INITIAL ASSESSMENT (HPI)
Ao4. Complaints of fall Sunday night (2 days ago). Wife states she found him on carpeted floor after he attempted to ambulate to bathroom without assistance. Unsure of how long he was down. Unsure of LOC. Now with left knee pain. Wife also reports small amt of blood found in diaper-unsure if urinary or rectal. Denies UTI symptoms or confusion.

## 2023-03-14 NOTE — ED QUICK NOTES
Received care of pt. Pt with hx of dementia. Normally does not know year however familiar with self and wife who is to the bedside of pt. Pt with rash noted to bottom that is recent according to wife. Redness/abrasion also noted to left knee. No open areas however. Pain to left knee only per pt. Pt will tolerate some procedures with some resistance however easily redirected afterwards. Repeitious in speaking. Pleasant demeanor. Safety/gait belt in place. Wife states this is how she walks without her  usually. Test and results to follow. Care ongoing.

## 2023-03-21 ENCOUNTER — APPOINTMENT (OUTPATIENT)
Dept: CT IMAGING | Facility: HOSPITAL | Age: 88
End: 2023-03-21
Attending: EMERGENCY MEDICINE
Payer: MEDICARE

## 2023-03-21 ENCOUNTER — HOSPITAL ENCOUNTER (INPATIENT)
Facility: HOSPITAL | Age: 88
LOS: 2 days | Discharge: NURSING, REHAB OR LONG-TERM CARE | DRG: 872 | End: 2023-03-24
Attending: EMERGENCY MEDICINE | Admitting: FAMILY MEDICINE
Payer: MEDICARE

## 2023-03-21 ENCOUNTER — APPOINTMENT (OUTPATIENT)
Dept: CT IMAGING | Facility: HOSPITAL | Age: 88
DRG: 872 | End: 2023-03-21
Attending: EMERGENCY MEDICINE
Payer: MEDICARE

## 2023-03-21 ENCOUNTER — HOSPITAL ENCOUNTER (INPATIENT)
Facility: HOSPITAL | Age: 88
LOS: 2 days | Discharge: NURSING, REHAB OR LONG-TERM CARE | End: 2023-03-24
Attending: EMERGENCY MEDICINE | Admitting: FAMILY MEDICINE
Payer: MEDICARE

## 2023-03-21 ENCOUNTER — APPOINTMENT (OUTPATIENT)
Dept: GENERAL RADIOLOGY | Facility: HOSPITAL | Age: 88
DRG: 872 | End: 2023-03-21
Attending: EMERGENCY MEDICINE
Payer: MEDICARE

## 2023-03-21 ENCOUNTER — APPOINTMENT (OUTPATIENT)
Dept: GENERAL RADIOLOGY | Facility: HOSPITAL | Age: 88
End: 2023-03-21
Attending: EMERGENCY MEDICINE
Payer: MEDICARE

## 2023-03-21 DIAGNOSIS — R53.1 WEAKNESS: Primary | ICD-10-CM

## 2023-03-21 DIAGNOSIS — N39.0 URINARY TRACT INFECTION WITHOUT HEMATURIA, SITE UNSPECIFIED: ICD-10-CM

## 2023-03-21 LAB
ALBUMIN SERPL-MCNC: 3.4 G/DL (ref 3.4–5)
ALBUMIN/GLOB SERPL: 0.8 {RATIO} (ref 1–2)
ALP LIVER SERPL-CCNC: 113 U/L
ALT SERPL-CCNC: 31 U/L
ANION GAP SERPL CALC-SCNC: 4 MMOL/L (ref 0–18)
AST SERPL-CCNC: 20 U/L (ref 15–37)
BASOPHILS # BLD AUTO: 0.02 X10(3) UL (ref 0–0.2)
BASOPHILS NFR BLD AUTO: 0.1 %
BILIRUB SERPL-MCNC: 0.3 MG/DL (ref 0.1–2)
BILIRUB UR QL: NEGATIVE
BUN BLD-MCNC: 15 MG/DL (ref 7–18)
BUN/CREAT SERPL: 19.7 (ref 10–20)
CALCIUM BLD-MCNC: 9.9 MG/DL (ref 8.5–10.1)
CHLORIDE SERPL-SCNC: 106 MMOL/L (ref 98–112)
CLARITY UR: CLEAR
CO2 SERPL-SCNC: 32 MMOL/L (ref 21–32)
COLOR UR: YELLOW
CREAT BLD-MCNC: 0.76 MG/DL
DEPRECATED RDW RBC AUTO: 50.5 FL (ref 35.1–46.3)
EOSINOPHIL # BLD AUTO: 0.01 X10(3) UL (ref 0–0.7)
EOSINOPHIL NFR BLD AUTO: 0.1 %
ERYTHROCYTE [DISTWIDTH] IN BLOOD BY AUTOMATED COUNT: 13.2 % (ref 11–15)
GFR SERPLBLD BASED ON 1.73 SQ M-ARVRAT: 88 ML/MIN/1.73M2 (ref 60–?)
GLOBULIN PLAS-MCNC: 4.5 G/DL (ref 2.8–4.4)
GLUCOSE BLD-MCNC: 83 MG/DL (ref 70–99)
GLUCOSE BLDC GLUCOMTR-MCNC: 111 MG/DL (ref 70–99)
GLUCOSE UR-MCNC: NEGATIVE MG/DL
HCT VFR BLD AUTO: 37.9 %
HGB BLD-MCNC: 12.3 G/DL
IMM GRANULOCYTES # BLD AUTO: 0.07 X10(3) UL (ref 0–1)
IMM GRANULOCYTES NFR BLD: 0.5 %
KETONES UR-MCNC: NEGATIVE MG/DL
LACTATE SERPL-SCNC: 5.5 MMOL/L (ref 0.4–2)
LEUKOCYTE ESTERASE UR QL STRIP.AUTO: NEGATIVE
LYMPHOCYTES # BLD AUTO: 0.44 X10(3) UL (ref 1–4)
LYMPHOCYTES NFR BLD AUTO: 3 %
MCH RBC QN AUTO: 33.8 PG (ref 26–34)
MCHC RBC AUTO-ENTMCNC: 32.5 G/DL (ref 31–37)
MCV RBC AUTO: 104.1 FL
MONOCYTES # BLD AUTO: 0.77 X10(3) UL (ref 0.1–1)
MONOCYTES NFR BLD AUTO: 5.2 %
NEUTROPHILS # BLD AUTO: 13.38 X10 (3) UL (ref 1.5–7.7)
NEUTROPHILS # BLD AUTO: 13.38 X10(3) UL (ref 1.5–7.7)
NEUTROPHILS NFR BLD AUTO: 91.1 %
NITRITE UR QL STRIP.AUTO: NEGATIVE
OSMOLALITY SERPL CALC.SUM OF ELEC: 294 MOSM/KG (ref 275–295)
PH UR: 6 [PH] (ref 5–8)
PLATELET # BLD AUTO: 227 10(3)UL (ref 150–450)
POTASSIUM SERPL-SCNC: 4.6 MMOL/L (ref 3.5–5.1)
PROT SERPL-MCNC: 7.9 G/DL (ref 6.4–8.2)
PROT UR-MCNC: 30 MG/DL
RBC # BLD AUTO: 3.64 X10(6)UL
RBC #/AREA URNS AUTO: >10 /HPF
SARS-COV-2 RNA RESP QL NAA+PROBE: NOT DETECTED
SODIUM SERPL-SCNC: 142 MMOL/L (ref 136–145)
SP GR UR STRIP: 1.01 (ref 1–1.03)
UROBILINOGEN UR STRIP-ACNC: <2
VIT C UR-MCNC: 40 MG/DL
WBC # BLD AUTO: 14.7 X10(3) UL (ref 4–11)

## 2023-03-21 PROCEDURE — 71045 X-RAY EXAM CHEST 1 VIEW: CPT | Performed by: EMERGENCY MEDICINE

## 2023-03-21 PROCEDURE — 70450 CT HEAD/BRAIN W/O DYE: CPT | Performed by: EMERGENCY MEDICINE

## 2023-03-21 RX ORDER — SODIUM CHLORIDE 9 MG/ML
125 INJECTION, SOLUTION INTRAVENOUS CONTINUOUS
Status: DISCONTINUED | OUTPATIENT
Start: 2023-03-21 | End: 2023-03-23

## 2023-03-22 PROBLEM — E87.20 LACTIC ACIDOSIS: Status: ACTIVE | Noted: 2023-03-22

## 2023-03-22 PROBLEM — N39.0 URINARY TRACT INFECTION WITHOUT HEMATURIA, SITE UNSPECIFIED: Status: ACTIVE | Noted: 2023-03-22

## 2023-03-22 PROBLEM — R65.10 SIRS (SYSTEMIC INFLAMMATORY RESPONSE SYNDROME) (HCC): Status: ACTIVE | Noted: 2023-03-22

## 2023-03-22 PROBLEM — N39.0 UTI (URINARY TRACT INFECTION): Status: ACTIVE | Noted: 2023-03-22

## 2023-03-22 LAB
ANION GAP SERPL CALC-SCNC: 4 MMOL/L (ref 0–18)
ATRIAL RATE: 87 BPM
BASOPHILS # BLD AUTO: 0.04 X10(3) UL (ref 0–0.2)
BASOPHILS NFR BLD AUTO: 0.7 %
BUN BLD-MCNC: 20 MG/DL (ref 7–18)
BUN/CREAT SERPL: 27.4 (ref 10–20)
CALCIUM BLD-MCNC: 9.2 MG/DL (ref 8.5–10.1)
CHLORIDE SERPL-SCNC: 111 MMOL/L (ref 98–112)
CO2 SERPL-SCNC: 29 MMOL/L (ref 21–32)
CREAT BLD-MCNC: 0.73 MG/DL
DEPRECATED RDW RBC AUTO: 54 FL (ref 35.1–46.3)
EOSINOPHIL # BLD AUTO: 0.08 X10(3) UL (ref 0–0.7)
EOSINOPHIL NFR BLD AUTO: 1.5 %
ERYTHROCYTE [DISTWIDTH] IN BLOOD BY AUTOMATED COUNT: 13.7 % (ref 11–15)
GFR SERPLBLD BASED ON 1.73 SQ M-ARVRAT: 89 ML/MIN/1.73M2 (ref 60–?)
GLUCOSE BLD-MCNC: 67 MG/DL (ref 70–99)
HCT VFR BLD AUTO: 31.1 %
HGB BLD-MCNC: 10.1 G/DL
IMM GRANULOCYTES # BLD AUTO: 0.02 X10(3) UL (ref 0–1)
IMM GRANULOCYTES NFR BLD: 0.4 %
LACTATE SERPL-SCNC: 1.4 MMOL/L (ref 0.4–2)
LYMPHOCYTES # BLD AUTO: 1.65 X10(3) UL (ref 1–4)
LYMPHOCYTES NFR BLD AUTO: 30.6 %
MAGNESIUM SERPL-MCNC: 2.3 MG/DL (ref 1.6–2.6)
MCH RBC QN AUTO: 34.7 PG (ref 26–34)
MCHC RBC AUTO-ENTMCNC: 32.5 G/DL (ref 31–37)
MCV RBC AUTO: 106.9 FL
MONOCYTES # BLD AUTO: 1.04 X10(3) UL (ref 0.1–1)
MONOCYTES NFR BLD AUTO: 19.3 %
NEUTROPHILS # BLD AUTO: 2.57 X10 (3) UL (ref 1.5–7.7)
NEUTROPHILS # BLD AUTO: 2.57 X10(3) UL (ref 1.5–7.7)
NEUTROPHILS NFR BLD AUTO: 47.5 %
OSMOLALITY SERPL CALC.SUM OF ELEC: 299 MOSM/KG (ref 275–295)
P AXIS: 13 DEGREES
P-R INTERVAL: 190 MS
PLATELET # BLD AUTO: 174 10(3)UL (ref 150–450)
POTASSIUM SERPL-SCNC: 3.9 MMOL/L (ref 3.5–5.1)
Q-T INTERVAL: 356 MS
QRS DURATION: 84 MS
QTC CALCULATION (BEZET): 428 MS
R AXIS: -8 DEGREES
RBC # BLD AUTO: 2.91 X10(6)UL
SODIUM SERPL-SCNC: 144 MMOL/L (ref 136–145)
STAPHYLOCOCCUS AUREUS, NOT MRSA BY PCR: DETECTED
T AXIS: 44 DEGREES
VENTRICULAR RATE: 87 BPM
WBC # BLD AUTO: 5.4 X10(3) UL (ref 4–11)

## 2023-03-22 PROCEDURE — 99223 1ST HOSP IP/OBS HIGH 75: CPT | Performed by: FAMILY MEDICINE

## 2023-03-22 RX ORDER — ACETAMINOPHEN 500 MG
1000 TABLET ORAL EVERY 8 HOURS PRN
Status: DISCONTINUED | OUTPATIENT
Start: 2023-03-22 | End: 2023-03-25

## 2023-03-22 RX ORDER — BENZONATATE 100 MG/1
200 CAPSULE ORAL 3 TIMES DAILY PRN
Status: DISCONTINUED | OUTPATIENT
Start: 2023-03-22 | End: 2023-03-25

## 2023-03-22 RX ORDER — CEFAZOLIN SODIUM/WATER 2 G/20 ML
2 SYRINGE (ML) INTRAVENOUS EVERY 8 HOURS
Status: DISCONTINUED | OUTPATIENT
Start: 2023-03-22 | End: 2023-03-25

## 2023-03-22 RX ORDER — MAGNESIUM OXIDE 400 MG (241.3 MG MAGNESIUM) TABLET
2 TABLET NIGHTLY
Status: DISCONTINUED | OUTPATIENT
Start: 2023-03-22 | End: 2023-03-22

## 2023-03-22 RX ORDER — IBUPROFEN 200 MG
CAPSULE ORAL 3 TIMES DAILY
Status: DISCONTINUED | OUTPATIENT
Start: 2023-03-22 | End: 2023-03-25

## 2023-03-22 RX ORDER — ACETAMINOPHEN 500 MG
500 TABLET ORAL EVERY 4 HOURS PRN
Status: DISCONTINUED | OUTPATIENT
Start: 2023-03-22 | End: 2023-03-25

## 2023-03-22 RX ORDER — SENNOSIDES 8.6 MG
17.2 TABLET ORAL NIGHTLY PRN
Status: DISCONTINUED | OUTPATIENT
Start: 2023-03-22 | End: 2023-03-25

## 2023-03-22 RX ORDER — POLYETHYLENE GLYCOL 3350 17 G/17G
17 POWDER, FOR SOLUTION ORAL DAILY PRN
Status: DISCONTINUED | OUTPATIENT
Start: 2023-03-22 | End: 2023-03-25

## 2023-03-22 RX ORDER — ENOXAPARIN SODIUM 100 MG/ML
40 INJECTION SUBCUTANEOUS DAILY
Status: DISCONTINUED | OUTPATIENT
Start: 2023-03-22 | End: 2023-03-25

## 2023-03-22 RX ORDER — ATORVASTATIN CALCIUM 20 MG/1
20 TABLET, FILM COATED ORAL NIGHTLY
Status: DISCONTINUED | OUTPATIENT
Start: 2023-03-22 | End: 2023-03-25

## 2023-03-22 RX ORDER — PANTOPRAZOLE SODIUM 40 MG/1
40 TABLET, DELAYED RELEASE ORAL
Status: DISCONTINUED | OUTPATIENT
Start: 2023-03-22 | End: 2023-03-25

## 2023-03-22 RX ORDER — ARIPIPRAZOLE 2 MG/1
2 TABLET ORAL EVERY OTHER DAY
Status: DISCONTINUED | OUTPATIENT
Start: 2023-03-22 | End: 2023-03-25

## 2023-03-22 RX ORDER — ATORVASTATIN CALCIUM 20 MG/1
20 TABLET, FILM COATED ORAL NIGHTLY
Status: DISCONTINUED | OUTPATIENT
Start: 2023-03-23 | End: 2023-03-22

## 2023-03-22 RX ORDER — DONEPEZIL HYDROCHLORIDE 10 MG/1
10 TABLET, FILM COATED ORAL NIGHTLY
Status: DISCONTINUED | OUTPATIENT
Start: 2023-03-22 | End: 2023-03-25

## 2023-03-22 RX ORDER — DONEPEZIL HYDROCHLORIDE 10 MG/1
10 TABLET, FILM COATED ORAL NIGHTLY
Status: DISCONTINUED | OUTPATIENT
Start: 2023-03-22 | End: 2023-03-22

## 2023-03-22 RX ORDER — ASPIRIN 81 MG/1
81 TABLET ORAL DAILY
Status: DISCONTINUED | OUTPATIENT
Start: 2023-03-22 | End: 2023-03-25

## 2023-03-22 RX ORDER — POTASSIUM CHLORIDE 750 MG/1
10 TABLET, EXTENDED RELEASE ORAL EVERY OTHER DAY
Status: DISCONTINUED | OUTPATIENT
Start: 2023-03-22 | End: 2023-03-25

## 2023-03-22 RX ORDER — VENLAFAXINE HYDROCHLORIDE 75 MG/1
75 CAPSULE, EXTENDED RELEASE ORAL DAILY
Status: DISCONTINUED | OUTPATIENT
Start: 2023-03-22 | End: 2023-03-25

## 2023-03-22 RX ORDER — MELATONIN
NIGHTLY
Status: DISCONTINUED | OUTPATIENT
Start: 2023-03-22 | End: 2023-03-25

## 2023-03-22 RX ORDER — SIMVASTATIN 40 MG
40 TABLET ORAL NIGHTLY
Status: DISCONTINUED | OUTPATIENT
Start: 2023-03-22 | End: 2023-03-22 | Stop reason: DRUGHIGH

## 2023-03-22 RX ORDER — MAGNESIUM OXIDE 400 MG (241.3 MG MAGNESIUM) TABLET
2 TABLET NIGHTLY
Status: DISCONTINUED | OUTPATIENT
Start: 2023-03-22 | End: 2023-03-25

## 2023-03-22 RX ORDER — POLYETHYLENE GLYCOL 3350 17 G/17G
17 POWDER, FOR SOLUTION ORAL EVERY OTHER DAY
Status: DISCONTINUED | OUTPATIENT
Start: 2023-03-22 | End: 2023-03-25

## 2023-03-22 RX ORDER — MIRTAZAPINE 15 MG/1
15 TABLET, FILM COATED ORAL NIGHTLY
Status: DISCONTINUED | OUTPATIENT
Start: 2023-03-22 | End: 2023-03-25

## 2023-03-22 RX ORDER — SODIUM CHLORIDE 9 MG/ML
150 INJECTION, SOLUTION INTRAVENOUS CONTINUOUS
Status: DISCONTINUED | OUTPATIENT
Start: 2023-03-22 | End: 2023-03-25

## 2023-03-22 NOTE — ED NOTES
Orders for admission, patient is aware of plan and ready to go upstairs. Any questions, please call ED RN Steph Nelson  at extension [phone].    Type of COVID test sent: rapid  COVID Suspicion level: Low    Titratable drug(s) infusing:n/a  Rate:n/a    LOC at time of transport:x1    Other pertinent information:    dementia, can become very easily agitated  Can be uncooperative, nonambulatory    CIWA score=n/a  NIH score=n/a

## 2023-03-22 NOTE — PROGRESS NOTES
1.  [facility] has accepted for [facility]. 2. Patient will need Skilled Nursing and therapy services under CMS 1135 waiver due to urinary tract infection and weakness.

## 2023-03-22 NOTE — PLAN OF CARE
Bed locked in low position, belongings in reach, bed alarm on, video monitoring in place, call light in reach. Frequent rounding done, all patient needs met. Non-slip socks on/at bedside. IV ABX continue, IVF continues. Repeat lactic 1.4, WNL. Daily lovenox ordered. Minced/NTL diet ordered. Gave medications this morning with apple sauce, all 4 tabs went down easily. A/O 2-3, has extremely sensitive skin (from taking venlafexine BID) so Anna Miller appears crabby but it is just because he is very sore from medication side effects. Per wife patient is able to ambulate with her assistance, they do not have a walker at home yet. There are 14 stairs up to their condo, and because there is a railing on both sides Anna Miller is able to navigate the stairs with her near by. He is medical overflow, no tele ordered a this time. Problem: Patient Centered Care  Goal: Patient preferences are identified and integrated in the patient's plan of care  Description: Interventions:  - What would you like us to know as we care for you? I live with my wife, who helps take care of me.   - Provide timely, complete, and accurate information to patient/family  - Incorporate patient and family knowledge, values, beliefs, and cultural backgrounds into the planning and delivery of care  - Encourage patient/family to participate in care and decision-making at the level they choose  - Honor patient and family perspectives and choices  Outcome: Progressing     Problem: Patient/Family Goals  Goal: Patient/Family Long Term Goal  Description: Patient's Long Term Goal: get stronger to ambulate better    Interventions:  - PT/OT eval  - take medications as ordered  - complete testing as ordered  - See additional Care Plan goals for specific interventions  Outcome: Progressing  Goal: Patient/Family Short Term Goal  Description: Patient's Short Term Goal: clear up this UTI    Interventions:   - rocephin IV daily  - take medications as ordered  - frequent checking to be sure patient is dry/unsoiled  - See additional Care Plan goals for specific interventions  Outcome: Progressing     Problem: CARDIOVASCULAR - ADULT  Goal: Maintains optimal cardiac output and hemodynamic stability  Description: INTERVENTIONS:  - Monitor vital signs, rhythm, and trends  - Monitor for bleeding, hypotension and signs of decreased cardiac output  - Evaluate effectiveness of vasoactive medications to optimize hemodynamic stability  - Monitor arterial and/or venous puncture sites for bleeding and/or hematoma  - Assess quality of pulses, skin color and temperature  - Assess for signs of decreased coronary artery perfusion - ex.  Angina  - Evaluate fluid balance, assess for edema, trend weights  Outcome: Progressing  Goal: Absence of cardiac arrhythmias or at baseline  Description: INTERVENTIONS:  - Continuous cardiac monitoring, monitor vital signs, obtain 12 lead EKG if indicated  - Evaluate effectiveness of antiarrhythmic and heart rate control medications as ordered  - Initiate emergency measures for life threatening arrhythmias  - Monitor electrolytes and administer replacement therapy as ordered  Outcome: Progressing     Problem: METABOLIC/FLUID AND ELECTROLYTES - ADULT  Goal: Electrolytes maintained within normal limits  Description: INTERVENTIONS:  - Monitor labs and rhythm and assess patient for signs and symptoms of electrolyte imbalances  - Administer electrolyte replacement as ordered  - Monitor response to electrolyte replacements, including rhythm and repeat lab results as appropriate  - Fluid restriction as ordered  - Instruct patient on fluid and nutrition restrictions as appropriate  Outcome: Progressing     Problem: SKIN/TISSUE INTEGRITY - ADULT  Goal: Incision(s), wounds(s) or drain site(s) healing without S/S of infection  Description: INTERVENTIONS:  - Assess and document risk factors for pressure ulcer development  - Assess and document skin integrity  - Assess and document dressing/incision, wound bed, drain sites and surrounding tissue  - Implement wound care per orders  - Initiate isolation precautions as appropriate  - Initiate Pressure Ulcer prevention bundle as indicated  Outcome: Progressing     Problem: MUSCULOSKELETAL - ADULT  Goal: Return mobility to safest level of function  Description: INTERVENTIONS:  - Assess patient stability and activity tolerance for standing, transferring and ambulating w/ or w/o assistive devices  - Assist with transfers and ambulation using safe patient handling equipment as needed  - Ensure adequate protection for wounds/incisions during mobilization  - Obtain PT/OT consults as needed  - Advance activity as appropriate  - Communicate ordered activity level and limitations with patient/family  Outcome: Progressing

## 2023-03-22 NOTE — CM/SW NOTE
Therapy recommendations are for [facility]. [facility] has accepted. Reserved facility in [address] [place] [address] per wife's choice. Wife Munir Rodriguez notified of bed availability. Will require COVID waiver documentation-  aware. PASRR screening completed- PASRR queued for review. SW to follow-up on PASRR status 3/23/23. PLAN:  [facility] at Discharge pending medical clearance. / to remain available for support and/or discharge planning.      Dasha Thibodeaux MBA BSN RN [address]  RN Case Manager  [phone]

## 2023-03-22 NOTE — ED INITIAL ASSESSMENT (HPI)
Pt arrives via EMS from home, per wife, pt has had AMS x2 hours, stating pt is confused, weak and unable to walk. Pt recently diagnosed with dementia. Pt is alert to person and place.

## 2023-03-22 NOTE — ED QUICK NOTES
Patient is alert and oriented x 2; hx of dementia. Uncooperative when attempting to do patient care. Showing no signs or symptoms of any respiratory distress. Upon providing patient care- two medium sores noted to bilateral buttocks- unable to measure at that moment. Comfort measures in place. Wife at bedside.

## 2023-03-22 NOTE — PLAN OF CARE
Lubna Kendall is resting comfortably in bed, alert and oriented to self, on room air, incontinent, ambulating with 2 assist x walker, on Lovenox subcutaneous for DVT prophylaxis, on IV ceftriaxone for abx, IVF infusing, wife at the bedside, safety precautions in place, call light within easy reach. Plan to discharge to [facility] pending choice and medical clearance. Problem: Patient Centered Care  Goal: Patient preferences are identified and integrated in the patient's plan of care  Description: Interventions:  - What would you like us to know as we care for you? I live with my wife, who helps take care of me.   - Provide timely, complete, and accurate information to patient/family  - Incorporate patient and family knowledge, values, beliefs, and cultural backgrounds into the planning and delivery of care  - Encourage patient/family to participate in care and decision-making at the level they choose  - Honor patient and family perspectives and choices  Outcome: Progressing     Problem: Patient/Family Goals  Goal: Patient/Family Long Term Goal  Description: Patient's Long Term Goal: get stronger to ambulate better    Interventions:  - PT/OT eval  - take medications as ordered  - complete testing as ordered  - See additional Care Plan goals for specific interventions  Outcome: Progressing  Goal: Patient/Family Short Term Goal  Description: Patient's Short Term Goal: clear up this UTI    Interventions:   - rocephin IV daily  - take medications as ordered  - frequent checking to be sure patient is dry/unsoiled  - See additional Care Plan goals for specific interventions  Outcome: Progressing     Problem: CARDIOVASCULAR - ADULT  Goal: Maintains optimal cardiac output and hemodynamic stability  Description: INTERVENTIONS:  - Monitor vital signs, rhythm, and trends  - Monitor for bleeding, hypotension and signs of decreased cardiac output  - Evaluate effectiveness of vasoactive medications to optimize hemodynamic stability  - Monitor arterial and/or venous puncture sites for bleeding and/or hematoma  - Assess quality of pulses, skin color and temperature  - Assess for signs of decreased coronary artery perfusion - ex.  Angina  - Evaluate fluid balance, assess for edema, trend weights  Outcome: Progressing  Goal: Absence of cardiac arrhythmias or at baseline  Description: INTERVENTIONS:  - Continuous cardiac monitoring, monitor vital signs, obtain 12 lead EKG if indicated  - Evaluate effectiveness of antiarrhythmic and heart rate control medications as ordered  - Initiate emergency measures for life threatening arrhythmias  - Monitor electrolytes and administer replacement therapy as ordered  Outcome: Progressing     Problem: METABOLIC/FLUID AND ELECTROLYTES - ADULT  Goal: Electrolytes maintained within normal limits  Description: INTERVENTIONS:  - Monitor labs and rhythm and assess patient for signs and symptoms of electrolyte imbalances  - Administer electrolyte replacement as ordered  - Monitor response to electrolyte replacements, including rhythm and repeat lab results as appropriate  - Fluid restriction as ordered  - Instruct patient on fluid and nutrition restrictions as appropriate  Outcome: Progressing     Problem: SKIN/TISSUE INTEGRITY - ADULT  Goal: Incision(s), wounds(s) or drain site(s) healing without S/S of infection  Description: INTERVENTIONS:  - Assess and document risk factors for pressure ulcer development  - Assess and document skin integrity  - Assess and document dressing/incision, wound bed, drain sites and surrounding tissue  - Implement wound care per orders  - Initiate isolation precautions as appropriate  - Initiate Pressure Ulcer prevention bundle as indicated  Outcome: Progressing     Problem: MUSCULOSKELETAL - ADULT  Goal: Return mobility to safest level of function  Description: INTERVENTIONS:  - Assess patient stability and activity tolerance for standing, transferring and ambulating w/ or w/o assistive devices  - Assist with transfers and ambulation using safe patient handling equipment as needed  - Ensure adequate protection for wounds/incisions during mobilization  - Obtain PT/OT consults as needed  - Advance activity as appropriate  - Communicate ordered activity level and limitations with patient/family  Outcome: Progressing

## 2023-03-22 NOTE — PROGRESS NOTES
Therapeutic interchange from Simvastatin 40 mg oral daily to atorvastatin 20 mg oral daily per P&T approved protocol.     Danyel LevinD

## 2023-03-23 ENCOUNTER — APPOINTMENT (OUTPATIENT)
Dept: PICC SERVICES | Facility: HOSPITAL | Age: 88
DRG: 872 | End: 2023-03-23
Attending: INTERNAL MEDICINE
Payer: MEDICARE

## 2023-03-23 ENCOUNTER — APPOINTMENT (OUTPATIENT)
Dept: GENERAL RADIOLOGY | Facility: HOSPITAL | Age: 88
DRG: 872 | End: 2023-03-23
Attending: STUDENT IN AN ORGANIZED HEALTH CARE EDUCATION/TRAINING PROGRAM
Payer: MEDICARE

## 2023-03-23 ENCOUNTER — APPOINTMENT (OUTPATIENT)
Dept: GENERAL RADIOLOGY | Facility: HOSPITAL | Age: 88
End: 2023-03-23
Attending: STUDENT IN AN ORGANIZED HEALTH CARE EDUCATION/TRAINING PROGRAM
Payer: MEDICARE

## 2023-03-23 ENCOUNTER — APPOINTMENT (OUTPATIENT)
Dept: PICC SERVICES | Facility: HOSPITAL | Age: 88
End: 2023-03-23
Attending: INTERNAL MEDICINE
Payer: MEDICARE

## 2023-03-23 LAB
ALBUMIN SERPL-MCNC: 3 G/DL (ref 3.4–5)
ALBUMIN/GLOB SERPL: 0.8 {RATIO} (ref 1–2)
ALP LIVER SERPL-CCNC: 90 U/L
ALT SERPL-CCNC: 32 U/L
ANION GAP SERPL CALC-SCNC: 9 MMOL/L (ref 0–18)
AST SERPL-CCNC: 32 U/L (ref 15–37)
BASOPHILS # BLD AUTO: 0.04 X10(3) UL (ref 0–0.2)
BASOPHILS NFR BLD AUTO: 0.8 %
BILIRUB SERPL-MCNC: 0.3 MG/DL (ref 0.1–2)
BUN BLD-MCNC: 13 MG/DL (ref 7–18)
BUN/CREAT SERPL: 19.4 (ref 10–20)
CALCIUM BLD-MCNC: 9.8 MG/DL (ref 8.5–10.1)
CHLORIDE SERPL-SCNC: 109 MMOL/L (ref 98–112)
CO2 SERPL-SCNC: 26 MMOL/L (ref 21–32)
CREAT BLD-MCNC: 0.67 MG/DL
DEPRECATED RDW RBC AUTO: 50.9 FL (ref 35.1–46.3)
EOSINOPHIL # BLD AUTO: 0.16 X10(3) UL (ref 0–0.7)
EOSINOPHIL NFR BLD AUTO: 3 %
ERYTHROCYTE [DISTWIDTH] IN BLOOD BY AUTOMATED COUNT: 13.3 % (ref 11–15)
GFR SERPLBLD BASED ON 1.73 SQ M-ARVRAT: 91 ML/MIN/1.73M2 (ref 60–?)
GLOBULIN PLAS-MCNC: 3.9 G/DL (ref 2.8–4.4)
GLUCOSE BLD-MCNC: 88 MG/DL (ref 70–99)
HCT VFR BLD AUTO: 35.3 %
HGB BLD-MCNC: 11.4 G/DL
IMM GRANULOCYTES # BLD AUTO: 0.01 X10(3) UL (ref 0–1)
IMM GRANULOCYTES NFR BLD: 0.2 %
INR BLD: 1.18 (ref 0.85–1.16)
LACTATE SERPL-SCNC: 1.9 MMOL/L (ref 0.4–2)
LACTATE SERPL-SCNC: 3.1 MMOL/L (ref 0.4–2)
LACTATE SERPL-SCNC: 5.9 MMOL/L (ref 0.4–2)
LYMPHOCYTES # BLD AUTO: 1.97 X10(3) UL (ref 1–4)
LYMPHOCYTES NFR BLD AUTO: 37 %
MAGNESIUM SERPL-MCNC: 2 MG/DL (ref 1.6–2.6)
MCH RBC QN AUTO: 33.5 PG (ref 26–34)
MCHC RBC AUTO-ENTMCNC: 32.3 G/DL (ref 31–37)
MCV RBC AUTO: 103.8 FL
MONOCYTES # BLD AUTO: 0.67 X10(3) UL (ref 0.1–1)
MONOCYTES NFR BLD AUTO: 12.6 %
NEUTROPHILS # BLD AUTO: 2.48 X10 (3) UL (ref 1.5–7.7)
NEUTROPHILS # BLD AUTO: 2.48 X10(3) UL (ref 1.5–7.7)
NEUTROPHILS NFR BLD AUTO: 46.4 %
OSMOLALITY SERPL CALC.SUM OF ELEC: 298 MOSM/KG (ref 275–295)
PLATELET # BLD AUTO: 158 10(3)UL (ref 150–450)
POTASSIUM SERPL-SCNC: 3.4 MMOL/L (ref 3.5–5.1)
PROT SERPL-MCNC: 6.9 G/DL (ref 6.4–8.2)
PROTHROMBIN TIME: 14.9 SECONDS (ref 11.6–14.8)
RBC # BLD AUTO: 3.4 X10(6)UL
SODIUM SERPL-SCNC: 144 MMOL/L (ref 136–145)
WBC # BLD AUTO: 5.3 X10(3) UL (ref 4–11)

## 2023-03-23 PROCEDURE — 74018 RADEX ABDOMEN 1 VIEW: CPT | Performed by: STUDENT IN AN ORGANIZED HEALTH CARE EDUCATION/TRAINING PROGRAM

## 2023-03-23 PROCEDURE — 99233 SBSQ HOSP IP/OBS HIGH 50: CPT | Performed by: INTERNAL MEDICINE

## 2023-03-23 RX ORDER — LIDOCAINE HYDROCHLORIDE 10 MG/ML
5 INJECTION, SOLUTION EPIDURAL; INFILTRATION; INTRACAUDAL; PERINEURAL
Status: COMPLETED | OUTPATIENT
Start: 2023-03-23 | End: 2023-03-23

## 2023-03-23 RX ORDER — POTASSIUM CHLORIDE 20 MEQ/1
40 TABLET, EXTENDED RELEASE ORAL ONCE
Status: COMPLETED | OUTPATIENT
Start: 2023-03-23 | End: 2023-03-23

## 2023-03-23 NOTE — CM/SW NOTE
ECTOR reviewed PASRR, which is completed, and was uploaded into [employer]. MD placed covid waiver note in his note, which was sent to [facility] via [employer]      Plan: Pending medical clearance, DC to [facility], PASRR completed, Covid waiver uploaded into [employer]    SW/DC to remain available for support and/or discharge planning.      Paige Carroll MSW, LSW   x [phone]

## 2023-03-23 NOTE — PROGRESS NOTES
Antimicrobial Stewardship   De-escalation Recommendation Note    Dennise Jacob. is a 81year old patient    Current Antimicrobial Medications  Anti-infectives (From admission, onward)      Start     Dose/Rate Route Frequency Ordered Stop    03/22/23 2300  cefTRIAXone (Rocephin) 1 g in D5W 100 mL IVPB-ADD         1 g  200 mL/hr over 30 Minutes Intravenous Every 24 hours 03/22/23 0028      03/22/23 2145  vancomycin (Vancocin) 1,000 mg in sodium chloride 0.9% 250 mL IVPB-ADDV         1,000 mg  250 mL/hr over 60 Minutes Intravenous Once 03/22/23 2131              Current Indication/Therapy MSSA bc 1/2 by PCR dirty UA       Assessment/Recommendation - Spk with Dr Kirby Nye and recommended Starting Cefazolin 2gm ivpb q8hr and dc Vancomycin and Ceftriaxone. MD agreed with recommendations        Pharmacy will continue to follow. We appreciate the opportunity to assist in the care of this patient.     Thank you,   Apollo Quinones, PharmD  3/22/2023  9:45 PM

## 2023-03-23 NOTE — PLAN OF CARE
Pt alert and oriented x2. Pt on room air. IVF and antibiotics per orders. Pt 2 assist up to chair. Extended IV placed. Pt 2 assist walker. Plan for echo. Problem: Patient Centered Care  Goal: Patient preferences are identified and integrated in the patient's plan of care  Description: Interventions:  - What would you like us to know as we care for you? I live with my wife, who helps take care of me. - Provide timely, complete, and accurate information to patient/family  - Incorporate patient and family knowledge, values, beliefs, and cultural backgrounds into the planning and delivery of care  - Encourage patient/family to participate in care and decision-making at the level they choose  - Honor patient and family perspectives and choices  Outcome: Progressing     Problem: Patient/Family Goals  Goal: Patient/Family Long Term Goal  Description: Patient's Long Term Goal: get stronger to ambulate better    Interventions:  - PT/OT eval  - take medications as ordered  - complete testing as ordered  - See additional Care Plan goals for specific interventions  Outcome: Progressing  Goal: Patient/Family Short Term Goal  Description: Patient's Short Term Goal: clear up this UTI    Interventions:   - rocephin IV daily  - take medications as ordered  - frequent checking to be sure patient is dry/unsoiled  - See additional Care Plan goals for specific interventions  Outcome: Progressing     Problem: CARDIOVASCULAR - ADULT  Goal: Maintains optimal cardiac output and hemodynamic stability  Description: INTERVENTIONS:  - Monitor vital signs, rhythm, and trends  - Monitor for bleeding, hypotension and signs of decreased cardiac output  - Evaluate effectiveness of vasoactive medications to optimize hemodynamic stability  - Monitor arterial and/or venous puncture sites for bleeding and/or hematoma  - Assess quality of pulses, skin color and temperature  - Assess for signs of decreased coronary artery perfusion - ex.  Angina  - Evaluate fluid balance, assess for edema, trend weights  Outcome: Progressing  Goal: Absence of cardiac arrhythmias or at baseline  Description: INTERVENTIONS:  - Continuous cardiac monitoring, monitor vital signs, obtain 12 lead EKG if indicated  - Evaluate effectiveness of antiarrhythmic and heart rate control medications as ordered  - Initiate emergency measures for life threatening arrhythmias  - Monitor electrolytes and administer replacement therapy as ordered  Outcome: Progressing     Problem: METABOLIC/FLUID AND ELECTROLYTES - ADULT  Goal: Electrolytes maintained within normal limits  Description: INTERVENTIONS:  - Monitor labs and rhythm and assess patient for signs and symptoms of electrolyte imbalances  - Administer electrolyte replacement as ordered  - Monitor response to electrolyte replacements, including rhythm and repeat lab results as appropriate  - Fluid restriction as ordered  - Instruct patient on fluid and nutrition restrictions as appropriate  Outcome: Progressing     Problem: SKIN/TISSUE INTEGRITY - ADULT  Goal: Incision(s), wounds(s) or drain site(s) healing without S/S of infection  Description: INTERVENTIONS:  - Assess and document risk factors for pressure ulcer development  - Assess and document skin integrity  - Assess and document dressing/incision, wound bed, drain sites and surrounding tissue  - Implement wound care per orders  - Initiate isolation precautions as appropriate  - Initiate Pressure Ulcer prevention bundle as indicated  Outcome: Progressing     Problem: MUSCULOSKELETAL - ADULT  Goal: Return mobility to safest level of function  Description: INTERVENTIONS:  - Assess patient stability and activity tolerance for standing, transferring and ambulating w/ or w/o assistive devices  - Assist with transfers and ambulation using safe patient handling equipment as needed  - Ensure adequate protection for wounds/incisions during mobilization  - Obtain PT/OT consults as needed  - Advance activity as appropriate  - Communicate ordered activity level and limitations with patient/family  Outcome: Progressing

## 2023-03-23 NOTE — CDS QUERY
CDI Addendum on 3/24/23 6:22 pm md answered this query below in his DCS, please see dcs for MD response. Savana Denis Forth: . To answer this query: 1st click on \"Edit\" button on toolbar. 2nd type an \"X\" in the bracket for the diagnosis that applies (You may add/type in any additional clinical details you feel necessary to substantiate your response). 3rd Click \"SIGN\" TAB to complete your response. Thank you. Lisa Denny     Dear Doctor Cathy Hess information (provided below) suggests Potential Risk for Infection. For accurate ICD-10-CM code assignment to reflect severity of illness and risk of mortality,  PLEASE (X) ALL DIAGNOSES THAT APPLY. SELECTION BY PROVIDER ONLY     (   ) Acute MSSA Bacteremia With Sepsis   (   ) Acute MSSA Bacteremia Without Sepsis   (   )  Other  Explanation,(please specify):____________________________________     _________________________________________________________________________________________________________________________________________________________________________________________________________________________________________________________________________________________________________________________________________________________  Documentation in the medical record:   03/21/23 ED admission: WBC 14.7, Pulse rates: 107-101-82 LA 5.5, Temp 98.3,  Resps 18-12-12 BP's 150//90--133/88 RA SATs 98-99%  ED MD note: 81year-old gentleman with history of Anxiety state,BPH, Dementia, Depression, Dysphagia, High blood pressure, Kidney stones, hyperlipidemia,  Pneumonia,  Seizure disorder\ 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 He is brought to the ER by EMS. His wife called because patient was weak and not acting like himself. Exam: Oriented to person, place, . Appears somewhat weak and tremulous. Cardiovascular: Normal rate, regular rhythm, normal heart sounds and intact distal pulses.  Pulmonary/Chest: Effort normal and breath sounds normal.No respiratory distress. Wife  states the tremor is from his depression medicine he is on day 3 of Macrobid for UTI. [facility] x1 sent   03/23/23 Hospitalist Progress Note: #MSSA bacteremia -Likely source from pressure sores in the left buttock other possibilities right buttock sore and left knee abrasion, ID consultation, Started on Rocephin for presumptive urinary tract infection which has now been discontinued and has been placed on cefazolin 1 g every 8, 2D echocardiogram ordered. Abrasions to bilateral buttock as well as left knee, Local wound care  03/23/23 ID Consult:# Acute MSSA bacteremia ?skin source, R/o IE  PLAN: Continue on ancef., Check TTE., Follow fever curve, wbc, Reviewed labs, micro, imaging reports, available old records. Treatment: Rocephin IV dc'd, Cefazolin IV q8h started, ID consulted, Repeat BC x2 ordered, Check TTE, Follow fever curve, wbc, Reviewed labs, micro, imaging reports, available old records. _____________________________________________________________________________________________________  If you have any questions, please contact Clinical :  Kasey Aldana RN at [phone]. Thank You.      THIS FORM IS A PERMANENT PART OF THE MEDICAL RECORD

## 2023-03-24 ENCOUNTER — APPOINTMENT (OUTPATIENT)
Dept: PICC SERVICES | Facility: HOSPITAL | Age: 88
DRG: 872 | End: 2023-03-24
Attending: PHYSICIAN ASSISTANT
Payer: MEDICARE

## 2023-03-24 ENCOUNTER — APPOINTMENT (OUTPATIENT)
Dept: CV DIAGNOSTICS | Facility: HOSPITAL | Age: 88
End: 2023-03-24
Attending: PHYSICIAN ASSISTANT
Payer: MEDICARE

## 2023-03-24 ENCOUNTER — APPOINTMENT (OUTPATIENT)
Dept: PICC SERVICES | Facility: HOSPITAL | Age: 88
End: 2023-03-24
Attending: PHYSICIAN ASSISTANT
Payer: MEDICARE

## 2023-03-24 ENCOUNTER — APPOINTMENT (OUTPATIENT)
Dept: CV DIAGNOSTICS | Facility: HOSPITAL | Age: 88
DRG: 872 | End: 2023-03-24
Attending: PHYSICIAN ASSISTANT
Payer: MEDICARE

## 2023-03-24 VITALS
HEART RATE: 53 BPM | OXYGEN SATURATION: 99 % | TEMPERATURE: 98 F | BODY MASS INDEX: 22.34 KG/M2 | HEIGHT: 67 IN | WEIGHT: 142.31 LBS | RESPIRATION RATE: 15 BRPM | SYSTOLIC BLOOD PRESSURE: 151 MMHG | DIASTOLIC BLOOD PRESSURE: 59 MMHG

## 2023-03-24 PROBLEM — N39.0 UTI (URINARY TRACT INFECTION): Status: RESOLVED | Noted: 2023-03-22 | Resolved: 2023-03-24

## 2023-03-24 PROBLEM — E87.20 LACTIC ACIDOSIS: Status: RESOLVED | Noted: 2023-03-22 | Resolved: 2023-03-24

## 2023-03-24 PROBLEM — N39.0 URINARY TRACT INFECTION WITHOUT HEMATURIA, SITE UNSPECIFIED: Status: RESOLVED | Noted: 2023-03-22 | Resolved: 2023-03-24

## 2023-03-24 LAB
ANION GAP SERPL CALC-SCNC: 3 MMOL/L (ref 0–18)
BUN BLD-MCNC: 11 MG/DL (ref 7–18)
BUN/CREAT SERPL: 19.6 (ref 10–20)
CALCIUM BLD-MCNC: 9.2 MG/DL (ref 8.5–10.1)
CHLORIDE SERPL-SCNC: 115 MMOL/L (ref 98–112)
CO2 SERPL-SCNC: 28 MMOL/L (ref 21–32)
CREAT BLD-MCNC: 0.56 MG/DL
GFR SERPLBLD BASED ON 1.73 SQ M-ARVRAT: 96 ML/MIN/1.73M2 (ref 60–?)
GLUCOSE BLD-MCNC: 106 MG/DL (ref 70–99)
LACTATE SERPL-SCNC: 1 MMOL/L (ref 0.4–2)
MAGNESIUM SERPL-MCNC: 1.9 MG/DL (ref 1.6–2.6)
OSMOLALITY SERPL CALC.SUM OF ELEC: 302 MOSM/KG (ref 275–295)
POTASSIUM SERPL-SCNC: 4 MMOL/L (ref 3.5–5.1)
POTASSIUM SERPL-SCNC: 4 MMOL/L (ref 3.5–5.1)
SARS-COV-2 RNA RESP QL NAA+PROBE: NOT DETECTED
SODIUM SERPL-SCNC: 146 MMOL/L (ref 136–145)

## 2023-03-24 PROCEDURE — 93306 TTE W/DOPPLER COMPLETE: CPT | Performed by: PHYSICIAN ASSISTANT

## 2023-03-24 PROCEDURE — 99239 HOSP IP/OBS DSCHRG MGMT >30: CPT | Performed by: INTERNAL MEDICINE

## 2023-03-24 RX ORDER — IBUPROFEN 200 MG
CAPSULE ORAL 3 TIMES DAILY
Refills: 0 | Status: SHIPPED | COMMUNITY
Start: 2023-03-24

## 2023-03-24 RX ORDER — CEFAZOLIN SODIUM/WATER 2 G/20 ML
2 SYRINGE (ML) INTRAVENOUS EVERY 8 HOURS
Qty: 780 ML | Refills: 0 | Status: SHIPPED | OUTPATIENT
Start: 2023-03-24 | End: 2023-04-06

## 2023-03-24 NOTE — PROGRESS NOTES
Report called to [facility]. All belongings collected and will be sent to 5th floor with patient and wife tonight. Explained need for move to patient and wife at bedside, agreeable to transfer.

## 2023-03-24 NOTE — PLAN OF CARE
Patient has safety precautions in place bed in the lowest position, bed alarm on, and call light within reach. Continue to monitor patient with intentional nursing rounds. Patient is receiving a PICC line for long-term abx. Report given to [facility] at facility. Problem: Patient Centered Care  Goal: Patient preferences are identified and integrated in the patient's plan of care  Description: Interventions:  - What would you like us to know as we care for you? I live with my wife, who helps take care of me.   - Provide timely, complete, and accurate information to patient/family  - Incorporate patient and family knowledge, values, beliefs, and cultural backgrounds into the planning and delivery of care  - Encourage patient/family to participate in care and decision-making at the level they choose  - Honor patient and family perspectives and choices  Outcome: Progressing     Problem: Patient/Family Goals  Goal: Patient/Family Long Term Goal  Description: Patient's Long Term Goal: get stronger to ambulate better    Interventions:  - PT/OT eval  - take medications as ordered  - complete testing as ordered  - See additional Care Plan goals for specific interventions  Outcome: Progressing  Goal: Patient/Family Short Term Goal  Description: Patient's Short Term Goal: clear up this UTI    Interventions:   - rocephin IV daily  - take medications as ordered  - frequent checking to be sure patient is dry/unsoiled  - See additional Care Plan goals for specific interventions  Outcome: Progressing     Problem: CARDIOVASCULAR - ADULT  Goal: Maintains optimal cardiac output and hemodynamic stability  Description: INTERVENTIONS:  - Monitor vital signs, rhythm, and trends  - Monitor for bleeding, hypotension and signs of decreased cardiac output  - Evaluate effectiveness of vasoactive medications to optimize hemodynamic stability  - Monitor arterial and/or venous puncture sites for bleeding and/or hematoma  - Assess quality of pulses, skin color and temperature  - Assess for signs of decreased coronary artery perfusion - ex.  Angina  - Evaluate fluid balance, assess for edema, trend weights  Outcome: Progressing  Goal: Absence of cardiac arrhythmias or at baseline  Description: INTERVENTIONS:  - Continuous cardiac monitoring, monitor vital signs, obtain 12 lead EKG if indicated  - Evaluate effectiveness of antiarrhythmic and heart rate control medications as ordered  - Initiate emergency measures for life threatening arrhythmias  - Monitor electrolytes and administer replacement therapy as ordered  Outcome: Progressing     Problem: METABOLIC/FLUID AND ELECTROLYTES - ADULT  Goal: Electrolytes maintained within normal limits  Description: INTERVENTIONS:  - Monitor labs and rhythm and assess patient for signs and symptoms of electrolyte imbalances  - Administer electrolyte replacement as ordered  - Monitor response to electrolyte replacements, including rhythm and repeat lab results as appropriate  - Fluid restriction as ordered  - Instruct patient on fluid and nutrition restrictions as appropriate  Outcome: Progressing     Problem: SKIN/TISSUE INTEGRITY - ADULT  Goal: Incision(s), wounds(s) or drain site(s) healing without S/S of infection  Description: INTERVENTIONS:  - Assess and document risk factors for pressure ulcer development  - Assess and document skin integrity  - Assess and document dressing/incision, wound bed, drain sites and surrounding tissue  - Implement wound care per orders  - Initiate isolation precautions as appropriate  - Initiate Pressure Ulcer prevention bundle as indicated  Outcome: Progressing     Problem: MUSCULOSKELETAL - ADULT  Goal: Return mobility to safest level of function  Description: INTERVENTIONS:  - Assess patient stability and activity tolerance for standing, transferring and ambulating w/ or w/o assistive devices  - Assist with transfers and ambulation using safe patient handling equipment as needed  - Ensure adequate protection for wounds/incisions during mobilization  - Obtain PT/OT consults as needed  - Advance activity as appropriate  - Communicate ordered activity level and limitations with patient/family  Outcome: Progressing

## 2023-03-24 NOTE — PLAN OF CARE
Bed locked in low position, belongings in reach, bed alarm on, call light in reach. Frequent rounding done, all patient needs met. Non-slip socks on/at bedside. Wife at bedside. Patient transferring to room 520 tonight, explained need to patient and wife. Problem: Patient Centered Care  Goal: Patient preferences are identified and integrated in the patient's plan of care  Description: Interventions:  - What would you like us to know as we care for you? I live with my wife, who helps take care of me.   - Provide timely, complete, and accurate information to patient/family  - Incorporate patient and family knowledge, values, beliefs, and cultural backgrounds into the planning and delivery of care  - Encourage patient/family to participate in care and decision-making at the level they choose  - Honor patient and family perspectives and choices  Outcome: Progressing     Problem: Patient/Family Goals  Goal: Patient/Family Long Term Goal  Description: Patient's Long Term Goal: get stronger to ambulate better    Interventions:  - PT/OT eval  - take medications as ordered  - complete testing as ordered  - See additional Care Plan goals for specific interventions  Outcome: Progressing  Goal: Patient/Family Short Term Goal  Description: Patient's Short Term Goal: clear up this UTI    Interventions:   - rocephin IV daily  - take medications as ordered  - frequent checking to be sure patient is dry/unsoiled  - See additional Care Plan goals for specific interventions  Outcome: Progressing     Problem: CARDIOVASCULAR - ADULT  Goal: Maintains optimal cardiac output and hemodynamic stability  Description: INTERVENTIONS:  - Monitor vital signs, rhythm, and trends  - Monitor for bleeding, hypotension and signs of decreased cardiac output  - Evaluate effectiveness of vasoactive medications to optimize hemodynamic stability  - Monitor arterial and/or venous puncture sites for bleeding and/or hematoma  - Assess quality of pulses, skin color and temperature  - Assess for signs of decreased coronary artery perfusion - ex.  Angina  - Evaluate fluid balance, assess for edema, trend weights  Outcome: Progressing  Goal: Absence of cardiac arrhythmias or at baseline  Description: INTERVENTIONS:  - Continuous cardiac monitoring, monitor vital signs, obtain 12 lead EKG if indicated  - Evaluate effectiveness of antiarrhythmic and heart rate control medications as ordered  - Initiate emergency measures for life threatening arrhythmias  - Monitor electrolytes and administer replacement therapy as ordered  Outcome: Progressing     Problem: METABOLIC/FLUID AND ELECTROLYTES - ADULT  Goal: Electrolytes maintained within normal limits  Description: INTERVENTIONS:  - Monitor labs and rhythm and assess patient for signs and symptoms of electrolyte imbalances  - Administer electrolyte replacement as ordered  - Monitor response to electrolyte replacements, including rhythm and repeat lab results as appropriate  - Fluid restriction as ordered  - Instruct patient on fluid and nutrition restrictions as appropriate  Outcome: Progressing     Problem: SKIN/TISSUE INTEGRITY - ADULT  Goal: Incision(s), wounds(s) or drain site(s) healing without S/S of infection  Description: INTERVENTIONS:  - Assess and document risk factors for pressure ulcer development  - Assess and document skin integrity  - Assess and document dressing/incision, wound bed, drain sites and surrounding tissue  - Implement wound care per orders  - Initiate isolation precautions as appropriate  - Initiate Pressure Ulcer prevention bundle as indicated  Outcome: Progressing     Problem: MUSCULOSKELETAL - ADULT  Goal: Return mobility to safest level of function  Description: INTERVENTIONS:  - Assess patient stability and activity tolerance for standing, transferring and ambulating w/ or w/o assistive devices  - Assist with transfers and ambulation using safe patient handling equipment as needed  - Ensure adequate protection for wounds/incisions during mobilization  - Obtain PT/OT consults as needed  - Advance activity as appropriate  - Communicate ordered activity level and limitations with patient/family  Outcome: Progressing

## 2023-03-24 NOTE — PROGRESS NOTES
Patient okay to be discharged per MD order, report to be called by primary RN, right arm IV removed by this RN, all paperwork filled out and printed to be picked up by Lily Alvarez at time of transport. Pt to be picked up by Lily Alvarez this evening, pt and pt's wife at bedside aware.

## 2023-03-24 NOTE — PLAN OF CARE
Pt wife at bedside and active in pt care. Frequent rounding maintained. Patient educated on all medications administered. Bed in lowest position, bed alarm and i bed awareness activated, fall precautions in place and call light within reach at all times. All patients questions answered and needs addressed promptly. Problem: Patient Centered Care  Goal: Patient preferences are identified and integrated in the patient's plan of care  Description: Interventions:  - What would you like us to know as we care for you? I live with my wife, who helps take care of me.   - Provide timely, complete, and accurate information to patient/family  - Incorporate patient and family knowledge, values, beliefs, and cultural backgrounds into the planning and delivery of care  - Encourage patient/family to participate in care and decision-making at the level they choose  - Honor patient and family perspectives and choices  Outcome: Progressing     Problem: Patient/Family Goals  Goal: Patient/Family Long Term Goal  Description: Patient's Long Term Goal: get stronger to ambulate better    Interventions:  - PT/OT eval  - take medications as ordered  - complete testing as ordered  - See additional Care Plan goals for specific interventions  Outcome: Progressing  Goal: Patient/Family Short Term Goal  Description: Patient's Short Term Goal: clear up this UTI    Interventions:   - rocephin IV daily  - take medications as ordered  - frequent checking to be sure patient is dry/unsoiled  - See additional Care Plan goals for specific interventions  Outcome: Progressing     Problem: CARDIOVASCULAR - ADULT  Goal: Maintains optimal cardiac output and hemodynamic stability  Description: INTERVENTIONS:  - Monitor vital signs, rhythm, and trends  - Monitor for bleeding, hypotension and signs of decreased cardiac output  - Evaluate effectiveness of vasoactive medications to optimize hemodynamic stability  - Monitor arterial and/or venous puncture sites for bleeding and/or hematoma  - Assess quality of pulses, skin color and temperature  - Assess for signs of decreased coronary artery perfusion - ex.  Angina  - Evaluate fluid balance, assess for edema, trend weights  Outcome: Progressing  Goal: Absence of cardiac arrhythmias or at baseline  Description: INTERVENTIONS:  - Continuous cardiac monitoring, monitor vital signs, obtain 12 lead EKG if indicated  - Evaluate effectiveness of antiarrhythmic and heart rate control medications as ordered  - Initiate emergency measures for life threatening arrhythmias  - Monitor electrolytes and administer replacement therapy as ordered  Outcome: Progressing     Problem: METABOLIC/FLUID AND ELECTROLYTES - ADULT  Goal: Electrolytes maintained within normal limits  Description: INTERVENTIONS:  - Monitor labs and rhythm and assess patient for signs and symptoms of electrolyte imbalances  - Administer electrolyte replacement as ordered  - Monitor response to electrolyte replacements, including rhythm and repeat lab results as appropriate  - Fluid restriction as ordered  - Instruct patient on fluid and nutrition restrictions as appropriate  Outcome: Progressing     Problem: SKIN/TISSUE INTEGRITY - ADULT  Goal: Incision(s), wounds(s) or drain site(s) healing without S/S of infection  Description: INTERVENTIONS:  - Assess and document risk factors for pressure ulcer development  - Assess and document skin integrity  - Assess and document dressing/incision, wound bed, drain sites and surrounding tissue  - Implement wound care per orders  - Initiate isolation precautions as appropriate  - Initiate Pressure Ulcer prevention bundle as indicated  Outcome: Progressing     Problem: MUSCULOSKELETAL - ADULT  Goal: Return mobility to safest level of function  Description: INTERVENTIONS:  - Assess patient stability and activity tolerance for standing, transferring and ambulating w/ or w/o assistive devices  - Assist with transfers and ambulation using safe patient handling equipment as needed  - Ensure adequate protection for wounds/incisions during mobilization  - Obtain PT/OT consults as needed  - Advance activity as appropriate  - Communicate ordered activity level and limitations with patient/family  Outcome: Progressing

## 2023-03-24 NOTE — CM/SW NOTE
03/24/23 1421   Discharge disposition   Expected discharge disposition [address]   Discharge transportation [address]     Pt discussed during nursing rounds. Pt is stable for dc today. MD dc order entered. Pt will dc to [facility] for [facility], Ciro Foster in admissions confirmed bed is available today. Pt and spouse agreeable to transfer today and cost of medicar which will be $44. [address] scheduled for 5pm . Number for nurse report is [phone]. Plan: [facility] for [facility] today. / to remain available for support and/or discharge planning.      Alexander Lee, BSN    [phone]

## 2023-03-26 ENCOUNTER — INITIAL APN SNF VISIT (OUTPATIENT)
Dept: INTERNAL MEDICINE CLINIC | Facility: SKILLED NURSING FACILITY | Age: 88
End: 2023-03-26

## 2023-03-26 DIAGNOSIS — Z79.899 ENCOUNTER FOR MEDICATION REVIEW: ICD-10-CM

## 2023-03-26 DIAGNOSIS — B95.61 MSSA BACTEREMIA: ICD-10-CM

## 2023-03-26 DIAGNOSIS — R53.81 PHYSICAL DECONDITIONING: ICD-10-CM

## 2023-03-26 DIAGNOSIS — Z45.2 PICC (PERIPHERALLY INSERTED CENTRAL CATHETER) IN PLACE: ICD-10-CM

## 2023-03-26 DIAGNOSIS — R78.81 MSSA BACTEREMIA: ICD-10-CM

## 2023-03-26 DIAGNOSIS — R41.82 ALTERED MENTAL STATUS, UNSPECIFIED ALTERED MENTAL STATUS TYPE: ICD-10-CM
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
## 2023-04-06 ENCOUNTER — SNF VISIT (OUTPATIENT)
Dept: INTERNAL MEDICINE CLINIC | Facility: SKILLED NURSING FACILITY | Age: 88
End: 2023-04-06

## 2023-04-06 DIAGNOSIS — R50.9 ACUTE FEBRILE ILLNESS: ICD-10-CM

## 2023-04-06 DIAGNOSIS — R53.81 PHYSICAL DECONDITIONING: ICD-10-CM

## 2023-04-06 DIAGNOSIS — Z45.2 PICC (PERIPHERALLY INSERTED CENTRAL CATHETER) IN PLACE: ICD-10-CM

## 2023-04-06 DIAGNOSIS — R78.81 BACTEREMIA: ICD-10-CM

## 2023-04-06 DIAGNOSIS — F03.918 DEMENTIA WITH OTHER BEHAVIORAL DISTURBANCE, UNSPECIFIED DEMENTIA SEVERITY, UNSPECIFIED DEMENTIA TYPE (HCC): ICD-10-CM

## 2023-04-06 DIAGNOSIS — Z09 ENCOUNTER FOR FOLLOW-UP: ICD-10-CM

## 2023-04-13 ENCOUNTER — SNF VISIT (OUTPATIENT)
Dept: INTERNAL MEDICINE CLINIC | Facility: SKILLED NURSING FACILITY | Age: 88
End: 2023-04-13

## 2023-04-13 DIAGNOSIS — Z09 ENCOUNTER FOR FOLLOW-UP: ICD-10-CM

## 2023-04-13 DIAGNOSIS — B95.61 MSSA BACTEREMIA: ICD-10-CM

## 2023-04-13 DIAGNOSIS — R53.81 PHYSICAL DECONDITIONING: ICD-10-CM

## 2023-04-13 DIAGNOSIS — R63.5 WEIGHT GAIN: ICD-10-CM

## 2023-04-13 DIAGNOSIS — R78.81 MSSA BACTEREMIA: ICD-10-CM

## 2023-04-13 DIAGNOSIS — G20 PARKINSON'S DISEASE (HCC): ICD-10-CM

## 2023-04-13 PROCEDURE — 1111F DSCHRG MED/CURRENT MED MERGE: CPT | Performed by: CLINICAL NURSE SPECIALIST

## 2023-04-13 PROCEDURE — 99309 SBSQ NF CARE MODERATE MDM 30: CPT | Performed by: CLINICAL NURSE SPECIALIST

## 2023-04-17 RX ORDER — FUROSEMIDE 20 MG/1
20 TABLET ORAL DAILY
COMMUNITY

## 2023-04-24 ENCOUNTER — HOSPITAL ENCOUNTER (OUTPATIENT)
Dept: PHYSICAL MEDICINE AND REHAB | Age: 88
Discharge: STILL ADMITTED OR ADMINISTRATIVE | End: 2023-04-24

## 2023-04-24 DIAGNOSIS — R26.9 GAIT ABNORMALITY: Primary | ICD-10-CM

## 2023-04-24 PROCEDURE — 97112 NEUROMUSCULAR REEDUCATION: CPT

## 2023-04-24 PROCEDURE — 97162 PT EVAL MOD COMPLEX 30 MIN: CPT

## 2023-04-24 ASSESSMENT — ENCOUNTER SYMPTOMS
PAIN SEVERITY NOW: 0
QUALITY: STIFF
ALLEVIATING FACTORS: REST
PAIN SCALE AT LOWEST: 0
PAIN SCALE AT HIGHEST: 0
PAIN: 1

## 2023-05-01 ENCOUNTER — HOSPITAL ENCOUNTER (OUTPATIENT)
Dept: PHYSICAL MEDICINE AND REHAB | Age: 88
Discharge: STILL ADMITTED OR ADMINISTRATIVE | End: 2023-05-01
Attending: FAMILY MEDICINE

## 2023-05-01 PROCEDURE — 97530 THERAPEUTIC ACTIVITIES: CPT

## 2023-05-01 PROCEDURE — 97112 NEUROMUSCULAR REEDUCATION: CPT

## 2023-05-04 ENCOUNTER — HOSPITAL ENCOUNTER (EMERGENCY)
Facility: HOSPITAL | Age: 88
Discharge: HOME | End: 2023-05-04
Attending: EMERGENCY MEDICINE
Payer: MEDICARE

## 2023-05-04 ENCOUNTER — APPOINTMENT (OUTPATIENT)
Dept: GENERAL RADIOLOGY | Facility: HOSPITAL | Age: 88
End: 2023-05-04
Attending: EMERGENCY MEDICINE
Payer: MEDICARE

## 2023-05-04 VITALS
HEART RATE: 67 BPM | HEIGHT: 67 IN | OXYGEN SATURATION: 90 % | SYSTOLIC BLOOD PRESSURE: 149 MMHG | BODY MASS INDEX: 22 KG/M2 | RESPIRATION RATE: 20 BRPM | TEMPERATURE: 97 F | DIASTOLIC BLOOD PRESSURE: 88 MMHG

## 2023-05-04 DIAGNOSIS — H10.31 ACUTE CONJUNCTIVITIS OF RIGHT EYE, UNSPECIFIED ACUTE CONJUNCTIVITIS TYPE: ICD-10-CM

## 2023-05-04 DIAGNOSIS — R53.1 WEAKNESS GENERALIZED: Primary | ICD-10-CM

## 2023-05-04 LAB
ANION GAP SERPL CALC-SCNC: 3 MMOL/L (ref 0–18)
BASOPHILS # BLD AUTO: 0.04 X10(3) UL (ref 0–0.2)
BASOPHILS NFR BLD AUTO: 0.6 %
BILIRUB UR QL: NEGATIVE
BUN BLD-MCNC: 15 MG/DL (ref 7–18)
BUN/CREAT SERPL: 26.3 (ref 10–20)
CALCIUM BLD-MCNC: 10 MG/DL (ref 8.5–10.1)
CHLORIDE SERPL-SCNC: 105 MMOL/L (ref 98–112)
CLARITY UR: CLEAR
CO2 SERPL-SCNC: 32 MMOL/L (ref 21–32)
CREAT BLD-MCNC: 0.57 MG/DL
DEPRECATED RDW RBC AUTO: 52.7 FL (ref 35.1–46.3)
EOSINOPHIL # BLD AUTO: 0.23 X10(3) UL (ref 0–0.7)
EOSINOPHIL NFR BLD AUTO: 3.6 %
ERYTHROCYTE [DISTWIDTH] IN BLOOD BY AUTOMATED COUNT: 14 % (ref 11–15)
GFR SERPLBLD BASED ON 1.73 SQ M-ARVRAT: 95 ML/MIN/1.73M2 (ref 60–?)
GLUCOSE BLD-MCNC: 99 MG/DL (ref 70–99)
GLUCOSE UR-MCNC: NORMAL MG/DL
HCT VFR BLD AUTO: 37.5 %
HGB BLD-MCNC: 12.2 G/DL
IMM GRANULOCYTES # BLD AUTO: 0.03 X10(3) UL (ref 0–1)
IMM GRANULOCYTES NFR BLD: 0.5 %
KETONES UR-MCNC: NEGATIVE MG/DL
LEUKOCYTE ESTERASE UR QL STRIP.AUTO: NEGATIVE
LYMPHOCYTES # BLD AUTO: 1.29 X10(3) UL (ref 1–4)
LYMPHOCYTES NFR BLD AUTO: 20.3 %
MCH RBC QN AUTO: 33.4 PG (ref 26–34)
MCHC RBC AUTO-ENTMCNC: 32.5 G/DL (ref 31–37)
MCV RBC AUTO: 102.7 FL
MONOCYTES # BLD AUTO: 0.85 X10(3) UL (ref 0.1–1)
MONOCYTES NFR BLD AUTO: 13.3 %
NEUTROPHILS # BLD AUTO: 3.93 X10 (3) UL (ref 1.5–7.7)
NEUTROPHILS # BLD AUTO: 3.93 X10(3) UL (ref 1.5–7.7)
NEUTROPHILS NFR BLD AUTO: 61.7 %
NITRITE UR QL STRIP.AUTO: NEGATIVE
OSMOLALITY SERPL CALC.SUM OF ELEC: 291 MOSM/KG (ref 275–295)
PH UR: 6.5 [PH] (ref 5–8)
PLATELET # BLD AUTO: 210 10(3)UL (ref 150–450)
POTASSIUM SERPL-SCNC: 5.1 MMOL/L (ref 3.5–5.1)
RBC # BLD AUTO: 3.65 X10(6)UL
RBC #/AREA URNS AUTO: >10 /HPF
SODIUM SERPL-SCNC: 140 MMOL/L (ref 136–145)
SP GR UR STRIP: 1.02 (ref 1–1.03)
TSI SER-ACNC: 1.39 MIU/ML (ref 0.36–3.74)
UROBILINOGEN UR STRIP-ACNC: NORMAL
WBC # BLD AUTO: 6.4 X10(3) UL (ref 4–11)

## 2023-05-04 PROCEDURE — 85025 COMPLETE CBC W/AUTO DIFF WBC: CPT | Performed by: EMERGENCY MEDICINE

## 2023-05-04 PROCEDURE — 99284 EMERGENCY DEPT VISIT MOD MDM: CPT

## 2023-05-04 PROCEDURE — 81001 URINALYSIS AUTO W/SCOPE: CPT | Performed by: EMERGENCY MEDICINE

## 2023-05-04 PROCEDURE — 99285 EMERGENCY DEPT VISIT HI MDM: CPT

## 2023-05-04 PROCEDURE — 80048 BASIC METABOLIC PNL TOTAL CA: CPT | Performed by: EMERGENCY MEDICINE

## 2023-05-04 PROCEDURE — 84443 ASSAY THYROID STIM HORMONE: CPT | Performed by: EMERGENCY MEDICINE

## 2023-05-04 PROCEDURE — 36415 COLL VENOUS BLD VENIPUNCTURE: CPT

## 2023-05-04 PROCEDURE — 71045 X-RAY EXAM CHEST 1 VIEW: CPT | Performed by: EMERGENCY MEDICINE

## 2023-05-04 RX ORDER — GENTAMICIN SULFATE 3 MG/ML
2 SOLUTION/ DROPS OPHTHALMIC
Qty: 5 ML | Refills: 0 | Status: SHIPPED | OUTPATIENT
Start: 2023-05-04 | End: 2023-05-09

## 2023-05-04 NOTE — ED QUICK NOTES
Patient straight cathed per protocol with Lambert Bello and Simba ocasio without complication. Urine sent to lab for analysis.

## 2023-05-04 NOTE — ED QUICK NOTES
The patient is cleared for discharge per Emergency Department physician. Discharge instructions were reviewed with patient and wife including when and how to follow up with healthcare providers and when to seek emergency care. Medication use was reviewed and prescription details were given per Emergency Department provider request.  Peripheral IV discontinued. The patient dressed self and was wheeled to the exit with steady gait.

## 2023-05-04 NOTE — ED INITIAL ASSESSMENT (HPI)
Patient arrives from wife from home via private car. Patient is poor historian, hx of dementia. Wife providing history and triage info. Wife states patient became weak 5/2/23 (unknown time) and was walking upstairs and could not finish getting up stairs. Patient slept at top of stairs last night. Paramedics helped assist patient up today and into wheelchair. Wife brought in to evaluate weakness.

## 2023-05-06 ENCOUNTER — HOSPITAL ENCOUNTER (EMERGENCY)
Facility: HOSPITAL | Age: 88
Discharge: HOME | End: 2023-05-06
Attending: EMERGENCY MEDICINE
Payer: MEDICARE

## 2023-05-06 ENCOUNTER — APPOINTMENT (OUTPATIENT)
Dept: CT IMAGING | Facility: HOSPITAL | Age: 88
End: 2023-05-06
Attending: EMERGENCY MEDICINE
Payer: MEDICARE

## 2023-05-06 VITALS
OXYGEN SATURATION: 100 % | RESPIRATION RATE: 16 BRPM | BODY MASS INDEX: 22.15 KG/M2 | SYSTOLIC BLOOD PRESSURE: 140 MMHG | HEIGHT: 67 IN | HEART RATE: 58 BPM | DIASTOLIC BLOOD PRESSURE: 110 MMHG | TEMPERATURE: 98 F | WEIGHT: 141.13 LBS

## 2023-05-06 DIAGNOSIS — R53.1 WEAKNESS GENERALIZED: Primary | ICD-10-CM

## 2023-05-06 LAB
ANION GAP SERPL CALC-SCNC: 3 MMOL/L (ref 0–18)
BASOPHILS # BLD AUTO: 0.03 X10(3) UL (ref 0–0.2)
BASOPHILS NFR BLD AUTO: 0.4 %
BUN BLD-MCNC: 20 MG/DL (ref 7–18)
BUN/CREAT SERPL: 26.3 (ref 10–20)
CALCIUM BLD-MCNC: 9.8 MG/DL (ref 8.5–10.1)
CHLORIDE SERPL-SCNC: 107 MMOL/L (ref 98–112)
CO2 SERPL-SCNC: 30 MMOL/L (ref 21–32)
CREAT BLD-MCNC: 0.76 MG/DL
DEPRECATED RDW RBC AUTO: 52.9 FL (ref 35.1–46.3)
EOSINOPHIL # BLD AUTO: 0.15 X10(3) UL (ref 0–0.7)
EOSINOPHIL NFR BLD AUTO: 2 %
ERYTHROCYTE [DISTWIDTH] IN BLOOD BY AUTOMATED COUNT: 13.5 % (ref 11–15)
GFR SERPLBLD BASED ON 1.73 SQ M-ARVRAT: 87 ML/MIN/1.73M2 (ref 60–?)
GLUCOSE BLD-MCNC: 115 MG/DL (ref 70–99)
HCT VFR BLD AUTO: 36.1 %
HGB BLD-MCNC: 11.2 G/DL
IMM GRANULOCYTES # BLD AUTO: 0.03 X10(3) UL (ref 0–1)
IMM GRANULOCYTES NFR BLD: 0.4 %
LYMPHOCYTES # BLD AUTO: 1.6 X10(3) UL (ref 1–4)
LYMPHOCYTES NFR BLD AUTO: 20.9 %
MCH RBC QN AUTO: 32.9 PG (ref 26–34)
MCHC RBC AUTO-ENTMCNC: 31 G/DL (ref 31–37)
MCV RBC AUTO: 106.2 FL
MONOCYTES # BLD AUTO: 0.85 X10(3) UL (ref 0.1–1)
MONOCYTES NFR BLD AUTO: 11.1 %
NEUTROPHILS # BLD AUTO: 5 X10 (3) UL (ref 1.5–7.7)
NEUTROPHILS # BLD AUTO: 5 X10(3) UL (ref 1.5–7.7)
NEUTROPHILS NFR BLD AUTO: 65.2 %
OSMOLALITY SERPL CALC.SUM OF ELEC: 294 MOSM/KG (ref 275–295)
PLATELET # BLD AUTO: 176 10(3)UL (ref 150–450)
POTASSIUM SERPL-SCNC: 4.8 MMOL/L (ref 3.5–5.1)
RBC # BLD AUTO: 3.4 X10(6)UL
SODIUM SERPL-SCNC: 140 MMOL/L (ref 136–145)
WBC # BLD AUTO: 7.7 X10(3) UL (ref 4–11)

## 2023-05-06 PROCEDURE — 36415 COLL VENOUS BLD VENIPUNCTURE: CPT

## 2023-05-06 PROCEDURE — 99285 EMERGENCY DEPT VISIT HI MDM: CPT

## 2023-05-06 PROCEDURE — 80048 BASIC METABOLIC PNL TOTAL CA: CPT | Performed by: EMERGENCY MEDICINE

## 2023-05-06 PROCEDURE — 70450 CT HEAD/BRAIN W/O DYE: CPT | Performed by: EMERGENCY MEDICINE

## 2023-05-06 PROCEDURE — 85025 COMPLETE CBC W/AUTO DIFF WBC: CPT | Performed by: EMERGENCY MEDICINE

## 2023-05-06 NOTE — ED INITIAL ASSESSMENT (HPI)
Pt eval for weakness, fall about 3 days ago& found by wife sitting on floor. Pt had bloody diarrhea x1, wife reports none noted since then. Wife requesting to have pt admitted with PT, pt was recently at [facility] but had issues with scheduling PT earlier in the day.

## 2023-05-06 NOTE — ED QUICK NOTES
RN to RN report given for continuation of care at [facility] and rehab. Wife of patient plans to transport patient in her private vehicle.

## 2023-05-06 NOTE — CM/SW NOTE
Received a call to assess the pt who left [facility] ext care earlier than needed. Wife states his pt would be at 5pm and that is too late for him. She states his strength has since declined. He has generalized weakness and has been using a wheelchair in the meantime   [facility] referral created ref number [id number]    9470- pt accepted to room 23 rn number is [phone]. PCS form completed. Family updated and requesting to take the pt. Neda Reynolds will ask when he calls to give report.

## 2023-05-10 ENCOUNTER — APPOINTMENT (OUTPATIENT)
Dept: PHYSICAL MEDICINE AND REHAB | Age: 88
End: 2023-05-10
Attending: FAMILY MEDICINE

## 2023-05-15 ENCOUNTER — APPOINTMENT (OUTPATIENT)
Dept: PHYSICAL MEDICINE AND REHAB | Age: 88
End: 2023-05-15
Attending: FAMILY MEDICINE

## 2023-05-17 ENCOUNTER — APPOINTMENT (OUTPATIENT)
Dept: PHYSICAL MEDICINE AND REHAB | Age: 88
End: 2023-05-17
Attending: FAMILY MEDICINE

## 2023-05-22 ENCOUNTER — APPOINTMENT (OUTPATIENT)
Dept: PHYSICAL MEDICINE AND REHAB | Age: 88
End: 2023-05-22
Attending: FAMILY MEDICINE

## 2023-05-24 ENCOUNTER — APPOINTMENT (OUTPATIENT)
Dept: PHYSICAL MEDICINE AND REHAB | Age: 88
End: 2023-05-24
Attending: FAMILY MEDICINE

## 2023-07-20 NOTE — CDS QUERY
.How to Answer this Query    1.) Click \"Edit\" button on the toolbar.  2.) Type an \"X\" in the bracket for the diagnosis that applies. (You may also add additional clinical details as you feel necessary to substantiate your response). 3.) Finally click \"Sign\" to complete response. Thank you    Potential for Impaired Nutritional Status  DOCUMENTATION CLARIFICATION FORM     Dear Doctor Amaral:  A Registered Dietitian evaluated this patient and found to meet Moderate Malnutrition Criteria using the [place] Guidelines based on the following Clinical Indicators:     * BMI 20.84  * Criteria for non-severe malnutrition diagnosis: chronic illness related to wt loss 10% in 6     months  * Mild depletion body fat orbital region, triceps region and fat overlying rib cage region and     mild depletion muscle mass temple region, clavicle region, scapular region, shoulder     region, dorsal grajeda region, thigh region and calf region --only mild deficits despite     35-40# wt loss. * Currently being treated for Closed fracture of neck of right femur,   * PMH includes BPH (benign prostatic hyperplasia), Dysphagia, Kidney stones, Other     and unspecified hyperlipidemia, Skin cancer, and Unspecified essential hypertension. * RD recommended adding Honey Thick-Mighty Shakes SF Vanilla (200 calories/ 7 g     protein each) Daily and Magic Cup (290 calories/ 9 g protein each) Orange Daily and     Chocolate Daily   PLEASE (X) ALL DIAGNOSES THAT APPLY. SELECTION BY PHYSICIAN ONLY       (  x)  Moderate Malnutrition    (  )  Undernutrition    (  )  Other (please specify):     (  )  Unable to Determine (please comment)            If you have any questions, please contact Clinical :  Sameera Weathers RN at [phone]     Thank You!     THIS FORM IS A PERMANENT PART OF THE MEDICAL RECORD Please let patient know that I would prefer to discuss medication changes with him at his next follow up. Thank you.

## 2023-08-10 ENCOUNTER — HOSPITAL ENCOUNTER (EMERGENCY)
Facility: HOSPITAL | Age: 88
Discharge: HOME | End: 2023-08-10
Attending: EMERGENCY MEDICINE
Payer: MEDICARE

## 2023-08-10 VITALS
HEART RATE: 51 BPM | RESPIRATION RATE: 14 BRPM | OXYGEN SATURATION: 96 % | SYSTOLIC BLOOD PRESSURE: 131 MMHG | DIASTOLIC BLOOD PRESSURE: 59 MMHG | TEMPERATURE: 98 F | HEIGHT: 67 IN | WEIGHT: 150 LBS | BODY MASS INDEX: 23.54 KG/M2

## 2023-08-10 DIAGNOSIS — N39.0 URINARY TRACT INFECTION WITHOUT HEMATURIA, SITE UNSPECIFIED: Primary | ICD-10-CM

## 2023-08-10 LAB
ALBUMIN SERPL-MCNC: 3.2 G/DL (ref 3.4–5)
ALBUMIN/GLOB SERPL: 0.8 {RATIO} (ref 1–2)
ALP LIVER SERPL-CCNC: 87 U/L
ALT SERPL-CCNC: 23 U/L
ANION GAP SERPL CALC-SCNC: 4 MMOL/L (ref 0–18)
AST SERPL-CCNC: 14 U/L (ref 15–37)
BASOPHILS # BLD AUTO: 0.02 X10(3) UL (ref 0–0.2)
BASOPHILS NFR BLD AUTO: 0.4 %
BILIRUB SERPL-MCNC: 0.2 MG/DL (ref 0.1–2)
BILIRUB UR QL: NEGATIVE
BUN BLD-MCNC: 16 MG/DL (ref 7–18)
BUN/CREAT SERPL: 20.3 (ref 10–20)
CALCIUM BLD-MCNC: 9.8 MG/DL (ref 8.5–10.1)
CHLORIDE SERPL-SCNC: 108 MMOL/L (ref 98–112)
CO2 SERPL-SCNC: 32 MMOL/L (ref 21–32)
COLOR UR: YELLOW
CREAT BLD-MCNC: 0.79 MG/DL
DEPRECATED RDW RBC AUTO: 51.5 FL (ref 35.1–46.3)
EGFRCR SERPLBLD CKD-EPI 2021: 86 ML/MIN/1.73M2 (ref 60–?)
EOSINOPHIL # BLD AUTO: 0.19 X10(3) UL (ref 0–0.7)
EOSINOPHIL NFR BLD AUTO: 3.8 %
ERYTHROCYTE [DISTWIDTH] IN BLOOD BY AUTOMATED COUNT: 13.4 % (ref 11–15)
GLOBULIN PLAS-MCNC: 4.1 G/DL (ref 2.8–4.4)
GLUCOSE BLD-MCNC: 112 MG/DL (ref 70–99)
GLUCOSE UR-MCNC: NORMAL MG/DL
HCT VFR BLD AUTO: 36.9 %
HGB BLD-MCNC: 11.9 G/DL
IMM GRANULOCYTES # BLD AUTO: 0.01 X10(3) UL (ref 0–1)
IMM GRANULOCYTES NFR BLD: 0.2 %
KETONES UR-MCNC: NEGATIVE MG/DL
LEUKOCYTE ESTERASE UR QL STRIP.AUTO: 500
LYMPHOCYTES # BLD AUTO: 1.37 X10(3) UL (ref 1–4)
LYMPHOCYTES NFR BLD AUTO: 27.2 %
MCH RBC QN AUTO: 33.1 PG (ref 26–34)
MCHC RBC AUTO-ENTMCNC: 32.2 G/DL (ref 31–37)
MCV RBC AUTO: 102.8 FL
MONOCYTES # BLD AUTO: 0.64 X10(3) UL (ref 0.1–1)
MONOCYTES NFR BLD AUTO: 12.7 %
NEUTROPHILS # BLD AUTO: 2.81 X10 (3) UL (ref 1.5–7.7)
NEUTROPHILS # BLD AUTO: 2.81 X10(3) UL (ref 1.5–7.7)
NEUTROPHILS NFR BLD AUTO: 55.7 %
NITRITE UR QL STRIP.AUTO: NEGATIVE
OSMOLALITY SERPL CALC.SUM OF ELEC: 300 MOSM/KG (ref 275–295)
PH UR: 6.5 [PH] (ref 5–8)
PLATELET # BLD AUTO: 169 10(3)UL (ref 150–450)
POTASSIUM SERPL-SCNC: 4 MMOL/L (ref 3.5–5.1)
PROT SERPL-MCNC: 7.3 G/DL (ref 6.4–8.2)
PROT UR-MCNC: 70 MG/DL
RBC # BLD AUTO: 3.59 X10(6)UL
RBC #/AREA URNS AUTO: >10 /HPF
SARS-COV-2 RNA RESP QL NAA+PROBE: NOT DETECTED
SODIUM SERPL-SCNC: 144 MMOL/L (ref 136–145)
SP GR UR STRIP: 1.02 (ref 1–1.03)
TSI SER-ACNC: 1.04 MIU/ML (ref 0.36–3.74)
UROBILINOGEN UR STRIP-ACNC: NORMAL
WBC # BLD AUTO: 5 X10(3) UL (ref 4–11)
WBC #/AREA URNS AUTO: >50 /HPF
WBC CLUMPS UR QL AUTO: PRESENT /HPF

## 2023-08-10 PROCEDURE — 36415 COLL VENOUS BLD VENIPUNCTURE: CPT

## 2023-08-10 PROCEDURE — 99285 EMERGENCY DEPT VISIT HI MDM: CPT

## 2023-08-10 PROCEDURE — 84443 ASSAY THYROID STIM HORMONE: CPT | Performed by: EMERGENCY MEDICINE

## 2023-08-10 PROCEDURE — 87077 CULTURE AEROBIC IDENTIFY: CPT | Performed by: EMERGENCY MEDICINE

## 2023-08-10 PROCEDURE — 87086 URINE CULTURE/COLONY COUNT: CPT | Performed by: EMERGENCY MEDICINE

## 2023-08-10 PROCEDURE — 87186 SC STD MICRODIL/AGAR DIL: CPT | Performed by: EMERGENCY MEDICINE

## 2023-08-10 PROCEDURE — 85025 COMPLETE CBC W/AUTO DIFF WBC: CPT | Performed by: EMERGENCY MEDICINE

## 2023-08-10 PROCEDURE — 81001 URINALYSIS AUTO W/SCOPE: CPT | Performed by: EMERGENCY MEDICINE

## 2023-08-10 PROCEDURE — 80053 COMPREHEN METABOLIC PANEL: CPT | Performed by: EMERGENCY MEDICINE

## 2023-08-10 PROCEDURE — 99283 EMERGENCY DEPT VISIT LOW MDM: CPT

## 2023-08-10 RX ORDER — CEFPODOXIME PROXETIL 200 MG/1
200 TABLET, FILM COATED ORAL 2 TIMES DAILY
Qty: 14 TABLET | Refills: 0 | Status: SHIPPED | OUTPATIENT
Start: 2023-08-10 | End: 2023-08-17

## 2023-08-10 NOTE — ED QUICK NOTES
Patient walked around nursing pod with wife and PCT. Patient was wearing a belt and the PCT had a wheel chair at standby when patient was walking.

## 2023-08-10 NOTE — ED INITIAL ASSESSMENT (HPI)
Patient from home with wife who states he has had increased generalized weakness since this morning.

## 2023-08-11 NOTE — ED QUICK NOTES
Patient and his daughter provided with discharge instructions. Verbalized understanding for plan of care at home and follow up. All questions/ concerns addressed prior to discharge.

## 2023-09-21 ENCOUNTER — APPOINTMENT (OUTPATIENT)
Dept: CT IMAGING | Facility: HOSPITAL | Age: 88
End: 2023-09-21
Attending: EMERGENCY MEDICINE
Payer: MEDICARE

## 2023-09-21 ENCOUNTER — HOSPITAL ENCOUNTER (INPATIENT)
Facility: HOSPITAL | Age: 88
LOS: 2 days | Discharge: HOME | End: 2023-09-23
Attending: EMERGENCY MEDICINE | Admitting: INTERNAL MEDICINE
Payer: MEDICARE

## 2023-09-21 ENCOUNTER — HOSPITAL ENCOUNTER (INPATIENT)
Facility: HOSPITAL | Age: 88
LOS: 2 days | Discharge: HOME | DRG: 690 | End: 2023-09-23
Attending: EMERGENCY MEDICINE | Admitting: INTERNAL MEDICINE
Payer: MEDICARE

## 2023-09-21 ENCOUNTER — APPOINTMENT (OUTPATIENT)
Dept: CT IMAGING | Facility: HOSPITAL | Age: 88
DRG: 690 | End: 2023-09-21
Attending: EMERGENCY MEDICINE
Payer: MEDICARE

## 2023-09-21 DIAGNOSIS — N20.0 STAGHORN CALCULUS: Primary | ICD-10-CM

## 2023-09-21 DIAGNOSIS — R53.1 WEAKNESS: ICD-10-CM

## 2023-09-21 DIAGNOSIS — F33.42 MAJOR DEPRESSIVE DISORDER, RECURRENT EPISODE, IN FULL REMISSION (HCC): ICD-10-CM

## 2023-09-21 LAB
ALBUMIN SERPL-MCNC: 3.3 G/DL (ref 3.4–5)
ALP LIVER SERPL-CCNC: 85 U/L
ALT SERPL-CCNC: 33 U/L
ANION GAP SERPL CALC-SCNC: 3 MMOL/L (ref 0–18)
ANION GAP SERPL CALC-SCNC: 3 MMOL/L (ref 0–18)
ANTIBODY SCREEN: NEGATIVE
AST SERPL-CCNC: 34 U/L (ref 15–37)
BASOPHILS # BLD AUTO: 0.04 X10(3) UL (ref 0–0.2)
BASOPHILS NFR BLD AUTO: 0.4 %
BILIRUB DIRECT SERPL-MCNC: <0.1 MG/DL (ref 0–0.2)
BILIRUB SERPL-MCNC: 0.5 MG/DL (ref 0.1–2)
BILIRUB UR QL: NEGATIVE
BUN BLD-MCNC: 15 MG/DL (ref 7–18)
BUN BLD-MCNC: 17 MG/DL (ref 7–18)
BUN/CREAT SERPL: 20.7 (ref 10–20)
BUN/CREAT SERPL: 20.8 (ref 10–20)
CALCIUM BLD-MCNC: 9.4 MG/DL (ref 8.5–10.1)
CALCIUM BLD-MCNC: 9.6 MG/DL (ref 8.5–10.1)
CHLORIDE SERPL-SCNC: 109 MMOL/L (ref 98–112)
CHLORIDE SERPL-SCNC: 110 MMOL/L (ref 98–112)
CO2 SERPL-SCNC: 30 MMOL/L (ref 21–32)
CO2 SERPL-SCNC: 31 MMOL/L (ref 21–32)
COLOR UR: YELLOW
CREAT BLD-MCNC: 0.72 MG/DL
CREAT BLD-MCNC: 0.82 MG/DL
DEPRECATED RDW RBC AUTO: 50.4 FL (ref 35.1–46.3)
EGFRCR SERPLBLD CKD-EPI 2021: 85 ML/MIN/1.73M2 (ref 60–?)
EGFRCR SERPLBLD CKD-EPI 2021: 88 ML/MIN/1.73M2 (ref 60–?)
EOSINOPHIL # BLD AUTO: 0.07 X10(3) UL (ref 0–0.7)
EOSINOPHIL NFR BLD AUTO: 0.6 %
ERYTHROCYTE [DISTWIDTH] IN BLOOD BY AUTOMATED COUNT: 13.6 % (ref 11–15)
GLUCOSE BLD-MCNC: 101 MG/DL (ref 70–99)
GLUCOSE BLD-MCNC: 118 MG/DL (ref 70–99)
GLUCOSE UR-MCNC: NORMAL MG/DL
HCT VFR BLD AUTO: 34.9 %
HGB BLD-MCNC: 11.4 G/DL
IMM GRANULOCYTES # BLD AUTO: 0.04 X10(3) UL (ref 0–1)
IMM GRANULOCYTES NFR BLD: 0.4 %
KETONES UR-MCNC: NEGATIVE MG/DL
LACTATE SERPL-SCNC: 1.7 MMOL/L (ref 0.4–2)
LEUKOCYTE ESTERASE UR QL STRIP.AUTO: 500
LIPASE SERPL-CCNC: 48 U/L (ref 13–75)
LYMPHOCYTES # BLD AUTO: 1.63 X10(3) UL (ref 1–4)
LYMPHOCYTES NFR BLD AUTO: 15 %
MCH RBC QN AUTO: 33.1 PG (ref 26–34)
MCHC RBC AUTO-ENTMCNC: 32.7 G/DL (ref 31–37)
MCV RBC AUTO: 101.5 FL
MONOCYTES # BLD AUTO: 0.75 X10(3) UL (ref 0.1–1)
MONOCYTES NFR BLD AUTO: 6.9 %
NEUTROPHILS # BLD AUTO: 8.34 X10 (3) UL (ref 1.5–7.7)
NEUTROPHILS # BLD AUTO: 8.34 X10(3) UL (ref 1.5–7.7)
NEUTROPHILS NFR BLD AUTO: 76.7 %
NITRITE UR QL STRIP.AUTO: NEGATIVE
OSMOLALITY SERPL CALC.SUM OF ELEC: 297 MOSM/KG (ref 275–295)
OSMOLALITY SERPL CALC.SUM OF ELEC: 299 MOSM/KG (ref 275–295)
PH UR: 8 [PH] (ref 5–8)
PLATELET # BLD AUTO: 165 10(3)UL (ref 150–450)
POTASSIUM SERPL-SCNC: 3.8 MMOL/L (ref 3.5–5.1)
POTASSIUM SERPL-SCNC: 5 MMOL/L (ref 3.5–5.1)
PROT SERPL-MCNC: 7.4 G/DL (ref 6.4–8.2)
PROT UR-MCNC: 300 MG/DL
RBC # BLD AUTO: 3.44 X10(6)UL
RBC #/AREA URNS AUTO: >10 /HPF
RH BLOOD TYPE: POSITIVE
SODIUM SERPL-SCNC: 143 MMOL/L (ref 136–145)
SODIUM SERPL-SCNC: 143 MMOL/L (ref 136–145)
SP GR UR STRIP: 1.02 (ref 1–1.03)
UROBILINOGEN UR STRIP-ACNC: NORMAL
WBC # BLD AUTO: 10.9 X10(3) UL (ref 4–11)
WBC #/AREA URNS AUTO: >50 /HPF
WBC CLUMPS UR QL AUTO: PRESENT /HPF

## 2023-09-21 PROCEDURE — 99285 EMERGENCY DEPT VISIT HI MDM: CPT

## 2023-09-21 PROCEDURE — 85025 COMPLETE CBC W/AUTO DIFF WBC: CPT | Performed by: EMERGENCY MEDICINE

## 2023-09-21 PROCEDURE — 87040 BLOOD CULTURE FOR BACTERIA: CPT | Performed by: EMERGENCY MEDICINE

## 2023-09-21 PROCEDURE — 85060 BLOOD SMEAR INTERPRETATION: CPT | Performed by: EMERGENCY MEDICINE

## 2023-09-21 PROCEDURE — 36415 COLL VENOUS BLD VENIPUNCTURE: CPT

## 2023-09-21 PROCEDURE — 86900 BLOOD TYPING SEROLOGIC ABO: CPT | Performed by: EMERGENCY MEDICINE

## 2023-09-21 PROCEDURE — 86901 BLOOD TYPING SEROLOGIC RH(D): CPT | Performed by: EMERGENCY MEDICINE

## 2023-09-21 PROCEDURE — 74176 CT ABD & PELVIS W/O CONTRAST: CPT | Performed by: EMERGENCY MEDICINE

## 2023-09-21 PROCEDURE — 87186 SC STD MICRODIL/AGAR DIL: CPT | Performed by: EMERGENCY MEDICINE

## 2023-09-21 PROCEDURE — 80076 HEPATIC FUNCTION PANEL: CPT | Performed by: EMERGENCY MEDICINE

## 2023-09-21 PROCEDURE — 87086 URINE CULTURE/COLONY COUNT: CPT | Performed by: EMERGENCY MEDICINE

## 2023-09-21 PROCEDURE — 83605 ASSAY OF LACTIC ACID: CPT | Performed by: EMERGENCY MEDICINE

## 2023-09-21 PROCEDURE — 83690 ASSAY OF LIPASE: CPT | Performed by: EMERGENCY MEDICINE

## 2023-09-21 PROCEDURE — 92610 EVALUATE SWALLOWING FUNCTION: CPT

## 2023-09-21 PROCEDURE — 80048 BASIC METABOLIC PNL TOTAL CA: CPT | Performed by: EMERGENCY MEDICINE

## 2023-09-21 PROCEDURE — 96365 THER/PROPH/DIAG IV INF INIT: CPT

## 2023-09-21 PROCEDURE — 97530 THERAPEUTIC ACTIVITIES: CPT

## 2023-09-21 PROCEDURE — 80048 BASIC METABOLIC PNL TOTAL CA: CPT | Performed by: INTERNAL MEDICINE

## 2023-09-21 PROCEDURE — 87077 CULTURE AEROBIC IDENTIFY: CPT | Performed by: EMERGENCY MEDICINE

## 2023-09-21 PROCEDURE — 97162 PT EVAL MOD COMPLEX 30 MIN: CPT

## 2023-09-21 PROCEDURE — 86850 RBC ANTIBODY SCREEN: CPT | Performed by: EMERGENCY MEDICINE

## 2023-09-21 PROCEDURE — 81001 URINALYSIS AUTO W/SCOPE: CPT | Performed by: EMERGENCY MEDICINE

## 2023-09-21 RX ORDER — ONDANSETRON 2 MG/ML
4 INJECTION INTRAMUSCULAR; INTRAVENOUS EVERY 6 HOURS PRN
Status: DISCONTINUED | OUTPATIENT
Start: 2023-09-21 | End: 2023-09-23

## 2023-09-21 RX ORDER — BISACODYL 10 MG
10 SUPPOSITORY, RECTAL RECTAL
Status: DISCONTINUED | OUTPATIENT
Start: 2023-09-21 | End: 2023-09-23

## 2023-09-21 RX ORDER — ENOXAPARIN SODIUM 100 MG/ML
40 INJECTION SUBCUTANEOUS DAILY
Status: DISCONTINUED | OUTPATIENT
Start: 2023-09-21 | End: 2023-09-23

## 2023-09-21 RX ORDER — DONEPEZIL HYDROCHLORIDE 10 MG/1
10 TABLET, FILM COATED ORAL NIGHTLY
Status: DISCONTINUED | OUTPATIENT
Start: 2023-09-21 | End: 2023-09-23

## 2023-09-21 RX ORDER — ARIPIPRAZOLE 2 MG/1
2 TABLET ORAL EVERY OTHER DAY
Status: DISCONTINUED | OUTPATIENT
Start: 2023-09-22 | End: 2023-09-23

## 2023-09-21 RX ORDER — SODIUM CHLORIDE 9 MG/ML
INJECTION, SOLUTION INTRAVENOUS CONTINUOUS
Status: DISCONTINUED | OUTPATIENT
Start: 2023-09-21 | End: 2023-09-23

## 2023-09-21 RX ORDER — VENLAFAXINE 37.5 MG/1
37.5 TABLET ORAL 2 TIMES DAILY
COMMUNITY

## 2023-09-21 RX ORDER — ACETAMINOPHEN 500 MG
500 TABLET ORAL EVERY 4 HOURS PRN
Status: DISCONTINUED | OUTPATIENT
Start: 2023-09-21 | End: 2023-09-23

## 2023-09-21 RX ORDER — HYDROXYZINE HYDROCHLORIDE 25 MG/1
25 TABLET, FILM COATED ORAL 3 TIMES DAILY PRN
Status: DISCONTINUED | OUTPATIENT
Start: 2023-09-21 | End: 2023-09-23

## 2023-09-21 RX ORDER — VENLAFAXINE 37.5 MG/1
37.5 TABLET ORAL 2 TIMES DAILY
Status: DISCONTINUED | OUTPATIENT
Start: 2023-09-21 | End: 2023-09-23

## 2023-09-21 RX ORDER — SENNOSIDES 8.6 MG
17.2 TABLET ORAL NIGHTLY PRN
Status: DISCONTINUED | OUTPATIENT
Start: 2023-09-21 | End: 2023-09-23

## 2023-09-21 RX ORDER — MIRTAZAPINE 15 MG/1
15 TABLET, ORALLY DISINTEGRATING ORAL NIGHTLY
Status: DISCONTINUED | OUTPATIENT
Start: 2023-09-21 | End: 2023-09-23

## 2023-09-21 RX ORDER — PANTOPRAZOLE SODIUM 40 MG/1
40 TABLET, DELAYED RELEASE ORAL
Status: DISCONTINUED | OUTPATIENT
Start: 2023-09-21 | End: 2023-09-23

## 2023-09-21 RX ORDER — METOCLOPRAMIDE HYDROCHLORIDE 5 MG/ML
10 INJECTION INTRAMUSCULAR; INTRAVENOUS EVERY 8 HOURS PRN
Status: DISCONTINUED | OUTPATIENT
Start: 2023-09-21 | End: 2023-09-21

## 2023-09-21 RX ORDER — MORPHINE SULFATE 2 MG/ML
1 INJECTION, SOLUTION INTRAMUSCULAR; INTRAVENOUS EVERY 2 HOUR PRN
Status: DISCONTINUED | OUTPATIENT
Start: 2023-09-21 | End: 2023-09-23

## 2023-09-21 RX ORDER — MORPHINE SULFATE 4 MG/ML
4 INJECTION, SOLUTION INTRAMUSCULAR; INTRAVENOUS EVERY 2 HOUR PRN
Status: DISCONTINUED | OUTPATIENT
Start: 2023-09-21 | End: 2023-09-23

## 2023-09-21 RX ORDER — MELATONIN
3 NIGHTLY PRN
Status: DISCONTINUED | OUTPATIENT
Start: 2023-09-21 | End: 2023-09-23

## 2023-09-21 RX ORDER — MORPHINE SULFATE 2 MG/ML
2 INJECTION, SOLUTION INTRAMUSCULAR; INTRAVENOUS EVERY 2 HOUR PRN
Status: DISCONTINUED | OUTPATIENT
Start: 2023-09-21 | End: 2023-09-23

## 2023-09-21 RX ORDER — MAGNESIUM OXIDE 400 MG (241.3 MG MAGNESIUM) TABLET
2 TABLET NIGHTLY
Status: DISCONTINUED | OUTPATIENT
Start: 2023-09-21 | End: 2023-09-23

## 2023-09-21 RX ORDER — ENEMA 19; 7 G/133ML; G/133ML
1 ENEMA RECTAL ONCE AS NEEDED
Status: DISCONTINUED | OUTPATIENT
Start: 2023-09-21 | End: 2023-09-23

## 2023-09-21 RX ORDER — POLYETHYLENE GLYCOL 3350 17 G/17G
17 POWDER, FOR SOLUTION ORAL DAILY PRN
Status: DISCONTINUED | OUTPATIENT
Start: 2023-09-21 | End: 2023-09-23

## 2023-09-21 RX ORDER — ATORVASTATIN CALCIUM 20 MG/1
20 TABLET, FILM COATED ORAL NIGHTLY
Status: DISCONTINUED | OUTPATIENT
Start: 2023-09-21 | End: 2023-09-23

## 2023-09-21 NOTE — ED INITIAL ASSESSMENT (HPI)
Patient came from home by EMS for weakness and per wife he gets this way when he has a UTI. Wife is normally A&Ox2, baseline. Patient at this time c/o of no symptoms or issues. Wife on the way here.

## 2023-09-21 NOTE — PLAN OF CARE
Patient stable. Seen by urology, no intervention planned for kidney stone. Seen by ST, recommending minced/moist foods with NTL, meds whole in applesauce. PT/OT saw patient and recommending home with [facility]. Continuing on IV abx and fluids. Needs met.

## 2023-09-21 NOTE — SLP NOTE
ADULT SWALLOWING EVALUATION    ASSESSMENT    ASSESSMENT/OVERALL IMPRESSION:  PPE REQUIRED. THIS THERAPIST WORE GLOVES AND MASK FOR DURATION OF EVALUATION. HANDS WASHED UPON ENTRANCE/EXIT. Per MD note, \"81year-old male with a history of dementia, hypertension who presents to the emergency department with the wife given that over the last 1 day he has had increased weakness, urinary frequency in the wife is concerned about another urinary tract infection. He has not fallen. He has been unable to use the walker was given to him at the last emergency department visit. He denies any chest pain or dyspnea. He does report generalized abdominal pain. History limited by patient's dementia, he is at baseline mental status according to the wife who is his caretaker. \"    SLP BSSE orders received and acknowledged. A swallow evaluation warranted as per \"RN dysphagia screen/modified diet consistency\". Pt afebrile with clear vocal quality, on room air, with oxygen saturation at 97%. Pt with hx of dysphagia at [address] [place] [address], last seen 3/22/23 for BSE with recommendations for minced & moist/mildly thick liquids. Pt positioned 90 degrees in bed, alert/cooperative. Oral motor examination reveals reduced strength, ROM, and rate of motion. Pt presented with trials of soft solids, puree, mildly thick liquids, and thin liquids via cup/tsp. Pt with adequate oral acceptance and bilabial seal across all trials. Pt with intact bite, prolonged mastication of soft solids, and delayed A-P transit. Pt's swallow response appears delayed with reduced hyolaryngeal elevation/excursion. No clinical signs of aspiration (e.g., immediate/delayed throat clear, immediate/delayed cough, wet vocal quality, increased O2 effort) observed across all trials. No CXR on this admission. Oxygen status remained stable t/o the entire evaluation. At this time, pt presents with mild oral dysphagia and probable pharyngeal dysfunction.  Recommend a minced & moist diet and mildly thick liquids with strict adherence to safe swallowing compensatory strategies. Results and recommendations reviewed with RN. SLP collaborated with RN for MD diet orders. PLAN: SLP to f/u x2 meal assessments, monitor imaging, and VFSS if clinically indicated           RECOMMENDATIONS   Diet Recommendations - Solids: Mechanical soft ground/ Minced & Moist  Diet Recommendations - Liquids: Nectar thick liquids/ Mildly thick                        Compensatory Strategies Recommended: No straws; Slow rate;Small bites and sips  Aspiration Precautions: Upright position; Slow rate;Small bites and sips; No straw  Medication Administration Recommendations:  (as tolerated)  Treatment Plan/Recommendations: Aspiration precautions  Discharge Recommendations/Plan: Undetermined    HISTORY   MEDICAL HISTORY  Reason for Referral: RN dysphagia screen; Altered diet consistency    Problem List  Principal Problem:    Staghorn calculus      Past Medical History  Past Medical History:   Diagnosis Date    Anxiety state     BPH (benign prostatic hyperplasia)     Dementia (HCC)     Depression     Dysphagia     High blood pressure     Kidney stones     Other and unspecified hyperlipidemia     Pneumonia due to organism     Seizure disorder (HCC)     because of venalfaxine    Skin cancer     melanoma    Unspecified essential hypertension        Prior Living Situation: Home with spouse  Diet Prior to Admission: Unknown  Precautions: Aspiration    Patient/Family Goals: pt did not state, family not present    SWALLOWING HISTORY  Current Diet Consistency: NPO  Dysphagia History: see above  Imaging Results: none    SUBJECTIVE       OBJECTIVE   ORAL MOTOR EXAMINATION  Dentition: Natural (sparse)     Strength:  (reduced)     Range of Motion:  (reduced)  Rate of Motion: Reduced    Voice Quality: Clear  Respiratory Status: Unlabored  Consistencies Trialed: Thin liquids; Nectar thick liquids; Soft solid;Puree  Method of Presentation: Staff/Clinician assistance;Spoon;Cup  Patient Positioning: Upright;Midline    Oral Phase of Swallow: Impaired  Bolus Retrieval: Intact  Bilabial Seal: Intact  Bolus Formation: Impaired  Bolus Propulsion: Impaired  Mastication: Impaired  Retention: Intact    Pharyngeal Phase of Swallow: Impaired  Laryngeal Elevation Timing: Appears impaired  Laryngeal Elevation Strength: Appears impaired  Laryngeal Elevation Coordination: Appears intact  (Please note: Silent aspiration cannot be evaluated clinically. Videofluoroscopic Swallow Study is required to rule-out silent aspiration.)    Esophageal Phase of Swallow: No complaints consistent with possible esophageal involvement  Comments: NA              GOALS  Goal #1 The patient will tolerate minced & moist consistency and mildly thick liquids without overt signs or symptoms of aspiration with 100 % accuracy over 1-2 session(s). In Progress   Goal #2 The patient/family/caregiver will demonstrate understanding and implementation of aspiration precautions and swallow strategies independently over 1-2 session(s). In Progress   Goal #3 The patient will tolerate trial upgrade of soft bite sized consistency and thin liquids without overt signs or symptoms of aspiration with 100 % accuracy over 1-2 session(s). In Progress   Goal #4 The patient will utilize compensatory strategies as outlined by  BSSE (clinical evaluation) including Slow rate, Small bites, Small sips, No straws, Upright 90 degrees with PRN assistance 100 % of the time across 2 sessions.     In Progress     FOLLOW UP  Treatment Plan/Recommendations: Aspiration precautions     Follow Up Needed (Documentation Required): Yes  SLP Follow-up Date: 09/22/23    Thank you for your referral.   If you have any questions, please contact Hadley Goldmann, SULEMA Donis  Phone Number [phone]. [phone]

## 2023-09-21 NOTE — ED QUICK NOTES
Orders for admission, patient is aware of plan and ready to go upstairs. Any questions, please call ED RN Joanie Coronado RN at extension [phone].      Patient Covid vaccination status: Fully vaccinated     COVID Test Ordered in ED: None    COVID Suspicion at Admission: N/A    Running Infusions:  None    Mental Status/LOC at time of transport: AAOx1-2    Other pertinent information:   CIWA score: N/A   NIH score:  N/A

## 2023-09-21 NOTE — PLAN OF CARE
Problem: Patient Centered Care  Goal: Patient preferences are identified and integrated in the patient's plan of care  Description: Interventions:    - Provide timely, complete, and accurate information to patient/family  - Incorporate patient and family knowledge, values, beliefs, and cultural backgrounds into the planning and delivery of care  - Encourage patient/family to participate in care and decision-making at the level they choose  - Honor patient and family perspectives and choices  Outcome: Progressing     Problem: Patient/Family Goals  Goal: Patient/Family Long Term Goal      Interventions:    - See additional Care Plan goals for specific interventions  Outcome: Progressing  Goal: Patient/Family Short Term Goal      Interventions:     - See additional Care Plan goals for specific interventions  Outcome: Progressing

## 2023-09-21 NOTE — PHYSICAL THERAPY NOTE
PHYSICAL THERAPY EVALUATION - INPATIENT     Room Number: 526/526-A  Evaluation Date: 9/21/2023  Type of Evaluation: Initial   Physician Order: PT Eval and Treat    Presenting Problem: weakness, possible UTI     Reason for Therapy: Mobility Dysfunction and Discharge Planning    PHYSICAL THERAPY ASSESSMENT     Patient is a 81year old male admitted 9/21/2023 for weakness, possible UTI. Patient's current functional deficits include weakness, impaired balance, bed mobility, transfers, gait, and stair navigation, which are below the patient's pre-admission status. Patient will benefit from continued IP PT services to address these deficits in preparation for discharge. RN approved participation with physical therapy. Pt was received resting in bed and agreeable to activity. Wife at bedside. Educated on benefits of oob mobility and role of PT. Pt verbalized understanding. Pt with no c/o pain this session. Bed mobility: Min A for supine>sit and scooting to EOB. CGA to SBA for static sitting balance at EOB. Max assist to don shoes while sitting at EOB. Transfers: Min A for STS with RW. Verbal cues for safe hand placement prior to transfer with fair carryover noted. Pt able to march in place while standing with RW and Min A for balance. Gait: 4 ft bed>chair with RW and Min A for balance, shuffled gait with posterior lean, slightly unsteady and needs cues for upright posture and RW management, no LOB. Pt was left resting in bed with needs within reach, bed alarm on, handoff to RN complete. The patient's Approx Degree of Impairment: 50.57% has been calculated based on documentation in the [facility] '6 clicks' Inpatient Basic Mobility Short Form. Research supports that patients with this level of impairment may benefit from SIVA however PT anticipates pt is on track to DC home safely with [facility] PT and 24 hour supervision. Pt and wife agreeable to [facility] PT.  Wife is already 24 hour caretaker for pt and assists with all ADLs. Neither are interested or want rehab at this time. DISCHARGE RECOMMENDATIONS  PT Discharge Recommendations: 24 hour care/supervision;Home with home health PT    PLAN  PT Treatment Plan: Bed mobility; Body mechanics; Endurance; Energy conservation;Gait training;Family education;Patient education;Strengthening;Stair training;Transfer training;Balance training  Rehab Potential : Good  Frequency (Obs): 5x/week       PHYSICAL THERAPY MEDICAL/SOCIAL HISTORY     Problem List  Principal Problem:    [place]aghorn calculus    HOME SITUATION  Home Situation  Type of Home: House  Home Layout: Two level;Bed/bath upstairs  Stairs to Bedroom: 14  Railing: Yes  Lives With: Spouse  Drives: No  Patient Owned Equipment: Rolling walker; Wheelchair  Patient Regularly Uses: None     Prior Level of [place]: assist for all ADLs and mobility, short distances with RW, longer distances with WC, wife assists with ADLs and STS transfers    SUBJECTIVE  Agreeable to activity. PHYSICAL THERAPY EXAMINATION     OBJECTIVE  Precautions: Bed/chair alarm  Fall Risk: High fall risk    WEIGHT BEARING RESTRICTION  none    PAIN ASSESSMENT  Ratin    COGNITION  Overall Cognitive Status:  WFL - within functional limits    RANGE OF MOTION AND STRENGTH ASSESSMENT  Upper extremity ROM and strength are within functional limits. Lower extremity ROM is within functional limits. Lower extremity strength is within functional limits. BALANCE  Static Sitting: Good  Dynamic Sitting: Fair +  Static Standing: Fair -  Dynamic Standing: Poor +    ACTIVITY TOLERANCE  Pulse: 55  Heart Rate Source: Monitor    AM-PAC '6-Clicks' INPATIENT SHORT FORM - BASIC MOBILITY  How much difficulty does the patient currently have. ..   Patient Difficulty: Turning over in bed (including adjusting bedclothes, sheets and blankets)?: A Little   Patient Difficulty: Sitting down on and standing up from a chair with arms (e.g., wheelchair, bedside commode, etc.): A Little   Patient Difficulty: Moving from lying on back to sitting on the side of the bed?: A Little   How much help from another person does the patient currently need. .. Help from Another: Moving to and from a bed to a chair (including a wheelchair)?: A Little   Help from Another: Need to walk in hospital room?: A Little   Help from Another: Climbing 3-5 steps with a railing?: A Lot     AM-PAC Score:  Raw Score: 17   Approx Degree of Impairment: 50.57%   Standardized Score (AM-PAC Scale): 42.13   CMS Modifier (G-Code): CK    FUNCTIONAL ABILITY STATUS  Functional Mobility/Gait Assessment  Gait Assistance: Minimum assistance  Distance (ft): 4  Assistive Device: Rolling walker  Pattern: Shuffle (flexed posture, unsteady, slow)    Bed Mobility: Min A     Transfers: Min A     Exercise/Education Provided:  Bed mobility  Body mechanics  Energy conservation  Functional activity tolerated  Gait training  Posture  Transfer training    Patient End of Session: Up in chair;Call light within reach; Needs met;RN aware of session/findings; All patient questions and concerns addressed; Alarm set; Family present    CURRENT GOALS    Goals to be met by: 9/28/23  Patient Goal Patient's self-stated goal is: go home   Goal #1 Patient is able to demonstrate supine - sit EOB @ level: CGA     Goal #1   Current Status    Goal #2 Patient is able to demonstrate transfers Sit to/from Stand at assistance level: CGA with walker - rolling     Goal #2  Current Status    Goal #3 Patient is able to ambulate 50 feet with assist device: walker - rolling at assistance level: CGA   Goal #3   Current Status    Goal #4 Patient will negotiate 8 stairs/one curb w/ assistive device and supervision   Goal #4   Current Status    Goal #5 Patient to demonstrate independence with home activity/exercise instructions provided to patient in preparation for discharge.    Goal #5   Current Status    Goal #6    Goal #6  Current Status        Patient Evaluation Complexity Level:  History Moderate - 1 or 2 personal factors and/or co-morbidities   Examination of body systems Moderate - addressing a total of 3 or more elements   Clinical Presentation Moderate - Evolving   Clinical Decision Making Moderate Complexity         Therapeutic Activity: 19 minutes

## 2023-09-21 NOTE — ED QUICK NOTES
Patient moving arms when staff attempts to place IV access and obtain blood. This writer unsuccessful x1. Another RN to attempt with multiple staff assistance.

## 2023-09-22 LAB
ANION GAP SERPL CALC-SCNC: 3 MMOL/L (ref 0–18)
BASOPHILS # BLD AUTO: 0.03 X10(3) UL (ref 0–0.2)
BASOPHILS NFR BLD AUTO: 0.7 %
BUN BLD-MCNC: 16 MG/DL (ref 7–18)
BUN/CREAT SERPL: 25 (ref 10–20)
CALCIUM BLD-MCNC: 9.4 MG/DL (ref 8.5–10.1)
CHLORIDE SERPL-SCNC: 115 MMOL/L (ref 98–112)
CO2 SERPL-SCNC: 29 MMOL/L (ref 21–32)
CREAT BLD-MCNC: 0.64 MG/DL
DEPRECATED RDW RBC AUTO: 51.8 FL (ref 35.1–46.3)
EGFRCR SERPLBLD CKD-EPI 2021: 92 ML/MIN/1.73M2 (ref 60–?)
EOSINOPHIL # BLD AUTO: 0.19 X10(3) UL (ref 0–0.7)
EOSINOPHIL NFR BLD AUTO: 4.4 %
ERYTHROCYTE [DISTWIDTH] IN BLOOD BY AUTOMATED COUNT: 13.4 % (ref 11–15)
GLUCOSE BLD-MCNC: 94 MG/DL (ref 70–99)
HCT VFR BLD AUTO: 35.8 %
HGB BLD-MCNC: 11.2 G/DL
IMM GRANULOCYTES # BLD AUTO: 0.01 X10(3) UL (ref 0–1)
IMM GRANULOCYTES NFR BLD: 0.2 %
LYMPHOCYTES # BLD AUTO: 1.41 X10(3) UL (ref 1–4)
LYMPHOCYTES NFR BLD AUTO: 32.9 %
MAGNESIUM SERPL-MCNC: 2.3 MG/DL (ref 1.6–2.6)
MCH RBC QN AUTO: 33 PG (ref 26–34)
MCHC RBC AUTO-ENTMCNC: 31.3 G/DL (ref 31–37)
MCV RBC AUTO: 105.6 FL
MONOCYTES # BLD AUTO: 0.52 X10(3) UL (ref 0.1–1)
MONOCYTES NFR BLD AUTO: 12.1 %
NEUTROPHILS # BLD AUTO: 2.12 X10 (3) UL (ref 1.5–7.7)
NEUTROPHILS # BLD AUTO: 2.12 X10(3) UL (ref 1.5–7.7)
NEUTROPHILS NFR BLD AUTO: 49.7 %
OSMOLALITY SERPL CALC.SUM OF ELEC: 305 MOSM/KG (ref 275–295)
PLATELET # BLD AUTO: 150 10(3)UL (ref 150–450)
POTASSIUM SERPL-SCNC: 4.4 MMOL/L (ref 3.5–5.1)
RBC # BLD AUTO: 3.39 X10(6)UL
SODIUM SERPL-SCNC: 147 MMOL/L (ref 136–145)
WBC # BLD AUTO: 4.3 X10(3) UL (ref 4–11)

## 2023-09-22 PROCEDURE — 83735 ASSAY OF MAGNESIUM: CPT | Performed by: INTERNAL MEDICINE

## 2023-09-22 PROCEDURE — 80048 BASIC METABOLIC PNL TOTAL CA: CPT | Performed by: INTERNAL MEDICINE

## 2023-09-22 PROCEDURE — 85025 COMPLETE CBC W/AUTO DIFF WBC: CPT | Performed by: INTERNAL MEDICINE

## 2023-09-22 NOTE — CM/SW NOTE
09/22/23 1500   / Referral Data   Referral Source    Reason for Referral Discharge planning   Informant Patient;Spouse/Significant Other   Medical Hx   Does patient have an established PCP? Yes  Ace Alston)   Significant Past Medical/Mental Health Hx Dementia, MDD, OSCAR   Patient Info   Patient's Current Mental Status at Time of Assessment Confused or unable to complete assessment   Patient's [address]   Patient lives with Spouse/Significant other   Patient Status Prior to Admission   Independent with ADLs and Mobility No   Pt. requires assistance with Housework;Driving;Meals; Finances; Medications   Discharge Needs   Anticipated D/C needs Outpatient therapy     Pt discussed during nursing rounds. Dx kidney stone, dementia. Home w/spouse, independent w/walker and WC at baseline. PT recommending HH w/PT at [facility]. Pt's spouse declining HH at [facility]. Pt's spouse notified  that she would prefer pt resume outpatient PT at [facility]. No additional home care needs anticipated at [facility]. Plan: Home w/spouse with outpatient PT at [facility] pending medical clearance. / to remain available for support and/or discharge planning.      GURJIT Rivas    [phone]

## 2023-09-23 VITALS
BODY MASS INDEX: 21.54 KG/M2 | DIASTOLIC BLOOD PRESSURE: 75 MMHG | WEIGHT: 142.13 LBS | HEART RATE: 54 BPM | RESPIRATION RATE: 18 BRPM | TEMPERATURE: 99 F | SYSTOLIC BLOOD PRESSURE: 164 MMHG | OXYGEN SATURATION: 97 % | HEIGHT: 68 IN

## 2023-09-23 RX ORDER — DONEPEZIL HYDROCHLORIDE 10 MG/1
10 TABLET, FILM COATED ORAL NIGHTLY
Status: SHIPPED | COMMUNITY
Start: 2023-09-23 | End: 2023-09-23

## 2023-09-23 RX ORDER — SULFAMETHOXAZOLE AND TRIMETHOPRIM 800; 160 MG/1; MG/1
1 TABLET ORAL 2 TIMES DAILY
Qty: 8 TABLET | Refills: 0 | Status: SHIPPED | OUTPATIENT
Start: 2023-09-23 | End: 2023-09-27

## 2023-09-23 RX ORDER — DONEPEZIL HYDROCHLORIDE 10 MG/1
10 TABLET, FILM COATED ORAL NIGHTLY
Status: SHIPPED | COMMUNITY
Start: 2023-09-23

## 2023-11-29 ENCOUNTER — APPOINTMENT (OUTPATIENT)
Dept: GENERAL RADIOLOGY | Facility: HOSPITAL | Age: 88
End: 2023-11-29
Attending: EMERGENCY MEDICINE
Payer: MEDICARE

## 2023-11-29 ENCOUNTER — HOSPITAL ENCOUNTER (INPATIENT)
Facility: HOSPITAL | Age: 88
LOS: 6 days | Discharge: NURSING, REHAB OR LONG-TERM CARE | End: 2023-12-05
Attending: EMERGENCY MEDICINE | Admitting: INTERNAL MEDICINE
Payer: MEDICARE

## 2023-11-29 DIAGNOSIS — R53.1 WEAKNESS GENERALIZED: Primary | ICD-10-CM

## 2023-11-29 DIAGNOSIS — N30.00 ACUTE CYSTITIS WITHOUT HEMATURIA: ICD-10-CM

## 2023-11-29 LAB
ALBUMIN SERPL-MCNC: 4.2 G/DL (ref 3.2–4.8)
ALP LIVER SERPL-CCNC: 99 U/L
ALT SERPL-CCNC: 26 U/L
ANION GAP SERPL CALC-SCNC: 6 MMOL/L (ref 0–18)
AST SERPL-CCNC: 29 U/L (ref ?–34)
BASOPHILS # BLD AUTO: 0.04 X10(3) UL (ref 0–0.2)
BASOPHILS NFR BLD AUTO: 0.4 %
BILIRUB DIRECT SERPL-MCNC: 0.1 MG/DL (ref ?–0.3)
BILIRUB SERPL-MCNC: 0.3 MG/DL (ref 0.2–1.1)
BILIRUB UR QL: NEGATIVE
BUN BLD-MCNC: 12 MG/DL (ref 9–23)
BUN/CREAT SERPL: 15.6 (ref 10–20)
CALCIUM BLD-MCNC: 10.4 MG/DL (ref 8.7–10.4)
CHLORIDE SERPL-SCNC: 106 MMOL/L (ref 98–112)
CO2 SERPL-SCNC: 28 MMOL/L (ref 21–32)
COLOR UR: YELLOW
CREAT BLD-MCNC: 0.77 MG/DL
DEPRECATED RDW RBC AUTO: 49.4 FL (ref 35.1–46.3)
EGFRCR SERPLBLD CKD-EPI 2021: 87 ML/MIN/1.73M2 (ref 60–?)
EOSINOPHIL # BLD AUTO: 0.13 X10(3) UL (ref 0–0.7)
EOSINOPHIL NFR BLD AUTO: 1.2 %
ERYTHROCYTE [DISTWIDTH] IN BLOOD BY AUTOMATED COUNT: 12.9 % (ref 11–15)
FLUAV + FLUBV RNA SPEC NAA+PROBE: NEGATIVE
FLUAV + FLUBV RNA SPEC NAA+PROBE: NEGATIVE
GLUCOSE BLD-MCNC: 96 MG/DL (ref 70–99)
GLUCOSE UR-MCNC: NORMAL MG/DL
HCT VFR BLD AUTO: 40.8 %
HGB BLD-MCNC: 13.1 G/DL
IMM GRANULOCYTES # BLD AUTO: 0.04 X10(3) UL (ref 0–1)
IMM GRANULOCYTES NFR BLD: 0.4 %
KETONES UR-MCNC: NEGATIVE MG/DL
LEUKOCYTE ESTERASE UR QL STRIP.AUTO: 500
LYMPHOCYTES # BLD AUTO: 1.09 X10(3) UL (ref 1–4)
LYMPHOCYTES NFR BLD AUTO: 10.2 %
MAGNESIUM SERPL-MCNC: 2.2 MG/DL (ref 1.6–2.6)
MCH RBC QN AUTO: 33.1 PG (ref 26–34)
MCHC RBC AUTO-ENTMCNC: 32.1 G/DL (ref 31–37)
MCV RBC AUTO: 103 FL
MONOCYTES # BLD AUTO: 0.82 X10(3) UL (ref 0.1–1)
MONOCYTES NFR BLD AUTO: 7.7 %
NEUTROPHILS # BLD AUTO: 8.54 X10 (3) UL (ref 1.5–7.7)
NEUTROPHILS # BLD AUTO: 8.54 X10(3) UL (ref 1.5–7.7)
NEUTROPHILS NFR BLD AUTO: 80.1 %
NITRITE UR QL STRIP.AUTO: NEGATIVE
OSMOLALITY SERPL CALC.SUM OF ELEC: 290 MOSM/KG (ref 275–295)
PH UR: 6 [PH] (ref 5–8)
PLATELET # BLD AUTO: 179 10(3)UL (ref 150–450)
POTASSIUM SERPL-SCNC: 5.1 MMOL/L (ref 3.5–5.1)
PROT SERPL-MCNC: 7.6 G/DL (ref 5.7–8.2)
PROT UR-MCNC: 30 MG/DL
RBC # BLD AUTO: 3.96 X10(6)UL
RBC #/AREA URNS AUTO: >10 /HPF
RSV RNA SPEC NAA+PROBE: NEGATIVE
SARS-COV-2 RNA RESP QL NAA+PROBE: NOT DETECTED
SODIUM SERPL-SCNC: 140 MMOL/L (ref 136–145)
SP GR UR STRIP: 1.02 (ref 1–1.03)
UROBILINOGEN UR STRIP-ACNC: NORMAL
WBC # BLD AUTO: 10.7 X10(3) UL (ref 4–11)
WBC #/AREA URNS AUTO: >50 /HPF
WBC CLUMPS UR QL AUTO: PRESENT /HPF

## 2023-11-29 PROCEDURE — 99285 EMERGENCY DEPT VISIT HI MDM: CPT

## 2023-11-29 PROCEDURE — 80076 HEPATIC FUNCTION PANEL: CPT | Performed by: EMERGENCY MEDICINE

## 2023-11-29 PROCEDURE — 81001 URINALYSIS AUTO W/SCOPE: CPT | Performed by: EMERGENCY MEDICINE

## 2023-11-29 PROCEDURE — 96374 THER/PROPH/DIAG INJ IV PUSH: CPT

## 2023-11-29 PROCEDURE — 85025 COMPLETE CBC W/AUTO DIFF WBC: CPT | Performed by: EMERGENCY MEDICINE

## 2023-11-29 PROCEDURE — 80048 BASIC METABOLIC PNL TOTAL CA: CPT | Performed by: EMERGENCY MEDICINE

## 2023-11-29 PROCEDURE — 71045 X-RAY EXAM CHEST 1 VIEW: CPT | Performed by: EMERGENCY MEDICINE

## 2023-11-29 PROCEDURE — 0241U SARS-COV-2/FLU A AND B/RSV BY PCR (GENEXPERT): CPT | Performed by: EMERGENCY MEDICINE

## 2023-11-29 PROCEDURE — 83735 ASSAY OF MAGNESIUM: CPT | Performed by: EMERGENCY MEDICINE

## 2023-11-29 RX ORDER — VENLAFAXINE 37.5 MG/1
37.5 TABLET ORAL 2 TIMES DAILY
Status: DISCONTINUED | OUTPATIENT
Start: 2023-11-29 | End: 2023-12-05

## 2023-11-29 RX ORDER — MAGNESIUM OXIDE 400 MG (241.3 MG MAGNESIUM) TABLET
2 TABLET NIGHTLY
Status: DISCONTINUED | OUTPATIENT
Start: 2023-11-29 | End: 2023-12-05

## 2023-11-29 RX ORDER — CEFAZOLIN SODIUM/WATER 2 G/20 ML
2 SYRINGE (ML) INTRAVENOUS ONCE
Status: COMPLETED | OUTPATIENT
Start: 2023-11-29 | End: 2023-11-29

## 2023-11-29 RX ORDER — PANTOPRAZOLE SODIUM 40 MG/1
40 TABLET, DELAYED RELEASE ORAL
Status: DISCONTINUED | OUTPATIENT
Start: 2023-11-30 | End: 2023-12-05

## 2023-11-29 RX ORDER — ATORVASTATIN CALCIUM 40 MG/1
40 TABLET, FILM COATED ORAL NIGHTLY
Status: DISCONTINUED | OUTPATIENT
Start: 2023-11-29 | End: 2023-12-05

## 2023-11-29 RX ORDER — CEPHALEXIN 500 MG/1
500 CAPSULE ORAL 3 TIMES DAILY
Qty: 21 CAPSULE | Refills: 0 | Status: SHIPPED | OUTPATIENT
Start: 2023-11-29 | End: 2023-12-06

## 2023-11-29 RX ORDER — ARIPIPRAZOLE 2 MG/1
2 TABLET ORAL EVERY OTHER DAY
Status: DISCONTINUED | OUTPATIENT
Start: 2023-11-30 | End: 2023-12-05

## 2023-11-29 RX ORDER — ASPIRIN 81 MG/1
81 TABLET, CHEWABLE ORAL DAILY
Status: DISCONTINUED | OUTPATIENT
Start: 2023-11-30 | End: 2023-12-05

## 2023-11-29 RX ORDER — HEPARIN SODIUM 5000 [USP'U]/ML
5000 INJECTION, SOLUTION INTRAVENOUS; SUBCUTANEOUS 2 TIMES DAILY
Status: DISCONTINUED | OUTPATIENT
Start: 2023-11-29 | End: 2023-12-05

## 2023-11-29 RX ORDER — MIRTAZAPINE 15 MG/1
15 TABLET, ORALLY DISINTEGRATING ORAL NIGHTLY
Status: DISCONTINUED | OUTPATIENT
Start: 2023-11-29 | End: 2023-12-05

## 2023-11-29 RX ORDER — HYDROXYZINE HYDROCHLORIDE 25 MG/1
25 TABLET, FILM COATED ORAL
Status: DISCONTINUED | OUTPATIENT
Start: 2023-11-29 | End: 2023-12-05

## 2023-11-29 NOTE — ED INITIAL ASSESSMENT (HPI)
Pt arrives via EMS from home for increased weakness. Pt's wife was attempting to bring pt to ED when he slid out of the wheelchair and called EMS. No injuries noted   Pt has hx of dementia.

## 2023-11-29 NOTE — ED QUICK NOTES
Orders for admission, patient is aware of plan and ready to go upstairs. Any questions, please call ED RN aPge at [phone].      Patient Covid vaccination status: Fully vaccinated     COVID Test Ordered in ED: SARS-CoV-2/Flu A and B/RSV by PCR (GeneXpert)    COVID Suspicion at Admission: N/A    Running Infusions:  None    Mental Status/LOC at time of transport: Alert to self     Other pertinent information: sensitive to touch everywhere & yelling \"ow\" is baseline per wife, no specific pain source, chronic UTI, from home   CIWA score: N/A   NIH score:  N/A

## 2023-11-30 PROCEDURE — 97530 THERAPEUTIC ACTIVITIES: CPT

## 2023-11-30 PROCEDURE — 97162 PT EVAL MOD COMPLEX 30 MIN: CPT

## 2023-11-30 PROCEDURE — 92610 EVALUATE SWALLOWING FUNCTION: CPT

## 2023-11-30 PROCEDURE — 97166 OT EVAL MOD COMPLEX 45 MIN: CPT

## 2023-11-30 PROCEDURE — 97535 SELF CARE MNGMENT TRAINING: CPT

## 2023-11-30 RX ORDER — CEFAZOLIN SODIUM/WATER 2 G/20 ML
2 SYRINGE (ML) INTRAVENOUS EVERY 8 HOURS
Status: DISCONTINUED | OUTPATIENT
Start: 2023-11-30 | End: 2023-12-04

## 2023-11-30 NOTE — CM/SW NOTE
PT/OT recommending SNF. Met with patient and wife Kirit Garrett for assessment. Patient and Kirit Garrett live together. Patient has dementia, Kirit Garrett is his primary caregiver and assists as needed. Patient uses a walker & wheelchair. He is not current w/ home health. He has been to [facility]. Discussed discharge plan, Kirit Garrett agreeable to SNF and expressing interest in [facility]. Initiated SNF referral, Kirit Garrett to review options when list is available. PASRR Level 1 queued for review.     Fartun Torres, [address]

## 2023-11-30 NOTE — SLP NOTE
ADULT SWALLOWING EVALUATION    ASSESSMENT    ASSESSMENT/OVERALL IMPRESSION:  PPE REQUIRED. THIS SLP WORE GLOVES AND DROPLET MASK. HANDS SANITIZED/WASHED UPON ENTRANCE/EXIT. SLP BSSE orders received and acknowledged. A swallow evaluation warranted as pt presents with history of dysphagia and modified diet. The pt was admitted with diagnosis of generalized weakness. Chart reviewed and collaborated with RN. Swallowing evaluation approved and RN reports pt required cues to swallow his medications. Per MD noted, \"The patient is a 72-year-old male. He has history of nephrolithiasis, status post lithotripsy in the past, benign prostatic hypertrophy, status post TURP, dementia, hypertension, who came to the emergency room complaining of increasing weakness. The patient was also having some change in his mental status. The patient has history of recurrent UTIs. \"  Pt seen at bedside and agreeable to participate in BSSE. Pt denies any pain. Pt is alert O x 1, afebrile with clear/weak vocal quality, tolerating room air with oxygen saturation at 95% prior to the start of po trials. Family was present at the time of testing. Pt with hx of dysphagia at [address] [place] [address], last seen for evaluation on 9/21/23, recommending minced moist and mildly thick liquids. The pt's wife reports providing minced and bite sized foods at home with extra gravy/sauce. Pt is on mildly thick liquids at home. Pt positioned upright to 90 degrees in bed. Oral mech examination completed. Face symmetrical at rest with overall generalized reduction in ROM/strength with labial and lingual movements. Most of testing with 1:1 feeding with assistance of hands on assistance with cup drinking. Assessed pt with po trials of puree, soft solids, and mildly thick liquids via teaspoon/open cup. Pt with functional oral acceptance and bilabial seal across all trials. Oral phase of swallow appears delayed with reduced bolus control and propulsion.  Reduced strength in bite, prolonged mastication of soft solids, and delayed A-P transit with soft solid bolus. Mild oral stasis was cleared with an assisted liquid wash. Pharyngeal phase of the swallow appears delayed with reduced/slow  hyolaryngeal elevation/excursion. Oxygen status remained >94% throughout the entire evaluation. No overt clinical signs of aspiration observed with any consistency (no coughing, no throat clearing, and vocal quality remains dry). Pt denies any globus sensation post the swallow. The pt was left at bedside resting in the bed with call light in reach and family present. At this time, pt presents with moderate oral dysphagia and probable pharyngeal dysfunction. Recommend a minced moist soft solid diet (extra sauce/gravy) and mildly thick liquids/no straw with strict adherence to safe swallowing compensatory strategies. Recommend 1:1 feeding. Pt's family reports preferred learning style of education via verbal discussion. Results and recommendations reviewed with pt, family, and RN. Provided education via verbal discussion on diet recommendations and swallowing precautions. The pt with increased confusion and was unable tro acknowledge understanding. The family acknowledged understanding of the education and was able to verbalize/demonstrate precautions. SLP collaborated with RN for diet orders. Diet recommendations and swallowing precautions/strategies posted on white board at bedside. FCM SCORE: 3/7       PLAN: Will follow up x2 to ensure safety with diet and educate pt on compensatory strategies/swallow precautions. Assess for diet advancement in solids as HARISH improves. Video swallow study to be completed if CXR declines, increase in clinical signs of aspiration, and/or MD desires.          RECOMMENDATIONS   Diet Recommendations - Solids: Mechanical soft ground/ Minced & Moist (Extra sauce and/or gravy)  Diet Recommendations - Liquids: Nectar thick liquids/ Mildly thick (No straw) Compensatory Strategies Recommended: No straws; Slow rate; Alternate consistencies;Small bites and sips;Multiple swallows; Extra sauce/gravy  Aspiration Precautions: Upright position; Slow rate;Small bites and sips; No straw  Medication Administration Recommendations: Whole in puree (Crush medications as needed)  Treatment Plan/Recommendations: Dysphagia therapy; Aspiration precautions  Discharge Recommendations/Plan: Undetermined    HISTORY   MEDICAL HISTORY  Reason for Referral: R/O aspiration    Problem List  Principal Problem:    Weakness generalized  Active Problems:    Acute cystitis without hematuria      Past Medical History  Past Medical History:   Diagnosis Date    Anxiety state     BPH (benign prostatic hyperplasia)     Dementia (HCC)     Depression     Dysphagia     Febrile illness 02/13/2021    High blood pressure     Kidney stones     Other and unspecified hyperlipidemia     Pneumonia due to organism     Seizure disorder ([facility] [address]     because of venalfaxine    Skin cancer     melanoma    Unspecified essential hypertension        Prior Living Situation: Home with spouse  Diet Prior to Admission: Mechanical soft chopped/ Soft & Bite Sized; Nectar thick liquids/ Mildly thick  Precautions: Aspiration    Patient/Family Goals: To eat      SWALLOWING HISTORY  Current Diet Consistency: Soft/ Easy to Chew;Nectar thick liquids/ Mildly thick  Dysphagia History: Pt with hx of dysphagia at [address] [place] [address], last seen for evaluation on 9/21/23, recommending minced moist and mildly thick liquids. The pt's wife reports providing minced and bite sized foods at home with extra gravy/sauce. Pt is on mildly thick liquids at home. Imaging Results:   CXR 11/29/23:  CONCLUSION:   1. No acute cardiopulmonary finding. Dictated by (CST): Julia Chaparro MD on 11/29/2023 at 2:32 PM       Finalized by (CST): Julia Chaparro MD on 11/29/2023 at 2:33 PM       SUBJECTIVE   The pt is alert and O x1.     OBJECTIVE   ORAL MOTOR EXAMINATION  Dentition:  (Limited scattered dentition)  Symmetry: Within Functional Limits  Strength:  (Reduced)     Range of Motion:  (Reduced)  Rate of Motion: Reduced    Voice Quality: Clear;Weak  Respiratory Status: Unlabored  Consistencies Trialed: Nectar thick liquids;Puree; Soft solid  Method of Presentation: Self presentation;Staff/Clinician assistance;Spoon;Cup;Single sips  Patient Positioning: Upright;Leaning to left    Oral Phase of Swallow: Impaired  Bolus Retrieval: Intact  Bilabial Seal: Intact  Bolus Formation: Impaired  Bolus Propulsion: Impaired  Mastication: Impaired  Retention: Impaired    Pharyngeal Phase of Swallow: Impaired  Laryngeal Elevation Timing: Appears impaired  Laryngeal Elevation Strength: Appears impaired  Laryngeal Elevation Coordination: Appears impaired  (Please note: Silent aspiration cannot be evaluated clinically. Videofluoroscopic Swallow Study is required to rule-out silent aspiration.)    Esophageal Phase of Swallow: No complaints consistent with possible esophageal involvement                GOALS  Goal #1 The patient will tolerate minced moist consistency and mildly thick liquids without overt signs or symptoms of aspiration with 100 % accuracy over 2 session(s). In Progress   Goal #2 The patient/family/caregiver will demonstrate understanding and implementation of aspiration precautions and swallow strategies independently over 2 session(s). In Progress   Goal #3 The patient will tolerate trial upgrade of soft bite size consistency and mildly thick liquids without overt signs or symptoms of aspiration with 100 % accuracy over 2 session(s). In Progress   Goal #4 The patient will utilize compensatory strategies as outlined by  BSSE (clinical evaluation) including Slow rate, Small bites, Small sips, Multiple swallows, Alternate liquids/solids, No straws, Upright 90 degrees, Feed patient with moderate assistance 95 % of the time across 2 sessions.     In Progress FOLLOW UP  Treatment Plan/Recommendations: Dysphagia therapy; Aspiration precautions  Number of Visits to Meet Established Goals: 2  Follow Up Needed (Documentation Required): Yes  SLP Follow-up Date: 12/01/23    Thank you for your referral.   If you have any questions, please contact     Brennen Ortiz MS/[facility]-SLP  Speech Language Pathologist  [address] [facility]  [address]  [id number]

## 2023-11-30 NOTE — PLAN OF CARE
Problem: Patient Centered Care  Goal: Patient preferences are identified and integrated in the patient's plan of care  Description: Interventions:  - What would you like us to know as we care for you?   - Provide timely, complete, and accurate information to patient/family  - Incorporate patient and family knowledge, values, beliefs, and cultural backgrounds into the planning and delivery of care  - Encourage patient/family to participate in care and decision-making at the level they choose  - Honor patient and family perspectives and choices  Outcome: Progressing     Problem: Patient/Family Goals  Goal: Patient/Family Long Term Goal  Description: Patient's Long Term Goal: Gain strength    Interventions:  - Monitor vitals  - Monitor appropriate labs  - Administer medications as ordered  - Follow MD's orders  - Update patient on plan of care   - Discharge planning   - See additional Care Plan goals for specific interventions  Outcome: Progressing  Goal: Patient/Family Short Term Goal  Description: Patient's Short Term Goal: Work with therapy and discharge from hospital    Interventions:   - Monitor vitals  - Monitor appropriate labs  - Administer medications as ordered  - Follow MD's orders  - Update patient on plan of care   - Discharge planning   - See additional Care Plan goals for specific interventions  Outcome: Progressing     Problem: PAIN - ADULT  Goal: Verbalizes/displays adequate comfort level or patient's stated pain goal  Description: INTERVENTIONS:  - Encourage pt to monitor pain and request assistance  - Assess pain using appropriate pain scale  - Administer analgesics based on type and severity of pain and evaluate response  - Implement non-pharmacological measures as appropriate and evaluate response  - Consider cultural and social influences on pain and pain management  - Manage/alleviate anxiety  - Utilize distraction and/or relaxation techniques  - Monitor for opioid side effects  - Notify MD/LIP if interventions unsuccessful or patient reports new pain  - Anticipate increased pain with activity and pre-medicate as appropriate  Outcome: Progressing     Problem: RISK FOR INFECTION - ADULT  Goal: Absence of fever/infection during anticipated neutropenic period  Description: INTERVENTIONS  - Monitor WBC  - Administer growth factors as ordered  - Implement neutropenic guidelines  Outcome: Progressing     Problem: SAFETY ADULT - FALL  Goal: Free from fall injury  Description: INTERVENTIONS:  - Assess pt frequently for physical needs  - Identify cognitive and physical deficits and behaviors that affect risk of falls.   - [place] fall precautions as indicated by assessment.  - Educate pt/family on patient safety including physical limitations  - Instruct pt to call for assistance with activity based on assessment  - Modify environment to reduce risk of injury  - Provide assistive devices as appropriate  - Consider OT/PT consult to assist with strengthening/mobility  - Encourage toileting schedule  Outcome: Progressing     Problem: METABOLIC/FLUID AND ELECTROLYTES - ADULT  Goal: Electrolytes maintained within normal limits  Description: INTERVENTIONS:  - Monitor labs and rhythm and assess patient for signs and symptoms of electrolyte imbalances  - Administer electrolyte replacement as ordered  - Monitor response to electrolyte replacements, including rhythm and repeat lab results as appropriate  - Fluid restriction as ordered  - Instruct patient on fluid and nutrition restrictions as appropriate  Outcome: Progressing  Goal: Hemodynamic stability and optimal renal function maintained  Description: INTERVENTIONS:  - Monitor labs and assess for signs and symptoms of volume excess or deficit  - Monitor intake, output and patient weight  - Monitor urine specific gravity, serum osmolarity and serum sodium as indicated or ordered  - Monitor response to interventions for patient's volume status, including labs, urine output, blood pressure (other measures as available)  - Encourage oral intake as appropriate  - Instruct patient on fluid and nutrition restrictions as appropriate  Outcome: Progressing     Problem: SKIN/TISSUE INTEGRITY - ADULT  Goal: Skin integrity remains intact  Description: INTERVENTIONS  - Assess and document risk factors for pressure ulcer development  - Assess and document skin integrity  - Monitor for areas of redness and/or skin breakdown  - Initiate interventions, skin care algorithm/standards of care as needed  Outcome: Progressing     Problem: HEMATOLOGIC - ADULT  Goal: Free from bleeding injury  Description: (Example usage: patient with low platelets)  INTERVENTIONS:  - Avoid intramuscular injections, enemas and rectal medication administration  - Ensure safe mobilization of patient  - Hold pressure on venipuncture sites to achieve adequate hemostasis  - Assess for signs and symptoms of internal bleeding  - Monitor lab trends  - Patient is to report abnormal signs of bleeding to staff  - Avoid use of toothpicks and dental floss  - Use electric shaver for shaving  - Use soft bristle tooth brush  - Limit straining and forceful nose blowing  Outcome: Progressing     Problem: MUSCULOSKELETAL - ADULT  Goal: Return mobility to safest level of function  Description: INTERVENTIONS:  - Assess patient stability and activity tolerance for standing, transferring and ambulating w/ or w/o assistive devices  - Assist with transfers and ambulation using safe patient handling equipment as needed  - Ensure adequate protection for wounds/incisions during mobilization  - Obtain PT/OT consults as needed  - Advance activity as appropriate  - Communicate ordered activity level and limitations with patient/family  Outcome: Progressing     Problem: NEUROLOGICAL - ADULT  Goal: Achieves stable or improved neurological status  Description: INTERVENTIONS  - Assess for and report changes in neurological status  - Initiate measures to prevent increased intracranial pressure  - Maintain blood pressure and fluid volume within ordered parameters to optimize cerebral perfusion and minimize risk of hemorrhage  - Monitor temperature, glucose, and sodium.  Initiate appropriate interventions as ordered  Outcome: Progressing

## 2023-11-30 NOTE — CM/SW NOTE
Department  notified of request for cesar PANIAGUA referrals started. Assigned CM/SW to follow up with pt/family on further discharge planning.        [facility]

## 2023-11-30 NOTE — H&P
[facility]    PATIENT'S NAME: Mahad Lloyd PHYSICIAN: Juliana Roberts MD   PATIENT ACCOUNT#:   [de-identified]    LOCATION:  [address] [place] [address],[address] #:   [id number]       YOB: 1935  ADMISSION DATE:       11/29/2023    HISTORY AND PHYSICAL EXAMINATION    SOURCE OF INFORMATION:  Reviewing his medical records and also talking to the patient and from the ER physician. CHIEF COMPLAINT:  Increased weakness for several days. HISTORY OF PRESENT ILLNESS:  The patient is a 81-year-old male. He has history of nephrolithiasis, status post lithotripsy in the past, benign prostatic hypertrophy, status post TURP, dementia, hypertension, who came to the emergency room complaining of increasing weakness. The patient was also having some change in his mental status. The patient has history of recurrent UTIs. The patient is not giving me much detailed history. PAST MEDICAL HISTORY:  Dementia, benign prostatic hypertrophy, hypertension, nephrolithiasis. PAST SURGICAL HISTORY:  Lithotripsy, TURP. MEDICATIONS:  Aricept, Protonix, melatonin, vitamin D, aspirin, Remeron, Zocor. ALLERGIES:  No known drug allergies. FAMILY HISTORY:  Positive for coronary artery disease and myeloproliferative disorder in the family. SOCIAL HISTORY:  Patient is . He is a former smoker. REVIEW OF SYSTEMS:  Patient is having increased weakness. He has no headache. No sore throat. No chest pain. No shortness of breath. No abdominal pain. No nausea, no vomiting, no diarrhea. Other systems are negative. PHYSICAL EXAMINATION:    GENERAL:  This is an 81-year-old male. VITAL SIGNS:  Blood pressure 152/72, temperature 98, pulse 67, respirations 17. HEENT:  Head is atraumatic. Pupils are equal and reactive to light. Extraocular movements are intact. NECK:  No JVD. No carotid bruit. LUNGS:  Clear to auscultation.    HEART:  S1 and S2.  Regular in rate and rhythm. ABDOMEN:  Soft. Bowel sounds are present. EXTREMITIES:  No pedal edema. NEUROLOGIC:  Patient is conscious and alert. There is no focal deficit. LABORATORY DATA:  Noted. UA shows leukocyte esterase is positive. ASSESSMENT AND PLAN:    1. Recurrent urinary tract infection. The patient will be started on IV antibiotics. 2.   Dementia, stable. 3.   Hypertension. We will continue the patient on his home medications. 4.   Further management will depend on the clinical course of the patient.      Dictated By Steven Solo MD  d: 11/29/2023 20:49:51  t: 11/29/2023 20:56:51  [facility] [id number]  XA/

## 2023-11-30 NOTE — PHYSICAL THERAPY NOTE
PHYSICAL THERAPY EVALUATION - INPATIENT     Room Number: 551/551-A  Evaluation Date: 11/30/2023  Type of Evaluation: Initial   Physician Order: PT Eval and Treat    Presenting Problem: UTI, weakness  Co-Morbidities : HTN, BPH, dementia  Reason for Therapy: Mobility Dysfunction and Discharge Planning    PHYSICAL THERAPY ASSESSMENT     Patient is a 81year old male admitted 11/29/2023 for UTI, weakness. Patient received semi-fowlers in bed, agreeable to physical therapy evaluation with encouragement from wife, session coordinated with OT to maximize patient safety and function given limited mobility status at baseline. Vital signs monitored as noted below, no adverse symptoms and patient stable during session. Education with patient and wife provided on Physical therapy plan of care and physiological benefits of out of bed mobility. At baseline pt requires Nithin from wife with all transfers and ambulating short distances, with recent fall wife unable to assist patient up. Pt requiring increased assist and unable to ambulate short household distances compared to baseline, will benefit from continued inpatient therapy services to facilitate safe discharge home and minimize caregiver burden. Patient is currently functioning below baseline with bed mobility, transfers, gait, and stair negotiation as a result of the following impairments: decreased functional strength, decreased endurance/aerobic capacity, impaired standing static and dynamic balance, impaired motor planning, decreased muscular endurance, cognitive deficits, and medical status. Next session anticipate to progress transfers, gait, and stair negotiation.     Patient history and/or personal factors that may impact the plan of care include home accessibility concerns, history of falls, limited functional status at baseline, cognitive deficits, co-morbidities (HTN, BPH, dementia) affecting activity tolerance, endurance, and has history of recent hospitalization. Based on the physical therapy examination of the noted systems and functional activity/participation limitations, the patient presentation is evolving given the patient demonstrates worsening of previously stable condition and demonstrates worsening of co-morbidities. Patient would benefit from continued skilled physical therapy interventions to maximize patient safety and functional independence. Updated nurse on results of session, patient left seated in bedside chair with alarm engaged and wife present, all lines intact, all needs met with call light in reach. The patient's Approx Degree of Impairment: 64.91% has been calculated based on documentation in the [facility] '6 clicks' Inpatient Basic Mobility Short Form. Research supports that patients with this level of impairment may benefit from SIVA. Patient will benefit from continued IP PT services to address these deficits in preparation for discharge. DISCHARGE RECOMMENDATIONS  PT Discharge Recommendations: Sub-acute rehabilitation    PLAN  PT Treatment Plan: Bed mobility; Body mechanics; Endurance; Energy conservation;Patient education; Family education;Gait training;Range of motion;Strengthening;Stair training;Transfer training;Balance training  Rehab Potential : Fair  Frequency (Obs): 3-5x/week       PHYSICAL THERAPY MEDICAL/SOCIAL HISTORY     History related to current admission: Recent admission 9/2023 for weakness and suspected UTI, Nithin with rw, D/C'ed home with wife 24/7 assist     Problem List  Principal Problem:    Weakness generalized  Active Problems:    Acute cystitis without hematuria      HOME SITUATION  Home Situation  Type of Home: Condo  Home Layout: One level  Stairs to Enter : 14  Railing: Yes  Lives With: Spouse  Drives: No  Patient Owned Equipment: Rolling walker; Wheelchair     Prior Level of [place]: assist from wife with transfers and wc mobility longer distances, ambulatory with assist short distances with rw    SUBJECTIVE  Pt agreeable to sitting up with encouragement and offering of caramel tany[place]ank from wife. PHYSICAL THERAPY EXAMINATION     OBJECTIVE  Precautions: Bed/chair alarm;Aspiration  Fall Risk: High fall risk    WEIGHT BEARING RESTRICTION  None    PAIN ASSESSMENT  Rating: Unable to rate  Location: no visible signs of distress       COGNITION  Overall Cognitive Status:  Impaired    RANGE OF MOTION AND STRENGTH ASSESSMENT  Upper extremity ROM and strength are within functional limits  BUEs   Lower extremity ROM is within functional limits  BLEs  Lower extremity strength is limited hip and knee extensor strength requiring increased assist with transfers     BALANCE  Static Sitting: Fair  Dynamic Sitting: Fair -  Static Standing: Fair -  Dynamic Standing: Poor    ACTIVITY TOLERANCE  Pulse: 57  Heart Rate Source: Monitor     BP: 135/61  BP Location: Right arm  BP Method: Automatic  Patient Position: Semi-[place]    O2 WALK  Oxygen Therapy  SPO2% on Room Air at Rest: 97    AM-PAC '6-Clicks' INPATIENT SHORT FORM - BASIC MOBILITY  How much difficulty does the patient currently have. .. Patient Difficulty: Turning over in bed (including adjusting bedclothes, sheets and blankets)?: A Little   Patient Difficulty: Sitting down on and standing up from a chair with arms (e.g., wheelchair, bedside commode, etc.): A Little   Patient Difficulty: Moving from lying on back to sitting on the side of the bed?: A Lot   How much help from another person does the patient currently need. ..    Help from Another: Moving to and from a bed to a chair (including a wheelchair)?: A Lot   Help from Another: Need to walk in hospital room?: A Lot   Help from Another: Climbing 3-5 steps with a railing?: Total     AM-PAC Score:  Raw Score: 13   Approx Degree of Impairment: 64.91%   Standardized Score (AM-PAC Scale): 36.74   CMS Modifier (G-Code): CL    FUNCTIONAL ABILITY STATUS     MOBILITY:  SUPINE TO SIT: moderate assist   SIT TO STAND: moderate assist   STAND TO SIT: moderate assist   BED TO/FROM CHAIR: moderate assist - 3' standing step transfer with rolling walker, decreased BLE hip extension, decreased foot clearance, shuffling, attempting to sit before fully aligned with chair  STAIR NEGOTIATION: not tested     Exercise/Education Provided:  Bed mobility  Body mechanics  Functional activity tolerated  Posture  ROM  Strengthening  Transfer training    Patient End of Session: Up in chair;Needs met;Call light within reach;RN aware of session/findings; All patient questions and concerns addressed; Alarm set; Family present    CURRENT GOALS    Goals to be met by: 12/10/23  Patient Goal Patient's self-stated goal is: maximize functional independence and safety   Goal #1 Patient is able to demonstrate supine - sit EOB @ level: minimum assistance     Goal #1   Current Status    Goal #2 Patient is able to demonstrate transfers EOB to/from [facility] at assistance level: minimum assistance with walker - rolling     Goal #2  Current Status    Goal #3 Patient is able to ambulate 30 feet with assist device: walker - rolling at assistance level: minimum assistance   Goal #3   Current Status    Goal #4 Patient will negotiate 12 stairs/one curb w/ assistive device and supervision   Goal #4   Current Status    Goal #5 Patient to demonstrate independence with home activity/exercise instructions provided to patient in preparation for discharge.    Goal #5   Current Status    Goal #6    Goal #6  Current Status      Patient Evaluation Complexity Level:  History Moderate - 1 or 2 personal factors and/or co-morbidities   Examination of body systems Moderate - addressing a total of 3 or more elements   Clinical Presentation Moderate - Evolving   Clinical Decision Making Moderate Complexity       Therapeutic Activity: 25 minutes      Jaja Maria, PT, DPT  [facility]  Inpatient Rehabilitation  Physical Therapy  [phone]

## 2023-11-30 NOTE — PLAN OF CARE
Pt a&o x1(wife reports baseline), room air, no tele, stand-pivot x2. No complaints of pain. IV antibiotics given as ordered. Plan is for further evaluation. Safety precautions maintained. Call light in reach. Problem: Patient Centered Care  Goal: Patient preferences are identified and integrated in the patient's plan of care  Description: Interventions:  - What would you like us to know as we care for you?  From home with wife   - Provide timely, complete, and accurate information to patient/family  - Incorporate patient and family knowledge, values, beliefs, and cultural backgrounds into the planning and delivery of care  - Encourage patient/family to participate in care and decision-making at the level they choose  - Honor patient and family perspectives and choices  Outcome: Progressing     Problem: Patient/Family Goals  Goal: Patient/Family Long Term Goal  Description: Patient's Long Term Goal: Discharge home     Interventions:  - vitals  - labs  - follow MD orders   - See additional Care Plan goals for specific interventions  Outcome: Progressing  Goal: Patient/Family Short Term Goal  Description: Patient's Short Term Goal: Relief of confusion symptoms     Interventions:   - antibiotics  - labs  - See additional Care Plan goals for specific interventions  Outcome: Progressing     Problem: PAIN - ADULT  Goal: Verbalizes/displays adequate comfort level or patient's stated pain goal  Description: INTERVENTIONS:  - Encourage pt to monitor pain and request assistance  - Assess pain using appropriate pain scale  - Administer analgesics based on type and severity of pain and evaluate response  - Implement non-pharmacological measures as appropriate and evaluate response  - Consider cultural and social influences on pain and pain management  - Manage/alleviate anxiety  - Utilize distraction and/or relaxation techniques  - Monitor for opioid side effects  - Notify MD/LIP if interventions unsuccessful or patient reports new pain  - Anticipate increased pain with activity and pre-medicate as appropriate  Outcome: Progressing     Problem: RISK FOR INFECTION - ADULT  Goal: Absence of fever/infection during anticipated neutropenic period  Description: INTERVENTIONS  - Monitor WBC  - Administer growth factors as ordered  - Implement neutropenic guidelines  Outcome: Progressing     Problem: SAFETY ADULT - FALL  Goal: Free from fall injury  Description: INTERVENTIONS:  - Assess pt frequently for physical needs  - Identify cognitive and physical deficits and behaviors that affect risk of falls.   - [place] fall precautions as indicated by assessment.  - Educate pt/family on patient safety including physical limitations  - Instruct pt to call for assistance with activity based on assessment  - Modify environment to reduce risk of injury  - Provide assistive devices as appropriate  - Consider OT/PT consult to assist with strengthening/mobility  - Encourage toileting schedule  Outcome: Progressing     Problem: METABOLIC/FLUID AND ELECTROLYTES - ADULT  Goal: Electrolytes maintained within normal limits  Description: INTERVENTIONS:  - Monitor labs and rhythm and assess patient for signs and symptoms of electrolyte imbalances  - Administer electrolyte replacement as ordered  - Monitor response to electrolyte replacements, including rhythm and repeat lab results as appropriate  - Fluid restriction as ordered  - Instruct patient on fluid and nutrition restrictions as appropriate  Outcome: Progressing  Goal: Hemodynamic stability and optimal renal function maintained  Description: INTERVENTIONS:  - Monitor labs and assess for signs and symptoms of volume excess or deficit  - Monitor intake, output and patient weight  - Monitor urine specific gravity, serum osmolarity and serum sodium as indicated or ordered  - Monitor response to interventions for patient's volume status, including labs, urine output, blood pressure (other measures as available)  - Encourage oral intake as appropriate  - Instruct patient on fluid and nutrition restrictions as appropriate  Outcome: Progressing     Problem: SKIN/TISSUE INTEGRITY - ADULT  Goal: Skin integrity remains intact  Description: INTERVENTIONS  - Assess and document risk factors for pressure ulcer development  - Assess and document skin integrity  - Monitor for areas of redness and/or skin breakdown  - Initiate interventions, skin care algorithm/standards of care as needed  Outcome: Progressing     Problem: HEMATOLOGIC - ADULT  Goal: Free from bleeding injury  Description: (Example usage: patient with low platelets)  INTERVENTIONS:  - Avoid intramuscular injections, enemas and rectal medication administration  - Ensure safe mobilization of patient  - Hold pressure on venipuncture sites to achieve adequate hemostasis  - Assess for signs and symptoms of internal bleeding  - Monitor lab trends  - Patient is to report abnormal signs of bleeding to staff  - Avoid use of toothpicks and dental floss  - Use electric shaver for shaving  - Use soft bristle tooth brush  - Limit straining and forceful nose blowing  Outcome: Progressing     Problem: MUSCULOSKELETAL - ADULT  Goal: Return mobility to safest level of function  Description: INTERVENTIONS:  - Assess patient stability and activity tolerance for standing, transferring and ambulating w/ or w/o assistive devices  - Assist with transfers and ambulation using safe patient handling equipment as needed  - Ensure adequate protection for wounds/incisions during mobilization  - Obtain PT/OT consults as needed  - Advance activity as appropriate  - Communicate ordered activity level and limitations with patient/family  Outcome: Progressing     Problem: NEUROLOGICAL - ADULT  Goal: Achieves stable or improved neurological status  Description: INTERVENTIONS  - Assess for and report changes in neurological status  - Initiate measures to prevent increased intracranial pressure  - Maintain blood pressure and fluid volume within ordered parameters to optimize cerebral perfusion and minimize risk of hemorrhage  - Monitor temperature, glucose, and sodium.  Initiate appropriate interventions as ordered  Outcome: Progressing

## 2023-12-01 NOTE — PLAN OF CARE
Pt a&o x1 (baseline per wife), room air, no tele, stand-pivot x2. No complaints of pain. IV antibiotics given as ordered. Plan is [facility] pending medical discharge. Safety precautions maintained. Call light in reach. Problem: Patient Centered Care  Goal: Patient preferences are identified and integrated in the patient's plan of care  Description: Interventions:  - What would you like us to know as we care for you?  Used to be a    - Provide timely, complete, and accurate information to patient/family  - Incorporate patient and family knowledge, values, beliefs, and cultural backgrounds into the planning and delivery of care  - Encourage patient/family to participate in care and decision-making at the level they choose  - Honor patient and family perspectives and choices  Outcome: Progressing     Problem: Patient/Family Goals  Goal: Patient/Family Long Term Goal  Description: Patient's Long Term Goal: Discharge     Interventions:  - vitals  - labs  - follow MD orders   - See additional Care Plan goals for specific interventions  Outcome: Progressing  Goal: Patient/Family Short Term Goal  Description: Patient's Short Term Goal: Relief of urinary symptoms     Interventions:   - antibiotics   - labs  - vitals  - See additional Care Plan goals for specific interventions  Outcome: Progressing     Problem: PAIN - ADULT  Goal: Verbalizes/displays adequate comfort level or patient's stated pain goal  Description: INTERVENTIONS:  - Encourage pt to monitor pain and request assistance  - Assess pain using appropriate pain scale  - Administer analgesics based on type and severity of pain and evaluate response  - Implement non-pharmacological measures as appropriate and evaluate response  - Consider cultural and social influences on pain and pain management  - Manage/alleviate anxiety  - Utilize distraction and/or relaxation techniques  - Monitor for opioid side effects  - Notify MD/LIP if interventions unsuccessful or patient reports new pain  - Anticipate increased pain with activity and pre-medicate as appropriate  Outcome: Progressing     Problem: RISK FOR INFECTION - ADULT  Goal: Absence of fever/infection during anticipated neutropenic period  Description: INTERVENTIONS  - Monitor WBC  - Administer growth factors as ordered  - Implement neutropenic guidelines  Outcome: Progressing     Problem: SAFETY ADULT - FALL  Goal: Free from fall injury  Description: INTERVENTIONS:  - Assess pt frequently for physical needs  - Identify cognitive and physical deficits and behaviors that affect risk of falls.   - [place] fall precautions as indicated by assessment.  - Educate pt/family on patient safety including physical limitations  - Instruct pt to call for assistance with activity based on assessment  - Modify environment to reduce risk of injury  - Provide assistive devices as appropriate  - Consider OT/PT consult to assist with strengthening/mobility  - Encourage toileting schedule  Outcome: Progressing     Problem: METABOLIC/FLUID AND ELECTROLYTES - ADULT  Goal: Electrolytes maintained within normal limits  Description: INTERVENTIONS:  - Monitor labs and rhythm and assess patient for signs and symptoms of electrolyte imbalances  - Administer electrolyte replacement as ordered  - Monitor response to electrolyte replacements, including rhythm and repeat lab results as appropriate  - Fluid restriction as ordered  - Instruct patient on fluid and nutrition restrictions as appropriate  Outcome: Progressing  Goal: Hemodynamic stability and optimal renal function maintained  Description: INTERVENTIONS:  - Monitor labs and assess for signs and symptoms of volume excess or deficit  - Monitor intake, output and patient weight  - Monitor urine specific gravity, serum osmolarity and serum sodium as indicated or ordered  - Monitor response to interventions for patient's volume status, including labs, urine output, blood pressure (other measures as available)  - Encourage oral intake as appropriate  - Instruct patient on fluid and nutrition restrictions as appropriate  Outcome: Progressing     Problem: SKIN/TISSUE INTEGRITY - ADULT  Goal: Skin integrity remains intact  Description: INTERVENTIONS  - Assess and document risk factors for pressure ulcer development  - Assess and document skin integrity  - Monitor for areas of redness and/or skin breakdown  - Initiate interventions, skin care algorithm/standards of care as needed  Outcome: Progressing     Problem: HEMATOLOGIC - ADULT  Goal: Free from bleeding injury  Description: (Example usage: patient with low platelets)  INTERVENTIONS:  - Avoid intramuscular injections, enemas and rectal medication administration  - Ensure safe mobilization of patient  - Hold pressure on venipuncture sites to achieve adequate hemostasis  - Assess for signs and symptoms of internal bleeding  - Monitor lab trends  - Patient is to report abnormal signs of bleeding to staff  - Avoid use of toothpicks and dental floss  - Use electric shaver for shaving  - Use soft bristle tooth brush  - Limit straining and forceful nose blowing  Outcome: Progressing     Problem: MUSCULOSKELETAL - ADULT  Goal: Return mobility to safest level of function  Description: INTERVENTIONS:  - Assess patient stability and activity tolerance for standing, transferring and ambulating w/ or w/o assistive devices  - Assist with transfers and ambulation using safe patient handling equipment as needed  - Ensure adequate protection for wounds/incisions during mobilization  - Obtain PT/OT consults as needed  - Advance activity as appropriate  - Communicate ordered activity level and limitations with patient/family  Outcome: Progressing     Problem: NEUROLOGICAL - ADULT  Goal: Achieves stable or improved neurological status  Description: INTERVENTIONS  - Assess for and report changes in neurological status  - Initiate measures to prevent increased intracranial pressure  - Maintain blood pressure and fluid volume within ordered parameters to optimize cerebral perfusion and minimize risk of hemorrhage  - Monitor temperature, glucose, and sodium.  Initiate appropriate interventions as ordered  Outcome: Progressing

## 2023-12-01 NOTE — PLAN OF CARE
Wife at bedside over night. Patient alert and orientated x1, confused. Room air, incontinent with premofit in place. Nurse placed mepilex to patient's sacrum and turned patient frequently throughout shift. Fall precautions in place. Call light in reach. Problem: Patient Centered Care  Goal: Patient preferences are identified and integrated in the patient's plan of care  Description: Interventions:  - What would you like us to know as we care for you?  Wife is primary caregiver  - Provide timely, complete, and accurate information to patient/family  - Incorporate patient and family knowledge, values, beliefs, and cultural backgrounds into the planning and delivery of care  - Encourage patient/family to participate in care and decision-making at the level they choose  - Honor patient and family perspectives and choices  Outcome: Progressing     Problem: Patient/Family Goals  Goal: Patient/Family Long Term Goal  Description: Patient's Long Term Goal: free of falls    Interventions:  - fall precautions in place  - See additional Care Plan goals for specific interventions  Outcome: Progressing  Goal: Patient/Family Short Term Goal  Description: Patient's Short Term Goal: free of infections    Interventions:   - monitor vitals   - See additional Care Plan goals for specific interventions  Outcome: Progressing     Problem: PAIN - ADULT  Goal: Verbalizes/displays adequate comfort level or patient's stated pain goal  Description: INTERVENTIONS:  - Encourage pt to monitor pain and request assistance  - Assess pain using appropriate pain scale  - Administer analgesics based on type and severity of pain and evaluate response  - Implement non-pharmacological measures as appropriate and evaluate response  - Consider cultural and social influences on pain and pain management  - Manage/alleviate anxiety  - Utilize distraction and/or relaxation techniques  - Monitor for opioid side effects  - Notify MD/LIP if interventions unsuccessful or patient reports new pain  - Anticipate increased pain with activity and pre-medicate as appropriate  Outcome: Progressing     Problem: RISK FOR INFECTION - ADULT  Goal: Absence of fever/infection during anticipated neutropenic period  Description: INTERVENTIONS  - Monitor WBC  - Administer growth factors as ordered  - Implement neutropenic guidelines  Outcome: Progressing     Problem: SAFETY ADULT - FALL  Goal: Free from fall injury  Description: INTERVENTIONS:  - Assess pt frequently for physical needs  - Identify cognitive and physical deficits and behaviors that affect risk of falls.   - [place] fall precautions as indicated by assessment.  - Educate pt/family on patient safety including physical limitations  - Instruct pt to call for assistance with activity based on assessment  - Modify environment to reduce risk of injury  - Provide assistive devices as appropriate  - Consider OT/PT consult to assist with strengthening/mobility  - Encourage toileting schedule  Outcome: Progressing     Problem: METABOLIC/FLUID AND ELECTROLYTES - ADULT  Goal: Electrolytes maintained within normal limits  Description: INTERVENTIONS:  - Monitor labs and rhythm and assess patient for signs and symptoms of electrolyte imbalances  - Administer electrolyte replacement as ordered  - Monitor response to electrolyte replacements, including rhythm and repeat lab results as appropriate  - Fluid restriction as ordered  - Instruct patient on fluid and nutrition restrictions as appropriate  Outcome: Progressing  Goal: Hemodynamic stability and optimal renal function maintained  Description: INTERVENTIONS:  - Monitor labs and assess for signs and symptoms of volume excess or deficit  - Monitor intake, output and patient weight  - Monitor urine specific gravity, serum osmolarity and serum sodium as indicated or ordered  - Monitor response to interventions for patient's volume status, including labs, urine output, blood pressure (other measures as available)  - Encourage oral intake as appropriate  - Instruct patient on fluid and nutrition restrictions as appropriate  Outcome: Progressing     Problem: SKIN/TISSUE INTEGRITY - ADULT  Goal: Skin integrity remains intact  Description: INTERVENTIONS  - Assess and document risk factors for pressure ulcer development  - Assess and document skin integrity  - Monitor for areas of redness and/or skin breakdown  - Initiate interventions, skin care algorithm/standards of care as needed  Outcome: Progressing     Problem: HEMATOLOGIC - ADULT  Goal: Free from bleeding injury  Description: (Example usage: patient with low platelets)  INTERVENTIONS:  - Avoid intramuscular injections, enemas and rectal medication administration  - Ensure safe mobilization of patient  - Hold pressure on venipuncture sites to achieve adequate hemostasis  - Assess for signs and symptoms of internal bleeding  - Monitor lab trends  - Patient is to report abnormal signs of bleeding to staff  - Avoid use of toothpicks and dental floss  - Use electric shaver for shaving  - Use soft bristle tooth brush  - Limit straining and forceful nose blowing  Outcome: Progressing     Problem: MUSCULOSKELETAL - ADULT  Goal: Return mobility to safest level of function  Description: INTERVENTIONS:  - Assess patient stability and activity tolerance for standing, transferring and ambulating w/ or w/o assistive devices  - Assist with transfers and ambulation using safe patient handling equipment as needed  - Ensure adequate protection for wounds/incisions during mobilization  - Obtain PT/OT consults as needed  - Advance activity as appropriate  - Communicate ordered activity level and limitations with patient/family  Outcome: Progressing     Problem: NEUROLOGICAL - ADULT  Goal: Achieves stable or improved neurological status  Description: INTERVENTIONS  - Assess for and report changes in neurological status  - Initiate measures to prevent increased intracranial pressure  - Maintain blood pressure and fluid volume within ordered parameters to optimize cerebral perfusion and minimize risk of hemorrhage  - Monitor temperature, glucose, and sodium.  Initiate appropriate interventions as ordered  Outcome: Progressing

## 2023-12-01 NOTE — CM/SW NOTE
SW reviewed PASRR. PASRR level 1 screen submitted and completed and uploaded to aidin referral    Pt needs one more midnight for [facility]. 1118am- SW provided to to wife at bedside. Wife was given SW direct number in regards to choice or further questions. 1241pm- SW received a call from wife in regards to [facility]. Wife wishes for [facility]. SW reserved DG via aidin and informed that wife is wanting a private room if possible. Plan: Pending medical clearance, DC to [facility] completed, *1mn    SW/CM to remain available for support and/or discharge planning.      Opal Garduno, MSW, LSW   x [phone]

## 2023-12-02 LAB
ANION GAP SERPL CALC-SCNC: 5 MMOL/L (ref 0–18)
BASOPHILS # BLD AUTO: 0.04 X10(3) UL (ref 0–0.2)
BASOPHILS NFR BLD AUTO: 0.6 %
BUN BLD-MCNC: 15 MG/DL (ref 9–23)
BUN/CREAT SERPL: 20.3 (ref 10–20)
CALCIUM BLD-MCNC: 9.8 MG/DL (ref 8.7–10.4)
CHLORIDE SERPL-SCNC: 107 MMOL/L (ref 98–112)
CO2 SERPL-SCNC: 30 MMOL/L (ref 21–32)
CREAT BLD-MCNC: 0.74 MG/DL
DEPRECATED RDW RBC AUTO: 47.3 FL (ref 35.1–46.3)
EGFRCR SERPLBLD CKD-EPI 2021: 88 ML/MIN/1.73M2 (ref 60–?)
EOSINOPHIL # BLD AUTO: 0.24 X10(3) UL (ref 0–0.7)
EOSINOPHIL NFR BLD AUTO: 3.5 %
ERYTHROCYTE [DISTWIDTH] IN BLOOD BY AUTOMATED COUNT: 12.5 % (ref 11–15)
GLUCOSE BLD-MCNC: 93 MG/DL (ref 70–99)
HCT VFR BLD AUTO: 35.7 %
HGB BLD-MCNC: 11.6 G/DL
IMM GRANULOCYTES # BLD AUTO: 0.02 X10(3) UL (ref 0–1)
IMM GRANULOCYTES NFR BLD: 0.3 %
LYMPHOCYTES # BLD AUTO: 1.85 X10(3) UL (ref 1–4)
LYMPHOCYTES NFR BLD AUTO: 26.8 %
MCH RBC QN AUTO: 33.1 PG (ref 26–34)
MCHC RBC AUTO-ENTMCNC: 32.5 G/DL (ref 31–37)
MCV RBC AUTO: 102 FL
MONOCYTES # BLD AUTO: 0.71 X10(3) UL (ref 0.1–1)
MONOCYTES NFR BLD AUTO: 10.3 %
NEUTROPHILS # BLD AUTO: 4.05 X10 (3) UL (ref 1.5–7.7)
NEUTROPHILS # BLD AUTO: 4.05 X10(3) UL (ref 1.5–7.7)
NEUTROPHILS NFR BLD AUTO: 58.5 %
OSMOLALITY SERPL CALC.SUM OF ELEC: 295 MOSM/KG (ref 275–295)
PLATELET # BLD AUTO: 176 10(3)UL (ref 150–450)
POTASSIUM SERPL-SCNC: 4.1 MMOL/L (ref 3.5–5.1)
RBC # BLD AUTO: 3.5 X10(6)UL
SODIUM SERPL-SCNC: 142 MMOL/L (ref 136–145)
WBC # BLD AUTO: 6.9 X10(3) UL (ref 4–11)

## 2023-12-02 PROCEDURE — 80048 BASIC METABOLIC PNL TOTAL CA: CPT | Performed by: INTERNAL MEDICINE

## 2023-12-02 PROCEDURE — 85025 COMPLETE CBC W/AUTO DIFF WBC: CPT | Performed by: INTERNAL MEDICINE

## 2023-12-02 NOTE — PLAN OF CARE
No acute changes during shift. Wife at bedside. Fall precautions in place. Call light in reach. Problem: Patient Centered Care  Goal: Patient preferences are identified and integrated in the patient's plan of care  Description: Interventions:  - What would you like us to know as we care for you?  None stated  - Provide timely, complete, and accurate information to patient/family  - Incorporate patient and family knowledge, values, beliefs, and cultural backgrounds into the planning and delivery of care  - Encourage patient/family to participate in care and decision-making at the level they choose  - Honor patient and family perspectives and choices  Outcome: Progressing     Problem: Patient/Family Goals  Goal: Patient/Family Long Term Goal  Description: Patient's Long Term Goal: free of confusion    Interventions:  - re-orientate patient frequently  - See additional Care Plan goals for specific interventions  Outcome: Progressing  Goal: Patient/Family Short Term Goal  Description: Patient's Short Term Goal: free of falls    Interventions:   - fall precautions in place  - See additional Care Plan goals for specific interventions  Outcome: Progressing     Problem: PAIN - ADULT  Goal: Verbalizes/displays adequate comfort level or patient's stated pain goal  Description: INTERVENTIONS:  - Encourage pt to monitor pain and request assistance  - Assess pain using appropriate pain scale  - Administer analgesics based on type and severity of pain and evaluate response  - Implement non-pharmacological measures as appropriate and evaluate response  - Consider cultural and social influences on pain and pain management  - Manage/alleviate anxiety  - Utilize distraction and/or relaxation techniques  - Monitor for opioid side effects  - Notify MD/LIP if interventions unsuccessful or patient reports new pain  - Anticipate increased pain with activity and pre-medicate as appropriate  Outcome: Progressing     Problem: RISK FOR INFECTION - ADULT  Goal: Absence of fever/infection during anticipated neutropenic period  Description: INTERVENTIONS  - Monitor WBC  - Administer growth factors as ordered  - Implement neutropenic guidelines  Outcome: Progressing     Problem: SAFETY ADULT - FALL  Goal: Free from fall injury  Description: INTERVENTIONS:  - Assess pt frequently for physical needs  - Identify cognitive and physical deficits and behaviors that affect risk of falls.   - [place] fall precautions as indicated by assessment.  - Educate pt/family on patient safety including physical limitations  - Instruct pt to call for assistance with activity based on assessment  - Modify environment to reduce risk of injury  - Provide assistive devices as appropriate  - Consider OT/PT consult to assist with strengthening/mobility  - Encourage toileting schedule  Outcome: Progressing     Problem: METABOLIC/FLUID AND ELECTROLYTES - ADULT  Goal: Electrolytes maintained within normal limits  Description: INTERVENTIONS:  - Monitor labs and rhythm and assess patient for signs and symptoms of electrolyte imbalances  - Administer electrolyte replacement as ordered  - Monitor response to electrolyte replacements, including rhythm and repeat lab results as appropriate  - Fluid restriction as ordered  - Instruct patient on fluid and nutrition restrictions as appropriate  Outcome: Progressing  Goal: Hemodynamic stability and optimal renal function maintained  Description: INTERVENTIONS:  - Monitor labs and assess for signs and symptoms of volume excess or deficit  - Monitor intake, output and patient weight  - Monitor urine specific gravity, serum osmolarity and serum sodium as indicated or ordered  - Monitor response to interventions for patient's volume status, including labs, urine output, blood pressure (other measures as available)  - Encourage oral intake as appropriate  - Instruct patient on fluid and nutrition restrictions as appropriate  Outcome: Progressing     Problem: SKIN/TISSUE INTEGRITY - ADULT  Goal: Skin integrity remains intact  Description: INTERVENTIONS  - Assess and document risk factors for pressure ulcer development  - Assess and document skin integrity  - Monitor for areas of redness and/or skin breakdown  - Initiate interventions, skin care algorithm/standards of care as needed  Outcome: Progressing     Problem: HEMATOLOGIC - ADULT  Goal: Free from bleeding injury  Description: (Example usage: patient with low platelets)  INTERVENTIONS:  - Avoid intramuscular injections, enemas and rectal medication administration  - Ensure safe mobilization of patient  - Hold pressure on venipuncture sites to achieve adequate hemostasis  - Assess for signs and symptoms of internal bleeding  - Monitor lab trends  - Patient is to report abnormal signs of bleeding to staff  - Avoid use of toothpicks and dental floss  - Use electric shaver for shaving  - Use soft bristle tooth brush  - Limit straining and forceful nose blowing  Outcome: Progressing     Problem: MUSCULOSKELETAL - ADULT  Goal: Return mobility to safest level of function  Description: INTERVENTIONS:  - Assess patient stability and activity tolerance for standing, transferring and ambulating w/ or w/o assistive devices  - Assist with transfers and ambulation using safe patient handling equipment as needed  - Ensure adequate protection for wounds/incisions during mobilization  - Obtain PT/OT consults as needed  - Advance activity as appropriate  - Communicate ordered activity level and limitations with patient/family  Outcome: Progressing     Problem: NEUROLOGICAL - ADULT  Goal: Achieves stable or improved neurological status  Description: INTERVENTIONS  - Assess for and report changes in neurological status  - Initiate measures to prevent increased intracranial pressure  - Maintain blood pressure and fluid volume within ordered parameters to optimize cerebral perfusion and minimize risk of hemorrhage  - Monitor temperature, glucose, and sodium.  Initiate appropriate interventions as ordered  Outcome: Progressing

## 2023-12-02 NOTE — PLAN OF CARE
Pt a&o x1(baseline per wife), room air, no tele, stand-pivot x2. No complaints of pain. IV given as ordered. Plan is [facility] when medically cleared. Safety precautions maintained. Call light in reach. Problem: Patient Centered Care  Goal: Patient preferences are identified and integrated in the patient's plan of care  Description: Interventions:  - What would you like us to know as we care for you?  From home with wife   - Provide timely, complete, and accurate information to patient/family  - Incorporate patient and family knowledge, values, beliefs, and cultural backgrounds into the planning and delivery of care  - Encourage patient/family to participate in care and decision-making at the level they choose  - Honor patient and family perspectives and choices  Outcome: Progressing     Problem: Patient/Family Goals  Goal: Patient/Family Long Term Goal  Description: Patient's Long Term Goal: Discharge     Interventions:  - vitals  - labs  - Follow MD orders   - See additional Care Plan goals for specific interventions  Outcome: Progressing  Goal: Patient/Family Short Term Goal  Description: Patient's Short Term Goal: Relief of symptoms     Interventions:   - antibiotics   -labs   - See additional Care Plan goals for specific interventions  Outcome: Progressing     Problem: PAIN - ADULT  Goal: Verbalizes/displays adequate comfort level or patient's stated pain goal  Description: INTERVENTIONS:  - Encourage pt to monitor pain and request assistance  - Assess pain using appropriate pain scale  - Administer analgesics based on type and severity of pain and evaluate response  - Implement non-pharmacological measures as appropriate and evaluate response  - Consider cultural and social influences on pain and pain management  - Manage/alleviate anxiety  - Utilize distraction and/or relaxation techniques  - Monitor for opioid side effects  - Notify MD/LIP if interventions unsuccessful or patient reports new pain  - Anticipate increased pain with activity and pre-medicate as appropriate  Outcome: Progressing     Problem: RISK FOR INFECTION - ADULT  Goal: Absence of fever/infection during anticipated neutropenic period  Description: INTERVENTIONS  - Monitor WBC  - Administer growth factors as ordered  - Implement neutropenic guidelines  Outcome: Progressing     Problem: SAFETY ADULT - FALL  Goal: Free from fall injury  Description: INTERVENTIONS:  - Assess pt frequently for physical needs  - Identify cognitive and physical deficits and behaviors that affect risk of falls.   - [place] fall precautions as indicated by assessment.  - Educate pt/family on patient safety including physical limitations  - Instruct pt to call for assistance with activity based on assessment  - Modify environment to reduce risk of injury  - Provide assistive devices as appropriate  - Consider OT/PT consult to assist with strengthening/mobility  - Encourage toileting schedule  Outcome: Progressing     Problem: METABOLIC/FLUID AND ELECTROLYTES - ADULT  Goal: Electrolytes maintained within normal limits  Description: INTERVENTIONS:  - Monitor labs and rhythm and assess patient for signs and symptoms of electrolyte imbalances  - Administer electrolyte replacement as ordered  - Monitor response to electrolyte replacements, including rhythm and repeat lab results as appropriate  - Fluid restriction as ordered  - Instruct patient on fluid and nutrition restrictions as appropriate  Outcome: Progressing  Goal: Hemodynamic stability and optimal renal function maintained  Description: INTERVENTIONS:  - Monitor labs and assess for signs and symptoms of volume excess or deficit  - Monitor intake, output and patient weight  - Monitor urine specific gravity, serum osmolarity and serum sodium as indicated or ordered  - Monitor response to interventions for patient's volume status, including labs, urine output, blood pressure (other measures as available)  - Encourage oral intake as appropriate  - Instruct patient on fluid and nutrition restrictions as appropriate  Outcome: Progressing     Problem: SKIN/TISSUE INTEGRITY - ADULT  Goal: Skin integrity remains intact  Description: INTERVENTIONS  - Assess and document risk factors for pressure ulcer development  - Assess and document skin integrity  - Monitor for areas of redness and/or skin breakdown  - Initiate interventions, skin care algorithm/standards of care as needed  Outcome: Progressing     Problem: HEMATOLOGIC - ADULT  Goal: Free from bleeding injury  Description: (Example usage: patient with low platelets)  INTERVENTIONS:  - Avoid intramuscular injections, enemas and rectal medication administration  - Ensure safe mobilization of patient  - Hold pressure on venipuncture sites to achieve adequate hemostasis  - Assess for signs and symptoms of internal bleeding  - Monitor lab trends  - Patient is to report abnormal signs of bleeding to staff  - Avoid use of toothpicks and dental floss  - Use electric shaver for shaving  - Use soft bristle tooth brush  - Limit straining and forceful nose blowing  Outcome: Progressing     Problem: MUSCULOSKELETAL - ADULT  Goal: Return mobility to safest level of function  Description: INTERVENTIONS:  - Assess patient stability and activity tolerance for standing, transferring and ambulating w/ or w/o assistive devices  - Assist with transfers and ambulation using safe patient handling equipment as needed  - Ensure adequate protection for wounds/incisions during mobilization  - Obtain PT/OT consults as needed  - Advance activity as appropriate  - Communicate ordered activity level and limitations with patient/family  Outcome: Progressing     Problem: NEUROLOGICAL - ADULT  Goal: Achieves stable or improved neurological status  Description: INTERVENTIONS  - Assess for and report changes in neurological status  - Initiate measures to prevent increased intracranial pressure  - Maintain blood pressure and fluid volume within ordered parameters to optimize cerebral perfusion and minimize risk of hemorrhage  - Monitor temperature, glucose, and sodium.  Initiate appropriate interventions as ordered  Outcome: Progressing

## 2023-12-03 NOTE — CM/SW NOTE
SW placed ambulance on will call from 12/3-12/5 to [facility] rehab pending medical clearance. SW/CM to remain available for support and/or discharge planning.      Padmaja Titus MSW, LSW   x [phone]

## 2023-12-04 PROCEDURE — 97530 THERAPEUTIC ACTIVITIES: CPT

## 2023-12-04 PROCEDURE — 97116 GAIT TRAINING THERAPY: CPT

## 2023-12-04 PROCEDURE — 97535 SELF CARE MNGMENT TRAINING: CPT

## 2023-12-04 NOTE — PLAN OF CARE
Patient alert to self only, vitals stable on room air. Up with one person assist to recliner. No issues with chewing or swallowing noted. Incontinence care provided PRN. Plan of care reviewed with patient and spouse, Nikky Julian. Problem: Patient Centered Care  Goal: Patient preferences are identified and integrated in the patient's plan of care  Description: Interventions:  - What would you like us to know as we care for you?  Unable to answer, per wife Araceli Overton we please get up\"  - Provide timely, complete, and accurate information to patient/family  - Incorporate patient and family knowledge, values, beliefs, and cultural backgrounds into the planning and delivery of care  - Encourage patient/family to participate in care and decision-making at the level they choose  - Honor patient and family perspectives and choices  Outcome: Progressing     Problem: Patient/Family Goals  Goal: Patient/Family Long Term Goal  Description: Patient's Long Term Goal: discharge home    Interventions:  - implement plan of care  - See additional Care Plan goals for specific interventions  Outcome: Progressing  Goal: Patient/Family Short Term Goal  Description: Patient's Short Term Goal: sit up to chair    Interventions:   - ambulate patient   - See additional Care Plan goals for specific interventions  Outcome: Progressing     Problem: PAIN - ADULT  Goal: Verbalizes/displays adequate comfort level or patient's stated pain goal  Description: INTERVENTIONS:  - Encourage pt to monitor pain and request assistance  - Assess pain using appropriate pain scale  - Administer analgesics based on type and severity of pain and evaluate response  - Implement non-pharmacological measures as appropriate and evaluate response  - Consider cultural and social influences on pain and pain management  - Manage/alleviate anxiety  - Utilize distraction and/or relaxation techniques  - Monitor for opioid side effects  - Notify MD/LIP if interventions unsuccessful or patient reports new pain  - Anticipate increased pain with activity and pre-medicate as appropriate  Outcome: Progressing     Problem: RISK FOR INFECTION - ADULT  Goal: Absence of fever/infection during anticipated neutropenic period  Description: INTERVENTIONS  - Monitor WBC  - Administer growth factors as ordered  - Implement neutropenic guidelines  Outcome: Progressing     Problem: SAFETY ADULT - FALL  Goal: Free from fall injury  Description: INTERVENTIONS:  - Assess pt frequently for physical needs  - Identify cognitive and physical deficits and behaviors that affect risk of falls.   - [place] fall precautions as indicated by assessment.  - Educate pt/family on patient safety including physical limitations  - Instruct pt to call for assistance with activity based on assessment  - Modify environment to reduce risk of injury  - Provide assistive devices as appropriate  - Consider OT/PT consult to assist with strengthening/mobility  - Encourage toileting schedule  Outcome: Progressing     Problem: METABOLIC/FLUID AND ELECTROLYTES - ADULT  Goal: Electrolytes maintained within normal limits  Description: INTERVENTIONS:  - Monitor labs and rhythm and assess patient for signs and symptoms of electrolyte imbalances  - Administer electrolyte replacement as ordered  - Monitor response to electrolyte replacements, including rhythm and repeat lab results as appropriate  - Fluid restriction as ordered  - Instruct patient on fluid and nutrition restrictions as appropriate  Outcome: Progressing     Problem: SKIN/TISSUE INTEGRITY - ADULT  Goal: Skin integrity remains intact  Description: INTERVENTIONS  - Assess and document risk factors for pressure ulcer development  - Assess and document skin integrity  - Monitor for areas of redness and/or skin breakdown  - Initiate interventions, skin care algorithm/standards of care as needed  Outcome: Progressing     Problem: HEMATOLOGIC - ADULT  Goal: Free from bleeding injury  Description: (Example usage: patient with low platelets)  INTERVENTIONS:  - Avoid intramuscular injections, enemas and rectal medication administration  - Ensure safe mobilization of patient  - Hold pressure on venipuncture sites to achieve adequate hemostasis  - Assess for signs and symptoms of internal bleeding  - Monitor lab trends  - Patient is to report abnormal signs of bleeding to staff  - Avoid use of toothpicks and dental floss  - Use electric shaver for shaving  - Use soft bristle tooth brush  - Limit straining and forceful nose blowing  Outcome: Progressing     Problem: MUSCULOSKELETAL - ADULT  Goal: Return mobility to safest level of function  Description: INTERVENTIONS:  - Assess patient stability and activity tolerance for standing, transferring and ambulating w/ or w/o assistive devices  - Assist with transfers and ambulation using safe patient handling equipment as needed  - Ensure adequate protection for wounds/incisions during mobilization  - Obtain PT/OT consults as needed  - Advance activity as appropriate  - Communicate ordered activity level and limitations with patient/family  Outcome: Progressing     Problem: NEUROLOGICAL - ADULT  Goal: Achieves stable or improved neurological status  Description: INTERVENTIONS  - Assess for and report changes in neurological status  - Initiate measures to prevent increased intracranial pressure  - Maintain blood pressure and fluid volume within ordered parameters to optimize cerebral perfusion and minimize risk of hemorrhage  - Monitor temperature, glucose, and sodium.  Initiate appropriate interventions as ordered  Outcome: Progressing

## 2023-12-04 NOTE — PLAN OF CARE
ID and Urology consults, antibiotics stopped. Problem: Patient Centered Care  Goal: Patient preferences are identified and integrated in the patient's plan of care  Description: Interventions:  - What would you like us to know as we care for you? From home with wife  - Provide timely, complete, and accurate information to patient/family  - Incorporate patient and family knowledge, values, beliefs, and cultural backgrounds into the planning and delivery of care  - Encourage patient/family to participate in care and decision-making at the level they choose  - Honor patient and family perspectives and choices  Outcome: Progressing     Problem: PAIN - ADULT  Goal: Verbalizes/displays adequate comfort level or patient's stated pain goal  Description: INTERVENTIONS:  - Encourage pt to monitor pain and request assistance  - Assess pain using appropriate pain scale  - Administer analgesics based on type and severity of pain and evaluate response  - Implement non-pharmacological measures as appropriate and evaluate response  - Consider cultural and social influences on pain and pain management  - Manage/alleviate anxiety  - Utilize distraction and/or relaxation techniques  - Monitor for opioid side effects  - Notify MD/LIP if interventions unsuccessful or patient reports new pain  - Anticipate increased pain with activity and pre-medicate as appropriate  Outcome: Progressing     Problem: RISK FOR INFECTION - ADULT  Goal: Absence of fever/infection during anticipated neutropenic period  Description: INTERVENTIONS  - Monitor WBC  - Administer growth factors as ordered  - Implement neutropenic guidelines  Outcome: Progressing     Problem: SAFETY ADULT - FALL  Goal: Free from fall injury  Description: INTERVENTIONS:  - Assess pt frequently for physical needs  - Identify cognitive and physical deficits and behaviors that affect risk of falls.   - [place] fall precautions as indicated by assessment.  - Educate pt/family on patient safety including physical limitations  - Instruct pt to call for assistance with activity based on assessment  - Modify environment to reduce risk of injury  - Provide assistive devices as appropriate  - Consider OT/PT consult to assist with strengthening/mobility  - Encourage toileting schedule  Outcome: Progressing     Problem: METABOLIC/FLUID AND ELECTROLYTES - ADULT  Goal: Hemodynamic stability and optimal renal function maintained  Description: INTERVENTIONS:  - Monitor labs and assess for signs and symptoms of volume excess or deficit  - Monitor intake, output and patient weight  - Monitor urine specific gravity, serum osmolarity and serum sodium as indicated or ordered  - Monitor response to interventions for patient's volume status, including labs, urine output, blood pressure (other measures as available)  - Encourage oral intake as appropriate  - Instruct patient on fluid and nutrition restrictions as appropriate  Outcome: Progressing     Problem: SKIN/TISSUE INTEGRITY - ADULT  Goal: Skin integrity remains intact  Description: INTERVENTIONS  - Assess and document risk factors for pressure ulcer development  - Assess and document skin integrity  - Monitor for areas of redness and/or skin breakdown  - Initiate interventions, skin care algorithm/standards of care as needed  Outcome: Progressing     Problem: HEMATOLOGIC - ADULT  Goal: Free from bleeding injury  Description: (Example usage: patient with low platelets)  INTERVENTIONS:  - Avoid intramuscular injections, enemas and rectal medication administration  - Ensure safe mobilization of patient  - Hold pressure on venipuncture sites to achieve adequate hemostasis  - Assess for signs and symptoms of internal bleeding  - Monitor lab trends  - Patient is to report abnormal signs of bleeding to staff  - Avoid use of toothpicks and dental floss  - Use electric shaver for shaving  - Use soft bristle tooth brush  - Limit straining and forceful nose blowing  Outcome: Progressing     Problem: Patient/Family Goals  Goal: Patient/Family Long Term Goal  Description: Patient's Long Term Goal: unable to state goals    Interventions:  -   - See additional Care Plan goals for specific interventions  Outcome: Not Progressing  Goal: Patient/Family Short Term Goal  Description: Patient's Short Term Goal: unable to state goals    Interventions:   -   - See additional Care Plan goals for specific interventions  Outcome: Not Progressing     Problem: MUSCULOSKELETAL - ADULT  Goal: Return mobility to safest level of function  Description: INTERVENTIONS:  - Assess patient stability and activity tolerance for standing, transferring and ambulating w/ or w/o assistive devices  - Assist with transfers and ambulation using safe patient handling equipment as needed  - Ensure adequate protection for wounds/incisions during mobilization  - Obtain PT/OT consults as needed  - Advance activity as appropriate  - Communicate ordered activity level and limitations with patient/family  Outcome: Not Progressing     Problem: NEUROLOGICAL - ADULT  Goal: Achieves stable or improved neurological status  Description: INTERVENTIONS  - Assess for and report changes in neurological status  - Initiate measures to prevent increased intracranial pressure  - Maintain blood pressure and fluid volume within ordered parameters to optimize cerebral perfusion and minimize risk of hemorrhage  - Monitor temperature, glucose, and sodium.  Initiate appropriate interventions as ordered  Outcome: Not Progressing

## 2023-12-04 NOTE — PHYSICAL THERAPY NOTE
PHYSICAL THERAPY TREATMENT NOTE - INPATIENT     Room Number: 551/551-A       Presenting Problem: UTI, weakness    Problem List  Principal Problem:    Weakness generalized  Active Problems:    Acute cystitis without hematuria      PHYSICAL THERAPY ASSESSMENT   Chart reviewed. BRYANT Lopez approved participation in physical therapy. Session coordinated with OT, gait belt donned. PPE worn by therapist: mask and gloves. Patient was not wearing a mask during session. Patient presented in bed with 0/10 pain. Patient with good  progress towards goals during this session. Education provided on Physical therapy plan of care and physiological benefits of out of bed mobility. Patient with good carryover. Patient on room air. Patient currently with mod A x 1 for bed mobility, min A x 1 for sit to stand from bedside, and max A x 1 for sit to stand from bedside chair. Patient with min to mod x 1 for ambulation with rolling walker with shuffling gait, chair follow for safety. Session focused on bed mobility, transfers, and gait training. Patient with good  progress towards goals and good carryover during session. Patient continues to function below baseline with bed mobility, transfers, and gait. Contributing factors to remaining limitations include decreased functional strength and medical status. Will continue to follow patient for duration of hospital stay per plan of care, next session anticipate to progress bed mobility, transfers, and gait. Discharge recommendation remains appropriate based on the patient's performance, personal factors, and remaining functional impairments. Goals remain in progress. Updated nurse on results of session, patient left in bedside chair with alarm set, all lines intact, all needs met with call light in reach. Oxygen sat 96% and heart rate 54, blood pressure 145/63, after the session oxygen sat 97% and heart rate 54. Bed mobility: Mod assist  Transfers: Min assist  Gait Assistance:  Moderate assistance  Distance (ft): 20ft  Assistive Device: Rolling walker (chair follow)             Patient was left in bedside chair and alarm activated at end of session with all needs in reach. Patient does not have the physical skills to return to prior living environment upon D/C from the hospital. Anticipate patient will benefit from continued skilled physical therapy while in the hospital and upon D/C from the hospital at a rehab facility. The patient's Approx Degree of Impairment: 64.91% has been calculated based on documentation in the [facility] '6 clicks' Inpatient Basic Mobility Short Form. Research supports that patients with this level of impairment may benefit from Subacute Rehab. RN aware of patient status post session. DISCHARGE RECOMMENDATIONS  PT Discharge Recommendations: Sub-acute rehabilitation     PLAN  PT Treatment Plan: Body mechanics; Bed mobility; Patient education;Gait training;Balance training;Transfer training;Strengthening    SUBJECTIVE  \"I can walk more\"    OBJECTIVE  Precautions: Bed/chair alarm;Aspiration    WEIGHT BEARING RESTRICTION  Weight Bearing Restriction: None                PAIN ASSESSMENT   Rating: Unable to rate  Location: no visible signs of distress       BALANCE                                                                                                                       Static Sitting: Fair +  Dynamic Sitting: Fair           Static Standing: Fair -  Dynamic Standing: Poor +    AM-PAC '6-Clicks' INPATIENT SHORT FORM - BASIC MOBILITY  How much difficulty does the patient currently have. ..   Patient Difficulty: Turning over in bed (including adjusting bedclothes, sheets and blankets)?: A Little   Patient Difficulty: Sitting down on and standing up from a chair with arms (e.g., wheelchair, bedside commode, etc.): A Little   Patient Difficulty: Moving from lying on back to sitting on the side of the bed?: A Lot   How much help from another person does the patient currently need... Help from Another: Moving to and from a bed to a chair (including a wheelchair)?: A Lot   Help from Another: Need to walk in hospital room?: A Lot   Help from Another: Climbing 3-5 steps with a railing?: Total     AM-PAC Score:  Raw Score: 13   Approx Degree of Impairment: 64.91%   Standardized Score (AM-PAC Scale): 36.74   CMS Modifier (G-Code): CL    Patient End of Session: Up in chair;Needs met;Call light within reach;RN aware of session/findings; All patient questions and concerns addressed; Alarm set; Family present    CURRENT GOALS   Goals to be met by: 12/10/23  Patient Goal Patient's self-stated goal is: maximize functional independence and safety   Goal #1 Patient is able to demonstrate supine - sit EOB @ level: minimum assistance      Goal #1   Current Status Mod A x 1    Goal #2 Patient is able to demonstrate transfers EOB to/from [facility] at assistance level: minimum assistance with walker - rolling      Goal #2  Current Status Min A  x 1 to max A x 1   Goal #3 Patient is able to ambulate 30 feet with assist device: walker - rolling at assistance level: minimum assistance   Goal #3   Current Status 20ft with min to mod A x 1    Goal #4 Patient will negotiate 12 stairs/one curb w/ assistive device and supervision   Goal #4   Current Status Not tested   Goal #5 Patient to demonstrate independence with home activity/exercise instructions provided to patient in preparation for discharge.    Goal #5   Current Status In progress   Goal #6     Goal #6  Current Status        Gait Training: 10 minutes  Therapeutic Activity: 13 minutes

## 2023-12-05 VITALS
HEIGHT: 68 IN | WEIGHT: 145.13 LBS | DIASTOLIC BLOOD PRESSURE: 73 MMHG | RESPIRATION RATE: 20 BRPM | TEMPERATURE: 98 F | OXYGEN SATURATION: 96 % | BODY MASS INDEX: 22 KG/M2 | SYSTOLIC BLOOD PRESSURE: 153 MMHG | HEART RATE: 64 BPM

## 2023-12-05 PROCEDURE — 90471 IMMUNIZATION ADMIN: CPT

## 2023-12-05 RX ORDER — METHENAMINE HIPPURATE 1000 MG/1
1 TABLET ORAL 2 TIMES DAILY
Qty: 60 TABLET | Refills: 0 | Status: SHIPPED | OUTPATIENT
Start: 2023-12-05

## 2023-12-05 NOTE — CM/SW NOTE
Patient discussed in rounds, possible discharge today. Bed available at [facility] but wife now requesting [facility]. Spoke w/ Emil Sun at [facility], patient is accepting but waiting on response regarding bed availability. Ambulance placed on will call on 12/3- will need to confirm discharge location w/ [facility].     Wojciech Almaraz 3

## 2023-12-05 NOTE — PLAN OF CARE
Problem: Patient Centered Care  Goal: Patient preferences are identified and integrated in the patient's plan of care  Description: Interventions:  - What would you like us to know as we care for you?  From home with wife  - Provide timely, complete, and accurate information to patient/family  - Incorporate patient and family knowledge, values, beliefs, and cultural backgrounds into the planning and delivery of care  - Encourage patient/family to participate in care and decision-making at the level they choose  - Honor patient and family perspectives and choices  Outcome: Not Progressing     Problem: Patient/Family Goals  Goal: Patient/Family Long Term Goal  Description: Patient's Long Term Goal: unable to state goals    Interventions:  -   - See additional Care Plan goals for specific interventions  Outcome: Not Progressing  Goal: Patient/Family Short Term Goal  Description: Patient's Short Term Goal: unable to state goals    Interventions:   -   - See additional Care Plan goals for specific interventions  Outcome: Not Progressing     Problem: PAIN - ADULT  Goal: Verbalizes/displays adequate comfort level or patient's stated pain goal  Description: INTERVENTIONS:  - Encourage pt to monitor pain and request assistance  - Assess pain using appropriate pain scale  - Administer analgesics based on type and severity of pain and evaluate response  - Implement non-pharmacological measures as appropriate and evaluate response  - Consider cultural and social influences on pain and pain management  - Manage/alleviate anxiety  - Utilize distraction and/or relaxation techniques  - Monitor for opioid side effects  - Notify MD/LIP if interventions unsuccessful or patient reports new pain  - Anticipate increased pain with activity and pre-medicate as appropriate  Outcome: Not Progressing     Problem: RISK FOR INFECTION - ADULT  Goal: Absence of fever/infection during anticipated neutropenic period  Description: INTERVENTIONS  - Monitor WBC  - Administer growth factors as ordered  - Implement neutropenic guidelines  Outcome: Not Progressing     Problem: SAFETY ADULT - FALL  Goal: Free from fall injury  Description: INTERVENTIONS:  - Assess pt frequently for physical needs  - Identify cognitive and physical deficits and behaviors that affect risk of falls.   - [place] fall precautions as indicated by assessment.  - Educate pt/family on patient safety including physical limitations  - Instruct pt to call for assistance with activity based on assessment  - Modify environment to reduce risk of injury  - Provide assistive devices as appropriate  - Consider OT/PT consult to assist with strengthening/mobility  - Encourage toileting schedule  Outcome: Not Progressing     Problem: METABOLIC/FLUID AND ELECTROLYTES - ADULT  Goal: Electrolytes maintained within normal limits  Description: INTERVENTIONS:  - Monitor labs and rhythm and assess patient for signs and symptoms of electrolyte imbalances  - Administer electrolyte replacement as ordered  - Monitor response to electrolyte replacements, including rhythm and repeat lab results as appropriate  - Fluid restriction as ordered  - Instruct patient on fluid and nutrition restrictions as appropriate  Outcome: Not Progressing  Goal: Hemodynamic stability and optimal renal function maintained  Description: INTERVENTIONS:  - Monitor labs and assess for signs and symptoms of volume excess or deficit  - Monitor intake, output and patient weight  - Monitor urine specific gravity, serum osmolarity and serum sodium as indicated or ordered  - Monitor response to interventions for patient's volume status, including labs, urine output, blood pressure (other measures as available)  - Encourage oral intake as appropriate  - Instruct patient on fluid and nutrition restrictions as appropriate  Outcome: Not Progressing     Problem: SKIN/TISSUE INTEGRITY - ADULT  Goal: Skin integrity remains intact  Description: INTERVENTIONS  - Assess and document risk factors for pressure ulcer development  - Assess and document skin integrity  - Monitor for areas of redness and/or skin breakdown  - Initiate interventions, skin care algorithm/standards of care as needed  Outcome: Not Progressing     Problem: HEMATOLOGIC - ADULT  Goal: Free from bleeding injury  Description: (Example usage: patient with low platelets)  INTERVENTIONS:  - Avoid intramuscular injections, enemas and rectal medication administration  - Ensure safe mobilization of patient  - Hold pressure on venipuncture sites to achieve adequate hemostasis  - Assess for signs and symptoms of internal bleeding  - Monitor lab trends  - Patient is to report abnormal signs of bleeding to staff  - Avoid use of toothpicks and dental floss  - Use electric shaver for shaving  - Use soft bristle tooth brush  - Limit straining and forceful nose blowing  Outcome: Not Progressing     Problem: MUSCULOSKELETAL - ADULT  Goal: Return mobility to safest level of function  Description: INTERVENTIONS:  - Assess patient stability and activity tolerance for standing, transferring and ambulating w/ or w/o assistive devices  - Assist with transfers and ambulation using safe patient handling equipment as needed  - Ensure adequate protection for wounds/incisions during mobilization  - Obtain PT/OT consults as needed  - Advance activity as appropriate  - Communicate ordered activity level and limitations with patient/family  Outcome: Not Progressing     Problem: NEUROLOGICAL - ADULT  Goal: Achieves stable or improved neurological status  Description: INTERVENTIONS  - Assess for and report changes in neurological status  - Initiate measures to prevent increased intracranial pressure  - Maintain blood pressure and fluid volume within ordered parameters to optimize cerebral perfusion and minimize risk of hemorrhage  - Monitor temperature, glucose, and sodium.  Initiate appropriate interventions as ordered  Outcome: Not Progressing     Monitoring vitals. Spouse at bedside. No acute changes observed. Safety measures in place. Call light within reach.

## 2023-12-05 NOTE — PLAN OF CARE
Problem: Patient Centered Care  Goal: Patient preferences are identified and integrated in the patient's plan of care  Description: Interventions:  - What would you like us to know as we care for you?  From home with wife  - Provide timely, complete, and accurate information to patient/family  - Incorporate patient and family knowledge, values, beliefs, and cultural backgrounds into the planning and delivery of care  - Encourage patient/family to participate in care and decision-making at the level they choose  - Honor patient and family perspectives and choices  Outcome: Progressing     Problem: Patient/Family Goals  Goal: Patient/Family Long Term Goal  Description: Patient's Long Term Goal: return to home and get stronger    Interventions:  - therapy, rehab  - See additional Care Plan goals for specific interventions  Outcome: Progressing  Goal: Patient/Family Short Term Goal  Description: Patient's Short Term Goal: clear infection    Interventions:   - antibiotics  - See additional Care Plan goals for specific interventions  Outcome: Progressing     Problem: PAIN - ADULT  Goal: Verbalizes/displays adequate comfort level or patient's stated pain goal  Description: INTERVENTIONS:  - Encourage pt to monitor pain and request assistance  - Assess pain using appropriate pain scale  - Administer analgesics based on type and severity of pain and evaluate response  - Implement non-pharmacological measures as appropriate and evaluate response  - Consider cultural and social influences on pain and pain management  - Manage/alleviate anxiety  - Utilize distraction and/or relaxation techniques  - Monitor for opioid side effects  - Notify MD/LIP if interventions unsuccessful or patient reports new pain  - Anticipate increased pain with activity and pre-medicate as appropriate  Outcome: Progressing     Problem: RISK FOR INFECTION - ADULT  Goal: Absence of fever/infection during anticipated neutropenic period  Description: INTERVENTIONS  - Monitor WBC  - Administer growth factors as ordered  - Implement neutropenic guidelines  Outcome: Progressing     Problem: SAFETY ADULT - FALL  Goal: Free from fall injury  Description: INTERVENTIONS:  - Assess pt frequently for physical needs  - Identify cognitive and physical deficits and behaviors that affect risk of falls.   - [place] fall precautions as indicated by assessment.  - Educate pt/family on patient safety including physical limitations  - Instruct pt to call for assistance with activity based on assessment  - Modify environment to reduce risk of injury  - Provide assistive devices as appropriate  - Consider OT/PT consult to assist with strengthening/mobility  - Encourage toileting schedule  Outcome: Progressing     Problem: METABOLIC/FLUID AND ELECTROLYTES - ADULT  Goal: Electrolytes maintained within normal limits  Description: INTERVENTIONS:  - Monitor labs and rhythm and assess patient for signs and symptoms of electrolyte imbalances  - Administer electrolyte replacement as ordered  - Monitor response to electrolyte replacements, including rhythm and repeat lab results as appropriate  - Fluid restriction as ordered  - Instruct patient on fluid and nutrition restrictions as appropriate  Outcome: Progressing  Goal: Hemodynamic stability and optimal renal function maintained  Description: INTERVENTIONS:  - Monitor labs and assess for signs and symptoms of volume excess or deficit  - Monitor intake, output and patient weight  - Monitor urine specific gravity, serum osmolarity and serum sodium as indicated or ordered  - Monitor response to interventions for patient's volume status, including labs, urine output, blood pressure (other measures as available)  - Encourage oral intake as appropriate  - Instruct patient on fluid and nutrition restrictions as appropriate  Outcome: Progressing     Problem: SKIN/TISSUE INTEGRITY - ADULT  Goal: Skin integrity remains intact  Description: INTERVENTIONS  - Assess and document risk factors for pressure ulcer development  - Assess and document skin integrity  - Monitor for areas of redness and/or skin breakdown  - Initiate interventions, skin care algorithm/standards of care as needed  Outcome: Progressing     Problem: HEMATOLOGIC - ADULT  Goal: Free from bleeding injury  Description: (Example usage: patient with low platelets)  INTERVENTIONS:  - Avoid intramuscular injections, enemas and rectal medication administration  - Ensure safe mobilization of patient  - Hold pressure on venipuncture sites to achieve adequate hemostasis  - Assess for signs and symptoms of internal bleeding  - Monitor lab trends  - Patient is to report abnormal signs of bleeding to staff  - Avoid use of toothpicks and dental floss  - Use electric shaver for shaving  - Use soft bristle tooth brush  - Limit straining and forceful nose blowing  Outcome: Progressing     Problem: MUSCULOSKELETAL - ADULT  Goal: Return mobility to safest level of function  Description: INTERVENTIONS:  - Assess patient stability and activity tolerance for standing, transferring and ambulating w/ or w/o assistive devices  - Assist with transfers and ambulation using safe patient handling equipment as needed  - Ensure adequate protection for wounds/incisions during mobilization  - Obtain PT/OT consults as needed  - Advance activity as appropriate  - Communicate ordered activity level and limitations with patient/family  Outcome: Progressing     Problem: NEUROLOGICAL - ADULT  Goal: Achieves stable or improved neurological status  Description: INTERVENTIONS  - Assess for and report changes in neurological status  - Initiate measures to prevent increased intracranial pressure  - Maintain blood pressure and fluid volume within ordered parameters to optimize cerebral perfusion and minimize risk of hemorrhage  - Monitor temperature, glucose, and sodium.  Initiate appropriate interventions as ordered  Outcome: Progressing

## 2023-12-06 NOTE — PROGRESS NOTES
2nd attempt to give report to [facility]. No answer. Patient is enroute to facility with wife in ambulance.

## 2023-12-06 NOTE — PROGRESS NOTES
Attempted to call report to [facility], phone rang, no response. Will try again later. Patient scheduled to discharge @3847.

## 2024-01-17 ENCOUNTER — APPOINTMENT (OUTPATIENT)
Dept: GENERAL RADIOLOGY | Facility: HOSPITAL | Age: 89
End: 2024-01-17
Attending: EMERGENCY MEDICINE
Payer: MEDICARE

## 2024-01-17 ENCOUNTER — HOSPITAL ENCOUNTER (INPATIENT)
Facility: HOSPITAL | Age: 89
LOS: 5 days | Discharge: NURSING, REHAB OR LONG-TERM CARE | End: 2024-01-22
Attending: EMERGENCY MEDICINE | Admitting: HOSPITALIST
Payer: MEDICARE

## 2024-01-17 ENCOUNTER — APPOINTMENT (OUTPATIENT)
Dept: CT IMAGING | Facility: HOSPITAL | Age: 89
End: 2024-01-17
Attending: EMERGENCY MEDICINE
Payer: MEDICARE

## 2024-01-17 DIAGNOSIS — N39.0 SEPSIS DUE TO URINARY TRACT INFECTION  (HCC): ICD-10-CM

## 2024-01-17 DIAGNOSIS — N39.0 URINARY TRACT INFECTION WITHOUT HEMATURIA, SITE UNSPECIFIED: Primary | ICD-10-CM

## 2024-01-17 DIAGNOSIS — G20.A1 PARKINSON'S DISEASE WITHOUT DYSKINESIA OR FLUCTUATING MANIFESTATIONS: ICD-10-CM

## 2024-01-17 DIAGNOSIS — E87.20 LACTIC ACIDOSIS: ICD-10-CM

## 2024-01-17 DIAGNOSIS — A41.9 SEPSIS DUE TO URINARY TRACT INFECTION  (HCC): ICD-10-CM

## 2024-01-17 DIAGNOSIS — R53.1 WEAKNESS: ICD-10-CM

## 2024-01-17 LAB
ALBUMIN SERPL-MCNC: 3.9 G/DL (ref 3.2–4.8)
ALBUMIN/GLOB SERPL: 1.2 {RATIO} (ref 1–2)
ALP LIVER SERPL-CCNC: 97 U/L
ALT SERPL-CCNC: 21 U/L
ANION GAP SERPL CALC-SCNC: 1 MMOL/L (ref 0–18)
AST SERPL-CCNC: 29 U/L (ref ?–34)
ATRIAL RATE: 50 BPM
BASOPHILS # BLD AUTO: 0.03 X10(3) UL (ref 0–0.2)
BASOPHILS NFR BLD AUTO: 0.3 %
BILIRUB SERPL-MCNC: 0.6 MG/DL (ref 0.2–1.1)
BILIRUB UR QL: NEGATIVE
BUN BLD-MCNC: 9 MG/DL (ref 9–23)
BUN/CREAT SERPL: 12.7 (ref 10–20)
CALCIUM BLD-MCNC: 10.1 MG/DL (ref 8.7–10.4)
CHLORIDE SERPL-SCNC: 108 MMOL/L (ref 98–112)
CO2 SERPL-SCNC: 28 MMOL/L (ref 21–32)
CREAT BLD-MCNC: 0.71 MG/DL
DEPRECATED RDW RBC AUTO: 47.6 FL (ref 35.1–46.3)
EGFRCR SERPLBLD CKD-EPI 2021: 89 ML/MIN/1.73M2 (ref 60–?)
EOSINOPHIL # BLD AUTO: 0.04 X10(3) UL (ref 0–0.7)
EOSINOPHIL NFR BLD AUTO: 0.4 %
ERYTHROCYTE [DISTWIDTH] IN BLOOD BY AUTOMATED COUNT: 12.9 % (ref 11–15)
FLUAV + FLUBV RNA SPEC NAA+PROBE: NEGATIVE
FLUAV + FLUBV RNA SPEC NAA+PROBE: NEGATIVE
GLOBULIN PLAS-MCNC: 3.3 G/DL (ref 2.8–4.4)
GLUCOSE BLD-MCNC: 111 MG/DL (ref 70–99)
GLUCOSE BLDC GLUCOMTR-MCNC: 105 MG/DL (ref 70–99)
GLUCOSE UR-MCNC: NORMAL MG/DL
HCT VFR BLD AUTO: 39.2 %
HGB BLD-MCNC: 12.7 G/DL
IMM GRANULOCYTES # BLD AUTO: 0.04 X10(3) UL (ref 0–1)
IMM GRANULOCYTES NFR BLD: 0.4 %
KETONES UR-MCNC: NEGATIVE MG/DL
LACTATE SERPL-SCNC: 3.3 MMOL/L (ref 0.5–2)
LACTATE SERPL-SCNC: 3.5 MMOL/L (ref 0.5–2)
LACTATE SERPL-SCNC: 4.5 MMOL/L (ref 0.5–2)
LEUKOCYTE ESTERASE UR QL STRIP.AUTO: 500
LYMPHOCYTES # BLD AUTO: 0.83 X10(3) UL (ref 1–4)
LYMPHOCYTES NFR BLD AUTO: 9.1 %
MAGNESIUM SERPL-MCNC: 2 MG/DL (ref 1.6–2.6)
MCH RBC QN AUTO: 32.8 PG (ref 26–34)
MCHC RBC AUTO-ENTMCNC: 32.4 G/DL (ref 31–37)
MCV RBC AUTO: 101.3 FL
MONOCYTES # BLD AUTO: 0.82 X10(3) UL (ref 0.1–1)
MONOCYTES NFR BLD AUTO: 9 %
NEUTROPHILS # BLD AUTO: 7.4 X10 (3) UL (ref 1.5–7.7)
NEUTROPHILS # BLD AUTO: 7.4 X10(3) UL (ref 1.5–7.7)
NEUTROPHILS NFR BLD AUTO: 80.8 %
NITRITE UR QL STRIP.AUTO: NEGATIVE
OSMOLALITY SERPL CALC.SUM OF ELEC: 283 MOSM/KG (ref 275–295)
P AXIS: 80 DEGREES
P-R INTERVAL: 204 MS
PH UR: 8 [PH] (ref 5–8)
PLATELET # BLD AUTO: 198 10(3)UL (ref 150–450)
POTASSIUM SERPL-SCNC: 4.5 MMOL/L (ref 3.5–5.1)
PROT SERPL-MCNC: 7.2 G/DL (ref 5.7–8.2)
PROT UR-MCNC: 20 MG/DL
Q-T INTERVAL: 476 MS
QRS DURATION: 80 MS
QTC CALCULATION (BEZET): 433 MS
R AXIS: -7 DEGREES
RBC # BLD AUTO: 3.87 X10(6)UL
RBC #/AREA URNS AUTO: >10 /HPF
RSV RNA SPEC NAA+PROBE: NEGATIVE
SARS-COV-2 RNA RESP QL NAA+PROBE: NOT DETECTED
SODIUM SERPL-SCNC: 137 MMOL/L (ref 136–145)
SP GR UR STRIP: 1.01 (ref 1–1.03)
T AXIS: -6 DEGREES
TROPONIN I SERPL HS-MCNC: 20 NG/L
TSI SER-ACNC: 1.56 MIU/ML (ref 0.55–4.78)
UROBILINOGEN UR STRIP-ACNC: NORMAL
VENTRICULAR RATE: 50 BPM
VIT B12 SERPL-MCNC: 427 PG/ML (ref 211–911)
WBC # BLD AUTO: 9.2 X10(3) UL (ref 4–11)
WBC #/AREA URNS AUTO: >50 /HPF
YEAST UR QL: PRESENT /HPF

## 2024-01-17 PROCEDURE — 71045 X-RAY EXAM CHEST 1 VIEW: CPT | Performed by: EMERGENCY MEDICINE

## 2024-01-17 PROCEDURE — 99223 1ST HOSP IP/OBS HIGH 75: CPT | Performed by: HOSPITALIST

## 2024-01-17 PROCEDURE — 70450 CT HEAD/BRAIN W/O DYE: CPT | Performed by: EMERGENCY MEDICINE

## 2024-01-17 RX ORDER — HEPARIN SODIUM 5000 [USP'U]/ML
5000 INJECTION, SOLUTION INTRAVENOUS; SUBCUTANEOUS EVERY 12 HOURS SCHEDULED
Status: DISCONTINUED | OUTPATIENT
Start: 2024-01-17 | End: 2024-01-22

## 2024-01-17 RX ORDER — ACETAMINOPHEN 500 MG
500 TABLET ORAL EVERY 4 HOURS PRN
Status: DISCONTINUED | OUTPATIENT
Start: 2024-01-17 | End: 2024-01-22

## 2024-01-17 RX ORDER — ARIPIPRAZOLE 2 MG/1
2 TABLET ORAL EVERY OTHER DAY
Status: DISCONTINUED | OUTPATIENT
Start: 2024-01-17 | End: 2024-01-22

## 2024-01-17 RX ORDER — PRAVASTATIN SODIUM 20 MG
20 TABLET ORAL NIGHTLY
Status: DISCONTINUED | OUTPATIENT
Start: 2024-01-17 | End: 2024-01-22

## 2024-01-17 RX ORDER — ONDANSETRON 2 MG/ML
4 INJECTION INTRAMUSCULAR; INTRAVENOUS EVERY 6 HOURS PRN
Status: DISCONTINUED | OUTPATIENT
Start: 2024-01-17 | End: 2024-01-22

## 2024-01-17 RX ORDER — DONEPEZIL HYDROCHLORIDE 10 MG/1
10 TABLET, FILM COATED ORAL NIGHTLY
Status: DISCONTINUED | OUTPATIENT
Start: 2024-01-17 | End: 2024-01-22

## 2024-01-17 RX ORDER — MIRTAZAPINE 15 MG/1
15 TABLET, ORALLY DISINTEGRATING ORAL NIGHTLY
Status: DISCONTINUED | OUTPATIENT
Start: 2024-01-17 | End: 2024-01-22

## 2024-01-17 RX ORDER — VENLAFAXINE 37.5 MG/1
37.5 TABLET ORAL 2 TIMES DAILY
Status: DISCONTINUED | OUTPATIENT
Start: 2024-01-17 | End: 2024-01-22

## 2024-01-17 RX ORDER — SODIUM CHLORIDE 9 MG/ML
INJECTION, SOLUTION INTRAVENOUS CONTINUOUS
Status: DISCONTINUED | OUTPATIENT
Start: 2024-01-17 | End: 2024-01-19

## 2024-01-17 RX ORDER — POLYETHYLENE GLYCOL 3350 17 G/17G
17 POWDER, FOR SOLUTION ORAL EVERY OTHER DAY
Status: DISCONTINUED | OUTPATIENT
Start: 2024-01-17 | End: 2024-01-22

## 2024-01-17 RX ORDER — ASPIRIN 81 MG/1
81 TABLET ORAL DAILY
Status: DISCONTINUED | OUTPATIENT
Start: 2024-01-17 | End: 2024-01-22

## 2024-01-17 RX ORDER — MAGNESIUM OXIDE 400 MG (241.3 MG MAGNESIUM) TABLET
2 TABLET NIGHTLY
Status: DISCONTINUED | OUTPATIENT
Start: 2024-01-17 | End: 2024-01-22

## 2024-01-17 RX ORDER — METOCLOPRAMIDE HYDROCHLORIDE 5 MG/ML
10 INJECTION INTRAMUSCULAR; INTRAVENOUS EVERY 8 HOURS PRN
Status: DISCONTINUED | OUTPATIENT
Start: 2024-01-17 | End: 2024-01-20

## 2024-01-17 NOTE — H&P
[facility]    PATIENT'S NAME: VIET MCKINLEY JR.   ATTENDING PHYSICIAN: Terry Bates MD   PATIENT ACCOUNT#:   [id number]    LOCATION:  [address]  MEDICAL RECORD #:   [id number]       YOB: 1935  ADMISSION DATE:       01/17/2024    HISTORY AND PHYSICAL EXAMINATION    (Addendum added 01/18/2024)    CHIEF COMPLAINT:  Mobility decline and urinary tract infection.    HISTORY OF PRESENT ILLNESS:  The patient is an 88-year-old  male who currently resides in a nursing facility, brought in today by his wife for progressive weakness for the last 2 days.  The patient had similar hospitalizations last year.  He has known recurrent urinary tract infection and underlying enlarged prostate.  Today, CBC showed white blood cell count of 9.2, no left shift.  Hemoglobin stable at 12.7.  MCV is elevated at 101.3.  Reviewing the records, the patient had history of vitamin 12 deficiency based on a blood test done in October 202.  Chemistry, magnesium, troponin, TSH, GeneXpert viral panel were negative.  Urinalysis showed evidence of gross urinary tract infection.  CT scan of the brain and chest x-ray were unremarkable.    PAST MEDICAL HISTORY:  Per the record, the patient has history of kidney stones, dementia, osteoarthritis, hypertension, hyperlipidemia, benign prostatic hypertrophy.  Imaging studies in the past showed bilateral staghorn calculi possibly serving as a nidus for recurrent urinary tract infections.  He has history of anxiety and seizure disorder.    PAST SURGICAL HISTORY:  Cystoscopy and lithotripsy and transurethral resection of the prostate.    MEDICATIONS:  Please see medication reconciliation list.    FAMILY HISTORY:  Positive for heart disease.    SOCIAL HISTORY:  Ex-tobacco user.  No current tobacco, alcohol, or drug use.  He requires assistance in his basic activities of daily living.      REVIEW OF SYSTEMS:  Per the wife, the patient had rapid decline in his  mobility over the last 12 months, now he cannot even stand up or walk on his own.  He is almost bedbound.  Other 12-point review of systems difficult to obtain from the patient himself.  He wears diapers and he is not able to get to the bathroom.      PHYSICAL EXAMINATION:    GENERAL:  Alert, able to answer questions, tremors noted upper extremities.  VITAL SIGNS:  Temperature 97.6, pulse 73, respiratory rate 14, blood pressure 121/71, pulse ox 100% on room air.  HEENT:  Atraumatic.  Oropharynx clear.  Dry mucous membranes.    NECK:  Supple.  No lymphadenopathy.  Trachea midline.  Full range of motion.  LUNGS:  Clear to auscultation bilaterally.  Normal respiratory effort.   HEART:  Regular rate and rhythm.  S1 and S2 auscultated.  No murmur.  ABDOMEN:  Soft, nondistended.  No tenderness.  Positive bowel sounds.  EXTREMITIES:  No edema, clubbing, or cyanosis.  NEUROLOGIC:  Upper extremity tremors and cogwheel rigidity noted.    ASSESSMENT AND PLAN:    1.   Recurrent urinary tract infection.  Prior urine cultures positive for Proteus mirabilis and imaging studies showing bilateral staghorn kidney stones.   2.   Patient supposed to be on methenamine suppressive therapy, but this has not been done in the last few weeks per the wife.  3.   Clinical manifestation suggestive of Parkinson disease.  4.   Macrocytic anemia, rule out vitamin B12 deficiency.    The patient will be admitted to general medical floor.  Obtain Urology consult.  Also start him on IV Rocephin, IV fluids.  Fall and aspiration precautions.  Further recommendations to follow.    ADDENDUM (Job [id number]):    ASSESSMENT AND PLAN:  Vitamin B12 of 427, which is low normal.  Lactic acid 4.5.  Patient was given IV fluid sepsis bolus.  We will continue to monitor his hemodynamic status and trend lactic acid level.  Follow up on blood cultures.    Dictated By Otilio Francis MD  d: 01/17/2024 14:16:14  t: 01/17/2024  14:22:01  [facility] [id number]  FB/    cc: Terry Bates MD

## 2024-01-17 NOTE — ED QUICK NOTES
Orders for admission, patient is aware of plan and ready to go upstairs. Any questions, please call ED BRYANT Mccoy at extension [phone].     Patient Covid vaccination status: Fully vaccinated     COVID Test Ordered in ED: SARS-CoV-2/Flu A and B/RSV by PCR (GeneXpert)    COVID Suspicion at Admission: N/A    Running Infusions:  None    Mental Status/LOC at time of transport: Alert    Other pertinent information:   CIWA score: N/A   NIH score:  N/A     Patient from home with wife, ambulates with walker and assist.

## 2024-01-17 NOTE — ED QUICK NOTES
Incontinence care provided. Noted superficial reddened area to patient's lower back, dressing noted to have yellow drainage.

## 2024-01-17 NOTE — ED PROVIDER NOTES
Patient Seen in: [facility] Emergency Department      History     Chief Complaint   Patient presents with    Weakness     Stated Complaint:     Subjective:   88-year-old male with complicated medical history here with wife because when he woke up at 7:30 AM he seemed to be more confused than baseline.  He was also very shaky.  His wife said he just got home from rehab 2 days ago and was not this confused.  He does have a tremor at times but not this bad.  She said he normally does not know what day it is.  She is not aware of any new medications that were started in the last few days.  She seen some diarrhea but no cough.  No vomiting.  Patient is joking around with her.  He has no pains.  He is alert to self and location.  She is not aware of any trauma.  He has not really ambulated with a walker since he has been home from the rehab.  Wife was also concerned because she seemed to be leaning to the left.            Objective:   Past Medical History:   Diagnosis Date    Anxiety state     BPH (benign prostatic hyperplasia)     Dementia (HCC)     Depression     Dysphagia     Febrile illness 2021    High blood pressure     Kidney stones     Other and unspecified hyperlipidemia     Pneumonia due to organism     Seizure disorder (HCC)     because of venalfaxine    Skin cancer     melanoma    Unspecified essential hypertension               Past Surgical History:   Procedure Laterality Date    EGD  2022    ESOPHAGOGASTRODUODENOSCOPY with Botox injection    EGD  2021    Large food impaction in the mid and distal esophagus    HEMIARTHROPLASTY HIP Right 2022    Right Cemented Hemiarthroplasty    LITHOTRIPSY      TRANSURETHRAL DESTRUCTION, PROSTATE TISSUE; WATER-INDUC                  Social History     Socioeconomic History    Marital status:    Tobacco Use    Smoking status: Former     Years: 3     Types: Cigarettes     Quit date: 1960     Years since quittin.4    Smokeless  tobacco: Never   Vaping Use    Vaping Use: Never used   Substance and Sexual Activity    Alcohol use: Yes     Comment: occasionally    Drug use: Never     Social Determinants of Health     Food Insecurity: No Food Insecurity (1/17/2024)    Food Insecurity     Food Insecurity: Never true   Transportation Needs: No Transportation Needs (1/17/2024)    Transportation Needs     Lack of Transportation: No   Housing Stability: Low Risk  (1/17/2024)    Housing Stability     Housing Instability: No              Review of Systems    Positive for stated complaint:   Other systems are as noted in HPI.  Constitutional and vital signs reviewed.      All other systems reviewed and negative except as noted above.    Physical Exam     ED Triage Vitals   BP 01/17/24 1010 103/65   Pulse 01/17/24 0948 51   Resp 01/17/24 0948 18   Temp 01/17/24 1010 97.6 °F (36.4 °C)   Temp src 01/17/24 1010 Rectal   SpO2 01/17/24 0948 100 %   O2 Device 01/17/24 0948 None (Room air)       Current:/84 (BP Location: Right arm)   Pulse 62   Temp 97.8 °F (36.6 °C) (Axillary)   Resp 18   Ht 172.7 cm (5' 8\")   Wt 65.9 kg   SpO2 97%   BMI 22.08 kg/m²         Physical Exam  Constitutional:       Comments: Tremulous   HENT:      Head: Normocephalic.      Nose: Nose normal.      Mouth/Throat:      Mouth: Mucous membranes are moist.   Eyes:      Extraocular Movements: Extraocular movements intact.      Pupils: Pupils are equal, round, and reactive to light.   Cardiovascular:      Rate and Rhythm: Normal rate and regular rhythm.      Pulses: Normal pulses.      Heart sounds: Normal heart sounds.   Pulmonary:      Effort: Pulmonary effort is normal.      Breath sounds: Normal breath sounds.   Abdominal:      Palpations: Abdomen is soft.      Tenderness: There is no abdominal tenderness.   Musculoskeletal:         General: Normal range of motion.      Cervical back: Normal range of motion.      Right lower leg: No edema.      Left lower leg: No edema.    Skin:     General: Skin is warm.   Neurological:      Mental Status: He is alert.      Sensory: No sensory deficit.      Motor: No weakness.      Comments: Patient has diffuse tremoring of the extremities i some cogwheel rigidity.  Finger-to-nose is slow.  No facial asymmetry.               ED Course     Labs Reviewed   URINALYSIS, ROUTINE - Abnormal; Notable for the following components:       Result Value    Clarity Urine Turbid (*)     Blood Urine 2+ (*)     Protein Urine 20 (*)     Leukocyte Esterase Urine 500 (*)     WBC Urine >50 (*)     RBC Urine >10 (*)     Yeast Urine Present (*)     All other components within normal limits   COMP METABOLIC PANEL (14) - Abnormal; Notable for the following components:    Glucose 111 (*)     All other components within normal limits   LACTIC ACID, PLASMA - Abnormal; Notable for the following components:    Lactic Acid 4.5 (*)     All other components within normal limits   LACTIC ACID REFLEX POST POSTIVE - Abnormal; Notable for the following components:    Lactic Acid 3.5 (*)     All other components within normal limits   LACTIC ACID REFLEX POST POSITIVE [id number] - Abnormal; Notable for the following components:    Lactic Acid 3.3 (*)     All other components within normal limits   BASIC METABOLIC PANEL (8) - Abnormal; Notable for the following components:    Calculated Osmolality 299 (*)     All other components within normal limits   POCT GLUCOSE - Abnormal; Notable for the following components:    POC Glucose  105 (*)     All other components within normal limits   CBC W/ DIFFERENTIAL - Abnormal; Notable for the following components:    HGB 12.7 (*)     .3 (*)     RDW-SD 47.6 (*)     Lymphocyte Absolute 0.83 (*)     All other components within normal limits   CBC W/ DIFFERENTIAL - Abnormal; Notable for the following components:    RBC 3.20 (*)     HGB 10.8 (*)     HCT 32.6 (*)     .9 (*)     RDW-SD 49.5 (*)     All other components within normal limits    MAGNESIUM - Normal   TROPONIN I HIGH SENSITIVITY - Normal   TSH W REFLEX TO FREE T4 - Normal   VITAMIN B12 - Normal   LACTIC ACID, PLASMA - Normal   SARS-COV-2/FLU A AND B/RSV BY PCR (GENEXPERT) - Normal    Narrative:     This test is intended for the qualitative detection and differentiation of SARS-CoV-2, influenza A, influenza B, and respiratory syncytial virus (RSV) viral RNA in nasopharyngeal or nares swabs from individuals suspected of respiratory viral infection consistent with COVID-19 by their healthcare provider. Signs and symptoms of respiratory viral infection due to SARS-CoV-2, influenza, and RSV can be similar.    Test performed using the Xpert Xpress SARS-CoV-2/FLU/RSV (real time RT-PCR)  assay on the "[employer]pert instrument, [employer], [place] [address].   This test is being used under the Food and Drug Administration's Emergency Use Authorization.    The authorized Fact Sheet for Healthcare Providers for this assay is available upon request from the laboratory.   CBC WITH DIFFERENTIAL WITH PLATELET    Narrative:     The following orders were created for panel order CBC With Differential With Platelet.  Procedure                               Abnormality         Status                     ---------                               -----------         ------                     CBC W/ DIFFERENTIAL[[id number]]          Abnormal            Final result                 Please view results for these tests on the individual orders.   CBC WITH DIFFERENTIAL WITH PLATELET    Narrative:     The following orders were created for panel order CBC With Differential With Platelet.  Procedure                               Abnormality         Status                     ---------                               -----------         ------                     CBC W/ DIFFERENTIAL[[id number]]          Abnormal            Final result                 Please view results for these tests on the individual orders.   BLOOD CULTURE    BLOOD CULTURE     EKG    Rate, intervals and axes as noted on EKG Report.  Rate: 50  Rhythm: Sinus Rhythm  Reading: Sinus bradycardia, inferior Q waves, poor baseline, QTc 433.  Abnormal EKG              ED Course as of 01/18/24 1254  ------------------------------------------------------------  Time: 01/17 1613  Comment: Labs and urine and CT and chest x-ray independently interpreted by me.  Lactate elevated, CBC unremarkable, TSH normal, troponin normal magnesium normal, CMP unremarkable.  Urine shows UTI.  CT brain nonacute and chest x-ray nonacute.  Patient will be getting 30 cc/kg of fluids as well as antibiotics.  Discussed with Dr. Francis who will admit the patient.  Neurology consult obtained.  ------------------------------------------------------------  Time: 01/18 1253  Comment: Hgb stable              MDM      CT BRAIN OR HEAD ([id number])    Result Date: 1/17/2024  CONCLUSION: No acute finding    Dictated by (CST): Ryne Moulton MD on 1/17/2024 at 1:45 PM     Finalized by (CST): Ryne Moulton MD on 1/17/2024 at 2:03 PM           Admission disposition: 1/17/2024  2:33 PM                                     [facility]      Sepsis Reassessment Note    /84 (BP Location: Right arm)   Pulse 62   Temp 97.8 °F (36.6 °C) (Axillary)   Resp 18   Ht 172.7 cm (5' 8\")   Wt 65.9 kg   SpO2 97%   BMI 22.08 kg/m²      I completed the sepsis reassessment at 1800    Cardiac:  Regularity: Regular  Rate: Normal  Heart Sounds: S1,S2    Lungs:   Right: Clear  Left: Clear    Peripheral Pulses:  Radial: Right 1+ or Left 1+      Capillary Refill:  <3 Secs    Skin:  Temp/Moisture: Warm and Dry  Color: Normal       LINDA HAMILTON MD  1/18/2024  12:50 PM      Medical Decision Making  Patient with complicated history here with weakness mental status change worse than baseline.  Has had frequent UTIs.  Differential could include but is not limited to UTI, metabolic disturbance, pneumonia, other  infections, stroke, bleed.  Extensive workup in progress    Problems Addressed:  Urinary tract infection without hematuria, site unspecified: acute illness or injury    Amount and/or Complexity of Data Reviewed  Independent Historian: spouse     Details: Gives all hx  External Data Reviewed: labs and notes.     Details: Labs compared  Labs: ordered. Decision-making details documented in ED Course.  Radiology: ordered and independent interpretation performed. Decision-making details documented in ED Course.  ECG/medicine tests: ordered and independent interpretation performed. Decision-making details documented in ED Course.  Discussion of management or test interpretation with external provider(s): Was given sepsis bolus fluids, antibiotics.  Did well in the ER and remained stable.  Discussed with hospitalist who saw the patient as well as neurology.  CT head was nonacute.  Patient never had low blood pressure but did have an elevated lactic acidosis which could be from sepsis or other causes.  Wife was informed of the results and was comfortable with the admission.    Risk  Decision regarding hospitalization.      I spent a total of 35 minutes of critical care time in obtaining history, performing a physical exam, bedside monitoring of interventions, collecting and interpreting tests and discussion with consultants but not including time spent performing procedures.    Disposition and Plan     Clinical Impression:  1. Urinary tract infection without hematuria, site unspecified    2. Weakness    3. Sepsis due to urinary tract infection  (HCC)    4. Lactic acidosis         Disposition:  Admit  1/17/2024  2:33 pm    Follow-up:  No follow-up provider specified.  We recommend that you schedule follow up care with a primary care provider within the next three months to obtain basic health screening including reassessment of your blood pressure.      Medications Prescribed:  Current Discharge Medication List                             Hospital Problems       Present on Admission  Date Reviewed: 12/5/2023            ICD-10-CM Noted POA    * (Principal) Urinary tract infection without hematuria, site unspecified N39.0 1/17/2024 Unknown    Weakness R53.1 3/21/2023 Unknown

## 2024-01-17 NOTE — ED INITIAL ASSESSMENT (HPI)
Patient arrived from home via EMS for a fall while getting into his wheelchair. EMS reports patient's wife was getting him ready a trip to the ER for increased weakness. Per EMS-wife reports she helped lower the patient to the ground.

## 2024-01-18 PROBLEM — G20.A1 PARKINSON'S DISEASE WITHOUT DYSKINESIA OR FLUCTUATING MANIFESTATIONS: Status: ACTIVE | Noted: 2024-01-18

## 2024-01-18 PROBLEM — G20.A1 PARKINSON'S DISEASE WITHOUT DYSKINESIA OR FLUCTUATING MANIFESTATIONS (HCC): Status: ACTIVE | Noted: 2024-01-18

## 2024-01-18 LAB
ANION GAP SERPL CALC-SCNC: 5 MMOL/L (ref 0–18)
BASOPHILS # BLD AUTO: 0.03 X10(3) UL (ref 0–0.2)
BASOPHILS NFR BLD AUTO: 0.6 %
BUN BLD-MCNC: 11 MG/DL (ref 9–23)
BUN/CREAT SERPL: 15.3 (ref 10–20)
CALCIUM BLD-MCNC: 9.4 MG/DL (ref 8.7–10.4)
CHLORIDE SERPL-SCNC: 112 MMOL/L (ref 98–112)
CO2 SERPL-SCNC: 28 MMOL/L (ref 21–32)
CREAT BLD-MCNC: 0.72 MG/DL
DEPRECATED RDW RBC AUTO: 49.5 FL (ref 35.1–46.3)
EGFRCR SERPLBLD CKD-EPI 2021: 88 ML/MIN/1.73M2 (ref 60–?)
EOSINOPHIL # BLD AUTO: 0.13 X10(3) UL (ref 0–0.7)
EOSINOPHIL NFR BLD AUTO: 2.6 %
ERYTHROCYTE [DISTWIDTH] IN BLOOD BY AUTOMATED COUNT: 13.2 % (ref 11–15)
GLUCOSE BLD-MCNC: 87 MG/DL (ref 70–99)
HCT VFR BLD AUTO: 32.6 %
HGB BLD-MCNC: 10.8 G/DL
IMM GRANULOCYTES # BLD AUTO: 0.02 X10(3) UL (ref 0–1)
IMM GRANULOCYTES NFR BLD: 0.4 %
LACTATE SERPL-SCNC: 0.9 MMOL/L (ref 0.5–2)
LYMPHOCYTES # BLD AUTO: 1.39 X10(3) UL (ref 1–4)
LYMPHOCYTES NFR BLD AUTO: 28 %
MCH RBC QN AUTO: 33.8 PG (ref 26–34)
MCHC RBC AUTO-ENTMCNC: 33.1 G/DL (ref 31–37)
MCV RBC AUTO: 101.9 FL
MONOCYTES # BLD AUTO: 0.64 X10(3) UL (ref 0.1–1)
MONOCYTES NFR BLD AUTO: 12.9 %
NEUTROPHILS # BLD AUTO: 2.75 X10 (3) UL (ref 1.5–7.7)
NEUTROPHILS # BLD AUTO: 2.75 X10(3) UL (ref 1.5–7.7)
NEUTROPHILS NFR BLD AUTO: 55.5 %
OSMOLALITY SERPL CALC.SUM OF ELEC: 299 MOSM/KG (ref 275–295)
PLATELET # BLD AUTO: 163 10(3)UL (ref 150–450)
POTASSIUM SERPL-SCNC: 4.5 MMOL/L (ref 3.5–5.1)
RBC # BLD AUTO: 3.2 X10(6)UL
SODIUM SERPL-SCNC: 145 MMOL/L (ref 136–145)
WBC # BLD AUTO: 5 X10(3) UL (ref 4–11)

## 2024-01-18 PROCEDURE — 99223 1ST HOSP IP/OBS HIGH 75: CPT | Performed by: OTHER

## 2024-01-18 PROCEDURE — 99233 SBSQ HOSP IP/OBS HIGH 50: CPT | Performed by: HOSPITALIST

## 2024-01-18 NOTE — PROGRESS NOTES
Patient admitted to unit from ED for UTI, wife present at bedside to provide admission information. Patient a/o x1 at baseline. PTA meds reviewed with wife, physician notified, meds received. Patient on soft/easy to chew diet and nectar thick liquids per MD order, order clarified and implemented. Speech therapy to assess patient tomorrow. Patient has stage one pressure ulcer to sacrum, site care done, zinc cream applied, mepilex applied, patient turned and sacrum/heels offloaded. Safety and fall precautions in place, bed locked in lowest position. Plan of care endorsed to night RN, all needs attended.

## 2024-01-18 NOTE — PROGRESS NOTES
[facility]    Progress Note    Fernando Ramos Jr. Patient Status:  Inpatient    1935 MRN [id number]   Location [facility] 5SW/SE Attending Woo Cartagena MD   Hosp Day # 1 PCP [facility]       Subjective:   Fernando Ramos Jr. is a(n) 88 year old male was seen and examined  Resting in bed comfortably  Wife at bedside  Pt improved since admission   No acute distress    Objective:   Blood pressure 138/84, pulse 64, temperature 97.8 °F (36.6 °C), temperature source Axillary, resp. rate 18, height 5' 8\" (1.727 m), weight 145 lb 3.2 oz (65.9 kg), SpO2 97%.    GENERAL:  The patient appeared to be in no distress and was comfortable.  SKIN:  Warm and hydrated  PSYCHIATRIC: Calm and cooperative    HEENT:  Head was atraumatic and normocephalic.  Eyes: Sclera was anicteric.  Pupils were equal.  Ears:  There were no lesions.  Nose:  No lesions were noted.      NECK:  Supple.  There was no JVD.    CHEST:  Symmetrical movement on inspiration  CARDIAC: S1 S2+, RRR  LUNGS:  CTAB  ABDOMEN: Non-distended, non-tender, BS+  MUSCULOSKELETAL:  There was no deformity.  There was full range of motion in all the extremities.    EXTREMITIES: There was no edema  NEUROLOGIC: Cranial nerves II-XII intact, no focal defecits    Current Inpatient Medications:     Current Facility-Administered Medications:     cefTRIAXone (Rocephin) 1 g in D5W 100 mL IVPB-ADD, 1 g, Intravenous, Q24H    sodium chloride 0.9% infusion, , Intravenous, Continuous    heparin (Porcine) 5000 UNIT/ML injection 5,000 Units, 5,000 Units, Subcutaneous, 2 times per day    acetaminophen (Tylenol Extra Strength) tab 500 mg, 500 mg, Oral, Q4H PRN    ondansetron (Zofran) 4 MG/2ML injection 4 mg, 4 mg, Intravenous, Q6H PRN    metoclopramide (Reglan) 5 mg/mL injection 10 mg, 10 mg, Intravenous, Q8H PRN    ARIPiprazole (Abilify) tab 2 mg, 2 mg, Oral, QOD    aspirin DR tab 81 mg, 81 mg, Oral, Daily    donepezil (Aricept) tab 10 mg, 10 mg,  Oral, Nightly    melatonin tab 2 mg, 2 mg, Oral, Nightly    mirtazapine (REMERON SOL-TAB) disintegrating tab 15 mg, 15 mg, Oral, Nightly    polyethylene glycol (PEG 3350) (Miralax) 17 g oral packet 17 g, 17 g, Oral, QOD    pravastatin (Pravachol) tab 20 mg, 20 mg, Oral, Nightly    venlafaxine (Effexor) tab 37.5 mg, 37.5 mg, Oral, BID    Assessment and Plan:   1.       Recurrent urinary tract infection.  Prior urine cultures positive for Proteus mirabilis and imaging studies showing bilateral staghorn kidney stones.   Started on IV abx, will cont  Await final ucx  Likely will need suppressive tx on dc    2.       Patient supposed to be on methenamine suppressive therapy, but this has not been done in the last few weeks per the wife.  Will restart once abx course is complete   Will need f/u with PCP    3.       Dementia  Cont home meds    4.        Depression, Anxiety  Cont home meds    Other issues   HTN  HL  BPH  B/l staghorn caliclui  Seizure d/o    DVT Ppx: Heparin subcutaneous  Full code    MDM: High     Results:     Recent Labs   Lab 01/17/24  1113 01/18/24  0538   RBC 3.87 3.20*   HGB 12.7* 10.8*   HCT 39.2 32.6*   .3* 101.9*   MCH 32.8 33.8   MCHC 32.4 33.1   RDW 12.9 13.2   NEPRELIM 7.40 2.75   WBC 9.2 5.0   .0 163.0         Recent Labs   Lab 01/17/24  1045 01/18/24  0538   * 87   BUN 9 11   CREATSERUM 0.71 0.72   EGFRCR 89 88   CA 10.1 9.4    145   K 4.5 4.5    112   CO2 28.0 28.0         Imaging:   CT BRAIN OR HEAD (21917)    Result Date: 1/17/2024  CONCLUSION: No acute finding    Dictated by (CST): Ryne Moulton MD on 1/17/2024 at 1:45 PM     Finalized by (CST): Ryne Moulton MD on 1/17/2024 at 2:03 PM          XR CHEST AP PORTABLE  (CPT=71045)    Result Date: 1/17/2024  CONCLUSION:  1. No acute airspace disease.  Minimal left basal scarring/atelectasis.    Dictated by (CST): Mehul Rondon MD on 1/17/2024 at 11:56 AM     Finalized by (CST): Mehul Rondon MD on 1/17/2024  at 12:01 PM         EKG 12 Lead    Result Date: 1/17/2024  Sinus bradycardia Inferior infarct , age undetermined Abnormal ECG When compared with ECG of 21-MAR-2023 21:24, Vent. rate has decreased BY  37 BPM Inverted T waves have replaced nonspecific T wave abnormality in Inferior leads Nonspecific T wave abnormality no longer evident in Lateral leads Confirmed by WENCESLAO SCOTT, DALTON ([id number]) on 1/17/2024 4:11:27 PM       Woo Cartagena MD  1/18/2024

## 2024-01-18 NOTE — PHYSICAL THERAPY NOTE
PHYSICAL THERAPY EVALUATION - INPATIENT     Room Number: 543/543-A  Evaluation Date: 1/18/2024  Type of Evaluation: Initial   Physician Order: PT Eval and Treat    Presenting Problem: UTI, weaikness  Co-Morbidities : Dementia, Depression, seizures, SIR, HTN,  Reason for Therapy: Mobility Dysfunction and Discharge Planning    PHYSICAL THERAPY ASSESSMENT     Patient is a 88 year old male admitted 1/17/2024 for UTI, weakness.  Patient's current functional deficits include decreased bed mobility, transfers, gait, balance, strength, which are below the patient's pre-admission status.  Pt and spouse ed with bed mobility with mod A to EOB. Pt ed with Mod A to stand and to take 45 steps to a chair with shuffling gait and retropulsive poor- balance. Pt requires PT VC's and encouragement to participate with PHM of dementia A/O x 1 at his base line. Pt and spouse ed with therex in a chair for ankle pumps, marching and LAQ's x 10 reps.     Spouse is receptive to SIVA to improve functional mobility and transfers to decrease burden of care and improve pt's mobility for home base mobility and safety.    The patient's Approx Degree of Impairment: 72.57% has been calculated based on documentation in the [facility] '6 clicks' Inpatient Basic Mobility Short Form.  Research supports that patients with this level of impairment may benefit from SIVA with PT to regain his maximal PLOF and mobility.    Patient will benefit from continued IP PT services to address these deficits in preparation for discharge.    DISCHARGE RECOMMENDATIONS  PT Discharge Recommendations: Sub-acute rehabilitation (PT)    PLAN  PT Treatment Plan: Bed mobility;Body mechanics;Endurance;Energy conservation;Patient education;Gait training;Strengthening;Transfer training;Balance training  Rehab Potential : Good  Frequency (Obs): 3-5x/week       PHYSICAL THERAPY MEDICAL/SOCIAL HISTORY     History related to current admission:  The patient is an 88-year-old  male who  currently resides in a nursing facility, brought in today by his wife for progressive weakness for the last 2 days.  The patient had similar hospitalizations last year.  He has known recurrent urinary tract infection and underlying enlarged prostate.  Today, CBC showed white blood cell count of 9.2, no left shift.  Hemoglobin stable at 12.7.  MCV is elevated at 101.3.  Reviewing the records, the patient had history of vitamin 12 deficiency based on a blood test done in October 202.  Chemistry, magnesium, troponin, TSH, GeneXpert viral panel were negative.  Urinalysis showed evidence of gross urinary tract infection.  CT scan of the brain and chest x-ray were unremarkable.      Problem List  Principal Problem:    Urinary tract infection without hematuria, site unspecified  Active Problems:    Weakness      HOME SITUATION  Home Situation  Type of Home: Condo  Home Layout: One level  Lives With: Spouse  Drives: No  Patient Owned Equipment: Wheelchair  Patient Regularly Uses: Glasses     Prior Level of [place]: Lives at home with supportive spouse assist for all mobility and ADL support uses a W/C and RW.     SUBJECTIVE  Intermittently presents with outbursts of emotions but pt is particiapting and following PT VC's with spouse to assist to facilitate movement    PHYSICAL THERAPY EXAMINATION     OBJECTIVE  Precautions: Bed/chair alarm  Fall Risk: High fall risk    WEIGHT BEARING RESTRICTION  Weight Bearing Restriction: None                PAIN ASSESSMENT  Rating: Unable to rate          COGNITION   Overall Cognitive Status:  Impaired A/O x 1  Dementia    RANGE OF MOTION AND STRENGTH ASSESSMENT  Upper extremity ROM and strength are within functional limits   Lower extremity ROM is within functional limits   Lower extremity strength is within functional limits 3-/5    ACTIVITY TOLERANCE  Pulse: 64  Heart Rate Source: Monitor  Resp: 18  BP: 138/84  BP Location: Right arm  BP Method: Automatic  Patient Position:  Sitting    O2 WALK  Oxygen Therapy  SPO2% on Room Air at Rest: 96  SPO2% Ambulation on Room Air: 97    AM-PAC '6-Clicks' INPATIENT SHORT FORM - BASIC MOBILITY  How much difficulty does the patient currently have...  Patient Difficulty: Turning over in bed (including adjusting bedclothes, sheets and blankets)?: A Lot   Patient Difficulty: Sitting down on and standing up from a chair with arms (e.g., wheelchair, bedside commode, etc.): A Lot   Patient Difficulty: Moving from lying on back to sitting on the side of the bed?: A Lot   How much help from another person does the patient currently need...   Help from Another: Moving to and from a bed to a chair (including a wheelchair)?: A Lot   Help from Another: Need to walk in hospital room?: A Lot   Help from Another: Climbing 3-5 steps with a railing?: Total     AM-PAC Score:  Raw Score: 11   Approx Degree of Impairment: 72.57%   Standardized Score (AM-PAC Scale): 33.86   CMS Modifier (G-Code): CL    FUNCTIONAL ABILITY STATUS  Functional Mobility/Gait Assessment  Gait Assistance: Moderate assistance  Distance (ft): 4  Pattern: Shuffle (retropuslive, rigidity)    Bed Mobility: Mod A    Transfers: Mod A with RW    Exercise/Education Provided:  Bed mobility  Body mechanics  Energy conservation  Functional activity tolerated  Gait training  Posture  ROM  Strengthening  Lower therapeutic exercise:  Ankle pumps  Heel slides  LAQ  Transfer training    Patient End of Session: Up in chair;With  staff;Needs met;Call light within reach;RN aware of session/findings;All patient questions and concerns addressed;Alarm set;Family present    CURRENT GOALS    Goals to be met by: 1/30/2024  Patient Goal Patient's self-stated goal is: return home   Goal #1 Patient is able to demonstrate supine - sit EOB @ level: minimum assistance     Goal #1   Current Status    Goal #2 Patient is able to demonstrate transfers Sit to/from Stand at assistance level: minimum assistance with walker -  rolling     Goal #2  Current Status    Goal #3 Patient is able to ambulate 10 feet with assist device: walker - standard and walker - rolling at assistance level: minimum assistance   Goal #3   Current Status    Goal #4    Goal #4   Current Status    Goal #5 Patient to demonstrate independence with home activity/exercise instructions provided to patient in preparation for discharge.   Goal #5   Current Status    Goal #6    Goal #6  Current Status      Gait Training: 10 minutes

## 2024-01-18 NOTE — CONSULTS
.[facility]   NEUROLOGY   CONSULT NOTE    Admission date: 2024  Reason for Consult: Weakness  Chief Complaint:   Chief Complaint   Patient presents with    Weakness   ________________________________________________________________    History     History of Presenting Illness  88 year old male with multiple medical problems including dementia, depression, dyslipidemia, recurrent chronic UTI brought to the emergency department because of generalized weakness.  Patient accompanied by his wife who is a speech therapist and solely responsible for taking care of patient at home she states that anytime patient has urinary infection he would develop similar symptoms.  Denied any asymmetric weakness, numbness, paresthesias.  No history of seizure-like activity.    History obtained from patient's wife and chart review.    Past Medical History:   Diagnosis Date    Anxiety state     BPH (benign prostatic hyperplasia)     Dementia (HCC)     Depression     Dysphagia     Febrile illness 2021    High blood pressure     Kidney stones     Other and unspecified hyperlipidemia     Pneumonia due to organism     Seizure disorder (HCC)     because of venalfaxine    Skin cancer     melanoma    Unspecified essential hypertension      Past Surgical History:   Procedure Laterality Date    EGD  2022    ESOPHAGOGASTRODUODENOSCOPY with Botox injection    EGD  2021    Large food impaction in the mid and distal esophagus    HEMIARTHROPLASTY HIP Right 2022    Right Cemented Hemiarthroplasty    LITHOTRIPSY      TRANSURETHRAL DESTRUCTION, PROSTATE TISSUE; WATER-INDUC       Social History     Socioeconomic History    Marital status:    Tobacco Use    Smoking status: Former     Years: 3     Types: Cigarettes     Quit date: 1960     Years since quittin.4    Smokeless tobacco: Never   Vaping Use    Vaping Use: Never used   Substance and Sexual Activity    Alcohol use: Yes     Comment:  occasionally    Drug use: Never     Social Determinants of Health     Food Insecurity: No Food Insecurity (1/17/2024)    Food Insecurity     Food Insecurity: Never true   Transportation Needs: No Transportation Needs (1/17/2024)    Transportation Needs     Lack of Transportation: No   Housing Stability: Low Risk  (1/17/2024)    Housing Stability     Housing Instability: No     Family History   Problem Relation Age of Onset    Alcohol and Other Disorders Associated Father     Heart Disorder Father     Depression Father     Heart Disorder Mother      Allergies No Known Allergies    Home Meds  Current Outpatient Medications   Medication Instructions    acetaminophen (TYLENOL EXTRA STRENGTH) 1,000 mg, Oral, Every 8 hours PRN    ARIPiprazole (ABILIFY) 2 mg, Oral, Every other day    aspirin 81 mg, Oral, Daily    Cholecalciferol (VITAMIN D) 50 MCG (2000 UT) Oral Tab Oral, Nightly    CoQ-10 100 mg, Oral, Daily    donepezil (ARICEPT) 10 mg, Oral, Nightly, TAKE ONE TABLET BY MOUTH EVERY MORNING    hydrOXYzine Pamoate 25 MG Oral Cap hydroxyzine pamoate 25 mg capsule   TAKE ONE CAPSULE BY MOUTH DAILY AS NEEDED    melatonin 2 mg, Oral, Nightly    methenamine (HIPREX) 1 g, Oral, 2 times daily    mirtazapine 15 MG Oral Tab TAKE ONE TABLET BY MOUTH AT BEDTIME    polyethylene glycol (PEG 3350) (MIRALAX) 17 g, Oral, Every other day    simvastatin (ZOCOR) 40 mg, Oral, Nightly    venlafaxine (EFFEXOR) 37.5 mg, Oral, 2 times daily     Scheduled Meds:   cefTRIAXone  1 g Intravenous Q24H    heparin  5,000 Units Subcutaneous 2 times per day    ARIPiprazole  2 mg Oral QOD    aspirin  81 mg Oral Daily    donepezil  10 mg Oral Nightly    melatonin  2 mg Oral Nightly    mirtazapine  15 mg Oral Nightly    polyethylene glycol (PEG 3350)  17 g Oral QOD    pravastatin  20 mg Oral Nightly    venlafaxine  37.5 mg Oral BID     Continuous Infusions:   sodium chloride 100 mL/hr at 01/18/24 1147     PRN Meds:acetaminophen, ondansetron,  metoclopramide    OBJECTIVE   VITAL SIGNS:   Temp:  [97.7 °F (36.5 °C)-98.2 °F (36.8 °C)] 97.8 °F (36.6 °C)  Pulse:  [61-87] 64  Resp:  [18-19] 18  BP: (138-157)/(73-84) 138/84  SpO2:  [97 %-98 %] 97 %    PHYSICAL EXAM:    NEUROLOGIC:    Mental Status:, Responsive, following simple commands, dysarthric, no aphasia   Cranial nerves: PERRL.EOMI.  Face symmetric, hearing grossly intact.   Motor: Bilateral upper extremity tremors as well as spasticity and cogwheeling noted strength seems reasonably normal.  Is downgoing.    Sensation and cerebellar: Unable to assess.        LABORATORY DATA:  Last 24 hour labs were reviewed in detail.  Recent Labs   Lab 01/17/24  1045 01/18/24  0538    145   K 4.5 4.5    112   CO2 28.0 28.0   * 87   BUN 9 11   CREATSERUM 0.71 0.72     Recent Labs   Lab 01/17/24  1113 01/18/24  0538   WBC 9.2 5.0   HGB 12.7* 10.8*   .0 163.0     Recent Labs   Lab 01/17/24  1045   ALT 21   AST 29     Recent Labs   Lab 01/17/24  1045   MG 2.0     Last A1c value was  % done  .           Radiology:    CT scan of the head did not show acute intracranial abnormality  ASSESSMENT/PLAN   88 year old male with:    Encephalopathy secondary to toxic metabolic etiology including UTI.  Further management as per patient's primary team.  Dementia with behavioral disorder.  Patient on multiple medications.  History of depressive illness on multiple medications further recommendations as per patient's psychiatrist.  Evidence of parkinsonian features on examination, could be related to primary or secondary parkinsonism.  Patient to be started on low-dose carbidopa.  Side effect explained to wife.  Patient to follow-up with neurology clinic 2 weeks after discharge.  No evidence at this time to suggest acute stroke/TIA.  His wife counseled in detail.  Agrees to the plan.  Please feel free to call for further queries.        Principal Problem:    Urinary tract infection without hematuria, site  unspecified  Active Problems:    Weakness       David Brewer MD  Neurohospitalist  [facility]    Disclaimer: This record was dictated using Dragon software. There may be errors due to voice recognition problems that were not realized and corrected during the completion of the note.

## 2024-01-18 NOTE — PLAN OF CARE
Problem: Patient Centered Care  Goal: Patient preferences are identified and integrated in the patient's plan of care  Description: Interventions:  - What would you like us to know as we care for you? From Rehabilitation Facility    - Provide timely, complete, and accurate information to patient/family  - Incorporate patient and family knowledge, values, beliefs, and cultural backgrounds into the planning and delivery of care  - Encourage patient/family to participate in care and decision-making at the level they choose  - Honor patient and family perspectives and choices  Outcome: Progressing     Problem: Patient/Family Goals  Goal: Patient/Family Long Term Goal  Description: Patient's Long Term Goal: return home     Interventions:  - follow doctors recommendations   - See additional Care Plan goals for specific interventions  Outcome: Progressing  Goal: Patient/Family Short Term Goal  Description: Patient's Short Term Goal: treat bladder infection     Interventions:   -   - See additional Care Plan goals for specific interventions  Outcome: Progressing     Problem: SAFETY ADULT - FALL  Goal: Free from fall injury  Description: INTERVENTIONS:  - Assess pt frequently for physical needs  - Identify cognitive and physical deficits and behaviors that affect risk of falls.  - [place] fall precautions as indicated by assessment.  - Educate pt/family on patient safety including physical limitations  - Instruct pt to call for assistance with activity based on assessment  - Modify environment to reduce risk of injury  - Provide assistive devices as appropriate  - Consider OT/PT consult to assist with strengthening/mobility  - Encourage toileting schedule  Outcome: Progressing     Problem: SKIN/TISSUE INTEGRITY - ADULT  Goal: Skin integrity remains intact  Description: INTERVENTIONS  - Assess and document risk factors for pressure ulcer development  - Assess and document skin integrity  - Monitor for areas of redness  and/or skin breakdown  - Initiate interventions, skin care algorithm/standards of care as needed  Outcome: Progressing  Goal: Incision(s), wounds(s) or drain site(s) healing without S/S of infection  Description: INTERVENTIONS:  - Assess and document risk factors for pressure ulcer development  - Assess and document skin integrity  - Assess and document dressing/incision, wound bed, drain sites and surrounding tissue  - Implement wound care per orders  - Initiate isolation precautions as appropriate  - Initiate Pressure Ulcer prevention bundle as indicated  Outcome: Progressing  Goal: Oral mucous membranes remain intact  Description: INTERVENTIONS  - Assess oral mucosa and hygiene practices  - Implement preventative oral hygiene regimen  - Implement oral medicated treatments as ordered  Outcome: Progressing   Patient alert and oriented x 1 at baseline, started on IV abx for UTI, speech and PT/OT evaluation, fall precautions in place, frequent rounding by nursing staff

## 2024-01-18 NOTE — SLP NOTE
ADULT SWALLOWING EVALUATION    ASSESSMENT    ASSESSMENT/OVERALL IMPRESSION:  PPE REQUIRED. THIS THERAPIST WORE GLOVES, DROPLET MASK, AND GOGGLES FOR DURATION OF EVALUATION. HANDS WASHED UPON ENTRANCE/EXIT.    SLP BSSE orders received and acknowledged. A swallow evaluation warranted as pt on modified diet of \"soft\" solids and mildly thick liquids at home. Pt afebrile with clear but weak vocal quality, on room air, with oxygen saturation at 98. Pt with prior hx of dysphagia at [facility] in which last recommended diet was minced and moist/mildly thick per tx in November 2023. Pt positioned upright in bed. Pt with no complaints of pain, RN aware. Pt with adequate oral acceptance and bilabial seal across all trials. Pt with weak bite, prolonged mastication of solids, and increased NANCY. Pt's swallow response appears delayed with reduced hyolaryngeal elevation/excursion. No clinical signs of aspiration (e.g., immediate/delayed throat clear, immediate/delayed cough, wet vocal quality, increased O2 effort) observed across all trials of puree, soft solids, and mildly thick liquids. Oral/buccal cavities clear of residue following liquid rinse. Oxygen status remained >97 t/o the entire evaluation.     At this time, pt presents with mild oral dysphagia and probable pharyngeal dysfunction. Recommend a minced and moist diet and mildly thick liquids with strict adherence to safe swallowing compensatory strategies. Results and recommendations reviewed with RN and pt. Pt's RN v/u to all results/recommendations. Pt would benefit from continued education. Recommendations remain written on whiteboard.    PLAN: SLP to f/u x1 meal assessment, monitor CXR, and VFSS if clinically warranted.     RECOMMENDATIONS   Diet Recommendations - Solids: Mechanical soft ground/ Minced & Moist  Diet Recommendations - Liquids: Nectar thick liquids/ Mildly thick    Compensatory Strategies Recommended: No straws;Slow rate;Alternate consistencies;Small bites and  sips  Aspiration Precautions: Upright position;Slow rate;Small bites and sips;No straw  Medication Administration Recommendations: Crushed in puree  Treatment Plan/Recommendations: Aspiration precautions  Discharge Recommendations/Plan: Undetermined    HISTORY   MEDICAL HISTORY  Reason for Referral: Altered diet consistency    Problem List  Principal Problem:    Urinary tract infection without hematuria, site unspecified  Active Problems:    Weakness      Past Medical History  Past Medical History:   Diagnosis Date    Anxiety state     BPH (benign prostatic hyperplasia)     Dementia (HCC)     Depression     Dysphagia     Febrile illness 02/13/2021    High blood pressure     Kidney stones     Other and unspecified hyperlipidemia     Pneumonia due to organism     Seizure disorder (HCC)     because of venalfaxine    Skin cancer     melanoma    Unspecified essential hypertension        Prior Living Situation: Home with spouse  Diet Prior to Admission: Nectar thick liquids/ Mildly thick (\"soft\")  Precautions: Aspiration    Patient/Family Goals: Pt on modified diet at home    SWALLOWING HISTORY  Current Diet Consistency: Soft/ Easy to Chew;Nectar thick liquids/ Mildly thick  Dysphagia History:     BSE 2/29/20: regular/thin  VFSS 3/2/20: regular/thin  BSE 10/8/20: regular/mildly thick  BSE 7/24/21: minced and moist/mildly thick  BSE 3/23/22: puree/moderately thick - later upgraded to mildly thick  VFSS 8/5/22: easy to chew/mildly thick  BSE 3/22/23: minced and moist/mildly thick  BSE 9/21/23: minced and moist/mildly thick  BSE 11/30/23: minced and moist/mildly thick    Imaging Results:     CT BRAIN 1/17/24:  CONCLUSION: No acute finding            Dictated by (CST): Ryne Moulton MD on 1/17/2024 at 1:45 PM       Finalized by (CST): Ryne Moulton MD on 1/17/2024 at 2:03 PM       CXR 1/17/24:  CONCLUSION:   1. No acute airspace disease.  Minimal left basal scarring/atelectasis.            Dictated by (CST): Mehul Rondon MD  on 1/17/2024 at 11:56 AM       Finalized by (CST): Mehul Rondon MD on 1/17/2024 at 12:01 PM            OBJECTIVE   ORAL MOTOR EXAMINATION  Dentition: Natural  Symmetry: Within Functional Limits  Strength:  (reduced)     Range of Motion:  (reduced)  Rate of Motion: Reduced    Voice Quality: Clear;Weak  Respiratory Status: Unlabored  Consistencies Trialed: Nectar thick liquids;Puree;Soft solid  Method of Presentation: Staff/Clinician assistance;Spoon;Cup;Single sips  Patient Positioning: Upright;Midline    Oral Phase of Swallow: Impaired  Bolus Retrieval: Intact  Bilabial Seal: Intact  Bolus Formation: Impaired  Bolus Propulsion: Impaired  Mastication: Impaired  Retention: Impaired    Pharyngeal Phase of Swallow: Impaired  Laryngeal Elevation Timing: Appears impaired  Laryngeal Elevation Strength: Appears impaired     (Please note: Silent aspiration cannot be evaluated clinically. Videofluoroscopic Swallow Study is required to rule-out silent aspiration.)    Esophageal Phase of Swallow: No complaints consistent with possible esophageal involvement      GOALS  Goal #1 The patient will tolerate minced and moist consistency and mildly thick liquids without overt signs or symptoms of aspiration with 100 % accuracy over 1 session(s).  In Progress   Goal #2 The patient/family/caregiver will demonstrate understanding and implementation of aspiration precautions and swallow strategies independently over 1 session(s).    In Progress     FOLLOW UP  Treatment Plan/Recommendations: Aspiration precautions  Number of Visits to Meet Established Goals: 1  Follow Up Needed (Documentation Required): Yes  SLP Follow-up Date: 01/19/24    Thank you for your referral.   If you have any questions, please contact SULEMA Sullivan M.S. CCC-SLP  Speech Language Pathologist  Phone Number Yes. [phone]

## 2024-01-18 NOTE — OCCUPATIONAL THERAPY NOTE
OCCUPATIONAL THERAPY EVALUATION - INPATIENT     Room Number: 543/543-A  Evaluation Date: 1/18/2024  Type of Evaluation: Initial    Presenting problem: UTI, anemia    Co-Morbidities: Dementia, HTN, BPH       Physician Order: IP Consult to Occupational Therapy  Reason for Therapy: ADL/IADL Dysfunction and Discharge Planning    OCCUPATIONAL THERAPY ASSESSMENT   Patient is a 88 year old male admitted 1/17/2024 for UTI, anemia.  In this OT evaluation patient presents with the following impairments: B UE rigidity, cognitive deficits, decreased muscle strength, decreased balance, poor judgement, decreased safety awareness, decreased insight to deficits.  These deficits manifest functionally while performing ADL's, functional mobility, functional transfers, negotiating stairs and bed mobility .   The patient is below baseline and would benefit from skilled inpatient OT to address the above deficits, maximizing patient's ability to return to prior level of function.    The patient's Approx Degree of Impairment: 70.42% has been calculated based on documentation in the [facility] '6 clicks' Inpatient Daily Activity Short Form.  Research supports that patients with this level of impairment may benefit from [facility] for continued skilled OT and PT intervention in order to return to PLOF.     RN cleared pt to participate in OT session, pt was agreeable. Spouse arrived during session and was able to provide HX. Spouse reported pt was at [facility] facility, was discharged home with spouse and was to follow up with OP PT at [facility]; however, pt's spouse reported pt did not have an MD order for OP PT and pt's PCP has been on vacation for 4 weeks. Pt's spouse reported that pt sleeps on the couch, able to ambulate with a R/W, uses W/C for community mobility and was negotiating 14 stairs to/from condo.    Pt required Max A with bed mobility, sit to stand transfers, and functional transfers, with spouse calming pt down and providing  verbal cues. Pt is unable to stand up tall, B knees are bent. Pt's spouse prefers for pt to discharge home and participate in OP PT at [facility] and feels pt will perform better in own environment. Usually in dementia patients, patients do better in own environment with familiar family members; however, pt has 14 exterior stairs to arrive to condo from parking garage and it is concerning should pt discharge home as pt is at high fall risk. Furthermore, per MD, Dr Rene's assessment on 1/17/24 \" Clinical manifestation suggestive of Parkinson disease\".   Moreover, pt's spouse commented during PLOF interview that pt has a walk-in shower at \[facility]\"" and when this therapist requested clarification, pt's spouse reported that pt and spouse go to [employer] \"for fun\" at times and there is a walk-in shower at said [facility]; otherwise, pt uses tub transfer bench for showers in tub.    Pt was growling during this session, making angry faces. Pt's spouse requested from pt not to make said sounds and faces, to no avail. This therapist reported to RN. Furthermore, when RN administered an injection during this session, pt became agitated, was grabbing and swearing.     DISCHARGE RECOMMENDATIONS  OT Discharge Recommendations: Sub-acute rehabilitation  OT Device Recommendations: None    PLAN  OT Treatment Plan: Balance activities;Energy conservation/work simplification techniques;ADL training;Functional transfer training;UE strengthening/ROM;Endurance training;Cognitive reorientation;Patient/Family education;Patient/Family training;Compensatory technique education;Neuromuscluar reeducation       OCCUPATIONAL THERAPY MEDICAL/SOCIAL HISTORY   Problem List  Principal Problem:    Urinary tract infection without hematuria, site unspecified  Active Problems:    Weakness    HOME SITUATION  Type of Home: Condo (14 stairs to condo on 2nd floor)  Home Layout: One level  Lives With: Spouse (Spouse is a retired SLP)  Toilet and  Equipment: -- (Incontinent)  Shower/Tub and Equipment: Tub-shower combo; Tub transfer bench  Occupation/Status: Retired  Hand Dominance: Left  Drives: No  Patient Regularly Uses: Glasses    Stairs in Home: N/A; 14 to condo  Use of Assistive Device(s): R/W at home, W/C in community    Prior Level of [place]: Pt was assisted with ADL, incontinent with B & B    SUBJECTIVE  \"I don't give a shit\"    OCCUPATIONAL THERAPY EXAMINATION    OBJECTIVE  Precautions: Bed/chair alarm; Aspiration  Fall Risk: High fall risk    PAIN ASSESSMENT  Rating: Unable to rate    ACTIVITY TOLERANCE  Pulse: 61  Heart Rate Source: Monitor     BP: 138/84 ()  BP Location: Right arm  BP Method: Automatic  Patient Position: Semi-[place]isano    O2 SATURATIONS  Oxygen Therapy  SPO2% on Room Air at Rest: 95    COGNITION  Overall Cognitive Status:  Impaired      Communication: Impaired, pt is unintelligible, has bursts of full sentences. Pt answered yes to questions, and \"I don't know\" to clarifying questions. For example, when pt was asked if pt knew where he was, pt replied \"yes\" when asked where, pt replied \" I don't know\"    Behavioral/Emotional/Social: Ornery, angry, can be combative.    RANGE OF MOTION   Upper extremity AAROM: Limited exam due to rigidity and cognitive deficits    STRENGTH ASSESSMENT  Upper extremity strength: Limited exam due to rigidity and cognitive deficits.     COORDINATION  Gross Motor: : Limited exam due to rigidity and cognitive deficits   Fine Motor: : Limited exam due to rigidity and cognitive deficits     ACTIVITIES OF DAILY LIVING ASSESSMENT  AM-PAC ‘6-Clicks’ Inpatient Daily Activity Short Form  How much help from another person does the patient currently need…  -   Putting on and taking off regular lower body clothing?: A Lot  -   Bathing (including washing, rinsing, drying)?: A Lot  -   Toileting, which includes using toilet, bedpan or urinal? : Total  -   Putting on and taking off regular upper body  clothing?: A Lot  -   Taking care of personal grooming such as brushing teeth?: A Lot  -   Eating meals?: A Lot    AM-PAC Score:  Score: 11  Approx Degree of Impairment: 70.42%  Standardized Score (AM-PAC Scale): 29.04  CMS Modifier (G-Code): CL    FUNCTIONAL TRANSFER ASSESSMENT  Sit to Stand: Edge of Bed  Edge of Bed: Maximum Assist    BED MOBILITY  Rolling: Maximum Assist  Supine to Sit : Maximum Assist  Sit to Supine (OT): Maximum Assist  Scooting: Dependent    BALANCE ASSESSMENT  Static Sitting: Minimal Assist  Static Standing: Maximum Assist       Skilled Therapy Provided: Bed mobility, sitting balance, sitting tolerance, standing balance, standing tolerance, sit to stand transfers, positioning, B UE AAROM, pt and spouse education    EDUCATION PROVIDED  Patient : Role of Occupational Therapy; Plan of Care; Discharge Recommendations; Functional Transfer Techniques; Fall Prevention  Patient's Response to Education: Requires Further Education; Demonstrates Poor Carry Over to Information  Family/Caregiver: Role of Occupational Therapy; Plan of Care; Discharge Recommendations  Family/Caregiver's Response to Education: Verbalized Understanding    Patient End of Session: In bed;Needs met;Call light within reach;RN aware of session/findings;All patient questions and concerns addressed;Alarm set;Family present;Discussed recommendations with /    OT Goals  Patients self stated goal is: Unable to state     Patient will complete functional transfer with Mod A  Comment:     Patient will complete LE dressing with Mod A  Comment:     Patient will tolerate standing for 1 minutes in prep for adls with Mod A   Comment:    Patient will complete UE dressing with Mod A  Comment:          Goals  on:   Frequency:     Patient Evaluation Complexity Level:   Occupational Profile/Medical History HIGH - Extensive review of history including review of medical or therapy records    Specific performance  deficits impacting engagement in ADL/IADL HIGH  5+ performance deficits    Client Assessment/Performance Deficits HIGH - Comorbidities and significant modifications of tasks    Clinical Decision Making HIGH - Analysis of occupational profile, comprehensive assessments, multiple treatment options    Overall Complexity HIGH     Neuromuscular Re-education: 32 minutes      Karin Boyle OTR/L  Occupational Therapy  [place] [facility]

## 2024-01-19 LAB
ALBUMIN SERPL-MCNC: 3.4 G/DL (ref 3.2–4.8)
ANION GAP SERPL CALC-SCNC: 3 MMOL/L (ref 0–18)
BASOPHILS # BLD AUTO: 0.03 X10(3) UL (ref 0–0.2)
BASOPHILS NFR BLD AUTO: 0.8 %
BUN BLD-MCNC: 6 MG/DL (ref 9–23)
BUN/CREAT SERPL: 9.7 (ref 10–20)
CALCIUM BLD-MCNC: 9.4 MG/DL (ref 8.7–10.4)
CHLORIDE SERPL-SCNC: 113 MMOL/L (ref 98–112)
CO2 SERPL-SCNC: 30 MMOL/L (ref 21–32)
CREAT BLD-MCNC: 0.62 MG/DL
DEPRECATED RDW RBC AUTO: 49.4 FL (ref 35.1–46.3)
EGFRCR SERPLBLD CKD-EPI 2021: 93 ML/MIN/1.73M2 (ref 60–?)
EOSINOPHIL # BLD AUTO: 0.16 X10(3) UL (ref 0–0.7)
EOSINOPHIL NFR BLD AUTO: 4.3 %
ERYTHROCYTE [DISTWIDTH] IN BLOOD BY AUTOMATED COUNT: 13.1 % (ref 11–15)
GLUCOSE BLD-MCNC: 85 MG/DL (ref 70–99)
HCT VFR BLD AUTO: 32.7 %
HGB BLD-MCNC: 10.9 G/DL
IMM GRANULOCYTES # BLD AUTO: 0.01 X10(3) UL (ref 0–1)
IMM GRANULOCYTES NFR BLD: 0.3 %
LYMPHOCYTES # BLD AUTO: 1.35 X10(3) UL (ref 1–4)
LYMPHOCYTES NFR BLD AUTO: 36.1 %
MCH RBC QN AUTO: 34.2 PG (ref 26–34)
MCHC RBC AUTO-ENTMCNC: 33.3 G/DL (ref 31–37)
MCV RBC AUTO: 102.5 FL
MONOCYTES # BLD AUTO: 0.43 X10(3) UL (ref 0.1–1)
MONOCYTES NFR BLD AUTO: 11.5 %
NEUTROPHILS # BLD AUTO: 1.76 X10 (3) UL (ref 1.5–7.7)
NEUTROPHILS # BLD AUTO: 1.76 X10(3) UL (ref 1.5–7.7)
NEUTROPHILS NFR BLD AUTO: 47 %
OSMOLALITY SERPL CALC.SUM OF ELEC: 299 MOSM/KG (ref 275–295)
PHOSPHATE SERPL-MCNC: 2.8 MG/DL (ref 2.4–5.1)
PLATELET # BLD AUTO: 151 10(3)UL (ref 150–450)
POTASSIUM SERPL-SCNC: 4.1 MMOL/L (ref 3.5–5.1)
RBC # BLD AUTO: 3.19 X10(6)UL
SODIUM SERPL-SCNC: 146 MMOL/L (ref 136–145)
WBC # BLD AUTO: 3.7 X10(3) UL (ref 4–11)

## 2024-01-19 PROCEDURE — 99233 SBSQ HOSP IP/OBS HIGH 50: CPT | Performed by: HOSPITALIST

## 2024-01-19 PROCEDURE — 90792 PSYCH DIAG EVAL W/MED SRVCS: CPT | Performed by: OTHER

## 2024-01-19 PROCEDURE — 99232 SBSQ HOSP IP/OBS MODERATE 35: CPT | Performed by: OTHER

## 2024-01-19 NOTE — CM/SW NOTE
Department  notified of request for cesar PANIAGUA referrals started. Assigned CM/SW to follow up with pt/family on further discharge planning.    Inés Winkler  DSC

## 2024-01-19 NOTE — PROGRESS NOTES
[facility]    Progress Note    Fernando Ramos Jr. Patient Status:  Inpatient    1935 MRN [id number]   Location [facility] 5SW/SE Attending Woo Cartagena MD   Hosp Day # 2 PCP CAN HALE       Subjective:   Fernando Ramos Jr. is a(n) 88 year old male was seen and examined  Resting in chair comfortably   Wife at bedside  Pt improved since admission   No acute distress    Objective:   Blood pressure (!) 169/69, pulse 68, temperature 97.8 °F (36.6 °C), temperature source Axillary, resp. rate 18, height 5' 8\" (1.727 m), weight 145 lb 3.2 oz (65.9 kg), SpO2 98%.    GENERAL:  The patient appeared to be in no distress and was comfortable.  SKIN:  Warm and hydrated  PSYCHIATRIC: Calm and cooperative    HEENT:  Head was atraumatic and normocephalic.  Eyes: Sclera was anicteric.  Pupils were equal.  Ears:  There were no lesions.  Nose:  No lesions were noted.      NECK:  Supple.  There was no JVD.    CHEST:  Symmetrical movement on inspiration  CARDIAC: S1 S2+, RRR  LUNGS:  CTAB  ABDOMEN: Non-distended, non-tender, BS+  MUSCULOSKELETAL:  There was no deformity.  There was full range of motion in all the extremities.    EXTREMITIES: There was no edema  NEUROLOGIC: Cranial nerves II-XII intact, no focal defecits    Current Inpatient Medications:     Current Facility-Administered Medications:     cefTRIAXone (Rocephin) 1 g in D5W 100 mL IVPB-ADD, 1 g, Intravenous, Q24H    heparin (Porcine) 5000 UNIT/ML injection 5,000 Units, 5,000 Units, Subcutaneous, 2 times per day    acetaminophen (Tylenol Extra Strength) tab 500 mg, 500 mg, Oral, Q4H PRN    ondansetron (Zofran) 4 MG/2ML injection 4 mg, 4 mg, Intravenous, Q6H PRN    metoclopramide (Reglan) 5 mg/mL injection 10 mg, 10 mg, Intravenous, Q8H PRN    ARIPiprazole (Abilify) tab 2 mg, 2 mg, Oral, QOD    aspirin DR tab 81 mg, 81 mg, Oral, Daily    donepezil (Aricept) tab 10 mg, 10 mg, Oral, Nightly    melatonin tab 2 mg, 2 mg, Oral,  Nightly    mirtazapine (REMERON SOL-TAB) disintegrating tab 15 mg, 15 mg, Oral, Nightly    polyethylene glycol (PEG 3350) (Miralax) 17 g oral packet 17 g, 17 g, Oral, QOD    pravastatin (Pravachol) tab 20 mg, 20 mg, Oral, Nightly    venlafaxine (Effexor) tab 37.5 mg, 37.5 mg, Oral, BID    Assessment and Plan:   1.       Recurrent urinary tract infection.  Prior urine cultures positive for Proteus mirabilis and imaging studies showing bilateral staghorn kidney stones.   Started on IV abx, will cont  Await final ucx  Bcx x2 NGTD  Likely will need suppressive tx on dc    2.       Patient supposed to be on methenamine suppressive therapy, but this has not been done in the last few weeks per the wife.  Will restart once abx course is complete   Will need f/u with PCP    3.       Dementia  Cont home meds    4.        Depression, Anxiety  Cont home meds    5.       Parkinsonism  Evidence of features on exam per neuro  To be started on low dose sinemet     Other issues   HTN  HL  BPH  B/l staghorn caliclui  Seizure d/o    DVT Ppx: Heparin subcutaneous  Full code    Dispo: will need SIVA on dc    MDM: High     Results:     Recent Labs   Lab 01/17/24  1113 01/18/24  0538 01/19/24  0513   RBC 3.87 3.20* 3.19*   HGB 12.7* 10.8* 10.9*   HCT 39.2 32.6* 32.7*   .3* 101.9* 102.5*   MCH 32.8 33.8 34.2*   MCHC 32.4 33.1 33.3   RDW 12.9 13.2 13.1   NEPRELIM 7.40 2.75 1.76   WBC 9.2 5.0 3.7*   .0 163.0 151.0         Recent Labs   Lab 01/17/24  1045 01/18/24  0538 01/19/24  0513   * 87 85   BUN 9 11 6*   CREATSERUM 0.71 0.72 0.62*   EGFRCR 89 88 93   CA 10.1 9.4 9.4    145 146*   K 4.5 4.5 4.1    112 113*   CO2 28.0 28.0 30.0         Imaging:   No results found.          Woo M. Mitzi, MD  1/19/2024

## 2024-01-19 NOTE — PHYSICAL THERAPY NOTE
PHYSICAL THERAPY TREATMENT NOTE - INPATIENT     Room Number: 543/543-A       Presenting Problem: UTI, weaikness    Hx of Dementia with behavior disturbance/ Parkinsonism  -- Neurology and psychology on consult  Open sores / wounds on B toes feet area ( RN Informed to inform MD )     Problem List  Principal Problem:    Urinary tract infection without hematuria, site unspecified  Active Problems:    Weakness    Parkinson's disease without dyskinesia or fluctuating manifestations      PHYSICAL THERAPY ASSESSMENT   Chart reviewed. RN  approved participation in physical therapy.  Spouse present in room . PPE worn by therapist: mask, gloves, and N95. Patient was not wearing a mask during session. Patient presented in bed with 0/10 pain.       Pt is alert oriented to person only agreeable to mobility .  Pt can get irritated during fxn mobility but is easily distracted and cooperative throughout session.       Pt is sensitive to touch throughout UE and LE during assistance for fxn mobility .  Pt when repositioning LE at end of session noting redness in distal left LE and sensitivity to touch .  B socks removed to assess feet ---pt noted to have multiple significant B feet toe open skin area and wounds with drainage noted observed left foot / toe area .   Poor feet skin integrity .  RN contacted to assess B feet and toes to inform MD .  Per spouse is room they were supposed to go to the foot MD today and have missed multiple prior appts as unable to get patient to MD .       Prior to this visit there were no WB restrictions noted in chart and  medical team unaware of B toe / feet sores   ---RN to contact MD to assess B feet / toes wounds .  Pt may need an updated activity clarification / WB Status pending MD assessment / RN aware of request .     Patient with fair  progress towards goals during this session. Education provided on Physical therapy plan of care, physiological benefits of out of bed mobility, and B AP , B UE  AROM , R / L SAQ , LAQ , fxn mobility training , fall risk prevention and DC planning.  . Patient with poor carryover.    Bed mobility: Max assist of one supine to sit.  Pt initially leaning posteriorly with mod assist needed.  Mod assist to scoot to EOB .  Pt once at EOB with sitting tolerance progressed to sitting at EOB with close CGA / min assist   Transfers: Mod assist of one for sit to stand using RW with second person present for safety providing close CGA as needed.   Pt with flexed standing postured able to stand with min support of one for safety .  CNA provided skin care and re-bandage on sacral area during standing activity .  SPT mod assist of one with CGA support of second person for safety bed to chair .   Rest provided .  Able to progress to ambulation as below.   Gait Assistance: Maximum assistance (2A used for safety with spouse managing IV)  Distance (ft): 50 ft x 1  Assistive Device: Rolling walker  Pattern: Shuffle;Ataxic (flexed trunk and LE gait  --chair follow used for pt safety --2 A needed for ambulation mod assist of pt / spouse with IV /  CNA pushing close chair follow)          Patient was left in bedside chair, alarm activated, and spouse present , B feel elevated  at end of session with all needs in reach. The patient's Approx Degree of Impairment: 72.57% has been calculated based on documentation in the [facility] '6 clicks' Inpatient Basic Mobility Short Form.  Research supports that patients with this level of impairment may benefit from Subacute Rehab.  Spouse reports a goal for DC to [facility] as next level of care . SW team working with RN on optimal DC Plan.  RN aware of patient status post session.    DISCHARGE RECOMMENDATIONS  PT Discharge Recommendations: Sub-acute rehabilitation     PLAN  PT Treatment Plan: Bed mobility;Body mechanics;Endurance;Energy conservation;Patient education;Family education;Gait training;Balance training;Transfer training;Strengthening    SUBJECTIVE  Agreeable  to mobility     OBJECTIVE  Precautions: Bed/chair alarm    WEIGHT BEARING RESTRICTION  Weight Bearing Restriction:  (none currently  discovered during session  B toes sores and wounds --_RN informed for assessment .)                PAIN ASSESSMENT   Rating: Other (Comment)  Location: Pt denies any pain at rest .   Pt is sensitive to touch in LE ---denies any B foot pain  Management Techniques: Activity promotion;Body mechanics;Breathing techniques;Relaxation;Other (Comment);Repositioning    BALANCE                                                                                                                       Static Sitting: Poor +  Dynamic Sitting: Poor           Static Standing: Poor +  Dynamic Standing: Poor +    ACTIVITY TOLERANCE  Pulse: 75 (activity to chair)        BP: (!) 163/84 (post actvity to chair    resting 150/64)             O2 WALK  Oxygen Therapy  SPO2% Ambulation on Room Air: 96    AM-PAC '6-Clicks' INPATIENT SHORT FORM - BASIC MOBILITY  How much difficulty does the patient currently have...  Patient Difficulty: Turning over in bed (including adjusting bedclothes, sheets and blankets)?: A Lot   Patient Difficulty: Sitting down on and standing up from a chair with arms (e.g., wheelchair, bedside commode, etc.): A Lot   Patient Difficulty: Moving from lying on back to sitting on the side of the bed?: A Lot   How much help from another person does the patient currently need...   Help from Another: Moving to and from a bed to a chair (including a wheelchair)?: A Lot   Help from Another: Need to walk in hospital room?: A Lot   Help from Another: Climbing 3-5 steps with a railing?: Total     AM-PAC Score:  Raw Score: 11   Approx Degree of Impairment: 72.57%   Standardized Score (AM-PAC Scale): 33.86   CMS Modifier (G-Code): CL    Patient End of Session: Up in chair;Needs met;Call light within reach;RN aware of session/findings;All patient questions and concerns addressed;Alarm set;Family  present    CURRENT GOALS     Goals to be met by: 1/30/2024  Patient Goal Patient's self-stated goal is: return home   Goal #1 Patient is able to demonstrate supine - sit EOB @ level: minimum assistance      Goal #1   Current Status  as above  max assist overall    Goal #2 Patient is able to demonstrate transfers Sit to/from Stand at assistance level: minimum assistance with walker - rolling      Goal #2  Current Status  as above    Goal #3 Patient is able to ambulate 10 feet with assist device: walker - standard and walker - rolling at assistance level: minimum assistance   Goal #3   Current Status  as above    Goal #4     Goal #4   Current Status     Goal #5 Patient to demonstrate independence with home activity/exercise instructions provided to patient in preparation for discharge.   Goal #5   Current Status  in progress    Goal #6     Goal #6  Current Status     Therapy activity 2 units

## 2024-01-19 NOTE — PLAN OF CARE
Problem: Patient Centered Care  Goal: Patient preferences are identified and integrated in the patient's plan of care  Description: Interventions:  - What would you like us to know as we care for you?  From home with wife, per wife he was was a    - Provide timely, complete, and accurate information to patient/family  - Incorporate patient and family knowledge, values, beliefs, and cultural backgrounds into the planning and delivery of care  - Encourage patient/family to participate in care and decision-making at the level they choose  - Honor patient and family perspectives and choices  Outcome: Progressing     Problem: Patient/Family Goals  Goal: Patient/Family Long Term Goal  Description: Patient's Long Term Goal: safe placement     Interventions:  - follow doctors recommendations   - See additional Care Plan goals for specific interventions  Outcome: Progressing  Goal: Patient/Family Short Term Goal  Description: Patient's Short Term Goal:    Interventions:   - speech evaluations   - PT/OT evaluations    - IV antibiotics   - See additional Care Plan goals for specific interventions  Outcome: Progressing     Problem: SAFETY ADULT - FALL  Goal: Free from fall injury  Description: INTERVENTIONS:  - Assess pt frequently for physical needs  - Identify cognitive and physical deficits and behaviors that affect risk of falls.  - [place] fall precautions as indicated by assessment.  - Educate pt/family on patient safety including physical limitations  - Instruct pt to call for assistance with activity based on assessment  - Modify environment to reduce risk of injury  - Provide assistive devices as appropriate  - Consider OT/PT consult to assist with strengthening/mobility  - Encourage toileting schedule  Outcome: Progressing     Problem: SKIN/TISSUE INTEGRITY - ADULT  Goal: Skin integrity remains intact  Description: INTERVENTIONS  - Assess and document risk factors for pressure ulcer development  - Assess and  document skin integrity  - Monitor for areas of redness and/or skin breakdown  - Initiate interventions, skin care algorithm/standards of care as needed  Outcome: Progressing  Goal: Incision(s), wounds(s) or drain site(s) healing without S/S of infection  Description: INTERVENTIONS:  - Assess and document risk factors for pressure ulcer development  - Assess and document skin integrity  - Assess and document dressing/incision, wound bed, drain sites and surrounding tissue  - Implement wound care per orders  - Initiate isolation precautions as appropriate  - Initiate Pressure Ulcer prevention bundle as indicated  Outcome: Progressing  Goal: Oral mucous membranes remain intact  Description: INTERVENTIONS  - Assess oral mucosa and hygiene practices  - Implement preventative oral hygiene regimen  - Implement oral medicated treatments as ordered  Outcome: Progressing     Problem: MUSCULOSKELETAL - ADULT  Goal: Return mobility to safest level of function  Description: INTERVENTIONS:  - Assess patient stability and activity tolerance for standing, transferring and ambulating w/ or w/o assistive devices  - Assist with transfers and ambulation using safe patient handling equipment as needed  - Ensure adequate protection for wounds/incisions during mobilization  - Obtain PT/OT consults as needed  - Advance activity as appropriate  - Communicate ordered activity level and limitations with patient/family  Outcome: Progressing  Goal: Maintain proper alignment of affected body part  Description: INTERVENTIONS:  - Support and protect limb and body alignment per provider's orders  - Instruct and reinforce with patient and family use of appropriate assistive device and precautions (e.g. spinal or hip dislocation precautions)  Outcome: Progressing     Problem: NEUROLOGICAL - ADULT  Goal: Achieves stable or improved neurological status  Description: INTERVENTIONS  - Assess for and report changes in neurological status  - Initiate  measures to prevent increased intracranial pressure  - Maintain blood pressure and fluid volume within ordered parameters to optimize cerebral perfusion and minimize risk of hemorrhage  - Monitor temperature, glucose, and sodium. Initiate appropriate interventions as ordered  Outcome: Progressing     Problem: Impaired Functional Mobility  Goal: Achieve highest/safest level of mobility/gait  Description: Interventions:  - Assess patient's functional ability and stability  - Promote increasing activity/tolerance for mobility and gait  - Educate and engage patient/family in tolerated activity level and precautions    Outcome: Progressing  No acute changes in patient status, vital signs stable , fall precautions in place, wife at the bedside , call light within reach

## 2024-01-19 NOTE — PLAN OF CARE
Fernando had no acute changes during shift. Patient had call light placed within reach at all times and bed locked in lowest position. Bed alarm set and patient rounded on frequently.    Problem: Patient Centered Care  Goal: Patient preferences are identified and integrated in the patient's plan of care  Description: Interventions:  - What would you like us to know as we care for you? From home with wife  - Provide timely, complete, and accurate information to patient/family  - Incorporate patient and family knowledge, values, beliefs, and cultural backgrounds into the planning and delivery of care  - Encourage patient/family to participate in care and decision-making at the level they choose  - Honor patient and family perspectives and choices  Outcome: Progressing    Problem: SAFETY ADULT - FALL  Goal: Free from fall injury  Description: INTERVENTIONS:  - Assess pt frequently for physical needs  - Identify cognitive and physical deficits and behaviors that affect risk of falls.  - [place] fall precautions as indicated by assessment.  - Educate pt/family on patient safety including physical limitations  - Instruct pt to call for assistance with activity based on assessment  - Modify environment to reduce risk of injury  - Provide assistive devices as appropriate  - Consider OT/PT consult to assist with strengthening/mobility  - Encourage toileting schedule  Outcome: Progressing     Problem: SKIN/TISSUE INTEGRITY - ADULT  Goal: Skin integrity remains intact  Description: INTERVENTIONS  - Assess and document risk factors for pressure ulcer development  - Assess and document skin integrity  - Monitor for areas of redness and/or skin breakdown  - Initiate interventions, skin care algorithm/standards of care as needed  Outcome: Progressing  Goal: Incision(s), wounds(s) or drain site(s) healing without S/S of infection  Description: INTERVENTIONS:  - Assess and document risk factors for pressure ulcer development  -  Assess and document skin integrity  - Assess and document dressing/incision, wound bed, drain sites and surrounding tissue  - Implement wound care per orders  - Initiate isolation precautions as appropriate  - Initiate Pressure Ulcer prevention bundle as indicated  Outcome: Progressing  Goal: Oral mucous membranes remain intact  Description: INTERVENTIONS  - Assess oral mucosa and hygiene practices  - Implement preventative oral hygiene regimen  - Implement oral medicated treatments as ordered  Outcome: Progressing     Problem: MUSCULOSKELETAL - ADULT  Goal: Return mobility to safest level of function  Description: INTERVENTIONS:  - Assess patient stability and activity tolerance for standing, transferring and ambulating w/ or w/o assistive devices  - Assist with transfers and ambulation using safe patient handling equipment as needed  - Ensure adequate protection for wounds/incisions during mobilization  - Obtain PT/OT consults as needed  - Advance activity as appropriate  - Communicate ordered activity level and limitations with patient/family  Outcome: Progressing  Goal: Maintain proper alignment of affected body part  Description: INTERVENTIONS:  - Support and protect limb and body alignment per provider's orders  - Instruct and reinforce with patient and family use of appropriate assistive device and precautions (e.g. spinal or hip dislocation precautions)  Outcome: Progressing     Problem: NEUROLOGICAL - ADULT  Goal: Achieves stable or improved neurological status  Description: INTERVENTIONS  - Assess for and report changes in neurological status  - Initiate measures to prevent increased intracranial pressure  - Maintain blood pressure and fluid volume within ordered parameters to optimize cerebral perfusion and minimize risk of hemorrhage  - Monitor temperature, glucose, and sodium. Initiate appropriate interventions as ordered  Outcome: Progressing     Problem: Impaired Functional Mobility  Goal: Achieve  highest/safest level of mobility/gait  Description: Interventions:  - Assess patient's functional ability and stability  - Promote increasing activity/tolerance for mobility and gait  - Educate and engage patient/family in tolerated activity level and precautions  Outcome: Progressing

## 2024-01-19 NOTE — SLP NOTE
SPEECH DAILY NOTE - INPATIENT    ASSESSMENT & PLAN   ASSESSMENT      Proper PPE worn. Hands sanitized upon entrance/exit Pt room.       Pt alert, afebrile and on room air. Pt seen for swallow analysis per BSE recommendations (after consulting with RN). Spouse present. Pt agreed to participate.Pt's preferred method of learning verbal. Pt upright in chair; observed with current diet of minced & moist/mildly-thick liquids via tsp. Swallowing precautions/strategies discussed with Pt and spouse as SLP directed oral trials. Oral skills functional for mastication and lingual preparation of food trials. No significant oral retention. Pharyngeal response appeared to trigger within 1-2 sec per hyolaryngeal elevation to completion (functional rise/strength per palpation). No overt CSA on minced & moist nor mildly thick liquid trials (no coughing, no throat clearing, clear vocal quality and no SOB). Collaborated with RN regarding Pt's swallowing plan of care. Per RN, Pt with good tolerance of current diet. No report of difficulty taking meds. Sp02 ~98% during this session. Call light within Pt's reach upon SLP discharge from room.      CXR 1/ 17/24:  CONCLUSION:   1. No acute airspace disease.  Minimal left basal scarring/atelectasis.       PLAN: Pt is discharged from skilled swallowing intervention secondary to functional swallowing skills on least restrictive SAFEST diet, family education completed and goal achievement.          Diet Recommendations - Solids: Mechanical soft ground/ Minced & Moist  Diet Recommendations - Liquids: Nectar thick liquids/ Mildly thick    Compensatory Strategies Recommended: No straws;Slow rate;Alternate consistencies;Small bites and sips  Aspiration Precautions: Upright position;Slow rate;Small bites and sips;No straw  Medication Administration Recommendations: Crushed in puree    Patient Experiencing Pain: No  Discharge Recommendations  Discharge Recommendations/Plan: Undetermined  Treatment  Plan  Treatment Plan/Recommendations: Aspiration precautions  Interdisciplinary Communication: Discussed with RN    GOALS  Goal #1 The patient will tolerate minced and moist consistency and mildly thick liquids without overt signs or symptoms of aspiration with 100 % accuracy over 1 session(s).    No CSA on current diet.        In Progress   Goal #2 The patient/family/caregiver will demonstrate understanding and implementation of aspiration precautions and swallow strategies independently over 1 session(s).    Education completed with spouse; v/u. Swallowing precautions posted in room.     In Progress   FOLLOW UP  Follow Up Needed (Documentation Required): No  SLP Follow-up Date: 01/19/24  Number of Visits to Meet Established Goals: 1    Session: 1    If you have any questions, please contact   Leeann Duarte M.S. CCC/SLP  Speech-Language Pathologist  [facility]  #[phone]

## 2024-01-20 PROBLEM — F05 DELIRIUM DUE TO ANOTHER MEDICAL CONDITION: Status: ACTIVE | Noted: 2024-01-20

## 2024-01-20 PROBLEM — G21.11: Status: ACTIVE | Noted: 2024-01-20

## 2024-01-20 PROCEDURE — 99233 SBSQ HOSP IP/OBS HIGH 50: CPT | Performed by: HOSPITALIST

## 2024-01-20 PROCEDURE — 99232 SBSQ HOSP IP/OBS MODERATE 35: CPT | Performed by: OTHER

## 2024-01-20 RX ORDER — VITAMIN E 268 MG
400 CAPSULE ORAL DAILY
Status: DISCONTINUED | OUTPATIENT
Start: 2024-01-20 | End: 2024-01-22

## 2024-01-20 NOTE — PROGRESS NOTES
[facility]    Progress Note    Fernando Ramos Jr. Patient Status:  Inpatient    1935 MRN [id number]   Location [facility] 5SW/SE Attending Woo Cartagena MD   Hosp Day # 3 PCP [facility] H[facility]       Subjective:   Fernando Ramos Jr. is a(n) 88 year old male was seen and examined  Resting comfortably   Wife at bedside  Pt improved since admission   No acute distress  Denies any cp, sob, f,c,n,v, abd pain or hA     Objective:   Blood pressure 151/85, pulse 65, temperature 97.9 °F (36.6 °C), temperature source Oral, resp. rate 18, height 5' 8\" (1.727 m), weight 145 lb 3.2 oz (65.9 kg), SpO2 95%.    GENERAL:  The patient appeared to be in no distress and was comfortable.  SKIN:  Warm and hydrated  PSYCHIATRIC: Calm and cooperative    HEENT:  Head was atraumatic and normocephalic.  Eyes: Sclera was anicteric.  Pupils were equal.  Ears:  There were no lesions.  Nose:  No lesions were noted.      NECK:  Supple.  There was no JVD.    CHEST:  Symmetrical movement on inspiration  CARDIAC: S1 S2+, RRR  LUNGS:  CTAB  ABDOMEN: Non-distended, non-tender, BS+  MUSCULOSKELETAL:  There was no deformity.  There was full range of motion in all the extremities.    EXTREMITIES: There was no edema  NEUROLOGIC: Cranial nerves II-XII intact, no focal defecits    Current Inpatient Medications:     Current Facility-Administered Medications:     vitamin E (Alpha-E) cap 400 Units, 400 Units, Oral, Daily    cefTRIAXone (Rocephin) 1 g in D5W 100 mL IVPB-ADD, 1 g, Intravenous, Q24H    heparin (Porcine) 5000 UNIT/ML injection 5,000 Units, 5,000 Units, Subcutaneous, 2 times per day    acetaminophen (Tylenol Extra Strength) tab 500 mg, 500 mg, Oral, Q4H PRN    ondansetron (Zofran) 4 MG/2ML injection 4 mg, 4 mg, Intravenous, Q6H PRN    metoclopramide (Reglan) 5 mg/mL injection 10 mg, 10 mg, Intravenous, Q8H PRN    ARIPiprazole (Abilify) tab 2 mg, 2 mg, Oral, QOD    aspirin DR tab 81 mg, 81 mg, Oral, Daily     donepezil (Aricept) tab 10 mg, 10 mg, Oral, Nightly    melatonin tab 2 mg, 2 mg, Oral, Nightly    mirtazapine (REMERON SOL-TAB) disintegrating tab 15 mg, 15 mg, Oral, Nightly    polyethylene glycol (PEG 3350) (Miralax) 17 g oral packet 17 g, 17 g, Oral, QOD    pravastatin (Pravachol) tab 20 mg, 20 mg, Oral, Nightly    venlafaxine (Effexor) tab 37.5 mg, 37.5 mg, Oral, BID    Assessment and Plan:   1.       Recurrent urinary tract infection.  Prior urine cultures positive for Proteus mirabilis and imaging studies showing bilateral staghorn kidney stones.   Started on IV abx, will cont  Await final ucx  Bcx x2 NGTD  Likely will need suppressive tx on dc    2.       Patient supposed to be on methenamine suppressive therapy, but this has not been done in the last few weeks per the wife.  Will restart once abx course is complete   Will need f/u with PCP    3.       Dementia  Cont home meds    4.        Depression, Anxiety  Cont home meds    5.       Parkinsonism  Evidence of features on exam per neuro  To be started on low dose sinemet, await neuro re-eval    Other issues   HTN  HL  BPH  B/l staghorn caliclui  Seizure d/o    DVT Ppx: Heparin subcutaneous  Full code    Dispo: will need SIVA on dc    MDM: High     Results:     Recent Labs   Lab 01/17/24  1113 01/18/24  0538 01/19/24  0513   RBC 3.87 3.20* 3.19*   HGB 12.7* 10.8* 10.9*   HCT 39.2 32.6* 32.7*   .3* 101.9* 102.5*   MCH 32.8 33.8 34.2*   MCHC 32.4 33.1 33.3   RDW 12.9 13.2 13.1   NEPRELIM 7.40 2.75 1.76   WBC 9.2 5.0 3.7*   .0 163.0 151.0         Recent Labs   Lab 01/17/24  1045 01/18/24  0538 01/19/24  0513   * 87 85   BUN 9 11 6*   CREATSERUM 0.71 0.72 0.62*   EGFRCR 89 88 93   CA 10.1 9.4 9.4    145 146*   K 4.5 4.5 4.1    112 113*   CO2 28.0 28.0 30.0         Imaging:   No results found.          Woo Cartagena MD  1/20/2024

## 2024-01-20 NOTE — PLAN OF CARE
Problem: Patient Centered Care  Goal: Patient preferences are identified and integrated in the patient's plan of care  Description: Interventions:  - What would you like us to know as we care for you? I live at home with my wife  - Provide timely, complete, and accurate information to patient/family  - Incorporate patient and family knowledge, values, beliefs, and cultural backgrounds into the planning and delivery of care  - Encourage patient/family to participate in care and decision-making at the level they choose  - Honor patient and family perspectives and choices  Outcome: Progressing       Problem: SAFETY ADULT - FALL  Goal: Free from fall injury  Description: INTERVENTIONS:  - Assess pt frequently for physical needs  - Identify cognitive and physical deficits and behaviors that affect risk of falls.  - [place] fall precautions as indicated by assessment.  - Educate pt/family on patient safety including physical limitations  - Instruct pt to call for assistance with activity based on assessment  - Modify environment to reduce risk of injury  - Provide assistive devices as appropriate  - Consider OT/PT consult to assist with strengthening/mobility  - Encourage toileting schedule  Outcome: Progressing     Problem: SKIN/TISSUE INTEGRITY - ADULT  Goal: Skin integrity remains intact  Description: INTERVENTIONS  - Assess and document risk factors for pressure ulcer development  - Assess and document skin integrity  - Monitor for areas of redness and/or skin breakdown  - Initiate interventions, skin care algorithm/standards of care as needed  Outcome: Progressing  Goal: Incision(s), wounds(s) or drain site(s) healing without S/S of infection  Description: INTERVENTIONS:  - Assess and document risk factors for pressure ulcer development  - Assess and document skin integrity  - Assess and document dressing/incision, wound bed, drain sites and surrounding tissue  - Implement wound care per orders  - Initiate  isolation precautions as appropriate  - Initiate Pressure Ulcer prevention bundle as indicated  Outcome: Progressing  Goal: Oral mucous membranes remain intact  Description: INTERVENTIONS  - Assess oral mucosa and hygiene practices  - Implement preventative oral hygiene regimen  - Implement oral medicated treatments as ordered  Outcome: Progressing     Problem: MUSCULOSKELETAL - ADULT  Goal: Return mobility to safest level of function  Description: INTERVENTIONS:  - Assess patient stability and activity tolerance for standing, transferring and ambulating w/ or w/o assistive devices  - Assist with transfers and ambulation using safe patient handling equipment as needed  - Ensure adequate protection for wounds/incisions during mobilization  - Obtain PT/OT consults as needed  - Advance activity as appropriate  - Communicate ordered activity level and limitations with patient/family  Outcome: Progressing  Goal: Maintain proper alignment of affected body part  Description: INTERVENTIONS:  - Support and protect limb and body alignment per provider's orders  - Instruct and reinforce with patient and family use of appropriate assistive device and precautions (e.g. spinal or hip dislocation precautions)  Outcome: Progressing     Problem: NEUROLOGICAL - ADULT  Goal: Achieves stable or improved neurological status  Description: INTERVENTIONS  - Assess for and report changes in neurological status  - Initiate measures to prevent increased intracranial pressure  - Maintain blood pressure and fluid volume within ordered parameters to optimize cerebral perfusion and minimize risk of hemorrhage  - Monitor temperature, glucose, and sodium. Initiate appropriate interventions as ordered  Outcome: Progressing     Problem: Impaired Functional Mobility  Goal: Achieve highest/safest level of mobility/gait  Description: Interventions:  - Assess patient's functional ability and stability  - Promote increasing activity/tolerance for mobility  and gait  - Educate and engage patient/family in tolerated activity level and precautions  Outcome: Progressing    Patient alert and oriented x 1, wife at bedside helping with feeding. Medication added by neuro, see orders. Left foot dressing changed, frequent repositioning. IV antibiotics on. Call light within reach, safety precautions in place.

## 2024-01-20 NOTE — CM/SW NOTE
List of accepting S[facility] facilities provided to patient's spouse at bedside. [facility] is chosen facility for [facility] at [place], facility reserved in [facility].    Plan: [facility] for [facility] pending medical clearance.    Pernell Deluca, BSN    [phone]

## 2024-01-20 NOTE — PLAN OF CARE
Problem: Patient Centered Care  Goal: Patient preferences are identified and integrated in the patient's plan of care  Description: Interventions:  - What would you like us to know as we care for you? Home with wife.  - Provide timely, complete, and accurate information to patient/family  - Incorporate patient and family knowledge, values, beliefs, and cultural backgrounds into the planning and delivery of care  - Encourage patient/family to participate in care and decision-making at the level they choose  - Honor patient and family perspectives and choices  Outcome: Progressing     Problem: SAFETY ADULT - FALL  Goal: Free from fall injury  Description: INTERVENTIONS:  - Assess pt frequently for physical needs  - Identify cognitive and physical deficits and behaviors that affect risk of falls.  - [place] fall precautions as indicated by assessment.  - Educate pt/family on patient safety including physical limitations  - Instruct pt to call for assistance with activity based on assessment  - Modify environment to reduce risk of injury  - Provide assistive devices as appropriate  - Consider OT/PT consult to assist with strengthening/mobility  - Encourage toileting schedule  Outcome: Progressing     Problem: SKIN/TISSUE INTEGRITY - ADULT  Goal: Skin integrity remains intact  Description: INTERVENTIONS  - Assess and document risk factors for pressure ulcer development  - Assess and document skin integrity  - Monitor for areas of redness and/or skin breakdown  - Initiate interventions, skin care algorithm/standards of care as needed  Outcome: Progressing  Goal: Incision(s), wounds(s) or drain site(s) healing without S/S of infection  Description: INTERVENTIONS:  - Assess and document risk factors for pressure ulcer development  - Assess and document skin integrity  - Assess and document dressing/incision, wound bed, drain sites and surrounding tissue  - Implement wound care per orders  - Initiate isolation  precautions as appropriate  - Initiate Pressure Ulcer prevention bundle as indicated  Outcome: Progressing  Goal: Oral mucous membranes remain intact  Description: INTERVENTIONS  - Assess oral mucosa and hygiene practices  - Implement preventative oral hygiene regimen  - Implement oral medicated treatments as ordered  Outcome: Progressing     Problem: MUSCULOSKELETAL - ADULT  Goal: Return mobility to safest level of function  Description: INTERVENTIONS:  - Assess patient stability and activity tolerance for standing, transferring and ambulating w/ or w/o assistive devices  - Assist with transfers and ambulation using safe patient handling equipment as needed  - Ensure adequate protection for wounds/incisions during mobilization  - Obtain PT/OT consults as needed  - Advance activity as appropriate  - Communicate ordered activity level and limitations with patient/family  Outcome: Progressing  Goal: Maintain proper alignment of affected body part  Description: INTERVENTIONS:  - Support and protect limb and body alignment per provider's orders  - Instruct and reinforce with patient and family use of appropriate assistive device and precautions (e.g. spinal or hip dislocation precautions)  Outcome: Progressing     Problem: NEUROLOGICAL - ADULT  Goal: Achieves stable or improved neurological status  Description: INTERVENTIONS  - Assess for and report changes in neurological status  - Initiate measures to prevent increased intracranial pressure  - Maintain blood pressure and fluid volume within ordered parameters to optimize cerebral perfusion and minimize risk of hemorrhage  - Monitor temperature, glucose, and sodium. Initiate appropriate interventions as ordered  Outcome: Progressing     Problem: Impaired Functional Mobility  Goal: Achieve highest/safest level of mobility/gait  Description: Interventions:  - Assess patient's functional ability and stability  - Promote increasing activity/tolerance for mobility and  gait  - Educate and engage patient/family in tolerated activity level and precautions  - Recommend use of  RW for transfers and ambulation  Outcome: Progressing  Patient A/O x1-2, up in the chair for meals, he walked with PT in hallway. Plan to discharge to [facility], wife to choose facility. She was at bedside thru out the day, updated on care plan.

## 2024-01-20 NOTE — PROGRESS NOTES
[facility]  [address] [place] [address], [address]  [facility] [address]  [phone]        INPATIENT NEUROLOGY   FOLLOW UP PROGRESS NOTE  [facility]    Fernando Ramos Jr. Patient Status:  Inpatient     1935 MRN [id number]    Location [facility] 5SW/SE Attending Woo Cartagena MD    Hosp Day # 2 PCP [facility]    Date of Admission:  2024  Date of Consult Follow Up:  2024     Assessment and Plan:   Fernando Ramos Jr. is a 88 year old  man w/ dementia and depression seen in follow up for parkinsonism. Plan was to start low dose carbidopa-levadopa, but it was not ordered. Pt denies significant sx of parkinsonism. He did not allow this author to the complete the exam. When checking for rigidity he abruptly yelled out \"will you stop it.\" He may have parkinson disease; cannot complete assessment to confirm.  Parkinson disease is a clinical diagnosis.  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  This is associated with drug-induced parkinsonism but the likelihood it would cause this is lower.  He also has Reglan ordered, which is anti-dopaminergic;  Will discontinue.  Will need to  monitor his behavior and irritability.  The addition of carbidopa levodopa may lead to hallucinations, orthostatic hypotension, and impulsivity.  Likelihood of adverse effects in the inpatient setting may be greater than a trial as an outpatient.  If there is concern for secondary parkinsonism also can consider outpatient DaTscan.  Will reevaluate patient on the morning of 2024 to determine if it is appropriate to start carbidopa levodopa.      Parkinsonism  Differential Diagnosis:  Primary Parkinson disease/idiopathic Parkinson disease with dementia  Secondary parkinsonism     Plan    Diagnostics:  Outpatient DaTscan   reversible dementia labs already been sent   we monitor his behavior/higher cognitive function; as long as the  patient does not appear to be developing delirium Will consider trial of Sinemet starting on 1/20/2024  Therapeutics:  Delirium precautions    will consider trial of Sinemet  Con donepezil for dementia  Neurochecks Q4                     INTERVAL EVENTS  01/19/24: Pending placement for subacute rehab.  Reevaluated this evening.       SUBJECTIVE:     Wife at bedside.  Provides majority of HPI.  She does not state the patient has dementia but states that he has memory deficits.  She reports that he does have an outpatient neurologist.  Per review of care everywhere patient was supposed to see a movement disorder neurologist at [facility] in 2021 but the visit was canceled.  Parkinson Review    Motor symptoms:   Tremor: yes  Dyskinesia:  no  Dystonia:  no  Slowness/stiffness:  yes  Hard time reaching behind:     Difficulty bending over:     Gait:    Balance / Falls:  yes  Freezing / Festination:  no  Difficulty getting out of a chair, car, or a bed:  no  Speech/Hypophonia:  no per spousse  Drooling / Chewing / Swallowing:  no   Diplopia:  no  Dystonia:     Fatigue:  yes  Eating Tasks / Dressing / Hygiene:     Handwriting / Micrographia:  no  Hobbies & Activities:      Non-motor symptoms:  Nocturia / Urinary Problems:  yes   Constipation:    Orthostasis / Autonomic Dysfunction:    Cognition:  yes   Fluctuations:  yes, ?  Hallucination:  no  Psychosis:  no  Driving:        Hypo- / Anosmia:  no  Pain: no    he abruptly yelled out \"will you stop it\" whil examinng pt         Pertinent positive and negatives per HPI.  All others were reviewed and negative.       Objective   OBJECTIVE:   Last vitals and weight :  Blood pressure (!) 169/69, pulse 68, temperature 97.8 °F (36.6 °C), temperature source Axillary, resp. rate 18, height 68\", weight 145 lb 3.2 oz (65.9 kg), SpO2 98%.   Vitals:    01/19/24 0500 01/19/24 1000 01/19/24 1100 01/19/24 1300   BP: 140/79 158/64 (!) 163/84 (!) 169/69   BP Location: Right arm Right arm   Right arm   Pulse: 66 62 75 68   Resp: 18 18  18   Temp: 97.8 °F (36.6 °C) 97.3 °F (36.3 °C)  97.8 °F (36.6 °C)   TempSrc: Axillary Axillary  Axillary   SpO2: 97% 98%  98%   Weight:       Height:          Exam:  - General: appears older than stated age, cachectic, combative, fatigued, no distress, and uncooperative    Neurologic Exam  - Mental Status: Alert and attentive. . He knew his age.  He knew the month.  He was irritable and abrupt.  He did not want to participate in exam.   Phrase length and rate are normal. No paraphasic errors, neologisms, anomia, neglect, apraxia, or R/L confusion.   - Cranial Nerves: No gaze preference. Visual fields: No saccadic breakdown.  No slow saccades.  Extraocular movements do not appear restricted.  Upgaze and downgaze are intact.. EOMI. No nystagmus. No ptosis. V1-V3 intact B/L to light touch.No pathological facial asymmetry. No flattening of the nasolabial fold. .  Hearing grossly intact.  Tongue midline. No atrophy or fasiculations of the tongue noted. Palate and uvula elevate symmetrically.  Shoulder shrug symmetric.  - Motor: Diffusely decreased bulk.  Questionable rigidity.  Could not accurately assess because patient did not allow exam to be completed.  No interosseous wasting. No flattening of hypothenar eminences.    Right Left     Motor Strength    5 5   Knee extensors 5 5     Tremor: He has a rest tremor.  - Sensory:   Light touch:normal      - Cerebellum: No truncal ataxia. No titubations.     Medications:   cefTRIAXone  1 g Intravenous Q24H    heparin  5,000 Units Subcutaneous 2 times per day    ARIPiprazole  2 mg Oral QOD    aspirin  81 mg Oral Daily    donepezil  10 mg Oral Nightly    melatonin  2 mg Oral Nightly    mirtazapine  15 mg Oral Nightly    polyethylene glycol (PEG 3350)  17 g Oral QOD    pravastatin  20 mg Oral Nightly    venlafaxine  37.5 mg Oral BID       PRNS: acetaminophen, ondansetron, metoclopramide    Infusions:          Results:   Laboratory  Data:  Lab Results   Component Value Date    WBC 3.7 (L) 01/19/2024    HGB 10.9 (L) 01/19/2024    HCT 32.7 (L) 01/19/2024    .0 01/19/2024    CREATSERUM 0.62 (L) 01/19/2024    BUN 6 (L) 01/19/2024     (H) 01/19/2024    K 4.1 01/19/2024     (H) 01/19/2024    CO2 30.0 01/19/2024    GLU 85 01/19/2024    CA 9.4 01/19/2024    ALB 3.4 01/19/2024    ALKPHO 97 01/17/2024    TP 7.2 01/17/2024    AST 29 01/17/2024    ALT 21 01/17/2024    INR 1.18 (H) 03/23/2023    PTP 14.9 (H) 03/23/2023    TSH 1.562 01/17/2024    LIP 48 09/21/2023    ESRML 26 (H) 05/03/2021    CRP 1.22 (H) 05/03/2021    MG 2.0 01/17/2024    PHOS 2.8 01/19/2024     03/14/2023    B12 427 01/17/2024     Recent Results (from the past 72 hour(s))   EKG 12 Lead    Collection Time: 01/17/24  9:53 AM   Result Value Ref Range    Ventricular rate 50 BPM    Atrial rate 50 BPM    P-R Interval 204 ms    QRS Duration 80 ms    Q-T Interval 476 ms    QTC Calculation (Bezet) 433 ms    P Axis 80 degrees    R Axis -7 degrees    T Axis -6 degrees   POCT Glucose    Collection Time: 01/17/24  9:56 AM   Result Value Ref Range    POC Glucose  105 (H) 70 - 99 mg/dL   Urinalysis, Routine    Collection Time: 01/17/24 10:26 AM   Result Value Ref Range    Urine Color Light-Yellow Yellow    Clarity Urine Turbid (A) Clear    Spec Gravity 1.010 1.005 - 1.030    Glucose Urine Normal Normal mg/dL    Bilirubin Urine Negative Negative    Ketones Urine Negative Negative mg/dL    Blood Urine 2+ (A) Negative    pH Urine 8.0 5.0 - 8.0    Protein Urine 20 (A) Negative mg/dL    Urobilinogen Urine Normal Normal    Nitrite Urine Negative Negative    Leukocyte Esterase Urine 500 (A) Negative    WBC Urine >50 (A) 0 - 5 /HPF    RBC Urine >10 (A) 0 - 2 /HPF    Bacteria Urine None Seen None Seen /HPF    Squamous Epi. Cells None Seen None Seen /HPF    Renal Tubular Epithelial Cells None Seen None Seen /HPF    Transitional Cells None Seen None Seen /HPF    Yeast Urine Present (A)  None Seen /HPF   SARS-CoV-2/Flu A and B/RSV by PCR (GeneXpert)    Collection Time: 01/17/24 10:26 AM    Specimen: Nares; Other   Result Value Ref Range    SARS-CoV-2 (COVID-19) - (GeneXpert) Not Detected Not Detected    Influenza A by PCR Negative Negative    Influenza B by PCR Negative Negative    RSV by PCR Negative Negative   Comp Metabolic Panel (14)    Collection Time: 01/17/24 10:45 AM   Result Value Ref Range    Glucose 111 (H) 70 - 99 mg/dL    Sodium 137 136 - 145 mmol/L    Potassium 4.5 3.5 - 5.1 mmol/L    Chloride 108 98 - 112 mmol/L    CO2 28.0 21.0 - 32.0 mmol/L    Anion Gap 1 0 - 18 mmol/L    BUN 9 9 - 23 mg/dL    Creatinine 0.71 0.70 - 1.30 mg/dL    BUN/CREA Ratio 12.7 10.0 - 20.0    Calcium, Total 10.1 8.7 - 10.4 mg/dL    Calculated Osmolality 283 275 - 295 mOsm/kg    eGFR-Cr 89 >=60 mL/min/1.73m2    ALT 21 10 - 49 U/L    AST 29 <=34 U/L    Alkaline Phosphatase 97 45 - 117 U/L    Bilirubin, Total 0.6 0.2 - 1.1 mg/dL    Total Protein 7.2 5.7 - 8.2 g/dL    Albumin 3.9 3.2 - 4.8 g/dL    Globulin  3.3 2.8 - 4.4 g/dL    A/G Ratio 1.2 1.0 - 2.0   Magnesium    Collection Time: 01/17/24 10:45 AM   Result Value Ref Range    Magnesium 2.0 1.6 - 2.6 mg/dL   Troponin I (High Sensitivity)    Collection Time: 01/17/24 10:45 AM   Result Value Ref Range    Troponin I (High Sensitivity) 20 <=53 ng/L   TSH W Reflex To Free T4    Collection Time: 01/17/24 10:45 AM   Result Value Ref Range    TSH 1.562 0.550 - 4.780 mIU/mL   Vitamin B12    Collection Time: 01/17/24 10:45 AM   Result Value Ref Range    Vitamin B12 427 211 - 911 pg/mL   CBC W/ DIFFERENTIAL    Collection Time: 01/17/24 11:13 AM   Result Value Ref Range    WBC 9.2 4.0 - 11.0 x10(3) uL    RBC 3.87 3.80 - 5.80 x10(6)uL    HGB 12.7 (L) 13.0 - 17.5 g/dL    HCT 39.2 39.0 - 53.0 %    .3 (H) 80.0 - 100.0 fL    MCH 32.8 26.0 - 34.0 pg    MCHC 32.4 31.0 - 37.0 g/dL    RDW-SD 47.6 (H) 35.1 - 46.3 fL    RDW 12.9 11.0 - 15.0 %    .0 150.0 - 450.0 10(3)uL     Neutrophil Absolute Prelim 7.40 1.50 - 7.70 x10 (3) uL    Neutrophil Absolute 7.40 1.50 - 7.70 x10(3) uL    Lymphocyte Absolute 0.83 (L) 1.00 - 4.00 x10(3) uL    Monocyte Absolute 0.82 0.10 - 1.00 x10(3) uL    Eosinophil Absolute 0.04 0.00 - 0.70 x10(3) uL    Basophil Absolute 0.03 0.00 - 0.20 x10(3) uL    Immature Granulocyte Absolute 0.04 0.00 - 1.00 x10(3) uL    Neutrophil % 80.8 %    Lymphocyte % 9.1 %    Monocyte % 9.0 %    Eosinophil % 0.4 %    Basophil % 0.3 %    Immature Granulocyte % 0.4 %   Blood Culture    Collection Time: 01/17/24  2:13 PM    Specimen: Blood,peripheral   Result Value Ref Range    Blood Culture Result No Growth 2 Days    Blood Culture    Collection Time: 01/17/24  2:13 PM    Specimen: Blood,peripheral   Result Value Ref Range    Blood Culture Result No Growth 2 Days    Lactic Acid, Plasma    Collection Time: 01/17/24  2:13 PM   Result Value Ref Range    Lactic Acid 4.5 (HH) 0.5 - 2.0 mmol/L   Lactic Acid Reflex Post Positive    Collection Time: 01/17/24  5:19 PM   Result Value Ref Range    Lactic Acid 3.5 (H) 0.5 - 2.0 mmol/L   Lactic Acid Post Positive    Collection Time: 01/17/24  8:14 PM   Result Value Ref Range    Lactic Acid 3.3 (H) 0.5 - 2.0 mmol/L   Basic Metabolic Panel (8)    Collection Time: 01/18/24  5:38 AM   Result Value Ref Range    Glucose 87 70 - 99 mg/dL    Sodium 145 136 - 145 mmol/L    Potassium 4.5 3.5 - 5.1 mmol/L    Chloride 112 98 - 112 mmol/L    CO2 28.0 21.0 - 32.0 mmol/L    Anion Gap 5 0 - 18 mmol/L    BUN 11 9 - 23 mg/dL    Creatinine 0.72 0.70 - 1.30 mg/dL    BUN/CREA Ratio 15.3 10.0 - 20.0    Calcium, Total 9.4 8.7 - 10.4 mg/dL    Calculated Osmolality 299 (H) 275 - 295 mOsm/kg    eGFR-Cr 88 >=60 mL/min/1.73m2   Lactic Acid, Plasma    Collection Time: 01/18/24  5:38 AM   Result Value Ref Range    Lactic Acid 0.9 0.5 - 2.0 mmol/L   CBC W/ DIFFERENTIAL    Collection Time: 01/18/24  5:38 AM   Result Value Ref Range    WBC 5.0 4.0 - 11.0 x10(3) uL    RBC 3.20  (L) 3.80 - 5.80 x10(6)uL    HGB 10.8 (L) 13.0 - 17.5 g/dL    HCT 32.6 (L) 39.0 - 53.0 %    .9 (H) 80.0 - 100.0 fL    MCH 33.8 26.0 - 34.0 pg    MCHC 33.1 31.0 - 37.0 g/dL    RDW-SD 49.5 (H) 35.1 - 46.3 fL    RDW 13.2 11.0 - 15.0 %    .0 150.0 - 450.0 10(3)uL    Neutrophil Absolute Prelim 2.75 1.50 - 7.70 x10 (3) uL    Neutrophil Absolute 2.75 1.50 - 7.70 x10(3) uL    Lymphocyte Absolute 1.39 1.00 - 4.00 x10(3) uL    Monocyte Absolute 0.64 0.10 - 1.00 x10(3) uL    Eosinophil Absolute 0.13 0.00 - 0.70 x10(3) uL    Basophil Absolute 0.03 0.00 - 0.20 x10(3) uL    Immature Granulocyte Absolute 0.02 0.00 - 1.00 x10(3) uL    Neutrophil % 55.5 %    Lymphocyte % 28.0 %    Monocyte % 12.9 %    Eosinophil % 2.6 %    Basophil % 0.6 %    Immature Granulocyte % 0.4 %   Renal Function Panel    Collection Time: 01/19/24  5:13 AM   Result Value Ref Range    Glucose 85 70 - 99 mg/dL    Sodium 146 (H) 136 - 145 mmol/L    Potassium 4.1 3.5 - 5.1 mmol/L    Chloride 113 (H) 98 - 112 mmol/L    CO2 30.0 21.0 - 32.0 mmol/L    Anion Gap 3 0 - 18 mmol/L    BUN 6 (L) 9 - 23 mg/dL    Creatinine 0.62 (L) 0.70 - 1.30 mg/dL    BUN/CREA Ratio 9.7 (L) 10.0 - 20.0    Calcium, Total 9.4 8.7 - 10.4 mg/dL    Calculated Osmolality 299 (H) 275 - 295 mOsm/kg    eGFR-Cr 93 >=60 mL/min/1.73m2    Albumin 3.4 3.2 - 4.8 g/dL    Phosphorus 2.8 2.4 - 5.1 mg/dL   CBC W/ DIFFERENTIAL    Collection Time: 01/19/24  5:13 AM   Result Value Ref Range    WBC 3.7 (L) 4.0 - 11.0 x10(3) uL    RBC 3.19 (L) 3.80 - 5.80 x10(6)uL    HGB 10.9 (L) 13.0 - 17.5 g/dL    HCT 32.7 (L) 39.0 - 53.0 %    .5 (H) 80.0 - 100.0 fL    MCH 34.2 (H) 26.0 - 34.0 pg    MCHC 33.3 31.0 - 37.0 g/dL    RDW-SD 49.4 (H) 35.1 - 46.3 fL    RDW 13.1 11.0 - 15.0 %    .0 150.0 - 450.0 10(3)uL    Neutrophil Absolute Prelim 1.76 1.50 - 7.70 x10 (3) uL    Neutrophil Absolute 1.76 1.50 - 7.70 x10(3) uL    Lymphocyte Absolute 1.35 1.00 - 4.00 x10(3) uL    Monocyte Absolute 0.43 0.10 -  1.00 x10(3) uL    Eosinophil Absolute 0.16 0.00 - 0.70 x10(3) uL    Basophil Absolute 0.03 0.00 - 0.20 x10(3) uL    Immature Granulocyte Absolute 0.01 0.00 - 1.00 x10(3) uL    Neutrophil % 47.0 %    Lymphocyte % 36.1 %    Monocyte % 11.5 %    Eosinophil % 4.3 %    Basophil % 0.8 %    Immature Granulocyte % 0.3 %         Test results/Imaging:   CT BRAIN OR HEAD (50978)    Result Date: 1/17/2024  CONCLUSION: No acute finding    Dictated by (CST): Ryne Moulton MD on 1/17/2024 at 1:45 PM     Finalized by (CST): Ryne Moulton MD on 1/17/2024 at 2:03 PM                Performed an independent visualization of  head ct   Imaging revealed:   татьяна garcia read     Education/Instructions given to: patient   Barriers to Learning:None  Content: Refer to note above. Evaluation/Outcome: Verbalized understanding    Disclaimer:   This record was dictated using Dragon software. There may be errors due to voice recognition problems that were not realized and corrected during the completion of the note.      This document is not intended to support charting by exception.  Sections left blank in a completed note should be presumed not to have been done.     Total time involved in care the patient occluding reviewing the prior notes, discussing the case with the nurse, writing the note, and face to face time was  35 minutes, more than 50% of the time was spent in counseling and/or coordination of care related to  parkinsonism.    Thank you.  Pete Llamas D.O.   Vascular & General Neurology    1/19/2024  8:18 PM

## 2024-01-20 NOTE — CONSULTS
[facility]    Report of Consultation    Fernando Ramos Jr. Patient Status:  Inpatient    1935 MRN [id number]   Location [facility] 5SW/SE Attending Woo Cartagena MD   Hosp Day # 3 PCP [facility]     Date of Admission:  2024  Date of Consult: 2024  Reason for Consultation:   Patient presented with increased confusion and history of depression, Woo Brown MD requested psychiatric consult for evaluation and advice.    Consult Duration     The patient seen for initial psychiatric consult evaluation.   Record reviewed, communication with attending, communication with RN and patient seen face to face evaluation.    History of Present Illness:   Patient is a 88 year old   male with history of osteoarthritis, HTN, hyperlipidemia, BPH and frequent UTI with history of depression who presented to the hospital for another UTI.  Patient in the medical floor with some confusion indicated psych consult for evaluation and advice.    The patient is seen today in the presence of his wife who seem very attentive to his medical needs.  The patient laying down in her recliner with noticeable tremor somewhat disheveled trying to eat his yogurt with noticeable coordination difficulty.    Patient and wife admitted long-term history of depression and the patient has been on mirtazapine and aripiprazole.  They understand that he has a parkinsonian possibly resulted from medication.  Otherwise his wife who reported that patient have severe episode of depression hopelessness and suicidality and she had difficulty understanding worried that, from because he is generally a pleasant person but feels the medication has made significant shift and worry about changing them.    In meantime the patient presented the pleasant with no sign of hopelessness or helplessness reporting he is feeling happy and he is content.    The patient admitted that he is forgetful  sometime but according to his wife it is on and off not consistent.  Patient reporting some difficulty sleeping at time otherwise today denying any hallucination, delusional ideation or any bizarre behavior.    Wife indicating that maybe he need follow-up outpatient when he is in more stable to place otherwise there is no agitation and no indication for immediate need for medication changes.    Past Psychiatric/Medication History:  1. Prior diagnoses: History of depression  2. Past psychiatric inpatient: Few psychiatric admission in the past none recently  3. Past outpatient history: He take his medication from his primary physician  4. Past suicide history: Previous suicidal ideation far in the past  5. Medication history: Mirtazapine and Abilify    Social History:   Marital status:   Living Situation: Lives at home with his wife  Education/Occupation: Retired  ETOH: (H/O withdrawal and Rehab): No history of alcohol  Substance: No history of substance      Family History:  No psychiatric family history  Medical History:   Past Medical History  Past Medical History:   Diagnosis Date    Anxiety state     BPH (benign prostatic hyperplasia)     Dementia (HCC)     Depression     Dysphagia     Febrile illness 02/13/2021    High blood pressure     Kidney stones     Other and unspecified hyperlipidemia     Pneumonia due to organism     Seizure disorder (HCC)     because of venalfaxine    Skin cancer     melanoma    Unspecified essential hypertension        Past Surgical History  Past Surgical History:   Procedure Laterality Date    EGD  05/23/2022    ESOPHAGOGASTRODUODENOSCOPY with Botox injection    EGD  07/23/2021    Large food impaction in the mid and distal esophagus    HEMIARTHROPLASTY HIP Right 03/22/2022    Right Cemented Hemiarthroplasty    LITHOTRIPSY      TRANSURETHRAL DESTRUCTION, PROSTATE TISSUE; WATER-INDUC         Family History  Family History   Problem Relation Age of Onset    Alcohol and Other  Disorders Associated Father     Heart Disorder Father     Depression Father     Heart Disorder Mother        Social History  Social History     Socioeconomic History    Marital status:    Tobacco Use    Smoking status: Former     Years: 3     Types: Cigarettes     Quit date: 1960     Years since quittin.4    Smokeless tobacco: Never   Vaping Use    Vaping Use: Never used   Substance and Sexual Activity    Alcohol use: Yes     Comment: occasionally    Drug use: Never     Social Determinants of Health     Food Insecurity: No Food Insecurity (2024)    Food Insecurity     Food Insecurity: Never true   Transportation Needs: No Transportation Needs (2024)    Transportation Needs     Lack of Transportation: No   Housing Stability: Low Risk  (2024)    Housing Stability     Housing Instability: No           Current Medications:  Current Facility-Administered Medications   Medication Dose Route Frequency    cefTRIAXone (Rocephin) 1 g in D5W 100 mL IVPB-ADD  1 g Intravenous Q24H    heparin (Porcine) 5000 UNIT/ML injection 5,000 Units  5,000 Units Subcutaneous 2 times per day    acetaminophen (Tylenol Extra Strength) tab 500 mg  500 mg Oral Q4H PRN    ondansetron (Zofran) 4 MG/2ML injection 4 mg  4 mg Intravenous Q6H PRN    metoclopramide (Reglan) 5 mg/mL injection 10 mg  10 mg Intravenous Q8H PRN    ARIPiprazole (Abilify) tab 2 mg  2 mg Oral QOD    aspirin DR tab 81 mg  81 mg Oral Daily    donepezil (Aricept) tab 10 mg  10 mg Oral Nightly    melatonin tab 2 mg  2 mg Oral Nightly    mirtazapine (REMERON SOL-TAB) disintegrating tab 15 mg  15 mg Oral Nightly    polyethylene glycol (PEG 3350) (Miralax) 17 g oral packet 17 g  17 g Oral QOD    pravastatin (Pravachol) tab 20 mg  20 mg Oral Nightly    venlafaxine (Effexor) tab 37.5 mg  37.5 mg Oral BID     Medications Prior to Admission   Medication Sig    methenamine 1 g Oral Tab Take 1 tablet (1 g total) by mouth 2 (two) times daily.    donepezil 10  MG Oral Tab Take 1 tablet (10 mg total) by mouth nightly. TAKE ONE TABLET BY MOUTH EVERY MORNING    venlafaxine 37.5 MG Oral Tab Take 1 tablet (37.5 mg total) by mouth 2 (two) times daily.    hydrOXYzine Pamoate 25 MG Oral Cap hydroxyzine pamoate 25 mg capsule   TAKE ONE CAPSULE BY MOUTH DAILY AS NEEDED    polyethylene glycol, PEG 3350, 17 g Oral Powd Pack Take 17 g by mouth every other day.    acetaminophen 500 MG Oral Tab Take 2 tablets (1,000 mg total) by mouth every 8 (eight) hours as needed for Pain.    melatonin 1 MG Oral Tab Take 2 tablets (2 mg total) by mouth nightly.    ARIPiprazole 2 MG Oral Tab Take 1 tablet (2 mg total) by mouth every other day.    Cholecalciferol (VITAMIN D) 50 MCG (2000 UT) Oral Tab Take by mouth nightly.    aspirin 81 MG Oral Tab Take 1 tablet (81 mg total) by mouth daily.    mirtazapine 15 MG Oral Tab TAKE ONE TABLET BY MOUTH AT BEDTIME    Coenzyme Q10 (COQ-10) 100 MG Oral Cap Take 100 mg by mouth daily.    Simvastatin 40 MG Oral Tab Take 1 tablet (40 mg total) by mouth nightly.    [] cephalexin 500 MG Oral Cap Take 1 capsule (500 mg total) by mouth 3 (three) times daily for 7 days.       Allergies  No Known Allergies    Review of Systems:   As by Admitting/Attending    Results:   Laboratory Data:  Lab Results   Component Value Date    WBC 3.7 (L) 2024    HGB 10.9 (L) 2024    HCT 32.7 (L) 2024    .0 2024    CREATSERUM 0.62 (L) 2024    BUN 6 (L) 2024     (H) 2024    K 4.1 2024     (H) 2024    CO2 30.0 2024    GLU 85 2024    CA 9.4 2024    ALB 3.4 2024    ALKPHO 97 2024    TP 7.2 2024    AST 29 2024    ALT 21 2024    INR 1.18 (H) 2023    PTP 14.9 (H) 2023    TSH 1.562 2024    LIP 48 2023    ESRML 26 (H) 2021    CRP 1.22 (H) 2021    MG 2.0 2024    PHOS 2.8 2024     2023    B12 427 2024          Imaging:  CT BRAIN OR HEAD (96818)    Result Date: 1/17/2024  CONCLUSION: No acute finding    Dictated by (CST): Ryne Moulton MD on 1/17/2024 at 1:45 PM     Finalized by (CST): Ryne Moulton MD on 1/17/2024 at 2:03 PM          XR CHEST AP PORTABLE  (CPT=71045)    Result Date: 1/17/2024  CONCLUSION:  1. No acute airspace disease.  Minimal left basal scarring/atelectasis.    Dictated by (CST): Mehul Rondon MD on 1/17/2024 at 11:56 AM     Finalized by (CST): Mehul Rondon MD on 1/17/2024 at 12:01 PM           Vital Signs:   Blood pressure (!) 161/70, pulse 67, temperature 98.1 °F (36.7 °C), temperature source Oral, resp. rate 18, height 68\", weight 65.9 kg (145 lb 3.2 oz), SpO2 93%.    Mental Status Exam:   Appearance: Stated age male, in hospital gown, laying down in his recliner  Psychomotor: No psychomotor agitation, or retardation.  Noticeable tremors, restlessness and akathisia.  Orientation: Alert and oriented to person, place, time and somewhat to condition.  Gait: Not evaluated.  Attitude/Coorperation: Cooperative and attentive.  Behavior: Difficulty engaging otherwise pleasant and smiling  Speech: Scattered speech with some limitation.  Mood: Patient reporting feeling good.  Affect: Congruent with the mood.  She presented with a smile  Thought process: Furtive speech.  Thought content: Patient denies any suicidal or homicidal ideation.  Perceptions: Patient denies any auditory or visual hallucinations.  Patient has been demonstrating some alternation in his cognition  Concentration: Grossly distracted  Memory: Grossly moderate impairment  Intellect: Average.  Judgment and Insight: Questionable.     Impression:     Delirium due to another medical condition.  Major depressive disorder, recurrent in remission.  Neuroleptic induced parkinsonian.  Urinary tract infection without hematuria, site unspecified  Weakness      The patient is an 88-year-old   male with a chronic history of  depression with multiple medical condition who has been on psychotropic medication for many years and has discover induced parkinsonian.  Patient has been diagnosed with some dementia otherwise brain scan is negative.  According to wife his cognition fluctuate indicate the possibility of some vascular dementia.  Otherwise the patient has been demonstrating some confusion during this admission which is common in his condition especially with a frequent UTI.  There is some improvement today.    There is no agitation and no indication for medication adjustment but patient will benefit from outpatient follow-up.    Discussed risk and benefit, acknowledging the current symptom and severity.  At this point, I would recommend the following approach:     Focus on safety  Focus on education and support.  Focus on insight orientation helping the patient understand diagnosis and treatment plan.  Continue Abilify 2 mg daily.  Continue Remeron 15 mg nightly.  Start vitamin E 400 unit daily  Coordinate plan with team    Orders This Visit:  Orders Placed This Encounter   Procedures    Urinalysis, Routine    CBC With Differential With Platelet    Comp Metabolic Panel (14)    Magnesium    Troponin I (High Sensitivity)    TSH W Reflex To Free T4    Lactic Acid, Plasma    Basic Metabolic Panel (8)    CBC With Differential With Platelet    Vitamin B12    Lactic Acid Reflex Post Positive    Lactic Acid Post Positive    Lactic Acid, Plasma    CBC With Differential With Platelet    Renal Function Panel    SARS-CoV-2/Flu A and B/RSV by PCR (GeneXpert)    Blood Culture       Meds This Visit:  Requested Prescriptions      No prescriptions requested or ordered in this encounter       Pop Ruffin MD  1/20/2024    This note was prepared using Dragon Medical voice recognition dictation software and as a result, errors may occur. When identified, these errors have been corrected. While every attempt is made to correct errors during  dictation, discrepancies may still exist.

## 2024-01-20 NOTE — PLAN OF CARE
Problem: SAFETY ADULT - FALL  Goal: Free from fall injury  Description: INTERVENTIONS:  - Assess pt frequently for physical needs  - Identify cognitive and physical deficits and behaviors that affect risk of falls.  - [place] fall precautions as indicated by assessment.  - Educate pt/family on patient safety including physical limitations  - Instruct pt to call for assistance with activity based on assessment  - Modify environment to reduce risk of injury  - Provide assistive devices as appropriate  - Consider OT/PT consult to assist with strengthening/mobility  - Encourage toileting schedule  Outcome: Progressing     Problem: SKIN/TISSUE INTEGRITY - ADULT  Goal: Skin integrity remains intact  Description: INTERVENTIONS  - Assess and document risk factors for pressure ulcer development  - Assess and document skin integrity  - Monitor for areas of redness and/or skin breakdown  - Initiate interventions, skin care algorithm/standards of care as needed  Outcome: Progressing  Goal: Incision(s), wounds(s) or drain site(s) healing without S/S of infection  Description: INTERVENTIONS:  - Assess and document risk factors for pressure ulcer development  - Assess and document skin integrity  - Assess and document dressing/incision, wound bed, drain sites and surrounding tissue  - Implement wound care per orders  - Initiate isolation precautions as appropriate  - Initiate Pressure Ulcer prevention bundle as indicated  Outcome: Progressing  Goal: Oral mucous membranes remain intact  Description: INTERVENTIONS  - Assess oral mucosa and hygiene practices  - Implement preventative oral hygiene regimen  - Implement oral medicated treatments as ordered  Outcome: Progressing     Problem: MUSCULOSKELETAL - ADULT  Goal: Return mobility to safest level of function  Description: INTERVENTIONS:  - Assess patient stability and activity tolerance for standing, transferring and ambulating w/ or w/o assistive devices  - Assist with  transfers and ambulation using safe patient handling equipment as needed  - Ensure adequate protection for wounds/incisions during mobilization  - Obtain PT/OT consults as needed  - Advance activity as appropriate  - Communicate ordered activity level and limitations with patient/family  Outcome: Progressing  Goal: Maintain proper alignment of affected body part  Description: INTERVENTIONS:  - Support and protect limb and body alignment per provider's orders  - Instruct and reinforce with patient and family use of appropriate assistive device and precautions (e.g. spinal or hip dislocation precautions)  Outcome: Progressing     Problem: NEUROLOGICAL - ADULT  Goal: Achieves stable or improved neurological status  Description: INTERVENTIONS  - Assess for and report changes in neurological status  - Initiate measures to prevent increased intracranial pressure  - Maintain blood pressure and fluid volume within ordered parameters to optimize cerebral perfusion and minimize risk of hemorrhage  - Monitor temperature, glucose, and sodium. Initiate appropriate interventions as ordered  Outcome: Progressing     Problem: Impaired Functional Mobility  Goal: Achieve highest/safest level of mobility/gait  Description: Interventions:  - Assess patient's functional ability and stability  - Promote increasing activity/tolerance for mobility and gait  - Educate and engage patient/family in tolerated activity level and precautions  - Recommend use of  RW for transfers and ambulation  Outcome: Progressing    Monitoring vitals. Spouse at bedside. No complaints of pain. Continues on IV antibiotic for UTI. Fall precautions in place. Frequent rounding by nursing staff.

## 2024-01-20 NOTE — CM/SW NOTE
Expected Discharge Plan:    Destination: Subacute Rehab:Referrals pending      Current Barriers to d/c: Medical Clearance;    Pt/Family aware of plan: Yes, wife Serena Willis Staff aware of dc plan: Patient discussed in care rounds.  Pt from home w/wife.  Pt presented for falls.  Pt has dementia & Parkinsons.  Dx: UTI, weakness    CM confirmed w/wife the following;  Active Problems:    Weakness   HOME SITUATION  Home Situation  Type of Home: Condo  Home Layout: One level  Lives With: Spouse  Drives: No  Patient Owned Equipment: Wheelchair  Patient Regularly Uses: Glasses      Prior Level of [place]: Lives at home with supportive spouse assist for all mobility and ADL support uses a W/C and RW.      Pt has hx at [facility] at [facility].  Also, at [facility] .    CM requested department  ([facility]) to initiate [facility] referral for [facility].     PAS[facility] *Queued for review*    Discharge Planner Follow up Needed:  [facility] LIST/CHOICE  PAS[facility]  3 INPT MIDNIGHT STAY    DC planner/CM to remain available for support and/or discharge planning.    Jazmyn Martin RN, [facility]  DC PLANNER / RN Case Manager  Ext.  [phone]

## 2024-01-20 NOTE — PROGRESS NOTES
[facility]  [address] [place] [address], [address]  [facility] [address]  [phone]        INPATIENT NEUROLOGY   FOLLOW UP PROGRESS NOTE  [facility]    Fernando Ramos Jr. Patient Status:  Inpatient     1935 MRN [id number]    Location [facility] 5SW/SE Attending Woo Cartagena MD    Hosp Day # 5 PCP CAN HALE    Date of Admission:  2024  Date of Consult Follow Up:  2024     Assessment and Plan:   Fernando Ramos Jr. is a 88 year old man w/ achalasia, dementia and depression seen in follow up for parkinsonism. Pt denies significant sx of parkinsonism. He did not allow this author to the complete the exam. When checking for rigidity he abruptly yelled out \"will you stop it.\" He may have parkinson disease; cannot complete assessment to confirm.  Parkinson disease is a clinical diagnosis.  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  This is associated with drug-induced parkinsonism but the likelihood it would cause this is lower.  He also has Reglan ordered, which is anti-dopaminergic and was discontinued.  Will trial sinemet and will need to  monitor his behavior and irritability.  The addition of carbidopa levodopa may lead to hallucinations, orthostatic hypotension, and impulsivity.  Likelihood of adverse effects in the inpatient setting may be greater than a trial as an outpatient.  If there is concern for secondary parkinsonism also can consider outpatient DaTscan.       Parkinsonism  Differential Diagnosis:  Primary Parkinson disease with comorbid dementia; since the dementia preceded the parkinsonism that he may have dementia with Lewy bodies.  Where he may have Alzheimer's dementia and Parkinson disease.  Lastly he could have Alzheimer's dementia and parkinsonism but not idiopathic Parkinson disease.  Secondary parkinsonism     Plan    Diagnostics:  Monitor for worsening behavior, sx of orthostatic  intolerance, hallucinations, impulsivity  Serial neurological exams as allowed by patient  Therapeutics:  Start carbidopa levodopa .  Start at 0.5 tabs 3 times a day at 7 AM, noon, and 5 PM.  After 2 weeks increase dose to 1 tab 3 times daily.  Delirium precautions  Neurochecks Q4    2.  Dementia  Continue Aricept 10 mg daily.  Consider Namenda but patient already has a diagnosis of bradycardia.  Pulse as low as 52.  RSF can artery cause bradycardia.  Will avoid adding Namenda.             INTERVAL EVENTS  01/20/24: No significant events.  Still refusing much of his care.       SUBJECTIVE:     Denies being any pain.  Willing to participate in part of the exam.  However after some initial testing he says that is enough.\"  Pertinent positive and negatives per HPI.  All others were reviewed and negative.       Objective   OBJECTIVE:   Last vitals and weight :  Blood pressure 152/64, pulse 55, temperature 98.2 °F (36.8 °C), temperature source Oral, resp. rate 16, height 68\", weight 145 lb 3.2 oz (65.9 kg), SpO2 98%.   Vitals:    01/21/24 1457 01/21/24 2118 01/22/24 0546 01/22/24 0834   BP: 134/68 151/74 152/86 152/64   BP Location: Right arm Left arm Right arm Right arm   Pulse: 52 58 54 55   Resp: 16 17 18 16   Temp: 97.8 °F (36.6 °C) 98.3 °F (36.8 °C) 98.2 °F (36.8 °C) 98.2 °F (36.8 °C)   TempSrc: Oral Oral Oral Oral   SpO2: 99% 98% 96% 98%   Weight:       Height:          Exam:  - General: appears older than stated age, cachectic, and no distress    Neurologic Exam  - Mental Status: Alert and attentive.  Oriented to self.  He is perseverating.  He told me that he is 84 years old (he is 88).  He says the month is also 83.  Speaks in short phrases.  He is irritable.  He allows a limited exam today.  - Cranial Nerves: No gaze preference. Visual fields: Blink to threat intact.  No saccadic breakdown.  Vertical gaze intact.. EOMI. No nystagmus. No ptosis. V1-V3 intact B/L to light touch.bitemporal atrophy.  No  pathological facial asymmetry. No flattening of the nasolabial fold. .  Hearing grossly intact.  Tongue midline. No atrophy or fasiculations of the tongue noted. Palate and uvula elevate symmetrically.  Shoulder shrug symmetric.  - Motor: He has rigidity.  There is minimal rigidity.  Diffusely bulk. No interosseous wasting. No flattening of hypothenar eminences.    Right Left     Motor Strength    5 5   Knee extensors 5 5    Pronator drift: No pronator drift   Index finger rolling: No orbiting.   Finger Taps: Finger taps are very small and bradykinetic.  May be worse on the right compared to the left.   Rapid movements: Rapid/fine movements are symmetric. As expected their dominant hand is slightly faster.   Leg Drift: none    Asterixis: No asterixis noted.   Tremor: He has a rest tremor that is present bilaterally.  - Sensory:   Light touch:normal   - Cerebellum: No truncal ataxia. No titubations.  No dysmetria.      Medications:   hydrALAzine  5 mg Intravenous Once    vitamin E  400 Units Oral Daily    carbidopa-levodopa  0.5 tablet Oral TID    [START ON 2/3/2024] carbidopa-levodopa  1 tablet Oral TID    cefTRIAXone  1 g Intravenous Q24H    heparin  5,000 Units Subcutaneous 2 times per day    ARIPiprazole  2 mg Oral QOD    aspirin  81 mg Oral Daily    donepezil  10 mg Oral Nightly    melatonin  2 mg Oral Nightly    mirtazapine  15 mg Oral Nightly    polyethylene glycol (PEG 3350)  17 g Oral QOD    pravastatin  20 mg Oral Nightly    venlafaxine  37.5 mg Oral BID       PRNS: acetaminophen, ondansetron    Infusions:          Results:   Laboratory Data:  Lab Results   Component Value Date    WBC 4.2 01/21/2024    HGB 11.4 (L) 01/21/2024    HCT 35.6 (L) 01/21/2024    .0 01/21/2024    CREATSERUM 0.72 01/21/2024    BUN 12 01/21/2024     01/21/2024    K 4.0 01/21/2024     01/21/2024    CO2 29.0 01/21/2024    GLU 95 01/21/2024    CA 9.6 01/21/2024    ALB 3.7 01/21/2024    ALKPHO 97 01/17/2024    TP  7.2 01/17/2024    AST 29 01/17/2024    ALT 21 01/17/2024    INR 1.18 (H) 03/23/2023    PTP 14.9 (H) 03/23/2023    TSH 1.562 01/17/2024    LIP 48 09/21/2023    ESRML 26 (H) 05/03/2021    CRP 1.22 (H) 05/03/2021    MG 2.2 01/21/2024    PHOS 3.5 01/21/2024     03/14/2023    B12 427 01/17/2024     Recent Results (from the past 72 hour(s))   Renal Function Panel    Collection Time: 01/21/24  6:30 AM   Result Value Ref Range    Glucose 95 70 - 99 mg/dL    Sodium 142 136 - 145 mmol/L    Potassium 4.0 3.5 - 5.1 mmol/L    Chloride 108 98 - 112 mmol/L    CO2 29.0 21.0 - 32.0 mmol/L    Anion Gap 5 0 - 18 mmol/L    BUN 12 9 - 23 mg/dL    Creatinine 0.72 0.70 - 1.30 mg/dL    BUN/CREA Ratio 16.7 10.0 - 20.0    Calcium, Total 9.6 8.7 - 10.4 mg/dL    Calculated Osmolality 294 275 - 295 mOsm/kg    eGFR-Cr 88 >=60 mL/min/1.73m2    Albumin 3.7 3.2 - 4.8 g/dL    Phosphorus 3.5 2.4 - 5.1 mg/dL   Magnesium    Collection Time: 01/21/24  6:30 AM   Result Value Ref Range    Magnesium 2.2 1.6 - 2.6 mg/dL   CBC W/ DIFFERENTIAL    Collection Time: 01/21/24  6:30 AM   Result Value Ref Range    WBC 4.2 4.0 - 11.0 x10(3) uL    RBC 3.46 (L) 3.80 - 5.80 x10(6)uL    HGB 11.4 (L) 13.0 - 17.5 g/dL    HCT 35.6 (L) 39.0 - 53.0 %    .9 (H) 80.0 - 100.0 fL    MCH 32.9 26.0 - 34.0 pg    MCHC 32.0 31.0 - 37.0 g/dL    RDW-SD 50.5 (H) 35.1 - 46.3 fL    RDW 13.3 11.0 - 15.0 %    .0 150.0 - 450.0 10(3)uL    Neutrophil Absolute Prelim 1.84 1.50 - 7.70 x10 (3) uL    Neutrophil Absolute 1.84 1.50 - 7.70 x10(3) uL    Lymphocyte Absolute 1.55 1.00 - 4.00 x10(3) uL    Monocyte Absolute 0.49 0.10 - 1.00 x10(3) uL    Eosinophil Absolute 0.27 0.00 - 0.70 x10(3) uL    Basophil Absolute 0.03 0.00 - 0.20 x10(3) uL    Immature Granulocyte Absolute 0.02 0.00 - 1.00 x10(3) uL    Neutrophil % 43.8 %    Lymphocyte % 36.9 %    Monocyte % 11.7 %    Eosinophil % 6.4 %    Basophil % 0.7 %    Immature Granulocyte % 0.5 %         Test results/Imaging:   CT BRAIN  OR HEAD ([id number])    Result Date: 1/17/2024  CONCLUSION: No acute finding    Dictated by (CST): Ryne Moulton MD on 1/17/2024 at 1:45 PM     Finalized by (CST): Ryne Moulton MD on 1/17/2024 at 2:03 PM                Performed an independent visualization of head CT from 1/17/2024  Imaging revealed: Agree with read  Education/Instructions given to: patient   Barriers to Learning:None  Content: Refer to note above. Evaluation/Outcome: Verbalized understanding    Disclaimer:   This record was dictated using Dragon software. There may be errors due to voice recognition problems that were not realized and corrected during the completion of the note.      This document is not intended to support charting by exception.  Sections left blank in a completed note should be presumed not to have been done.     Total face to face time was 35 minutes, more than 50% of the time was spent in counseling and/or coordination of care related to parkinsonism and dementia.    Thank you.  Pete Llamas D.O.   Vascular & General Neurology    1/20/2024  4:01 PM

## 2024-01-21 LAB
ALBUMIN SERPL-MCNC: 3.7 G/DL (ref 3.2–4.8)
ANION GAP SERPL CALC-SCNC: 5 MMOL/L (ref 0–18)
BASOPHILS # BLD AUTO: 0.03 X10(3) UL (ref 0–0.2)
BASOPHILS NFR BLD AUTO: 0.7 %
BUN BLD-MCNC: 12 MG/DL (ref 9–23)
BUN/CREAT SERPL: 16.7 (ref 10–20)
CALCIUM BLD-MCNC: 9.6 MG/DL (ref 8.7–10.4)
CHLORIDE SERPL-SCNC: 108 MMOL/L (ref 98–112)
CO2 SERPL-SCNC: 29 MMOL/L (ref 21–32)
CREAT BLD-MCNC: 0.72 MG/DL
DEPRECATED RDW RBC AUTO: 50.5 FL (ref 35.1–46.3)
EGFRCR SERPLBLD CKD-EPI 2021: 88 ML/MIN/1.73M2 (ref 60–?)
EOSINOPHIL # BLD AUTO: 0.27 X10(3) UL (ref 0–0.7)
EOSINOPHIL NFR BLD AUTO: 6.4 %
ERYTHROCYTE [DISTWIDTH] IN BLOOD BY AUTOMATED COUNT: 13.3 % (ref 11–15)
GLUCOSE BLD-MCNC: 95 MG/DL (ref 70–99)
HCT VFR BLD AUTO: 35.6 %
HGB BLD-MCNC: 11.4 G/DL
IMM GRANULOCYTES # BLD AUTO: 0.02 X10(3) UL (ref 0–1)
IMM GRANULOCYTES NFR BLD: 0.5 %
LYMPHOCYTES # BLD AUTO: 1.55 X10(3) UL (ref 1–4)
LYMPHOCYTES NFR BLD AUTO: 36.9 %
MAGNESIUM SERPL-MCNC: 2.2 MG/DL (ref 1.6–2.6)
MCH RBC QN AUTO: 32.9 PG (ref 26–34)
MCHC RBC AUTO-ENTMCNC: 32 G/DL (ref 31–37)
MCV RBC AUTO: 102.9 FL
MONOCYTES # BLD AUTO: 0.49 X10(3) UL (ref 0.1–1)
MONOCYTES NFR BLD AUTO: 11.7 %
NEUTROPHILS # BLD AUTO: 1.84 X10 (3) UL (ref 1.5–7.7)
NEUTROPHILS # BLD AUTO: 1.84 X10(3) UL (ref 1.5–7.7)
NEUTROPHILS NFR BLD AUTO: 43.8 %
OSMOLALITY SERPL CALC.SUM OF ELEC: 294 MOSM/KG (ref 275–295)
PHOSPHATE SERPL-MCNC: 3.5 MG/DL (ref 2.4–5.1)
PLATELET # BLD AUTO: 172 10(3)UL (ref 150–450)
POTASSIUM SERPL-SCNC: 4 MMOL/L (ref 3.5–5.1)
RBC # BLD AUTO: 3.46 X10(6)UL
SODIUM SERPL-SCNC: 142 MMOL/L (ref 136–145)
WBC # BLD AUTO: 4.2 X10(3) UL (ref 4–11)

## 2024-01-21 PROCEDURE — 99232 SBSQ HOSP IP/OBS MODERATE 35: CPT | Performed by: OTHER

## 2024-01-21 PROCEDURE — 99232 SBSQ HOSP IP/OBS MODERATE 35: CPT | Performed by: HOSPITALIST

## 2024-01-21 RX ORDER — HYDRALAZINE HYDROCHLORIDE 20 MG/ML
5 INJECTION INTRAMUSCULAR; INTRAVENOUS ONCE
Status: DISCONTINUED | OUTPATIENT
Start: 2024-01-21 | End: 2024-01-22

## 2024-01-21 RX ORDER — METHENAMINE HIPPURATE 1000 MG/1
1 TABLET ORAL 2 TIMES DAILY
Qty: 60 TABLET | Refills: 0 | Status: SHIPPED | OUTPATIENT
Start: 2024-01-21

## 2024-01-21 NOTE — PROGRESS NOTES
[facility]    Progress Note    Fernando Ramos Jr. Patient Status:  Inpatient    1935 MRN [id number]   Location [facility] 5SW/SE Attending Woo Cartagena MD   Hosp Day # 4 PCP CAN HALE       Subjective:   Fernando Ramos Jr. is a(n) 88 year old male was seen and examined  Resting comfortably   Wife at bedside  In good spirits   No acute distress  Denies any cp, sob, f,c,n,v, abd pain or hA     Objective:   Blood pressure 134/68, pulse 52, temperature 97.9 °F (36.6 °C), temperature source Oral, resp. rate 16, height 5' 8\" (1.727 m), weight 145 lb 3.2 oz (65.9 kg), SpO2 99%.    GENERAL:  The patient appeared to be in no distress and was comfortable.  SKIN:  Warm and hydrated  PSYCHIATRIC: Calm and cooperative    HEENT:  Head was atraumatic and normocephalic.  Eyes: Sclera was anicteric.  Pupils were equal.  Ears:  There were no lesions.  Nose:  No lesions were noted.      NECK:  Supple.  There was no JVD.    CHEST:  Symmetrical movement on inspiration  CARDIAC: S1 S2+, RRR  LUNGS:  CTAB  ABDOMEN: Non-distended, non-tender, BS+  MUSCULOSKELETAL:  There was no deformity.  There was full range of motion in all the extremities.    EXTREMITIES: There was no edema  NEUROLOGIC: Cranial nerves II-XII intact, no focal defecits    Current Inpatient Medications:     Current Facility-Administered Medications:     hydrALAzine (Apresoline) 20 mg/mL injection 5 mg, 5 mg, Intravenous, Once    vitamin E (Alpha-E) cap 400 Units, 400 Units, Oral, Daily    carbidopa-levodopa (SINEMET)  MG per tab 0.5 tablet, 0.5 tablet, Oral, TID    [START ON 2/3/2024] carbidopa-levodopa (SINEMET)  MG per tab 1 tablet, 1 tablet, Oral, TID    cefTRIAXone (Rocephin) 1 g in D5W 100 mL IVPB-ADD, 1 g, Intravenous, Q24H    heparin (Porcine) 5000 UNIT/ML injection 5,000 Units, 5,000 Units, Subcutaneous, 2 times per day    acetaminophen (Tylenol Extra Strength) tab 500 mg, 500 mg, Oral, Q4H PRN     ondansetron (Zofran) 4 MG/2ML injection 4 mg, 4 mg, Intravenous, Q6H PRN    ARIPiprazole (Abilify) tab 2 mg, 2 mg, Oral, QOD    aspirin DR tab 81 mg, 81 mg, Oral, Daily    donepezil (Aricept) tab 10 mg, 10 mg, Oral, Nightly    melatonin tab 2 mg, 2 mg, Oral, Nightly    mirtazapine (REMERON SOL-TAB) disintegrating tab 15 mg, 15 mg, Oral, Nightly    polyethylene glycol (PEG 3350) (Miralax) 17 g oral packet 17 g, 17 g, Oral, QOD    pravastatin (Pravachol) tab 20 mg, 20 mg, Oral, Nightly    venlafaxine (Effexor) tab 37.5 mg, 37.5 mg, Oral, BID    Assessment and Plan:   1.       Recurrent urinary tract infection.  Prior urine cultures positive for Proteus mirabilis and imaging studies showing bilateral staghorn kidney stones.   Started on IV abx, will cont  Bcx x2 NGTD  Likely will need suppressive tx on dc    2.       Patient supposed to be on methenamine suppressive therapy, but this has not been done in the last few weeks per the wife.  Will restart once abx course is complete   Will need f/u with PCP    3.       Dementia  Cont home meds    4.        Depression, Anxiety  Cont home meds    5.       Parkinsonism  Evidence of features on exam per neuro  Started on sinemet     Other issues   HTN  HL  BPH  B/l staghorn caliclui  Seizure d/o    DVT Ppx: Heparin subcutaneous  Full code    Dispo: will need SIVA on dc    MDM: High     Results:     Recent Labs   Lab 01/18/24  0538 01/19/24  0513 01/21/24  0630   RBC 3.20* 3.19* 3.46*   HGB 10.8* 10.9* 11.4*   HCT 32.6* 32.7* 35.6*   .9* 102.5* 102.9*   MCH 33.8 34.2* 32.9   MCHC 33.1 33.3 32.0   RDW 13.2 13.1 13.3   NEPRELIM 2.75 1.76 1.84   WBC 5.0 3.7* 4.2   .0 151.0 172.0         Recent Labs   Lab 01/18/24  0538 01/19/24  0513 01/21/24  0630   GLU 87 85 95   BUN 11 6* 12   CREATSERUM 0.72 0.62* 0.72   EGFRCR 88 93 88   CA 9.4 9.4 9.6    146* 142   K 4.5 4.1 4.0    113* 108   CO2 28.0 30.0 29.0         Imaging:   No results found.          Woo FRANCOIS  MD Mitzi  1/21/2024

## 2024-01-21 NOTE — CM/SW NOTE
01/21/24 1400   Discharge disposition   Expected discharge disposition subacute   Post Acute Care Provider   ([facility])   Discharge transportation [facility]     Pt discussed during nursing rounds. Pt is stable for dc today. MD dc order entered. Pt will dc to [facility] for SIVA, but facility is unable to accept patient today due staffing issue per facility's CHIDI Velasco. Krista is requesting transfer tomorrow morning at 11am. Pt's spouse agreeable to transfer tomorrow. [facility] scheduled for  1/22 at 11am.    Number for nurse report is [phone].    Plan: [facility] for SIVA Monday 1/22 at 11am via [facility].    / to remain available for support and/or discharge planning.     TARIK HesterN    [phone]

## 2024-01-21 NOTE — PROGRESS NOTES
[facility] NEUROSCIENCES [address], [address]  [facility] [address]  [phone]        INPATIENT NEUROLOGY   FOLLOW UP PROGRESS NOTE  [facility]    Fernando Ramos Jr. Patient Status:  Inpatient     1935 MRN [id number]    Location [facility] 5SW/SE Attending Woo Cartagena MD    Hosp Day # 4 PCP CAN HALE    Date of Admission:  2024  Date of Consult Follow Up:  24     Assessment and Plan:   Fernando Ramos Jr. is a 88 year old  man w/ dementia and depression seen in follow up for parkinsonism. Plan was to start low dose carbidopa-levadopa, but it was not ordered. Pt denies significant sx of parkinsonism. He did not allow this author to the complete the exam. When checking for rigidity he abruptly yelled out \"will you stop it.\" He may have parkinson disease; cannot complete assessment to confirm.  Parkinson disease is a clinical diagnosis.  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  This is associated with drug-induced parkinsonism but the likelihood it would cause this is lower.  He also has Reglan ordered, which is anti-dopaminergic;  Will discontinue.  Will need to  monitor his behavior and irritability.  The addition of carbidopa levodopa may lead to hallucinations, orthostatic hypotension, and impulsivity.  Likelihood of adverse effects in the inpatient setting may be greater than a trial as an outpatient.  If there is concern for secondary parkinsonism also can consider outpatient DaTscan.  No significant movement on his exam with the addition of half a tab of carbidopa levodopa  3 times daily.  May take up to 4 months to see benefit once he is on a full tab.  Fortunately he does not have any worsening symptoms such as hallucinations.  He was referred back to a movement disorder specialist associated with [facility] to schedule to see back in .  Patient will follow-up with psychiatry.   Discussed adding Namenda but cannot because of his bradycardia.    His psychiatrist is Dr. Nj P:  [phone]    Parkinsonism  Differential Diagnosis:  Primary Parkinson disease/idiopathic Parkinson disease with dementia  Secondary parkinsonism     Plan    Diagnostics:  Consider outpatient DaTscan   reversible dementia labs already been sent  Therapeutics:  Delirium precautions    Will  trial of Sinemet.  Outpatient prescription sent.  Con donepezil for dementia.  Cannot add Namenda because of his bradycardia.  Neurochecks Q4                            INTERVAL EVENTS  01/19/24: Pending placement for subacute rehab.  Reevaluated this evening.       SUBJECTIVE:     Wife at bedside.  Provides majority of HPI.  She does not state the patient has dementia but states that he has memory deficits.  She reports that he does have an outpatient neurologist.  Per review of care everywhere patient was supposed to see a movement disorder neurologist at [facility] in 2021 but the visit was canceled.  Parkinson Review    Motor symptoms:   Tremor: yes; for yrs;  R > L.  \"Times when he does not have a tremor for months.\" More obvious with intention or when eating then at rest. Will not notice for months.  Dyskinesia:  no  Dystonia:  no  Slowness/stiffness:  yes, sometimes.  Hard time reaching behind:   probably per his wife.   Difficulty bending over:    he can bend over  Gait:   needs a walker  Balance / Falls:  yes when he has a uti  Freezing / Festination:  no  Difficulty getting out of a chair, car, or a bed:  no  Speech/Hypophonia:  no per spouse  Drooling / Chewing / Swallowing:  no   Diplopia:    Dystonia:     Fatigue:  yes  Eating Tasks / Dressing / Hygiene:   his wife helps him. \"A littl bit. Not all the time w/ everything.\"  Handwriting / Micrographia:  no  Hobbies & Activities:      Non-motor symptoms:  Nocturia / Urinary Problems:  yes yrs ago when he was continent   Constipation:  yes   Orthostasis / Autonomic  Dysfunction:    Cognition:  yes   Fluctuations:  yes, ?  Hallucination:  no  Psychosis:  no  Driving:      No rem sleep behavior disorder   Hypo- / Anosmia:  no  Pain: no            Pertinent positive and negatives per HPI.  All others were reviewed and negative.       Objective   OBJECTIVE:   Last vitals and weight :  Blood pressure 134/68, pulse 52, temperature 97.8 °F (36.6 °C), temperature source Oral, resp. rate 16, height 68\", weight 145 lb 3.2 oz (65.9 kg), SpO2 99%.   Vitals:    01/21/24 0546 01/21/24 0832 01/21/24 0941 01/21/24 1457   BP: 141/71 (!) 173/70 139/69 134/68   BP Location: Right arm Right arm Right arm Right arm   Pulse: 52 63 58 52   Resp: 16 16  16   Temp: 97.8 °F (36.6 °C) 97.9 °F (36.6 °C)  97.8 °F (36.6 °C)   TempSrc: Oral Oral  Oral   SpO2: 95% 94%  99%   Weight:       Height:          Exam:  - General: appears older than stated age, cachectic, combative, fatigued, no distress, and uncooperative    Neurologic Exam  - Mental Status: He was drifting off to sleep.  Regarded.  Able to maintain wakefulness for exam.  - Cranial Nerves: No gaze preference. Visual fields: No saccadic breakdown.  No slow saccades.  Extraocular movements do not appear restricted.  Upgaze and downgaze are intact.. EOMI. No nystagmus. No ptosis. V1-V3 intact B/L to light touch.No pathological facial asymmetry. No flattening of the nasolabial fold. .  Hearing grossly intact.  Tongue midline. No atrophy or fasiculations of the tongue noted. Palate and uvula elevate symmetrically.  Shoulder shrug symmetric.  - Motor: Diffusely decreased bulk.  Questionable rigidity.  Could not accurately assess because patient did not allow exam to be completed.  No interosseous wasting. No flattening of hypothenar eminences.   Rapid movements: Unchanged.  Bradykinetic bilaterally.  Decrements.   Right Left     Motor Strength    5 5   Knee extensors 5 5     Tremor: He has a rest tremor.  - Sensory:   Light touch:normal      -  Cerebellum: No truncal ataxia. No titubations.     Medications:   hydrALAzine  5 mg Intravenous Once    vitamin E  400 Units Oral Daily    carbidopa-levodopa  0.5 tablet Oral TID    [START ON 2/3/2024] carbidopa-levodopa  1 tablet Oral TID    cefTRIAXone  1 g Intravenous Q24H    heparin  5,000 Units Subcutaneous 2 times per day    ARIPiprazole  2 mg Oral QOD    aspirin  81 mg Oral Daily    donepezil  10 mg Oral Nightly    melatonin  2 mg Oral Nightly    mirtazapine  15 mg Oral Nightly    polyethylene glycol (PEG 3350)  17 g Oral QOD    pravastatin  20 mg Oral Nightly    venlafaxine  37.5 mg Oral BID       PRNS: acetaminophen, ondansetron    Infusions:          Results:   Laboratory Data:  Lab Results   Component Value Date    WBC 4.2 01/21/2024    HGB 11.4 (L) 01/21/2024    HCT 35.6 (L) 01/21/2024    .0 01/21/2024    CREATSERUM 0.72 01/21/2024    BUN 12 01/21/2024     01/21/2024    K 4.0 01/21/2024     01/21/2024    CO2 29.0 01/21/2024    GLU 95 01/21/2024    CA 9.6 01/21/2024    ALB 3.7 01/21/2024    ALKPHO 97 01/17/2024    TP 7.2 01/17/2024    AST 29 01/17/2024    ALT 21 01/17/2024    INR 1.18 (H) 03/23/2023    PTP 14.9 (H) 03/23/2023    TSH 1.562 01/17/2024    LIP 48 09/21/2023    ESRML 26 (H) 05/03/2021    CRP 1.22 (H) 05/03/2021    MG 2.2 01/21/2024    PHOS 3.5 01/21/2024     03/14/2023    B12 427 01/17/2024     Recent Results (from the past 72 hour(s))   Renal Function Panel    Collection Time: 01/19/24  5:13 AM   Result Value Ref Range    Glucose 85 70 - 99 mg/dL    Sodium 146 (H) 136 - 145 mmol/L    Potassium 4.1 3.5 - 5.1 mmol/L    Chloride 113 (H) 98 - 112 mmol/L    CO2 30.0 21.0 - 32.0 mmol/L    Anion Gap 3 0 - 18 mmol/L    BUN 6 (L) 9 - 23 mg/dL    Creatinine 0.62 (L) 0.70 - 1.30 mg/dL    BUN/CREA Ratio 9.7 (L) 10.0 - 20.0    Calcium, Total 9.4 8.7 - 10.4 mg/dL    Calculated Osmolality 299 (H) 275 - 295 mOsm/kg    eGFR-Cr 93 >=60 mL/min/1.73m2    Albumin 3.4 3.2 - 4.8 g/dL     Phosphorus 2.8 2.4 - 5.1 mg/dL   CBC W/ DIFFERENTIAL    Collection Time: 01/19/24  5:13 AM   Result Value Ref Range    WBC 3.7 (L) 4.0 - 11.0 x10(3) uL    RBC 3.19 (L) 3.80 - 5.80 x10(6)uL    HGB 10.9 (L) 13.0 - 17.5 g/dL    HCT 32.7 (L) 39.0 - 53.0 %    .5 (H) 80.0 - 100.0 fL    MCH 34.2 (H) 26.0 - 34.0 pg    MCHC 33.3 31.0 - 37.0 g/dL    RDW-SD 49.4 (H) 35.1 - 46.3 fL    RDW 13.1 11.0 - 15.0 %    .0 150.0 - 450.0 10(3)uL    Neutrophil Absolute Prelim 1.76 1.50 - 7.70 x10 (3) uL    Neutrophil Absolute 1.76 1.50 - 7.70 x10(3) uL    Lymphocyte Absolute 1.35 1.00 - 4.00 x10(3) uL    Monocyte Absolute 0.43 0.10 - 1.00 x10(3) uL    Eosinophil Absolute 0.16 0.00 - 0.70 x10(3) uL    Basophil Absolute 0.03 0.00 - 0.20 x10(3) uL    Immature Granulocyte Absolute 0.01 0.00 - 1.00 x10(3) uL    Neutrophil % 47.0 %    Lymphocyte % 36.1 %    Monocyte % 11.5 %    Eosinophil % 4.3 %    Basophil % 0.8 %    Immature Granulocyte % 0.3 %   Renal Function Panel    Collection Time: 01/21/24  6:30 AM   Result Value Ref Range    Glucose 95 70 - 99 mg/dL    Sodium 142 136 - 145 mmol/L    Potassium 4.0 3.5 - 5.1 mmol/L    Chloride 108 98 - 112 mmol/L    CO2 29.0 21.0 - 32.0 mmol/L    Anion Gap 5 0 - 18 mmol/L    BUN 12 9 - 23 mg/dL    Creatinine 0.72 0.70 - 1.30 mg/dL    BUN/CREA Ratio 16.7 10.0 - 20.0    Calcium, Total 9.6 8.7 - 10.4 mg/dL    Calculated Osmolality 294 275 - 295 mOsm/kg    eGFR-Cr 88 >=60 mL/min/1.73m2    Albumin 3.7 3.2 - 4.8 g/dL    Phosphorus 3.5 2.4 - 5.1 mg/dL   Magnesium    Collection Time: 01/21/24  6:30 AM   Result Value Ref Range    Magnesium 2.2 1.6 - 2.6 mg/dL   CBC W/ DIFFERENTIAL    Collection Time: 01/21/24  6:30 AM   Result Value Ref Range    WBC 4.2 4.0 - 11.0 x10(3) uL    RBC 3.46 (L) 3.80 - 5.80 x10(6)uL    HGB 11.4 (L) 13.0 - 17.5 g/dL    HCT 35.6 (L) 39.0 - 53.0 %    .9 (H) 80.0 - 100.0 fL    MCH 32.9 26.0 - 34.0 pg    MCHC 32.0 31.0 - 37.0 g/dL    RDW-SD 50.5 (H) 35.1 - 46.3 fL     RDW 13.3 11.0 - 15.0 %    .0 150.0 - 450.0 10(3)uL    Neutrophil Absolute Prelim 1.84 1.50 - 7.70 x10 (3) uL    Neutrophil Absolute 1.84 1.50 - 7.70 x10(3) uL    Lymphocyte Absolute 1.55 1.00 - 4.00 x10(3) uL    Monocyte Absolute 0.49 0.10 - 1.00 x10(3) uL    Eosinophil Absolute 0.27 0.00 - 0.70 x10(3) uL    Basophil Absolute 0.03 0.00 - 0.20 x10(3) uL    Immature Granulocyte Absolute 0.02 0.00 - 1.00 x10(3) uL    Neutrophil % 43.8 %    Lymphocyte % 36.9 %    Monocyte % 11.7 %    Eosinophil % 6.4 %    Basophil % 0.7 %    Immature Granulocyte % 0.5 %         Test results/Imaging:   CT BRAIN OR HEAD (58023)    Result Date: 1/17/2024  CONCLUSION: No acute finding    Dictated by (CST): Ryne Moulton MD on 1/17/2024 at 1:45 PM     Finalized by (CST): Ryne Moulton MD on 1/17/2024 at 2:03 PM                Performed an independent visualization of  head ct   Imaging revealed:   татьяна garcia read     Education/Instructions given to: patient   Barriers to Learning:None  Content: Refer to note above. Evaluation/Outcome: Verbalized understanding    Disclaimer:   This record was dictated using Dragon software. There may be errors due to voice recognition problems that were not realized and corrected during the completion of the note.      This document is not intended to support charting by exception.  Sections left blank in a completed note should be presumed not to have been done.     Total time involved in care the patient occluding reviewing the prior notes, discussing the case with the nurse, writing the note, and face to face time was  35 minutes, more than 50% of the time was spent in counseling and/or coordination of care related to  parkinsonism.    Thank you.  Pete Llamas D.O.   Vascular & General Neurology      01/21/24  5:18 PM

## 2024-01-22 VITALS
WEIGHT: 145.19 LBS | BODY MASS INDEX: 22.01 KG/M2 | OXYGEN SATURATION: 98 % | RESPIRATION RATE: 16 BRPM | HEIGHT: 68 IN | TEMPERATURE: 98 F | HEART RATE: 55 BPM | SYSTOLIC BLOOD PRESSURE: 152 MMHG | DIASTOLIC BLOOD PRESSURE: 64 MMHG

## 2024-01-22 PROBLEM — F03.B3 MODERATE DEMENTIA WITH MOOD DISTURBANCE (HCC): Status: ACTIVE | Noted: 2024-01-22

## 2024-01-22 PROCEDURE — 99239 HOSP IP/OBS DSCHRG MGMT >30: CPT | Performed by: HOSPITALIST

## 2024-01-22 NOTE — PLAN OF CARE
Patient is being discharge today via ambulance to [facility]. All paper work was submitted and sent with patient. He is on room air. Report given to Shavonne, staff nurse at rehab, all questions answered. Wife at bedside, she will travel with  in ambulance.

## 2024-01-22 NOTE — PLAN OF CARE
Pt alert and responsive to undersigned. Requires reorientation and occasionally refused to be given hygiene/skin care. Wife at the bedside helps out with reorienting the pt and provides emotional support. Pt is redirectable and cooperative. For DC to [facility] today.     Problem: Patient Centered Care  Goal: Patient preferences are identified and integrated in the patient's plan of care  Description: Interventions:  - What would you like us to know as we care for you?   - Provide timely, complete, and accurate information to patient/family  - Incorporate patient and family knowledge, values, beliefs, and cultural backgrounds into the planning and delivery of care  - Encourage patient/family to participate in care and decision-making at the level they choose  - Honor patient and family perspectives and choices  Outcome: Progressing     Problem: SAFETY ADULT - FALL  Goal: Free from fall injury  Description: INTERVENTIONS:  - Assess pt frequently for physical needs  - Identify cognitive and physical deficits and behaviors that affect risk of falls.  - Twilight fall precautions as indicated by assessment.  - Educate pt/family on patient safety including physical limitations  - Instruct pt to call for assistance with activity based on assessment  - Modify environment to reduce risk of injury  - Provide assistive devices as appropriate  - Consider OT/PT consult to assist with strengthening/mobility  - Encourage toileting schedule  Outcome: Progressing     Problem: SKIN/TISSUE INTEGRITY - ADULT  Goal: Skin integrity remains intact  Description: INTERVENTIONS  - Assess and document risk factors for pressure ulcer development  - Assess and document skin integrity  - Monitor for areas of redness and/or skin breakdown  - Initiate interventions, skin care algorithm/standards of care as needed  Outcome: Progressing  Goal: Incision(s), wounds(s) or drain site(s) healing without S/S of infection  Description:  INTERVENTIONS:  - Assess and document risk factors for pressure ulcer development  - Assess and document skin integrity  - Assess and document dressing/incision, wound bed, drain sites and surrounding tissue  - Implement wound care per orders  - Initiate isolation precautions as appropriate  - Initiate Pressure Ulcer prevention bundle as indicated  Outcome: Progressing  Goal: Oral mucous membranes remain intact  Description: INTERVENTIONS  - Assess oral mucosa and hygiene practices  - Implement preventative oral hygiene regimen  - Implement oral medicated treatments as ordered  Outcome: Progressing     Problem: MUSCULOSKELETAL - ADULT  Goal: Return mobility to safest level of function  Description: INTERVENTIONS:  - Assess patient stability and activity tolerance for standing, transferring and ambulating w/ or w/o assistive devices  - Assist with transfers and ambulation using safe patient handling equipment as needed  - Ensure adequate protection for wounds/incisions during mobilization  - Obtain PT/OT consults as needed  - Advance activity as appropriate  - Communicate ordered activity level and limitations with patient/family  Outcome: Progressing  Goal: Maintain proper alignment of affected body part  Description: INTERVENTIONS:  - Support and protect limb and body alignment per provider's orders  - Instruct and reinforce with patient and family use of appropriate assistive device and precautions (e.g. spinal or hip dislocation precautions)  Outcome: Progressing     Problem: NEUROLOGICAL - ADULT  Goal: Achieves stable or improved neurological status  Description: INTERVENTIONS  - Assess for and report changes in neurological status  - Initiate measures to prevent increased intracranial pressure  - Maintain blood pressure and fluid volume within ordered parameters to optimize cerebral perfusion and minimize risk of hemorrhage  - Monitor temperature, glucose, and sodium. Initiate appropriate interventions as  ordered  Outcome: Progressing     Problem: Impaired Functional Mobility  Goal: Achieve highest/safest level of mobility/gait  Description: Interventions:  - Assess patient's functional ability and stability  - Promote increasing activity/tolerance for mobility and gait  - Educate and engage patient/family in tolerated activity level and precautions  - Recommend use of chair position in bed 3 times per day  Outcome: Progressing

## 2024-01-22 NOTE — DISCHARGE SUMMARY
[facility]    Discharge Summary    Fernando Ramos Jr. Patient Status:  Inpatient    1935 MRN [id number]   Location [facility] 5SW/SE Attending Woo Cartagena MD   Hosp Day # 5 PCP CAN HALE     Date of Admission: 2024 Disposition: SNF Subacute Rehab     Date of Discharge: 24    Admitting Diagnosis: Weakness [R53.1]  Urinary tract infection without hematuria, site unspecified [N39.0]    Hospital Discharge Diagnoses: UTI, weakness    Lace+ Score: 66  59-90 High Risk  29-58 Medium Risk  0-28   Low Risk.    TCM Follow-Up Recommendation:  LACE > 58: High Risk of readmission after discharge from the hospital.    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[facility])    Food impaction of esophagus    Esophageal dysphagia    Esophageal obstruction due to food impaction    Displaced fracture of right femoral neck ([facility])    Closed fracture of neck of right femur, initial encounter ([facility])    Acute febrile illness    Sepsis due to urinary tract infection  ([facility])    Weakness    SIRS (systemic inflammatory response syndrome) ([facility])    Staghorn calculus    Acute cystitis without hematuria    Urinary tract infection without hematuria, site unspecified    Parkinson's disease without dyskinesia or fluctuating manifestations    Delirium due to another medical condition    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       Physical Exam:   Vitals:    24 0834   BP: 152/64   Pulse: 55   Resp: 16   Temp: 98.2 °F (36.8 °C)   GENERAL:  The patient appeared to be in no distress and was  comfortable.  SKIN:  Warm and hydrated  PSYCHIATRIC: Calm and cooperative    HEENT:  Head was atraumatic and normocephalic.  Eyes: Sclera was anicteric.  Pupils were equal.  Ears:  There were no lesions.  Nose:  No lesions were noted.      NECK:  Supple.  There was no JVD.    CHEST:  Symmetrical movement on inspiration  CARDIAC: S1 S2+, RRR  LUNGS:  CTAB  ABDOMEN: Non-distended, non-tender, BS+  MUSCULOSKELETAL:  There was no deformity.  There was full range of motion in all the extremities.    EXTREMITIES: There was no edema  NEUROLOGIC: Cranial nerves II-XII intact, no focal defecits      History of Present Illness:   Per Dr. Francis  The patient is an 88-year-old  male who currently resides in a nursing facility, brought in today by his wife for progressive weakness for the last 2 days.  The patient had similar hospitalizations last year.  He has known recurrent urinary tract infection and underlying enlarged prostate.  Today, CBC showed white blood cell count of 9.2, no left shift.  Hemoglobin stable at 12.7.  MCV is elevated at 101.3.  Reviewing the records, the patient had history of vitamin 12 deficiency based on a blood test done in October 202.  Chemistry, magnesium, troponin, TSH, GeneXpert viral panel were negative.  Urinalysis showed evidence of gross urinary tract infection.  CT scan of the brain and chest x-ray were unremarkable.     Hospital Course:   1.       Recurrent urinary tract infection.  Prior urine cultures positive for Proteus mirabilis and imaging studies showing bilateral staghorn kidney stones.   Started on IV abx  Bcx x2 NGTD  Likely will need suppressive tx on dc -> will restart methenamine      2.       Patient supposed to be on methenamine suppressive therapy, but this has not been done in the last few weeks per the wife.  See above     3.       Dementia  Cont home meds     4.        Depression, Anxiety  Cont home meds     5.       Parkinsonism  Evidence of features on exam  per neuro  Started on sinemet, cont on dc  OP neuro f/u     Other issues   HTN  HL  BPH  B/l staghorn caliclui  Seizure d/o    Consultations: Neuro    Procedures: none    Complications: none    Discharge Condition: Good    Discharge Medications:      Discharge Medications        START taking these medications        Instructions Prescription details   carbidopa-levodopa  MG Tabs  Commonly known as: SINEMET      Take 0.5 tablets by mouth 3 (three) times daily for 14 days.   Stop taking on: February 4, 2024  Quantity: 21 tablet  Refills: 0     carbidopa-levodopa  MG Tabs  Commonly known as: SINEMET  Start taking on: February 3, 2024      Take 1 tablet by mouth 3 (three) times daily.   Quantity: 90 tablet  Refills: 0            CONTINUE taking these medications        Instructions Prescription details   acetaminophen 500 MG Tabs  Commonly known as: Tylenol Extra Strength      Take 2 tablets (1,000 mg total) by mouth every 8 (eight) hours as needed for Pain.   Refills: 0     ARIPiprazole 2 MG Tabs  Commonly known as: Abilify      Take 1 tablet (2 mg total) by mouth every other day.   Quantity: 1 tablet  Refills: 0     aspirin 81 MG Tabs      Take 1 tablet (81 mg total) by mouth daily.   Refills: 0     CoQ-10 100 MG Caps      Take 100 mg by mouth daily.   Refills: 0     donepezil 10 MG Tabs  Commonly known as: Aricept      Take 1 tablet (10 mg total) by mouth nightly. TAKE ONE TABLET BY MOUTH EVERY MORNING   Refills: 0     hydrOXYzine Pamoate 25 MG Caps  Commonly known as: VISTARIL      hydroxyzine pamoate 25 mg capsule   TAKE ONE CAPSULE BY MOUTH DAILY AS NEEDED   Refills: 0     melatonin 1 MG Tabs      Take 2 tablets (2 mg total) by mouth nightly.   Refills: 0     methenamine 1 g Tabs  Commonly known as: Hiprex      Take 1 tablet (1 g total) by mouth 2 (two) times daily.   Quantity: 60 tablet  Refills: 0     mirtazapine 15 MG Tabs  Commonly known as: Remeron      TAKE ONE TABLET BY MOUTH AT BEDTIME    Quantity: 90 tablet  Refills: 3     polyethylene glycol (PEG 3350) 17 g Pack  Commonly known as: Miralax      Take 17 g by mouth every other day.   Refills: 0     simvastatin 40 MG Tabs  Commonly known as: Zocor      Take 1 tablet (40 mg total) by mouth nightly.   Refills: 0     venlafaxine 37.5 MG Tabs  Commonly known as: Effexor      Take 1 tablet (37.5 mg total) by mouth 2 (two) times daily.   Refills: 0     Vitamin D 50 MCG (2000 UT) Tabs      Take by mouth nightly.   Refills: 0            STOP taking these medications      cephalexin 500 MG Caps  Commonly known as: Keflex                  Where to Get Your Medications        These medications [id number] [place] [facility] [address] [phone], [phone]  [address] [place] [address], [facility] [place] [address]      Phone: [phone]   carbidopa-levodopa  MG Tabs  carbidopa-levodopa  MG Tabs  methenamine 1 g Tabs         Greater than 35 minutes spent, >50% spent counseling re: treatment plan and workup     Woo Cartagena MD  1/22/2024

## 2024-01-22 NOTE — PLAN OF CARE
Problem: Patient Centered Care  Goal: Patient preferences are identified and integrated in the patient's plan of care  Description: Interventions:  - What would you like us to know as we care for you? Home with wife.  - Provide timely, complete, and accurate information to patient/family  - Incorporate patient and family knowledge, values, beliefs, and cultural backgrounds into the planning and delivery of care  - Encourage patient/family to participate in care and decision-making at the level they choose  - Honor patient and family perspectives and choices  Outcome: Adequate for Discharge     Problem: Patient/Family Goals  Goal: Patient/Family Long Term Goal  Description: Patient's Long Term Goal: to stay well.    Interventions:  - follow doctor's instructions.  - See additional Care Plan goals for specific interventions  Outcome: Adequate for Discharge  Goal: Patient/Family Short Term Goal  Description: Patient's Short Term Goal: discharge soon to rehab    Interventions:   - plan for discharge today.  - See additional Care Plan goals for specific interventions  Outcome: Adequate for Discharge     Problem: SAFETY ADULT - FALL  Goal: Free from fall injury  Description: INTERVENTIONS:  - Assess pt frequently for physical needs  - Identify cognitive and physical deficits and behaviors that affect risk of falls.  - [place] fall precautions as indicated by assessment.  - Educate pt/family on patient safety including physical limitations  - Instruct pt to call for assistance with activity based on assessment  - Modify environment to reduce risk of injury  - Provide assistive devices as appropriate  - Consider OT/PT consult to assist with strengthening/mobility  - Encourage toileting schedule  Outcome: Adequate for Discharge     Problem: SKIN/TISSUE INTEGRITY - ADULT  Goal: Skin integrity remains intact  Description: INTERVENTIONS  - Assess and document risk factors for pressure ulcer development  - Assess and  document skin integrity  - Monitor for areas of redness and/or skin breakdown  - Initiate interventions, skin care algorithm/standards of care as needed  Outcome: Adequate for Discharge  Goal: Incision(s), wounds(s) or drain site(s) healing without S/S of infection  Description: INTERVENTIONS:  - Assess and document risk factors for pressure ulcer development  - Assess and document skin integrity  - Assess and document dressing/incision, wound bed, drain sites and surrounding tissue  - Implement wound care per orders  - Initiate isolation precautions as appropriate  - Initiate Pressure Ulcer prevention bundle as indicated  Outcome: Adequate for Discharge  Goal: Oral mucous membranes remain intact  Description: INTERVENTIONS  - Assess oral mucosa and hygiene practices  - Implement preventative oral hygiene regimen  - Implement oral medicated treatments as ordered  Outcome: Adequate for Discharge     Problem: MUSCULOSKELETAL - ADULT  Goal: Return mobility to safest level of function  Description: INTERVENTIONS:  - Assess patient stability and activity tolerance for standing, transferring and ambulating w/ or w/o assistive devices  - Assist with transfers and ambulation using safe patient handling equipment as needed  - Ensure adequate protection for wounds/incisions during mobilization  - Obtain PT/OT consults as needed  - Advance activity as appropriate  - Communicate ordered activity level and limitations with patient/family  Outcome: Adequate for Discharge  Goal: Maintain proper alignment of affected body part  Description: INTERVENTIONS:  - Support and protect limb and body alignment per provider's orders  - Instruct and reinforce with patient and family use of appropriate assistive device and precautions (e.g. spinal or hip dislocation precautions)  Outcome: Adequate for Discharge     Problem: NEUROLOGICAL - ADULT  Goal: Achieves stable or improved neurological status  Description: INTERVENTIONS  - Assess for and  report changes in neurological status  - Initiate measures to prevent increased intracranial pressure  - Maintain blood pressure and fluid volume within ordered parameters to optimize cerebral perfusion and minimize risk of hemorrhage  - Monitor temperature, glucose, and sodium. Initiate appropriate interventions as ordered  Outcome: Adequate for Discharge     Problem: Impaired Functional Mobility  Goal: Achieve highest/safest level of mobility/gait  Description: Interventions:  - Assess patient's functional ability and stability  - Promote increasing activity/tolerance for mobility and gait  - Educate and engage patient/family in tolerated activity level and precautions  - Recommend use of  RW for transfers and ambulation  Outcome: Adequate for Discharge  Patient is medically stable and is being discharge to rehab today. Wife is at bedside updated on care plan.

## 2024-03-01 ENCOUNTER — APPOINTMENT (OUTPATIENT)
Dept: PHYSICAL MEDICINE AND REHAB | Age: 89
End: 2024-03-01

## 2024-03-07 ENCOUNTER — APPOINTMENT (OUTPATIENT)
Dept: PHYSICAL MEDICINE AND REHAB | Age: 89
End: 2024-03-07

## 2024-03-08 ENCOUNTER — HOSPITAL ENCOUNTER (EMERGENCY)
Facility: HOSPITAL | Age: 89
Discharge: HOME | End: 2024-03-08
Attending: EMERGENCY MEDICINE
Payer: MEDICARE

## 2024-03-08 VITALS
BODY MASS INDEX: 19.7 KG/M2 | RESPIRATION RATE: 16 BRPM | DIASTOLIC BLOOD PRESSURE: 82 MMHG | SYSTOLIC BLOOD PRESSURE: 149 MMHG | TEMPERATURE: 96 F | WEIGHT: 130 LBS | OXYGEN SATURATION: 96 % | HEART RATE: 53 BPM | HEIGHT: 68 IN

## 2024-03-08 DIAGNOSIS — N30.00 ACUTE CYSTITIS WITHOUT HEMATURIA: Primary | ICD-10-CM

## 2024-03-08 DIAGNOSIS — R53.1 WEAKNESS GENERALIZED: ICD-10-CM

## 2024-03-08 LAB
ALBUMIN SERPL-MCNC: 4.4 G/DL (ref 3.2–4.8)
ALBUMIN/GLOB SERPL: 1.2 {RATIO} (ref 1–2)
ALP LIVER SERPL-CCNC: 116 U/L
ALT SERPL-CCNC: 14 U/L
ANION GAP SERPL CALC-SCNC: 5 MMOL/L (ref 0–18)
AST SERPL-CCNC: 29 U/L (ref ?–34)
BASE EXCESS BLD CALC-SCNC: 6 MMOL/L (ref ?–2)
BASOPHILS # BLD AUTO: 0.04 X10(3) UL (ref 0–0.2)
BASOPHILS NFR BLD AUTO: 0.7 %
BILIRUB SERPL-MCNC: 0.4 MG/DL (ref 0.2–1.1)
BILIRUB UR QL: NEGATIVE
BUN BLD-MCNC: 13 MG/DL (ref 9–23)
BUN/CREAT SERPL: 18.3 (ref 10–20)
CALCIUM BLD-MCNC: 10.4 MG/DL (ref 8.7–10.4)
CHLORIDE SERPL-SCNC: 107 MMOL/L (ref 98–112)
CO2 SERPL-SCNC: 30 MMOL/L (ref 21–32)
COLOR UR: YELLOW
CREAT BLD-MCNC: 0.71 MG/DL
DEPRECATED RDW RBC AUTO: 48.8 FL (ref 35.1–46.3)
EGFRCR SERPLBLD CKD-EPI 2021: 89 ML/MIN/1.73M2 (ref 60–?)
EOSINOPHIL # BLD AUTO: 0.18 X10(3) UL (ref 0–0.7)
EOSINOPHIL NFR BLD AUTO: 3.2 %
ERYTHROCYTE [DISTWIDTH] IN BLOOD BY AUTOMATED COUNT: 13 % (ref 11–15)
GLOBULIN PLAS-MCNC: 3.6 G/DL (ref 2.8–4.4)
GLUCOSE BLD-MCNC: 86 MG/DL (ref 70–99)
GLUCOSE UR-MCNC: NORMAL MG/DL
HCO3 BLDV-SCNC: 29.2 MEQ/L (ref 22–26)
HCT VFR BLD AUTO: 41.6 %
HGB BLD-MCNC: 14 G/DL
IMM GRANULOCYTES # BLD AUTO: 0.02 X10(3) UL (ref 0–1)
IMM GRANULOCYTES NFR BLD: 0.4 %
KETONES UR-MCNC: NEGATIVE MG/DL
LEUKOCYTE ESTERASE UR QL STRIP.AUTO: 500
LYMPHOCYTES # BLD AUTO: 1.6 X10(3) UL (ref 1–4)
LYMPHOCYTES NFR BLD AUTO: 28.6 %
MCH RBC QN AUTO: 34.2 PG (ref 26–34)
MCHC RBC AUTO-ENTMCNC: 33.7 G/DL (ref 31–37)
MCV RBC AUTO: 101.7 FL
MONOCYTES # BLD AUTO: 0.69 X10(3) UL (ref 0.1–1)
MONOCYTES NFR BLD AUTO: 12.3 %
NEUTROPHILS # BLD AUTO: 3.07 X10 (3) UL (ref 1.5–7.7)
NEUTROPHILS # BLD AUTO: 3.07 X10(3) UL (ref 1.5–7.7)
NEUTROPHILS NFR BLD AUTO: 54.8 %
NITRITE UR QL STRIP.AUTO: NEGATIVE
OSMOLALITY SERPL CALC.SUM OF ELEC: 293 MOSM/KG (ref 275–295)
PCO2 BLDV: 59 MM HG (ref 38–50)
PH BLDV: 7.36 [PH] (ref 7.32–7.43)
PH UR: 6.5 [PH] (ref 5–8)
PLATELET # BLD AUTO: 169 10(3)UL (ref 150–450)
PO2 BLDV: 49 MM HG (ref 35–40)
POTASSIUM SERPL-SCNC: 4.9 MMOL/L (ref 3.5–5.1)
PROT SERPL-MCNC: 8 G/DL (ref 5.7–8.2)
PROT UR-MCNC: 50 MG/DL
PUNCTURE CHARGE: NO
RBC # BLD AUTO: 4.09 X10(6)UL
RBC #/AREA URNS AUTO: >10 /HPF
SAO2 % BLDV: 81.8 % (ref 60–85)
SODIUM SERPL-SCNC: 142 MMOL/L (ref 136–145)
SP GR UR STRIP: 1.02 (ref 1–1.03)
TSI SER-ACNC: 0.89 MIU/ML (ref 0.55–4.78)
UROBILINOGEN UR STRIP-ACNC: NORMAL
WBC # BLD AUTO: 5.6 X10(3) UL (ref 4–11)
WBC #/AREA URNS AUTO: >50 /HPF

## 2024-03-08 PROCEDURE — 99285 EMERGENCY DEPT VISIT HI MDM: CPT

## 2024-03-08 PROCEDURE — 81001 URINALYSIS AUTO W/SCOPE: CPT | Performed by: EMERGENCY MEDICINE

## 2024-03-08 PROCEDURE — 80053 COMPREHEN METABOLIC PANEL: CPT | Performed by: EMERGENCY MEDICINE

## 2024-03-08 PROCEDURE — 84443 ASSAY THYROID STIM HORMONE: CPT | Performed by: EMERGENCY MEDICINE

## 2024-03-08 PROCEDURE — 87186 SC STD MICRODIL/AGAR DIL: CPT | Performed by: EMERGENCY MEDICINE

## 2024-03-08 PROCEDURE — 87086 URINE CULTURE/COLONY COUNT: CPT | Performed by: EMERGENCY MEDICINE

## 2024-03-08 PROCEDURE — 87077 CULTURE AEROBIC IDENTIFY: CPT | Performed by: EMERGENCY MEDICINE

## 2024-03-08 PROCEDURE — 85025 COMPLETE CBC W/AUTO DIFF WBC: CPT | Performed by: EMERGENCY MEDICINE

## 2024-03-08 PROCEDURE — 96375 TX/PRO/DX INJ NEW DRUG ADDON: CPT

## 2024-03-08 PROCEDURE — 82805 BLOOD GASES W/O2 SATURATION: CPT | Performed by: EMERGENCY MEDICINE

## 2024-03-08 PROCEDURE — 96365 THER/PROPH/DIAG IV INF INIT: CPT

## 2024-03-08 RX ORDER — HALOPERIDOL 5 MG/ML
2.5 INJECTION INTRAMUSCULAR ONCE AS NEEDED
Status: COMPLETED | OUTPATIENT
Start: 2024-03-08 | End: 2024-03-08

## 2024-03-08 RX ORDER — CEPHALEXIN 500 MG/1
500 CAPSULE ORAL 2 TIMES DAILY
Qty: 14 CAPSULE | Refills: 0 | Status: SHIPPED | OUTPATIENT
Start: 2024-03-08 | End: 2024-03-14

## 2024-03-08 NOTE — ED QUICK NOTES
Pt's neuro assessment done and no deficits seen, able to follow commands. Pt's wife at the bedside and she states he was \"groggy and more quiet than usual\".

## 2024-03-08 NOTE — ED INITIAL ASSESSMENT (HPI)
Patient from home with wife who reports concern he has been \"more quiet\" today and had increased generalized weakness.

## 2024-03-08 NOTE — ED PROVIDER NOTES
Patient Seen in: [facility] Emergency Department    History     Chief Complaint   Patient presents with    Altered Mental Status    Weakness     Stated Complaint: AMS, Weakness     HPI    88-year-old male with past medical history anxiety/depression followed by psychiatry, hypertension, dyslipidemia, dementia on donepezil and with recent neurologic consultation concerning for parkinsonism for which Sinemet/carbidopa initiated presenting with wife for evaluation of generalized weakness is noted today.  No urinary complaints.  No other new medications or dosage changes-no pitting antagonism.  No headache or neck pain.  No chest pain or shortness of breath, cough.  No vomiting or diarrhea, no abdominal pain.  No reported urinary complaints. PCP at [facility]; EMR reviewed, patient with hospitalization and discharged 1/22 in setting of generalized weakness and UTI with concern for recurrent UTI which patient discharged on suppressive methenamine given concern for UTI recurrence.  Wife at bedside noting patient to be with some physical recovery with ongoing PT/rehab and ambulatory with seemingly one-person assist/walker    Past Medical History:   Diagnosis Date    Anxiety state     BPH (benign prostatic hyperplasia)     Dementia (HCC)     Depression     Dysphagia     Febrile illness 02/13/2021    High blood pressure     Kidney stones     Other and unspecified hyperlipidemia     Pneumonia due to organism     Seizure disorder (HCC)     because of venalfaxine    Skin cancer     melanoma    Unspecified essential hypertension        Past Surgical History:   Procedure Laterality Date    EGD  05/23/2022    ESOPHAGOGASTRODUODENOSCOPY with Botox injection    EGD  07/23/2021    Large food impaction in the mid and distal esophagus    HEMIARTHROPLASTY HIP Right 03/22/2022    Right Cemented Hemiarthroplasty    LITHOTRIPSY      TRANSURETHRAL DESTRUCTION, PROSTATE TISSUE; WATER-INDUC              Family History   Problem  Relation Age of Onset    Alcohol and Other Disorders Associated Father     Heart Disorder Father     Depression Father     Heart Disorder Mother        Social History     Socioeconomic History    Marital status:    Tobacco Use    Smoking status: Former     Years: 3     Types: Cigarettes     Quit date: 1960     Years since quittin.5    Smokeless tobacco: Never   Vaping Use    Vaping Use: Never used   Substance and Sexual Activity    Alcohol use: Yes     Comment: occasionally    Drug use: Never     Social Determinants of Health     Food Insecurity: No Food Insecurity (2024)    Food Insecurity     Food Insecurity: Never true   Transportation Needs: No Transportation Needs (2024)    Transportation Needs     Lack of Transportation: No   Housing Stability: Low Risk  (2024)    Housing Stability     Housing Instability: No       Review of Systems :  Constitutional: As per HPI  Respiratory: Negative for cough and shortness of breath.    Cardiovascular: Negative for chest pain and palpitations.   Gastrointestinal: Negative for vomiting and abdominal pain.   Genitourinary: Negative for dysuria and hematuria.   Musculoskeletal: Negative for joint swelling and arthralgias.       Positive for stated complaint: AMS, Weakness  Other systems are as noted in HPI.  Constitutional and vital signs reviewed.      All other systems reviewed and negative except as noted above.    PSFH elements reviewed from today and agreed except as otherwise stated in HPI.    Physical Exam     ED Triage Vitals [24 1412]   BP (!) 174/87   Pulse 56   Resp 20   Temp (!) 96.2 °F (35.7 °C)   Temp src Temporal   SpO2 96 %   O2 Device None (Room air)       Current:BP (!) 174/87   Pulse 56   Temp (!) 96.2 °F (35.7 °C) (Temporal)   Resp 20   Ht 172.7 cm (5' 8\")   Wt 59 kg   SpO2 96%   BMI 19.77 kg/m²         Physical Exam   Constitutional: No distress. Nontoxic.  HEENT: MMM.  Head: Normocephalic. Atraumatic.  Eyes: No  injection. No photophobia.  Neck: Neck supple. No meningismus.  Cardiovascular: RRR.   Pulmonary/Chest: Effort normal. CTAB.  Abdominal: Soft. Nontender; no CVA/flank tenderness.  Musculoskeletal: No gross deformity.  Neurological: Alert. Oriented to person/hospital, disoriented to time (baseline per wife at bedside). CN II-XII grossly intact. BUE/BLE proximally and distally with 5/5 strength.  Skin: Skin is warm.   Psychiatric: Cooperative.  Nursing note and vitals reviewed.        ED Course     Labs Reviewed   VENOUS BLOOD GAS - Abnormal; Notable for the following components:       Result Value    Venous pCO2 59 (*)     Venous pO2 49 (*)     Venous Bicarbonate 29.2 (*)     Blood Gas Base Excess 6.0 (*)     All other components within normal limits   URINALYSIS, ROUTINE - Abnormal; Notable for the following components:    Clarity Urine Turbid (*)     Blood Urine 3+ (*)     Protein Urine 50 (*)     Leukocyte Esterase Urine 500 (*)     WBC Urine >50 (*)     RBC Urine >10 (*)     Bacteria Urine 1+ (*)     Squamous Epi. Cells Few (*)     All other components within normal limits   URINE CULTURE, ROUTINE - Abnormal; Notable for the following components:    Urine Culture >100,000 CFU/ML Gram Negative Alexis (*)     All other components within normal limits   CBC W/ DIFFERENTIAL - Abnormal; Notable for the following components:    .7 (*)     MCH 34.2 (*)     RDW-SD 48.8 (*)     All other components within normal limits   COMP METABOLIC PANEL (14) - Normal   TSH W REFLEX TO FREE T4 - Normal   CBC WITH DIFFERENTIAL WITH PLATELET    Narrative:     The following orders were created for panel order CBC With Differential With Platelet.  Procedure                               Abnormality         Status                     ---------                               -----------         ------                     CBC W/ DIFFERENTIAL[[id number]]          Abnormal            Final result                 Please view results for these  tests on the individual orders.       ED Course as of 03/08/24 1943  ------------------------------------------------------------  Time: 03/08 1602  Comment: Questionably agitated with staff/wife upon pIV placement/labs - will initiate prn haloperidol to facilitate diagnostics.  ------------------------------------------------------------  Time: 03/08 1810  Comment: Case management graciously evaluating for possible home health and/or transport assistance with wife deferring at this time and comfortable with discharge.  ------------------------------------------------------------  Time: 03/08 1821  Comment: Wife noting ongoing comfort with discharge plan of care; warning instructions for emergent return given with understanding voiced.       MDM   DIFFERENTIAL DIAGNOSIS: After history and physical exam differential diagnosis includes but is not limited to hypercapnia, UTI, thyroid derangement, dementia, MELANIE, electrolyte derangement.    Pulse ox: 96%:Normal on RA, as interpreted by myself    Cardiac Monitor Interpretation:   Pulse Readings from Last 1 Encounters:   03/08/24 56   , sinus,      Medical Decision Making  Evaluation for generalized/nonfocal weakness in AF/HDS, nontoxic patient. Labs nonacute, UA as noted without fever in HDS patient without CVA/flank tenderness for which ceftriaxone initiated. Wife at bedside and comfortable with discharge plan of care,  contacted and graciously offering discharge/transportation assistance, family comfortable with discharge and current support system.    Problems Addressed:  Acute cystitis without hematuria: acute illness or injury    Amount and/or Complexity of Data Reviewed  Independent Historian: spouse     Details: Wife at bedside for collateral history  External Data Reviewed: labs, radiology and notes.     Details: 1/21/2024 neurology consultation and renal function panel reviewed, 1/17/2024 [facility] reviewed  Labs: ordered. Decision-making details documented in ED  Course.  Discussion of management or test interpretation with external provider(s): Case d/w case management Funmilayo to facilitate discharge planning    Risk  Prescription drug management.        I was wearing at minimum a facemask and eye protection throughout this encounter with handwashing performed prior and after patient evaluation without personal hand/facial/oropharyngeal contact and gloves worn throughout encounter. See note and/or contact this provider for further PPE details.    Disposition and Plan     Clinical Impression:  1. Acute cystitis without hematuria    2. Weakness generalized        Disposition:  Discharge    Follow-up:  Yaakov Zepeda    Call  For followup and re-evaluation.      Medications Prescribed:  Discharge Medication List as of 3/8/2024  7:22 PM        START taking these medications    Details   cephalexin 500 MG Oral Cap Take 1 capsule (500 mg total) by mouth 2 (two) times daily for 7 days., Normal, Disp-14 capsule, R-0

## 2024-03-09 NOTE — CM/SW NOTE
Met with wife at [facility] to discuss transporting patient home    Offered assistance with medicar and lyft assist at the home but wife refused stating \"we will be just fine, I have we have a wheelchair, a gait belt and I get him right in my car.\"  Wife states neighbors are available if needed also    Steph PIERCE, CCM, MSN    Emergency Room  [facility]  Clinical Transitions Leader  [phone]

## 2024-03-10 NOTE — PROGRESS NOTES
ED Culture Callback Results Review    Pharmacist reviewed culture results from ED visit .    Final urine culture positive for P. mirabilis. Patient was prescribed Cephalexin (Keflex) on discharge. Current therapy is appropriate based on reported susceptibilities. No further intervention required at this time.      Tigre Inman, Marilyn  Emergency Medicine Pharmacist Specialist  03/10/24; 11:35 AM

## 2024-03-11 ENCOUNTER — HOSPITAL ENCOUNTER (INPATIENT)
Facility: HOSPITAL | Age: 89
LOS: 3 days | Discharge: HOME | End: 2024-03-14
Attending: EMERGENCY MEDICINE | Admitting: HOSPITALIST
Payer: MEDICARE

## 2024-03-11 DIAGNOSIS — R40.0 SOMNOLENCE: ICD-10-CM

## 2024-03-11 DIAGNOSIS — N20.0 STAGHORN KIDNEY STONES: ICD-10-CM

## 2024-03-11 DIAGNOSIS — N39.0 RECURRENT UTI: ICD-10-CM

## 2024-03-11 DIAGNOSIS — N39.0 ACUTE URINARY TRACT INFECTION: Primary | ICD-10-CM

## 2024-03-11 LAB
ALBUMIN SERPL-MCNC: 3.7 G/DL (ref 3.2–4.8)
ALP LIVER SERPL-CCNC: 101 U/L
ALT SERPL-CCNC: 35 U/L
ANION GAP SERPL CALC-SCNC: 1 MMOL/L (ref 0–18)
AST SERPL-CCNC: 22 U/L (ref ?–34)
BASOPHILS # BLD AUTO: 0.04 X10(3) UL (ref 0–0.2)
BASOPHILS NFR BLD AUTO: 0.5 %
BILIRUB DIRECT SERPL-MCNC: 0.1 MG/DL (ref ?–0.3)
BILIRUB SERPL-MCNC: 0.3 MG/DL (ref 0.2–1.1)
BILIRUB UR QL: NEGATIVE
BUN BLD-MCNC: 16 MG/DL (ref 9–23)
BUN/CREAT SERPL: 26.2 (ref 10–20)
CALCIUM BLD-MCNC: 9.7 MG/DL (ref 8.7–10.4)
CHLORIDE SERPL-SCNC: 108 MMOL/L (ref 98–112)
CO2 SERPL-SCNC: 33 MMOL/L (ref 21–32)
COLOR UR: YELLOW
CREAT BLD-MCNC: 0.61 MG/DL
DEPRECATED RDW RBC AUTO: 49.3 FL (ref 35.1–46.3)
EGFRCR SERPLBLD CKD-EPI 2021: 93 ML/MIN/1.73M2 (ref 60–?)
EOSINOPHIL # BLD AUTO: 0.18 X10(3) UL (ref 0–0.7)
EOSINOPHIL NFR BLD AUTO: 2.5 %
ERYTHROCYTE [DISTWIDTH] IN BLOOD BY AUTOMATED COUNT: 13 % (ref 11–15)
GLUCOSE BLD-MCNC: 77 MG/DL (ref 70–99)
GLUCOSE BLDC GLUCOMTR-MCNC: 114 MG/DL (ref 70–99)
GLUCOSE UR-MCNC: NORMAL MG/DL
HCT VFR BLD AUTO: 35.7 %
HGB BLD-MCNC: 11.6 G/DL
HYALINE CASTS #/AREA URNS AUTO: PRESENT /LPF
IMM GRANULOCYTES # BLD AUTO: 0.03 X10(3) UL (ref 0–1)
IMM GRANULOCYTES NFR BLD: 0.4 %
KETONES UR-MCNC: NEGATIVE MG/DL
LEUKOCYTE ESTERASE UR QL STRIP.AUTO: 250
LYMPHOCYTES # BLD AUTO: 1.47 X10(3) UL (ref 1–4)
LYMPHOCYTES NFR BLD AUTO: 20.2 %
MCH RBC QN AUTO: 33.8 PG (ref 26–34)
MCHC RBC AUTO-ENTMCNC: 32.5 G/DL (ref 31–37)
MCV RBC AUTO: 104.1 FL
MONOCYTES # BLD AUTO: 0.66 X10(3) UL (ref 0.1–1)
MONOCYTES NFR BLD AUTO: 9.1 %
NEUTROPHILS # BLD AUTO: 4.9 X10 (3) UL (ref 1.5–7.7)
NEUTROPHILS # BLD AUTO: 4.9 X10(3) UL (ref 1.5–7.7)
NEUTROPHILS NFR BLD AUTO: 67.3 %
NITRITE UR QL STRIP.AUTO: NEGATIVE
OSMOLALITY SERPL CALC.SUM OF ELEC: 294 MOSM/KG (ref 275–295)
PH UR: 6 [PH] (ref 5–8)
PLATELET # BLD AUTO: 164 10(3)UL (ref 150–450)
POTASSIUM SERPL-SCNC: 4.6 MMOL/L (ref 3.5–5.1)
PROT SERPL-MCNC: 6.9 G/DL (ref 5.7–8.2)
PROT UR-MCNC: 20 MG/DL
RBC # BLD AUTO: 3.43 X10(6)UL
RBC #/AREA URNS AUTO: >10 /HPF
SODIUM SERPL-SCNC: 142 MMOL/L (ref 136–145)
SP GR UR STRIP: 1.02 (ref 1–1.03)
UROBILINOGEN UR STRIP-ACNC: NORMAL
WBC # BLD AUTO: 7.3 X10(3) UL (ref 4–11)
WBC #/AREA URNS AUTO: >50 /HPF

## 2024-03-11 PROCEDURE — 99223 1ST HOSP IP/OBS HIGH 75: CPT | Performed by: HOSPITALIST

## 2024-03-11 RX ORDER — ONDANSETRON 2 MG/ML
4 INJECTION INTRAMUSCULAR; INTRAVENOUS EVERY 6 HOURS PRN
Status: DISCONTINUED | OUTPATIENT
Start: 2024-03-11 | End: 2024-03-14

## 2024-03-11 RX ORDER — ACETAMINOPHEN 500 MG
500 TABLET ORAL EVERY 4 HOURS PRN
Status: DISCONTINUED | OUTPATIENT
Start: 2024-03-11 | End: 2024-03-14

## 2024-03-11 RX ORDER — SODIUM CHLORIDE 9 MG/ML
INJECTION, SOLUTION INTRAVENOUS CONTINUOUS
Status: DISCONTINUED | OUTPATIENT
Start: 2024-03-11 | End: 2024-03-11

## 2024-03-11 RX ORDER — HEPARIN SODIUM 5000 [USP'U]/ML
5000 INJECTION, SOLUTION INTRAVENOUS; SUBCUTANEOUS EVERY 12 HOURS SCHEDULED
Status: DISCONTINUED | OUTPATIENT
Start: 2024-03-11 | End: 2024-03-14

## 2024-03-11 RX ORDER — METOCLOPRAMIDE HYDROCHLORIDE 5 MG/ML
10 INJECTION INTRAMUSCULAR; INTRAVENOUS EVERY 8 HOURS PRN
Status: DISCONTINUED | OUTPATIENT
Start: 2024-03-11 | End: 2024-03-12

## 2024-03-11 RX ORDER — SODIUM CHLORIDE 9 MG/ML
INJECTION, SOLUTION INTRAVENOUS CONTINUOUS
Status: DISCONTINUED | OUTPATIENT
Start: 2024-03-11 | End: 2024-03-13

## 2024-03-11 NOTE — ED INITIAL ASSESSMENT (HPI)
Patient arrives via EMS with reports of increased weakness and AMS. Patient recently here for UTI and discharged with abx. Aox1. Unknown baseline. Per medics no fevers.     Hx. Dementia

## 2024-03-11 NOTE — ED QUICK NOTES
Patient transported to floor in stable condition. Belongings with patient. Continuation of care endorsed to floor RN.

## 2024-03-11 NOTE — H&P
[facility]    PATIENT'S NAME: VIET MCKINLEY JR.   ATTENDING PHYSICIAN: Zahira Sanchez MD   PATIENT ACCOUNT#:   [id number]    LOCATION:  [place] [address]  MEDICAL RECORD #:   [id number]       YOB: 1935  ADMISSION DATE:       03/11/2024    HISTORY AND PHYSICAL EXAMINATION    CHIEF COMPLAINT:  Encephalopathy, urinary tract infection, and generalized weakness.     HISTORY OF PRESENT ILLNESS:  Patient is an 88-year-old  male with a history of Parkinson disease and recurrent urinary tract infections, was seen at the emergency room on March 8.  Urinalysis suggestive of urinary tract infection.  Urine cultures were obtained and discharged on Keflex.  Today, his wife brought him in for progressive weakness.  Repeat urinalysis today showed still evidence of urinary tract infection.  Chemistry and CBC were unremarkable.  Patient was started on IV Rocephin, and he Patient will be admitted to the hospital for further management.  Urine culture from March 8 grew Proteus mirabilis.      PAST MEDICAL HISTORY:  He had bilateral staghorn kidney stones, possibly serving as a nidus for recurrent Proteus mirabilis urinary tract infections.  He has a history of Parkinson disease, hypertension, hyperlipidemia, dementia, benign prostatic hypertrophy, anxiety, seizure disorder.     PAST SURGICAL HISTORY:  Cystoscopy, lithotripsy, and TURP.  Patient is supposed to follow up with a urologist at [facility] as a new patient regarding his staghorn kidney stones.     MEDICATIONS:  Please see medication reconciliation list.  Currently on methenamine suppressive antibiotic treatment since his recent hospitalization in January 2024 with a similar presentation.     ALLERGIES:  No known drug allergies.    FAMILY HISTORY:  Father had hypertension.      SOCIAL HISTORY:  Ex-tobacco user.  No current tobacco, alcohol, or drug use.  Lives with his wife.  Requires some assistance in his basic activities of  daily living.     REVIEW OF SYSTEMS:  Progressive weakness, fatigue, inability to even due basic activities of daily living.  Patient is not able to tell me if he had dysuria or urinary frequency.        PHYSICAL EXAMINATION:    GENERAL:  Alert and oriented to time, place and person.  Fatigued.  No acute distress.   VITAL SIGNS:  Temperature 98.0, pulse 56, respiratory rate 14, blood pressure 164/71, pulse ox 95% on room air.  HEENT:  Atraumatic.  Oropharynx clear.  Dry mucous membranes.  Normal hard and soft palate.  Eyes:  Anicteric sclerae.    NECK:  Supple.  No lymphadenopathy.  Trachea midline.  Full range of motion.   LUNGS:  Clear to auscultation bilaterally.  Normal respiratory effort.    HEART:  Regular rate and rhythm.  S1 and S2 auscultated.  No murmur.    ABDOMEN:  Soft, nondistended.  No tenderness.  Positive bowel sounds.   EXTREMITIES:  No peripheral edema, clubbing or cyanosis.   NEUROLOGIC:  Motor and sensory intact.      ASSESSMENT:    1.   Recurrent urinary tract infection with Proteus mirabilis with underlying bilateral staghorn kidney stones serving as nidus for recurrent infections.  2.   Parkinson disease.   3.   Hypertension.    PLAN:  Patient will be admitted to general medical floor.  IV Rocephin.  Family is requesting urology evaluation at [facility].  Place him on fall precautions.  Monitor his hemodynamic status.  Further recommendations to follow.     Dictated By Otilio Francis MD  d: 03/11/2024 17:47:39  t: 03/11/2024 18:31:48  Job [id number]  FB/

## 2024-03-11 NOTE — ED QUICK NOTES
Orders for admission, patient is aware of plan and ready to go upstairs. Any questions, please call ED RN Pacheco at extension [phone].     Patient Covid vaccination status: Fully vaccinated     COVID Test Ordered in ED: None    COVID Suspicion at Admission: N/A    Running Infusions:  None    Mental Status/LOC at time of transport: a/o x 1 (baseline per wife)    Other pertinent information: Patient is extremely sensitive to touch. Patient stated even a cold alcohol swab hurt him.   CIWA score: N/A   NIH score:  N/A

## 2024-03-12 ENCOUNTER — APPOINTMENT (OUTPATIENT)
Dept: GENERAL RADIOLOGY | Facility: HOSPITAL | Age: 89
End: 2024-03-12
Attending: UROLOGY
Payer: MEDICARE

## 2024-03-12 ENCOUNTER — APPOINTMENT (OUTPATIENT)
Dept: ULTRASOUND IMAGING | Facility: HOSPITAL | Age: 89
End: 2024-03-12
Attending: UROLOGY
Payer: MEDICARE

## 2024-03-12 LAB
ANION GAP SERPL CALC-SCNC: 1 MMOL/L (ref 0–18)
BASOPHILS # BLD AUTO: 0.03 X10(3) UL (ref 0–0.2)
BASOPHILS NFR BLD AUTO: 0.6 %
BUN BLD-MCNC: 12 MG/DL (ref 9–23)
BUN/CREAT SERPL: 20 (ref 10–20)
CALCIUM BLD-MCNC: 9.6 MG/DL (ref 8.7–10.4)
CHLORIDE SERPL-SCNC: 110 MMOL/L (ref 98–112)
CO2 SERPL-SCNC: 30 MMOL/L (ref 21–32)
CREAT BLD-MCNC: 0.6 MG/DL
DEPRECATED RDW RBC AUTO: 47.5 FL (ref 35.1–46.3)
EGFRCR SERPLBLD CKD-EPI 2021: 93 ML/MIN/1.73M2 (ref 60–?)
EOSINOPHIL # BLD AUTO: 0.25 X10(3) UL (ref 0–0.7)
EOSINOPHIL NFR BLD AUTO: 5.4 %
ERYTHROCYTE [DISTWIDTH] IN BLOOD BY AUTOMATED COUNT: 12.7 % (ref 11–15)
GLUCOSE BLD-MCNC: 80 MG/DL (ref 70–99)
HCT VFR BLD AUTO: 32.5 %
HGB BLD-MCNC: 11 G/DL
IMM GRANULOCYTES # BLD AUTO: 0.01 X10(3) UL (ref 0–1)
IMM GRANULOCYTES NFR BLD: 0.2 %
LYMPHOCYTES # BLD AUTO: 1.62 X10(3) UL (ref 1–4)
LYMPHOCYTES NFR BLD AUTO: 34.8 %
MCH RBC QN AUTO: 34.3 PG (ref 26–34)
MCHC RBC AUTO-ENTMCNC: 33.8 G/DL (ref 31–37)
MCV RBC AUTO: 101.2 FL
MONOCYTES # BLD AUTO: 0.49 X10(3) UL (ref 0.1–1)
MONOCYTES NFR BLD AUTO: 10.5 %
NEUTROPHILS # BLD AUTO: 2.26 X10 (3) UL (ref 1.5–7.7)
NEUTROPHILS # BLD AUTO: 2.26 X10(3) UL (ref 1.5–7.7)
NEUTROPHILS NFR BLD AUTO: 48.5 %
OSMOLALITY SERPL CALC.SUM OF ELEC: 291 MOSM/KG (ref 275–295)
PLATELET # BLD AUTO: 163 10(3)UL (ref 150–450)
POTASSIUM SERPL-SCNC: 4.1 MMOL/L (ref 3.5–5.1)
Q-T INTERVAL: 430 MS
QRS DURATION: 86 MS
QTC CALCULATION (BEZET): 399 MS
R AXIS: 1 DEGREES
RBC # BLD AUTO: 3.21 X10(6)UL
SODIUM SERPL-SCNC: 141 MMOL/L (ref 136–145)
T AXIS: -3 DEGREES
VENTRICULAR RATE: 52 BPM
WBC # BLD AUTO: 4.7 X10(3) UL (ref 4–11)

## 2024-03-12 PROCEDURE — 74018 RADEX ABDOMEN 1 VIEW: CPT | Performed by: UROLOGY

## 2024-03-12 PROCEDURE — 76775 US EXAM ABDO BACK WALL LIM: CPT | Performed by: UROLOGY

## 2024-03-12 PROCEDURE — 99233 SBSQ HOSP IP/OBS HIGH 50: CPT | Performed by: HOSPITALIST

## 2024-03-12 RX ORDER — POLYETHYLENE GLYCOL 3350 17 G/17G
17 POWDER, FOR SOLUTION ORAL DAILY
Status: DISCONTINUED | OUTPATIENT
Start: 2024-03-12 | End: 2024-03-14

## 2024-03-12 RX ORDER — ARIPIPRAZOLE 2 MG/1
2 TABLET ORAL EVERY OTHER DAY
Status: DISCONTINUED | OUTPATIENT
Start: 2024-03-12 | End: 2024-03-14

## 2024-03-12 RX ORDER — DONEPEZIL HYDROCHLORIDE 10 MG/1
10 TABLET, FILM COATED ORAL NIGHTLY
Status: DISCONTINUED | OUTPATIENT
Start: 2024-03-12 | End: 2024-03-14

## 2024-03-12 RX ORDER — MIRTAZAPINE 7.5 MG/1
15 TABLET, FILM COATED ORAL NIGHTLY
Status: DISCONTINUED | OUTPATIENT
Start: 2024-03-12 | End: 2024-03-14

## 2024-03-12 RX ORDER — HYDRALAZINE HYDROCHLORIDE 20 MG/ML
10 INJECTION INTRAMUSCULAR; INTRAVENOUS EVERY 6 HOURS PRN
Status: DISCONTINUED | OUTPATIENT
Start: 2024-03-12 | End: 2024-03-14

## 2024-03-12 RX ORDER — ATORVASTATIN CALCIUM 20 MG/1
20 TABLET, FILM COATED ORAL NIGHTLY
Status: DISCONTINUED | OUTPATIENT
Start: 2024-03-12 | End: 2024-03-14

## 2024-03-12 RX ORDER — VENLAFAXINE 37.5 MG/1
37.5 TABLET ORAL 2 TIMES DAILY
Status: DISCONTINUED | OUTPATIENT
Start: 2024-03-12 | End: 2024-03-14

## 2024-03-12 RX ORDER — ASPIRIN 81 MG/1
81 TABLET ORAL DAILY
Status: DISCONTINUED | OUTPATIENT
Start: 2024-03-12 | End: 2024-03-14

## 2024-03-12 NOTE — ED PROVIDER NOTES
Patient Seen in: [facility] 5sw/se      History     Chief Complaint   Patient presents with    Altered Mental Status     Stated Complaint: UTI    Subjective:   HPI    The patient is an 88-year-old male with a history of hypertension, kidney stones, dementia and possible Parkinson's who was brought by his wife for increased lethargy and generalized weakness.  She states that he was seen in the emergency department on 3/8 and diagnosed with a UTI and was given ceftriaxone and home with an antibiotic.  She states he was doing okay yesterday but sleeping more.  Today she had difficult time waking him up and had to wake him up to eat.  He was too weak to get out of bed.  No vomiting.  No fevers or chills.  He denies pain.  He is more confused than his baseline.    Objective:   Past Medical History:   Diagnosis Date    Anxiety state     BPH (benign prostatic hyperplasia)     Dementia (HCC)     Depression     Dysphagia     Febrile illness 2021    High blood pressure     Kidney stones     Other and unspecified hyperlipidemia     Pneumonia due to organism     Seizure disorder (HCC)     because of venalfaxine    Skin cancer     melanoma    Unspecified essential hypertension               Past Surgical History:   Procedure Laterality Date    EGD  2022    ESOPHAGOGASTRODUODENOSCOPY with Botox injection    EGD  2021    Large food impaction in the mid and distal esophagus    HEMIARTHROPLASTY HIP Right 2022    Right Cemented Hemiarthroplasty    LITHOTRIPSY      TRANSURETHRAL DESTRUCTION, PROSTATE TISSUE; WATER-INDUC                  Social History     Socioeconomic History    Marital status:    Tobacco Use    Smoking status: Former     Years: 3     Types: Cigarettes     Quit date: 1960     Years since quittin.6    Smokeless tobacco: Never   Vaping Use    Vaping Use: Never used   Substance and Sexual Activity    Alcohol use: Not Currently     Comment: occasionally    Drug use: Never      Social Determinants of Health     Food Insecurity: No Food Insecurity (1/17/2024)    Food Insecurity     Food Insecurity: Never true   Transportation Needs: No Transportation Needs (1/17/2024)    Transportation Needs     Lack of Transportation: No   Housing Stability: Low Risk  (1/17/2024)    Housing Stability     Housing Instability: No              Review of Systems    Positive for stated complaint: UTI  Other systems are as noted in HPI.  Constitutional and vital signs reviewed.      All other systems reviewed and negative except as noted above.    Physical Exam     ED Triage Vitals   BP 03/11/24 1322 (!) 176/61   Pulse 03/11/24 1322 56   Resp 03/11/24 1322 20   Temp 03/11/24 1958 97.3 °F (36.3 °C)   Temp src 03/11/24 1958 Oral   SpO2 03/11/24 1322 96 %   O2 Device 03/11/24 1322 None (Room air)       Current:/74 (BP Location: Right arm)   Pulse 54   Temp 97.3 °F (36.3 °C) (Oral)   Resp 16   Ht 172.7 cm (5' 8\")   Wt 66.5 kg   SpO2 97%   BMI 22.31 kg/m²         Physical Exam  Vitals and nursing note reviewed.   Constitutional:       General: He is not in acute distress.     Appearance: Normal appearance. He is well-developed. He is not ill-appearing.   HENT:      Head: Normocephalic and atraumatic.      Mouth/Throat:      Mouth: Mucous membranes are moist.   Eyes:      Conjunctiva/sclera: Conjunctivae normal.      Pupils: Pupils are equal, round, and reactive to light.   Neck:      Vascular: No JVD.   Cardiovascular:      Rate and Rhythm: Normal rate and regular rhythm.      Heart sounds: Normal heart sounds. No murmur heard.  Pulmonary:      Effort: Pulmonary effort is normal.      Breath sounds: Normal breath sounds.   Abdominal:      General: Bowel sounds are normal. There is no distension.      Palpations: Abdomen is soft. There is no mass.      Tenderness: There is no abdominal tenderness. There is no guarding or rebound.   Musculoskeletal:         General: Normal range of motion.       Cervical back: Normal range of motion and neck supple.   Skin:     General: Skin is warm and dry.      Capillary Refill: Capillary refill takes less than 2 seconds.      Findings: No rash.   Neurological:      General: No focal deficit present.      Mental Status: He is alert. Mental status is at baseline. He is disoriented.      Cranial Nerves: No cranial nerve deficit.      Sensory: No sensory deficit.      Motor: No weakness.      Deep Tendon Reflexes: Reflexes are normal and symmetric.   Psychiatric:         Judgment: Judgment normal.       Differential diagnosis includes infection, electrolyte abnormality, dehydration, medication reaction        ED Course     Labs Reviewed   BASIC METABOLIC PANEL (8) - Abnormal; Notable for the following components:       Result Value    CO2 33.0 (*)     Creatinine 0.61 (*)     BUN/CREA Ratio 26.2 (*)     All other components within normal limits   URINALYSIS, ROUTINE - Abnormal; Notable for the following components:    Clarity Urine Turbid (*)     Blood Urine 2+ (*)     Protein Urine 20 (*)     Leukocyte Esterase Urine 250 (*)     WBC Urine >50 (*)     RBC Urine >10 (*)     Hyaline Casts Present (*)     All other components within normal limits   POCT GLUCOSE - Abnormal; Notable for the following components:    POC Glucose  114 (*)     All other components within normal limits   CBC W/ DIFFERENTIAL - Abnormal; Notable for the following components:    RBC 3.43 (*)     HGB 11.6 (*)     HCT 35.7 (*)     .1 (*)     RDW-SD 49.3 (*)     All other components within normal limits   HEPATIC FUNCTION PANEL (7) - Normal   CBC WITH DIFFERENTIAL WITH PLATELET    Narrative:     The following orders were created for panel order CBC With Differential With Platelet.  Procedure                               Abnormality         Status                     ---------                               -----------         ------                     CBC W/ DIFFERENTIAL[[id number]]          Abnormal             Final result                 Please view results for these tests on the individual orders.   RAINBOW DRAW LAVENDER   RAINBOW DRAW LIGHT GREEN     EKG    Rate, intervals and axes as noted on EKG Report.  Rate: 52  Rhythm: Junctional rhythm  Reading: Nonspecific ST changes                          MDM        Admission disposition: 3/11/2024  5:35 PM                                        Medical Decision Making  Patient with persistent recurrent UTI with generalized weakness and increased lethargy  Culture shows that patient is sensitive to cephalosporins  Previous notes that show that he has had bilateral staghorn calculi and has not yet followed up with urology which she was supposed to per his wife  Will admit for IV antibiotics IV fluids and urology on consult  Vital signs stable prior to admission    Problems Addressed:  Acute urinary tract infection: acute illness or injury  Recurrent UTI: acute illness or injury  Somnolence: acute illness or injury  Staghorn kidney stones: chronic illness or injury    Amount and/or Complexity of Data Reviewed  Independent Historian: spouse     Details: Assisting with history   External Data Reviewed: notes.     Details: from urology in September 23 reviewed  Labs: ordered.  ECG/medicine tests: ordered and independent interpretation performed. Decision-making details documented in ED Course.  Discussion of management or test interpretation with external provider(s): Case discussed with hospitalist and urology regarding admission    Risk  Decision regarding hospitalization.        Disposition and Plan     Clinical Impression:  1. Acute urinary tract infection    2. Somnolence    3. Recurrent UTI    4. Staghorn kidney stones         Disposition:  Admit  3/11/2024  5:35 pm    Follow-up:  No follow-up provider specified.  We recommend that you schedule follow up care with a primary care provider within the next three months to obtain basic health screening including  reassessment of your blood pressure.      Medications Prescribed:  Current Discharge Medication List                            Hospital Problems       Present on Admission  Date Reviewed: 12/5/2023            ICD-10-CM Noted POA    * (Principal) Acute urinary tract infection N39.0 3/11/2024 Unknown    Recurrent UTI N39.0 3/11/2024 Unknown    Somnolence R40.0 3/11/2024 Unknown    Staghorn kidney stones N20.0 3/11/2024 Unknown

## 2024-03-12 NOTE — PROGRESS NOTES
[facility]  part of [facility]    Progress Note    Fernando Ramos Jr. Patient Status:  Inpatient    1935 MRN [id number]   Location [facility] 5SW/SE Attending Jose Roberto Kothari MD   Hosp Day # 1 PCP [facility]     Chief Complaint:   Chief Complaint   Patient presents with    Altered Mental Status   Weakness     Subjective:   Fernando Ramos Jr. is up to the chair with PT - he denies any complaints. Wife arrived at bedside when I was examining him. He denies any urinary complaints no back pain no abd pain. He is A&O x 1 self       Objective:   Objective:    Blood pressure (!) 162/69, pulse 63, temperature 98.4 °F (36.9 °C), temperature source Axillary, resp. rate 16, height 5' 8\" (1.727 m), weight 146 lb 11.2 oz (66.5 kg), SpO2 94%.    Physical Exam:    General: No acute distress.   Respiratory: Clear to auscultation bilaterally. No wheezes. No rhonchi.  Cardiovascular: S1, S2. Regular rate and rhythm. No murmurs, rubs or gallops.   Abdomen: Soft, nontender, nondistended.  Positive bowel sounds. No rebound or guarding.  Neurologic: No focal neurological deficits.   Musculoskeletal: Moves all extremities.  Extremities: No edema.      Results:   Results:    Labs:  Recent Labs   Lab 24  1546 24  1540 24  0540   WBC 5.6 7.3 4.7   HGB 14.0 11.6* 11.0*   .7* 104.1* 101.2*   .0 164.0 163.0       Recent Labs   Lab 24  1546 24  1412 24  0540   GLU 86 77 80   BUN 13 16 12   CREATSERUM 0.71 0.61* 0.60*   CA 10.4 9.7 9.6   ALB 4.4 3.7  --     142 141   K 4.9 4.6 4.1    108 110   CO2 30.0 33.0* 30.0   ALKPHO 116 101  --    AST 29 22  --    ALT 14 35  --    BILT 0.4 0.3  --    TP 8.0 6.9  --        Estimated Creatinine Clearance: 81.6 mL/min (A) (based on SCr of 0.6 mg/dL (L)).    No results for input(s): \"PTP\", \"INR\" in the last 168 hours.         Culture:  No results found for this visit on 24.    Cardiac  No results  for input(s): \"TROP\", \"PBNP\" in the last 168 hours.      Imaging: Imaging data reviewed in Epic.  No results found.    Medications:    ARIPiprazole  2 mg Oral QOD    carbidopa-levodopa  0.5 tablet Oral TID    carbidopa-levodopa  1 tablet Oral TID    mirtazapine  15 mg Oral Nightly    aspirin  81 mg Oral Daily    donepezil  10 mg Oral Nightly    atorvastatin  20 mg Oral Nightly    venlafaxine  37.5 mg Oral BID    heparin  5,000 Units Subcutaneous 2 times per day    cefTRIAXone  1 g Intravenous Q24H         Assessment and Plan:   Assessment & Plan:      Recurrent UTI   Underlying large R renal calculus and partial staghorn calclulus L  Weakness   - Ucx from 3/8 Proteus Mirabilis   - Pt not interested in PCNL/surgery for stone  - Hold methanamine suppressive treatment.   - IV Rocephin 1 g daily   - WBC normal. Afebrile.   - US kidney   - ? If weakness from infection as no leukocytosis or fever and no dysuria (though was on PO abx prior to arrival)     Dementia   MDD/OSCAR  - Aricept  - supportive care   - venlafaxine, remeron   - outpt neuro fu     Parkinson's disease   - Sinemet   - was started with this on last admit.   - Needs to fu with neuro outpt     Sinus bradycardia  - EKG sinus ivonne   - tele.  - TSH normal.   - HR better now     Other issues   HTN - diet controlled at home. Add PRN hydralazing   Hyperlipidemia  BPH  Episode of Seizure d/o     >55min spent, >50% spent counseling and coordinating care in the form of educating pt/family and d/w consultants and staff. Most of the time spent discussing the above plan.        Plan of care discussed with patient or family at bedside.    Jose Roberto Kothari MD  Hospitalist          Supplementary Documentation:     Quality:  DVT Prophylaxis: heparin   CODE status: Full  Dispo: per clinical course           Estimated date of discharge: TBD  Discharge is dependent on: clinical stability  At this point Mr. Ramos is expected to be discharge to:  TBD        **Certification      PHYSICIAN Certification of Need for Inpatient Hospitalization - Initial Certification    Patient will require inpatient services that will reasonably be expected to span two midnight's based on the clinical documentation in H+P.   Based on patients current state of illness, I anticipate that, after discharge, patient will require TBD.

## 2024-03-12 NOTE — PLAN OF CARE
Fernando is alert/oriented x2. Vitals stable on room air. Anxious/irritable at times. Sensitive to cold. Tolerating diet. Saline locked. Ambulating up to chair with 2 assist stand pivot and walker. Denies pain. Abd xray and kidney US completed. Rocephin for antibiotic coverage. Heparin for DVT prophylaxis. Voiding adequately via primofit. No BM today. Pt and wife at bedside updated on plan of care. Bed low, locked, all safety measures in place.    Problem: Patient Centered Care  Goal: Patient preferences are identified and integrated in the patient's plan of care  Description: Interventions:  - What would you like us to know as we care for you? From home with spouse.  - Provide timely, complete, and accurate information to patient/family  - Incorporate patient and family knowledge, values, beliefs, and cultural backgrounds into the planning and delivery of care  - Encourage patient/family to participate in care and decision-making at the level they choose  - Honor patient and family perspectives and choices  Outcome: Progressing     Problem: Patient/Family Goals  Goal: Patient/Family Long Term Goal  Description: Patient's Long Term Goal: Resolution of UTI    Interventions:  - IV antibiotics  - IV fluids  - See additional Care Plan goals for specific interventions  Outcome: Progressing  Goal: Patient/Family Short Term Goal  Description: Patient's Short Term Goal: Increase strength/walk    Interventions:   - Physical therapy  - See additional Care Plan goals for specific interventions  Outcome: Progressing     Problem: GASTROINTESTINAL - ADULT  Goal: Maintains or returns to baseline bowel function  Description: INTERVENTIONS:  - Assess bowel function  - Maintain adequate hydration with IV or PO as ordered and tolerated  - Evaluate effectiveness of GI medications  - Encourage mobilization and activity  - Obtain nutritional consult as needed  - Establish a toileting routine/schedule  - Consider collaborating with  pharmacy to review patient's medication profile  Outcome: Progressing     Problem: SKIN/TISSUE INTEGRITY - ADULT  Goal: Skin integrity remains intact  Description: INTERVENTIONS  - Assess and document risk factors for pressure ulcer development  - Assess and document skin integrity  - Monitor for areas of redness and/or skin breakdown  - Initiate interventions, skin care algorithm/standards of care as needed  Outcome: Progressing     Problem: MUSCULOSKELETAL - ADULT  Goal: Return mobility to safest level of function  Description: INTERVENTIONS:  - Assess patient stability and activity tolerance for standing, transferring and ambulating w/ or w/o assistive devices  - Assist with transfers and ambulation using safe patient handling equipment as needed  - Ensure adequate protection for wounds/incisions during mobilization  - Obtain PT/OT consults as needed  - Advance activity as appropriate  - Communicate ordered activity level and limitations with patient/family  Outcome: Progressing     Problem: Impaired Functional Mobility  Goal: Achieve highest/safest level of mobility/gait  Description: Interventions:  - Assess patient's functional ability and stability  - Promote increasing activity/tolerance for mobility and gait  - Educate and engage patient/family in tolerated activity level and precautions  Outcome: Progressing     Problem: Impaired Swallowing  Goal: Minimize aspiration risk  Description: Interventions:  - Patient should be alert and upright for all feedings (90 degrees preferred)  - Offer food and liquids at a slow rate  - No straws  - Encourage small bites of food and small sips of liquid  - Offer pills one at a time, crush or deliver with applesauce as needed  - Discontinue feeding and notify MD (or speech pathologist) if coughing or persistent throat clearing or wet/gurgly vocal quality is noted  Outcome: Progressing

## 2024-03-12 NOTE — SLP NOTE
ADULT SWALLOWING EVALUATION    ASSESSMENT    ASSESSMENT/OVERALL IMPRESSION:    Proper PPE worn. Hands sanitized upon entrance/exit Pt room.       BSE ordered 2/2 modified diet consistency. Pt admitted 3/11/24 with Acute UTI. Pt on minced & moist/mildly-thick liquids/no straws at home, with spouse. PMH includes HTN, dysphagia, dementia, PNA, HTN.  Current diet minced & moist/mildly-thick liquids.     Pt well known to this department. Most recent  BSE 1/18/24 rec minced& moist/mildly-thick liquids. Most recent [facility]SS 8/05/22 at [facility] outpt services 8/05/22 rec soft ET/mildly-thick liquids.     No CXR completed.       Pt alert, on room air, afebrile and assessed sitting upright in bed (after consulting with RN). Pt agreed to participate. Pt's learning preference verbal. No verbal or non-verbal indication of pain. Vocal quality/intensity weak. Volitional swallow and cough present; considered functional. Oral motor exam revealed overall reduced labial and lingual skills for rate, ROM and strength. Natural dentition with several missing teeth. Pt self-fed soft solid and mildly-thick liquid trials. Bilabial seal adequate; no anterior loss. Lingual skills slow and uncoordinated for bolus formation on soft solids; increased NANCY. Bite reflex of soft solid reduced in strength. Effortful prolonged mastication on soft solids. Oral cavity grossly clear post swallows.     Pharyngeal response appeared delayed per hyolaryngeal elevation to completion (considered weak in strength/rise to palpation). No overt CSA on soft solid nor mildly-thick liquids (no throat clearing, no coughing, no SOB and clear vocal quality). Overall, swallow function appeared weak. Sp02 ~94% during this assessment.        IMPRESSIONS:    Pt presents with mild to moderate oral swallow and possible pharyngeal dysfunction. Collaborated with RN regarding Pt's swallowing plan of care. BSE results/recommendations discussed with Pt and spouse. Spouse v/u. Pt with fair  understanding; would benefit from additional reinforcement. Swallowing precautions written on white board in room for reinforcement/carry-over of skills for Pt, family and staff. Call light within Pt's reach upon SLP discharge from room.          PLAN:    To f/u x1-2 sessions to ensure safe intake of diet and reinforce swallowing/aspiration precautions. To monitor for any CXR results. Family education to be completed as available.        RECOMMENDATIONS   Diet Recommendations - Solids: Mechanical soft ground/ Minced & Moist  Diet Recommendations - Liquids: Nectar thick liquids/ Mildly thick    Compensatory Strategies Recommended: No straws  Aspiration Precautions: Upright position;Small bites and sips;Slow rate;No straw  Medication Administration Recommendations: Whole in puree  Treatment Plan/Recommendations: Aspiration precautions    HISTORY   MEDICAL HISTORY  Reason for Referral: Altered diet consistency    Problem List  Principal Problem:    Acute urinary tract infection  Active Problems:    Somnolence    Recurrent UTI    Staghorn kidney stones      Past Medical History  Past Medical History:   Diagnosis Date    Anxiety state     BPH (benign prostatic hyperplasia)     Dementia (HCC)     Depression     Dysphagia     Febrile illness 02/13/2021    High blood pressure     Kidney stones     Other and unspecified hyperlipidemia     Pneumonia due to organism     Seizure disorder (HCC)     because of venalfaxine    Skin cancer     melanoma    Unspecified essential hypertension        Prior Living Situation: Home with spouse  Diet Prior to Admission: Mechanical soft ground/ Minced & Moist;Nectar thick liquids/ Mildly thick  Precautions: Aspiration    Patient/Family Goals: least restrictive safest diet      SWALLOWING HISTORY  Current Diet Consistency: Mechanical soft ground/ Minced & Moist;Nectar thick liquids/ Mildly thick    OBJECTIVE   ORAL MOTOR EXAMINATION  Dentition: Natural (scattered missing teeth)  Symmetry:  Within Functional Limits  Strength:  (overall reduced)  Range of Motion:  (overall reduced)  Rate of Motion: Reduced    Voice Quality: Weak  Respiratory Status: Unlabored  Consistencies Trialed: Nectar thick liquids;Puree;Soft solid  Method of Presentation: Staff/Clinician assistance;Spoon;Cup  Patient Positioning: Upright;Midline    Oral Phase of Swallow: Impaired  Pharyngeal Phase of Swallow: Impaired  Laryngeal Elevation Timing: Appears impaired  Laryngeal Elevation Strength: Appears impaired     (Please note: Silent aspiration cannot be evaluated clinically. Videofluoroscopic Swallow Study is required to rule-out silent aspiration.)    GOALS  Goal #1 The patient will tolerate minced & moist consistency and mildly-thick  liquids without overt signs or symptoms of aspiration with 100 % accuracy over 2 session(s).  In Progress   Goal #2 The patient will utilize compensatory strategies as outlined by  BSSE (clinical evaluation) including Slow rate, Small bites, Small sips, No straws, Upright 90 degrees with PRN feeding assistance 90 % of the time across 1-2 sessions.    In Progress   FOLLOW UP  Treatment Plan/Recommendations: Aspiration precautions  Number of Visits to Meet Established Goals:  (1-2)  Follow Up Needed (Documentation Required): Yes  SLP Follow-up Date: 03/13/24    Thank you for your referral.   If you have any questions, please contact   Leeann Duarte M.S. [facility]/SLP  Speech-Language Pathologist  [facility]  #[phone]

## 2024-03-12 NOTE — PLAN OF CARE
Patient is a new admit from the ED, AxO x 2, currently on RA. Spouse at bedside. IV fluids started. Patient resting well overnight, no c/o pain.     Problem: Patient Centered Care  Goal: Patient preferences are identified and integrated in the patient's plan of care  Description: Interventions:  - What would you like us to know as we care for you? From home with spouse.  - Provide timely, complete, and accurate information to patient/family  - Incorporate patient and family knowledge, values, beliefs, and cultural backgrounds into the planning and delivery of care  - Encourage patient/family to participate in care and decision-making at the level they choose  - Honor patient and family perspectives and choices  Outcome: Progressing     Problem: Patient/Family Goals  Goal: Patient/Family Long Term Goal  Description: Patient's Long Term Goal: Resolution of UTI    Interventions:  - IV antibiotics  - IV fluids  - See additional Care Plan goals for specific interventions  Outcome: Progressing  Goal: Patient/Family Short Term Goal  Description: Patient's Short Term Goal: Increase strength/walk    Interventions:   - Physical therapy  - See additional Care Plan goals for specific interventions  Outcome: Progressing     Problem: GASTROINTESTINAL - ADULT  Goal: Maintains or returns to baseline bowel function  Description: INTERVENTIONS:  - Assess bowel function  - Maintain adequate hydration with IV or PO as ordered and tolerated  - Evaluate effectiveness of GI medications  - Encourage mobilization and activity  - Obtain nutritional consult as needed  - Establish a toileting routine/schedule  - Consider collaborating with pharmacy to review patient's medication profile  Outcome: Progressing     Problem: SKIN/TISSUE INTEGRITY - ADULT  Goal: Skin integrity remains intact  Description: INTERVENTIONS  - Assess and document risk factors for pressure ulcer development  - Assess and document skin integrity  - Monitor for areas of  redness and/or skin breakdown  - Initiate interventions, skin care algorithm/standards of care as needed  Outcome: Progressing     Problem: MUSCULOSKELETAL - ADULT  Goal: Return mobility to safest level of function  Description: INTERVENTIONS:  - Assess patient stability and activity tolerance for standing, transferring and ambulating w/ or w/o assistive devices  - Assist with transfers and ambulation using safe patient handling equipment as needed  - Ensure adequate protection for wounds/incisions during mobilization  - Obtain PT/OT consults as needed  - Advance activity as appropriate  - Communicate ordered activity level and limitations with patient/family  Outcome: Progressing     Problem: Impaired Functional Mobility  Goal: Achieve highest/safest level of mobility/gait  Description: Interventions:  - Assess patient's functional ability and stability  - Promote increasing activity/tolerance for mobility and gait  - Educate and engage patient/family in tolerated activity level and precautions  - Recommend use of  RW for transfers and ambulation  Outcome: Progressing     Problem: Impaired Swallowing  Goal: Minimize aspiration risk  Description: Interventions:  - Patient should be alert and upright for all feedings (90 degrees preferred)  - Offer food and liquids at a slow rate  - No straws  - Encourage small bites of food and small sips of liquid  - Offer pills one at a time, crush or deliver with applesauce as needed  - Discontinue feeding and notify MD (or speech pathologist) if coughing or persistent throat clearing or wet/gurgly vocal quality is noted  Outcome: Progressing

## 2024-03-12 NOTE — PHYSICAL THERAPY NOTE
PHYSICAL THERAPY EVALUATION - INPATIENT     Room Number: 550/550-A  Evaluation Date: 3/12/2024  Type of Evaluation: Initial   Physician Order: PT Eval and Treat    Presenting Problem: acute UTI  Co-Morbidities : parkinsonian mechanics  Reason for Therapy: Mobility Dysfunction and Discharge Planning    PHYSICAL THERAPY ASSESSMENT   Patient is a 88 year old male admitted 3/11/2024 for acute UTI.  Prior to admission, patient's baseline is ambulatory short distances with RW or WC for community distances, able to ascend/descend 12 stairs to enter/exit condo with CGA of spouse.  Patient is currently functioning below baseline with bed mobility, transfers, and gait.  Patient is requiring moderate assist and maximum assist as a result of the following impairments: decreased functional strength, decreased endurance/aerobic capacity, pain, and medical status.  Physical Therapy will continue to follow for duration of hospitalization.    Patient will benefit from continued skilled PT Services to promote return to prior level of function and safety with continuous assistance and gradual rehabilitative therapy . Spouse states pt is scheduled for OPPT and this is her preference at discharge.     PLAN  PT Treatment Plan: Bed mobility;Body mechanics;Endurance;Energy conservation;Patient education;Gait training;Range of motion;Stoop training;Transfer training;Stair training;Balance training  Rehab Potential : Good  Frequency (Obs): 5x/week    PHYSICAL THERAPY MEDICAL/SOCIAL HISTORY   History related to current admission: acute UTI     Problem List  Principal Problem:    Acute urinary tract infection  Active Problems:    Somnolence    Recurrent UTI    Staghorn kidney stones      HOME SITUATION  Home Situation  Type of Home: Apartment  Home Layout: One level;Elevator  Lives With: Spouse  Patient Owned Equipment: None     SUBJECTIVE  \"Ouch!\" Sensitive to touch     PHYSICAL THERAPY EXAMINATION   OBJECTIVE  Precautions: Bed/chair  alarm  Fall Risk: High fall risk    WEIGHT BEARING RESTRICTION  Weight Bearing Restriction: None                PAIN ASSESSMENT     Location: body aches and R quad pain       COGNITION  Oriented to self, lethargic    BALANCE  Static Sitting: Fair -  Dynamic Sitting: Fair -  Static Standing: Poor  Dynamic Standing: Poor -    AM-PAC '6-Clicks' INPATIENT SHORT FORM - BASIC MOBILITY  How much difficulty does the patient currently have...  Patient Difficulty: Turning over in bed (including adjusting bedclothes, sheets and blankets)?: A Lot   Patient Difficulty: Sitting down on and standing up from a chair with arms (e.g., wheelchair, bedside commode, etc.): A Lot   Patient Difficulty: Moving from lying on back to sitting on the side of the bed?: A Lot   How much help from another person does the patient currently need...   Help from Another: Moving to and from a bed to a chair (including a wheelchair)?: A Lot   Help from Another: Need to walk in hospital room?: Total   Help from Another: Climbing 3-5 steps with a railing?: Total     AM-PAC Score:  Raw Score: 10   Approx Degree of Impairment: 76.75%   Standardized Score (AM-PAC Scale): 32.29   CMS Modifier (G-Code): CL    FUNCTIONAL ABILITY STATUS  Functional Mobility/Gait Assessment  Gait Assistance: Other (Comment) (max SPT only)  Rolling: moderate assist  Supine to Sit: moderate assist  Sit to Supine:  NT  Sit to Stand: maximum assist Max A spt from PT, able to perform second rep of STS with spouse providing min/mod A and pt providing CGA for safety.     Exercise/Education Provided:  Bed mobility  Body mechanics  Functional activity tolerated  Posture  Transfer training    The patient's Approx Degree of Impairment: 76.75% has been calculated based on documentation in the [facility] '6 clicks' Inpatient Basic Mobility Short Form.  Research supports that patients with this level of impairment may benefit from LTAC.  Final disposition will be made by interdisciplinary medical  team.    Patient End of Session: Up in chair;Needs met;Call light within reach;RN aware of session/findings;All patient questions and concerns addressed;Alarm set    CURRENT GOALS  Goals to be met by: 3/30  Patient Goal Patient's self-stated goal is: to rest    Goal #1 Patient is able to demonstrate supine - sit EOB @ level: minimum assistance     Goal #1   Current Status    Goal #2 Patient is able to demonstrate transfers Sit to/from Stand at assistance level: minimum assistance with walker - rolling     Goal #2  Current Status    Goal #3 Patient is able to ambulate 10 feet with assist device: walker - rolling at assistance level: minimum assistance   Goal #3   Current Status    Goal #4 Patient will negotiate 12 stairs/one curb w/ assistive device and supervision   Goal #4   Current Status    Goal #5 Patient to demonstrate independence with home activity/exercise instructions provided to patient in preparation for discharge.   Goal #5   Current Status    Goal #6    Goal #6  Current Status      Patient Evaluation Complexity Level:  History Moderate - 1 or 2 personal factors and/or co-morbidities   Examination of body systems Moderate - addressing a total of 3 or more elements   Clinical Presentation  Moderate - Evolving   Clinical Decision Making  Moderate Complexity     Therapeutic Activity:  25 minutes

## 2024-03-13 LAB — VIT B12 SERPL-MCNC: 418 PG/ML (ref 211–911)

## 2024-03-13 PROCEDURE — 99233 SBSQ HOSP IP/OBS HIGH 50: CPT | Performed by: HOSPITALIST

## 2024-03-13 RX ORDER — AMLODIPINE BESYLATE 5 MG/1
5 TABLET ORAL DAILY
Status: DISCONTINUED | OUTPATIENT
Start: 2024-03-13 | End: 2024-03-14

## 2024-03-13 NOTE — PROGRESS NOTES
[facility]  part of [facility]    Progress Note    Fernando Ramos Jr. Patient Status:  Inpatient    1935 MRN [id number]   Location [facility] 5SW/SE Attending Linda Antoine MD   Hosp Day # 2 PCP CAN HALE       Subjective:   Fernando Ramos Jr. is a(n) 88 year old male is feeling ok. He is sitting up in the chair. Afebrile.     Objective:   Blood pressure (!) 165/76, pulse 63, temperature 97.4 °F (36.3 °C), temperature source Axillary, resp. rate 16, height 5' 8\" (1.727 m), weight 146 lb 11.2 oz (66.5 kg), SpO2 98%.    Physical Exam:    General: No acute distress.   Respiratory: Clear to auscultation bilaterally. No wheezes. No rhonchi.  Cardiovascular: S1, S2. Regular rate and rhythm. No murmurs, rubs or gallops.   Abdomen: Soft, nontender, nondistended.  Positive bowel sounds. No rebound or guarding.  Neurologic: No focal neurological deficits.   Musculoskeletal: Moves all extremities.  Extremities: No edema.    Results:     Lab Results   Component Value Date    WBC 4.7 2024    HGB 11.0 (L) 2024    HCT 32.5 (L) 2024    .0 2024    CREATSERUM 0.60 (L) 2024    BUN 12 2024     2024    K 4.1 2024     2024    CO2 30.0 2024    GLU 80 2024    CA 9.6 2024    ALB 3.7 2024    ALKPHO 101 2024    BILT 0.3 2024    TP 6.9 2024    AST 22 2024    ALT 35 2024    INR 1.18 (H) 2023    TSH 0.889 2024    LIP 48 2023    ESRML 26 (H) 2021    CRP 1.22 (H) 2021    MG 2.2 2024    PHOS 3.5 2024     2023    B12 418 2024       XR ABDOMEN (1 VIEW) (CPT=74018)    Result Date: 3/12/2024  CONCLUSION:  1. Staghorn calculus again noted in the left kidney with redemonstration of a right renal stone, all of which is unchanged from the 2023 CT. 2. Large colonic stool burden.  Correlate clinically for a history of  constipation.  No associated bowel obstruction. 3. Nonspecific gastric distension. 4. Partially imaged right hip hemiarthroplasty.  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    Dictated by (CST): Aleksey Barrientos MD on 3/12/2024 at 2:39 PM     Finalized by (CST): Aleksey Barrientos MD on 3/12/2024 at 2:43 PM         EKG 12 Lead    Result Date: 3/12/2024  Sinus bradycardia Nonspecific ST abnormality Abnormal ECG When compared with ECG of 17-JAN-2024 09:53, Criteria for Inferior infarct are no longer Present Confirmed by ASHA LAO ([id number]) on 3/12/2024 8:46:49 AM      ARIPiprazole  2 mg Oral QOD    carbidopa-levodopa  1 tablet Oral TID    mirtazapine  15 mg Oral Nightly    aspirin  81 mg Oral Daily    donepezil  10 mg Oral Nightly    atorvastatin  20 mg Oral Nightly    venlafaxine  37.5 mg Oral BID    polyethylene glycol (PEG 3350)  17 g Oral Daily    heparin  5,000 Units Subcutaneous 2 times per day    cefTRIAXone  1 g Intravenous Q24H     hydrALAzine, acetaminophen, ondansetron      Assessment and Plan:      Recurrent UTI   Underlying large R renal calculus and partial staghorn calclulus L  Weakness   - Ucx from 3/8 Proteus Mirabilis   - Pt not interested in PCNL/surgery for stone  - Hold methanamine suppressive treatment.   - IV Rocephin 1 g daily   - WBC normal. Afebrile.   - US kidney   - ? If weakness from infection as no leukocytosis or fever and no dysuria (though was on PO abx prior to arrival)   - PT/OT eval today.     Dementia   MDD/OSCAR  - Aricept  - supportive care   - venlafaxine, remeron   - outpt neuro fu      Parkinson's disease   - Sinemet   - was started with this on last admit.   - Needs to fu with neuro outpt      Sinus bradycardia  - EKG sinus ivonne   - tele.  - TSH normal.   - HR better now      Other issues   HTN - diet controlled at home. Add PRN hydralazing   Hyperlipidemia  BPH  Episode of Seizure d/o      >55min spent, >50% spent counseling and  coordinating care in the form of educating pt/family and d/w consultants and staff. Most of the time spent discussing the above plan.         Plan of care discussed with patient or family at bedside.      Quality:  DVT Prophylaxis: Heparin  CODE status: Full      KAYLEN BARFIELD MD  03/13/24

## 2024-03-13 NOTE — PLAN OF CARE
PRN Hydralazine given for elevated blood pressure this morning. No c/o pain. Spouse at bedside. No acute changes.    Problem: Patient Centered Care  Goal: Patient preferences are identified and integrated in the patient's plan of care  Description: Interventions:  - What would you like us to know as we care for you? From home with spouse.  - Provide timely, complete, and accurate information to patient/family  - Incorporate patient and family knowledge, values, beliefs, and cultural backgrounds into the planning and delivery of care  - Encourage patient/family to participate in care and decision-making at the level they choose  - Honor patient and family perspectives and choices  Outcome: Progressing     Problem: Patient/Family Goals  Goal: Patient/Family Long Term Goal  Description: Patient's Long Term Goal: Resolution of UTI    Interventions:  - IV antibiotics  - IV fluids  - See additional Care Plan goals for specific interventions  Outcome: Progressing  Goal: Patient/Family Short Term Goal  Description: Patient's Short Term Goal: Increase strength/walk    Interventions:   - Physical therapy  - See additional Care Plan goals for specific interventions  Outcome: Progressing     Problem: GASTROINTESTINAL - ADULT  Goal: Maintains or returns to baseline bowel function  Description: INTERVENTIONS:  - Assess bowel function  - Maintain adequate hydration with IV or PO as ordered and tolerated  - Evaluate effectiveness of GI medications  - Encourage mobilization and activity  - Obtain nutritional consult as needed  - Establish a toileting routine/schedule  - Consider collaborating with pharmacy to review patient's medication profile  Outcome: Progressing     Problem: SKIN/TISSUE INTEGRITY - ADULT  Goal: Skin integrity remains intact  Description: INTERVENTIONS  - Assess and document risk factors for pressure ulcer development  - Assess and document skin integrity  - Monitor for areas of redness and/or skin breakdown  -  Initiate interventions, skin care algorithm/standards of care as needed  Outcome: Progressing     Problem: MUSCULOSKELETAL - ADULT  Goal: Return mobility to safest level of function  Description: INTERVENTIONS:  - Assess patient stability and activity tolerance for standing, transferring and ambulating w/ or w/o assistive devices  - Assist with transfers and ambulation using safe patient handling equipment as needed  - Ensure adequate protection for wounds/incisions during mobilization  - Obtain PT/OT consults as needed  - Advance activity as appropriate  - Communicate ordered activity level and limitations with patient/family  Outcome: Progressing     Problem: Impaired Functional Mobility  Goal: Achieve highest/safest level of mobility/gait  Description: Interventions:  - Assess patient's functional ability and stability  - Promote increasing activity/tolerance for mobility and gait  - Educate and engage patient/family in tolerated activity level and precautions  - Recommend use of  RW for transfers and ambulation  Outcome: Progressing     Problem: Impaired Swallowing  Goal: Minimize aspiration risk  Description: Interventions:  - Patient should be alert and upright for all feedings (90 degrees preferred)  - Offer food and liquids at a slow rate  - No straws  - Encourage small bites of food and small sips of liquid  - Offer pills one at a time, crush or deliver with applesauce as needed  - Discontinue feeding and notify MD (or speech pathologist) if coughing or persistent throat clearing or wet/gurgly vocal quality is noted  Outcome: Progressing

## 2024-03-13 NOTE — PLAN OF CARE
Problem: Patient Centered Care  Goal: Patient preferences are identified and integrated in the patient's plan of care  Description: Interventions:  - What would you like us to know as we care for you? From home with spouse.  - Provide timely, complete, and accurate information to patient/family  - Incorporate patient and family knowledge, values, beliefs, and cultural backgrounds into the planning and delivery of care  - Encourage patient/family to participate in care and decision-making at the level they choose  - Honor patient and family perspectives and choices  Outcome: Progressing     Problem: GASTROINTESTINAL - ADULT  Goal: Maintains or returns to baseline bowel function  Description: INTERVENTIONS:  - Assess bowel function  - Maintain adequate hydration with IV or PO as ordered and tolerated  - Evaluate effectiveness of GI medications  - Encourage mobilization and activity  - Obtain nutritional consult as needed  - Establish a toileting routine/schedule  - Consider collaborating with pharmacy to review patient's medication profile  Outcome: Progressing     Problem: SKIN/TISSUE INTEGRITY - ADULT  Goal: Skin integrity remains intact  Description: INTERVENTIONS  - Assess and document risk factors for pressure ulcer development  - Assess and document skin integrity  - Monitor for areas of redness and/or skin breakdown  - Initiate interventions, skin care algorithm/standards of care as needed  Outcome: Progressing     Problem: MUSCULOSKELETAL - ADULT  Goal: Return mobility to safest level of function  Description: INTERVENTIONS:  - Assess patient stability and activity tolerance for standing, transferring and ambulating w/ or w/o assistive devices  - Assist with transfers and ambulation using safe patient handling equipment as needed  - Ensure adequate protection for wounds/incisions during mobilization  - Obtain PT/OT consults as needed  - Advance activity as appropriate  - Communicate ordered activity level  and limitations with patient/family  Outcome: Progressing     Problem: Impaired Functional Mobility  Goal: Achieve highest/safest level of mobility/gait  Description: Interventions:  - Assess patient's functional ability and stability  - Promote increasing activity/tolerance for mobility and gait  - Educate and engage patient/family in tolerated activity level and precautions  - Recommend use of  RW for transfers and ambulation  Outcome: Progressing     Problem: Impaired Swallowing  Goal: Minimize aspiration risk  Description: Interventions:  - Patient should be alert and upright for all feedings (90 degrees preferred)  - Offer food and liquids at a slow rate  - No straws  - Encourage small bites of food and small sips of liquid  - Offer pills one at a time, crush or deliver with applesauce as needed  - Discontinue feeding and notify MD (or speech pathologist) if coughing or persistent throat clearing or wet/gurgly vocal quality is noted  Outcome: Progressing  Patient is A/O x2, vital signs-b/p elevated, amlodipine added, otherwise stable. Patient is up in the chair. PT/OT to evaluate and tx. Speech tx recommended ground ,minced, moist, nectar thick liquids. He continues on rocephin.

## 2024-03-13 NOTE — SLP NOTE
SPEECH DAILY NOTE - INPATIENT    ASSESSMENT & PLAN   ASSESSMENT  PPE REQUIRED. THIS THERAPIST WORE GLOVES AND MASK FOR DURATION OF EVALUATION. HANDS WASHED UPON ENTRANCE/EXIT.    SLP f/u for ongoing dysphagia tx/meal assessment per recommendations of minced & moist/mildly thick liquids per BSE. RN reports pt tolerates diet and medication well with no overt clinical s/s aspiration. Pt denies any swallowing challenges.     Pt positioned upright in bedside chair, alert/cooperative/wife present. Pt afebrile, tolerating room air with oxygen status 98% prior to the start of oral trials. SLP reviewed aspiration precautions and safe swallowing compensatory strategies with the patient. Safe swallow guidelines remain written on the white board in purple. Patient and family v/u. Provided mod assistance, pt tolerates minced & moist consistency and mildly thick liquids via cup with no overt clinical signs/symptoms of aspiration. Oxygen status remained stable t/o the entire session. Recommend remain on current diet with strict adherence to aspiration precautions and swallow strategies. RN alerted with results and recommendations. No further swallow services warranted at this time. Please re consult if needed.       MOST RECENT CXR - none on this admission       Diet Recommendations - Solids: Mechanical soft ground/ Minced & Moist  Diet Recommendations - Liquids: Nectar thick liquids/ Mildly thick    Compensatory Strategies Recommended: No straws  Aspiration Precautions: Upright position;Small bites and sips;Slow rate;No straw  Medication Administration Recommendations: Whole in puree    Patient Experiencing Pain: No                Treatment Plan  Treatment Plan/Recommendations: Aspiration precautions;No further inpatient SLP service warranted    Interdisciplinary Communication: Plan posted at bedside            GOALS  Goal #1 The patient will tolerate minced & moist consistency and mildly-thick  liquids without overt signs or  symptoms of aspiration with 100 % accuracy over 2 session(s).  Goal Met 3/13   Goal #2 The patient will utilize compensatory strategies as outlined by  BSSE (clinical evaluation) including Slow rate, Small bites, Small sips, No straws, Upright 90 degrees with PRN feeding assistance 90 % of the time across 1-2 sessions.    Goal Met 3/13     FOLLOW UP  Follow Up Needed (Documentation Required): No  SLP Follow-up Date: 03/13/24  Number of Visits to Meet Established Goals:  (1-2)    Session: 1    If you have any questions, please contact SULEMA Man M.S. CCC-SLP  Speech Language Pathologist  Phone Number Ext. [phone]

## 2024-03-14 VITALS
HEART RATE: 72 BPM | HEIGHT: 68 IN | SYSTOLIC BLOOD PRESSURE: 118 MMHG | DIASTOLIC BLOOD PRESSURE: 71 MMHG | RESPIRATION RATE: 18 BRPM | OXYGEN SATURATION: 94 % | TEMPERATURE: 98 F | WEIGHT: 146.69 LBS | BODY MASS INDEX: 22.23 KG/M2

## 2024-03-14 PROCEDURE — 99239 HOSP IP/OBS DSCHRG MGMT >30: CPT | Performed by: HOSPITALIST

## 2024-03-14 RX ORDER — AMLODIPINE BESYLATE 5 MG/1
5 TABLET ORAL DAILY
Qty: 30 TABLET | Refills: 0 | Status: SHIPPED | OUTPATIENT
Start: 2024-03-15

## 2024-03-14 RX ORDER — HYDROXYZINE HYDROCHLORIDE 25 MG/1
25 TABLET, FILM COATED ORAL 3 TIMES DAILY PRN
Status: DISCONTINUED | OUTPATIENT
Start: 2024-03-14 | End: 2024-03-14

## 2024-03-14 NOTE — PLAN OF CARE
Problem: Patient Centered Care  Goal: Patient preferences are identified and integrated in the patient's plan of care  Description: Interventions:  - What would you like us to know as we care for you? From home with spouse.  - Provide timely, complete, and accurate information to patient/family  - Incorporate patient and family knowledge, values, beliefs, and cultural backgrounds into the planning and delivery of care  - Encourage patient/family to participate in care and decision-making at the level they choose  - Honor patient and family perspectives and choices  Outcome: Adequate for Discharge     Problem: Patient/Family Goals  Goal: Patient/Family Long Term Goal  Description: Patient's Long Term Goal: Resolution of UTI    Interventions:  - IV antibiotics  - IV fluids  - See additional Care Plan goals for specific interventions  Outcome: Adequate for Discharge  Goal: Patient/Family Short Term Goal  Description: Patient's Short Term Goal: Increase strength/walk    Interventions:   - Physical therapy  - See additional Care Plan goals for specific interventions  Outcome: Adequate for Discharge     Problem: GASTROINTESTINAL - ADULT  Goal: Maintains or returns to baseline bowel function  Description: INTERVENTIONS:  - Assess bowel function  - Maintain adequate hydration with IV or PO as ordered and tolerated  - Evaluate effectiveness of GI medications  - Encourage mobilization and activity  - Obtain nutritional consult as needed  - Establish a toileting routine/schedule  - Consider collaborating with pharmacy to review patient's medication profile  Outcome: Adequate for Discharge     Problem: SKIN/TISSUE INTEGRITY - ADULT  Goal: Skin integrity remains intact  Description: INTERVENTIONS  - Assess and document risk factors for pressure ulcer development  - Assess and document skin integrity  - Monitor for areas of redness and/or skin breakdown  - Initiate interventions, skin care algorithm/standards of care as  needed  Outcome: Adequate for Discharge     Problem: MUSCULOSKELETAL - ADULT  Goal: Return mobility to safest level of function  Description: INTERVENTIONS:  - Assess patient stability and activity tolerance for standing, transferring and ambulating w/ or w/o assistive devices  - Assist with transfers and ambulation using safe patient handling equipment as needed  - Ensure adequate protection for wounds/incisions during mobilization  - Obtain PT/OT consults as needed  - Advance activity as appropriate  - Communicate ordered activity level and limitations with patient/family  Outcome: Adequate for Discharge     Problem: Impaired Functional Mobility  Goal: Achieve highest/safest level of mobility/gait  Description: Interventions:  - Assess patient's functional ability and stability  - Promote increasing activity/tolerance for mobility and gait  - Educate and engage patient/family in tolerated activity level and precautions  - Recommend use of  RW for transfers and ambulation  Outcome: Adequate for Discharge     Problem: Impaired Swallowing  Goal: Minimize aspiration risk  Description: Interventions:  - Patient should be alert and upright for all feedings (90 degrees preferred)  - Offer food and liquids at a slow rate  - No straws  - Encourage small bites of food and small sips of liquid  - Offer pills one at a time, crush or deliver with applesauce as needed  - Discontinue feeding and notify MD (or speech pathologist) if coughing or persistent throat clearing or wet/gurgly vocal quality is noted  Outcome: Adequate for Discharge  Patient is medically stable and is being discharge home today, wife is here to take him.

## 2024-03-14 NOTE — OCCUPATIONAL THERAPY NOTE
OCCUPATIONAL THERAPY EVALUATION - INPATIENT     Room Number: 550/550-A  Evaluation Date: 3/14/2024  Type of Evaluation: Initial  Presenting Problem: acute UTI, generalized weakness  Hx Parkinsonism, HTN, dementia     Physician Order: IP Consult to Occupational Therapy  Reason for Therapy: ADL/IADL Dysfunction and Discharge Planning    OCCUPATIONAL THERAPY ASSESSMENT   Patient is a 88 year old male admitted 3/11/2024 for acute UTI, generalized weakness.  Patient receives assist at baseline for all ADLs from spouse. Per spouse, patient pivots to/from transport chair with gait belt and 1-person assist. Patient is currently functioning below baseline with  ADLs and fx transfers. .  Patient is requiring up to max/total assist as a result of the following impairments: decreased functional strength, decreased functional reach, decreased endurance, pain, impaired balance, decreased muscular endurance, cognitive deficits, decreased insight to deficits, and decreased safety awareness. Occupational Therapy will continue to follow for duration of hospitalization.    Patient will benefit from continued skilled OT Services to promote return to prior level of function and safety with continuous assistance and gradual rehabilitative therapy     PLAN  OT Treatment Plan: Balance activities;Energy conservation/work simplification techniques;ADL training;Functional transfer training;Endurance training;Patient/Family education;Patient/Family training;Equipment eval/education;Cognitive reorientation     OCCUPATIONAL THERAPY MEDICAL/SOCIAL HISTORY   Problem List  Principal Problem:    Acute urinary tract infection  Active Problems:    Somnolence    Recurrent UTI    Staghorn kidney stones    HOME SITUATION  Type of Home: Apartment  Home Layout: One level; Elevator  Lives With: Spouse  Shower/Tub and Equipment: Tub-shower combo; Tub transfer bench  Drives: No  Stairs in Home: 12 KRISTIN with B hand rails  Use of Assistive Device(s): 2 transport  chairs; RW    Prior Level of [place]:  Patient receives assist at baseline for all ADLs from spouse. Per spouse, patient pivots to/from transport chair with gait belt and 1-person assist.    SUBJECTIVE  \"No!\"    OCCUPATIONAL THERAPY EXAMINATION    OBJECTIVE  Precautions: Bed/chair alarm  Fall Risk: High fall risk    PAIN ASSESSMENT  Ratin    COGNITION  Overall Cognitive Status:  Impaired  Following Commands:  follows one-step commands inconsistently  Benefited from encouragement for participation    RANGE OF MOTION   Upper extremity ROM is within functional limits     STRENGTH ASSESSMENT  Upper extremity strength is within functional limits     ACTIVITIES OF DAILY LIVING ASSESSMENT  AM-PAC ‘6-Clicks’ Inpatient Daily Activity Short Form  How much help from another person does the patient currently need…  -   Putting on and taking off regular lower body clothing?: A Lot  -   Bathing (including washing, rinsing, drying)?: A Lot  -   Toileting, which includes using toilet, bedpan or urinal? : Total  -   Putting on and taking off regular upper body clothing?: A Lot  -   Taking care of personal grooming such as brushing teeth?: A Lot  -   Eating meals?: A Lot    AM-PAC Score:  Score: 11  Approx Degree of Impairment: 70.42%  Standardized Score (AM-PAC Scale): 29.04  CMS Modifier (G-Code): CL    BED MOBILITY  Supine to Sit: Max assist  Comments:  Required min assist for static sitting balance at EOB    FUNCTIONAL TRANSFER ASSESSMENT  Mod assist for stand pivot transfer from bed>chair without a device    FUNCTIONAL ADL ASSESSMENT  Eating: mod assist seated (per obs)   Grooming: mod assist seated (per obs)   UB Dressing: max assist seated   LB Dressing: max assist   Toileting: total assist (per obs)     EDUCATION PROVIDED  Patient: Role of Occupational Therapy; Plan of Care; Discharge Recommendations; Functional Transfer Techniques; Fall Prevention; Posture/Positioning; Proper Body Mechanics  Patient's Response to  Education: Demonstrates Poor Carry Over to Information; Requires Further Education    The patient's Approx Degree of Impairment: 70.42% has been calculated based on documentation in the [facility] '6 clicks' Inpatient Daily Activity Short Form.  Research supports that patients with this level of impairment may benefit from rehab.  Final disposition will be made by interdisciplinary medical team.     Patient End of Session: Up in chair;Needs met;Call light within reach;RN aware of session/findings;All patient questions and concerns addressed;Alarm set;Family present    OT Goals  Patients self stated goal is: none stated     Patient will complete functional transfer with Min A x1  Comment:     Patient will sit EOB with SBA in prep for ADLs  Comment:     Patient will tolerate standing for 1-2 minutes in prep for adls with Min A x1  Comment:    Patient will complete oral/facial grooming task in supported sitting with Setup Assist  Comment:            Goals  on: 3/28/24  Frequency: 3-5x/week    Patient Evaluation Complexity Level:   Occupational Profile/Medical History MODERATE - Expanded review of history including review of medical or therapy record   Specific performance deficits impacting engagement in ADL/IADL HIGH  5+ performance deficits    Client Assessment/Performance Deficits HIGH - Comorbidities and significant modifications of tasks    Clinical Decision Making MODERATE - Analysis of occupational profile, detailed assessments, several treatment options    Overall Complexity MODERATE     OT Session Time  Therapeutic Activity: 15 minutes    BRICE Chacko/L  [facility]  #[id number]

## 2024-03-14 NOTE — PHYSICAL THERAPY NOTE
PHYSICAL THERAPY TREATMENT NOTE - INPATIENT     Room Number: 550/550-A       Presenting Problem: acute UTI  Co-Morbidities : Hx Parkinsonism, HTN, dementia    Problem List  Principal Problem:    Acute urinary tract infection  Active Problems:    Somnolence    Recurrent UTI    Staghorn kidney stones      PHYSICAL THERAPY ASSESSMENT   Patient is a 88 year old male admitted 3/11/2024 for acute UTI.  Prior to admission, patient's baseline is ambulatory short distances with RW or WC for community distances, able to ascend/descend 12 stairs to enter/exit condo with CGA of spouse.   Patient demonstrates limited progress this session, goals  remain in progress.    Patient continues to function below baseline with bed mobility, transfers, gait, and stair negotiation.  Contributing factors to remaining limitations include decreased functional strength and decreased endurance/aerobic capacity.  Next session anticipate patient to progress bed mobility, transfers, gait, and stair negotiation.  Physical Therapy will continue to follow patient for duration of hospitalization.    Patient continues to benefit from continued skilled PT services: to promote return to prior level of function and safety with continuous assistance and gradual rehabilitative therapy . Pt's spouse is hoping to take pt home.     PLAN  PT Treatment Plan: Bed mobility;Body mechanics;Endurance;Energy conservation;Patient education;Gait training;Range of motion;Stoop training;Transfer training;Stair training;Balance training  Frequency (Obs): 5x/week    SUBJECTIVE  \"No!!\" Answering for all questions.  Pt also using expletives throughout session, easily agitated    OBJECTIVE  Precautions: Bed/chair alarm    WEIGHT BEARING RESTRICTION                PAIN ASSESSMENT   Rating: Unable to rate  Location: generalized  Management Techniques: Activity promotion    BALANCE  Static Sitting: Good  Dynamic Sitting: Fair  Static Standing: Poor  Dynamic Standing:  Poor    ACTIVITY TOLERANCE  Pulse: 72  Heart Rate Source: Monitor                    O2 WALK       AM-PAC '6-Clicks' INPATIENT SHORT FORM - BASIC MOBILITY  How much difficulty does the patient currently have...  Patient Difficulty: Turning over in bed (including adjusting bedclothes, sheets and blankets)?: A Lot   Patient Difficulty: Sitting down on and standing up from a chair with arms (e.g., wheelchair, bedside commode, etc.): A Lot   Patient Difficulty: Moving from lying on back to sitting on the side of the bed?: A Lot   How much help from another person does the patient currently need...   Help from Another: Moving to and from a bed to a chair (including a wheelchair)?: A Lot   Help from Another: Need to walk in hospital room?: A Lot   Help from Another: Climbing 3-5 steps with a railing?: Total     AM-PAC Score:  Raw Score: 11   Approx Degree of Impairment: 72.57%   Standardized Score (AM-PAC Scale): 33.86   CMS Modifier (G-Code): CL    FUNCTIONAL ABILITY STATUS  Functional Mobility/Gait Assessment  Gait Assistance: Moderate assistance  Distance (ft): 30  Assistive Device: Rolling walker  Pattern: R Flexed knee;L Flexed knee (bilat hips flexed)    Supine to Sit: NT, pt recd sitting up in chair  Sit to Supine: NT  Sit to Stand: moderate assist    Additional information: Pt ok to see per rn, pt recd sitting up in chair, spouse present, educated in role of PT, goals for session, importance of consistent mobility.    Pt is alert, very easily agitated today, using expletives throughout session despite calming management attempted, spouse also involved in this.  Pt required above assist for fxal mobility. Gait training with rw with emphasis on safe rw use, upright posture. Pt required mod a, unable to fully extend bilat hips and knees during standing/ambulation. Pt's present and did not appear to have good insight into pt's deficits, reports pt will be \"better\" at home.  Pt returned to bedside chair.  Pt found to  be soiled with bm, pct's assisted with pericare. Pt remained in chair with chair alarm activated.     The patient's Approx Degree of Impairment: 72.57% has been calculated based on documentation in the [facility] '6 clicks' Inpatient Daily Activity Short Form.  Research supports that patients with this level of impairment may benefit from inpatient rehab.    Final disposition will be made by interdisciplinary medical team.      Patient End of Session: Up in chair;With  staff;Needs met;Call light within reach;RN aware of session/findings;All patient questions and concerns addressed;Family present;Alarm set    CURRENT GOALS   Goals to be met by: 3/30  Patient Goal Patient's self-stated goal is: to rest    Goal #1 Patient is able to demonstrate supine - sit EOB @ level: minimum assistance      Goal #1   Current Status     Goal #2 Patient is able to demonstrate transfers Sit to/from Stand at assistance level: minimum assistance with walker - rolling      Goal #2  Current Status     Goal #3 Patient is able to ambulate 10 feet with assist device: walker - rolling at assistance level: minimum assistance   Goal #3   Current Status     Goal #4 Patient will negotiate 12 stairs/one curb w/ assistive device and supervision   Goal #4   Current Status     Goal #5 Patient to demonstrate independence with home activity/exercise instructions provided to patient in preparation for discharge.   Goal #5   Current Status     Goal #6     Goal #6  Current Status       Therapeutic Activity: 30 minutes

## 2024-03-14 NOTE — PLAN OF CARE
Patient VSS, no acute changes during shift. Updated patient's wife on plan of care. Wife remains at bedside. Frequent rounding, no needs at this time. Call light always in reach and safety precautions in place.     Problem: Patient Centered Care  Goal: Patient preferences are identified and integrated in the patient's plan of care  Description: Interventions:  - What would you like us to know as we care for you? From home with spouse.  - Provide timely, complete, and accurate information to patient/family  - Incorporate patient and family knowledge, values, beliefs, and cultural backgrounds into the planning and delivery of care  - Encourage patient/family to participate in care and decision-making at the level they choose  - Honor patient and family perspectives and choices  Outcome: Progressing     Problem: Patient/Family Goals  Goal: Patient/Family Long Term Goal  Description: Patient's Long Term Goal: Resolution of UTI    Interventions:  - IV antibiotics  - IV fluids  - See additional Care Plan goals for specific interventions  Outcome: Progressing  Goal: Patient/Family Short Term Goal  Description: Patient's Short Term Goal: Increase strength/walk    Interventions:   - Physical therapy  - See additional Care Plan goals for specific interventions  Outcome: Progressing     Problem: GASTROINTESTINAL - ADULT  Goal: Maintains or returns to baseline bowel function  Description: INTERVENTIONS:  - Assess bowel function  - Maintain adequate hydration with IV or PO as ordered and tolerated  - Evaluate effectiveness of GI medications  - Encourage mobilization and activity  - Obtain nutritional consult as needed  - Establish a toileting routine/schedule  - Consider collaborating with pharmacy to review patient's medication profile  Outcome: Progressing     Problem: SKIN/TISSUE INTEGRITY - ADULT  Goal: Skin integrity remains intact  Description: INTERVENTIONS  - Assess and document risk factors for pressure ulcer  development  - Assess and document skin integrity  - Monitor for areas of redness and/or skin breakdown  - Initiate interventions, skin care algorithm/standards of care as needed  Outcome: Progressing     Problem: MUSCULOSKELETAL - ADULT  Goal: Return mobility to safest level of function  Description: INTERVENTIONS:  - Assess patient stability and activity tolerance for standing, transferring and ambulating w/ or w/o assistive devices  - Assist with transfers and ambulation using safe patient handling equipment as needed  - Ensure adequate protection for wounds/incisions during mobilization  - Obtain PT/OT consults as needed  - Advance activity as appropriate  - Communicate ordered activity level and limitations with patient/family  Outcome: Progressing     Problem: Impaired Functional Mobility  Goal: Achieve highest/safest level of mobility/gait  Description: Interventions:  - Assess patient's functional ability and stability  - Promote increasing activity/tolerance for mobility and gait  - Educate and engage patient/family in tolerated activity level and precautions  - Recommend use of  RW for transfers and ambulation  Outcome: Progressing     Problem: Impaired Swallowing  Goal: Minimize aspiration risk  Description: Interventions:  - Patient should be alert and upright for all feedings (90 degrees preferred)  - Offer food and liquids at a slow rate  - No straws  - Encourage small bites of food and small sips of liquid  - Offer pills one at a time, crush or deliver with applesauce as needed  - Discontinue feeding and notify MD (or speech pathologist) if coughing or persistent throat clearing or wet/gurgly vocal quality is noted  Outcome: Progressing

## 2024-03-14 NOTE — DISCHARGE SUMMARY
[facility]  part of [facility]    Discharge Summary    Fernando Ramos Jr. Patient Status:  Inpatient    1935 MRN [id number]   Location [facility] 5SW/SE Attending Linda Antoine MD   Hosp Day # 3 PCP CAN HALE     Date of Admission: 3/11/2024   Date of Discharge: 3/14/2024    Hospital Discharge Diagnoses: Acute Recurrent UTI    Lace+ Score: 64  59-90 High Risk  29-58 Medium Risk  0-28   Low Risk.    TCM Follow-Up Recommendation:  LACE > 58: High Risk of readmission after discharge from the hospital.        Admitting Diagnosis: Somnolence [R40.0]  Recurrent UTI [N39.0]  Acute urinary tract infection [N39.0]  Staghorn kidney stones [N20.0]    Disposition: Home    Discharge Diagnosis: .Principal Problem:    Acute urinary tract infection  Active Problems:    Somnolence    Recurrent UTI    Staghorn kidney stones      Hospital Course:   Reason for Admission:   Per Dr. Francis  Patient is an 88-year-old  male with a history of Parkinson disease and recurrent urinary tract infections, was seen at the emergency room on . Urinalysis suggestive of urinary tract infection. Urine cultures were obtained and discharged on Keflex. Today, his wife brought him in for progressive weakness. Repeat urinalysis today showed still evidence of urinary tract infection. Chemistry and CBC were unremarkable. Patient was started on IV Rocephin, and he Patient will be admitted to the hospital for further management. Urine culture from  grew Proteus mirabilis.     Discharge Physical Exam:   Physical Exam:    General: No acute distress.   Respiratory: Clear to auscultation bilaterally. No wheezes. No rhonchi.  Cardiovascular: S1, S2. Regular rate and rhythm. No murmurs, rubs or gallops.   Abdomen: Soft, nontender, nondistended.  Positive bowel sounds. No rebound or guarding.  Neurologic: No focal neurological deficits.   Musculoskeletal: Moves all extremities.    Hospital Course:        Recurrent UTI   Underlying large R renal calculus and partial staghorn calclulus L  Weakness   - Ucx from 3/8 Proteus Mirabilis   - Pt not interested in PCNL/surgery for stone  - Hold methanamine suppressive treatment.   - IV Rocephin 1 g daily- discontinue at discharge   - WBC normal. Afebrile.   - PT/OT eval today- wife wants to take patient home today      Dementia   MDD/OSCAR  - Aricept  - supportive care   - venlafaxine, remeron   - outpt neuro fu     HTN  - added amlodipine 5 mg daily      Parkinson's disease   - Sinemet   - was started with this on last admit.   - Needs to fu with neuro outpt      Sinus bradycardia  - EKG sinus ivonne   - tele.  - TSH normal.   - HR better now      Other issues   HTN - diet controlled at home. Add PRN hydralazing   Hyperlipidemia  BPH  Episode of Seizure d/o   Complications: none    Consultants         Provider   Role Specialty     Devendra Cramer MD  Consulting Physician UROLOGY     Yonis Duarte MD  Consulting Physician UROLOGY                Discharge Plan:   Discharge Condition: Stable    Current Discharge Medication List        New Orders    Details   amLODIPine 5 MG Oral Tab Take 1 tablet (5 mg total) by mouth daily.           Home Meds - Modified    Details   !! carbidopa-levodopa  MG Oral Tab Take 0.5 tablets by mouth 3 (three) times daily for 14 days.       !! - Potential duplicate medications found. Please discuss with provider.        Home Meds - Unchanged    Details   methenamine 1 g Oral Tab Take 1 tablet (1 g total) by mouth 2 (two) times daily.      !! carbidopa-levodopa  MG Oral Tab Take 1 tablet by mouth 3 (three) times daily.      donepezil 10 MG Oral Tab Take 1 tablet (10 mg total) by mouth nightly.      venlafaxine 37.5 MG Oral Tab Take 1 tablet (37.5 mg total) by mouth 2 (two) times daily.      hydrOXYzine Pamoate 25 MG Oral Cap hydroxyzine pamoate 25 mg capsule   TAKE ONE CAPSULE BY MOUTH DAILY AS NEEDED      polyethylene glycol, PEG  3350, 17 g Oral Powd Pack Take 17 g by mouth every other day. Patient takes every three days      acetaminophen 500 MG Oral Tab Take 2 tablets (1,000 mg total) by mouth every 8 (eight) hours as needed for Pain.      ARIPiprazole 2 MG Oral Tab Take 1 tablet (2 mg total) by mouth every other day.      Cholecalciferol (VITAMIN D) 50 MCG (2000 UT) Oral Tab Take by mouth nightly.      aspirin 81 MG Oral Tab Take 1 tablet (81 mg total) by mouth daily.      mirtazapine 15 MG Oral Tab TAKE ONE TABLET BY MOUTH AT BEDTIME      Simvastatin 40 MG Oral Tab Take 1 tablet (40 mg total) by mouth nightly.      melatonin 1 MG Oral Tab Take 2 tablets (2 mg total) by mouth nightly.      Coenzyme Q10 (COQ-10) 100 MG Oral Cap Take 100 mg by mouth daily.       !! - Potential duplicate medications found. Please discuss with provider.              Discharge Diet: General diet    Discharge Activity: As tolerated       Discharge Medications        START taking these medications        Instructions Prescription details   amLODIPine 5 MG Tabs  Commonly known as: Norvasc  Start taking on: March 15, 2024      Take 1 tablet (5 mg total) by mouth daily.   Quantity: 30 tablet  Refills: 0            CONTINUE taking these medications        Instructions Prescription details   acetaminophen 500 MG Tabs  Commonly known as: Tylenol Extra Strength      Take 2 tablets (1,000 mg total) by mouth every 8 (eight) hours as needed for Pain.   Refills: 0     ARIPiprazole 2 MG Tabs  Commonly known as: Abilify      Take 1 tablet (2 mg total) by mouth every other day.   Quantity: 1 tablet  Refills: 0     aspirin 81 MG Tabs      Take 1 tablet (81 mg total) by mouth daily.   Refills: 0     carbidopa-levodopa  MG Tabs  Commonly known as: SINEMET      Take 1 tablet by mouth 3 (three) times daily.   Quantity: 90 tablet  Refills: 0     carbidopa-levodopa  MG Tabs  Commonly known as: SINEMET      Take 0.5 tablets by mouth 3 (three) times daily for 14 days.    Stop taking on: March 28, 2024  Quantity: 21 tablet  Refills: 0     CoQ-10 100 MG Caps      Take 100 mg by mouth daily.   Refills: 0     donepezil 10 MG Tabs  Commonly known as: Aricept      Take 1 tablet (10 mg total) by mouth nightly.   Refills: 0     hydrOXYzine Pamoate 25 MG Caps  Commonly known as: VISTARIL      hydroxyzine pamoate 25 mg capsule   TAKE ONE CAPSULE BY MOUTH DAILY AS NEEDED   Refills: 0     melatonin 1 MG Tabs      Take 2 tablets (2 mg total) by mouth nightly.   Refills: 0     methenamine 1 g Tabs  Commonly known as: Hiprex      Take 1 tablet (1 g total) by mouth 2 (two) times daily.   Quantity: 60 tablet  Refills: 0     mirtazapine 15 MG Tabs  Commonly known as: Remeron      TAKE ONE TABLET BY MOUTH AT BEDTIME   Quantity: 90 tablet  Refills: 3     polyethylene glycol (PEG 3350) 17 g Pack  Commonly known as: Miralax      Take 17 g by mouth every other day. Patient takes every three days   Refills: 0     simvastatin 40 MG Tabs  Commonly known as: Zocor      Take 1 tablet (40 mg total) by mouth nightly.   Refills: 0     venlafaxine 37.5 MG Tabs  Commonly known as: Effexor      Take 1 tablet (37.5 mg total) by mouth 2 (two) times daily.   Refills: 0     Vitamin D 50 MCG (2000 UT) Tabs      Take by mouth nightly.   Refills: 0            STOP taking these medications      cephalexin 500 MG Caps  Commonly known as: Keflex                  Where to Get Your Medications        These medications were sent to [id number] [place] [place] - [address] [phone], [phone]  [address] [place] [address]      Phone: [phone]   amLODIPine 5 MG Tabs  carbidopa-levodopa  MG Tabs         Follow up:       Follow up Labs and imaging:         Time spent:  > 30 minutes    KAYLEN BARFIELD MD  3/14/2024

## 2024-03-26 ENCOUNTER — APPOINTMENT (OUTPATIENT)
Dept: PHYSICAL MEDICINE AND REHAB | Age: 89
End: 2024-03-26

## 2024-04-01 ENCOUNTER — HOSPITAL ENCOUNTER (OUTPATIENT)
Dept: PHYSICAL MEDICINE AND REHAB | Age: 89
Discharge: STILL ADMITTED OR ADMINISTRATIVE | End: 2024-04-01

## 2024-04-01 DIAGNOSIS — M62.81 MUSCLE WEAKNESS (GENERALIZED): Primary | ICD-10-CM

## 2024-04-01 PROCEDURE — 97530 THERAPEUTIC ACTIVITIES: CPT

## 2024-04-01 PROCEDURE — 97110 THERAPEUTIC EXERCISES: CPT

## 2024-04-01 PROCEDURE — 97162 PT EVAL MOD COMPLEX 30 MIN: CPT

## 2024-04-01 ASSESSMENT — ENCOUNTER SYMPTOMS: PAIN: 1

## 2024-04-08 ENCOUNTER — APPOINTMENT (OUTPATIENT)
Dept: PHYSICAL MEDICINE AND REHAB | Age: 89
End: 2024-04-08

## 2024-04-08 PROCEDURE — 97112 NEUROMUSCULAR REEDUCATION: CPT

## 2024-04-08 PROCEDURE — 97530 THERAPEUTIC ACTIVITIES: CPT

## 2024-04-08 ASSESSMENT — ENCOUNTER SYMPTOMS: PAIN: 1

## 2024-04-16 ENCOUNTER — APPOINTMENT (OUTPATIENT)
Dept: PHYSICAL MEDICINE AND REHAB | Age: 89
End: 2024-04-16

## 2024-04-16 PROCEDURE — 97530 THERAPEUTIC ACTIVITIES: CPT

## 2024-04-16 ASSESSMENT — ENCOUNTER SYMPTOMS: PAIN: 1

## 2024-04-22 ENCOUNTER — APPOINTMENT (OUTPATIENT)
Dept: PHYSICAL MEDICINE AND REHAB | Age: 89
End: 2024-04-22

## 2024-04-22 PROCEDURE — 97112 NEUROMUSCULAR REEDUCATION: CPT

## 2024-04-22 PROCEDURE — 97530 THERAPEUTIC ACTIVITIES: CPT

## 2024-04-22 ASSESSMENT — ENCOUNTER SYMPTOMS: PAIN: 1

## 2024-04-29 ENCOUNTER — APPOINTMENT (OUTPATIENT)
Dept: PHYSICAL MEDICINE AND REHAB | Age: 89
End: 2024-04-29

## 2024-04-29 PROCEDURE — 97530 THERAPEUTIC ACTIVITIES: CPT

## 2024-04-29 PROCEDURE — 97112 NEUROMUSCULAR REEDUCATION: CPT

## 2024-04-29 ASSESSMENT — ENCOUNTER SYMPTOMS: PAIN: 1

## 2024-05-06 ENCOUNTER — HOSPITAL ENCOUNTER (OUTPATIENT)
Age: 89
End: 2024-05-06
Attending: UROLOGY | Admitting: UROLOGY

## 2024-05-06 ENCOUNTER — HOSPITAL ENCOUNTER (OUTPATIENT)
Dept: PHYSICAL MEDICINE AND REHAB | Age: 89
Discharge: STILL ADMITTED OR ADMINISTRATIVE | End: 2024-05-06

## 2024-05-06 PROCEDURE — 97530 THERAPEUTIC ACTIVITIES: CPT

## 2024-05-12 ENCOUNTER — HOSPITAL ENCOUNTER (EMERGENCY)
Facility: HOSPITAL | Age: 89
Discharge: HOME | DRG: 689 | End: 2024-05-12
Attending: EMERGENCY MEDICINE

## 2024-05-12 ENCOUNTER — HOSPITAL ENCOUNTER (EMERGENCY)
Facility: HOSPITAL | Age: 89
Discharge: HOME | End: 2024-05-12
Attending: EMERGENCY MEDICINE

## 2024-05-12 VITALS
OXYGEN SATURATION: 98 % | WEIGHT: 146.63 LBS | TEMPERATURE: 99 F | DIASTOLIC BLOOD PRESSURE: 72 MMHG | RESPIRATION RATE: 12 BRPM | HEART RATE: 60 BPM | SYSTOLIC BLOOD PRESSURE: 152 MMHG | BODY MASS INDEX: 22 KG/M2

## 2024-05-12 DIAGNOSIS — R53.1 WEAKNESS GENERALIZED: ICD-10-CM

## 2024-05-12 DIAGNOSIS — N30.00 ACUTE CYSTITIS WITHOUT HEMATURIA: Primary | ICD-10-CM

## 2024-05-12 LAB
ALBUMIN SERPL-MCNC: 4.2 G/DL (ref 3.2–4.8)
ALP LIVER SERPL-CCNC: 92 U/L
ALT SERPL-CCNC: 28 U/L
ANION GAP SERPL CALC-SCNC: 4 MMOL/L (ref 0–18)
AST SERPL-CCNC: 27 U/L (ref ?–34)
BASOPHILS # BLD AUTO: 0.03 X10(3) UL (ref 0–0.2)
BASOPHILS NFR BLD AUTO: 0.5 %
BILIRUB DIRECT SERPL-MCNC: 0.1 MG/DL (ref ?–0.3)
BILIRUB SERPL-MCNC: 0.4 MG/DL (ref 0.2–1.1)
BILIRUB UR QL: NEGATIVE
BUN BLD-MCNC: 13 MG/DL (ref 9–23)
BUN/CREAT SERPL: 17.6 (ref 10–20)
CALCIUM BLD-MCNC: 9.9 MG/DL (ref 8.7–10.4)
CHLORIDE SERPL-SCNC: 108 MMOL/L (ref 98–112)
CO2 SERPL-SCNC: 30 MMOL/L (ref 21–32)
COLOR UR: YELLOW
CREAT BLD-MCNC: 0.74 MG/DL
DEPRECATED RDW RBC AUTO: 48.6 FL (ref 35.1–46.3)
EGFRCR SERPLBLD CKD-EPI 2021: 87 ML/MIN/1.73M2 (ref 60–?)
EOSINOPHIL # BLD AUTO: 0.06 X10(3) UL (ref 0–0.7)
EOSINOPHIL NFR BLD AUTO: 0.9 %
ERYTHROCYTE [DISTWIDTH] IN BLOOD BY AUTOMATED COUNT: 13 % (ref 11–15)
GLUCOSE BLD-MCNC: 99 MG/DL (ref 70–99)
GLUCOSE UR-MCNC: NORMAL MG/DL
HCT VFR BLD AUTO: 37.6 %
HGB BLD-MCNC: 12.7 G/DL
IMM GRANULOCYTES # BLD AUTO: 0.04 X10(3) UL (ref 0–1)
IMM GRANULOCYTES NFR BLD: 0.6 %
KETONES UR-MCNC: NEGATIVE MG/DL
LEUKOCYTE ESTERASE UR QL STRIP.AUTO: 500
LYMPHOCYTES # BLD AUTO: 1.02 X10(3) UL (ref 1–4)
LYMPHOCYTES NFR BLD AUTO: 15.7 %
MAGNESIUM SERPL-MCNC: 2.2 MG/DL (ref 1.6–2.6)
MCH RBC QN AUTO: 34.1 PG (ref 26–34)
MCHC RBC AUTO-ENTMCNC: 33.8 G/DL (ref 31–37)
MCV RBC AUTO: 101.1 FL
MONOCYTES # BLD AUTO: 0.65 X10(3) UL (ref 0.1–1)
MONOCYTES NFR BLD AUTO: 10 %
NEUTROPHILS # BLD AUTO: 4.68 X10 (3) UL (ref 1.5–7.7)
NEUTROPHILS # BLD AUTO: 4.68 X10(3) UL (ref 1.5–7.7)
NEUTROPHILS NFR BLD AUTO: 72.3 %
NITRITE UR QL STRIP.AUTO: NEGATIVE
OSMOLALITY SERPL CALC.SUM OF ELEC: 294 MOSM/KG (ref 275–295)
PH UR: 6.5 [PH] (ref 5–8)
PLATELET # BLD AUTO: 168 10(3)UL (ref 150–450)
POTASSIUM SERPL-SCNC: 4.2 MMOL/L (ref 3.5–5.1)
PROT SERPL-MCNC: 7.5 G/DL (ref 5.7–8.2)
PROT UR-MCNC: 30 MG/DL
RBC # BLD AUTO: 3.72 X10(6)UL
RBC #/AREA URNS AUTO: >10 /HPF
SODIUM SERPL-SCNC: 142 MMOL/L (ref 136–145)
SP GR UR STRIP: 1.02 (ref 1–1.03)
UROBILINOGEN UR STRIP-ACNC: NORMAL
WBC # BLD AUTO: 6.5 X10(3) UL (ref 4–11)
WBC #/AREA URNS AUTO: >50 /HPF
WBC CLUMPS UR QL AUTO: PRESENT /HPF

## 2024-05-12 PROCEDURE — 87186 SC STD MICRODIL/AGAR DIL: CPT | Performed by: EMERGENCY MEDICINE

## 2024-05-12 PROCEDURE — 87077 CULTURE AEROBIC IDENTIFY: CPT | Performed by: EMERGENCY MEDICINE

## 2024-05-12 PROCEDURE — 80048 BASIC METABOLIC PNL TOTAL CA: CPT | Performed by: EMERGENCY MEDICINE

## 2024-05-12 PROCEDURE — 80076 HEPATIC FUNCTION PANEL: CPT | Performed by: EMERGENCY MEDICINE

## 2024-05-12 PROCEDURE — 96365 THER/PROPH/DIAG IV INF INIT: CPT

## 2024-05-12 PROCEDURE — 83735 ASSAY OF MAGNESIUM: CPT | Performed by: EMERGENCY MEDICINE

## 2024-05-12 PROCEDURE — 85025 COMPLETE CBC W/AUTO DIFF WBC: CPT | Performed by: EMERGENCY MEDICINE

## 2024-05-12 PROCEDURE — 87086 URINE CULTURE/COLONY COUNT: CPT | Performed by: EMERGENCY MEDICINE

## 2024-05-12 PROCEDURE — 81001 URINALYSIS AUTO W/SCOPE: CPT | Performed by: EMERGENCY MEDICINE

## 2024-05-12 PROCEDURE — 99284 EMERGENCY DEPT VISIT MOD MDM: CPT

## 2024-05-12 RX ORDER — CEPHALEXIN 500 MG/1
500 CAPSULE ORAL 2 TIMES DAILY
Qty: 10 CAPSULE | Refills: 0 | Status: ON HOLD | OUTPATIENT
Start: 2024-05-12 | End: 2024-05-13

## 2024-05-12 NOTE — ED INITIAL ASSESSMENT (HPI)
Patient with family to ED with complaint of recurrent UTI. Endorses weakness. Wife states when he gets these symptoms he typically has an infection.      Patient is AXOX4.

## 2024-05-12 NOTE — ED PROVIDER NOTES
Patient Seen in: [facility] Emergency Department    History     Chief Complaint   Patient presents with    UTI       HPI    89year-old male presents ER with complaint of generalized weakness.  Patient's family member bedside states that whenever he gets weak he usually has urinary tract infection.  Patient with multiple UTIs in the past.  Patient with past medical history of dementia and poor historian.  Patient denies any flank pain or abdominal pain    History reviewed.   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   Past Surgical History:   Procedure Laterality Date    Egd  2022    ESOPHAGOGASTRODUODENOSCOPY with Botox injection    Egd  2021    Large food impaction in the mid and distal esophagus    Hemiarthroplasty hip Right 2022    Right Cemented Hemiarthroplasty    Lithotripsy      Transurethral destruction, prostate tissue; water-induc           Medications :  (Not in a hospital admission)       Family History   Problem Relation Age of Onset    Alcohol and Other Disorders Associated Father     Heart Disorder Father     Depression Father     Heart Disorder Mother        Smoking Status:   Social History     Socioeconomic History    Marital status:    Tobacco Use    Smoking status: Former     Current packs/day: 0.00     Types: Cigarettes     Start date: 1957     Quit date: 1960     Years since quittin.7    Smokeless tobacco: Never   Vaping Use    Vaping status: Never Used   Substance and Sexual Activity    Alcohol use: Not Currently     Comment: occasionally    Drug use: Never       ROS  All pertinent positives for the  review of systems are mentioned in the HPI  All other organ systems are reviewed and are negative.    Constitutional and vital signs reviewed.      Social History and Family History elements reviewed from today, pertinent positives to the presenting problem noted.    Physical Exam     ED Triage Vitals [05/12/24 1331]   /64   Pulse 67   Resp 22   Temp 98.6 °F (37 °C)   Temp src Temporal   SpO2 100 %   O2 Device None (Room air)       All measures to prevent infection transmission during my interaction with the patient were taken. The patient was already wearing a droplet mask on my arrival to the room. Personal protective equipment including droplet mask, eye protection, and gloves were worn throughout the duration of the exam.  Handwashing was performed prior to and after the exam.  Stethoscope and any equipment used during my examination was cleaned with super sani-cloth germicidal wipes following the exam.     Physical Exam  Vitals and nursing note reviewed.   Constitutional:       Appearance: He is well-developed.   HENT:      Head: Normocephalic and atraumatic.      Right Ear: External ear normal.      Left Ear: External ear normal.      Nose: Nose normal.   Eyes:      Conjunctiva/sclera: Conjunctivae normal.      Pupils: Pupils are equal, round, and reactive to light.   Cardiovascular:      Rate and Rhythm: Normal rate and regular rhythm.      Heart sounds: Normal heart sounds.   Pulmonary:      Effort: Pulmonary effort is normal.      Breath sounds: Normal breath sounds.   Abdominal:      General: Bowel sounds are normal.      Palpations: Abdomen is soft.   Musculoskeletal:         General: Normal range of motion.      Cervical back: Normal range of motion and neck supple.   Skin:     General: Skin is warm and dry.   Neurological:      General: No focal deficit present.      Mental Status: He is alert and oriented to person, place, and time. Mental status is at baseline.      Deep Tendon Reflexes:  Reflexes are normal and symmetric.   Psychiatric:         Behavior: Behavior normal.         Thought Content: Thought content normal.         Judgment: Judgment normal.         ED Course        Labs Reviewed   URINALYSIS WITH CULTURE REFLEX - Abnormal; Notable for the following components:       Result Value    Clarity Urine Turbid (*)     Blood Urine 3+ (*)     Protein Urine 30 (*)     Leukocyte Esterase Urine 500 (*)     WBC Urine >50 (*)     RBC Urine >10 (*)     Bacteria Urine Rare (*)     WBC Clump Present (*)     All other components within normal limits   CBC W/ DIFFERENTIAL - Abnormal; Notable for the following components:    RBC 3.72 (*)     HGB 12.7 (*)     HCT 37.6 (*)     .1 (*)     MCH 34.1 (*)     RDW-SD 48.6 (*)     All other components within normal limits   BASIC METABOLIC PANEL (8) - Normal   HEPATIC FUNCTION PANEL (7) - Normal   MAGNESIUM - Normal   CBC WITH DIFFERENTIAL WITH PLATELET    Narrative:     The following orders were created for panel order CBC With Differential With Platelet.                  Procedure                               Abnormality         Status                                     ---------                               -----------         ------                                     CBC W/ DIFFERENTIAL[[id number]]          Abnormal            Final result                                                 Please view results for these tests on the individual orders.   URINE CULTURE, ROUTINE         Imaging Results Available and Reviewed while in ED: No results found.  ED Medications Administered:   Medications   cefTRIAXone (Rocephin) 2 g in D5W 100 mL IVPB-ADD (2 g Intravenous New Bag 5/12/24 1549)         MDM     Vitals:    05/12/24 1331 05/12/24 1430 05/12/24 1530   BP: 111/64 143/67 153/69   Pulse: 67 57 59   Resp: 22 20 15   Temp: 98.6 °F (37 °C)     TempSrc: Temporal     SpO2: 100% 96% 96%   Weight: 66.5 kg       *I personally reviewed and interpreted all ED  vitals.  I also personally reviewed all labs and imaging if ordered    Pulse Ox: 96%, Room air, Normal     Monitor Interpretation:   normal sinus rhythm    Differential Diagnosis/ Diagnostic Considerations: UTI, electrolyte abnormality, dementia.    Medical Record Review: I personally reviewed available prior medical records for any recent pertinent discharge summaries, testing, and procedures and reviewed those reports.    Complicating Factors: The patient already has does not have any pertinent problems on file. to contribute to the complexity of this ED evaluation.    Medical Decision Making  89-year-old male presents ER with complaint of generalized weakness.  Patient with a positive urinary tract infection.  Previous cultures show sensitivity to most antibiotics except for Macrobid.  Patient given 2 g of Rocephin in the ER.  Patient discharged home with Keflex next 5 days.  Patient's family bedside made aware of the discharge planning disposition to return to the ER symptoms worsen.    Problems Addressed:  Acute cystitis without hematuria: acute illness or injury  Weakness generalized: acute illness or injury    Amount and/or Complexity of Data Reviewed  Independent Historian:      Details: Patient's pain member bedside and states that he gets weak whenever he has urinary tract infections  External Data Reviewed: labs and notes.     Details: Patient's urine culture was reviewed.  Patient tested positive for Proteus in his urine multiple times which is resistant to Macrobid.  Labs: ordered. Decision-making details documented in ED Course.    Risk  Prescription drug management.        Condition upon leaving the department: Stable    Disposition and Plan     Clinical Impression:  1. Acute cystitis without hematuria    2. Weakness generalized        Disposition:  Discharge    Follow-up:  No follow-up provider specified.    Medications Prescribed:  Current Discharge Medication List        START taking these  medications    Details   cephalexin 500 MG Oral Cap Take 1 capsule (500 mg total) by mouth 2 (two) times daily for 5 days.  Qty: 10 capsule, Refills: 0

## 2024-05-12 NOTE — ED QUICK NOTES
Patient's wife/caregiver given discharge instructions and provided education regarding any follow-up referrals and/or prescription pick-up. All questions and concerns addressed by this RN. Patient wheeled out of department exit in .

## 2024-05-13 ENCOUNTER — HOSPITAL ENCOUNTER (INPATIENT)
Facility: HOSPITAL | Age: 89
LOS: 4 days | Discharge: NURSING, REHAB OR LONG-TERM CARE | DRG: 689 | End: 2024-05-17
Attending: EMERGENCY MEDICINE | Admitting: HOSPITALIST

## 2024-05-13 DIAGNOSIS — R53.1 WEAKNESS: ICD-10-CM

## 2024-05-13 DIAGNOSIS — N30.00 ACUTE CYSTITIS WITHOUT HEMATURIA: Primary | ICD-10-CM

## 2024-05-13 PROBLEM — N39.0 UTI (URINARY TRACT INFECTION): Status: ACTIVE | Noted: 2024-05-13

## 2024-05-13 LAB
ANION GAP SERPL CALC-SCNC: 8 MMOL/L (ref 0–18)
BASOPHILS # BLD AUTO: 0.04 X10(3) UL (ref 0–0.2)
BASOPHILS NFR BLD AUTO: 0.6 %
BUN BLD-MCNC: 11 MG/DL (ref 9–23)
BUN/CREAT SERPL: 16.2 (ref 10–20)
CALCIUM BLD-MCNC: 10.4 MG/DL (ref 8.7–10.4)
CHLORIDE SERPL-SCNC: 105 MMOL/L (ref 98–112)
CO2 SERPL-SCNC: 28 MMOL/L (ref 21–32)
CREAT BLD-MCNC: 0.68 MG/DL
DEPRECATED RDW RBC AUTO: 51.3 FL (ref 35.1–46.3)
EGFRCR SERPLBLD CKD-EPI 2021: 89 ML/MIN/1.73M2 (ref 60–?)
EOSINOPHIL # BLD AUTO: 0.04 X10(3) UL (ref 0–0.7)
EOSINOPHIL NFR BLD AUTO: 0.6 %
ERYTHROCYTE [DISTWIDTH] IN BLOOD BY AUTOMATED COUNT: 13.3 % (ref 11–15)
GLUCOSE BLD-MCNC: 111 MG/DL (ref 70–99)
HCT VFR BLD AUTO: 41.8 %
HGB BLD-MCNC: 13.4 G/DL
IMM GRANULOCYTES # BLD AUTO: 0.04 X10(3) UL (ref 0–1)
IMM GRANULOCYTES NFR BLD: 0.6 %
LYMPHOCYTES # BLD AUTO: 1.21 X10(3) UL (ref 1–4)
LYMPHOCYTES NFR BLD AUTO: 16.9 %
MCH RBC QN AUTO: 33.5 PG (ref 26–34)
MCHC RBC AUTO-ENTMCNC: 32.1 G/DL (ref 31–37)
MCV RBC AUTO: 104.5 FL
MONOCYTES # BLD AUTO: 1.12 X10(3) UL (ref 0.1–1)
MONOCYTES NFR BLD AUTO: 15.6 %
NEUTROPHILS # BLD AUTO: 4.71 X10 (3) UL (ref 1.5–7.7)
NEUTROPHILS # BLD AUTO: 4.71 X10(3) UL (ref 1.5–7.7)
NEUTROPHILS NFR BLD AUTO: 65.7 %
OSMOLALITY SERPL CALC.SUM OF ELEC: 292 MOSM/KG (ref 275–295)
PLATELET # BLD AUTO: 175 10(3)UL (ref 150–450)
POTASSIUM SERPL-SCNC: 5 MMOL/L (ref 3.5–5.1)
RBC # BLD AUTO: 4 X10(6)UL
SODIUM SERPL-SCNC: 141 MMOL/L (ref 136–145)
WBC # BLD AUTO: 7.2 X10(3) UL (ref 4–11)

## 2024-05-13 PROCEDURE — 99222 1ST HOSP IP/OBS MODERATE 55: CPT | Performed by: HOSPITALIST

## 2024-05-13 RX ORDER — ATORVASTATIN CALCIUM 20 MG/1
20 TABLET, FILM COATED ORAL NIGHTLY
Status: DISCONTINUED | OUTPATIENT
Start: 2024-05-13 | End: 2024-05-17

## 2024-05-13 RX ORDER — HEPARIN SODIUM 5000 [USP'U]/ML
5000 INJECTION, SOLUTION INTRAVENOUS; SUBCUTANEOUS EVERY 12 HOURS SCHEDULED
Status: DISCONTINUED | OUTPATIENT
Start: 2024-05-13 | End: 2024-05-17

## 2024-05-13 RX ORDER — ONDANSETRON 2 MG/ML
4 INJECTION INTRAMUSCULAR; INTRAVENOUS EVERY 6 HOURS PRN
Status: DISCONTINUED | OUTPATIENT
Start: 2024-05-13 | End: 2024-05-17

## 2024-05-13 RX ORDER — HYDROXYZINE HYDROCHLORIDE 25 MG/1
25 TABLET, FILM COATED ORAL EVERY 6 HOURS PRN
Status: DISCONTINUED | OUTPATIENT
Start: 2024-05-13 | End: 2024-05-17

## 2024-05-13 RX ORDER — VENLAFAXINE 37.5 MG/1
37.5 TABLET ORAL 2 TIMES DAILY
Status: DISCONTINUED | OUTPATIENT
Start: 2024-05-13 | End: 2024-05-17

## 2024-05-13 RX ORDER — METOCLOPRAMIDE HYDROCHLORIDE 5 MG/ML
10 INJECTION INTRAMUSCULAR; INTRAVENOUS EVERY 8 HOURS PRN
Status: DISCONTINUED | OUTPATIENT
Start: 2024-05-13 | End: 2024-05-13

## 2024-05-13 RX ORDER — ARIPIPRAZOLE 2 MG/1
2 TABLET ORAL EVERY OTHER DAY
Status: DISCONTINUED | OUTPATIENT
Start: 2024-05-13 | End: 2024-05-17

## 2024-05-13 RX ORDER — SODIUM CHLORIDE 9 MG/ML
INJECTION, SOLUTION INTRAVENOUS CONTINUOUS
Status: DISCONTINUED | OUTPATIENT
Start: 2024-05-13 | End: 2024-05-17

## 2024-05-13 RX ORDER — ACETAMINOPHEN 500 MG
500 TABLET ORAL EVERY 4 HOURS PRN
Status: DISCONTINUED | OUTPATIENT
Start: 2024-05-13 | End: 2024-05-17

## 2024-05-13 RX ORDER — ASPIRIN 81 MG/1
81 TABLET ORAL DAILY
Status: DISCONTINUED | OUTPATIENT
Start: 2024-05-14 | End: 2024-05-17

## 2024-05-13 RX ORDER — MIRTAZAPINE 7.5 MG/1
15 TABLET, FILM COATED ORAL NIGHTLY
Status: DISCONTINUED | OUTPATIENT
Start: 2024-05-13 | End: 2024-05-17

## 2024-05-13 RX ORDER — POLYETHYLENE GLYCOL 3350 17 G/17G
17 POWDER, FOR SOLUTION ORAL DAILY PRN
Status: DISCONTINUED | OUTPATIENT
Start: 2024-05-13 | End: 2024-05-17

## 2024-05-13 RX ORDER — MAGNESIUM OXIDE 400 MG (241.3 MG MAGNESIUM) TABLET
2 TABLET NIGHTLY
Status: DISCONTINUED | OUTPATIENT
Start: 2024-05-13 | End: 2024-05-17

## 2024-05-13 RX ORDER — AMLODIPINE BESYLATE 5 MG/1
5 TABLET ORAL DAILY
Status: DISCONTINUED | OUTPATIENT
Start: 2024-05-13 | End: 2024-05-17

## 2024-05-13 RX ORDER — DONEPEZIL HYDROCHLORIDE 10 MG/1
10 TABLET, FILM COATED ORAL NIGHTLY
Status: DISCONTINUED | OUTPATIENT
Start: 2024-05-13 | End: 2024-05-17

## 2024-05-13 NOTE — SLP NOTE
ADULT SWALLOWING EVALUATION    ASSESSMENT    ASSESSMENT/OVERALL IMPRESSION:  PPE REQUIRED. THIS THERAPIST WORE GLOVES, DROPLET MASK, AND GOGGLES FOR DURATION OF EVALUATION. HANDS WASHED UPON ENTRANCE/EXIT.    SLP BSSE orders received and acknowledged. A swallow evaluation warranted as pt on modified diet of soft solids and mildly thick liquids at home. Pt afebrile with clear but weak vocal quality, on room air, with oxygen saturation at 96. Pt with prior hx of dysphagia at [facility] in which last recommended diet was minced and moist/mildly thick liquids per BSSE in 3/2024. Pt positioned upright in bed with wife at bedside feeding pt lunch. Pt with no complaints of pain, RN aware. Pt with adequate oral acceptance and bilabial seal across all trials. Pt with intact bite, prolonged mastication of solids, and increased NANCY. Pt's swallow response appears delayed with reduced hyolaryngeal elevation/excursion. No clinical signs of aspiration (e.g., immediate/delayed throat clear, immediate/delayed cough, wet vocal quality, increased O2 effort) observed across all trials. Oxygen status remained >95 t/o the entire evaluation.     At this time, pt presents with mild oral dysphagia and probable pharyngeal dysfunction. Recommend a bite sized diet and mildly thick liquids with strict adherence to safe swallowing compensatory strategies. Results and recommendations reviewed with RN, pt, and family. Pt/pt's family v/u to all results/recommendations. Recommendations remain written on whiteboard. SLP collaborated with RN for MD diet orders.     PLAN: SLP to f/u x1-2 meal assessment, monitor imaging, and VFSS if clinically warranted.     RECOMMENDATIONS   Diet Recommendations - Solids: Mechanical soft chopped/ Soft & Bite Sized  Diet Recommendations - Liquids: Nectar thick liquids/ Mildly thick      Compensatory Strategies Recommended: No straws;Slow rate;Alternate consistencies;Small bites and sips  Aspiration Precautions: Upright  position;Slow rate;Small bites and sips;No straw  Medication Administration Recommendations: One pill at a time  Treatment Plan/Recommendations: Aspiration precautions    HISTORY   MEDICAL HISTORY  Reason for Referral: Altered diet consistency    Problem List  Principal Problem:    Acute cystitis without hematuria  Active Problems:    Weakness    UTI (urinary tract infection)      Past Medical History  Past Medical History:    Anxiety state    BPH (benign prostatic hyperplasia)    Dementia (HCC)    Depression    Dysphagia    Febrile illness    High blood pressure    Kidney stones    Other and unspecified hyperlipidemia    Pneumonia due to organism    Seizure disorder (HCC)    because of venalfaxine    Skin cancer    melanoma    Unspecified essential hypertension       Prior Living Situation: Home with spouse  Diet Prior to Admission: Nectar thick liquids/ Mildly thick (soft food)  Precautions: Aspiration    Patient/Family Goals: Pt on soft solids and mildly thick liquids at home    SWALLOWING HISTORY  Current Diet Consistency: Soft/ Easy to Chew;Nectar thick liquids/ Mildly thick  Dysphagia History:   BSE 2/29/20: regular/thin  VFSS 3/2/20: regular/thin  BSE 10/8/20: regular/mildly hick  BSE 7/24/21: minced and moist/mildly thick  BSE 3/23/22: puree/moderately thick - later upgraded to puree/mildly thick  VFSS 8/5/22: minced and moist/mildly thick  BSE 3/12/24: minced and moist/mildly thick      OBJECTIVE   ORAL MOTOR EXAMINATION  Dentition: Natural  Symmetry: Within Functional Limits  Strength:  (reduced)     Range of Motion: Within Functional Limits  Rate of Motion: Reduced    Voice Quality: Clear;Weak  Respiratory Status: Unlabored  Consistencies Trialed: Nectar thick liquids;Puree;Soft solid  Method of Presentation: Self presentation;Staff/Clinician assistance;Spoon;Cup;Single sips  Patient Positioning: Upright;Midline    Oral Phase of Swallow: Impaired  Bolus Retrieval: Intact  Bilabial Seal: Intact  Bolus  Formation: Impaired  Bolus Propulsion: Impaired  Mastication: Impaired       Pharyngeal Phase of Swallow: Impaired  Laryngeal Elevation Timing: Appears impaired  Laryngeal Elevation Strength: Appears impaired     (Please note: Silent aspiration cannot be evaluated clinically. Videofluoroscopic Swallow Study is required to rule-out silent aspiration.)    Esophageal Phase of Swallow: No complaints consistent with possible esophageal involvement      GOALS  Goal #1 The patient will tolerate bite sized consistency and mildly thick liquids without overt signs or symptoms of aspiration with 100 % accuracy over 1-2 session(s).  In Progress   Goal #2 The patient/family/caregiver will demonstrate understanding and implementation of aspiration precautions and swallow strategies independently over 1-2 session(s).    In Progress   Goal #3 The patient will utilize compensatory strategies as outlined by  BSSE (clinical evaluation) including Slow rate, Small bites, Small sips, Multiple swallows, Alternate liquids/solids, No straws, Upright 90 degrees, Feed patient with moderate assistance 100 % of the time across 2 sessions.  In Progress     FOLLOW UP  Treatment Plan/Recommendations: Aspiration precautions  Number of Visits to Meet Established Goals:  (1-2)  Follow Up Needed (Documentation Required): Yes  SLP Follow-up Date: 05/14/24    Thank you for your referral.   If you have any questions, please contact SULEMA Sullivan M.S. CCC-SLP  Speech Language Pathologist  Phone Number Ext. [phone]

## 2024-05-13 NOTE — ED INITIAL ASSESSMENT (HPI)
Patient that comes in frequently with bladder infections was diagnosed with one yesterday & was placed on antibiotics. Per his wife, patient was started on antibiotics in ER, but patient continues to have generalized weakness that is normal for him when he gets bladder infections.

## 2024-05-13 NOTE — PLAN OF CARE
Patient alert and oriented x 1. Wife at bedside. Denies pain. Incontinent of bladder. Evaluated by SLP for history of dysphagia- see diet orders. IV fluids infusing. Plan for IV Rocephin and PT/OT.    Problem: Patient Centered Care  Goal: Patient preferences are identified and integrated in the patient's plan of care  Description: Interventions:  - What would you like us to know as we care for you? My wife is at bedside  - Provide timely, complete, and accurate information to patient/family  - Incorporate patient and family knowledge, values, beliefs, and cultural backgrounds into the planning and delivery of care  - Encourage patient/family to participate in care and decision-making at the level they choose  - Honor patient and family perspectives and choices  Outcome: Progressing     Problem: Patient/Family Goals  Goal: Patient/Family Long Term Goal  Description: Patient's Long Term Goal: Go home with wife    Interventions:  - Antibiotics  - PT/OT  - See additional Care Plan goals for specific interventions  Outcome: Progressing  Goal: Patient/Family Short Term Goal  Description: Patient's Short Term Goal: out of bed with PT/OT    Interventions:   - See additional Care Plan goals for specific interventions  Outcome: Progressing     Problem: RISK FOR INFECTION - ADULT  Goal: Absence of fever/infection during anticipated neutropenic period  Description: INTERVENTIONS  - Monitor WBC  - Administer growth factors as ordered  - Implement neutropenic guidelines  Outcome: Progressing     Problem: SAFETY ADULT - FALL  Goal: Free from fall injury  Description: INTERVENTIONS:  - Assess pt frequently for physical needs  - Identify cognitive and physical deficits and behaviors that affect risk of falls.  - [facility] fall precautions as indicated by assessment.  - Educate pt/family on patient safety including physical limitations  - Instruct pt to call for assistance with activity based on assessment  - Modify environment to  reduce risk of injury  - Provide assistive devices as appropriate  - Consider OT/PT consult to assist with strengthening/mobility  - Encourage toileting schedule  Outcome: Progressing     Problem: DISCHARGE PLANNING  Goal: Discharge to home or other facility with appropriate resources  Description: INTERVENTIONS:  - Identify barriers to discharge w/pt and caregiver  - Include patient/family/discharge partner in discharge planning  - Arrange for needed discharge resources and transportation as appropriate  - Identify discharge learning needs (meds, wound care, etc)  - Arrange for interpreters to assist at discharge as needed  - Consider post-discharge preferences of patient/family/discharge partner  - Complete POLST form as appropriate  - Assess patient's ability to be responsible for managing their own health  - Refer to Case Management Department for coordinating discharge planning if the patient needs post-hospital services based on physician/LIP order or complex needs related to functional status, cognitive ability or social support system  Outcome: Progressing

## 2024-05-13 NOTE — ED PROVIDER NOTES
Patient Seen in: [facility] Emergency Department    History     Chief Complaint   Patient presents with    Fatigue    Urinary Symptoms       HPI    89-year-old male with past medical history significant for anxiety, dementia, depression, high blood pressure, seizure, presents to the ED for generalized weakness.  Patient's wife states that patient was diagnosed with a urinary tract infection yesterday and started on IV antibiotics.  He was discharged but today patient remained too weak to be able to move without significant assistance.  Wife was unable to help him change clothes or get to the bathroom because he was so weak.  She states that generally, when patient has had prior urinary tract infections, he is required admission to the hospital for a few days until he regains his strength.  Patient is denying any pain, nausea, chills.    History from Independent Source: Initial history provided by wife as stated in [facility]    External Records Reviewed: Reviewed lab work completed yesterday which showed a normal chemistry panel and white blood cell count.  Patient did have a urinary tract infection.  Urine culture at this time shows greater than 100,000 colony-forming units of gram-negative rods.  Patient was last admitted with urinary tract infection on March 11 and was treated with ceftriaxone followed by cephalexin    History reviewed.   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   Past Surgical History:   Procedure Laterality Date    Egd  05/23/2022    ESOPHAGOGASTRODUODENOSCOPY with Botox injection    Egd  07/23/2021    Large food impaction in the mid and distal  esophagus    Hemiarthroplasty hip Right 2022    Right Cemented Hemiarthroplasty    Lithotripsy      Transurethral destruction, prostate tissue; water-induc           Medications :  (Not in a hospital admission)       Family History   Problem Relation Age of Onset    Alcohol and Other Disorders Associated Father     Heart Disorder Father     Depression Father     Heart Disorder Mother        Smoking Status:   Social History     Socioeconomic History    Marital status:    Tobacco Use    Smoking status: Former     Current packs/day: 0.00     Types: Cigarettes     Start date: 1957     Quit date: 1960     Years since quittin.7    Smokeless tobacco: Never   Vaping Use    Vaping status: Never Used   Substance and Sexual Activity    Alcohol use: Not Currently     Comment: occasionally    Drug use: Never       Constitutional and vital signs reviewed.      Social History and Family History elements reviewed from today, pertinent positives to the presenting problem noted.    Physical Exam     ED Triage Vitals [24 0836]   BP (!) 173/71   Pulse 70   Resp 20   Temp 98.2 °F (36.8 °C)   Temp src Temporal   SpO2 98 %   O2 Device        Physical Exam   Constitutional: AAOx3, chronically ill-appearing, NAD  HEENT: Normocephalic, PERRLA, MMM  CV: s1s2+, RRR, no m/r/g, normal distal pulses  Pulmonary/Chest: CTA b/l with no rales, wheezes.  No chest wall tenderness  Abdominal: Nontender.  Nondistended. Soft. Bowel sounds are normal.   Neck/Back:   :   Musculoskeletal: Normal range of motion. No deformity.   Neurological: Awake, alert. Normal reflexes. No cranial nerve deficit.    Skin: Skin is warm and dry. No rash noted. No erythema.   Psychiatric:      All measures to prevent infection transmission during my interaction with the patient were taken. The patient was already wearing a droplet mask on my arrival to the room. Personal protective equipment was worn throughout the duration of the exam.       ED Course        Labs Reviewed   BASIC METABOLIC PANEL (8) - Abnormal; Notable for the following components:       Result Value    Glucose 111 (*)     Creatinine 0.68 (*)     All other components within normal limits   CBC W/ DIFFERENTIAL - Abnormal; Notable for the following components:    .5 (*)     RDW-SD 51.3 (*)     Monocyte Absolute 1.12 (*)     All other components within normal limits   CBC WITH DIFFERENTIAL WITH PLATELET    Narrative:     The following orders were created for panel order CBC With Differential With Platelet.                  Procedure                               Abnormality         Status                                     ---------                               -----------         ------                                     CBC W/ DIFFERENTIAL[[id number]]          Abnormal            Final result                                                 Please view results for these tests on the individual orders.     My Independent Interpretation of EKG (if performed):     Monitor Interpretation:   normal sinus rhythm as interpreted by me.      Imaging Results Available and Reviewed while in ED: No results found.  ED Medications Administered:   Medications   cefTRIAXone (Rocephin) 1 g in D5W 100 mL IVPB-ADD (1 g Intravenous New Bag 5/13/24 1100)   sodium chloride 0.9 % IV bolus 1,000 mL (1,000 mL Intravenous New Bag 5/13/24 0954)             MDM     Vitals:    05/13/24 0836   BP: (!) 173/71   Pulse: 70   Resp: 20   Temp: 98.2 °F (36.8 °C)   TempSrc: Temporal   SpO2: 98%   Weight: 77.1 kg   Height: 172.7 cm (5' 8\")     *I personally reviewed and interpreted all ED vitals.    Independent Interpretation of Studies:     Social Determinants of Health:     Procedures:      Differential/MDM/Shared Decision Making: Differential Diagnosis includes electrolyte abnormality, UTI, dehydration, others.      The patient already has recently diagnosed UTI, anxiety, dementia, depression, high blood  pressure, to contribute to the complexity of this ED evaluation.           Patient given a gram of IV ceftriaxone in the ED.  [facility] as patient is having significant generalized weakness due to his urinary tract infection and is failing outpatient management.  Discussed case with patient's wife and she is comfortable with this plan    Critical care time exclusive of procedure time spent on this patient was 31 min for taking history from patient examining patient, medical decision-making, reviewing lab work and radiology studies, explaining results to patient and family, discussing with consultants/admitting physician, and documenting in patient's chart.      Condition upon leaving the department: Stable    Disposition and Plan     Clinical Impression:  1. Acute cystitis without hematuria    2. Weakness        Disposition:  Admit    Follow-up:  No follow-up provider specified.    Medications Prescribed:  Current Discharge Medication List          Hospital Problems       Present on Admission  Date Reviewed: 12/5/2023            ICD-10-CM Noted POA    * (Principal) Acute cystitis without hematuria N30.00 11/29/2023 Unknown

## 2024-05-13 NOTE — ED QUICK NOTES
Orders for admission, patient is aware of plan and ready to go upstairs. Any questions, please call ED RN Kelin at extension [phone].     Patient Covid vaccination status: Fully vaccinated     COVID Test Ordered in ED: None    COVID Suspicion at Admission: N/A    Running Infusions:      Mental Status/LOC at time of transport: X4    Other pertinent information:   CIWA score: N/A   NIH score:  N/A

## 2024-05-13 NOTE — H&P
[facility]    PATIENT'S NAME: VIET MCKINLEY JR.   ATTENDING PHYSICIAN: Rahul Cramer MD   PATIENT ACCOUNT#:   [id number]    LOCATION:  [address]  MEDICAL RECORD #:   [id number]       YOB: 1935  ADMISSION DATE:       05/13/2024    HISTORY AND PHYSICAL EXAMINATION    CHIEF COMPLAINT:  Urinary tract infection and generalized weakness.    HISTORY OF PRESENT ILLNESS:  The patient is an 89-year-old  male with underlying dementia, Parkinson disease, and generalized musculoskeletal deconditioning with recurrent urinary tract infections.  He was seen in the emergency room on May 12 and diagnosed with a urinary tract infection, started on antibiotics, discharged home.  A urine culture from May 12 growing gram negative rods.  Today his wife brought him back because she cannot even get him up to use the walker at home.  CBC and chemistry were unremarkable.  The patient was started on IV Rocephin, and he will be admitted to the hospital for further management.    PAST MEDICAL HISTORY:  He has a history of bilateral staghorn kidney stones with recurrent Proteus mirabilis urinary tract infections, history of Parkinson disease, hypertension, hyperlipidemia, dementia, benign prostatic hypertrophy, anxiety, seizure disorder, and musculoskeletal deconditioning.    PAST SURGICAL HISTORY:  Cystoscopy and lithotripsy, TURP.    MEDICATIONS:  Please see medication reconciliation list.     ALLERGIES:  No known drug allergies.    FAMILY HISTORY:  Father had hypertension.    SOCIAL HISTORY:  Ex-tobacco user.  No current tobacco, alcohol, or drug use.  Lives with his wife.  Requires assistance in his basic activities of daily living.    REVIEW OF SYSTEMS:  The patient has dementia, is not able to give me much.  Per his wife, he has been weak for the last 2 to 3 days.  Usually he is able to stand up with help and use the walker with help, but he has not been able to stand up per his wife.  No other  focal review of systems is positive.        PHYSICAL EXAMINATION:    GENERAL:  Alert.  Able to answer questions.  Does not appear to be in distress.  VITAL SIGNS:  Temperature 98.2, pulse 59, respiratory rate 16, blood pressure 156/81, pulse ox 93% on room air.  HEENT:  Atraumatic.  Oropharynx clear.  Moist mucous membranes.  Normal hard and soft palate.  Eyes:  Anicteric sclerae.  NECK:  Supple.  No lymphadenopathy.    LUNGS:  Clear to auscultation bilaterally.  Normal respiratory effort.    HEART:  Regular rate and rhythm.  S1 and S2 auscultated.  No murmur.  ABDOMEN:  Soft, nondistended.  No tenderness.  Positive bowel sounds.  EXTREMITIES:  No edema, clubbing, or cyanosis.  Cogwheel rigidity noted in upper extremities.    ASSESSMENT:    1.   Urinary tract infection.  2.   Musculoskeletal deconditioning and generalized weakness.  3.   Parkinson disease.  4.   Dementia.    PLAN:  The patient will be admitted to general medical floor.  IV antibiotic, Rocephin.  Known underlying bilateral staghorn calculi serving as source for recurrent urinary tract infections with Proteus mirabilis.  Obtain physical and occupational therapy.  Fall precautions.  Monitor his clinical status.    Dictated By Otilio Francis MD  d: 05/13/2024 11:29:06  t: 05/13/2024 11:35:43  Job [id number]  FB/

## 2024-05-14 LAB
ANION GAP SERPL CALC-SCNC: 3 MMOL/L (ref 0–18)
BASOPHILS # BLD AUTO: 0.03 X10(3) UL (ref 0–0.2)
BASOPHILS NFR BLD AUTO: 0.6 %
BUN BLD-MCNC: 8 MG/DL (ref 9–23)
BUN/CREAT SERPL: 14.8 (ref 10–20)
CALCIUM BLD-MCNC: 9 MG/DL (ref 8.7–10.4)
CHLORIDE SERPL-SCNC: 113 MMOL/L (ref 98–112)
CO2 SERPL-SCNC: 28 MMOL/L (ref 21–32)
CREAT BLD-MCNC: 0.54 MG/DL
DEPRECATED RDW RBC AUTO: 50.6 FL (ref 35.1–46.3)
EGFRCR SERPLBLD CKD-EPI 2021: 96 ML/MIN/1.73M2 (ref 60–?)
EOSINOPHIL # BLD AUTO: 0.16 X10(3) UL (ref 0–0.7)
EOSINOPHIL NFR BLD AUTO: 3.5 %
ERYTHROCYTE [DISTWIDTH] IN BLOOD BY AUTOMATED COUNT: 13.3 % (ref 11–15)
GLUCOSE BLD-MCNC: 88 MG/DL (ref 70–99)
HCT VFR BLD AUTO: 32 %
HGB BLD-MCNC: 10.5 G/DL
IMM GRANULOCYTES # BLD AUTO: 0.03 X10(3) UL (ref 0–1)
IMM GRANULOCYTES NFR BLD: 0.6 %
LYMPHOCYTES # BLD AUTO: 1.37 X10(3) UL (ref 1–4)
LYMPHOCYTES NFR BLD AUTO: 29.6 %
MCH RBC QN AUTO: 33.9 PG (ref 26–34)
MCHC RBC AUTO-ENTMCNC: 32.8 G/DL (ref 31–37)
MCV RBC AUTO: 103.2 FL
MONOCYTES # BLD AUTO: 0.6 X10(3) UL (ref 0.1–1)
MONOCYTES NFR BLD AUTO: 13 %
NEUTROPHILS # BLD AUTO: 2.44 X10 (3) UL (ref 1.5–7.7)
NEUTROPHILS # BLD AUTO: 2.44 X10(3) UL (ref 1.5–7.7)
NEUTROPHILS NFR BLD AUTO: 52.7 %
OSMOLALITY SERPL CALC.SUM OF ELEC: 296 MOSM/KG (ref 275–295)
PLATELET # BLD AUTO: 155 10(3)UL (ref 150–450)
POTASSIUM SERPL-SCNC: 3.7 MMOL/L (ref 3.5–5.1)
RBC # BLD AUTO: 3.1 X10(6)UL
SODIUM SERPL-SCNC: 144 MMOL/L (ref 136–145)
WBC # BLD AUTO: 4.6 X10(3) UL (ref 4–11)

## 2024-05-14 PROCEDURE — 99233 SBSQ HOSP IP/OBS HIGH 50: CPT | Performed by: HOSPITALIST

## 2024-05-14 NOTE — PHYSICAL THERAPY NOTE
PHYSICAL THERAPY EVALUATION - INPATIENT     Room Number: 407/407-A  Evaluation Date: 5/14/2024  Type of Evaluation: Initial   Physician Order: PT Eval and Treat    Presenting Problem: UTI and generalized weakness  Co-Morbidities : dementia, Parkinson disease,  Reason for Therapy: Mobility Dysfunction and Discharge Planning    PHYSICAL THERAPY ASSESSMENT   Patient is a 89 year old male admitted 5/13/2024 for UTI and generalized weakness.  Prior to admission, patient's baseline is supervision, CGA for ADL's and IADL's provided by his wife. Ambulates with HHA from spouse vs use of RW and CGA.   Patient is currently functioning below baseline with bed mobility, transfers, gait, stair negotiation, maintaining seated position, and standing prolonged periods.  Patient is requiring moderate assist and maximum assist as a result of the following impairments: decreased functional strength, decreased endurance/aerobic capacity, impaired sitting and standing balance, decreased muscular endurance, cognitive deficits ( ), and medical status.  Physical Therapy will continue to follow for duration of hospitalization.    Patient will benefit from continued skilled PT Services to promote return to prior level of function and safety with continuous assistance and gradual rehabilitative therapy .    PLAN  PT Treatment Plan: Bed mobility;Body mechanics;Endurance;Energy conservation;Patient education;Family education;Gait training;Range of motion;Strengthening;Stoop training;Stair training;Transfer training;Balance training  Rehab Potential : Fair  Frequency (Obs): 3-5x/week    PHYSICAL THERAPY MEDICAL/SOCIAL HISTORY   Problem List  Principal Problem:    Acute cystitis without hematuria  Active Problems:    Weakness    UTI (urinary tract infection)      HOME SITUATION  Home Situation  Type of Home: Apartment  Home Layout: One level  Stairs to Enter : 12  Railing: Yes  Lives With: Spouse  Drives: No  Patient Owned Equipment:  Wheelchair;Gait belt;Rolling walker  Patient Regularly Uses: None     SUBJECTIVE  \"No pain\"     PHYSICAL THERAPY EXAMINATION   OBJECTIVE  Precautions: Bed/chair alarm  Fall Risk: High fall risk    WEIGHT BEARING RESTRICTION  Weight Bearing Restriction: None     PAIN ASSESSMENT  Rating: Unable to rate  Location: denies pain but stating \"ow, ow, ow!\" w mobility  Management Techniques: Activity promotion;Body mechanics;Repositioning    COGNITION  Overall Cognitive Status:  Impaired  Arousal/Alertness:  lethargic  Following Commands:  follows multistep commands with increased time  Initiation: hand over hand to initiate tasks    RANGE OF MOTION AND STRENGTH ASSESSMENT  Upper extremity ROM and strength are within functional limits   Lower extremity ROM is within functional limits   Lower extremity strength is impaired bilateral LE's: 3/5    BALANCE  Static Sitting: Poor +  Dynamic Sitting: Poor  Static Standing: Poor -  Dynamic Standing: Poor -    ADDITIONAL TESTS      *RN made aware, skin tear L shin and redness on dorsal aspect of R ankle.        NEUROLOGICAL FINDINGS  Coordination - Finger to Nose: Left decreased speed;Right decreased speed  Coordination - Heel to [place]: Left decreased speed;Right decreased speed  Coordination - Rapid Alternating Movement: Left decreased speed;Right decreased speed             ACTIVITY TOLERANCE           BP: 150/76  BP Location: Right arm  BP Method: Automatic  Patient Position: Sitting    O2 WALK       AM-PAC '6-Clicks' INPATIENT SHORT FORM - BASIC MOBILITY  How much difficulty does the patient currently have...  Patient Difficulty: Turning over in bed (including adjusting bedclothes, sheets and blankets)?: A Lot   Patient Difficulty: Sitting down on and standing up from a chair with arms (e.g., wheelchair, bedside commode, etc.): A Lot   Patient Difficulty: Moving from lying on back to sitting on the side of the bed?: A Lot   How much help from another person does the patient  currently need...   Help from Another: Moving to and from a bed to a chair (including a wheelchair)?: A Lot   Help from Another: Need to walk in hospital room?: A Lot   Help from Another: Climbing 3-5 steps with a railing?: Total     AM-PAC Score:  Raw Score: 11   Approx Degree of Impairment: 72.57%   Standardized Score (AM-PAC Scale): 33.86   CMS Modifier (G-Code): CL    FUNCTIONAL ABILITY STATUS  Functional Mobility/Gait Assessment  Gait Assistance: Maximum assistance (assist x 2)  Distance (ft): 3ft bed>chair  Assistive Device: Rolling walker  Pattern: Shuffle (flexed posture, unsteady gait. Cues for upight posture and and assist with management of RW w turns.)  Supine to Sit: maximum assist and assist x 2. Assist with LE's and trunk. Task requiring increased time to complete. Max A x 1 to scoot pt toward EOB. Sat EOB for 8 minutes requiring Min A with fluctuating bouts of Max A x 1 2* retropulsive episodes.   Sit to Stand: moderate assist and assist x2. Cues for appropriate UE positioning with transitional movements. Max A x 1 to maintain static standing with bilateral UE support on RW. Cues for upright posture.     Exercise/Education Provided:  Bed mobility  Body mechanics  Energy conservation  Functional activity tolerated  Gait training  Posture  ROM  Strengthening  Transfer training    The patient's Approx Degree of Impairment: 72.57% has been calculated based on documentation in the [facility] '6 clicks' Inpatient Basic Mobility Short Form.  Research supports that patients with this level of impairment may benefit from rehab.  Final disposition will be made by interdisciplinary medical team.    Patient received supine in bed with spouse present. RN approved activity. Coordinated session with OT. Therapist educated pt and family on POC and physiological benefits of mobilization. Patient lethargic throughout session, mild improvement in alertness once activity initiated. Per pt's spouse, \"he's usually more  talkative.\" Follows simple commands with increased time. Agreeable to participate. Denies pain, yet stating \"ow, ow, ow!\" During ambulation to chair.     Patient End of Session: Up in chair;Needs met;Call light within reach;RN aware of session/findings;Alarm set;Family present    CURRENT GOALS  Goals to be met by: 5/28/24  Patient Goal Patient's self-stated goal is: return to PLOF   Goal #1 Patient is able to demonstrate supine - sit EOB @ level: minimum assistance     Goal #1   Current Status    Goal #2 Patient is able to demonstrate transfers Sit to/from Stand at assistance level: minimum assistance with walker - rolling     Goal #2  Current Status    Goal #3 Patient is able to ambulate 50 feet with assist device: walker - rolling at assistance level: minimum assistance   Goal #3   Current Status    Goal #4 *Proceed to stair goal once ambulation goal achieved:   Patient will negotiate 12 stairs/one curb w/ assistive device and supervision   Goal #4   Current Status    Goal #5 Patient to demonstrate independence with home activity/exercise instructions provided to patient in preparation for discharge.   Goal #5   Current Status      Patient Evaluation Complexity Level:  History Moderate - 1 or 2 personal factors and/or co-morbidities   Examination of body systems Moderate - addressing a total of 3 or more elements   Clinical Presentation  Moderate - Evolving   Clinical Decision Making  Moderate Complexity     Therapeutic Activity:  23 minutes

## 2024-05-14 NOTE — PROGRESS NOTES
ED Culture Callback Results Review    Pharmacist reviewed culture results from ED visit .    Final urine culture positive for P. mirabilis. Patient was prescribed Cephalexin (Keflex) on discharge. Current therapy is appropriate based on reported susceptibilities. No further intervention required at this time.      Tigre Inman, Marilyn  Emergency Medicine Pharmacist Specialist  05/14/24; 1:21 PM

## 2024-05-14 NOTE — PLAN OF CARE
Patient is A&Ox1-2 ( hx of dementia). Wife @ bedside,  RA. Rocephin continued for UTI. IVF infusing. Chopped soft/bite size, Nectar thick/mildly thickened liquids diet. Heparin & Scds for dvt prophylaxis. Takes medication with pudding. Voiding via primofit. Denies pain. Up by lift. Call light within reach, frequent rounding. Safety measures in place. Plan for PT/OT eval.      Problem: Patient Centered Care  Goal: Patient preferences are identified and integrated in the patient's plan of care  Description: Interventions:  - What would you like us to know as we care for you? My wife is at bedside  - Provide timely, complete, and accurate information to patient/family  - Incorporate patient and family knowledge, values, beliefs, and cultural backgrounds into the planning and delivery of care  - Encourage patient/family to participate in care and decision-making at the level they choose  - Honor patient and family perspectives and choices  Outcome: Progressing     Problem: Patient/Family Goals  Goal: Patient/Family Long Term Goal  Description: Patient's Long Term Goal: Go home with wife    Interventions:  - Antibiotics  - PT/OT  - See additional Care Plan goals for specific interventions  Outcome: Progressing  Goal: Patient/Family Short Term Goal  Description: Patient's Short Term Goal: out of bed with PT/OT    Interventions:   - See additional Care Plan goals for specific interventions  Outcome: Progressing     Problem: RISK FOR INFECTION - ADULT  Goal: Absence of fever/infection during anticipated neutropenic period  Description: INTERVENTIONS  - Monitor WBC  - Administer growth factors as ordered  - Implement neutropenic guidelines  Outcome: Progressing     Problem: SAFETY ADULT - FALL  Goal: Free from fall injury  Description: INTERVENTIONS:  - Assess pt frequently for physical needs  - Identify cognitive and physical deficits and behaviors that affect risk of falls.  - [place] fall precautions as indicated by  assessment.  - Educate pt/family on patient safety including physical limitations  - Instruct pt to call for assistance with activity based on assessment  - Modify environment to reduce risk of injury  - Provide assistive devices as appropriate  - Consider OT/PT consult to assist with strengthening/mobility  - Encourage toileting schedule  Outcome: Progressing     Problem: DISCHARGE PLANNING  Goal: Discharge to home or other facility with appropriate resources  Description: INTERVENTIONS:  - Identify barriers to discharge w/pt and caregiver  - Include patient/family/discharge partner in discharge planning  - Arrange for needed discharge resources and transportation as appropriate  - Identify discharge learning needs (meds, wound care, etc)  - Arrange for interpreters to assist at discharge as needed  - Consider post-discharge preferences of patient/family/discharge partner  - Complete POLST form as appropriate  - Assess patient's ability to be responsible for managing their own health  - Refer to Case Management Department for coordinating discharge planning if the patient needs post-hospital services based on physician/LIP order or complex needs related to functional status, cognitive ability or social support system  Outcome: Progressing

## 2024-05-14 NOTE — PROGRESS NOTES
[facility]  part of [facility]    Progress Note    Fernando Ramos Jr. Patient Status:  Inpatient    1935 MRN [id number]   Location [facility] 4W/SW/SE Attending Tanya Wheat MD   Hosp Day # 1 PCP [facility]     Chief complaint: Weakness  Subjective:     More tired and sleepy today, but did not sleep well the last night after admission  No high fevers  Seen by speech and physical therapist  On modified diet  Wife at bedside    Objective:   Blood pressure 150/76, pulse 58, temperature 97.7 °F (36.5 °C), temperature source Axillary, resp. rate 18, height 5' 8\" (1.727 m), weight 152 lb 3.2 oz (69 kg), SpO2 100%.    GEN: Looks frail and chronically ill, sleeping in a chair, wife speaks for him  HEENT: conjunctivae/corneas clear. PERRL, EOM's intact.  Neck: no adenopathy, no carotid bruit, no JVD, supple  Pulmonary:  clear to auscultation bilaterally  Cardiovascular: S1, S2 normal, no murmur, click, rub or gallop, regular rate and rhythm  Abdominal: soft, non-tender; bowel sounds normal; no masses,  no organomegaly  Extremities: no cyanosis or edema  Pulses: palpable and symmetric  Skin: Warm and dry  Neurologic: Somnolent  Psychiatric: calm, cooperative    Assessment and Plan:   89M with underlying dementia, Parkinson disease, and generalized musculoskeletal deconditioning with recurrent urinary tract infections. He was seen in the emergency room on May 12 and diagnosed with UTI, started on antibiotics, discharged home, brought back because of profound weakness, unable to get up and walk.     Urinary tract infection.  Recent cx +proteus, partially treated with oral abx  -now on ceftriaxone    Musculoskeletal deconditioning and generalized weakness.  Worsen by infection  -PT/OT  -Recent TSH okay    Acute encephalopathy due to infection (metabolic)  Parkinson disease.  Dementia.  -cont meds  -Speech recommends mechanical soft with nectar thick liquids, aspiration  precautions--> continue to follow      Chronic anemia, hgb~10-11  Recent B12 ok  H/o iron deficiency previously--> recheck  H/o nephrolithiasis, staghorn calculi with recurrent Proteus infections, Cr nl  HTN--> monitor BP  BPH--> monitor urine output  Anxiety-->cont meds      DVT prophylaxis: Heparin subcu     Dispo: Pending progress, cont PT/OT,  wife would prefer home with home health if improves sufficiently vs short stay at rehab       Chart reviewed, including current vitals, notes, labs and imaging  Pertinent past medical records reviewed  Labs ordered and medications adjusted as outlined above  Coordinate care with care team/consultants  Discussed with patient and family at bedside results of tests, management plan as outlined above, and the need for ongoing hospitalization  D/w RN     MDM high  severe acute illness/or exacerbation of chronic illness posing a threat to life, IV medications, requiring close monitoring in hospital.       Results:     Lab Results   Component Value Date    WBC 4.6 05/14/2024    HGB 10.5 (L) 05/14/2024    HCT 32.0 (L) 05/14/2024    .0 05/14/2024    CREATSERUM 0.54 (L) 05/14/2024    BUN 8 (L) 05/14/2024     05/14/2024    K 3.7 05/14/2024     (H) 05/14/2024    CO2 28.0 05/14/2024    GLU 88 05/14/2024    CA 9.0 05/14/2024    ALB 4.2 05/12/2024    ALKPHO 92 05/12/2024    BILT 0.4 05/12/2024    TP 7.5 05/12/2024    AST 27 05/12/2024    ALT 28 05/12/2024    INR 1.18 (H) 03/23/2023    TSH 0.889 03/08/2024    LIP 48 09/21/2023    ESRML 26 (H) 05/03/2021    CRP 1.22 (H) 05/03/2021    MG 2.2 05/12/2024    PHOS 3.5 01/21/2024     03/14/2023    B12 418 03/13/2024       No results found.          LUCAS WADE MD  5/14/2024

## 2024-05-14 NOTE — CM/SW NOTE
Department  notified of request for HHC, aidin referrals started. Assigned CM/SW to follow up with pt/family on further discharge planning.     Marlena Stevens, DSC

## 2024-05-14 NOTE — OCCUPATIONAL THERAPY NOTE
OCCUPATIONAL THERAPY EVALUATION - INPATIENT     Room Number: 407/407-A  Evaluation Date: 5/14/2024  Type of Evaluation: Initial  Presenting Problem: Urinary tract infection and generalized weakness    Physician Order: IP Consult to Occupational Therapy  Reason for Therapy: ADL/IADL Dysfunction and Discharge Planning    OCCUPATIONAL THERAPY ASSESSMENT   Patient is a 89 year old male admitted 5/13/2024 for weakness, UTI.      Prior to admission, patient's baseline is assist with ADLs as needed, CGA with functional mobility with RW.  Patient is currently functioning below baseline with ADLs and functional mobility.  Patient is requiring maximum assist as a result of the following impairments: fatigue, decreased functional strength, decreased functional reach, decreased endurance, and impaired standing balance. Occupational Therapy will continue to follow for duration of hospitalization.    Patient will benefit from continued skilled OT Services to promote return to prior level of function and safety with continuous assistance and gradual rehabilitative therapy     PLAN  OT Treatment Plan: Balance activities;Energy conservation/work simplification techniques;ADL training;Functional transfer training;Endurance training;Equipment eval/education  OT Device Recommendations: TBD    OCCUPATIONAL THERAPY MEDICAL/SOCIAL HISTORY   Problem List  Principal Problem:    Acute cystitis without hematuria  Active Problems:    Weakness    UTI (urinary tract infection)    HOME SITUATION  Type of Home: Apartment  Home Layout: One level  Lives With: Spouse  Drives: No  Patient Regularly Uses: None      SUBJECTIVE  \"Ok\"  \"I don't know\"   Pt soft spoken, pt's spouse reported pt looking more tired than usual     OCCUPATIONAL THERAPY EXAMINATION    OBJECTIVE  Precautions: Bed/chair alarm  Fall Risk: High fall risk    PAIN ASSESSMENT  Rating: Non-verbal signs of pain/discomfort observed (grimacing and yelling out with supine>sit but denied  pain)    ACTIVITY TOLERANCE  Room air    COGNITION  Impaired, lethargic, followed one-step cues with additional time, tactile cues to initiate tasks    RANGE OF MOTION   Upper extremity ROM is within functional limits     STRENGTH ASSESSMENT  Upper extremity strength is within functional limits     COORDINATION  Gross Motor: WFL   Fine Motor: WFL     ACTIVITIES OF DAILY LIVING ASSESSMENT  AM-PAC ‘6-Clicks’ Inpatient Daily Activity Short Form  How much help from another person does the patient currently need…  -   Putting on and taking off regular lower body clothing?: Total  -   Bathing (including washing, rinsing, drying)?: Total  -   Toileting, which includes using toilet, bedpan or urinal? : Total  -   Putting on and taking off regular upper body clothing?: A Lot  -   Taking care of personal grooming such as brushing teeth?: A Little  -   Eating meals?: A Little    AM-PAC Score:  Score: 11  Approx Degree of Impairment: 70.42%  Standardized Score (AM-PAC Scale): 29.04  CMS Modifier (G-Code): CL    FUNCTIONAL TRANSFER ASSESSMENT  Sit to Stand: Edge of Bed  Edge of Bed: Moderate Assist (; Max A x 2 spt)    BED MOBILITY  Supine to Sit : Maximum Assist (x2)  FUNCTIONAL ADL ASSESSMENT  Grooming Seated: Minimal Assist  LB Dressing Seated: Dependent (bed level)    Skilled Therapy Provided: Pt completed ADLs and functional mobility between Mod - Max A x 2. Pt benefited from additional time in between 1 step cues due to delayed processing , tactile cues to initiate tasks . Pt benefited from the presence of his spouse who was very motivating and encouraging for pt.     EDUCATION PROVIDED  Patient: Role of Occupational Therapy; Plan of Care; Discharge Recommendations; Functional Transfer Techniques; Compensatory ADL Techniques  Patient's Response to Education: Verbalized Understanding; Returned Demonstration    The patient's Approx Degree of Impairment: 70.42% has been calculated based on documentation in the [facility] '6  clicks' Inpatient Daily Activity Short Form.  Research supports that patients with this level of impairment may benefit from rehab.  Final disposition will be made by interdisciplinary medical team.     Patient End of Session: Up in chair;Needs met;Call light within reach;RN aware of session/findings;Alarm set;Family present    OT Goals  Patients self stated goal is: home     Patient will complete functional transfer with sba  Comment:     Patient will complete toileting with sba  Comment:     Patient will tolerate standing for 4 minutes in prep for adls with sba   Comment:      Comment:          Goals  on:  24  Frequency: 3x/w    Patient Evaluation Complexity Level:   Occupational Profile/Medical History MODERATE - Expanded review of history including review of medical or therapy record   Specific performance deficits impacting engagement in ADL/IADL MODERATE  3 - 5 performance deficits   Client Assessment/Performance Deficits MODERATE - Comorbidities and min to mod modifications of tasks    Clinical Decision Making MODERATE - Analysis of occupational profile, detailed assessments, several treatment options    Overall Complexity MODERATE     OT Session Time: 25 minutes  Self-Care Home Management: 15 minutes  Therapeutic Activity: 10 minutes

## 2024-05-14 NOTE — SLP NOTE
SPEECH DAILY NOTE - INPATIENT    ASSESSMENT & PLAN   ASSESSMENT  PPE REQUIRED. THIS THERAPIST WORE GLOVES AND MASK FOR DURATION OF EVALUATION. HANDS WASHED UPON ENTRANCE/EXIT.    SLP f/u for ongoing dysphagia tx/meal assessment per recommendations of soft bite sized/thin liquids per BSE. RN reports pt tolerates diet and medication well with no overt clinical s/s aspiration. Pt denies any swallowing challenges.     Pt positioned upright in bedside chair, drowsy/limited participation. Pt afebrile, tolerating room air with oxygen status 100% prior to the start of oral trials. SLP reviewed aspiration precautions and safe swallowing compensatory strategies with the patient. Safe swallow guidelines remain written on the white board in purple. Patient unable to v/u, no family present. Provided 1:1 assistance, pt tolerates limited trials soft bite sized consistency and mildly thick liquids via tsp/cup with no overt clinical signs/symptoms of aspiration. Oxygen status remained stable t/o the entire session. Recommend remain on current diet with strict adherence to aspiration precautions and swallow strategies.    SLP to f/u with meal assessment x1, monitor  CXR, and VFSS if any overt CSA and/or decline in CXR. RN alerted with results and recommendations.     MOST RECENT CXR -none on this admission         Diet Recommendations - Solids: Mechanical soft chopped/ Soft & Bite Sized  Diet Recommendations - Liquids: Nectar thick liquids/ Mildly thick    Compensatory Strategies Recommended: No straws;Slow rate;Alternate consistencies;Small bites and sips  Aspiration Precautions: Upright position;Slow rate;Small bites and sips;No straw  Medication Administration Recommendations: One pill at a time    Patient Experiencing Pain: No                Treatment Plan  Treatment Plan/Recommendations: Aspiration precautions    Interdisciplinary Communication: Plan posted at bedside            GOALS  Goal #1 The patient will tolerate bite  sized consistency and mildly thick liquids without overt signs or symptoms of aspiration with 100 % accuracy over 1-2 session(s).  In Progress   Goal #2 The patient/family/caregiver will demonstrate understanding and implementation of aspiration precautions and swallow strategies independently over 1-2 session(s).    In Progress   Goal #3 The patient will utilize compensatory strategies as outlined by  BSSE (clinical evaluation) including Slow rate, Small bites, Small sips, Multiple swallows, Alternate liquids/solids, No straws, Upright 90 degrees, Feed patient with moderate assistance 100 % of the time across 2 sessions.  In Progress     FOLLOW UP  Follow Up Needed (Documentation Required): Yes  SLP Follow-up Date: 05/15/24  Number of Visits to Meet Established Goals:  (1-2)    Session: 1    If you have any questions, please contact SULEMA Man M.S. CCC-SLP  Speech Language Pathologist  Phone Number Ext. [phone]

## 2024-05-14 NOTE — PLAN OF CARE
Patient is A&Ox1-2. VSS. RA. From home with spouse. Up max assist with walker. Patient is on a soft/chopped/nectar thick diet. Patient takes his pills whole with pudding. Patient is incontinent. Primofit is in place. Rocephin administered for UTI. Wife would like rehab on discharge. Heparin and SCDs for VTE prophylaxis. Call light is within reach.  Problem: Patient Centered Care  Goal: Patient preferences are identified and integrated in the patient's plan of care  Description: Interventions:  - What would you like us to know as we care for you? My wife is at bedside  - Provide timely, complete, and accurate information to patient/family  - Incorporate patient and family knowledge, values, beliefs, and cultural backgrounds into the planning and delivery of care  - Encourage patient/family to participate in care and decision-making at the level they choose  - Honor patient and family perspectives and choices  Outcome: Progressing     Problem: Patient/Family Goals  Goal: Patient/Family Long Term Goal  Description: Patient's Long Term Goal: Go home with wife    Interventions:  - Antibiotics  - PT/OT  - See additional Care Plan goals for specific interventions  Outcome: Progressing  Goal: Patient/Family Short Term Goal  Description: Patient's Short Term Goal: out of bed with PT/OT    Interventions:   - See additional Care Plan goals for specific interventions  Outcome: Progressing     Problem: RISK FOR INFECTION - ADULT  Goal: Absence of fever/infection during anticipated neutropenic period  Description: INTERVENTIONS  - Monitor WBC  - Administer growth factors as ordered  - Implement neutropenic guidelines  Outcome: Progressing     Problem: SAFETY ADULT - FALL  Goal: Free from fall injury  Description: INTERVENTIONS:  - Assess pt frequently for physical needs  - Identify cognitive and physical deficits and behaviors that affect risk of falls.  - [place] fall precautions as indicated by assessment.  - Educate  pt/family on patient safety including physical limitations  - Instruct pt to call for assistance with activity based on assessment  - Modify environment to reduce risk of injury  - Provide assistive devices as appropriate  - Consider OT/PT consult to assist with strengthening/mobility  - Encourage toileting schedule  Outcome: Progressing     Problem: DISCHARGE PLANNING  Goal: Discharge to home or other facility with appropriate resources  Description: INTERVENTIONS:  - Identify barriers to discharge w/pt and caregiver  - Include patient/family/discharge partner in discharge planning  - Arrange for needed discharge resources and transportation as appropriate  - Identify discharge learning needs (meds, wound care, etc)  - Arrange for interpreters to assist at discharge as needed  - Consider post-discharge preferences of patient/family/discharge partner  - Complete POLST form as appropriate  - Assess patient's ability to be responsible for managing their own health  - Refer to Case Management Department for coordinating discharge planning if the patient needs post-hospital services based on physician/LIP order or complex needs related to functional status, cognitive ability or social support system  Outcome: Progressing

## 2024-05-14 NOTE — CM/SW NOTE
05/14/24 1800   CM/SW Referral Data   Referral Source Social Work (self-referral)   Reason for Referral Discharge planning   Informant Spouse/Significant Other   Medical Hx   Does patient have an established PCP? Yes   Patient Info   Patient's Current Mental Status at Time of Assessment Confused or unable to complete assessment   Patient's Home Environment Condo/Apt with elevator   Patient lives with Spouse/Significant other   Patient Status Prior to Admission   Independent with ADLs and Mobility Yes   Services in place prior to admission DME/Supplies at home   Type of DME/Supplies Straight Cane;Standard Walker;Wheelchair   Discharge Needs   Anticipated D/C needs To be determined     Patient signed out as possible SIVA placement.     SW called and spoke with wifeSerena. Introduced self and role. Patient lives with spouse at (address correct on face sheet). Patient lives with spouse, Serena and she is his primary caregiver. PCP is Dr. Zepeda.     Patient does not currently have any home services. Wife, Serena is a retired SLP, states that she has been working on therapy exercises with him in the home and he has been doing well.     ECTOR discussed SIVA with Serena. Serena reports that after speaking with the doctor, she would like to wait and see how patient progresses before committing to SIVA. Serena in agreement with tentative SIVA referrals being sent out.     ECTOR discussed home health care, Serena not in agreement with home health care at this time. States that they had it in the past and did not find it useful. Serena reports that she will be able to assist patient with home therapy.     No further questions or concerns at this time.     CLRC DONE.   PASRR in que for review*    PLAN: From home w spouse. PT= SIVA. Wife would like to wait and see how patient progresses before agreeing to SIVA. Tentative SIVA referrals pending in [place], need to follow up with choice list when avail. Spouse declines home health care.     Leeann  Renate Oneill, MSW, LSW

## 2024-05-15 PROCEDURE — 99233 SBSQ HOSP IP/OBS HIGH 50: CPT | Performed by: HOSPITALIST

## 2024-05-15 RX ORDER — HYDRALAZINE HYDROCHLORIDE 20 MG/ML
10 INJECTION INTRAMUSCULAR; INTRAVENOUS EVERY 4 HOURS PRN
Status: DISCONTINUED | OUTPATIENT
Start: 2024-05-15 | End: 2024-05-17

## 2024-05-15 RX ORDER — CEPHALEXIN 500 MG/1
500 CAPSULE ORAL EVERY 6 HOURS SCHEDULED
Status: DISCONTINUED | OUTPATIENT
Start: 2024-05-16 | End: 2024-05-17

## 2024-05-15 NOTE — PLAN OF CARE
Patient is A&Ox1-2 ( hx of dementia). Wife @ bedside, RA. Rocephin continued for UTI. IVF infusing. Chopped soft/bite size, Nectar thick/mildly thickened liquids diet. Heparin & Scds for dvt prophylaxis. Takes medication with pudding. Voiding via primofit. Denies pain. Call light within reach, frequent rounding. Safety measures in place. D.C.plan is possible SIVA pending for family choice.    Problem: Patient Centered Care  Goal: Patient preferences are identified and integrated in the patient's plan of care  Description: Interventions:  - What would you like us to know as we care for you? My wife is at bedside  - Provide timely, complete, and accurate information to patient/family  - Incorporate patient and family knowledge, values, beliefs, and cultural backgrounds into the planning and delivery of care  - Encourage patient/family to participate in care and decision-making at the level they choose  - Honor patient and family perspectives and choices  Outcome: Progressing     Problem: Patient/Family Goals  Goal: Patient/Family Long Term Goal  Description: Patient's Long Term Goal: Go home with wife    Interventions:  - Antibiotics  - PT/OT  - See additional Care Plan goals for specific interventions  Outcome: Progressing  Goal: Patient/Family Short Term Goal  Description: Patient's Short Term Goal: out of bed with PT/OT    Interventions:   - See additional Care Plan goals for specific interventions  Outcome: Progressing     Problem: RISK FOR INFECTION - ADULT  Goal: Absence of fever/infection during anticipated neutropenic period  Description: INTERVENTIONS  - Monitor WBC  - Administer growth factors as ordered  - Implement neutropenic guidelines  Outcome: Progressing     Problem: SAFETY ADULT - FALL  Goal: Free from fall injury  Description: INTERVENTIONS:  - Assess pt frequently for physical needs  - Identify cognitive and physical deficits and behaviors that affect risk of falls.  - [place] fall precautions as  indicated by assessment.  - Educate pt/family on patient safety including physical limitations  - Instruct pt to call for assistance with activity based on assessment  - Modify environment to reduce risk of injury  - Provide assistive devices as appropriate  - Consider OT/PT consult to assist with strengthening/mobility  - Encourage toileting schedule  Outcome: Progressing     Problem: DISCHARGE PLANNING  Goal: Discharge to home or other facility with appropriate resources  Description: INTERVENTIONS:  - Identify barriers to discharge w/pt and caregiver  - Include patient/family/discharge partner in discharge planning  - Arrange for needed discharge resources and transportation as appropriate  - Identify discharge learning needs (meds, wound care, etc)  - Arrange for interpreters to assist at discharge as needed  - Consider post-discharge preferences of patient/family/discharge partner  - Complete POLST form as appropriate  - Assess patient's ability to be responsible for managing their own health  - Refer to Case Management Department for coordinating discharge planning if the patient needs post-hospital services based on physician/LIP order or complex needs related to functional status, cognitive ability or social support system  Outcome: Progressing

## 2024-05-15 NOTE — SLP NOTE
SPEECH DAILY NOTE - INPATIENT    ASSESSMENT & PLAN   ASSESSMENT  PPE REQUIRED. THIS THERAPIST WORE GLOVES AND MASK FOR DURATION OF EVALUATION. HANDS WASHED UPON ENTRANCE/EXIT.    SLP f/u for ongoing dysphagia tx/meal assessment per recommendations of soft bite sized/mildly thick liquids per BSE. RN reports pt tolerates diet and medication well with no overt clinical s/s aspiration. Pt denies any swallowing challenges. Wife reports difficulty with soft bite sized consistency, requesting minced & moist consistency.    Pt positioned upright in bed, alert/cooperative/wife present. Pt afebrile, tolerating room air with oxygen status 93% prior to the start of oral trials. SLP reviewed aspiration precautions and safe swallowing compensatory strategies with the patient/wife. Safe swallow guidelines remain written on the white board in purple. Patient and family v/u. Provided min assistance, pt tolerates minced & moist consistency and mildly thick liquids via cup with no overt clinical signs/symptoms of aspiration. Oxygen status remained stable t/o the entire session. Recommend downgrade to minced & moist consistency, remain on mildly thick liquids with adherence to aspiration precautions and swallow strategies.     SLP to f/u with meal assessment x1-2, monitor  CXR, and VFSS if any overt CSA and/or decline in CXR. RN alerted with results and recommendations.     MOST RECENT CXR - none on this admission         Diet Recommendations - Solids: Mechanical soft ground/ Minced & Moist  Diet Recommendations - Liquids: Nectar thick liquids/ Mildly thick    Compensatory Strategies Recommended: No straws;Slow rate;Alternate consistencies;Small bites and sips  Aspiration Precautions: Upright position;Slow rate;Small bites and sips;No straw  Medication Administration Recommendations: One pill at a time    Patient Experiencing Pain: No                Treatment Plan  Treatment Plan/Recommendations: Aspiration  precautions    Interdisciplinary Communication: Discussed with RN  Plan posted at bedside            GOALS  Goal #1 The patient will tolerate minced & moist consistency and mildly thick liquids without overt signs or symptoms of aspiration with 100 % accuracy over 1-2 session(s).  Revised 5/15   Goal #2 The patient/family/caregiver will demonstrate understanding and implementation of aspiration precautions and swallow strategies independently over 1-2 session(s).    In Progress   Goal #3 The patient will utilize compensatory strategies as outlined by  BSSE (clinical evaluation) including Slow rate, Small bites, Small sips, Multiple swallows, Alternate liquids/solids, No straws, Upright 90 degrees, Feed patient with moderate assistance 100 % of the time across 2 sessions.  In Progress     FOLLOW UP  Follow Up Needed (Documentation Required): Yes  SLP Follow-up Date: 05/16/24  Number of Visits to Meet Established Goals:  (1-2)    Session: 2    If you have any questions, please contact SULEMA Man M.S. CCC-SLP  Speech Language Pathologist  Phone Number Ext. [phone]

## 2024-05-15 NOTE — PROGRESS NOTES
[facility]  part of [facility]  Hospitalist Progress Note     Fernando Ramos Jr. Patient Status:  Inpatient    1935  89 year old CSN [id number]   Location 407/407-A Attending Han Luevano MD   Hosp Day # 2 PCP [facility]     Assessment & Plan:   ----------------------------------  Acute UTI.  Patient has no leukocytosis, no fever, no urinary symptoms.  Sepsis is not present.   -Urine culture: From outpatient setting growing Proteus sensitive to Ancef  -Blood cultures: NGTD  -Antibiotics: Ceftriaxone, switch to Keflex  -ID consult appreciated: Difficult to tell colonization versus infection.    Kidney stones.  Chronic, large, numerous.  Definitive removal not reasonable given his advanced age and degree of nephrolithiasis.  -Stent placement as outpatient from outside urologist, tentatively 3-4 weeks  -Antibiotics as above per ID    Weakness.  Attributed to his UTI though this is not exactly clear.  Patient probably also has underappreciated dementia.  Seems better today per spouse.  -Continue supportive care  -Antibiotics as above  -PT/OT  -Wife would like to go home rather than [facility], final decision pending    Other problems  Acute metabolic encephalopathy, improving  Chronic anemia  History of iron deficiency  History of nephrolithiasis with staghorn calculi  Hypertension  BPH  Anxiety    dvt prophylaxis: sc heparin  code status: full code  dispo: to be determined    I personally reviewed the available laboratories, imaging including urine culture. I discussed/will discuss the case with ID. I ordered laboratories, studies including renal panel. I adjusted medications including ceftriaxone. Medical decision making high, risk is high.    Subjective:   ----------------------------------  Feeling stronger, ate well.  No chest pain or shortness of breath.  Wife thinks the patient is much more alert today.      Objective:   Chief Complaint:   Chief Complaint   Patient presents with     Fatigue    Urinary Symptoms     ----------------------------------  Temp:  [97.5 °F (36.4 °C)-98 °F (36.7 °C)] 97.5 °F (36.4 °C)  Pulse:  [59-67] 65  Resp:  [14-18] 14  BP: (134-195)/() 134/83  SpO2:  [93 %-100 %] 93 %  Gen: A+Ox2.  No distress.   HEENT: NCAT, neck supple, no carotid bruit.  CV: RRR, S1S2, and intact distal pulses. No gallop, rub, murmur.  Pulm: Effort and breath sounds normal. No distress, wheezes, rales, rhonchi.  Abd: Soft, NTND, BS normal, no mass, no HSM, no rebound/guarding.   Neuro: Normal reflexes, CN. Sensory/motor exams grossly normal deficit.  Generally mildly weak  MS: No joint effusions.  No peripheral edema.  Skin: Skin is warm and dry. No rashes, erythema, diaphoresis.   Psych: Normal mood and affect. Calm, cooperative    Labs:  Lab Results   Component Value Date    HGB 10.5 (L) 05/14/2024    WBC 4.6 05/14/2024    .0 05/14/2024     05/14/2024    K 3.7 05/14/2024    CREATSERUM 0.54 (L) 05/14/2024    VHCO3 29.2 (H) 03/08/2024    INR 1.18 (H) 03/23/2023    AST 27 05/12/2024    ALT 28 05/12/2024          [START ON 5/16/2024] cephalexin  500 mg Oral 4 times per day    heparin  5,000 Units Subcutaneous 2 times per day    amLODIPine  5 mg Oral Daily    ARIPiprazole  2 mg Oral QOD    aspirin  81 mg Oral Daily    carbidopa-levodopa  0.5 tablet Oral TID    donepezil  10 mg Oral Nightly    melatonin  2 mg Oral Nightly    mirtazapine  15 mg Oral Nightly    atorvastatin  20 mg Oral Nightly    venlafaxine  37.5 mg Oral BID       hydrALAzine    acetaminophen    ondansetron    hydrOXYzine    polyethylene glycol (PEG 3350)  **Certification      PHYSICIAN Certification of Need for Inpatient Hospitalization - Initial Certification    Patient will require inpatient services that will reasonably be expected to span two midnight's based on the clinical documentation in H+P.   Based on patients current state of illness, I anticipate that, after discharge, patient will require TBD.

## 2024-05-15 NOTE — CONGREGATE LIVING REVIEW
[facility] Authorization    The [facility] Review Committee has reviewed this case and the patient IS APPROVED for discharge to a facility for Short Term Skilled once the following procedure is followed:     - The physician discharge instructions (contained within the [facility] note for SNF) must inlcude the below appropriate and approved COVID instructions to the facility    For questions regarding CLRC approval process, please contact the CM assigned to the case.  For questions regarding RN discharge workflow, please contact the unit Clinical Leader.

## 2024-05-15 NOTE — CM/SW NOTE
Social work was able to speak to the patient's spouse via phone regarding discharge planning.    The wife states that she wants him to have another day of therapy at [facility] before she makes the decision on [facility]. The spouse stated that said last time the patient had 2 days of therapy at the hospital and was ready to go home. She is declining HH and does not want to make a [facility] decision until after his therapy session on 5/16.    Social work notified MD and RN of above.    Social work will follow up after PT/OT session is complete.    SW/CM to remain available for support and/or discharge planning.     Maya Camilo MSW, LSW  Discharge Planner [id number]

## 2024-05-15 NOTE — CONSULTS
[facility]  part of [facility] ID CONSULT NOTE    Fernando Ramos Jr. Patient Status:  Inpatient    1935 MRN [id number]   Location [facility] 4W/SW/SE Attending Han Luevano MD   Hosp Day # 2 PCP [facility]       Reason for Consultation:  UTI    ASSESSMENT:    Antibiotics: IV ceftriaxone    # Pyuria with bacteruria P. Mirabilis - same as previous cx in setting of L staghorn calculus and chronic bilateral nephrolithiasis w/o hydro   - suspect symptoms mostly related to underlying dementia. UTI less likely  # Fatigue, weakness w/o signs of infectious process  # Previous MSSA bacteremia 3/2023 from skin source (knee abrasion, buttock wound) with negative TTE s/p IV cefazolin x2 weeks  # Seizure disorder  # Dementia      PLAN:    -  on IV ceftriaxone - day #3 - stop and start Keflex - EOT 24  -  patient to follow up with Urology outpatient for possible bilateral ureteral stents  -  no plans for lithotripsy at this time per wife  -  Follow fever curve, wbc  -  Reviewed labs, micro, imaging reports, available old records  -  d/w patient, wife, RN, Primary    History of Present Illness:  Fernando Ramos Jr. is a a(n) 89 year old male. Patient is a 89-year-old male with history of dementia, HTN seizure, BPH, nephrolithiasis, previous MSSA bacteremia in 2003 from skin source and seen most recently in December for pyuria and monitor off antibiotics.  Most recent admitted in March of this year came in for lethargy and weakness again with abnormal UA and treated for UTI. Now comes mg on  weakness.  Was seen in the ED 1 day prior and discharged on Keflex.  Afebrile here, room air, normal WBC and renal function.  Started on IV ceftriaxone.  Most recent urine culture Proteus mirabilis.  Renal ultrasound a few months ago with bilateral stones without hydro.  Has a staghorn calculus in left kidney.    History:  Past Medical History:    Anxiety state    BPH  (benign prostatic hyperplasia)    Dementia (HCC)    Depression    Dysphagia    Febrile illness    High blood pressure    Kidney stones    Other and unspecified hyperlipidemia    Pneumonia due to organism    Seizure disorder (HCC)    because of venalfaxine    Skin cancer    melanoma    Unspecified essential hypertension     Past Surgical History:   Procedure Laterality Date    Egd  05/23/2022    ESOPHAGOGASTRODUODENOSCOPY with Botox injection    Egd  07/23/2021    Large food impaction in the mid and distal esophagus    Hemiarthroplasty hip Right 03/22/2022    Right Cemented Hemiarthroplasty    Lithotripsy      Transurethral destruction, prostate tissue; water-induc       Family History   Problem Relation Age of Onset    Alcohol and Other Disorders Associated Father     Heart Disorder Father     Depression Father     Heart Disorder Mother       reports that he quit smoking about 63 years ago. His smoking use included cigarettes. He started smoking about 66 years ago. He has never used smokeless tobacco. He reports that he does not currently use alcohol. He reports that he does not use drugs.    Allergies:  No Known Allergies    Medications:    Current Facility-Administered Medications:     hydrALAzine (Apresoline) 20 mg/mL injection 10 mg, 10 mg, Intravenous, Q4H PRN    cefTRIAXone (Rocephin) 1 g in D5W 100 mL IVPB-ADD, 1 g, Intravenous, Q24H    sodium chloride 0.9% infusion, , Intravenous, Continuous    heparin (Porcine) 5000 UNIT/ML injection 5,000 Units, 5,000 Units, Subcutaneous, 2 times per day    acetaminophen (Tylenol Extra Strength) tab 500 mg, 500 mg, Oral, Q4H PRN    ondansetron (Zofran) 4 MG/2ML injection 4 mg, 4 mg, Intravenous, Q6H PRN    amLODIPine (Norvasc) tab 5 mg, 5 mg, Oral, Daily    ARIPiprazole (Abilify) tab 2 mg, 2 mg, Oral, QOD    aspirin DR tab 81 mg, 81 mg, Oral, Daily    carbidopa-levodopa (SINEMET)  MG per tab 0.5 tablet, 0.5 tablet, Oral, TID    donepezil (Aricept) tab 10 mg, 10 mg,  Oral, Nightly    hydrOXYzine (Atarax) tab 25 mg, 25 mg, Oral, Q6H PRN    melatonin tab 2 mg, 2 mg, Oral, Nightly    mirtazapine (Remeron) tab 15 mg, 15 mg, Oral, Nightly    polyethylene glycol (PEG 3350) (Miralax) 17 g oral packet 17 g, 17 g, Oral, Daily PRN    atorvastatin (Lipitor) tab 20 mg, 20 mg, Oral, Nightly    venlafaxine (Effexor) tab 37.5 mg, 37.5 mg, Oral, BID    Review of Systems:  Unable to obtain 2/2 clinical status    Physical Exam:  Vital signs: Blood pressure 134/83, pulse 65, temperature 97.5 °F (36.4 °C), temperature source Axillary, resp. rate 14, height 172.7 cm (5' 8\"), weight 152 lb 3.2 oz (69 kg), SpO2 93%.    General: in bed, room air, nad, confused  HEENT: Moist mucous membranes. EOMI  Neck: No lymphadenopathy.  Supple.  Cardiovascular: No chest wall tenderness  Respiratory: Symmetric expansion  Abdomen: Soft, nontender, nondistended.   Musculoskeletal: No edema noted  Integument: No lesions. No erythema.    Laboratory Data: Reviewed in EMR    Microbiology: Reviewed in EMR    Radiology: Reviewed    Thank you for allowing us to participate in the care of this patient. Please do not hesitate to call if you have any questions.     We will continue to follow with you and will make further recommendations based on his progress.    MD Adam Viveros [facility]  [phone]  5/15/2024

## 2024-05-15 NOTE — PLAN OF CARE
Patient is A&Ox4. Blood pressure elevated this morning, PRN hydralazine administered. RA. From home with wife. Patient is incontinent x2. Primofit is in place. Up x2 assist and walker. Lift was used used yesterday. Heparin and SCDs for VTE prophylaxis. Call light is within reach.   Problem: Patient Centered Care  Goal: Patient preferences are identified and integrated in the patient's plan of care  Description: Interventions  - What would you like us to know as we care for you? My wife is at bedside  - Provide timely, complete, and accurate information to patient/family  - Incorporate patient and family knowledge, values, beliefs, and cultural backgrounds into the planning and delivery of care  - Encourage patient/family to participate in care and decision-making at the level they choose  - Honor patient and family perspectives and choices  Outcome: Progressing     Problem: Patient/Family Goals  Goal: Patient/Family Long Term Goal  Description: Patient's Long Term Goal: Go home with wife    Interventions:  - Antibiotics  - PT/OT  - See additional Care Plan goals for specific interventions  Outcome: Progressing  Goal: Patient/Family Short Term Goal  Description: Patient's Short Term Goal: out of bed with PT/OT    Interventions:   - See additional Care Plan goals for specific interventions  Outcome: Progressing     Problem: RISK FOR INFECTION - ADULT  Goal: Absence of fever/infection during anticipated neutropenic period  Description: INTERVENTIONS  - Monitor WBC  - Administer growth factors as ordered  - Implement neutropenic guidelines  Outcome: Progressing     Problem: SAFETY ADULT - FALL  Goal: Free from fall injury  Description: INTERVENTIONS:  - Assess pt frequently for physical needs  - Identify cognitive and physical deficits and behaviors that affect risk of falls.  - [place] fall precautions as indicated by assessment.  - Educate pt/family on patient safety including physical limitations  - Instruct pt  to call for assistance with activity based on assessment  - Modify environment to reduce risk of injury  - Provide assistive devices as appropriate  - Consider OT/PT consult to assist with strengthening/mobility  - Encourage toileting schedule  Outcome: Progressing     Problem: DISCHARGE PLANNING  Goal: Discharge to home or other facility with appropriate resources  Description: INTERVENTIONS:  - Identify barriers to discharge w/pt and caregiver  - Include patient/family/discharge partner in discharge planning  - Arrange for needed discharge resources and transportation as appropriate  - Identify discharge learning needs (meds, wound care, etc)  - Arrange for interpreters to assist at discharge as needed  - Consider post-discharge preferences of patient/family/discharge partner  - Complete POLST form as appropriate  - Assess patient's ability to be responsible for managing their own health  - Refer to Case Management Department for coordinating discharge planning if the patient needs post-hospital services based on physician/LIP order or complex needs related to functional status, cognitive ability or social support system  Outcome: Progressing     Problem: PAIN - ADULT  Goal: Verbalizes/displays adequate comfort level or patient's stated pain goal  Description: INTERVENTIONS:  - Encourage pt to monitor pain and request assistance  - Assess pain using appropriate pain scale  - Administer analgesics based on type and severity of pain and evaluate response  - Implement non-pharmacological measures as appropriate and evaluate response  - Consider cultural and social influences on pain and pain management  - Manage/alleviate anxiety  - Utilize distraction and/or relaxation techniques  - Monitor for opioid side effects  - Notify MD/LIP if interventions unsuccessful or patient reports new pain  - Anticipate increased pain with activity and pre-medicate as appropriate  Outcome: Progressing     Problem: Altered  Communication/Language Barrier  Goal: Patient/Family is able to understand and participate in their care  Description: Interventions:  - Assess communication ability and preferred communication style  - Implement communication aides and strategies  - Use visual cues when possible  - Listen attentively, be patient, do not interrupt  - Minimize distractions  - Allow time for understanding and response  - Establish method for patient to ask for assistance (call light)  - Provide an  as needed  - Communicate barriers and strategies to overcome with those who interact with patient  Outcome: Progressing

## 2024-05-16 PROCEDURE — 99233 SBSQ HOSP IP/OBS HIGH 50: CPT | Performed by: HOSPITALIST

## 2024-05-16 NOTE — PLAN OF CARE
Patient AO x1-2, baseline. Using lift to get back into bed. Incontinent bowel/bladder. Male purewick in place. Scds and Heparin for DVT prophylaxis. Nectar thick liquids, aspiration precautions. VSS, on RA. Denies pain. On PO Keflex for UTI. Call light within reach. Fall precautions. Wife at bedside. Plan pending.   Problem: Patient Centered Care  Goal: Patient preferences are identified and integrated in the patient's plan of care  Description: Interventions:  - What would you like us to know as we care for you? My wife is at bedside  - Provide timely, complete, and accurate information to patient/family  - Incorporate patient and family knowledge, values, beliefs, and cultural backgrounds into the planning and delivery of care  - Encourage patient/family to participate in care and decision-making at the level they choose  - Honor patient and family perspectives and choices  Outcome: Progressing     Problem: Patient/Family Goals  Goal: Patient/Family Long Term Goal  Description: Patient's Long Term Goal: Go home with wife    Interventions:  - Antibiotics  - PT/OT  - See additional Care Plan goals for specific interventions  Outcome: Progressing  Goal: Patient/Family Short Term Goal  Description: Patient's Short Term Goal: out of bed with PT/OT    Interventions:   - See additional Care Plan goals for specific interventions  Outcome: Progressing     Problem: RISK FOR INFECTION - ADULT  Goal: Absence of fever/infection during anticipated neutropenic period  Description: INTERVENTIONS  - Monitor WBC  - Administer growth factors as ordered  - Implement neutropenic guidelines  Outcome: Progressing     Problem: SAFETY ADULT - FALL  Goal: Free from fall injury  Description: INTERVENTIONS:  - Assess pt frequently for physical needs  - Identify cognitive and physical deficits and behaviors that affect risk of falls.  - [place] fall precautions as indicated by assessment.  - Educate pt/family on patient safety including  physical limitations  - Instruct pt to call for assistance with activity based on assessment  - Modify environment to reduce risk of injury  - Provide assistive devices as appropriate  - Consider OT/PT consult to assist with strengthening/mobility  - Encourage toileting schedule  Outcome: Progressing     Problem: DISCHARGE PLANNING  Goal: Discharge to home or other facility with appropriate resources  Description: INTERVENTIONS:  - Identify barriers to discharge w/pt and caregiver  - Include patient/family/discharge partner in discharge planning  - Arrange for needed discharge resources and transportation as appropriate  - Identify discharge learning needs (meds, wound care, etc)  - Arrange for interpreters to assist at discharge as needed  - Consider post-discharge preferences of patient/family/discharge partner  - Complete POLST form as appropriate  - Assess patient's ability to be responsible for managing their own health  - Refer to Case Management Department for coordinating discharge planning if the patient needs post-hospital services based on physician/LIP order or complex needs related to functional status, cognitive ability or social support system  Outcome: Progressing     Problem: PAIN - ADULT  Goal: Verbalizes/displays adequate comfort level or patient's stated pain goal  Description: INTERVENTIONS:  - Encourage pt to monitor pain and request assistance  - Assess pain using appropriate pain scale  - Administer analgesics based on type and severity of pain and evaluate response  - Implement non-pharmacological measures as appropriate and evaluate response  - Consider cultural and social influences on pain and pain management  - Manage/alleviate anxiety  - Utilize distraction and/or relaxation techniques  - Monitor for opioid side effects  - Notify MD/LIP if interventions unsuccessful or patient reports new pain  - Anticipate increased pain with activity and pre-medicate as appropriate  Outcome:  Progressing     Problem: Altered Communication/Language Barrier  Goal: Patient/Family is able to understand and participate in their care  Description: Interventions:  - Assess communication ability and preferred communication style  - Implement communication aides and strategies  - Use visual cues when possible  - Listen attentively, be patient, do not interrupt  - Minimize distractions  - Allow time for understanding and response  - Establish method for patient to ask for assistance (call light)  - Provide an  as needed  - Communicate barriers and strategies to overcome with those who interact with patient  Outcome: Progressing     Problem: MUSCULOSKELETAL - ADULT  Goal: Return mobility to safest level of function  Description: INTERVENTIONS:  - Assess patient stability and activity tolerance for standing, transferring and ambulating w/ or w/o assistive devices  - Assist with transfers and ambulation using safe patient handling equipment as needed  - Ensure adequate protection for wounds/incisions during mobilization  - Obtain PT/OT consults as needed  - Advance activity as appropriate  - Communicate ordered activity level and limitations with patient/family  Outcome: Progressing     Problem: Impaired Functional Mobility  Goal: Achieve highest/safest level of mobility/gait  Description: Interventions:  - Assess patient's functional ability and stability  - Promote increasing activity/tolerance for mobility and gait  - Educate and engage patient/family in tolerated activity level and precautions  - Recommend use of chair position in bed 3 times per day  Outcome: Progressing     Problem: Impaired Activities of Daily Living  Goal: Achieve highest/safest level of independence in self care  Description: Interventions:  - Assess ability and encourage patient to participate in ADLs to maximize function  - Promote sitting position while performing ADLs such as feeding, grooming, and bathing  - Educate and  encourage patient/family in tolerated functional activity level and precautions during self-care  - Encourage patient to incorporate impaired side during daily activities to promote function  Outcome: Progressing

## 2024-05-16 NOTE — PLAN OF CARE
Patient A&ox1-2, hx of dementia. Monitoring vital signs. No acute changes noted throughout shift. Tolerating diet. Voiding via male purewick. SCDs and lovenox for DVT prophylaxis. Up x2 s/p to chair. Fall precautions maintained- bed alarm on, bed locked in lowest position, call light and personal belongings within reach, non-skid socks in place to bilateral feet. Frequent rounding by nursing staff. Plan TBD, possible discharge tomorrow, awaiting clearance.    Problem: Patient Centered Care  Goal: Patient preferences are identified and integrated in the patient's plan of care  Description: Interventions:  - What would you like us to know as we care for you? My wife is at bedside  - Provide timely, complete, and accurate information to patient/family  - Incorporate patient and family knowledge, values, beliefs, and cultural backgrounds into the planning and delivery of care  - Encourage patient/family to participate in care and decision-making at the level they choose  - Honor patient and family perspectives and choices  Outcome: Progressing     Problem: Patient/Family Goals  Goal: Patient/Family Long Term Goal  Description: Patient's Long Term Goal: Go home with wife    Interventions:  - Antibiotics  - PT/OT  - See additional Care Plan goals for specific interventions  Outcome: Progressing  Goal: Patient/Family Short Term Goal  Description: Patient's Short Term Goal: out of bed with PT/OT    Interventions:   - See additional Care Plan goals for specific interventions  Outcome: Progressing     Problem: RISK FOR INFECTION - ADULT  Goal: Absence of fever/infection during anticipated neutropenic period  Description: INTERVENTIONS  - Monitor WBC  - Administer growth factors as ordered  - Implement neutropenic guidelines  Outcome: Progressing     Problem: SAFETY ADULT - FALL  Goal: Free from fall injury  Description: INTERVENTIONS:  - Assess pt frequently for physical needs  - Identify cognitive and physical deficits and  behaviors that affect risk of falls.  - [place] fall precautions as indicated by assessment.  - Educate pt/family on patient safety including physical limitations  - Instruct pt to call for assistance with activity based on assessment  - Modify environment to reduce risk of injury  - Provide assistive devices as appropriate  - Consider OT/PT consult to assist with strengthening/mobility  - Encourage toileting schedule  Outcome: Progressing     Problem: DISCHARGE PLANNING  Goal: Discharge to home or other facility with appropriate resources  Description: INTERVENTIONS:  - Identify barriers to discharge w/pt and caregiver  - Include patient/family/discharge partner in discharge planning  - Arrange for needed discharge resources and transportation as appropriate  - Identify discharge learning needs (meds, wound care, etc)  - Arrange for interpreters to assist at discharge as needed  - Consider post-discharge preferences of patient/family/discharge partner  - Complete POLST form as appropriate  - Assess patient's ability to be responsible for managing their own health  - Refer to Case Management Department for coordinating discharge planning if the patient needs post-hospital services based on physician/LIP order or complex needs related to functional status, cognitive ability or social support system  Outcome: Progressing     Problem: PAIN - ADULT  Goal: Verbalizes/displays adequate comfort level or patient's stated pain goal  Description: INTERVENTIONS:  - Encourage pt to monitor pain and request assistance  - Assess pain using appropriate pain scale  - Administer analgesics based on type and severity of pain and evaluate response  - Implement non-pharmacological measures as appropriate and evaluate response  - Consider cultural and social influences on pain and pain management  - Manage/alleviate anxiety  - Utilize distraction and/or relaxation techniques  - Monitor for opioid side effects  - Notify MD/LIP if  interventions unsuccessful or patient reports new pain  - Anticipate increased pain with activity and pre-medicate as appropriate  Outcome: Progressing     Problem: Altered Communication/Language Barrier  Goal: Patient/Family is able to understand and participate in their care  Description: Interventions:  - Assess communication ability and preferred communication style  - Implement communication aides and strategies  - Use visual cues when possible  - Listen attentively, be patient, do not interrupt  - Minimize distractions  - Allow time for understanding and response  - Establish method for patient to ask for assistance (call light)  - Provide an  as needed  - Communicate barriers and strategies to overcome with those who interact with patient  Outcome: Progressing     Problem: MUSCULOSKELETAL - ADULT  Goal: Return mobility to safest level of function  Description: INTERVENTIONS:  - Assess patient stability and activity tolerance for standing, transferring and ambulating w/ or w/o assistive devices  - Assist with transfers and ambulation using safe patient handling equipment as needed  - Ensure adequate protection for wounds/incisions during mobilization  - Obtain PT/OT consults as needed  - Advance activity as appropriate  - Communicate ordered activity level and limitations with patient/family  Outcome: Progressing     Problem: Impaired Functional Mobility  Goal: Achieve highest/safest level of mobility/gait  Description: Interventions:  - Assess patient's functional ability and stability  - Promote increasing activity/tolerance for mobility and gait  - Educate and engage patient/family in tolerated activity level and precautions  Outcome: Progressing     Problem: Impaired Activities of Daily Living  Goal: Achieve highest/safest level of independence in self care  Description: Interventions:  - Assess ability and encourage patient to participate in ADLs to maximize function  - Promote sitting position  while performing ADLs such as feeding, grooming, and bathing  - Educate and encourage patient/family in tolerated functional activity level and precautions during self-care  Outcome: Progressing

## 2024-05-16 NOTE — PROGRESS NOTES
[facility]  part of [facility]  Hospitalist Progress Note     Fernando Ramos . Patient Status:  Inpatient    1935  89 year old CSN [id number]   Location 407/407-A Attending Han Luevano MD   Hosp Day # 3 PCP CAN HALE     Assessment & Plan:   ----------------------------------  Acute UTI.  Patient has no leukocytosis, no fever, no urinary symptoms.  Sepsis is not present.   -Urine culture: From outpatient setting growing Proteus sensitive to Ancef  -Blood cultures: NGTD  -Antibiotics:Keflex  -ID consult appreciated: Difficult to tell colonization versus infection.    Kidney stones.  Chronic, large, numerous.  Definitive removal not reasonable given his advanced age and degree of nephrolithiasis.  -Stent placement as outpatient from outside urologist, tentatively 3-4 weeks  -Antibiotics as above per ID    Weakness.  Attributed to his UTI though this is not exactly clear.  Patient probably also has underappreciated dementia.  Seems better today per spouse.  -Continue supportive care  -Antibiotics as above  -PT/OT  -Wife would like to go home rather than [facility], though I have officially recommended subacute rehab given his current degree of debility    Other problems  Acute metabolic encephalopathy, improving  Chronic anemia  History of iron deficiency  History of nephrolithiasis with staghorn calculi  Hypertension  BPH  Anxiety    dvt prophylaxis: sc heparin  code status: full code  dispo: to be determined, medically stable for discharge    I personally reviewed the available laboratories, imaging including urine culture. I discussed/will discuss the case with ID. I ordered laboratories, studies including renal panel. I adjusted medications including ceftriaxone. Medical decision making high, risk is high.    Subjective:   ----------------------------------  Patient's spouse thinks that he is a lot more alert and stronger.  Ultimately she would prefer to go home though is open to  the possibility of going to subacute rehab.  Patient without pain, shortness of breath.  Tolerating diet.      Objective:   Chief Complaint:   Chief Complaint   Patient presents with    Fatigue    Urinary Symptoms     ----------------------------------  Temp:  [98 °F (36.7 °C)-98.1 °F (36.7 °C)] 98 °F (36.7 °C)  Pulse:  [65-84] 67  Resp:  [14-18] 18  BP: (107-173)/(64-88) 113/65  SpO2:  [96 %-100 %] 97 %  Gen: A+Ox2.  No distress.   HEENT: NCAT, neck supple, no carotid bruit.  CV: RRR, S1S2, and intact distal pulses. No gallop, rub, murmur.  Pulm: Effort and breath sounds normal. No distress, wheezes, rales, rhonchi.  Abd: Soft, NTND, BS normal, no mass, no HSM, no rebound/guarding.   Neuro: Normal reflexes, CN. Sensory/motor exams grossly normal deficit.  Generally mildly weak  MS: No joint effusions.  No peripheral edema.  Skin: Skin is warm and dry. No rashes, erythema, diaphoresis.   Psych: Normal mood and affect. Calm, cooperative    Labs:  Lab Results   Component Value Date    HGB 10.5 (L) 05/14/2024    WBC 4.6 05/14/2024    .0 05/14/2024     05/14/2024    K 3.7 05/14/2024    CREATSERUM 0.54 (L) 05/14/2024    VHCO3 29.2 (H) 03/08/2024    INR 1.18 (H) 03/23/2023    AST 27 05/12/2024    ALT 28 05/12/2024          cephalexin  500 mg Oral 4 times per day    heparin  5,000 Units Subcutaneous 2 times per day    amLODIPine  5 mg Oral Daily    ARIPiprazole  2 mg Oral QOD    aspirin  81 mg Oral Daily    carbidopa-levodopa  0.5 tablet Oral TID    donepezil  10 mg Oral Nightly    melatonin  2 mg Oral Nightly    mirtazapine  15 mg Oral Nightly    atorvastatin  20 mg Oral Nightly    venlafaxine  37.5 mg Oral BID       hydrALAzine    acetaminophen    ondansetron    hydrOXYzine    polyethylene glycol (PEG 3350)  **Certification      PHYSICIAN Certification of Need for Inpatient Hospitalization - Initial Certification    Patient will require inpatient services that will reasonably be expected to span two  midnight's based on the clinical documentation in H+P.   Based on patients current state of illness, I anticipate that, after discharge, patient will require TBD.

## 2024-05-17 VITALS
DIASTOLIC BLOOD PRESSURE: 64 MMHG | WEIGHT: 152.19 LBS | HEIGHT: 68 IN | BODY MASS INDEX: 23.06 KG/M2 | OXYGEN SATURATION: 96 % | RESPIRATION RATE: 14 BRPM | TEMPERATURE: 98 F | HEART RATE: 59 BPM | SYSTOLIC BLOOD PRESSURE: 149 MMHG

## 2024-05-17 PROCEDURE — 99239 HOSP IP/OBS DSCHRG MGMT >30: CPT | Performed by: HOSPITALIST

## 2024-05-17 RX ORDER — CEPHALEXIN 500 MG/1
500 CAPSULE ORAL EVERY 6 HOURS SCHEDULED
Status: SHIPPED | COMMUNITY
Start: 2024-05-17

## 2024-05-17 NOTE — PROGRESS NOTES
[facility]  part of [facility]  Hospitalist Progress Note     Fernando Ramos . Patient Status:  Inpatient    1935  89 year old CSN [id number]   Location 407/407-A Attending Han Luevano MD   Hosp Day # 4 PCP CAN HALE     Assessment & Plan:   ----------------------------------  Acute UTI.  Patient has no leukocytosis, no fever, no urinary symptoms.  Sepsis is not present.   -Urine culture: From outpatient setting growing Proteus sensitive to Ancef  -Blood cultures: NGTD  -Antibiotics:Keflex  -ID consult appreciated: Difficult to tell colonization versus infection.    Kidney stones.  Chronic, large, numerous.  Definitive removal not reasonable given his advanced age and degree of nephrolithiasis.  -Stent placement as outpatient from outside urologist, tentatively 3-4 weeks  -Antibiotics as above per ID    Weakness.  Attributed to his UTI though this is not exactly clear.  Patient probably also has underappreciated dementia.  Seems better today per spouse.  -Continue supportive care  -Antibiotics as above  -PT/OT  -Wife would like to go home rather than [facility], though I have officially recommended subacute rehab given his current degree of debility    Other problems  Acute metabolic encephalopathy, improving  Chronic anemia  History of iron deficiency  History of nephrolithiasis with staghorn calculi  Hypertension  BPH  Anxiety    dvt prophylaxis: sc heparin  code status: full code  dispo: to be determined, medically stable for discharge      Subjective:   ----------------------------------  No events overnight.  Spouse not at bedside.  Patient admits that he has been doing well and feels stronger each day.  No chest pain or shortness of breath.      Objective:   Chief Complaint:   Chief Complaint   Patient presents with    Fatigue    Urinary Symptoms     ----------------------------------  Temp:  [97.5 °F (36.4 °C)-98.4 °F (36.9 °C)] 97.5 °F (36.4 °C)  Pulse:  [59-71] 59  Resp:   [14-18] 14  BP: (124-157)/(61-66) 149/64  SpO2:  [96 %-99 %] 96 %  Gen: A+Ox2.  No distress.   HEENT: NCAT, neck supple, no carotid bruit.  CV: RRR, S1S2, and intact distal pulses. No gallop, rub, murmur.  Pulm: Effort and breath sounds normal. No distress, wheezes, rales, rhonchi.  Abd: Soft, NTND, BS normal, no mass, no HSM, no rebound/guarding.   Neuro: Normal reflexes, CN. Sensory/motor exams grossly normal deficit.  Generally mildly weak  MS: No joint effusions.  No peripheral edema.  Skin: Skin is warm and dry. No rashes, erythema, diaphoresis.   Psych: Normal mood and affect. Calm, cooperative    Labs:  Lab Results   Component Value Date    HGB 10.5 (L) 05/14/2024    WBC 4.6 05/14/2024    .0 05/14/2024     05/14/2024    K 3.7 05/14/2024    CREATSERUM 0.54 (L) 05/14/2024    VHCO3 29.2 (H) 03/08/2024    INR 1.18 (H) 03/23/2023    AST 27 05/12/2024    ALT 28 05/12/2024          cephalexin  500 mg Oral 4 times per day    heparin  5,000 Units Subcutaneous 2 times per day    amLODIPine  5 mg Oral Daily    ARIPiprazole  2 mg Oral QOD    aspirin  81 mg Oral Daily    carbidopa-levodopa  0.5 tablet Oral TID    donepezil  10 mg Oral Nightly    melatonin  2 mg Oral Nightly    mirtazapine  15 mg Oral Nightly    atorvastatin  20 mg Oral Nightly    venlafaxine  37.5 mg Oral BID       hydrALAzine    acetaminophen    ondansetron    hydrOXYzine    polyethylene glycol (PEG 3350)  **Certification      PHYSICIAN Certification of Need for Inpatient Hospitalization - Initial Certification    Patient will require inpatient services that will reasonably be expected to span two midnight's based on the clinical documentation in H+P.   Based on patients current state of illness, I anticipate that, after discharge, patient will require TBD.

## 2024-05-17 NOTE — CM/SW NOTE
05/17/24 1050   Discharge disposition   Expected discharge disposition subacute   Post Acute Care Provider Mariola [facility]  ([facility])   Discharge transportation [facility]       SW confirmed pt is cleared for DC.    Spoke to liacuauhtemoc Zamorano - confirmed they have a private room for pt today around 4pm (earliest they can accept due to other admissions).    RN Lang updated. Requested she update pt's wife Shelley at bedside.    ECTOR spoke to [facility] w/ [facility] (Dementia AOX1-2) set for ETA 4PM. PCS completed in Epic - pt's RN to print w/ pt's AVS.    Report phone #: [phone]      PLAN: [facility] ETA 4PM, PCS completed            Betsy Sawant, MSW, LSW [id number]

## 2024-05-17 NOTE — CM/SW NOTE
DC spoke with Alicia in rehab office. Requested pt to be a priority to be seen as pt has not been seen since 5/14 with no finalized dc plan.     Linda Ferrara RN, BSN  Nurse   [phone]

## 2024-05-17 NOTE — OCCUPATIONAL THERAPY NOTE
RN approving pt participation in therapy. Contact coordinated w/ PT. Pt received in bed alert and oriented to self. When asked where he was he stated that he didn't care. Pt refusing all attempts to engage in sitting eob or transferring to chair. Pt becoming upset when activity was encouraged. OT treatment deferred. RN aware

## 2024-05-17 NOTE — PROGRESS NOTES
INFECTIOUS DISEASE PROGRESS NOTE  [facility]  part of [facility] ID PROGRESS NOTE    Fernando Ramos Jr. Patient Status:  Inpatient    1935 MRN [id number]   Location [facility] 4W/SW/SE Attending Han Luevano MD   Hosp Day # 4 PCP CAN HALE     Subjective:  ROS reviewed. No acute overnight events.    ASSESSMENT:    Antibiotics: Keflex  IV ceftriaxone     # Pyuria with bacteruria P. Mirabilis - same as previous cx in setting of L staghorn calculus and chronic bilateral nephrolithiasis w/o hydro               - suspect symptoms mostly related to underlying dementia. UTI less likely  # Fatigue, weakness w/o signs of infectious process  # Previous MSSA bacteremia 3/2023 from skin source (knee abrasion, buttock wound) with negative TTE s/p IV cefazolin x2 weeks  # Seizure disorder  # Dementia       PLAN:  -  Will complete course of keflex today.  -  Patient to follow up with Urology outpatient for possible bilateral ureteral stents  -  no plans for lithotripsy at this time per wife  -  Follow fever curve, wbc.  -  Reviewed labs, micro, imaging reports, available old records.  -  d/w patient, RN.     History of Present Illness:  89-year-old male with history of dementia, HTN seizure, BPH, nephrolithiasis, previous MSSA bacteremia in 2003 from skin source and seen most recently in December for pyuria and monitor off antibiotics.  Most recent admitted in March of this year came in for lethargy and weakness again with abnormal UA and treated for UTI. Now comes mg on  weakness.  Was seen in the ED 1 day prior and discharged on Keflex.  Afebrile here, room air, normal WBC and renal function.  Started on IV ceftriaxone.  Most recent urine culture Proteus mirabilis.  Renal ultrasound a few months ago with bilateral stones without hydro.  Has a staghorn calculus in left kidney.    Physical Exam:  /64 (BP Location: Right arm)   Pulse 59   Temp 97.5 °F  (36.4 °C) (Oral)   Resp 14   Ht 5' 8\" (1.727 m)   Wt 152 lb 3.2 oz (69 kg)   SpO2 96%   BMI 23.14 kg/m²     Gen:   Awake, in bed  HEENT:  EOMI, neck supple  CV/lungs:  RRR, CTAB  Abdom:  Soft, no TTP  Skin/extrem:  No rashes, no c/c/e  Lines:  PIV+    Laboratory Data: Reviewed    Microbiology: Reviewed    Radiology: Reviewed      [facility]  [phone]  5/17/2024

## 2024-05-17 NOTE — PHYSICAL THERAPY NOTE
RN approving pt participation in therapy. Contact coordinated w/ OT. Pt received in bed alert and oriented to self. When asked where he was he stated that he didn't care. Pt refusing all attempts to engage in sitting eob or transferring to chair. Pt becoming upset when activity was encouraged. PT treatment deferred. RN aware

## 2024-05-17 NOTE — DISCHARGE SUMMARY
[facility]  part of [facility]     DISCHARGE SUMMARY     Fernando Ramos Jr. Patient Status:  Inpatient    1935 MRN [id number]   Location [facility] 4W/SW/SE Attending Han Luevano MD   Hosp Day # 4 PCP [facility]     DATE OF ADMISSION: 2024  DATE OF DISCHARGE:  24  DISPOSITION: home  CONDITION ON DISCHARGE: good    DISCHARGE DIAGNOSES:  Acute UTI  Kidney stones  Weakness  Acute metabolic encephalopathy, improving  Chronic anemia  History of iron deficiency  History of nephrolithiasis with staghorn calculi  Hypertension  BPH  Anxiety    HISTORY OF PRESENT ILLNESS (COPIED FROM ADMISSION H&P)  The patient is an 89-year-old  male with underlying dementia, Parkinson disease, and generalized musculoskeletal deconditioning with recurrent urinary tract infections. He was seen in the emergency room on May 12 and diagnosed with a urinary tract infection, started on antibiotics, discharged home. A urine culture from May 12 growing gram negative rods. Today his wife brought him back because she cannot even get him up to use the walker at home. CBC and chemistry were unremarkable. The patient was started on IV Rocephin, and he will be admitted to the hospital for further management.     HOSPITAL COURSE:  Patient was admitted, started on IV antibiotics for urinary tract infection.  However he acknowledged that differentiation between true infection and colonization in this patient with chronic kidney stones is difficult.  ID consult obtained, they favored colonization but continue to treat for true UTI with oral antibiotics.  Did not recommend long-term antibiotics.  The patient's mental status and strength did improve slightly.  Unclear if this is related to treatment of the infection or natural waxing and waning of his mental status in the setting of probably underappreciated dementia.  Per spouse, patient has an appointment at an outside urologist in 3-4 weeks  for consideration of stent placement.  At this time there is no plan for definitive stone management given his advanced age and the size and number of stones.    Patient understands return to the emergency room for increased pain, fever, discharge, shortness of breath, chest pain, new neurologic symptoms, other concerning symptoms.    PHYSICAL EXAM:  Temp:  [97.5 °F (36.4 °C)-98.4 °F (36.9 °C)] 97.5 °F (36.4 °C)  Pulse:  [59-71] 59  Resp:  [14-18] 14  BP: (124-157)/(61-66) 149/64  SpO2:  [96 %-99 %] 96 %  Gen: A+Ox2.  No distress.   HEENT: NCAT, neck supple, no carotid bruit.  CV: RRR, S1S2, and intact distal pulses. No gallop, rub, murmur.  Pulm: Effort and breath sounds normal. No distress, wheezes, rales, rhonchi.  Abd: Soft, NTND, BS normal, no mass, no HSM, no rebound/guarding.   Neuro: Generally weak, but improved from admission  MS: No joint effusions.  No peripheral edema.  Skin: Skin is warm and dry. No rashes, erythema, diaphoresis.   Psych: Normal mood and affect. Behavior and judgment normal.     DISCHARGE MEDICATIONS     Discharge Medications        START taking these medications        Instructions Prescription details   cephalexin 500 MG Caps  Commonly known as: Keflex  Notes to patient: Started on 5/16 6am x 7 doses, will complete regimen tonight      Take 1 capsule (500 mg total) by mouth every 6 (six) hours.   Refills: 0            CONTINUE taking these medications        Instructions Prescription details   acetaminophen 500 MG Tabs  Commonly known as: Tylenol Extra Strength      Take 2 tablets (1,000 mg total) by mouth every 8 (eight) hours as needed for Pain.   Refills: 0     amLODIPine 5 MG Tabs  Commonly known as: Norvasc      Take 1 tablet (5 mg total) by mouth daily.   Quantity: 30 tablet  Refills: 0     ARIPiprazole 2 MG Tabs  Commonly known as: Abilify      Take 1 tablet (2 mg total) by mouth every other day.   Quantity: 1 tablet  Refills: 0     aspirin 81 MG Tabs      Take 1 tablet (81 mg  total) by mouth daily.   Refills: 0     carbidopa-levodopa  MG Tabs  Commonly known as: SINEMET      Take 0.5 tablets by mouth 3 (three) times daily.   Refills: 0     CoQ-10 100 MG Caps  Notes to patient: Continue home schedule      Take 100 mg by mouth daily.   Refills: 0     donepezil 10 MG Tabs  Commonly known as: Aricept      Take 1 tablet (10 mg total) by mouth nightly.   Refills: 0     hydrOXYzine Pamoate 25 MG Caps  Commonly known as: VISTARIL      hydroxyzine pamoate 25 mg capsule   TAKE ONE CAPSULE BY MOUTH DAILY AS NEEDED   Refills: 0     melatonin 1 MG Tabs      Take 2 tablets (2 mg total) by mouth nightly.   Refills: 0     methenamine 1 g Tabs  Commonly known as: Hiprex  Notes to patient: Continue home schedule      Take 1 tablet (1 g total) by mouth 2 (two) times daily.   Quantity: 60 tablet  Refills: 0     mirtazapine 15 MG Tabs  Commonly known as: Remeron      TAKE ONE TABLET BY MOUTH AT BEDTIME   Quantity: 90 tablet  Refills: 3     polyethylene glycol (PEG 3350) 17 g Pack  Commonly known as: Miralax      Take 17 g by mouth every other day. Patient takes every three days   Refills: 0     simvastatin 40 MG Tabs  Commonly known as: Zocor      Take 1 tablet (40 mg total) by mouth nightly.   Refills: 0     venlafaxine 37.5 MG Tabs  Commonly known as: Effexor      Take 1 tablet (37.5 mg total) by mouth 2 (two) times daily.   Refills: 0     Vitamin D 50 MCG (2000 UT) Tabs  Notes to patient: Continue home schedule      Take by mouth nightly.   Refills: 0              CONSULTANTS  Consultants         Provider   Role Specialty     Red Au MD      Consulting Physician INFECTIOUS DISEASES            FOLLOW UP:  Yaakov Zepeda    Schedule an appointment as soon as possible for a visit  After discharge from rehab    Urology    Follow up in 3 week(s)  Post Discharge Followup    The above plan and follow-up instructions were reviewed with the patient and they verbalized understanding and  agreement.  They understand to return to the emergency room for any concerning signs or symptoms.  Greater than 30 minutes spent on discharge.  -----------------------    Hospital Discharge Diagnoses:  UTI    Lace+ Score: 65  59-90 High Risk  29-58 Medium Risk  0-28   Low Risk.    TCM Follow-Up Recommendation:  LACE > 58: High Risk of readmission after discharge from the hospital.

## 2024-05-17 NOTE — PLAN OF CARE
Patient is alert and oriented x 1-2 and on room air. He is stand pivot with 2 people from chair to bed. He takes medication whole with apple sauce and is on nectar thick liquids . He is incontinent of both bowel and bladder and has a male pure wick in place. He denies any pain and is given keflex po for UTI. SCDs are on and heparin given for DVT prophylaxis. Call light within reach, bed at the lowest position. Plan is for PT to see him tomorrow.  Problem: Patient Centered Care  Goal: Patient preferences are identified and integrated in the patient's plan of care  Description: Interventions:  - What would you like us to know as we care for you? My wife is at bedside  - Provide timely, complete, and accurate information to patient/family  - Incorporate patient and family knowledge, values, beliefs, and cultural backgrounds into the planning and delivery of care  - Encourage patient/family to participate in care and decision-making at the level they choose  - Honor patient and family perspectives and choices  Outcome: Progressing     Problem: Patient/Family Goals  Goal: Patient/Family Long Term Goal  Description: Patient's Long Term Goal: Go home with wife    Interventions:  - Antibiotics  - PT/OT  - See additional Care Plan goals for specific interventions  Outcome: Progressing  Goal: Patient/Family Short Term Goal  Description: Patient's Short Term Goal: out of bed with PT/OT    Interventions:   - See additional Care Plan goals for specific interventions  Outcome: Progressing     Problem: RISK FOR INFECTION - ADULT  Goal: Absence of fever/infection during anticipated neutropenic period  Description: INTERVENTIONS  - Monitor WBC  - Administer growth factors as ordered  - Implement neutropenic guidelines  Outcome: Progressing     Problem: SAFETY ADULT - FALL  Goal: Free from fall injury  Description: INTERVENTIONS:  - Assess pt frequently for physical needs  - Identify cognitive and physical deficits and behaviors that  affect risk of falls.  - [place] fall precautions as indicated by assessment.  - Educate pt/family on patient safety including physical limitations  - Instruct pt to call for assistance with activity based on assessment  - Modify environment to reduce risk of injury  - Provide assistive devices as appropriate  - Consider OT/PT consult to assist with strengthening/mobility  - Encourage toileting schedule  Outcome: Progressing     Problem: DISCHARGE PLANNING  Goal: Discharge to home or other facility with appropriate resources  Description: INTERVENTIONS:  - Identify barriers to discharge w/pt and caregiver  - Include patient/family/discharge partner in discharge planning  - Arrange for needed discharge resources and transportation as appropriate  - Identify discharge learning needs (meds, wound care, etc)  - Arrange for interpreters to assist at discharge as needed  - Consider post-discharge preferences of patient/family/discharge partner  - Complete POLST form as appropriate  - Assess patient's ability to be responsible for managing their own health  - Refer to Case Management Department for coordinating discharge planning if the patient needs post-hospital services based on physician/LIP order or complex needs related to functional status, cognitive ability or social support system  Outcome: Progressing     Problem: PAIN - ADULT  Goal: Verbalizes/displays adequate comfort level or patient's stated pain goal  Description: INTERVENTIONS:  - Encourage pt to monitor pain and request assistance  - Assess pain using appropriate pain scale  - Administer analgesics based on type and severity of pain and evaluate response  - Implement non-pharmacological measures as appropriate and evaluate response  - Consider cultural and social influences on pain and pain management  - Manage/alleviate anxiety  - Utilize distraction and/or relaxation techniques  - Monitor for opioid side effects  - Notify MD/LIP if interventions  unsuccessful or patient reports new pain  - Anticipate increased pain with activity and pre-medicate as appropriate  Outcome: Progressing     Problem: Altered Communication/Language Barrier  Goal: Patient/Family is able to understand and participate in their care  Description: Interventions:  - Assess communication ability and preferred communication style  - Implement communication aides and strategies  - Use visual cues when possible  - Listen attentively, be patient, do not interrupt  - Minimize distractions  - Allow time for understanding and response  - Establish method for patient to ask for assistance (call light)  - Provide an  as needed  - Communicate barriers and strategies to overcome with those who interact with patient  Outcome: Progressing     Problem: MUSCULOSKELETAL - ADULT  Goal: Return mobility to safest level of function  Description: INTERVENTIONS:  - Assess patient stability and activity tolerance for standing, transferring and ambulating w/ or w/o assistive devices  - Assist with transfers and ambulation using safe patient handling equipment as needed  - Ensure adequate protection for wounds/incisions during mobilization  - Obtain PT/OT consults as needed  - Advance activity as appropriate  - Communicate ordered activity level and limitations with patient/family  Outcome: Progressing     Problem: Impaired Functional Mobility  Goal: Achieve highest/safest level of mobility/gait  Description: Interventions:  - Assess patient's functional ability and stability  - Promote increasing activity/tolerance for mobility and gait  - Educate and engage patient/family in tolerated activity level and precautions    Outcome: Progressing     Problem: Impaired Activities of Daily Living  Goal: Achieve highest/safest level of independence in self care  Description: Interventions:  - Assess ability and encourage patient to participate in ADLs to maximize function  - Promote sitting position while  performing ADLs such as feeding, grooming, and bathing  - Educate and encourage patient/family in tolerated functional activity level and precautions during self-care    Outcome: Progressing

## 2024-05-17 NOTE — PLAN OF CARE
Patient A&Ox1-2 at baseline, Hx of dementia. Monitoring vital signs. Tolerating diet. Voiding via male purewick. Incontinent x2. SCDs and heparin for DVT prophylaxis. No c/o pain at this time. Up with x2 stand pivot to the chair. Fall precautions maintained- bed alarm on, bed locked in lowest position, call light and personal belongings within reach, non-skid socks in place to bilateral feet. Frequent rounding by nursing staff. [facility]   Patient cleared by internal medicine and social work. IV removed, discharge education provided, patient sent with all personal belongings, and discharge instructions. Addressed additional questions.    Attempted to call [place] a few times, call failed, no answer. Unable to leave VM.    Problem: Patient Centered Care  Goal: Patient preferences are identified and integrated in the patient's plan of care  Description: Interventions:  - What would you like us to know as we care for you? My wife is at bedside  - Provide timely, complete, and accurate information to patient/family  - Incorporate patient and family knowledge, values, beliefs, and cultural backgrounds into the planning and delivery of care  - Encourage patient/family to participate in care and decision-making at the level they choose  - Honor patient and family perspectives and choices  Outcome: Adequate for Discharge     Problem: Patient/Family Goals  Goal: Patient/Family Long Term Goal  Description: Patient's Long Term Goal: Go home with wife    Interventions:  - Antibiotics  - PT/OT  - See additional Care Plan goals for specific interventions  Outcome: Adequate for Discharge  Goal: Patient/Family Short Term Goal  Description: Patient's Short Term Goal: out of bed with PT/OT    Interventions:   - See additional Care Plan goals for specific interventions  Outcome: Adequate for Discharge     Problem: RISK FOR INFECTION - ADULT  Goal: Absence of fever/infection during anticipated  neutropenic period  Description: INTERVENTIONS  - Monitor WBC  - Administer growth factors as ordered  - Implement neutropenic guidelines  Outcome: Adequate for Discharge     Problem: SAFETY ADULT - FALL  Goal: Free from fall injury  Description: INTERVENTIONS:  - Assess pt frequently for physical needs  - Identify cognitive and physical deficits and behaviors that affect risk of falls.  - [place] fall precautions as indicated by assessment.  - Educate pt/family on patient safety including physical limitations  - Instruct pt to call for assistance with activity based on assessment  - Modify environment to reduce risk of injury  - Provide assistive devices as appropriate  - Consider OT/PT consult to assist with strengthening/mobility  - Encourage toileting schedule  Outcome: Adequate for Discharge     Problem: DISCHARGE PLANNING  Goal: Discharge to home or other facility with appropriate resources  Description: INTERVENTIONS:  - Identify barriers to discharge w/pt and caregiver  - Include patient/family/discharge partner in discharge planning  - Arrange for needed discharge resources and transportation as appropriate  - Identify discharge learning needs (meds, wound care, etc)  - Arrange for interpreters to assist at discharge as needed  - Consider post-discharge preferences of patient/family/discharge partner  - Complete POLST form as appropriate  - Assess patient's ability to be responsible for managing their own health  - Refer to Case Management Department for coordinating discharge planning if the patient needs post-hospital services based on physician/LIP order or complex needs related to functional status, cognitive ability or social support system  Outcome: Adequate for Discharge     Problem: PAIN - ADULT  Goal: Verbalizes/displays adequate comfort level or patient's stated pain goal  Description: INTERVENTIONS:  - Encourage pt to monitor pain and request assistance  - Assess pain using appropriate pain  scale  - Administer analgesics based on type and severity of pain and evaluate response  - Implement non-pharmacological measures as appropriate and evaluate response  - Consider cultural and social influences on pain and pain management  - Manage/alleviate anxiety  - Utilize distraction and/or relaxation techniques  - Monitor for opioid side effects  - Notify MD/LIP if interventions unsuccessful or patient reports new pain  - Anticipate increased pain with activity and pre-medicate as appropriate  Outcome: Adequate for Discharge     Problem: Altered Communication/Language Barrier  Goal: Patient/Family is able to understand and participate in their care  Description: Interventions:  - Assess communication ability and preferred communication style  - Implement communication aides and strategies  - Use visual cues when possible  - Listen attentively, be patient, do not interrupt  - Minimize distractions  - Allow time for understanding and response  - Establish method for patient to ask for assistance (call light)  - Provide an  as needed  - Communicate barriers and strategies to overcome with those who interact with patient  Outcome: Adequate for Discharge     Problem: MUSCULOSKELETAL - ADULT  Goal: Return mobility to safest level of function  Description: INTERVENTIONS:  - Assess patient stability and activity tolerance for standing, transferring and ambulating w/ or w/o assistive devices  - Assist with transfers and ambulation using safe patient handling equipment as needed  - Ensure adequate protection for wounds/incisions during mobilization  - Obtain PT/OT consults as needed  - Advance activity as appropriate  - Communicate ordered activity level and limitations with patient/family  Outcome: Adequate for Discharge     Problem: Impaired Functional Mobility  Goal: Achieve highest/safest level of mobility/gait  Description: Interventions:  - Assess patient's functional ability and stability  - Promote  increasing activity/tolerance for mobility and gait  - Educate and engage patient/family in tolerated activity level and precautions  Outcome: Adequate for Discharge     Problem: Impaired Activities of Daily Living  Goal: Achieve highest/safest level of independence in self care  Description: Interventions:  - Assess ability and encourage patient to participate in ADLs to maximize function  - Promote sitting position while performing ADLs such as feeding, grooming, and bathing  - Educate and encourage patient/family in tolerated functional activity level and precautions during self-care  Outcome: Adequate for Discharge

## 2024-05-17 NOTE — CM/SW NOTE
09:15AM  Per chart, pt's wife would like to see updates from PT/OT prior to deciding on [facility] placement for pt (per E[facility] note on 5/15).     There are no updated PT/OT notes since 5/14. SW sent message to PT/OT signed into pt's case this AM and requested notice when they have worked w/ pt.     Pending the results of PT/OT session today, SW to f/up w/ pt's spouse for further DC Planning discussion.    Clinical updates sent to pending [facility] referral in [place].    10:45AM  SW spoke to pt's wife Shelley via pt's room phone. Discussed [facility] vs [facility].    Pt's wife confirmed choice is [facility] as [facility] does not have a private room.    Shelley is aware pt will DC sometime today.      PLAN: [facility] - pending time from facility        SW/CM to remain available for support and/or discharge planning.         Betsy Sawant, MSW, LSW [phone]

## 2024-07-31 ENCOUNTER — APPOINTMENT (OUTPATIENT)
Dept: CT IMAGING | Facility: HOSPITAL | Age: 89
End: 2024-07-31
Attending: EMERGENCY MEDICINE
Payer: MEDICARE

## 2024-07-31 ENCOUNTER — HOSPITAL ENCOUNTER (INPATIENT)
Facility: HOSPITAL | Age: 89
LOS: 3 days | Discharge: NURSING, REHAB OR LONG-TERM CARE | End: 2024-08-03
Attending: EMERGENCY MEDICINE | Admitting: HOSPITALIST
Payer: MEDICARE

## 2024-07-31 DIAGNOSIS — N39.0 URINARY TRACT INFECTION WITHOUT HEMATURIA, SITE UNSPECIFIED: Primary | ICD-10-CM

## 2024-07-31 DIAGNOSIS — G20.A1 PARKINSON'S DISEASE WITHOUT DYSKINESIA OR FLUCTUATING MANIFESTATIONS (HCC): ICD-10-CM

## 2024-07-31 LAB
ADENOVIRUS PCR:: NOT DETECTED
ALBUMIN SERPL-MCNC: 4.4 G/DL (ref 3.2–4.8)
ALBUMIN/GLOB SERPL: 1.2 {RATIO} (ref 1–2)
ALP LIVER SERPL-CCNC: 107 U/L
ALT SERPL-CCNC: 20 U/L
ANION GAP SERPL CALC-SCNC: 6 MMOL/L (ref 0–18)
AST SERPL-CCNC: 18 U/L (ref ?–34)
B PARAPERT DNA SPEC QL NAA+PROBE: NOT DETECTED
B PERT DNA SPEC QL NAA+PROBE: NOT DETECTED
BASOPHILS # BLD AUTO: 0.03 X10(3) UL (ref 0–0.2)
BASOPHILS NFR BLD AUTO: 0.7 %
BILIRUB SERPL-MCNC: 0.5 MG/DL (ref 0.2–1.1)
BILIRUB UR QL: NEGATIVE
BUN BLD-MCNC: 14 MG/DL (ref 9–23)
BUN/CREAT SERPL: 17.5 (ref 10–20)
C PNEUM DNA SPEC QL NAA+PROBE: NOT DETECTED
CALCIUM BLD-MCNC: 10.1 MG/DL (ref 8.7–10.4)
CHLORIDE SERPL-SCNC: 105 MMOL/L (ref 98–112)
CO2 SERPL-SCNC: 30 MMOL/L (ref 21–32)
COLOR UR: YELLOW
CORONAVIRUS 229E PCR:: NOT DETECTED
CORONAVIRUS HKU1 PCR:: NOT DETECTED
CORONAVIRUS NL63 PCR:: NOT DETECTED
CORONAVIRUS OC43 PCR:: NOT DETECTED
CREAT BLD-MCNC: 0.8 MG/DL
DEPRECATED RDW RBC AUTO: 49.1 FL (ref 35.1–46.3)
EGFRCR SERPLBLD CKD-EPI 2021: 85 ML/MIN/1.73M2 (ref 60–?)
EOSINOPHIL # BLD AUTO: 0.18 X10(3) UL (ref 0–0.7)
EOSINOPHIL NFR BLD AUTO: 4.2 %
ERYTHROCYTE [DISTWIDTH] IN BLOOD BY AUTOMATED COUNT: 13.1 % (ref 11–15)
FLUAV RNA SPEC QL NAA+PROBE: NOT DETECTED
FLUBV RNA SPEC QL NAA+PROBE: NOT DETECTED
GLOBULIN PLAS-MCNC: 3.7 G/DL (ref 2–3.5)
GLUCOSE BLD-MCNC: 124 MG/DL (ref 70–99)
GLUCOSE BLDC GLUCOMTR-MCNC: 121 MG/DL (ref 70–99)
GLUCOSE UR-MCNC: NORMAL MG/DL
HCT VFR BLD AUTO: 37.7 %
HGB BLD-MCNC: 12.5 G/DL
IMM GRANULOCYTES # BLD AUTO: 0.03 X10(3) UL (ref 0–1)
IMM GRANULOCYTES NFR BLD: 0.7 %
KETONES UR-MCNC: NEGATIVE MG/DL
LACTATE SERPL-SCNC: 1.7 MMOL/L (ref 0.5–2)
LEUKOCYTE ESTERASE UR QL STRIP.AUTO: 500
LYMPHOCYTES # BLD AUTO: 1.29 X10(3) UL (ref 1–4)
LYMPHOCYTES NFR BLD AUTO: 29.9 %
MCH RBC QN AUTO: 33.8 PG (ref 26–34)
MCHC RBC AUTO-ENTMCNC: 33.2 G/DL (ref 31–37)
MCV RBC AUTO: 101.9 FL
METAPNEUMOVIRUS PCR:: NOT DETECTED
MONOCYTES # BLD AUTO: 0.48 X10(3) UL (ref 0.1–1)
MONOCYTES NFR BLD AUTO: 11.1 %
MYCOPLASMA PNEUMONIA PCR:: NOT DETECTED
NEUTROPHILS # BLD AUTO: 2.3 X10 (3) UL (ref 1.5–7.7)
NEUTROPHILS # BLD AUTO: 2.3 X10(3) UL (ref 1.5–7.7)
NEUTROPHILS NFR BLD AUTO: 53.4 %
NITRITE UR QL STRIP.AUTO: NEGATIVE
OSMOLALITY SERPL CALC.SUM OF ELEC: 294 MOSM/KG (ref 275–295)
PARAINFLUENZA 1 PCR:: NOT DETECTED
PARAINFLUENZA 2 PCR:: NOT DETECTED
PARAINFLUENZA 3 PCR:: NOT DETECTED
PARAINFLUENZA 4 PCR:: NOT DETECTED
PH UR: 5.5 [PH] (ref 5–8)
PLATELET # BLD AUTO: 261 10(3)UL (ref 150–450)
POTASSIUM SERPL-SCNC: 4 MMOL/L (ref 3.5–5.1)
PROT SERPL-MCNC: 8.1 G/DL (ref 5.7–8.2)
PROT UR-MCNC: 300 MG/DL
RBC # BLD AUTO: 3.7 X10(6)UL
RBC #/AREA URNS AUTO: >10 /HPF
RHINOVIRUS/ENTERO PCR:: NOT DETECTED
RSV RNA SPEC QL NAA+PROBE: NOT DETECTED
SARS-COV-2 RNA NPH QL NAA+NON-PROBE: NOT DETECTED
SODIUM SERPL-SCNC: 141 MMOL/L (ref 136–145)
SP GR UR STRIP: 1.02 (ref 1–1.03)
UROBILINOGEN UR STRIP-ACNC: NORMAL
WBC # BLD AUTO: 4.3 X10(3) UL (ref 4–11)
WBC #/AREA URNS AUTO: >50 /HPF

## 2024-07-31 PROCEDURE — 81001 URINALYSIS AUTO W/SCOPE: CPT | Performed by: EMERGENCY MEDICINE

## 2024-07-31 PROCEDURE — 87040 BLOOD CULTURE FOR BACTERIA: CPT | Performed by: EMERGENCY MEDICINE

## 2024-07-31 PROCEDURE — 93005 ELECTROCARDIOGRAM TRACING: CPT

## 2024-07-31 PROCEDURE — 87086 URINE CULTURE/COLONY COUNT: CPT | Performed by: EMERGENCY MEDICINE

## 2024-07-31 PROCEDURE — 85025 COMPLETE CBC W/AUTO DIFF WBC: CPT | Performed by: EMERGENCY MEDICINE

## 2024-07-31 PROCEDURE — 82962 GLUCOSE BLOOD TEST: CPT

## 2024-07-31 PROCEDURE — 83605 ASSAY OF LACTIC ACID: CPT | Performed by: EMERGENCY MEDICINE

## 2024-07-31 PROCEDURE — 83605 ASSAY OF LACTIC ACID: CPT

## 2024-07-31 PROCEDURE — 85025 COMPLETE CBC W/AUTO DIFF WBC: CPT

## 2024-07-31 PROCEDURE — 96360 HYDRATION IV INFUSION INIT: CPT

## 2024-07-31 PROCEDURE — 99285 EMERGENCY DEPT VISIT HI MDM: CPT

## 2024-07-31 PROCEDURE — 36415 COLL VENOUS BLD VENIPUNCTURE: CPT

## 2024-07-31 PROCEDURE — 80053 COMPREHEN METABOLIC PANEL: CPT | Performed by: EMERGENCY MEDICINE

## 2024-07-31 PROCEDURE — 80053 COMPREHEN METABOLIC PANEL: CPT

## 2024-07-31 PROCEDURE — 74176 CT ABD & PELVIS W/O CONTRAST: CPT | Performed by: EMERGENCY MEDICINE

## 2024-07-31 PROCEDURE — 81001 URINALYSIS AUTO W/SCOPE: CPT

## 2024-07-31 PROCEDURE — 96361 HYDRATE IV INFUSION ADD-ON: CPT

## 2024-07-31 PROCEDURE — 87086 URINE CULTURE/COLONY COUNT: CPT

## 2024-07-31 PROCEDURE — 93010 ELECTROCARDIOGRAM REPORT: CPT

## 2024-07-31 PROCEDURE — 0202U NFCT DS 22 TRGT SARS-COV-2: CPT | Performed by: HOSPITALIST

## 2024-07-31 RX ORDER — ACETAMINOPHEN 500 MG
1000 TABLET ORAL EVERY 6 HOURS PRN
Status: DISCONTINUED | OUTPATIENT
Start: 2024-07-31 | End: 2024-08-03

## 2024-07-31 RX ORDER — DONEPEZIL HYDROCHLORIDE 5 MG/1
10 TABLET, FILM COATED ORAL NIGHTLY
Status: DISCONTINUED | OUTPATIENT
Start: 2024-07-31 | End: 2024-08-03

## 2024-07-31 RX ORDER — ONDANSETRON 2 MG/ML
4 INJECTION INTRAMUSCULAR; INTRAVENOUS EVERY 6 HOURS PRN
Status: DISCONTINUED | OUTPATIENT
Start: 2024-07-31 | End: 2024-08-03

## 2024-07-31 RX ORDER — SODIUM CHLORIDE 9 MG/ML
INJECTION, SOLUTION INTRAVENOUS CONTINUOUS
Status: DISCONTINUED | OUTPATIENT
Start: 2024-07-31 | End: 2024-08-01

## 2024-07-31 RX ORDER — MIRTAZAPINE 15 MG/1
15 TABLET, FILM COATED ORAL NIGHTLY
Status: DISCONTINUED | OUTPATIENT
Start: 2024-07-31 | End: 2024-08-03

## 2024-07-31 RX ORDER — ENEMA 19; 7 G/133ML; G/133ML
1 ENEMA RECTAL ONCE AS NEEDED
Status: DISCONTINUED | OUTPATIENT
Start: 2024-07-31 | End: 2024-08-03

## 2024-07-31 RX ORDER — VENLAFAXINE 37.5 MG/1
37.5 TABLET ORAL 2 TIMES DAILY
Status: DISCONTINUED | OUTPATIENT
Start: 2024-07-31 | End: 2024-08-03

## 2024-07-31 RX ORDER — MELATONIN
3 NIGHTLY PRN
Status: DISCONTINUED | OUTPATIENT
Start: 2024-07-31 | End: 2024-08-03

## 2024-07-31 RX ORDER — AMLODIPINE BESYLATE 5 MG/1
5 TABLET ORAL DAILY
Status: DISCONTINUED | OUTPATIENT
Start: 2024-08-01 | End: 2024-08-03

## 2024-07-31 RX ORDER — POLYETHYLENE GLYCOL 3350 17 G/17G
17 POWDER, FOR SOLUTION ORAL DAILY
Status: DISCONTINUED | OUTPATIENT
Start: 2024-07-31 | End: 2024-08-03

## 2024-07-31 RX ORDER — ARIPIPRAZOLE 2 MG/1
2 TABLET ORAL EVERY OTHER DAY
Status: DISCONTINUED | OUTPATIENT
Start: 2024-08-01 | End: 2024-08-03

## 2024-07-31 RX ORDER — BISACODYL 10 MG
10 SUPPOSITORY, RECTAL RECTAL
Status: DISCONTINUED | OUTPATIENT
Start: 2024-07-31 | End: 2024-08-03

## 2024-07-31 RX ORDER — HEPARIN SODIUM 5000 [USP'U]/ML
5000 INJECTION, SOLUTION INTRAVENOUS; SUBCUTANEOUS EVERY 8 HOURS SCHEDULED
Status: DISCONTINUED | OUTPATIENT
Start: 2024-07-31 | End: 2024-08-03

## 2024-07-31 RX ORDER — SENNOSIDES 8.6 MG
17.2 TABLET ORAL NIGHTLY
Status: DISCONTINUED | OUTPATIENT
Start: 2024-07-31 | End: 2024-08-03

## 2024-07-31 RX ORDER — SODIUM CHLORIDE 9 MG/ML
INJECTION, SOLUTION INTRAVENOUS CONTINUOUS
Status: DISCONTINUED | OUTPATIENT
Start: 2024-07-31 | End: 2024-08-02

## 2024-07-31 RX ORDER — ATORVASTATIN CALCIUM 20 MG/1
20 TABLET, FILM COATED ORAL NIGHTLY
Status: DISCONTINUED | OUTPATIENT
Start: 2024-07-31 | End: 2024-08-03

## 2024-07-31 RX ORDER — BISACODYL 10 MG
10 SUPPOSITORY, RECTAL RECTAL ONCE
Status: COMPLETED | OUTPATIENT
Start: 2024-07-31 | End: 2024-08-01

## 2024-07-31 NOTE — ED INITIAL ASSESSMENT (HPI)
Pt arrives via EMS for \"heat exposure\" after being outside about 20 minutes. Pt drove with wife from [place] this morning. Recently finished abx for UTI yesterday. [place] in place upon arrival

## 2024-07-31 NOTE — ED PROVIDER NOTES
Patient Seen in: [facility] Emergency Department      History     Chief Complaint   Patient presents with    Heat Exposure     Stated Complaint: \"heat exposure\"    Subjective:   HPI    89-year-old male with history of dementia presents for evaluation of altered mental status.  Patient brought by EMS, according to wife patient had an episode in which he seemed more confused as well as hot to the touch.  They were traveling from [place], however wife advises patient was never in a car without air conditioning or outside for prolonged period of time.  Patient has been afebrile.  Of note was recently discharged on 7/26/2024 after being admitted for inability to urinate.  Wife reports he was cleared from an infection standpoint and discharged with plan to see his home urologist.  Wife does note the patient has had a Melchor catheter for the past month with a plan to take it out as he is now able to urinate, she also reports he had kidney stones and had ureteral stents placed approximately 1 month ago as well.  History is otherwise limited due to patient's dementia.    Objective:   Past Medical History:    Achalasia    Recurrent esophageal food impactions    Anxiety state    Aortic atherosclerosis (HCC)    CXR 11-23    BPH (benign prostatic hyperplasia)    Dementia (HCC)    Depression with anxiety    Dyslipidemia    High blood pressure    High cholesterol    History of melanoma    melanoma    History of pneumonia    History of recurrent UTIs    Kidney stones    Bilateral staghorn calculi    Macrocytic anemia    Mitral regurgitation    Echo 3-23 with EF 60-65%, mild-moderate MR, marked LAE    Osteoarthritis    Parkinson's disease (HCC)    Primary hypertension    Seizure disorder ([facility])    because of venalfaxine              Past Surgical History:   Procedure Laterality Date    Egd  05/23/2022    ESOPHAGOGASTRODUODENOSCOPY with Botox injection    Egd  07/23/2021    Large food impaction in the mid and distal  esophagus    Hemiarthroplasty hip Right 2022    Right Cemented Hemiarthroplasty    Lithotripsy      Total knee replacement      Transurethral destruction, prostate tissue; water-induc                  Social History     Socioeconomic History    Marital status:    Tobacco Use    Smoking status: Former     Current packs/day: 0.00     Types: Cigarettes     Start date: 1957     Quit date: 1960     Years since quittin.9    Smokeless tobacco: Never   Vaping Use    Vaping status: Never Used   Substance and Sexual Activity    Alcohol use: Not Currently     Comment: occasionally    Drug use: Never   Other Topics Concern     Service No    Blood Transfusions No    Caffeine Concern No    Sleep Concern No    Stress Concern Yes    Special Diet Yes    Back Care No    Exercise No    Bike Helmet No    Seat Belt No     Social Determinants of Health     Financial Resource Strain: Patient Declined (2024)    Received from [facility]    Overall Financial Resource Strain (CARDIA)     Difficulty of Paying Living Expenses: Patient declined   Food Insecurity: No Food Insecurity (2024)    Food Insecurity     Food Insecurity: Never true   Transportation Needs: No Transportation Needs (2024)    Transportation Needs     Lack of Transportation: No   Physical Activity: Patient Declined (2024)    Received from [facility]    Exercise Vital Sign     Days of Exercise per Week: Patient declined     Minutes of Exercise per Session: Patient declined   Stress: Patient Declined (2024)    Received from [facility]    [place] [employer] - Occupational Stress Questionnaire     Feeling of Stress : Patient declined   Social Connections: Patient Declined (2024)    Received from [facility]    Social Connection and Isolation Panel [NHANES]     Frequency of Communication with Friends and Family: Patient declined     Frequency of Social Gatherings with Friends and Family: Patient declined      Attends Religion Services: Patient declined     Active Member of Clubs or Organizations: Patient declined     Attends Club or Organization Meetings: Patient declined     Marital Status: Patient declined   Housing Stability: Low Risk  (7/31/2024)    Housing Stability     Housing Instability: No              Review of Systems    Positive for stated Chief Complaint: Heat Exposure    Other systems are as noted in HPI.  Constitutional and vital signs reviewed.      All other systems reviewed and negative except as noted above.    Physical Exam     ED Triage Vitals [07/31/24 1634]   /60   Pulse 96   Resp (!) 28   Temp 100.2 °F (37.9 °C)   Temp src Temporal   SpO2 93 %   O2 Device None (Room air)       Current Vitals:   Vital Signs  BP: 114/67  Pulse: 69  Resp: 12  Temp: 97.4 °F (36.3 °C)  Temp src: Axillary  MAP (mmHg): 81    Oxygen Therapy  SpO2: 96 %  O2 Device: None (Room air)            Physical Exam  Vitals and nursing note reviewed.   Constitutional:       General: He is not in acute distress.     Appearance: Normal appearance. He is not toxic-appearing.   HENT:      Head: Normocephalic and atraumatic.   Eyes:      Conjunctiva/sclera: Conjunctivae normal.   Cardiovascular:      Rate and Rhythm: Normal rate and regular rhythm.   Pulmonary:      Effort: Pulmonary effort is normal. No respiratory distress.      Breath sounds: Normal breath sounds.   Abdominal:      Palpations: Abdomen is soft.      Tenderness: There is no abdominal tenderness.   Musculoskeletal:         General: Normal range of motion.      Cervical back: Normal range of motion and neck supple. No rigidity.   Neurological:      General: No focal deficit present.   Psychiatric:         Mood and Affect: Mood normal.               ED Course     Labs Reviewed   COMP METABOLIC PANEL (14) - Abnormal; Notable for the following components:       Result Value    Glucose 124 (*)     Globulin  3.7 (*)     All other components within normal limits    URINALYSIS WITH CULTURE REFLEX - Abnormal; Notable for the following components:    Clarity Urine Ex.Turbid (*)     Blood Urine 3+ (*)     Protein Urine 300 (*)     Leukocyte Esterase Urine 500 (*)     WBC Urine >50 (*)     RBC Urine >10 (*)     All other components within normal limits   POCT GLUCOSE - Abnormal; Notable for the following components:    POC Glucose  121 (*)     All other components within normal limits   CBC W/ DIFFERENTIAL - Abnormal; Notable for the following components:    RBC 3.70 (*)     HGB 12.5 (*)     HCT 37.7 (*)     .9 (*)     RDW-SD 49.1 (*)     All other components within normal limits   LACTIC ACID, PLASMA - Normal   RESPIRATORY FLU EXPAND PANEL + COVID-19 - Normal    Narrative:     This test is intended for the simultaneous qualitative detection and differentiation of nucleic acids from multiple viral and bacterial respiratory organisms, including nucleic acid from Severe Acute Respiratory Syndrome Coronavirus 2 (SARS-CoV-2) in nasopharyngeal swab from individuals suspected of respiratory viral infection consistent with COVID-19 by their healthcare provider.    Test performed using the [employer] Respiratory Panel 2.1 (RP2.1) assay on the [employer] 2.0 System, [employer], [employer], [place] [address].    This test is being used under the Food and Drug Administration's Emergency Use Authorization.    The authorized Fact Sheet for Healthcare Providers for this assay is available upon request from the laboratory.    SARS and MERS coronaviruses are not tested on this assay.   CBC WITH DIFFERENTIAL WITH PLATELET    Narrative:     The following orders were created for panel order CBC With Differential With Platelet.  Procedure                               Abnormality         Status                     ---------                               -----------         ------                     CBC W/ DIFFERENTIAL[[id number]]          Abnormal            Final result                  Please view results for these tests on the individual orders.   BASIC METABOLIC PANEL (8)   MAGNESIUM   CBC WITH DIFFERENTIAL WITH PLATELET   RAINBOW DRAW BLUE   URINE CULTURE, ROUTINE   BLOOD CULTURE   BLOOD CULTURE     EKG    Rate, intervals and axes as noted on EKG Report.  Rate: 83  Rhythm: Sinus Rhythm  Reading: Sinus rhythm, no STEMI                          MDM        Admission disposition: 7/31/2024  8:01 PM                                        Medical Decision Making  Differential diagnosis includes but is not limited to cystitis, bacteremia, viral infection, less likely heat related    Patient with reassuring labs, concern for infection component.  Start empirically on Rocephin after culture results reviewed.  Discussed with and admitted to hospitalist.  Discussed with Dr. Cramer, no further recommendations for the emergency department.    Problems Addressed:  Urinary tract infection without hematuria, site unspecified: acute illness or injury    Amount and/or Complexity of Data Reviewed  Independent Historian: spouse  External Data Reviewed: labs and notes.     Details: Review of discharge summary from 7/26/2024 indicates patient had recently been seen by urology with bilateral ureteral stents placed, during his ER visit Melchor catheter was placed with greater than 1 L retained urine, KUB showed bilateral ureteral stents in proper position.  Urine was slightly suspicious for UTI and patient was started on Keflex.  He was started on Flomax and able to participate in physical therapy and Occupational Therapy, return to his baseline physical and cognitive function.  Unable to view urine culture from 7/21/2024, UA with trace leuk esterase, 15 WBC, greater than 50 RBC, trace bacteria    Review of urine culture shows Proteus Mirabilis resistant to nitrofurantoin on 5/12/2024, 3/8/2024, 9/21/2023, and 8/10/2023  Labs: ordered.  Radiology: ordered.  Discussion of management or test interpretation with  external provider(s): Discussed with hospitalist and urology        Disposition and Plan     Clinical Impression:  1. Urinary tract infection without hematuria, site unspecified         Disposition:  Admit  7/31/2024  8:01 pm    Follow-up:  No follow-up provider specified.  We recommend that you schedule follow up care with a primary care provider within the next three months to obtain basic health screening including reassessment of your blood pressure.      Medications Prescribed:  Current Discharge Medication List                            Hospital Problems       Present on Admission  Date Reviewed: 12/5/2023            ICD-10-CM Noted POA    * (Principal) Urinary tract infection without hematuria, site unspecified N39.0 1/17/2024 Unknown

## 2024-08-01 LAB
ANION GAP SERPL CALC-SCNC: 4 MMOL/L (ref 0–18)
ATRIAL RATE: 83 BPM
BASOPHILS # BLD AUTO: 0.04 X10(3) UL (ref 0–0.2)
BASOPHILS NFR BLD AUTO: 1.1 %
BUN BLD-MCNC: 11 MG/DL (ref 9–23)
BUN/CREAT SERPL: 17.7 (ref 10–20)
CALCIUM BLD-MCNC: 9.6 MG/DL (ref 8.7–10.4)
CHLORIDE SERPL-SCNC: 107 MMOL/L (ref 98–112)
CO2 SERPL-SCNC: 30 MMOL/L (ref 21–32)
CREAT BLD-MCNC: 0.62 MG/DL
DEPRECATED RDW RBC AUTO: 48.3 FL (ref 35.1–46.3)
EGFRCR SERPLBLD CKD-EPI 2021: 92 ML/MIN/1.73M2 (ref 60–?)
EOSINOPHIL # BLD AUTO: 0.23 X10(3) UL (ref 0–0.7)
EOSINOPHIL NFR BLD AUTO: 6.1 %
ERYTHROCYTE [DISTWIDTH] IN BLOOD BY AUTOMATED COUNT: 12.9 % (ref 11–15)
GLUCOSE BLD-MCNC: 113 MG/DL (ref 70–99)
HCT VFR BLD AUTO: 33.5 %
HGB BLD-MCNC: 11.5 G/DL
IMM GRANULOCYTES # BLD AUTO: 0.02 X10(3) UL (ref 0–1)
IMM GRANULOCYTES NFR BLD: 0.5 %
LYMPHOCYTES # BLD AUTO: 1.23 X10(3) UL (ref 1–4)
LYMPHOCYTES NFR BLD AUTO: 32.4 %
MAGNESIUM SERPL-MCNC: 1.9 MG/DL (ref 1.6–2.6)
MCH RBC QN AUTO: 34.5 PG (ref 26–34)
MCHC RBC AUTO-ENTMCNC: 34.3 G/DL (ref 31–37)
MCV RBC AUTO: 100.6 FL
MONOCYTES # BLD AUTO: 0.46 X10(3) UL (ref 0.1–1)
MONOCYTES NFR BLD AUTO: 12.1 %
NEUTROPHILS # BLD AUTO: 1.82 X10 (3) UL (ref 1.5–7.7)
NEUTROPHILS # BLD AUTO: 1.82 X10(3) UL (ref 1.5–7.7)
NEUTROPHILS NFR BLD AUTO: 47.8 %
OSMOLALITY SERPL CALC.SUM OF ELEC: 292 MOSM/KG (ref 275–295)
P AXIS: 33 DEGREES
P-R INTERVAL: 202 MS
PLATELET # BLD AUTO: 206 10(3)UL (ref 150–450)
POTASSIUM SERPL-SCNC: 3.4 MMOL/L (ref 3.5–5.1)
Q-T INTERVAL: 352 MS
QRS DURATION: 86 MS
QTC CALCULATION (BEZET): 413 MS
R AXIS: -7 DEGREES
RBC # BLD AUTO: 3.33 X10(6)UL
SODIUM SERPL-SCNC: 141 MMOL/L (ref 136–145)
T AXIS: 41 DEGREES
VENTRICULAR RATE: 83 BPM
WBC # BLD AUTO: 3.8 X10(3) UL (ref 4–11)

## 2024-08-01 PROCEDURE — 83735 ASSAY OF MAGNESIUM: CPT | Performed by: HOSPITALIST

## 2024-08-01 PROCEDURE — 85025 COMPLETE CBC W/AUTO DIFF WBC: CPT | Performed by: HOSPITALIST

## 2024-08-01 PROCEDURE — 80048 BASIC METABOLIC PNL TOTAL CA: CPT | Performed by: HOSPITALIST

## 2024-08-01 PROCEDURE — 92610 EVALUATE SWALLOWING FUNCTION: CPT

## 2024-08-01 RX ORDER — POTASSIUM CHLORIDE 20 MEQ/1
40 TABLET, EXTENDED RELEASE ORAL ONCE
Status: COMPLETED | OUTPATIENT
Start: 2024-08-01 | End: 2024-08-01

## 2024-08-01 NOTE — ED QUICK NOTES
Orders for admission, patient is aware of plan and ready to go upstairs. Any questions, please call ED RN Cydney at extension [phone].     Patient Covid vaccination status: Fully vaccinated     COVID Test Ordered in ED: None    COVID Suspicion at Admission: N/A    Running Infusions:    sodium chloride          Mental Status/LOC at time of transport: AxOx1    Other pertinent information:   CIWA score: N/A   NIH score:  N/A

## 2024-08-01 NOTE — PLAN OF CARE
The patient is A/0x1, lethagy improving during the shift, IV antibiotics, speech  evaluation and treat, call light in place     Problem: Patient Centered Care  Goal: Patient preferences are identified and integrated in the patient's plan of care  Description: Interventions:  - What would you like us to know as we care for you? From home with spouse   - Provide timely, complete, and accurate information to patient/family  - Incorporate patient and family knowledge, values, beliefs, and cultural backgrounds into the planning and delivery of care  - Encourage patient/family to participate in care and decision-making at the level they choose  - Honor patient and family perspectives and choices  Outcome: Progressing     Problem: Patient/Family Goals  Goal: Patient/Family Long Term Goal  Description: Patient's Long Term Goal: To be discharged home.     Interventions:  - IVF, IV antibotics, and pain control.   - See additional Care Plan goals for specific interventions  Outcome: Progressing  Goal: Patient/Family Short Term Goal  Description: Patient's Short Term Goal: Return to baseline cognition.     Interventions:   - IVF, Antibiotics.   - See additional Care Plan goals for specific interventions  Outcome: Progressing    Problem: GASTROINTESTINAL - ADULT  Goal: Maintains adequate nutritional intake (undernourished)  Description: INTERVENTIONS:  - Monitor percentage of each meal consumed  - Identify factors contributing to decreased intake, treat as appropriate  - Assist with meals as needed  - Monitor I&O, WT and lab values  - Obtain nutritional consult as needed  - Optimize oral hygiene and moisture  - Encourage food from home; allow for food preferences  - Enhance eating environment  Outcome: Progressing  Goal: Achieves appropriate nutritional intake (bariatric)  Description: INTERVENTIONS:  - Monitor for over-consumption  - Identify factors contributing to increased intake, treat as appropriate  - Monitor I&O, WT and  lab values  - Obtain nutritional consult as needed  - Evaluate psychosocial factors contributing to over-consumption  Outcome: Progressing     Problem: GENITOURINARY - ADULT  Goal: Absence of urinary retention  Description: INTERVENTIONS:  - Assess patient’s ability to void and empty bladder  - Monitor intake/output and perform bladder scan as needed  - Follow urinary retention protocol/standard of care  - Consider collaborating with pharmacy to review patient's medication profile  - Implement strategies to promote bladder emptying  Outcome: Progressing     Problem: MUSCULOSKELETAL - ADULT  Goal: Return mobility to safest level of function  Description: INTERVENTIONS:  - Assess patient stability and activity tolerance for standing, transferring and ambulating w/ or w/o assistive devices  - Assist with transfers and ambulation using safe patient handling equipment as needed  - Ensure adequate protection for wounds/incisions during mobilization  - Obtain PT/OT consults as needed  - Advance activity as appropriate  - Communicate ordered activity level and limitations with patient/family  Outcome: Progressing     Problem: NEUROLOGICAL - ADULT  Goal: Achieves stable or improved neurological status  Description: INTERVENTIONS  - Assess for and report changes in neurological status  - Initiate measures to prevent increased intracranial pressure  - Maintain blood pressure and fluid volume within ordered parameters to optimize cerebral perfusion and minimize risk of hemorrhage  - Monitor temperature, glucose, and sodium. Initiate appropriate interventions as ordered  Outcome: Progressing     Problem: Impaired Functional Mobility  Goal: Achieve highest/safest level of mobility/gait  Description: Interventions:  - Assess patient's functional ability and stability  - Promote increasing activity/tolerance for mobility and gait  - Educate and engage patient/family in tolerated activity level and precautions    Outcome:  Progressing     Problem: Impaired Activities of Daily Living  Goal: Achieve highest/safest level of independence in self care  Description: Interventions:  - Assess ability and encourage patient to participate in ADLs to maximize function  - Promote sitting position while performing ADLs such as feeding, grooming, and bathing  - Educate and encourage patient/family in tolerated functional activity level and precautions during self-care    Outcome: Progressing     Problem: Impaired Swallowing  Goal: Minimize aspiration risk  Description: Interventions:  - Patient should be alert and upright for all feedings (90 degrees preferred)  - Offer food and liquids at a slow rate  - No straws  - Encourage small bites of food and small sips of liquid  - Offer pills one at a time, crush or deliver with applesauce as needed  - Discontinue feeding and notify MD (or speech pathologist) if coughing or persistent throat clearing or wet/gurgly vocal quality is noted  Outcome: Progressing     Problem: Impaired Cognition  Goal: Patient will exhibit improved attention, thought processing and/or memory  Description: Interventions:  - Minimize distractions in the room when full attention is required  Outcome: Progressing

## 2024-08-01 NOTE — ED QUICK NOTES
Extended respiratory panel obtained.  Pending clean bed for transport to the floor.  Pt and family aware of plan of care.

## 2024-08-01 NOTE — PLAN OF CARE
Problem: Patient Centered Care  Goal: Patient preferences are identified and integrated in the patient's plan of care  Description: Interventions:  - What would you like us to know as we care for you? He gets really anxious in the hospital.   - Provide timely, complete, and accurate information to patient/family  - Incorporate patient and family knowledge, values, beliefs, and cultural backgrounds into the planning and delivery of care  - Encourage patient/family to participate in care and decision-making at the level they choose  - Honor patient and family perspectives and choices  Outcome: Progressing     Problem: Patient/Family Goals  Goal: Patient/Family Long Term Goal  Description: Patient's Long Term Goal: To be discharged home.     Interventions:  - IVF, IV antibotics, and pain control.   - See additional Care Plan goals for specific interventions  Outcome: Progressing  Goal: Patient/Family Short Term Goal  Description: Patient's Short Term Goal: Return to baseline cognition.     Interventions:   - IVF, Antibiotics.   - See additional Care Plan goals for specific interventions  Outcome: Progressing     Problem: GASTROINTESTINAL - ADULT  Goal: Maintains adequate nutritional intake (undernourished)  Description: INTERVENTIONS:  - Monitor percentage of each meal consumed  - Identify factors contributing to decreased intake, treat as appropriate  - Assist with meals as needed  - Monitor I&O, WT and lab values  - Obtain nutritional consult as needed  - Optimize oral hygiene and moisture  - Encourage food from home; allow for food preferences  - Enhance eating environment  Outcome: Progressing  Goal: Achieves appropriate nutritional intake (bariatric)  Description: INTERVENTIONS:  - Monitor for over-consumption  - Identify factors contributing to increased intake, treat as appropriate  - Monitor I&O, WT and lab values  - Obtain nutritional consult as needed  - Evaluate psychosocial factors contributing to  over-consumption  Outcome: Progressing     Problem: GENITOURINARY - ADULT  Goal: Absence of urinary retention  Description: INTERVENTIONS:  - Assess patient’s ability to void and empty bladder  - Monitor intake/output and perform bladder scan as needed  - Follow urinary retention protocol/standard of care  - Consider collaborating with pharmacy to review patient's medication profile  - Implement strategies to promote bladder emptying  Outcome: Progressing     Problem: MUSCULOSKELETAL - ADULT  Goal: Return mobility to safest level of function  Description: INTERVENTIONS:  - Assess patient stability and activity tolerance for standing, transferring and ambulating w/ or w/o assistive devices  - Assist with transfers and ambulation using safe patient handling equipment as needed  - Ensure adequate protection for wounds/incisions during mobilization  - Obtain PT/OT consults as needed  - Advance activity as appropriate  - Communicate ordered activity level and limitations with patient/family  Outcome: Progressing     Problem: NEUROLOGICAL - ADULT  Goal: Achieves stable or improved neurological status  Description: INTERVENTIONS  - Assess for and report changes in neurological status  - Initiate measures to prevent increased intracranial pressure  - Maintain blood pressure and fluid volume within ordered parameters to optimize cerebral perfusion and minimize risk of hemorrhage  - Monitor temperature, glucose, and sodium. Initiate appropriate interventions as ordered  Outcome: Progressing     Problem: Impaired Functional Mobility  Goal: Achieve highest/safest level of mobility/gait  Description: Interventions:  - Assess patient's functional ability and stability  - Promote increasing activity/tolerance for mobility and gait  - Educate and engage patient/family in tolerated activity level and precautions  - Recommend use of  RW for transfers and ambulation  Outcome: Progressing     Problem: Impaired Activities of Daily  Living  Goal: Achieve highest/safest level of independence in self care  Description: Interventions:  - Assess ability and encourage patient to participate in ADLs to maximize function  - Promote sitting position while performing ADLs such as feeding, grooming, and bathing  - Educate and encourage patient/family in tolerated functional activity level and precautions during self-care  - Provide support under elbow of weak side to prevent shoulder subluxation  Outcome: Progressing     Problem: Impaired Swallowing  Goal: Minimize aspiration risk  Description: Interventions:  - Patient should be alert and upright for all feedings (90 degrees preferred)  - Offer food and liquids at a slow rate  - No straws  - Encourage small bites of food and small sips of liquid  - Offer pills one at a time, crush or deliver with applesauce as needed  - Discontinue feeding and notify MD (or speech pathologist) if coughing or persistent throat clearing or wet/gurgly vocal quality is noted  Outcome: Progressing     Problem: Impaired Cognition  Goal: Patient will exhibit improved attention, thought processing and/or memory  Description: Interventions:  - Minimize distractions in the room when full attention is required  - Allow additional time for processing after asking questions or providing instructions  - Encourage use of eye glasses  Outcome: Progressing   Pt was transfer from the ED and Med Rec was completed. Pt is lethargic and disorientated x4. Pt arrived with a chronic cash. Safety precaution in place and call light is within reach.

## 2024-08-01 NOTE — SLP NOTE
ADULT SWALLOWING EVALUATION    ASSESSMENT    ASSESSMENT/OVERALL IMPRESSION:  PPE REQUIRED. THIS THERAPIST WORE GLOVES, DROPLET MASK, AND GOGGLES FOR DURATION OF EVALUATION. HANDS WASHED UPON ENTRANCE/EXIT.    SLP BSSE orders received and acknowledged. A swallow evaluation warranted as pt on modified diet of minced and moist/mildly thick liquids. Pt afebrile with clear but weak vocal quality, on room air, with oxygen saturation at 97. Pt with prior hx of dysphagia at [facility] in which last recommended diet was minced and moist/mildly thick liquids per BSSE in 5/2024. Pt positioned upright in bed with wife at bedside. Pt with no complaints of pain, RN aware. Pt with adequate oral acceptance and bilabial seal across all trials. Pt with intact bite, prolonged mastication of solids, and increased NANCY. Pt's swallow response appears delayed with reduced hyolaryngeal elevation/excursion. No clinical signs of aspiration (e.g., immediate/delayed throat clear, immediate/delayed cough, wet vocal quality, increased O2 effort) observed across all trials of puree, soft solids, and mildly thick liquids. Oral/buccal cavities clear of residue following all trials. Oxygen status remained >96 t/o the entire evaluation.     At this time, pt presents with mild oral dysphagia and probable pharyngeal dysfunction. Recommend a minced and moist diet and mildly thick liquids with strict adherence to safe swallowing compensatory strategies. Results and recommendations reviewed with RN, pt, and family. Pt/pt's family v/u to all results/recommendations. Recommendations remain written on whiteboard. SLP collaborated with RN for MD diet orders.     PLAN: SLP to f/u x1 meal assessment, monitor imaging, and VFSS if clinically warranted.     RECOMMENDATIONS   Diet Recommendations - Solids: Mechanical soft ground/ Minced & Moist  Diet Recommendations - Liquids: Nectar thick liquids/ Mildly thick  Compensatory Strategies Recommended: No straws;Slow  rate;Alternate consistencies;Small bites and sips  Aspiration Precautions: Upright position;Slow rate;Small bites and sips;No straw  Medication Administration Recommendations: Whole in puree  Treatment Plan/Recommendations: Aspiration precautions    HISTORY   MEDICAL HISTORY  Reason for Referral: Altered diet consistency    Problem List  Principal Problem:    Urinary tract infection without hematuria, site unspecified      Past Medical History  Past Medical History:    Achalasia    Recurrent esophageal food impactions    Anxiety state    Aortic atherosclerosis (HCC)    CXR 11-23    BPH (benign prostatic hyperplasia)    Dementia (HCC)    Depression with anxiety    Dyslipidemia    High blood pressure    High cholesterol    History of melanoma    melanoma    History of pneumonia    History of recurrent UTIs    Kidney stones    Bilateral staghorn calculi    Macrocytic anemia    Mitral regurgitation    Echo 3-23 with EF 60-65%, mild-moderate MR, marked LAE    Osteoarthritis    Parkinson's disease (HCC)    Primary hypertension    Seizure disorder ([facility])    because of venalfaxine       Prior Living Situation: Home with spouse  Diet Prior to Admission: Mechanical soft ground/ Minced & Moist;Nectar thick liquids/ Mildly thick  Precautions: Aspiration    Patient/Family Goals: Pt on modified diet of minced and moist/mildly thick    SWALLOWING HISTORY  Current Diet Consistency: NPO  Dysphagia History:     BSE 2/29/20: regular/thin  VFSS 3/2/20: regular/thin  BSE 10/8/20: regular/mildly hick  BSE 7/24/21: minced and moist/mildly thick  BSE 3/23/22: puree/moderately thick - later upgraded to puree/mildly thick  VFSS 8/5/22: minced and moist/mildly thick  BSE 3/12/24: minced and moist/mildly thick  BSE 5/13/24: minced and moist/mildly thick    Imaging Results:     CT ABDOMEN 7/31/24:  CONCLUSION:   1.  Bilateral ureteral stents are present in gross satisfactory position.  Proximal coil of the right stent is in the renal pelvis and  proximal coil of the left stent is in an upper pole calyx.  Both distal coils are in the urinary bladder.  No   hydroureteronephrosis.  Bilateral nonobstructing intrarenal staghorn calculi are present larger and more numerous on the left.   2.  Urinary bladder is collapsed around a Melchor catheter   3.  Large 9 cm diameter focus of fecal material within the rectum with surrounding fat stranding.  This may represent changes of stercoral colitis/impaction.  There is also evidence of fecal contamination across the perineum and perianal region   suggesting recent evacuation or incontinence.   4.  Post right hip arthroplasty.  The hardware causes beam hardening artifact limiting assessment of adjacent structures.   5.  Layering density within the gallbladder suggesting tiny stones or sludge.  No biliary ductal dilatation.   6.  Hepatic cirrhosis.   7.  Coronary atherosclerosis.            Dictated by (CST): Inder Johns MD on 7/31/2024 at 7:39 PM       Finalized by (CST): Inder Johns MD on 7/31/2024 at 7:45 PM             OBJECTIVE   ORAL MOTOR EXAMINATION  Dentition: Natural (scattered)  Symmetry: Within Functional Limits  Strength:  (reduced)     Range of Motion: Within Functional Limits  Rate of Motion: Reduced    Voice Quality: Clear;Weak  Respiratory Status: Unlabored  Consistencies Trialed: Nectar thick liquids;Puree;Soft solid  Method of Presentation: Self presentation;Staff/Clinician assistance;Spoon;Cup;Single sips  Patient Positioning: Upright;Midline    Oral Phase of Swallow: Impaired  Bolus Retrieval: Intact  Bilabial Seal: Intact  Bolus Formation: Intact  Bolus Propulsion: Impaired  Mastication: Impaired       Pharyngeal Phase of Swallow: Impaired  Laryngeal Elevation Timing: Appears impaired  Laryngeal Elevation Strength: Appears impaired     (Please note: Silent aspiration cannot be evaluated clinically. Videofluoroscopic Swallow Study is required to rule-out silent aspiration.)    Esophageal Phase of  Swallow: No complaints consistent with possible esophageal involvement      GOALS  Goal #1 The patient will tolerate minced and moist consistency and mildly thick liquids without overt signs or symptoms of aspiration with 100 % accuracy over 1 session(s).  In Progress   Goal #2 The patient/family/caregiver will demonstrate understanding and implementation of aspiration precautions and swallow strategies independently over 1 session(s).    In Progress     FOLLOW UP  Treatment Plan/Recommendations: Aspiration precautions  Number of Visits to Meet Established Goals: 1  Follow Up Needed (Documentation Required): Yes  SLP Follow-up Date: 08/03/24    Thank you for your referral.   If you have any questions, please contact SULEMA Sullivan M.S. CCC-SLP  Speech Language Pathologist  Phone Number [phone]

## 2024-08-01 NOTE — CDS QUERY
Dear Dr Hopper ,      Can  the  Altered Mental Status be further  specified?    ( x ) Acute Metabolic Encephalopathy     (  ) Other : Please Specify : ________________________     ________________________________________________________________    Clinical Indicator:     8/1 Assessment & Plan : \" AMS,Weakness  -Possibly due to heat exhaustion after long car ride in the heat without air conditioning  -UTI\"    7/31  ED Note : \"brought by EMS, according to wife patient had an episode in which he seemed more confused as well as hot to the touch. \"     Risk Factor: Advanced Age, Dementia, Parkinson       Treatment :  --IV Rocephin for UTI  --IVF for supportive care   --Continue home medications with Abilify, donepezil, mirtazapine, venlafaxine,Sinemet                If you have any questions ,please call Clinical : Zahira Garcia/BRYANT-BSN  at [phone]  Thank you!          THIS FORM IS A PERMANENT PART OF THE MEDICAL RECORD

## 2024-08-01 NOTE — CDS QUERY
Dear Dr Hopper ,    Can the relationship between Urinary Tract Infection and Melchor Catheter/ Ureteral Stents  be further clarified?    (  x ) Due to or associated with each other     (  ) Unrelated to each other     (  ) Other: Please specify: ________________________     _____________________________________________________________________________________    Clinical Indicators:     7/31  Urinalysis -positive for infection     7/21/24   Melchor catheter was placed  due to urinary retention @ [facility]    6/20/24  Insertion of Bilateral double -J ureteral stents  due to bilateral large kidney stones at [facility]    Risk Factors:  Advanced Age, Dementia, Parkinsons     Treatment:   -- IV Rocephin pending urine culture             If you have any questions ,please call Clinical : Zahira Garcia/RN-BSN  at [phone]  Thank you!        THIS FORM IS A PERMANENT PART OF THE MEDICAL RECORD

## 2024-08-01 NOTE — CM/SW NOTE
08/01/24 1400   CM/ Referral Data   Referral Source    Reason for Referral Discharge planning   Informant EMR;Clinical Staff Member   Medical Hx   Does patient have an established PCP? Yes   Significant Past Medical/Mental Health Hx Mitral Regurgitation, Parkinson's disease, Dementia, Seizure disorder, HTN, OSCAR, MDD, BPH, recurrent UTIs, kidney stones   Patient Info   Patient's Current Mental Status at Time of Assessment Confused or unable to complete assessment   Patient's Home Environment Condo/Apt with elevator   Patient lives with Spouse/Significant other   Patient Status Prior to Admission   Independent with ADLs and Mobility No   Pt. requires assistance with Ambulating;Housework;Driving;Bathing;Meals;Dressing;Medications;Toileting;Finances   Services in place prior to admission DME/Supplies at home   Type of DME/Supplies Wheeled Walker;Wheelchair       CM attempted to call patient's spouse Serena, no answer received.  CM will plan to meet with Serena at later date to verify initial assessment.     Per chart review, patient lives with Serena who is patient's primary caregiver.  Serena has declined HH arrangement in the past, but patient was recently discharged from [facility] 5/17 for SIVA stay at [facility].    PT/OT evaluations pending at this time.     / to remain available for support and/or discharge planning.     Plan: TBD, pending PT/OT, clinical course    Leeann Renee RN, BSN  Nurse   [phone]

## 2024-08-01 NOTE — H&P
[facility] Hospitalist H&P       CC:   Chief Complaint   Patient presents with    Heat Exposure        PCP: None Pcp    History of Present Illness: Patient is a 89 year old male with PMH sig for Mitral Regurgitation, Parkinson's disease, Dementia, Seizure disorder, HTN, OSCAR, MDD, BPH, recurrent UTIs, kidney stones, who presents with change in mental status.  Patient drove from [place] and that he yesterday.  Recently finished oral antibiotics for a UTI yesterday.  Patient has a Melchor in place after being discharged from an outside hospital 7/26/2024 for urinary retention.  He also had a ureteral stent placed about a month ago.  Patient also has underlying dementia and Parkinson's and limited history and ROS was able to be obtained.    In the ER patient was normotensive and afebrile, increased respiratory rate  WBC 4.3, lactic 1.7, UA positive for infection  RVP negative   CT: Bilateral ureteral stents, no hydro, changes of stercoral colitis/impaction      PMH  Past Medical History:    Achalasia    Recurrent esophageal food impactions    Anxiety state    Aortic atherosclerosis (HCC)    CXR 11-23    BPH (benign prostatic hyperplasia)    Dementia (HCC)    Depression with anxiety    Dyslipidemia    High blood pressure    High cholesterol    History of melanoma    melanoma    History of pneumonia    History of recurrent UTIs    Kidney stones    Bilateral staghorn calculi    Macrocytic anemia    Mitral regurgitation    Echo 3-23 with EF 60-65%, mild-moderate MR, marked LAE    Osteoarthritis    Parkinson's disease (HCC)    Primary hypertension    Seizure disorder (HCC)    because of venalfaxine        PSH  Past Surgical History:   Procedure Laterality Date    Egd  05/23/2022    ESOPHAGOGASTRODUODENOSCOPY with Botox injection    Egd  07/23/2021    Large food impaction in the mid and distal esophagus    Hemiarthroplasty hip Right 03/22/2022    Right Cemented Hemiarthroplasty    Lithotripsy      Total knee replacement       Transurethral destruction, prostate tissue; water-induc          ALL:  No Known Allergies     Home Medications:  Outpatient Medications Marked as Taking for the 24 encounter (Hospital Encounter)   Medication Sig Dispense Refill    cephalexin 500 MG Oral Cap Take 1 capsule (500 mg total) by mouth every 6 (six) hours.      amLODIPine 5 MG Oral Tab Take 1 tablet (5 mg total) by mouth daily. 30 tablet 0    donepezil 10 MG Oral Tab Take 1 tablet (10 mg total) by mouth nightly.      venlafaxine 37.5 MG Oral Tab Take 1 tablet (37.5 mg total) by mouth 2 (two) times daily.      hydrOXYzine Pamoate 25 MG Oral Cap hydroxyzine pamoate 25 mg capsule   TAKE ONE CAPSULE BY MOUTH DAILY AS NEEDED      polyethylene glycol, PEG 3350, 17 g Oral Powd Pack Take 17 g by mouth every other day. Patient takes every three days      melatonin 1 MG Oral Tab Take 2 tablets (2 mg total) by mouth nightly.      ARIPiprazole 2 MG Oral Tab Take 1 tablet (2 mg total) by mouth every other day. 1 tablet 0    aspirin 81 MG Oral Tab Take 1 tablet (81 mg total) by mouth daily.      mirtazapine 15 MG Oral Tab TAKE ONE TABLET BY MOUTH AT BEDTIME 90 tablet 3    Coenzyme Q10 (COQ-10) 100 MG Oral Cap Take 100 mg by mouth daily.      Simvastatin 40 MG Oral Tab Take 1 tablet (40 mg total) by mouth nightly.           Soc Hx  Social History     Tobacco Use    Smoking status: Former     Current packs/day: 0.00     Types: Cigarettes     Start date: 1957     Quit date: 1960     Years since quittin.9    Smokeless tobacco: Never   Substance Use Topics    Alcohol use: Not Currently     Comment: occasionally        Fam Hx  Family History   Problem Relation Age of Onset    Alcohol and Other Disorders Associated Father     Heart Disorder Father     Depression Father     Heart Disorder Mother        Review of Systems  Comprehensive ROS reviewed and negative except for what's stated above.     OBJECTIVE:  /69 (BP Location: Right arm)   Pulse 66    Temp 97.3 °F (36.3 °C) (Axillary)   Resp 14   Ht 5' 8\" (1.727 m)   Wt 151 lb 12.8 oz (68.9 kg)   SpO2 96%   BMI 23.08 kg/m²   General: Alert, no acute distress  HEENT: oral mucosa normal   Neck: non tender, no adenopathy   Lungs: clear to ausculation bilaterally  Heart: Regular rate and rhythm  Abdomen: soft, non tender, non distended   Extremities: No edema  Skin: no new rash, normal color  Neuro: Normal strength in bilateral extremities, normal sensations  Psych: appropriate affect   Diagnostic Data:    CBC/Chem  Recent Labs   Lab 07/31/24  1638   WBC 4.3   HGB 12.5*   .9*   .0       Recent Labs   Lab 07/31/24  1638      K 4.0      CO2 30.0   BUN 14   CREATSERUM 0.80   *   CA 10.1       Recent Labs   Lab 07/31/24  1638   ALT 20   AST 18   ALB 4.4       No results for input(s): \"TROP\" in the last 168 hours.    Additional Diagnostics: ECG: reviewed     Radiology: CT ABDOMEN+PELVIS(CPT=74176)    Result Date: 7/31/2024  CONCLUSION:  1.  Bilateral ureteral stents are present in gross satisfactory position.  Proximal coil of the right stent is in the renal pelvis and proximal coil of the left stent is in an upper pole calyx.  Both distal coils are in the urinary bladder.  No hydroureteronephrosis.  Bilateral nonobstructing intrarenal staghorn calculi are present larger and more numerous on the left. 2.  Urinary bladder is collapsed around a Melchor catheter 3.  Large 9 cm diameter focus of fecal material within the rectum with surrounding fat stranding.  This may represent changes of stercoral colitis/impaction.  There is also evidence of fecal contamination across the perineum and perianal region suggesting recent evacuation or incontinence. 4.  Post right hip arthroplasty.  The hardware causes beam hardening artifact limiting assessment of adjacent structures. 5.  Layering density within the gallbladder suggesting tiny stones or sludge.  No biliary ductal dilatation. 6.  Hepatic  cirrhosis. 7.  Coronary atherosclerosis.    Dictated by (CST): Inder Johns MD on 7/31/2024 at 7:39 PM     Finalized by (CST): Inder Johns MD on 7/31/2024 at 7:45 PM             ASSESSMENT / PLAN:   Patient is a 89 year old male with PMH sig for Mitral Regurgitation, Parkinson's disease, Dementia, Seizure disorder, HTN, OSCAR, MDD, BPH, recurrent UTIs, kidney stones, who presents with change in mental status.     AMS  Weakness   -Possibly due to heat exhaustion after long car ride in the heat without air conditioning  -UTI  -H/o recurrent UTIs, kidney stones  -Recently finished a course of oral antibiotics  -Ureteral stents placed about a month ago  -ER patient was normotensive and afebrile, increased respiratory rate  -WBC 4.3, lactic 1.7, UA positive for infection  -RVP negative   -CT: Bilateral ureteral stents, no hydro, changes of stercoral colitis/impaction    -Ceftriaxone started  -Follow-up blood and urine cultures  -IVF for supportive care  -Swallow eval before diet  -Continue Melchor Care     Constipation   -changes of stercoral colitis/impaction seen on CT  -Bowel regimen    Parkinson's disease/dementia   With behavioral changes  -Continue home medications with Abilify, donepezil, mirtazapine, venlafaxine  -Sinemet    HTN - amlodipine  HLD - statin     Asa on hold     FN:  - IVF: 0.9  - Diet: swallow eval     DVT Prophy: SCDs HSQ  Atrophy: Ambulate PT/OT  Lines: Piv  Discussed with wife at bedside     Dispo: pending clinical course    Outpatient records or previous hospital records reviewed.     Further recommendations pending patient's clinical course.  \[facility] [place] [facility]\""ist to continue to follow patient while in house    Patient and/or patient's family given opportunity to ask questions and note understanding and agreeing with therapeutic plan as outlined    Thank You,  Hector Hopper MD    [facility] Hospitalist  Answering Service number: [phone]

## 2024-08-02 LAB
ANION GAP SERPL CALC-SCNC: 4 MMOL/L (ref 0–18)
BASOPHILS # BLD AUTO: 0.03 X10(3) UL (ref 0–0.2)
BASOPHILS NFR BLD AUTO: 0.6 %
BUN BLD-MCNC: 14 MG/DL (ref 9–23)
BUN/CREAT SERPL: 21.5 (ref 10–20)
CALCIUM BLD-MCNC: 10.1 MG/DL (ref 8.7–10.4)
CHLORIDE SERPL-SCNC: 110 MMOL/L (ref 98–112)
CO2 SERPL-SCNC: 31 MMOL/L (ref 21–32)
CREAT BLD-MCNC: 0.65 MG/DL
DEPRECATED RDW RBC AUTO: 49.9 FL (ref 35.1–46.3)
EGFRCR SERPLBLD CKD-EPI 2021: 91 ML/MIN/1.73M2 (ref 60–?)
EOSINOPHIL # BLD AUTO: 0.22 X10(3) UL (ref 0–0.7)
EOSINOPHIL NFR BLD AUTO: 4.3 %
ERYTHROCYTE [DISTWIDTH] IN BLOOD BY AUTOMATED COUNT: 13.1 % (ref 11–15)
GLUCOSE BLD-MCNC: 116 MG/DL (ref 70–99)
HCT VFR BLD AUTO: 32.7 %
HGB BLD-MCNC: 10.6 G/DL
IMM GRANULOCYTES # BLD AUTO: 0.03 X10(3) UL (ref 0–1)
IMM GRANULOCYTES NFR BLD: 0.6 %
LYMPHOCYTES # BLD AUTO: 1.49 X10(3) UL (ref 1–4)
LYMPHOCYTES NFR BLD AUTO: 29 %
MAGNESIUM SERPL-MCNC: 1.9 MG/DL (ref 1.6–2.6)
MCH RBC QN AUTO: 33.5 PG (ref 26–34)
MCHC RBC AUTO-ENTMCNC: 32.4 G/DL (ref 31–37)
MCV RBC AUTO: 103.5 FL
MONOCYTES # BLD AUTO: 0.6 X10(3) UL (ref 0.1–1)
MONOCYTES NFR BLD AUTO: 11.7 %
NEUTROPHILS # BLD AUTO: 2.76 X10 (3) UL (ref 1.5–7.7)
NEUTROPHILS # BLD AUTO: 2.76 X10(3) UL (ref 1.5–7.7)
NEUTROPHILS NFR BLD AUTO: 53.8 %
OSMOLALITY SERPL CALC.SUM OF ELEC: 301 MOSM/KG (ref 275–295)
PLATELET # BLD AUTO: 204 10(3)UL (ref 150–450)
POTASSIUM SERPL-SCNC: 4.2 MMOL/L (ref 3.5–5.1)
POTASSIUM SERPL-SCNC: 4.2 MMOL/L (ref 3.5–5.1)
RBC # BLD AUTO: 3.16 X10(6)UL
SODIUM SERPL-SCNC: 145 MMOL/L (ref 136–145)
WBC # BLD AUTO: 5.1 X10(3) UL (ref 4–11)

## 2024-08-02 PROCEDURE — 97166 OT EVAL MOD COMPLEX 45 MIN: CPT

## 2024-08-02 PROCEDURE — 97530 THERAPEUTIC ACTIVITIES: CPT

## 2024-08-02 PROCEDURE — 83735 ASSAY OF MAGNESIUM: CPT | Performed by: INTERNAL MEDICINE

## 2024-08-02 PROCEDURE — 80048 BASIC METABOLIC PNL TOTAL CA: CPT | Performed by: INTERNAL MEDICINE

## 2024-08-02 PROCEDURE — 92526 ORAL FUNCTION THERAPY: CPT

## 2024-08-02 PROCEDURE — 97162 PT EVAL MOD COMPLEX 30 MIN: CPT

## 2024-08-02 PROCEDURE — 85025 COMPLETE CBC W/AUTO DIFF WBC: CPT | Performed by: INTERNAL MEDICINE

## 2024-08-02 PROCEDURE — 84132 ASSAY OF SERUM POTASSIUM: CPT | Performed by: INTERNAL MEDICINE

## 2024-08-02 RX ORDER — ASPIRIN 81 MG/1
81 TABLET ORAL DAILY
Status: DISCONTINUED | OUTPATIENT
Start: 2024-08-02 | End: 2024-08-03

## 2024-08-02 NOTE — PROGRESS NOTES
[facility] Hospitalist Progress Note     CC: Hospital Follow up    PCP: ALINE HERRERA MD       Assessment/Plan:     Principal Problem:    Urinary tract infection without hematuria, site unspecified  Patient is a 89 year old male with PMH sig for Mitral Regurgitation, Parkinson's disease/syndrome, Dementia, Seizure disorder, HTN, OSCAR, MDD, BPH, recurrent UTIs, kidney stones, who presents with change in mental status.      AMS  Acute Metabolic Encephalopathy   Weakness   -Possibly due to heat exhaustion after long car ride in the heat without air conditioning  -UTI  -H/o recurrent UTIs, kidney stones  -Recently finished a course of oral antibiotics  -Ureteral stents placed about a month ago, Melchor in place   -ER patient was normotensive and afebrile, increased respiratory rate  -WBC 4.3, lactic 1.7, UA positive for infection  -RVP negative   -CT: Bilateral ureteral stents, no hydro, changes of stercoral colitis/impaction    -Ceftriaxone started with negative urine cx. Now stopped   -IVF for supportive care complete  -Swallow eval complete   -Continue Melchor Care   -Seen by Urology with plans to follow up with established urologist   -will resume ASA     Constipation   -changes of stercoral colitis/impaction seen on CT  -Bowel regimen     Parkinson's disease/dementia   With behavioral changes  -Continue home medications with Abilify, donepezil, mirtazapine, venlafaxine  -Sinemet     HTN - amlodipine  HLD - statin      FN:  - IVF: 0.9 complete   - Diet: swallow eval complete      DVT Prophy: SCDs HSQ  Atrophy: Ambulate PT/OT  Lines: Piv  Discussed with wife at bedside      Dispo: SIVA recommendation  SW  Cleared for dc      Outpatient records or previous hospital records reviewed.      Further recommendations pending patient's clinical course.  [facility] hospitalist to continue to follow patient while in house     Patient and/or patient's family given opportunity to ask questions and note understanding and agreeing with therapeutic  plan as outlined     Thank You,  Hector Hopper MD     [facility] Hospitalist  Answering Service number: [phone]     Subjective:     No CP, SOB, or N/V.  Feels well today.  Sitting up in chair and tolerating diet.  Working with PT.  Wife at bedside.    OBJECTIVE:    Blood pressure 146/72, pulse 61, temperature 97.8 °F (36.6 °C), temperature source Axillary, resp. rate 18, height 5' 8\" (1.727 m), weight 151 lb 12.8 oz (68.9 kg), SpO2 95%.    Temp:  [97.5 °F (36.4 °C)-98 °F (36.7 °C)] 97.8 °F (36.6 °C)  Pulse:  [56-67] 61  Resp:  [16-18] 18  BP: (115-146)/(54-72) 146/72  SpO2:  [94 %-97 %] 95 %      Intake/Output:    Intake/Output Summary (Last 24 hours) at 8/2/2024 1216  Last data filed at 8/2/2024 0400  Gross per 24 hour   Intake 180 ml   Output 1700 ml   Net -1520 ml       Last 3 Weights   07/31/24 2330 151 lb 12.8 oz (68.9 kg)   07/31/24 1634 160 lb (72.6 kg)   05/13/24 1222 152 lb 3.2 oz (69 kg)   05/13/24 0836 170 lb (77.1 kg)   05/12/24 1331 146 lb 9.7 oz (66.5 kg)       Exam   General: Alert, no acute distress  HEENT: oral mucosa normal   Neck: non tender, no adenopathy   Lungs: clear to ausculation bilaterally  Heart: Regular rate and rhythm  Abdomen: soft, non tender, non distended   : cash in place    Extremities: No edema  Skin: no new rash, normal color  Neuro: Normal strength in bilateral extremities, normal sensations  Psych: appropriate affect     Data Review:       Labs:     Recent Labs   Lab 07/31/24  1638 08/01/24  0942 08/02/24  0439   RBC 3.70* 3.33* 3.16*   HGB 12.5* 11.5* 10.6*   HCT 37.7* 33.5* 32.7*   .9* 100.6* 103.5*   MCH 33.8 34.5* 33.5   MCHC 33.2 34.3 32.4   RDW 13.1 12.9 13.1   NEPRELIM 2.30 1.82 2.76   WBC 4.3 3.8* 5.1   .0 206.0 204.0         Recent Labs   Lab 07/31/24  1638 08/01/24  0942 08/02/24  0439   * 113* 116*   BUN 14 11 14   CREATSERUM 0.80 0.62* 0.65*   EGFRCR 85 92 91   CA 10.1 9.6 10.1    141 145   K 4.0 3.4* 4.2  4.2     107 110   CO2 30.0 30.0 31.0       Recent Labs   Lab 07/31/24  1638   ALT 20   AST 18   ALB 4.4         Imaging:  CT ABDOMEN+PELVIS(CPT=74176)    Result Date: 7/31/2024  CONCLUSION:  1.  Bilateral ureteral stents are present in gross satisfactory position.  Proximal coil of the right stent is in the renal pelvis and proximal coil of the left stent is in an upper pole calyx.  Both distal coils are in the urinary bladder.  No hydroureteronephrosis.  Bilateral nonobstructing intrarenal staghorn calculi are present larger and more numerous on the left. 2.  Urinary bladder is collapsed around a Melchor catheter 3.  Large 9 cm diameter focus of fecal material within the rectum with surrounding fat stranding.  This may represent changes of stercoral colitis/impaction.  There is also evidence of fecal contamination across the perineum and perianal region suggesting recent evacuation or incontinence. 4.  Post right hip arthroplasty.  The hardware causes beam hardening artifact limiting assessment of adjacent structures. 5.  Layering density within the gallbladder suggesting tiny stones or sludge.  No biliary ductal dilatation. 6.  Hepatic cirrhosis. 7.  Coronary atherosclerosis.    Dictated by (CST): Inder Johns MD on 7/31/2024 at 7:39 PM     Finalized by (CST): Inder Johns MD on 7/31/2024 at 7:45 PM             Meds:      heparin  5,000 Units Subcutaneous Q8H KENAN    polyethylene glycol (PEG 3350)  17 g Oral Daily    sennosides  17.2 mg Oral Nightly    amLODIPine  5 mg Oral Daily    ARIPiprazole  2 mg Oral QOD    carbidopa-levodopa  0.5 tablet Oral TID    donepezil  10 mg Oral Nightly    mirtazapine  15 mg Oral Nightly    atorvastatin  20 mg Oral Nightly    venlafaxine  37.5 mg Oral BID         acetaminophen    melatonin    bisacodyl    fleet enema    ondansetron

## 2024-08-02 NOTE — CONSULTS
[facility]  [facility]  Consultation Note    Fernando Ramos Jr. Patient Status:  Inpatient    1935 MRN [id number]   Location [facility]5W Attending Hector Hopper MD   Hosp Day # 2 PCP ALINE HERRERA MD     Reason for Consultation:  Bilateral kidney staghorn stones, urinary retention, UTI    History of Present Illness:  Fernando Ramos Jr. is a a(n) 89 year old male with dementia admitted with altered mental status; he follows with an outside urologist for issues of urinary retention and bilateral staghorn stones (patient with indwelling cash and bilateral ureter stents).  He is currently being treated for suspected UTI. Both stents appear to be correctly positioned. Patient has no flank or abdominal pain. Renal function is normal, no elevated WBC.     CT ab/pel   1.  Bilateral ureteral stents are present in gross satisfactory position.  Proximal coil of the right stent is in the renal pelvis and proximal coil of the left stent is in an upper pole calyx.  Both distal coils are in the urinary bladder.  No   hydroureteronephrosis.  Bilateral nonobstructing intrarenal staghorn calculi are present larger and more numerous on the left.   2.  Urinary bladder is collapsed around a Cash catheter   3.  Large 9 cm diameter focus of fecal material within the rectum with surrounding fat stranding.  This may represent changes of stercoral colitis/impaction.  There is also evidence of fecal contamination across the perineum and perianal region   suggesting recent evacuation or incontinence.   4.  Post right hip arthroplasty.  The hardware causes beam hardening artifact limiting assessment of adjacent structures.   5.  Layering density within the gallbladder suggesting tiny stones or sludge.  No biliary ductal dilatation.   6.  Hepatic cirrhosis.   7.  Coronary atherosclerosis.     History:  Past Medical History:    Achalasia    Recurrent esophageal food impactions    Anxiety state    Aortic  atherosclerosis (HCC)    CXR 11-23    BPH (benign prostatic hyperplasia)    Dementia (HCC)    Depression with anxiety    Dyslipidemia    High blood pressure    High cholesterol    History of melanoma    melanoma    History of pneumonia    History of recurrent UTIs    Kidney stones    Bilateral staghorn calculi    Macrocytic anemia    Mitral regurgitation    Echo 3-23 with EF 60-65%, mild-moderate MR, marked LAE    Osteoarthritis    Parkinson's disease (HCC)    Primary hypertension    Seizure disorder (HCC)    because of venalfaxine     Past Surgical History:   Procedure Laterality Date    Egd  05/23/2022    ESOPHAGOGASTRODUODENOSCOPY with Botox injection    Egd  07/23/2021    Large food impaction in the mid and distal esophagus    Hemiarthroplasty hip Right 03/22/2022    Right Cemented Hemiarthroplasty    Lithotripsy      Total knee replacement      Transurethral destruction, prostate tissue; water-induc       Family History   Problem Relation Age of Onset    Alcohol and Other Disorders Associated Father     Heart Disorder Father     Depression Father     Heart Disorder Mother       reports that he quit smoking about 63 years ago. His smoking use included cigarettes. He started smoking about 66 years ago. He has never used smokeless tobacco. He reports that he does not currently use alcohol. He reports that he does not use drugs.    Allergies:  No Known Allergies    Medications:    Current Facility-Administered Medications:     [COMPLETED] sodium chloride 0.9 % IV bolus 500 mL, 500 mL, Intravenous, Once **FOLLOWED BY** sodium chloride 0.9% infusion, , Intravenous, Continuous    heparin (Porcine) 5000 UNIT/ML injection 5,000 Units, 5,000 Units, Subcutaneous, Q8H KENAN    acetaminophen (Tylenol Extra Strength) tab 1,000 mg, 1,000 mg, Oral, Q6H PRN    melatonin tab 3 mg, 3 mg, Oral, Nightly PRN    polyethylene glycol (PEG 3350) (Miralax) 17 g oral packet 17 g, 17 g, Oral, Daily    sennosides (Senokot) tab 17.2 mg, 17.2  mg, Oral, Nightly    bisacodyl (Dulcolax) 10 MG rectal suppository 10 mg, 10 mg, Rectal, Daily PRN    fleet enema (Fleet) 7-19 GM/118ML rectal enema 133 mL, 1 enema, Rectal, Once PRN    ondansetron (Zofran) 4 MG/2ML injection 4 mg, 4 mg, Intravenous, Q6H PRN    cefTRIAXone (Rocephin) 1 g in sodium chloride 0.9% 100 mL IVPB-ADDV, 1 g, Intravenous, Q24H    amLODIPine (Norvasc) tab 5 mg, 5 mg, Oral, Daily    ARIPiprazole (Abilify) tab 2 mg, 2 mg, Oral, QOD    carbidopa-levodopa (SINEMET)  MG per tab 0.5 tablet, 0.5 tablet, Oral, TID    donepezil (Aricept) tab 10 mg, 10 mg, Oral, Nightly    mirtazapine (Remeron) tab 15 mg, 15 mg, Oral, Nightly    atorvastatin (Lipitor) tab 20 mg, 20 mg, Oral, Nightly    venlafaxine (Effexor) tab 37.5 mg, 37.5 mg, Oral, BID    Review of Systems:  Pertinent items are noted in HPI.    Physical Exam:  /65 (BP Location: Right arm)   Pulse 67   Temp 98 °F (36.7 °C) (Oral)   Resp 18   Ht 5' 8\" (1.727 m)   Wt 151 lb 12.8 oz (68.9 kg)   SpO2 97%   BMI 23.08 kg/m²   GENERAL:  no apparent distress  EYES: sclera non-icteric, no redness   LUNGS: normal respiratory motion without distress  GI: Abdomen soft , no tenderness  : cash in place, urine yellow / clear   NEURO: Alert and Oriented, motor and sensory grossly non-focal   LYMPH:  No appreciable axillary, neck  Adenopathy  SKIN: No rashes, no discoloration  EXTREMITIES: No appreciable joint deformities     Laboratory Data:  Lab Results   Component Value Date    WBC 5.1 08/02/2024    HGB 10.6 08/02/2024    HCT 32.7 08/02/2024    .0 08/02/2024    CREATSERUM 0.62 08/01/2024    BUN 11 08/01/2024     08/01/2024    K 3.4 08/01/2024     08/01/2024    CO2 30.0 08/01/2024     08/01/2024    CA 9.6 08/01/2024    MG 1.9 08/01/2024         Impression:  Bilateral kidney staghorn stones, urinary retention, UTI    Recommendations:  CT imaging reviewed, bilateral ureter stents appropriately positioned, ie no  hydronephrosis   Continue cash for now, may exchange prior to discharge to home   Recommend await urine cultures and treat appropriately per final sensitivities   Follow up with outside established urologist   Urology will sing off at this time.     Thank you for allowing me to participate in the care of your patient.    Ashley Jeff MD  [facility]  8/2/2024

## 2024-08-02 NOTE — SLP NOTE
SPEECH DAILY NOTE - INPATIENT    ASSESSMENT & PLAN   ASSESSMENT    Proper PPE worn. Hands sanitized upon entrance/exit Pt room.       Pt alert, afebrile and on room air. Pt seen for swallow analysis per BSE recommendations (after consulting with RN). Spouse present. Pt agreed to participate. Pt's preferred method of learning verbal. Pt upright in chair; observed with current diet of minced & moist/mildly-thick liquids for monitoring diet tolerance. Swallowing precautions/strategies discussed as SLP directed oral trials. Functional mastication on minced & moist trials. No significant oral retention. Pharyngeal response appeared to trigger within 1-2 sec per hyolaryngeal elevation to completion (functional rise/strength per palpation). No overt CSA on minced & moist nor mildly-thick liquids.   Observed RN administering pills whole in pureed consistency with no difficulty; reported Pt with good tolerance of current diet. No CXR completed. Sp02 ~94% during this session. Call light within Pt's reach upon SLP discharge from room.         PLAN: Pt is discharged from skilled swallowing intervention secondary to functional swallowing skills on least restrictive SAFEST diet, family education completed and goal achievement.          Diet Recommendations - Solids: Mechanical soft ground/ Minced & Moist  Diet Recommendations - Liquids: Nectar thick liquids/ Mildly thick    Compensatory Strategies Recommended: No straws;Slow rate;Alternate consistencies;Small bites and sips  Aspiration Precautions: Upright position;Slow rate;Small bites and sips;No straw  Medication Administration Recommendations: Whole in puree    Patient Experiencing Pain: No  Treatment Plan  Treatment Plan/Recommendations: Aspiration precautions  Interdisciplinary Communication: Discussed with RN  Plan posted at bedside    GOALS  Goal #1 The patient will tolerate minced and moist consistency and mildly thick liquids without overt signs or symptoms of  aspiration with 100 % accuracy over 1 session(s).    No CSA on current diet; functional oral skills.        GOAL MET      Goal #2 The patient/family/caregiver will demonstrate understanding and implementation of aspiration precautions and swallow strategies independently over 1 session(s).    Swallowing precautions posted in room. Discussed with spouse; v/u.     GOAL MET     FOLLOW UP  Follow Up Needed (Documentation Required): No  SLP Follow-up Date: 08/02/24  Number of Visits to Meet Established Goals: 1    Session: 1    If you have any questions, please contact   Leeann Duarte M.S. [facility]/SLP  Speech-Language Pathologist  [facility]  #[phone]

## 2024-08-02 NOTE — DISCHARGE INSTRUCTIONS
Keep all follow up appointments and continue current medications.   Follow up with outpatient Urology.

## 2024-08-02 NOTE — PLAN OF CARE
The patient is A/0x1, cleared for DC pending insurance auth and SIVA placement, aspiration precautions in place, cash care, call light in place    Problem: Patient Centered Care  Goal: Patient preferences are identified and integrated in the patient's plan of care  Description: Interventions:  - What would you like us to know as we care for you? From home with spouse   - Provide timely, complete, and accurate information to patient/family  - Incorporate patient and family knowledge, values, beliefs, and cultural backgrounds into the planning and delivery of care  - Encourage patient/family to participate in care and decision-making at the level they choose  - Honor patient and family perspectives and choices  Outcome: Progressing     Problem: Patient/Family Goals  Goal: Patient/Family Long Term Goal  Description: Patient's Long Term Goal: Return home     Interventions:  - PT/OT  -Aspiration precautions   -Working with social work for SIVA placement   - See additional Care Plan goals for specific interventions  Outcome: Progressing  Goal: Patient/Family Short Term Goal  Description: Patient's Short Term Goal: Manage bowel regimen     Interventions:   - Mepilex and zinc cream for protection   -laxatives   - See additional Care Plan goals for specific interventions  Outcome: Progressing     Problem: GASTROINTESTINAL - ADULT  Goal: Maintains adequate nutritional intake (undernourished)  Description: INTERVENTIONS:  - Monitor percentage of each meal consumed  - Identify factors contributing to decreased intake, treat as appropriate  - Assist with meals as needed  - Monitor I&O, WT and lab values  - Obtain nutritional consult as needed  - Optimize oral hygiene and moisture  - Encourage food from home; allow for food preferences  - Enhance eating environment  Outcome: Progressing  Goal: Achieves appropriate nutritional intake (bariatric)  Description: INTERVENTIONS:  - Monitor for over-consumption  - Identify factors  contributing to increased intake, treat as appropriate  - Monitor I&O, WT and lab values  - Obtain nutritional consult as needed  - Evaluate psychosocial factors contributing to over-consumption  Outcome: Progressing     Problem: GENITOURINARY - ADULT  Goal: Absence of urinary retention  Description: INTERVENTIONS:  - Assess patient’s ability to void and empty bladder  - Monitor intake/output and perform bladder scan as needed  - Follow urinary retention protocol/standard of care  - Consider collaborating with pharmacy to review patient's medication profile  - Implement strategies to promote bladder emptying  Outcome: Progressing     Problem: MUSCULOSKELETAL - ADULT  Goal: Return mobility to safest level of function  Description: INTERVENTIONS:  - Assess patient stability and activity tolerance for standing, transferring and ambulating w/ or w/o assistive devices  - Assist with transfers and ambulation using safe patient handling equipment as needed  - Ensure adequate protection for wounds/incisions during mobilization  - Obtain PT/OT consults as needed  - Advance activity as appropriate  - Communicate ordered activity level and limitations with patient/family  Outcome: Progressing     Problem: NEUROLOGICAL - ADULT  Goal: Achieves stable or improved neurological status  Description: INTERVENTIONS  - Assess for and report changes in neurological status  - Initiate measures to prevent increased intracranial pressure  - Maintain blood pressure and fluid volume within ordered parameters to optimize cerebral perfusion and minimize risk of hemorrhage  - Monitor temperature, glucose, and sodium. Initiate appropriate interventions as ordered  Outcome: Progressing     Problem: Impaired Functional Mobility  Goal: Achieve highest/safest level of mobility/gait  Description: Interventions:  - Assess patient's functional ability and stability  - Promote increasing activity/tolerance for mobility and gait  - Educate and engage  patient/family in tolerated activity level and precautions    Outcome: Progressing     Problem: Impaired Activities of Daily Living  Goal: Achieve highest/safest level of independence in self care  Description: Interventions:  - Assess ability and encourage patient to participate in ADLs to maximize function  - Promote sitting position while performing ADLs such as feeding, grooming, and bathing  - Educate and encourage patient/family in tolerated functional activity level and precautions during self-care    Outcome: Progressing     Problem: Impaired Swallowing  Goal: Minimize aspiration risk  Description: Interventions:  - Patient should be alert and upright for all feedings (90 degrees preferred)  - Offer food and liquids at a slow rate  - No straws  - Encourage small bites of food and small sips of liquid  - Offer pills one at a time, crush or deliver with applesauce as needed  - Discontinue feeding and notify MD (or speech pathologist) if coughing or persistent throat clearing or wet/gurgly vocal quality is noted  Outcome: Progressing     Problem: Impaired Cognition  Goal: Patient will exhibit improved attention, thought processing and/or memory  Description: Interventions:  - Allow additional time for processing after asking questions or providing instructions  Outcome: Progressing

## 2024-08-02 NOTE — OCCUPATIONAL THERAPY NOTE
OCCUPATIONAL THERAPY EVALUATION - INPATIENT     Room Number: 511/511-A  Evaluation Date: 8/2/2024  Type of Evaluation: Initial  Presenting Problem:  (uti)    Physician Order: IP Consult to Occupational Therapy  Reason for Therapy: ADL/IADL Dysfunction and Discharge Planning    OCCUPATIONAL THERAPY ASSESSMENT   Patient is a 89 year old male admitted 7/31/2024 for uti.  Prior to admission, patient was living w/ his wife in an apartment. Per spouse pt was ambulating short distances w/ rw and 1 assist. Pt requires assist for adls.  Patient is currently functioning below baseline with functional mobility.  Patient is requiring mod/maximum assist as a result of the following impairments: decreased functional strength and decreased endurance. Occupational Therapy will continue to follow for duration of hospitalization.    Patient will benefit from continued skilled OT Services to promote return to prior level of function and safety with continuous assistance and gradual rehabilitative therapy    PLAN  OT Treatment Plan: Compensatory technique education;Patient/Family training;Patient/Family education;Endurance training;Functional transfer training  OT Device Recommendations: TBD    OCCUPATIONAL THERAPY MEDICAL/SOCIAL HISTORY   Problem List  Principal Problem:    Urinary tract infection without hematuria, site unspecified    HOME SITUATION  Type of Home: House  Home Layout: Two level  Lives With: Spouse  Shower/Tub and Equipment: Tub-shower combo; Tub transfer bench  Other Equipment: -- (rw, transport chair)  Drives: No  Patient Regularly Uses: Glasses    Stairs in Home: 14 stairs to enter  Use of Assistive Device(s): rw    Prior Level of [place]: Prior to admission, patient was living w/ his wife in an apartment. Per spouse pt was ambulating short distances w/ rw and 1 assist. Pt requires assist for adls.    SUBJECTIVE  \"Be careful\"    OCCUPATIONAL THERAPY EXAMINATION    OBJECTIVE  Precautions: Bed/chair alarm; Hard  of hearing; Seizure  Fall Risk: High fall risk    PAIN ASSESSMENT  Rating: Unable to rate  Location: -- (generalized)    ACTIVITY TOLERANCE  fair    O2 SATURATIONS  Activity on room air    Behavioral/Emotional/Social: pt participating in oob activity w/ encouragement of spouse    RANGE OF MOTION   Upper extremity ROM is within functional limits     STRENGTH ASSESSMENT  Upper extremity strength is within functional limits     ACTIVITIES OF DAILY LIVING ASSESSMENT  AM-PAC ‘6-Clicks’ Inpatient Daily Activity Short Form  How much help from another person does the patient currently need…  -   Putting on and taking off regular lower body clothing?: A Lot  -   Bathing (including washing, rinsing, drying)?: A Lot  -   Toileting, which includes using toilet, bedpan or urinal? : Total  -   Putting on and taking off regular upper body clothing?: A Lot  -   Taking care of personal grooming such as brushing teeth?: A Lot  -   Eating meals?: A Little    AM-PAC Score:  Score: 12  Approx Degree of Impairment: 66.57%  Standardized Score (AM-PAC Scale): 30.6  CMS Modifier (G-Code): CL    FUNCTIONAL ADL ASSESSMENT  Eating: min assist  Grooming: mod assist  UB Dressing: min assist  LB Dressing: max assist  Toileting: mod assist    Skilled Therapy Provided: RN contacted prior to start of care. Treatment coordinated w/ PT. Pt agreeable to participation in therapy. Gait belt used during dynamic activity. Pt received in  bed asleep but easily aroused by spouse;spouse at bedside. On initial contact pt required mod a to transfer supine<>sit at eob. Pt maintained unsupported sitting w/ cga. Pt required max a to ramesh briefs and total assist to complete liliane care in preparation for oob activity. Pt required mod a for sit<>stand and to transfer 3' bed<>chair w/ rw     At end of session pt remaining up in chair w/ all needs in reach and alarm on;wife at bedside. RN aware of pt's status and performance in therapy      EDUCATION PROVIDED  Patient:  Role of Occupational Therapy; Plan of Care; Functional Transfer Techniques; Fall Prevention  Patient's Response to Education: Requires Further Education  Family/Caregiver's Response to Education: Verbalized Understanding    The patient's Approx Degree of Impairment: 66.57% has been calculated based on documentation in the [facility] '6 clicks' Inpatient Daily Activity Short Form.  Research supports that patients with this level of impairment may benefit from IRF.  Final disposition will be made by interdisciplinary medical team.     Patient End of Session: Up in chair;Needs met;Call light within reach;RN aware of session/findings;All patient questions and concerns addressed;Alarm set;Family present    OT Goals  Patients self stated goal is: unstated     Patient will complete functional transfer with min a  Comment:     Patient will complete grooming task with vc  Comment:     Patient will tolerate standing for 2 minutes in prep for adls with rw and cga   Comment:             Goals  on: 24  Frequency: 3x/week    Patient Evaluation Complexity Level:   Occupational Profile/Medical History MODERATE - Expanded review of history including review of medical or therapy record   Specific performance deficits impacting engagement in ADL/IADL MODERATE  3 - 5 performance deficits   Client Assessment/Performance Deficits MODERATE - Comorbidities and min to mod modifications of tasks    Clinical Decision Making MODERATE - Analysis of occupational profile, detailed assessments, several treatment options    Overall Complexity MODERATE     Therapeutic Activity: 15 minutes

## 2024-08-02 NOTE — PHYSICAL THERAPY NOTE
PHYSICAL THERAPY EVALUATION - INPATIENT     Room Number: 511/511-A  Evaluation Date: 8/2/2024  Type of Evaluation: Initial   Physician Order: PT Eval and Treat    Presenting Problem: UTI, weakness  Co-Morbidities : HTN, BPH, dementia, Parkinson's disease, seizure disorder  Reason for Therapy: Mobility Dysfunction and Discharge Planning    PHYSICAL THERAPY ASSESSMENT   Patient is a 89 year old male admitted 7/31/2024 for UTI, weakness.  Prior to admission, patient's baseline is CGA-Nithin using a rolling walker for short household distances, wheelchair for longer distances with assist from wife for transfers and ADLs.  Patient is currently functioning below baseline with bed mobility, transfers, and gait.  Patient is requiring moderate assist as a result of the following impairments: decreased functional strength, decreased endurance/aerobic capacity, impaired seated and standing balance, decreased muscular endurance, and medical status.  Physical Therapy will continue to follow for duration of hospitalization.    Patient will benefit from continued skilled PT Services to promote return to prior level of function and safety with continuous assistance and gradual rehabilitative therapy .    PLAN  PT Treatment Plan: Bed mobility;Coordination;Body mechanics;Endurance;Energy conservation;Patient education;Family education;Gait training;Neuromuscular re-educate;Range of motion;Strengthening;Stair training;Transfer training;Balance training  Rehab Potential : Fair  Frequency (Obs): 3-5x/week    PHYSICAL THERAPY MEDICAL/SOCIAL HISTORY   History related to current admission: Serena who is patient's primary caregiver.  Serena has declined HH arrangement in the past, but patient was recently discharged from [facility] 5/17 for [facility] stay at [facility].     Problem List  Principal Problem:    Urinary tract infection without hematuria, site unspecified      HOME SITUATION  Home Situation  Type of Home: House  Home Layout:  Two level  Stairs to Bedroom: 14  Railing: Yes  Lives With: Spouse  Drives: No  Patient Owned Equipment: Rolling walker;Wheelchair;Gait belt     Prior Level of [place]: CGA-Nithin using a rolling walker for short household distances, wheelchair for longer distances with assist from wife for transfers and ADLs    SUBJECTIVE  \"Hurry up!\"    PHYSICAL THERAPY EXAMINATION   OBJECTIVE  Precautions: Bed/chair alarm;Hard of hearing;Seizure  Fall Risk: High fall risk    WEIGHT BEARING RESTRICTION  Weight Bearing Restriction: None                PAIN ASSESSMENT  Rating: Unable to rate  Location: buttocks, small skin tear  Management Techniques: Repositioning;Activity promotion    COGNITION  Memory:  decreased recall of recent events  Motor Planning: impaired  Problem Solving:  assistance required to identify errors made, assistance required to generate solutions, and assistance required to implement solutions  Hard of hearing, dementia baseline    RANGE OF MOTION AND STRENGTH ASSESSMENT  Upper extremity ROM and strength are within functional limits  BUEs  Lower extremity ROM is within functional limits  BLEs  Lower extremity strength is within functional limits  BLEs for bed mobility, able to perform bridge, increased assist for standing     BALANCE  Static Sitting: Fair  Dynamic Sitting: Fair -  Static Standing: Poor +  Dynamic Standing: Poor    NEUROLOGICAL FINDINGS           Sensation: WFL BLEs          ACTIVITY TOLERANCE  Pulse: 61  Heart Rate Source: Monitor     BP: 146/72  BP Location: Right arm  BP Method: Automatic  Patient Position: Semi-[place]    O2 WALK  Oxygen Therapy  SPO2% on Room Air at Rest: 96    AM-PAC '6-Clicks' INPATIENT SHORT FORM - BASIC MOBILITY  How much difficulty does the patient currently have...  Patient Difficulty: Turning over in bed (including adjusting bedclothes, sheets and blankets)?: A Little   Patient Difficulty: Sitting down on and standing up from a chair with arms (e.g., wheelchair,  bedside commode, etc.): A Lot   Patient Difficulty: Moving from lying on back to sitting on the side of the bed?: A Lot   How much help from another person does the patient currently need...   Help from Another: Moving to and from a bed to a chair (including a wheelchair)?: A Lot   Help from Another: Need to walk in hospital room?: A Lot   Help from Another: Climbing 3-5 steps with a railing?: Total     AM-PAC Score:  Raw Score: 12   Approx Degree of Impairment: 68.66%   Standardized Score (AM-PAC Scale): 35.33   CMS Modifier (G-Code): CL    FUNCTIONAL ABILITY STATUS  Functional Mobility/Gait Assessment  Gait Assistance: Moderate assistance  Distance (ft): 3' bed>chair  Assistive Device: Rolling walker  Pattern: Shuffle;R Flexed knee;L Flexed knee;Festinating  Rolling: minimal assist  Supine to Sit: moderate assist  Sit to Stand: moderate assist    Exercise/Education Provided:  Bed mobility  Body mechanics  Functional activity tolerated  Gait training  Posture  Transfer training    Skilled Therapy Provided: Pt demonstrates decreased core strength and endurance requiring mod-maxA for maintaining static seated balance, min-modA for standing balance, quick fatigue limiting activity tolerance at this time. Pt incontinent of bowels upon arrival, Nithin for bed mobility while dependent pericares performed. Pt's wife retired SLP present and supportive.    Patient received supine in bed, agreeable to physical therapy evaluation. Vital signs monitored as noted above, no adverse symptoms and patient stable during session. Education with patient and patient's wife provided verbally on physical therapy plan of care and physiological benefits of out of bed mobility. Next session anticipate to progress bed mobility, transfers, and gait.    Patient history and/or personal factors that may impact the plan of care include limited functional status at baseline, dementia, co-morbidities (HTN, BPH, dementia, Parkinson's disease, seizure  disorder) affecting medical status, endurance, balance, longstanding history of pain, and has history of recent hospitalization. Based on the physical therapy examination of the noted systems and functional activity/participation limitations, the patient presentation is evolving given the patient demonstrates worsening of previously stable condition and demonstrates worsening of co-morbidities.      The patient's Approx Degree of Impairment: 68.66% has been calculated based on documentation in the [facility] '6 clicks' Inpatient Basic Mobility Short Form.  Research supports that patients with this level of impairment may benefit from rehab facility.  Final disposition will be made by interdisciplinary medical team.    Patient End of Session: Up in chair;Needs met;Call light within reach;With  staff;RN aware of session/findings;All patient questions and concerns addressed;Alarm set;Family present (handoff to SLP)    CURRENT GOALS  Goals to be met by: 8/20/24  Patient Goal Patient's self-stated goal is: maximize functional independence and safety   Goal #1 Patient is able to demonstrate supine - sit EOB @ level: minimum assistance     Goal #1   Current Status    Goal #2 Patient is able to demonstrate transfers EOB to/from BSC at assistance level: minimum assistance with walker - rolling     Goal #2  Current Status    Goal #3 Patient is able to ambulate 30 feet with assist device: walker - rolling at assistance level: minimum assistance   Goal #3   Current Status    Goal #4 Patient is able to demonstrate transfers sit to/from stand at assistance level: minimum assistance with rolling walker      Goal #4   Current Status    Goal #5 Patient to demonstrate independence with home activity/exercise instructions provided to patient in preparation for discharge.   Goal #5   Current Status    Goal #6    Goal #6  Current Status      Patient Evaluation Complexity Level:  History High - 3 or more personal factors and/or  co-morbidities   Examination of body systems Moderate - addressing a total of 3 or more elements   Clinical Presentation  Moderate - Evolving   Clinical Decision Making  Moderate Complexity       Therapeutic Activity:  15 minutes      Shae Mchugh, PT, DPT  [facility]  Rehab Services - Physical Therapy  [id number]

## 2024-08-02 NOTE — PLAN OF CARE
Vitals are stable with no acute changes during the shift. Pt's wife at bedside. Pt was able to tolerate his minced  and pills with apple sauce.     Problem: Patient Centered Care  Goal: Patient preferences are identified and integrated in the patient's plan of care  Description: Interventions:  - What would you like us to know as we care for you? His wife is his care giver. They have an schedule appointment with his urologist next week.   - Provide timely, complete, and accurate information to patient/family  - Incorporate patient and family knowledge, values, beliefs, and cultural backgrounds into the planning and delivery of care  - Encourage patient/family to participate in care and decision-making at the level they choose  - Honor patient and family perspectives and choices  Outcome: Progressing     Problem: Patient/Family Goals  Goal: Patient/Family Long Term Goal  Description: Patient's Long Term Goal: To be discharged home.     Interventions:  - IV antibiotics, pain management, urology on call.   - See additional Care Plan goals for specific interventions  Outcome: Progressing  Goal: Patient/Family Short Term Goal  Description: Patient's Short Term Goal: To be less confused.     Interventions:   - IV antibiotics, pain control, and urology consult.   - See additional Care Plan goals for specific interventions  Outcome: Progressing     Problem: GASTROINTESTINAL - ADULT  Goal: Maintains adequate nutritional intake (undernourished)  Description: INTERVENTIONS:  - Monitor percentage of each meal consumed  - Identify factors contributing to decreased intake, treat as appropriate  - Assist with meals as needed  - Monitor I&O, WT and lab values  - Obtain nutritional consult as needed  - Optimize oral hygiene and moisture  - Encourage food from home; allow for food preferences  - Enhance eating environment  Outcome: Progressing  Goal: Achieves appropriate nutritional intake (bariatric)  Description:  INTERVENTIONS:  - Monitor for over-consumption  - Identify factors contributing to increased intake, treat as appropriate  - Monitor I&O, WT and lab values  - Obtain nutritional consult as needed  - Evaluate psychosocial factors contributing to over-consumption  Outcome: Progressing     Problem: GENITOURINARY - ADULT  Goal: Absence of urinary retention  Description: INTERVENTIONS:  - Assess patient’s ability to void and empty bladder  - Monitor intake/output and perform bladder scan as needed  - Follow urinary retention protocol/standard of care  - Consider collaborating with pharmacy to review patient's medication profile  - Implement strategies to promote bladder emptying  Outcome: Progressing     Problem: MUSCULOSKELETAL - ADULT  Goal: Return mobility to safest level of function  Description: INTERVENTIONS:  - Assess patient stability and activity tolerance for standing, transferring and ambulating w/ or w/o assistive devices  - Assist with transfers and ambulation using safe patient handling equipment as needed  - Ensure adequate protection for wounds/incisions during mobilization  - Obtain PT/OT consults as needed  - Advance activity as appropriate  - Communicate ordered activity level and limitations with patient/family  Outcome: Progressing     Problem: NEUROLOGICAL - ADULT  Goal: Achieves stable or improved neurological status  Description: INTERVENTIONS  - Assess for and report changes in neurological status  - Initiate measures to prevent increased intracranial pressure  - Maintain blood pressure and fluid volume within ordered parameters to optimize cerebral perfusion and minimize risk of hemorrhage  - Monitor temperature, glucose, and sodium. Initiate appropriate interventions as ordered  Outcome: Progressing     Problem: Impaired Functional Mobility  Goal: Achieve highest/safest level of mobility/gait  Description: Interventions:  - Assess patient's functional ability and stability  - Promote increasing  activity/tolerance for mobility and gait  - Educate and engage patient/family in tolerated activity level and precautions  - Recommend use of  RW for transfers and ambulation  Outcome: Progressing     Problem: Impaired Activities of Daily Living  Goal: Achieve highest/safest level of independence in self care  Description: Interventions:  - Assess ability and encourage patient to participate in ADLs to maximize function  - Promote sitting position while performing ADLs such as feeding, grooming, and bathing  - Educate and encourage patient/family in tolerated functional activity level and precautions during self-care    Outcome: Progressing     Problem: Impaired Swallowing  Goal: Minimize aspiration risk  Description: Interventions:  - Patient should be alert and upright for all feedings (90 degrees preferred)  - Offer food and liquids at a slow rate  - No straws  - Encourage small bites of food and small sips of liquid  - Offer pills one at a time, crush or deliver with applesauce as needed  - Discontinue feeding and notify MD (or speech pathologist) if coughing or persistent throat clearing or wet/gurgly vocal quality is noted  Outcome: Progressing     Problem: Impaired Cognition  Goal: Patient will exhibit improved attention, thought processing and/or memory  Description: Interventions:  - Minimize distractions in the room when full attention is required  - Allow additional time for processing after asking questions or providing instructions  - Encourage use of eye glasses  Outcome: Progressing

## 2024-08-02 NOTE — CM/SW NOTE
Anticipated therapy need: Gradual Rehabilitative Therapy    [facility].    CM sent tentative SIVA referrals via [employer]. PASRR screen current within 90 days and uploaded to referral (completed 5/15/24).  CM completed new PASRR screen, now queued for review.    CM spoke with patient's wife Serena to discuss dc planning.  Serena is agreeable to SIVA and confirms her [facility]    CM confirmed with [facility] liaison [place] - facility is able to accommodate patient.      Patient will require 3 inpatient midnights for SIVA coverage with Medicare (dc date of 8/3 or later).    / to remain available for support and/or discharge planning.     Plan: [facility], pending medical clearance    Leeann Renee RN, BSN  Nurse   [phone]

## 2024-08-03 VITALS
WEIGHT: 151.81 LBS | HEART RATE: 67 BPM | DIASTOLIC BLOOD PRESSURE: 64 MMHG | RESPIRATION RATE: 18 BRPM | HEIGHT: 68 IN | TEMPERATURE: 98 F | BODY MASS INDEX: 23.01 KG/M2 | SYSTOLIC BLOOD PRESSURE: 130 MMHG | OXYGEN SATURATION: 95 %

## 2024-08-03 LAB
ANION GAP SERPL CALC-SCNC: 2 MMOL/L (ref 0–18)
BASOPHILS # BLD AUTO: 0.03 X10(3) UL (ref 0–0.2)
BASOPHILS NFR BLD AUTO: 0.6 %
BUN BLD-MCNC: 12 MG/DL (ref 9–23)
BUN/CREAT SERPL: 21.1 (ref 10–20)
CALCIUM BLD-MCNC: 9.7 MG/DL (ref 8.7–10.4)
CHLORIDE SERPL-SCNC: 111 MMOL/L (ref 98–112)
CO2 SERPL-SCNC: 31 MMOL/L (ref 21–32)
CREAT BLD-MCNC: 0.57 MG/DL
DEPRECATED RDW RBC AUTO: 48.4 FL (ref 35.1–46.3)
EGFRCR SERPLBLD CKD-EPI 2021: 94 ML/MIN/1.73M2 (ref 60–?)
EOSINOPHIL # BLD AUTO: 0.4 X10(3) UL (ref 0–0.7)
EOSINOPHIL NFR BLD AUTO: 8.5 %
ERYTHROCYTE [DISTWIDTH] IN BLOOD BY AUTOMATED COUNT: 13 % (ref 11–15)
GLUCOSE BLD-MCNC: 87 MG/DL (ref 70–99)
GLUCOSE BLDC GLUCOMTR-MCNC: 102 MG/DL (ref 70–99)
HCT VFR BLD AUTO: 32.9 %
HGB BLD-MCNC: 11.1 G/DL
IMM GRANULOCYTES # BLD AUTO: 0.03 X10(3) UL (ref 0–1)
IMM GRANULOCYTES NFR BLD: 0.6 %
LYMPHOCYTES # BLD AUTO: 1.79 X10(3) UL (ref 1–4)
LYMPHOCYTES NFR BLD AUTO: 38 %
MCH RBC QN AUTO: 33.9 PG (ref 26–34)
MCHC RBC AUTO-ENTMCNC: 33.7 G/DL (ref 31–37)
MCV RBC AUTO: 100.6 FL
MONOCYTES # BLD AUTO: 0.59 X10(3) UL (ref 0.1–1)
MONOCYTES NFR BLD AUTO: 12.5 %
NEUTROPHILS # BLD AUTO: 1.87 X10 (3) UL (ref 1.5–7.7)
NEUTROPHILS # BLD AUTO: 1.87 X10(3) UL (ref 1.5–7.7)
NEUTROPHILS NFR BLD AUTO: 39.8 %
OSMOLALITY SERPL CALC.SUM OF ELEC: 297 MOSM/KG (ref 275–295)
PLATELET # BLD AUTO: 196 10(3)UL (ref 150–450)
POTASSIUM SERPL-SCNC: 4.1 MMOL/L (ref 3.5–5.1)
RBC # BLD AUTO: 3.27 X10(6)UL
SODIUM SERPL-SCNC: 144 MMOL/L (ref 136–145)
WBC # BLD AUTO: 4.7 X10(3) UL (ref 4–11)

## 2024-08-03 PROCEDURE — 80048 BASIC METABOLIC PNL TOTAL CA: CPT | Performed by: INTERNAL MEDICINE

## 2024-08-03 PROCEDURE — 85025 COMPLETE CBC W/AUTO DIFF WBC: CPT | Performed by: INTERNAL MEDICINE

## 2024-08-03 PROCEDURE — 82962 GLUCOSE BLOOD TEST: CPT

## 2024-08-03 NOTE — PLAN OF CARE
Patient discharged to [facility] via ambulance. IV removed, discharge education provided, patient sent with all personal belongings, medications and discharge instructions. Addressed additional questions. Report called to BRYANT Yun .  Problem: Patient Centered Care  Goal: Patient preferences are identified and integrated in the patient's plan of care  Description: Interventions:  - What would you like us to know as we care for you? I'm  from home with my wife  - Provide timely, complete, and accurate information to patient/family  - Incorporate patient and family knowledge, values, beliefs, and cultural backgrounds into the planning and delivery of care  - Encourage patient/family to participate in care and decision-making at the level they choose  - Honor patient and family perspectives and choices  Outcome: Adequate for Discharge     Problem: Patient/Family Goals  Goal: Patient/Family Long Term Goal  Description: Patient's Long Term Goal: discharge    Interventions:  - bowel regimen as ordered  - See additional Care Plan goals for specific interventions  Outcome: Adequate for Discharge  Goal: Patient/Family Short Term Goal  Description: Patient's Short Term Goal: feel better    Interventions:   - manage pain   - See additional Care Plan goals for specific interventions  Outcome: Adequate for Discharge     Problem: GASTROINTESTINAL - ADULT  Goal: Maintains adequate nutritional intake (undernourished)  Description: INTERVENTIONS:  - Monitor percentage of each meal consumed  - Identify factors contributing to decreased intake, treat as appropriate  - Assist with meals as needed  - Monitor I&O, WT and lab values  - Obtain nutritional consult as needed  - Optimize oral hygiene and moisture  - Encourage food from home; allow for food preferences  - Enhance eating environment  Outcome: Adequate for Discharge  Goal: Achieves appropriate nutritional intake (bariatric)  Description: INTERVENTIONS:  - Monitor for  over-consumption  - Identify factors contributing to increased intake, treat as appropriate  - Monitor I&O, WT and lab values  - Obtain nutritional consult as needed  - Evaluate psychosocial factors contributing to over-consumption  Outcome: Adequate for Discharge     Problem: GENITOURINARY - ADULT  Goal: Absence of urinary retention  Description: INTERVENTIONS:  - Assess patient’s ability to void and empty bladder  - Monitor intake/output and perform bladder scan as needed  - Follow urinary retention protocol/standard of care  - Consider collaborating with pharmacy to review patient's medication profile  - Implement strategies to promote bladder emptying  Outcome: Adequate for Discharge     Problem: MUSCULOSKELETAL - ADULT  Goal: Return mobility to safest level of function  Description: INTERVENTIONS:  - Assess patient stability and activity tolerance for standing, transferring and ambulating w/ or w/o assistive devices  - Assist with transfers and ambulation using safe patient handling equipment as needed  - Ensure adequate protection for wounds/incisions during mobilization  - Obtain PT/OT consults as needed  - Advance activity as appropriate  - Communicate ordered activity level and limitations with patient/family  Outcome: Adequate for Discharge     Problem: NEUROLOGICAL - ADULT  Goal: Achieves stable or improved neurological status  Description: INTERVENTIONS  - Assess for and report changes in neurological status  - Initiate measures to prevent increased intracranial pressure  - Maintain blood pressure and fluid volume within ordered parameters to optimize cerebral perfusion and minimize risk of hemorrhage  - Monitor temperature, glucose, and sodium. Initiate appropriate interventions as ordered  Outcome: Adequate for Discharge  Problem: Impaired Functional Mobility  Goal: Achieve highest/safest level of mobility/gait  Description: Interventions:  - Assess patient's functional ability and stability  - Promote  increasing activity/tolerance for mobility and gait  - Educate and engage patient/family in tolerated activity level and precautions    Outcome: Adequate for Discharge  Problem: Impaired Activities of Daily Living  Goal: Achieve highest/safest level of independence in self care  Description: Interventions:  - Assess ability and encourage patient to participate in ADLs to maximize function  - Promote sitting position while performing ADLs such as feeding, grooming, and bathing  - Educate and encourage patient/family in tolerated functional activity level and precautions during self-care    Outcome: Adequate for Discharge  Problem: Impaired Swallowing  Goal: Minimize aspiration risk  Description: Interventions:  - Patient should be alert and upright for all feedings (90 degrees preferred)  - Offer food and liquids at a slow rate  - No straws  - Encourage small bites of food and small sips of liquid  - Offer pills one at a time, crush or deliver with applesauce as needed  - Discontinue feeding and notify MD (or speech pathologist) if coughing or persistent throat clearing or wet/gurgly vocal quality is noted  Outcome: Adequate for Discharge     Problem: Impaired Cognition  Goal: Patient will exhibit improved attention, thought processing and/or memory  Description: Interventions:  Outcome: Adequate for Discharge

## 2024-08-03 NOTE — PLAN OF CARE
Bed is in lowest setting, locked and has call light within reach. Patient's spouse at bedside overnight. Patient educated to use call light as needed overnight. Frequent rounding performed by nursing staff overnight.       Problem: Patient Centered Care  Goal: Patient preferences are identified and integrated in the patient's plan of care  Description: Interventions:  - What would you like us to know as we care for you? From home with wife  - Provide timely, complete, and accurate information to patient/family  - Incorporate patient and family knowledge, values, beliefs, and cultural backgrounds into the planning and delivery of care  - Encourage patient/family to participate in care and decision-making at the level they choose  - Honor patient and family perspectives and choices  Outcome: Progressing     Problem: Patient/Family Goals  Goal: Patient/Family Long Term Goal  Description: Patient's Long Term Goal: To discharge    Interventions:  - Monitor vitals  -Monitor labs  - See additional Care Plan goals for specific interventions  Outcome: Progressing  Goal: Patient/Family Short Term Goal  Description: Patient's Short Term Goal: To feel better    Interventions:   - Give PRN medications  -Reposition as needed   -Frequent rounding  -Assess for pain/discomfort  - See additional Care Plan goals for specific interventions  Outcome: Progressing     Problem: GASTROINTESTINAL - ADULT  Goal: Maintains adequate nutritional intake (undernourished)  Description: INTERVENTIONS:  - Monitor percentage of each meal consumed  - Identify factors contributing to decreased intake, treat as appropriate  - Assist with meals as needed  - Monitor I&O, WT and lab values  - Obtain nutritional consult as needed  - Optimize oral hygiene and moisture  - Encourage food from home; allow for food preferences  - Enhance eating environment  Outcome: Progressing     Problem: GENITOURINARY - ADULT  Goal: Absence of urinary retention  Description:  INTERVENTIONS:  - Assess patient’s ability to void and empty bladder  - Monitor intake/output and perform bladder scan as needed  - Follow urinary retention protocol/standard of care  - Consider collaborating with pharmacy to review patient's medication profile  - Implement strategies to promote bladder emptying  Outcome: Progressing     Problem: MUSCULOSKELETAL - ADULT  Goal: Return mobility to safest level of function  Description: INTERVENTIONS:  - Assess patient stability and activity tolerance for standing, transferring and ambulating w/ or w/o assistive devices  - Assist with transfers and ambulation using safe patient handling equipment as needed  - Ensure adequate protection for wounds/incisions during mobilization  - Obtain PT/OT consults as needed  - Advance activity as appropriate  - Communicate ordered activity level and limitations with patient/family  Outcome: Progressing     Problem: NEUROLOGICAL - ADULT  Goal: Achieves stable or improved neurological status  Description: INTERVENTIONS  - Assess for and report changes in neurological status  - Initiate measures to prevent increased intracranial pressure  - Maintain blood pressure and fluid volume within ordered parameters to optimize cerebral perfusion and minimize risk of hemorrhage  - Monitor temperature, glucose, and sodium. Initiate appropriate interventions as ordered  Outcome: Progressing     Problem: Impaired Functional Mobility  Goal: Achieve highest/safest level of mobility/gait  Description: Interventions:  - Assess patient's functional ability and stability  - Promote increasing activity/tolerance for mobility and gait  - Educate and engage patient/family in tolerated activity level and precautions  - Recommend use of  RW for transfers and ambulation  Outcome: Progressing     Problem: Impaired Activities of Daily Living  Goal: Achieve highest/safest level of independence in self care  Description: Interventions:  - Assess ability and  encourage patient to participate in ADLs to maximize function  - Promote sitting position while performing ADLs such as feeding, grooming, and bathing  - Educate and encourage patient/family in tolerated functional activity level and precautions during self-care  Outcome: Progressing     Problem: Impaired Swallowing  Goal: Minimize aspiration risk  Description: Interventions:  - Patient should be alert and upright for all feedings (90 degrees preferred)  - Offer food and liquids at a slow rate  - No straws  - Encourage small bites of food and small sips of liquid  - Offer pills one at a time, crush or deliver with applesauce as needed  - Discontinue feeding and notify MD (or speech pathologist) if coughing or persistent throat clearing or wet/gurgly vocal quality is noted  Outcome: Progressing     Problem: Impaired Cognition  Goal: Patient will exhibit improved attention, thought processing and/or memory  Description: Interventions:  - Minimize distractions in the room when full attention is required  - Allow additional time for processing after asking questions or providing instructions  - Encourage use of eye glasses  Outcome: Progressing

## 2024-08-03 NOTE — CM/SW NOTE
08/03/24 1109   Discharge disposition   Expected discharge disposition subacute   Post Acute Care Provider   ([facility])   Discharge transportation [facility]     Pt discussed during nursing rounds. Pt is stable for dc today. MD dc order entered. Pt will dc to [facility] for SIVA, Mei in admissions confirmed bed is available today. Pt's spouse agreeable to transport today. [facility] scheduled for 1pm .     Number for nurse report is [phone].    Plan: [facility] for SIVA today.    / to remain available for support and/or discharge planning.     TARIK HesterN    [phone]

## 2024-08-03 NOTE — DISCHARGE SUMMARY
[facility]  part of [facility] Internal Medicine Discharge Summary   Patient ID:  Fernando Ramos Jr.  [id number]  89 year old  4/13/1935    Admit date: 7/31/2024    Discharge date and time: No discharge date for patient encounter. 8/3/24    Attending Physician: Stephani Álvarez MD     Primary Care Physician: ALINE HERRERA MD     Reason for admission AMS  Acute Metabolic Encephalopathy   Weakness  (see HPI on HP for further detail)    Discharged Condition: stable    Disposition: SNF    Risk of Readmission Lace+ Score: 64  59-90 High Risk  29-58 Medium Risk  0-28   Low Risk    Important follow up:  -pcp [facility]elle 1 week of leaving [place]  -urlogy in 1-2 weeks  Discharge meds  I reviewed and reconciled current and discharge medications on the day of discharge with the changes reflected below.       Medication List        CONTINUE taking these medications      acetaminophen 500 MG Tabs  Commonly known as: Tylenol Extra Strength     amLODIPine 5 MG Tabs  Commonly known as: Norvasc  Take 1 tablet (5 mg total) by mouth daily.     ARIPiprazole 2 MG Tabs  Commonly known as: Abilify  Take 1 tablet (2 mg total) by mouth every other day.     aspirin 81 MG Tabs     carbidopa-levodopa  MG Tabs  Commonly known as: SINEMET     CoQ-10 100 MG Caps     donepezil 10 MG Tabs  Commonly known as: Aricept     hydrOXYzine Pamoate 25 MG Caps  Commonly known as: VISTARIL     melatonin 1 MG Tabs     mirtazapine 15 MG Tabs  Commonly known as: Remeron  TAKE ONE TABLET BY MOUTH AT BEDTIME     polyethylene glycol (PEG 3350) 17 g Pack  Commonly known as: Miralax     simvastatin 40 MG Tabs  Commonly known as: Zocor     venlafaxine 37.5 MG Tabs  Commonly known as: Effexor     Vitamin D 50 MCG (2000 UT) Tabs            STOP taking these medications      cephalexin 500 MG Caps  Commonly known as: Keflex            HPI per chart  Patient is a 89 year old male with PMH sig for Mitral Regurgitation, Parkinson's disease,  Dementia, Seizure disorder, HTN, OSCAR, MDD, BPH, recurrent UTIs, kidney stones, who presents with change in mental status.  Patient drove from [place] and that he yesterday.  Recently finished oral antibiotics for a UTI yesterday.  Patient has a Melchor in place after being discharged from an outside hospital 7/26/2024 for urinary retention.  He also had a ureteral stent placed about a month ago.  Patient also has underlying dementia and Parkinson's and limited history and ROS was able to be obtained.     In the ER patient was normotensive and afebrile, increased respiratory rate  WBC 4.3, lactic 1.7, UA positive for infection  RVP negative   CT: Bilateral ureteral stents, no hydro, changes of stercoral colitis/impaction    Hospital Course  Patient is a 89 year old male with PMH sig for Mitral Regurgitation, Parkinson's disease/syndrome, Dementia, Seizure disorder, HTN, OSCAR, MDD, BPH, recurrent UTIs, kidney stones, who presents with change in mental status.   Patient was CT showing bilateral ureteral stents, no hydro, changes of impaction.  Urine culture negative and IV antibiotics were stopped.  Seen by urology and plans to follow-up as outpatient.  Patient's mental status improved back to baseline per discussion with wife but overall functional status is below baseline and PT recommended subacute rehab.  Bowel regimen started and bowel movement prior to discharge.  Discharge Diagnoses:   AMS  Acute Metabolic Encephalopathy   Weakness   -Possibly due to heat exhaustion after long car ride in the heat without air conditioning  -UTI  -H/o recurrent UTIs, kidney stones  -Recently finished a course of oral antibiotics  -Ureteral stents placed about a month ago, Melchor in place   -ER patient was normotensive and afebrile, increased respiratory rate  -WBC 4.3, lactic 1.7, UA positive for infection  -RVP negative   -CT: Bilateral ureteral stents, no hydro, changes of stercoral colitis/impaction    -Ceftriaxone started with  negative urine cx. Now stopped   -IVF for supportive care complete  -Swallow eval complete   -Continue Melchor Care   -Seen by Urology with plans to follow up with established urologist   -will resume ASA  -Per wife mental status at baseline, weakness below baseline and will DC to subacute rehab     Constipation   -changes of stercoral colitis/impaction seen on CT  -Bowel regimen     Parkinson's disease/dementia   With behavioral changes  -Continue home medications with Abilify, donepezil, mirtazapine, venlafaxine  -Sinemet     HTN - amlodipine  HLD - statin     Consults: IP CONSULT TO UROLOGY  IP CONSULT TO SOCIAL WORK  IP CONSULT TO SPIRITUAL CARE    Radiology: CT ABDOMEN+PELVIS(CPT=74176)    Result Date: 7/31/2024  PROCEDURE:   CT ABDOMEN+PELVIS(CPT=74176)  COMPARISON:   [facility], CT ABDOMEN+PELVIS (CPT=74176), 9/21/2023, 1:46 AM.  INDICATIONS:   History of ureteral stone status post bilateral ureteral stent placement  TECHNIQUE: CT images of the abdomen and pelvis were obtained without intravenous contrast material.  Automated exposure control for dose reduction was used. Adjustment of the mA and/or kV was done based on the patient's size. Use of iterative reconstruction technique for dose reduction was used.  Dose information is transmitted to the ACR (American College of Radiology) NRDR (National Radiology Data Registry) which includes the Dose Index Registry.  FINDINGS:  LIVER: Liver has a shrunken and nodular contour.  Slight hypertrophy of the caudate lobe.  No suspicious non contrast hepatic lesion or biliary ductal dilatation. BILIARY: Gallbladder has layering density compatible with tiny stones and/or sludge.  No significant intra or extrahepatic biliary ductal dilatation. SPLEEN: Normal size.  No noncontrast splenic lesion. STOMACH: Small hiatal hernia.  Debris-filled diverticulum along the right lateral aspect of the distal esophagus, similar to prior exam.  No gastric obstruction.   Duodenum is unremarkable. PANCREAS: No lesion, fluid collection, ductal dilatation, or atrophy.  ADRENALS: No nodule or enlargement. KIDNEYS: Cortical based low density foci within the kidneys compatible with cysts.  In the upper pole of the right kidney there is a 19 x 17 mm staghorn calculus without upstream dilatation of calices in the left kidney there are 3 dominant staghorn calculi measuring 13 x 24 mm in the upper pole, 9 x 14 mm in the lower pole, and 20 x 25 mm in the pelvis.  Bilateral ureteral stents are present.  The proximal coil of the right renal stent is in the renal pelvis, with distal coil in the urinary bladder.  The contralateral left ureteral stent proximal coil is in an upper pole calyx, corresponding to the same site as aforementioned 13 x 24 mm stone, and the distal coil is the urinary bladder.  No stones within either ureter.  No significant hydroureteronephrosis. AORTA/VASCULAR:   Aortic atherosclerosis without aneurysm. RETROPERITONEUM: No mass or enlarged adenopathy.  BOWEL/MESENTERY:  Scattered left and right-sided colonic diverticulosis.  No evidence of bowel obstruction.  Large 91 mm diameter focus of fecal material within the rectum with perirectal and presacral fat stranding.  Small volume of fecal contamination along the posterior perineum and perianal region suggesting incontinence or recent evacuation. ABDOMINAL WALL: Normal.  No mass or hernia.  No free air, free fluid, or lymphadenopathy in the abdomen or pelvis. URINARY BLADDER: Bladder is collapsed around a Melchor catheter.  Distal coils from bilateral ureteral stents are present within the bladder lumen. PELVIC NODES: No enlarged mass or adenopathy.   PELVIC ORGANS: No visible mass.  Pelvic organs appropriate for patient age.  BONES:   Mild endplate change and disc disease throughout the spine.  Post right hip arthroplasty.  The hardware causes beam hardening artifact limiting assessment of adjacent structures. LUNG  BASES: Scattered linear bands of atelectasis/scar in the lung bases.  Coronary atherosclerosis. OTHER: Negative.          CONCLUSION:  1.  Bilateral ureteral stents are present in gross satisfactory position.  Proximal coil of the right stent is in the renal pelvis and proximal coil of the left stent is in an upper pole calyx.  Both distal coils are in the urinary bladder.  No hydroureteronephrosis.  Bilateral nonobstructing intrarenal staghorn calculi are present larger and more numerous on the left. 2.  Urinary bladder is collapsed around a Melchor catheter 3.  Large 9 cm diameter focus of fecal material within the rectum with surrounding fat stranding.  This may represent changes of stercoral colitis/impaction.  There is also evidence of fecal contamination across the perineum and perianal region suggesting recent evacuation or incontinence. 4.  Post right hip arthroplasty.  The hardware causes beam hardening artifact limiting assessment of adjacent structures. 5.  Layering density within the gallbladder suggesting tiny stones or sludge.  No biliary ductal dilatation. 6.  Hepatic cirrhosis. 7.  Coronary atherosclerosis.    Dictated by (CST): Inder Johns MD on 7/31/2024 at 7:39 PM     Finalized by (CST): Inder Johns MD on 7/31/2024 at 7:45 PM             Operative Procedures:      Day of discharge no complaints, feels well    Exam  Vitals:    08/03/24 0837   BP: 129/53   Pulse: 61   Resp: 18   Temp: 97.7 °F (36.5 °C)     No acute distress, alert and oriented to self and wife, per wife at baseline  Lungs Clear  Heart Regular  Abdomen Benign    Total Time Coordinating Care: > than 30 minutes  Note: This chart was prepared using voice recognition software and may contain unintended word substitution errors.     Patient had opportunity to ask questions and state understand and agree with therapeutic plan as outlined

## 2024-08-05 ENCOUNTER — EXTERNAL LAB (OUTPATIENT)
Dept: HEALTH INFORMATION MANAGEMENT | Facility: OTHER | Age: 89
End: 2024-08-05

## 2024-08-05 LAB
ALBUMIN SERPL-MCNC: 3.8 G/DL (ref 3.2–4.9)
ALP SERPL-CCNC: 102 U/L (ref 34–143)
ALT SERPL-CCNC: 14 U/L (ref 0–42)
AST SERPL-CCNC: 18 U/L (ref 9–35)
BASOPHILS # BLD: 0.04 THO/MM3 (ref 0–0.1)
BASOPHILS NFR BLD: 0.8 %
BILIRUB SERPL-MCNC: 0.32 MG/DL (ref 0.16–1.3)
BUN SERPL-MCNC: 15 MG/DL (ref 7–28)
CALCIUM SERPL-MCNC: 9.3 MG/DL (ref 8.7–10.5)
CHLORIDE SERPL-SCNC: 104 MEQ/L (ref 99–110)
CO2 SERPL-SCNC: 27 MMOL/L (ref 18–30)
CREAT SERPL-MCNC: 0.68 MG/DL (ref 0.44–1.32)
EGFRCR SERPLBLD CKD-EPI 2021: >60 ML/M/1.73M2
EGFRCR SERPLBLD CKD-EPI 2021: >60 ML/M/1.73M2
EOSINOPHIL # BLD: 0.37 THO/MM3 (ref 0–0.5)
EOSINOPHIL NFR BLD: 7.8 %
ERYTHROCYTE [DISTWIDTH] IN BLOOD: 13.3 % (ref 11.5–15.2)
GLUCOSE SERPL-MCNC: 171 MG/DL (ref 70–110)
HCT VFR BLD CALC: 39.7 % (ref 42–52)
HGB BLD-MCNC: 12.6 G/DL (ref 14–18)
IMM GRANULOCYTES # BLD: 0.04 THO/MM3 (ref 0–0.1)
IMM GRANULOCYTES NFR BLD: 0.8 %
LENGTH OF FAST TIME PATIENT: ABNORMAL H
LYMPHOCYTES # BLD: 1.09 THO/MM3 (ref 0.8–3)
LYMPHOCYTES NFR BLD: 22.9 %
MCH RBC QN AUTO: 33.3 PG (ref 27–33)
MCHC RBC AUTO-ENTMCNC: 31.7 G/DL (ref 32–36)
MCV RBC AUTO: 105 FL (ref 80–94)
MONOCYTES # BLD: 0.35 THO/MM3 (ref 0.2–1)
MONOCYTES NFR BLD: 7.4 %
NEUTROPHILS # BLD: 2.87 THO/MM3 (ref 1.4–6.8)
NEUTROPHILS NFR BLD: 60.3 %
PLATELET # BLD: 233 THO/MM3 (ref 150–400)
PMV BLD AUTO: 11.2 FL (ref 9.5–13.1)
POTASSIUM SERPL-SCNC: 4.3 MEQ/L (ref 3.6–5)
PROT SERPL-MCNC: 7.2 G/DL (ref 5.6–8.2)
RBC # BLD: 3.78 MIL/MM3 (ref 4.7–6.1)
SODIUM SERPL-SCNC: 140 MEQ/L (ref 138–147)
WBC # BLD: 4.76 THO/MM3 (ref 4.8–10.8)

## 2024-08-18 ENCOUNTER — HOSPITAL ENCOUNTER (INPATIENT)
Age: 89
LOS: 5 days | Discharge: NURSING, REHAB OR LONG-TERM CARE | DRG: 698 | End: 2024-08-24
Attending: EMERGENCY MEDICINE | Admitting: INTERNAL MEDICINE

## 2024-08-18 ENCOUNTER — APPOINTMENT (OUTPATIENT)
Dept: GENERAL RADIOLOGY | Age: 89
DRG: 698 | End: 2024-08-18
Attending: EMERGENCY MEDICINE

## 2024-08-18 ENCOUNTER — APPOINTMENT (OUTPATIENT)
Dept: CT IMAGING | Age: 89
DRG: 698 | End: 2024-08-18
Attending: EMERGENCY MEDICINE

## 2024-08-18 DIAGNOSIS — N39.0 URINARY TRACT INFECTION WITHOUT HEMATURIA, SITE UNSPECIFIED: Primary | ICD-10-CM

## 2024-08-18 LAB
A BAUMANNII DNA BLD POS QL NAA+NON-PROBE: NOT DETECTED
ALBUMIN SERPL-MCNC: 2.8 G/DL (ref 3.6–5.1)
ALBUMIN SERPL-MCNC: 3.1 G/DL (ref 3.6–5.1)
ALBUMIN/GLOB SERPL: 0.6 {RATIO} (ref 1–2.4)
ALBUMIN/GLOB SERPL: 0.7 {RATIO} (ref 1–2.4)
ALP SERPL-CCNC: 77 UNITS/L (ref 45–117)
ALP SERPL-CCNC: 87 UNITS/L (ref 45–117)
ALT SERPL-CCNC: 13 UNITS/L
ALT SERPL-CCNC: 16 UNITS/L
ANION GAP SERPL CALC-SCNC: 6 MMOL/L (ref 7–19)
ANION GAP SERPL CALC-SCNC: 9 MMOL/L (ref 7–19)
APPEARANCE UR: ABNORMAL
APTT PPP: 30 SEC (ref 22–32)
APTT PPP: 31 SEC (ref 22–32)
AST SERPL-CCNC: 10 UNITS/L
AST SERPL-CCNC: 9 UNITS/L
ATRIAL RATE (BPM): 62
B FRAGILIS DNA BLD POS QL NAA+NON-PROBE: NOT DETECTED
BACTERIA #/AREA URNS HPF: ABNORMAL /HPF
BASOPHILS # BLD: 0 K/MCL (ref 0–0.3)
BASOPHILS NFR BLD: 0 %
BILIRUB SERPL-MCNC: 0.4 MG/DL (ref 0.2–1)
BILIRUB SERPL-MCNC: 0.6 MG/DL (ref 0.2–1)
BILIRUB UR QL STRIP: NEGATIVE
BUN SERPL-MCNC: 16 MG/DL (ref 6–20)
BUN SERPL-MCNC: 25 MG/DL (ref 6–20)
BUN/CREAT SERPL: 24 (ref 7–25)
BUN/CREAT SERPL: 27 (ref 7–25)
C ALBICANS DNA BLD POS QL NAA+NON-PROBE: NOT DETECTED
C AURIS DNA BLD POS QL NAA+NON-PROBE: NOT DETECTED
C GATTII+NEOFOR DNA BLD POS QL NAA+N-PRB: NOT DETECTED
C GLABRATA DNA BLD POS QL NAA+NON-PROBE: NOT DETECTED
C KRUSEI DNA BLD POS QL NAA+NON-PROBE: NOT DETECTED
C PARAP DNA BLD POS QL NAA+NON-PROBE: NOT DETECTED
C TROPICLS DNA BLD POS QL NAA+NON-PROBE: NOT DETECTED
CALCIUM SERPL-MCNC: 9.8 MG/DL (ref 8.4–10.2)
CALCIUM SERPL-MCNC: 9.8 MG/DL (ref 8.4–10.2)
CHLORIDE SERPL-SCNC: 111 MMOL/L (ref 97–110)
CHLORIDE SERPL-SCNC: 111 MMOL/L (ref 97–110)
CO2 SERPL-SCNC: 25 MMOL/L (ref 21–32)
CO2 SERPL-SCNC: 25 MMOL/L (ref 21–32)
COLOR UR: ABNORMAL
CREAT SERPL-MCNC: 0.67 MG/DL (ref 0.67–1.17)
CREAT SERPL-MCNC: 0.91 MG/DL (ref 0.67–1.17)
DEPRECATED RDW RBC: 51.5 FL (ref 39–50)
DEPRECATED RDW RBC: 52.9 FL (ref 39–50)
E CLOAC COMP DNA BLD POS NAA+NON-PROBE: NOT DETECTED
E COLI DNA BLD POS QL NAA+NON-PROBE: NOT DETECTED
E FAECALIS DNA BLD POS QL NAA+NON-PROBE: NOT DETECTED
E FAECIUM DNA BLD POS QL NAA+NON-PROBE: NOT DETECTED
EGFRCR SERPLBLD CKD-EPI 2021: 81 ML/MIN/{1.73_M2}
EGFRCR SERPLBLD CKD-EPI 2021: 90 ML/MIN/{1.73_M2}
ENTEROBACTERALES DNA BLD POS NAA+N-PRB: NOT DETECTED
EOSINOPHIL # BLD: 0 K/MCL (ref 0–0.5)
EOSINOPHIL NFR BLD: 0 %
ERYTHROCYTE [DISTWIDTH] IN BLOOD: 13.7 % (ref 11–15)
ERYTHROCYTE [DISTWIDTH] IN BLOOD: 13.8 % (ref 11–15)
FASTING DURATION TIME PATIENT: ABNORMAL H
FASTING DURATION TIME PATIENT: ABNORMAL H
FLUAV RNA RESP QL NAA+PROBE: NOT DETECTED
FLUBV RNA RESP QL NAA+PROBE: NOT DETECTED
GLOBULIN SER-MCNC: 4.7 G/DL (ref 2–4)
GLOBULIN SER-MCNC: 4.8 G/DL (ref 2–4)
GLUCOSE SERPL-MCNC: 169 MG/DL (ref 70–99)
GLUCOSE SERPL-MCNC: 213 MG/DL (ref 70–99)
GLUCOSE UR STRIP-MCNC: NEGATIVE MG/DL
GP B STREP DNA CSF QL NAA+NON-PROBE: NOT DETECTED
HAEM INFLU DNA BLD POS QL NAA+NON-PROBE: NOT DETECTED
HCT VFR BLD CALC: 35.5 % (ref 39–51)
HCT VFR BLD CALC: 36.7 % (ref 39–51)
HGB BLD-MCNC: 11.7 G/DL (ref 13–17)
HGB BLD-MCNC: 11.9 G/DL (ref 13–17)
HGB UR QL STRIP: ABNORMAL
HYALINE CASTS #/AREA URNS LPF: ABNORMAL /LPF
IMM GRANULOCYTES # BLD AUTO: 0.1 K/MCL (ref 0–0.2)
IMM GRANULOCYTES # BLD: 1 %
INR PPP: 1.2
INR PPP: 1.3
K OXYTOCA DNA BLD POS QL NAA+NON-PROBE: NOT DETECTED
KETONES UR STRIP-MCNC: NEGATIVE MG/DL
KLEBSIELLA SP DNA BLD POS QL NAA+NON-PRB: NOT DETECTED
KLEBSIELLA SP DNA BLD POS QL NAA+NON-PRB: NOT DETECTED
L MONOCYTOG DNA BLD POS QL NAA+NON-PROBE: NOT DETECTED
LACTATE BLDV-SCNC: 1.5 MMOL/L (ref 0–2)
LACTATE BLDV-SCNC: 2.5 MMOL/L (ref 0–2)
LEUKOCYTE ESTERASE UR QL STRIP: ABNORMAL
LYMPHOCYTES # BLD: 0.7 K/MCL (ref 1–4)
LYMPHOCYTES # BLD: 1.3 K/MCL (ref 1–4)
LYMPHOCYTES NFR BLD: 4 %
LYMPHOCYTES NFR BLD: 5 %
MCH RBC QN AUTO: 33.6 PG (ref 26–34)
MCH RBC QN AUTO: 33.9 PG (ref 26–34)
MCHC RBC AUTO-ENTMCNC: 32.4 G/DL (ref 32–36.5)
MCHC RBC AUTO-ENTMCNC: 33 G/DL (ref 32–36.5)
MCV RBC AUTO: 102 FL (ref 78–100)
MCV RBC AUTO: 104.6 FL (ref 78–100)
MONOCYTES # BLD: 1 K/MCL (ref 0.3–0.9)
MONOCYTES # BLD: 1.4 K/MCL (ref 0.3–0.9)
MONOCYTES NFR BLD: 4 %
MONOCYTES NFR BLD: 9 %
MRSA DNA SPEC QL NAA+PROBE: DETECTED
MUCOUS THREADS URNS QL MICRO: PRESENT
N MEN DNA BLD POS QL NAA+NON-PROBE: NOT DETECTED
NEUTROPHILS # BLD: 14.1 K/MCL (ref 1.8–7.7)
NEUTROPHILS # BLD: 23.6 K/MCL (ref 1.8–7.7)
NEUTROPHILS NFR BLD: 86 %
NEUTS SEG NFR BLD: 91 %
NITRITE UR QL STRIP: POSITIVE
NRBC BLD MANUAL-RTO: 0 /100 WBC
NRBC BLD MANUAL-RTO: 0 /100 WBC
NT-PROBNP SERPL-MCNC: 1006 PG/ML
P AERUGINOSA DNA BLD POS NAA+NON-PROBE: NOT DETECTED
P AXIS (DEGREES): 38
PH UR STRIP: 5.5 [PH] (ref 5–7)
PLAT MORPH BLD: NORMAL
PLATELET # BLD AUTO: 193 K/MCL (ref 140–450)
PLATELET # BLD AUTO: 201 K/MCL (ref 140–450)
POLYCHROMASIA BLD QL SMEAR: ABNORMAL
POTASSIUM SERPL-SCNC: 3.6 MMOL/L (ref 3.4–5.1)
POTASSIUM SERPL-SCNC: 3.7 MMOL/L (ref 3.4–5.1)
PR-INTERVAL (MSEC): 198
PROCALCITONIN SERPL IA-MCNC: 0.05 NG/ML
PROT SERPL-MCNC: 7.6 G/DL (ref 6.4–8.2)
PROT SERPL-MCNC: 7.8 G/DL (ref 6.4–8.2)
PROT UR STRIP-MCNC: 300 MG/DL
PROTEUS SP DNA BLD POS QL NAA+NON-PROBE: NOT DETECTED
PROTHROMBIN TIME: 12.4 SEC (ref 9.7–11.8)
PROTHROMBIN TIME: 13.1 SEC (ref 9.7–11.8)
QRS-INTERVAL (MSEC): 92
QT-INTERVAL (MSEC): 414
QTC: 420
R AXIS (DEGREES): 3
RAINBOW EXTRA TUBES HOLD SPECIMEN: NORMAL
RBC # BLD: 3.48 MIL/MCL (ref 4.5–5.9)
RBC # BLD: 3.51 MIL/MCL (ref 4.5–5.9)
RBC #/AREA URNS HPF: >100 /HPF
REPORT TEXT: NORMAL
RSV AG NPH QL IA.RAPID: NOT DETECTED
S LUGDUNENSIS DNA BLD POS QL NAA+NON-PRB: NOT DETECTED
S MALTOPHILIA DNA BLD POS QL NAA+NON-PRB: NOT DETECTED
S MARCESCENS DNA BLD POS NAA+NON-PROBE: NOT DETECTED
S PNEUM DNA BLD POS QL NAA+NON-PROBE: NOT DETECTED
S PYO DNA BLD POS QL NAA+NON-PROBE: NOT DETECTED
SALMONELLA DNA BLD POS QL NAA+NON-PROBE: NOT DETECTED
SARS-COV-2 RNA RESP QL NAA+PROBE: NOT DETECTED
SERVICE CMNT-IMP: NORMAL
SERVICE CMNT-IMP: NORMAL
SODIUM SERPL-SCNC: 138 MMOL/L (ref 135–145)
SODIUM SERPL-SCNC: 141 MMOL/L (ref 135–145)
SP GR UR STRIP: 1.02 (ref 1–1.03)
SQUAMOUS #/AREA URNS HPF: ABNORMAL /HPF
STAPHYLOCOCCUS AUREUS: ABNORMAL
STREPTOCOCCUS DNA BLD POS NAA+NON-PROBE: NOT DETECTED
T AXIS (DEGREES): 7
TROPONIN I SERPL DL<=0.01 NG/ML-MCNC: 28 NG/L
UROBILINOGEN UR STRIP-MCNC: 0.2 MG/DL
VENTRICULAR RATE EKG/MIN (BPM): 62
WBC # BLD: 16.4 K/MCL (ref 4.2–11)
WBC # BLD: 25.9 K/MCL (ref 4.2–11)
WBC #/AREA URNS HPF: >100 /HPF
WBC MORPH BLD: NORMAL

## 2024-08-18 PROCEDURE — 93005 ELECTROCARDIOGRAM TRACING: CPT | Performed by: EMERGENCY MEDICINE

## 2024-08-18 PROCEDURE — 85025 COMPLETE CBC W/AUTO DIFF WBC: CPT | Performed by: EMERGENCY MEDICINE

## 2024-08-18 PROCEDURE — 10002800 HB RX 250 W HCPCS: Performed by: EMERGENCY MEDICINE

## 2024-08-18 PROCEDURE — 71045 X-RAY EXAM CHEST 1 VIEW: CPT

## 2024-08-18 PROCEDURE — 87077 CULTURE AEROBIC IDENTIFY: CPT | Performed by: EMERGENCY MEDICINE

## 2024-08-18 PROCEDURE — 85610 PROTHROMBIN TIME: CPT | Performed by: INTERNAL MEDICINE

## 2024-08-18 PROCEDURE — 81001 URINALYSIS AUTO W/SCOPE: CPT | Performed by: EMERGENCY MEDICINE

## 2024-08-18 PROCEDURE — 85730 THROMBOPLASTIN TIME PARTIAL: CPT | Performed by: INTERNAL MEDICINE

## 2024-08-18 PROCEDURE — G0378 HOSPITAL OBSERVATION PER HR: HCPCS

## 2024-08-18 PROCEDURE — 10002805 HB CONTRAST AGENT: Performed by: EMERGENCY MEDICINE

## 2024-08-18 PROCEDURE — 10002803 HB RX 637: Performed by: EMERGENCY MEDICINE

## 2024-08-18 PROCEDURE — 80053 COMPREHEN METABOLIC PANEL: CPT | Performed by: EMERGENCY MEDICINE

## 2024-08-18 PROCEDURE — 74177 CT ABD & PELVIS W/CONTRAST: CPT

## 2024-08-18 PROCEDURE — 10004651 HB RX, NO CHARGE ITEM: Performed by: INTERNAL MEDICINE

## 2024-08-18 PROCEDURE — 51702 INSERT TEMP BLADDER CATH: CPT

## 2024-08-18 PROCEDURE — 83605 ASSAY OF LACTIC ACID: CPT | Performed by: EMERGENCY MEDICINE

## 2024-08-18 PROCEDURE — 87641 MR-STAPH DNA AMP PROBE: CPT | Performed by: INTERNAL MEDICINE

## 2024-08-18 PROCEDURE — 87086 URINE CULTURE/COLONY COUNT: CPT | Performed by: EMERGENCY MEDICINE

## 2024-08-18 PROCEDURE — 93010 ELECTROCARDIOGRAM REPORT: CPT | Performed by: INTERNAL MEDICINE

## 2024-08-18 PROCEDURE — 85610 PROTHROMBIN TIME: CPT | Performed by: EMERGENCY MEDICINE

## 2024-08-18 PROCEDURE — 85730 THROMBOPLASTIN TIME PARTIAL: CPT | Performed by: EMERGENCY MEDICINE

## 2024-08-18 PROCEDURE — 10002800 HB RX 250 W HCPCS: Performed by: INTERNAL MEDICINE

## 2024-08-18 PROCEDURE — 83880 ASSAY OF NATRIURETIC PEPTIDE: CPT | Performed by: EMERGENCY MEDICINE

## 2024-08-18 PROCEDURE — 80053 COMPREHEN METABOLIC PANEL: CPT | Performed by: INTERNAL MEDICINE

## 2024-08-18 PROCEDURE — 96374 THER/PROPH/DIAG INJ IV PUSH: CPT

## 2024-08-18 PROCEDURE — 10002807 HB RX 258: Performed by: INTERNAL MEDICINE

## 2024-08-18 PROCEDURE — 99285 EMERGENCY DEPT VISIT HI MDM: CPT

## 2024-08-18 PROCEDURE — 0241U COVID/FLU/RSV PANEL: CPT | Performed by: EMERGENCY MEDICINE

## 2024-08-18 PROCEDURE — 87186 SC STD MICRODIL/AGAR DIL: CPT | Performed by: EMERGENCY MEDICINE

## 2024-08-18 PROCEDURE — 83605 ASSAY OF LACTIC ACID: CPT | Performed by: INTERNAL MEDICINE

## 2024-08-18 PROCEDURE — 84145 PROCALCITONIN (PCT): CPT | Performed by: EMERGENCY MEDICINE

## 2024-08-18 PROCEDURE — 36415 COLL VENOUS BLD VENIPUNCTURE: CPT | Performed by: INTERNAL MEDICINE

## 2024-08-18 PROCEDURE — 85027 COMPLETE CBC AUTOMATED: CPT | Performed by: INTERNAL MEDICINE

## 2024-08-18 PROCEDURE — 87154 CUL TYP ID BLD PTHGN 6+ TRGT: CPT | Performed by: EMERGENCY MEDICINE

## 2024-08-18 PROCEDURE — 10002803 HB RX 637: Performed by: INTERNAL MEDICINE

## 2024-08-18 PROCEDURE — 84484 ASSAY OF TROPONIN QUANT: CPT | Performed by: EMERGENCY MEDICINE

## 2024-08-18 RX ORDER — HYDROXYZINE HYDROCHLORIDE 25 MG/1
25 TABLET, FILM COATED ORAL ONCE
Status: COMPLETED | OUTPATIENT
Start: 2024-08-18 | End: 2024-08-18

## 2024-08-18 RX ORDER — MIRTAZAPINE 15 MG/1
15 TABLET, FILM COATED ORAL NIGHTLY
COMMUNITY

## 2024-08-18 RX ORDER — CEFAZOLIN SODIUM/WATER 2 G/20 ML
2000 SYRINGE (ML) INTRAVENOUS ONCE
Status: DISCONTINUED | OUTPATIENT
Start: 2024-08-19 | End: 2024-08-18

## 2024-08-18 RX ORDER — PANTOPRAZOLE SODIUM 40 MG/1
40 TABLET, DELAYED RELEASE ORAL DAILY
Status: DISCONTINUED | OUTPATIENT
Start: 2024-08-18 | End: 2024-08-24 | Stop reason: HOSPADM

## 2024-08-18 RX ORDER — ACETAMINOPHEN 325 MG/1
650 TABLET ORAL EVERY 4 HOURS PRN
Status: DISCONTINUED | OUTPATIENT
Start: 2024-08-18 | End: 2024-08-24 | Stop reason: HOSPADM

## 2024-08-18 RX ORDER — AMLODIPINE BESYLATE 5 MG/1
5 TABLET ORAL DAILY
Status: DISCONTINUED | OUTPATIENT
Start: 2024-08-18 | End: 2024-08-24 | Stop reason: HOSPADM

## 2024-08-18 RX ORDER — ARIPIPRAZOLE 2 MG/1
2 TABLET ORAL EVERY OTHER DAY
COMMUNITY

## 2024-08-18 RX ORDER — PANTOPRAZOLE SODIUM 40 MG/1
40 TABLET, DELAYED RELEASE ORAL DAILY
COMMUNITY

## 2024-08-18 RX ORDER — SODIUM CHLORIDE 9 MG/ML
INJECTION, SOLUTION INTRAVENOUS CONTINUOUS
Status: DISCONTINUED | OUTPATIENT
Start: 2024-08-18 | End: 2024-08-20

## 2024-08-18 RX ORDER — HYDRALAZINE HYDROCHLORIDE 20 MG/ML
10 INJECTION INTRAMUSCULAR; INTRAVENOUS EVERY 6 HOURS PRN
Status: DISCONTINUED | OUTPATIENT
Start: 2024-08-18 | End: 2024-08-24 | Stop reason: HOSPADM

## 2024-08-18 RX ORDER — DONEPEZIL HYDROCHLORIDE 10 MG/1
10 TABLET, FILM COATED ORAL NIGHTLY
Status: DISCONTINUED | OUTPATIENT
Start: 2024-08-18 | End: 2024-08-24 | Stop reason: HOSPADM

## 2024-08-18 RX ORDER — ARIPIPRAZOLE 2 MG/1
2 TABLET ORAL EVERY OTHER DAY
Status: DISCONTINUED | OUTPATIENT
Start: 2024-08-18 | End: 2024-08-24 | Stop reason: HOSPADM

## 2024-08-18 RX ORDER — VENLAFAXINE 37.5 MG/1
37.5 TABLET ORAL 2 TIMES DAILY
Status: DISCONTINUED | OUTPATIENT
Start: 2024-08-18 | End: 2024-08-24 | Stop reason: HOSPADM

## 2024-08-18 RX ORDER — AMLODIPINE BESYLATE 5 MG/1
5 TABLET ORAL DAILY
COMMUNITY

## 2024-08-18 RX ORDER — DONEPEZIL HYDROCHLORIDE 10 MG/1
10 TABLET, FILM COATED ORAL NIGHTLY
COMMUNITY

## 2024-08-18 RX ORDER — CEFAZOLIN SODIUM/WATER 2 G/20 ML
2000 SYRINGE (ML) INTRAVENOUS ONCE
Status: COMPLETED | OUTPATIENT
Start: 2024-08-18 | End: 2024-08-18

## 2024-08-18 RX ORDER — MIRTAZAPINE 30 MG/1
15 TABLET, FILM COATED ORAL NIGHTLY
Status: DISCONTINUED | OUTPATIENT
Start: 2024-08-18 | End: 2024-08-24 | Stop reason: HOSPADM

## 2024-08-18 RX ORDER — CEFAZOLIN SODIUM/WATER 2 G/20 ML
2000 SYRINGE (ML) INTRAVENOUS DAILY
Status: DISCONTINUED | OUTPATIENT
Start: 2024-08-19 | End: 2024-08-18

## 2024-08-18 RX ORDER — VENLAFAXINE 37.5 MG/1
37.5 TABLET ORAL 2 TIMES DAILY
COMMUNITY

## 2024-08-18 RX ADMIN — MIRTAZAPINE 15 MG: 15 TABLET, FILM COATED ORAL at 20:29

## 2024-08-18 RX ADMIN — VANCOMYCIN HYDROCHLORIDE 1500 MG: 10 INJECTION, POWDER, LYOPHILIZED, FOR SOLUTION INTRAVENOUS at 21:52

## 2024-08-18 RX ADMIN — PANTOPRAZOLE SODIUM 40 MG: 40 TABLET, DELAYED RELEASE ORAL at 11:36

## 2024-08-18 RX ADMIN — ARIPIPRAZOLE 2 MG: 2 TABLET ORAL at 20:29

## 2024-08-18 RX ADMIN — PIPERACILLIN SODIUM AND TAZOBACTAM SODIUM 3.38 G: 3; .375 INJECTION, POWDER, FOR SOLUTION INTRAVENOUS at 21:58

## 2024-08-18 RX ADMIN — AMLODIPINE BESYLATE 5 MG: 5 TABLET ORAL at 11:36

## 2024-08-18 RX ADMIN — VENLAFAXINE 37.5 MG: 37.5 TABLET ORAL at 11:40

## 2024-08-18 RX ADMIN — SODIUM CHLORIDE: 9 INJECTION, SOLUTION INTRAVENOUS at 20:21

## 2024-08-18 RX ADMIN — ACETAMINOPHEN 650 MG: 325 TABLET ORAL at 20:29

## 2024-08-18 RX ADMIN — DONEPEZIL HYDROCHLORIDE 10 MG: 10 TABLET ORAL at 20:29

## 2024-08-18 RX ADMIN — IOHEXOL 75 ML: 350 INJECTION, SOLUTION INTRAVENOUS at 05:11

## 2024-08-18 RX ADMIN — VENLAFAXINE 37.5 MG: 37.5 TABLET ORAL at 20:29

## 2024-08-18 RX ADMIN — HYDROXYZINE HYDROCHLORIDE 25 MG: 25 TABLET ORAL at 03:45

## 2024-08-18 RX ADMIN — CEFTRIAXONE SODIUM 2000 MG: 10 INJECTION, POWDER, FOR SOLUTION INTRAVENOUS at 05:56

## 2024-08-18 RX ADMIN — ACETAMINOPHEN 650 MG: 325 TABLET ORAL at 11:36

## 2024-08-18 SDOH — ECONOMIC STABILITY: TRANSPORTATION INSECURITY
IN THE PAST 12 MONTHS, HAS LACK OF RELIABLE TRANSPORTATION KEPT YOU FROM MEDICAL APPOINTMENTS, MEETINGS, WORK OR FROM GETTING THINGS NEEDED FOR DAILY LIVING?: PATIENT UNABLE TO ANSWER

## 2024-08-18 SDOH — HEALTH STABILITY: PHYSICAL HEALTH: DO YOU HAVE SERIOUS DIFFICULTY WALKING OR CLIMBING STAIRS?: YES

## 2024-08-18 SDOH — HEALTH STABILITY: GENERAL: BECAUSE OF A PHYSICAL, MENTAL, OR EMOTIONAL CONDITION, DO YOU HAVE DIFFICULTY DOING ERRANDS ALONE?: YES

## 2024-08-18 SDOH — ECONOMIC STABILITY: INCOME INSECURITY: IN THE PAST 12 MONTHS, HAS THE ELECTRIC, GAS, OIL, OR WATER COMPANY THREATENED TO SHUT OFF SERVICE IN YOUR HOME?: NO

## 2024-08-18 SDOH — ECONOMIC STABILITY: TRANSPORTATION INSECURITY
IN THE PAST 12 MONTHS, HAS LACK OF RELIABLE TRANSPORTATION KEPT YOU FROM MEDICAL APPOINTMENTS, MEETINGS, WORK OR FROM GETTING THINGS NEEDED FOR DAILY LIVING?: YES

## 2024-08-18 SDOH — ECONOMIC STABILITY: HOUSING INSECURITY: WHAT IS YOUR LIVING SITUATION TODAY?: I HAVE A STEADY PLACE TO LIVE

## 2024-08-18 SDOH — SOCIAL STABILITY: SOCIAL NETWORK: SUPPORT SYSTEMS: FAMILY MEMBERS

## 2024-08-18 SDOH — ECONOMIC STABILITY: HOUSING INSECURITY: WHAT IS YOUR LIVING SITUATION TODAY?: OTHER (COMMENT)

## 2024-08-18 SDOH — ECONOMIC STABILITY: FOOD INSECURITY: WITHIN THE PAST 12 MONTHS, THE FOOD YOU BOUGHT JUST DIDN'T LAST AND YOU DIDN'T HAVE MONEY TO GET MORE.: PATIENT DECLINED

## 2024-08-18 SDOH — SOCIAL STABILITY: SOCIAL INSECURITY: HOW OFTEN DOES ANYONE, INCLUDING FAMILY AND FRIENDS, SCREAM OR CURSE AT YOU?: NEVER

## 2024-08-18 SDOH — ECONOMIC STABILITY: HOUSING INSECURITY: DO YOU HAVE PROBLEMS WITH ANY OF THE FOLLOWING?: NONE OF THE ABOVE

## 2024-08-18 SDOH — SOCIAL STABILITY: SOCIAL INSECURITY: HOW OFTEN DOES ANYONE, INCLUDING FAMILY AND FRIENDS, INSULT OR TALK DOWN TO YOU?: NEVER

## 2024-08-18 SDOH — HEALTH STABILITY: PHYSICAL HEALTH: DO YOU HAVE DIFFICULTY DRESSING OR BATHING?: YES

## 2024-08-18 SDOH — ECONOMIC STABILITY: HOUSING INSECURITY: WHAT IS YOUR LIVING SITUATION TODAY?: SPOUSE

## 2024-08-18 SDOH — HEALTH STABILITY: GENERAL
BECAUSE OF A PHYSICAL, MENTAL, OR EMOTIONAL CONDITION, DO YOU HAVE SERIOUS DIFFICULTY CONCENTRATING, REMEMBERING OR MAKING DECISIONS?: YES

## 2024-08-18 SDOH — ECONOMIC STABILITY: GENERAL

## 2024-08-18 SDOH — ECONOMIC STABILITY: HOUSING INSECURITY

## 2024-08-18 SDOH — ECONOMIC STABILITY: HOUSING INSECURITY: DO YOU HAVE PROBLEMS WITH ANY OF THE FOLLOWING?: PATIENT UNABLE TO ANSWER

## 2024-08-18 SDOH — SOCIAL STABILITY: SOCIAL NETWORK
HOW OFTEN DO YOU SEE OR TALK TO PEOPLE THAT YOU CARE ABOUT AND FEEL CLOSE TO? (FOR EXAMPLE: TALKING TO FRIENDS ON THE PHONE, VISITING FRIENDS OR FAMILY, GOING TO CHURCH OR CLUB MEETINGS): 5 OR MORE TIMES A WEEK

## 2024-08-18 SDOH — ECONOMIC STABILITY: GENERAL: WOULD YOU LIKE HELP WITH ANY OF THE FOLLOWING NEEDS?: I DON'T WANT HELP WITH ANY OF THESE

## 2024-08-18 SDOH — SOCIAL STABILITY: SOCIAL INSECURITY: HOW OFTEN DOES ANYONE, INCLUDING FAMILY AND FRIENDS, THREATEN YOU WITH HARM?: NEVER

## 2024-08-18 SDOH — SOCIAL STABILITY: SOCIAL INSECURITY: HOW OFTEN DOES ANYONE, INCLUDING FAMILY AND FRIENDS, PHYSICALLY HURT YOU?: NEVER

## 2024-08-18 ASSESSMENT — ACTIVITIES OF DAILY LIVING (ADL)
FEEDING: NEEDS ASSISTANCE
DRESSING: NEEDS ASSISTANCE
ADL_BEFORE_ADMISSION: NEEDS/REQUIRES ASSISTANCE
ADL_SCORE: 5
TOILETING: DEPENDENT
RECENT_DECLINE_ADL: YES, DECLINE IN AMBULATION/TRANSFERRING, COLLABORATE WITH PROVIDER (T);YES, DECLINE IN BATHING/DRESSING/FEEDING, COLLABORATE WITH PROVIDER (T)
ADL_SHORT_OF_BREATH: NO
BATHING: NEEDS ASSISTANCE

## 2024-08-18 ASSESSMENT — PAIN SCALES - GENERAL
PAINLEVEL_OUTOF10: 0

## 2024-08-18 ASSESSMENT — COLUMBIA-SUICIDE SEVERITY RATING SCALE - C-SSRS
2. HAVE YOU ACTUALLY HAD ANY THOUGHTS OF KILLING YOURSELF?: NO
IS THE PATIENT ABLE TO COMPLETE C-SSRS: NO, DEFER TO LATER TIME
6. HAVE YOU EVER DONE ANYTHING, STARTED TO DO ANYTHING, OR PREPARED TO DO ANYTHING TO END YOUR LIFE?: NO
1. WITHIN THE PAST MONTH, HAVE YOU WISHED YOU WERE DEAD OR WISHED YOU COULD GO TO SLEEP AND NOT WAKE UP?: NO

## 2024-08-18 ASSESSMENT — PAIN SCALES - PAIN ASSESSMENT IN ADVANCED DEMENTIA (PAINAD)
TOTALSCORE: 1
FACIALEXPRESSION: SMILING OR INEXPRESSIVE
BODYLANGUAGE: RELAXED
CONSOLABILITY: NO NEED TO CONSOLE
CONSOLABILITY: NO NEED TO CONSOLE
NEGVOCALIZATION: OCCASIONAL MOAN OR GROAN, LOW LEVELS OF SPEECH WITH A NEGATIVE OR DISAPPROVING QUALITY
BODYLANGUAGE: RELAXED
BREATHING: NORMAL
FACIALEXPRESSION: SMILING OR INEXPRESSIVE
BREATHING: OCCASIONAL LABORED BREATHING, SHORT PERIOD OF HYPERVENTILATION
TOTALSCORE: 1

## 2024-08-18 ASSESSMENT — PATIENT HEALTH QUESTIONNAIRE - PHQ9
CLINICAL INTERPRETATION OF PHQ2 SCORE: NO FURTHER SCREENING NEEDED
SUM OF ALL RESPONSES TO PHQ9 QUESTIONS 1 AND 2: 0
1. LITTLE INTEREST OR PLEASURE IN DOING THINGS: NOT AT ALL
2. FEELING DOWN, DEPRESSED OR HOPELESS: NOT AT ALL
SUM OF ALL RESPONSES TO PHQ9 QUESTIONS 1 AND 2: 0
IS PATIENT ABLE TO COMPLETE PHQ2 OR PHQ9: YES

## 2024-08-18 ASSESSMENT — LIFESTYLE VARIABLES
AUDIT-C TOTAL SCORE: 0
HOW MANY STANDARD DRINKS CONTAINING ALCOHOL DO YOU HAVE ON A TYPICAL DAY: 0,1 OR 2
ALCOHOL_USE_STATUS: NO OR LOW RISK WITH VALIDATED TOOL
HOW OFTEN DO YOU HAVE 6 OR MORE DRINKS ON ONE OCCASION: NEVER
HOW OFTEN DO YOU HAVE A DRINK CONTAINING ALCOHOL: NEVER

## 2024-08-18 ASSESSMENT — PAIN SCALES - WONG BAKER: WONGBAKER_NUMERICALRESPONSE: 0

## 2024-08-19 LAB
CREAT SERPL-MCNC: 0.91 MG/DL (ref 0.67–1.17)
CREAT SERPL-MCNC: 1.06 MG/DL (ref 0.67–1.17)
EGFRCR SERPLBLD CKD-EPI 2021: 68 ML/MIN/{1.73_M2}
EGFRCR SERPLBLD CKD-EPI 2021: 81 ML/MIN/{1.73_M2}
LACTATE BLDV-SCNC: 1.3 MMOL/L (ref 0–2)
LACTATE BLDV-SCNC: 2.2 MMOL/L (ref 0–2)
VANCOMYCIN TROUGH SERPL-MCNC: 11.3 MCG/ML (ref 10–20)

## 2024-08-19 PROCEDURE — 10006031 HB ROOM CHARGE TELEMETRY

## 2024-08-19 PROCEDURE — 36415 COLL VENOUS BLD VENIPUNCTURE: CPT | Performed by: INTERNAL MEDICINE

## 2024-08-19 PROCEDURE — 10002803 HB RX 637: Performed by: INTERNAL MEDICINE

## 2024-08-19 PROCEDURE — 10002800 HB RX 250 W HCPCS: Performed by: INTERNAL MEDICINE

## 2024-08-19 PROCEDURE — 82565 ASSAY OF CREATININE: CPT | Performed by: INTERNAL MEDICINE

## 2024-08-19 PROCEDURE — G0378 HOSPITAL OBSERVATION PER HR: HCPCS

## 2024-08-19 PROCEDURE — 80202 ASSAY OF VANCOMYCIN: CPT | Performed by: INTERNAL MEDICINE

## 2024-08-19 PROCEDURE — 10002807 HB RX 258: Performed by: INTERNAL MEDICINE

## 2024-08-19 PROCEDURE — 83605 ASSAY OF LACTIC ACID: CPT | Performed by: INTERNAL MEDICINE

## 2024-08-19 PROCEDURE — 87040 BLOOD CULTURE FOR BACTERIA: CPT | Performed by: INTERNAL MEDICINE

## 2024-08-19 RX ADMIN — VENLAFAXINE 37.5 MG: 37.5 TABLET ORAL at 10:16

## 2024-08-19 RX ADMIN — VANCOMYCIN HYDROCHLORIDE 750 MG: 750 INJECTION, POWDER, LYOPHILIZED, FOR SOLUTION INTRAVENOUS at 10:27

## 2024-08-19 RX ADMIN — AMLODIPINE BESYLATE 5 MG: 5 TABLET ORAL at 10:16

## 2024-08-19 RX ADMIN — MIRTAZAPINE 15 MG: 15 TABLET, FILM COATED ORAL at 20:52

## 2024-08-19 RX ADMIN — SODIUM CHLORIDE: 9 INJECTION, SOLUTION INTRAVENOUS at 21:08

## 2024-08-19 RX ADMIN — PIPERACILLIN SODIUM AND TAZOBACTAM SODIUM 3.38 G: 3; .375 INJECTION, POWDER, FOR SOLUTION INTRAVENOUS at 05:07

## 2024-08-19 RX ADMIN — DONEPEZIL HYDROCHLORIDE 10 MG: 10 TABLET ORAL at 20:52

## 2024-08-19 RX ADMIN — VANCOMYCIN HYDROCHLORIDE 750 MG: 750 INJECTION, POWDER, LYOPHILIZED, FOR SOLUTION INTRAVENOUS at 21:03

## 2024-08-19 RX ADMIN — PANTOPRAZOLE SODIUM 40 MG: 40 TABLET, DELAYED RELEASE ORAL at 10:16

## 2024-08-19 RX ADMIN — SODIUM CHLORIDE: 9 INJECTION, SOLUTION INTRAVENOUS at 10:08

## 2024-08-19 RX ADMIN — VENLAFAXINE 37.5 MG: 37.5 TABLET ORAL at 20:53

## 2024-08-19 ASSESSMENT — PAIN SCALES - GENERAL: PAINLEVEL_OUTOF10: 0

## 2024-08-19 ASSESSMENT — ENCOUNTER SYMPTOMS
ACTIVITY CHANGE: 1
SINUS PAIN: 0
HEMATOLOGIC/LYMPHATIC NEGATIVE: 1
ENDOCRINE NEGATIVE: 1
LIGHT-HEADEDNESS: 0
UNEXPECTED WEIGHT CHANGE: 0
PSYCHIATRIC NEGATIVE: 1
DIZZINESS: 1
ABDOMINAL DISTENTION: 0
EYES NEGATIVE: 1
NAUSEA: 0
SINUS PRESSURE: 0
WEAKNESS: 1
WOUND: 0
ABDOMINAL PAIN: 0
FEVER: 1
FATIGUE: 1
HEADACHES: 0
CONSTIPATION: 0
CHILLS: 1
DIARRHEA: 0
SORE THROAT: 0
COLOR CHANGE: 0
VOMITING: 0
BACK PAIN: 0
COUGH: 0
ALLERGIC/IMMUNOLOGIC NEGATIVE: 1
RESPIRATORY NEGATIVE: 1
SHORTNESS OF BREATH: 0

## 2024-08-19 ASSESSMENT — PAIN SCALES - PAIN ASSESSMENT IN ADVANCED DEMENTIA (PAINAD)
BREATHING: OCCASIONAL LABORED BREATHING, SHORT PERIOD OF HYPERVENTILATION
BREATHING: OCCASIONAL LABORED BREATHING, SHORT PERIOD OF HYPERVENTILATION
FACIALEXPRESSION: SMILING OR INEXPRESSIVE
CONSOLABILITY: NO NEED TO CONSOLE
TOTALSCORE: 1
BODYLANGUAGE: RELAXED
TOTALSCORE: 1
BODYLANGUAGE: RELAXED
FACIALEXPRESSION: SMILING OR INEXPRESSIVE
CONSOLABILITY: NO NEED TO CONSOLE

## 2024-08-20 ENCOUNTER — APPOINTMENT (OUTPATIENT)
Dept: CARDIOLOGY | Age: 89
DRG: 698 | End: 2024-08-20
Attending: INTERNAL MEDICINE

## 2024-08-20 LAB
ANION GAP SERPL CALC-SCNC: 5 MMOL/L (ref 7–19)
AORTIC VALVE AREA (AVA): 0.88
ASCENDING AORTA (AAD): 3
AV MEAN GRADIENT (AVMG): 4
AV MEAN VELOCITY (AVMV): 0.96
AV PEAK GRADIENT (AVPG): 11
AV PEAK VELOCITY (AVPV): 1.68
AV STENOSIS SEVERITY TEXT: NORMAL
AVI LVOT PEAK GRADIENT (LVOTMG): 1.2
BACTERIA BLD CULT: ABNORMAL
BACTERIA BLD CULT: ABNORMAL
BASOPHILS # BLD: 0 K/MCL (ref 0–0.3)
BASOPHILS NFR BLD: 0 %
BUN SERPL-MCNC: 38 MG/DL (ref 6–20)
BUN/CREAT SERPL: 41 (ref 7–25)
CALCIUM SERPL-MCNC: 9.8 MG/DL (ref 8.4–10.2)
CHLORIDE SERPL-SCNC: 119 MMOL/L (ref 97–110)
CO2 SERPL-SCNC: 28 MMOL/L (ref 21–32)
CREAT SERPL-MCNC: 0.92 MG/DL (ref 0.67–1.17)
CREAT SERPL-MCNC: 0.92 MG/DL (ref 0.67–1.17)
DEPRECATED RDW RBC: 54.7 FL (ref 39–50)
E WAVE DECELARATION TIME (MDT): 12.45
EGFRCR SERPLBLD CKD-EPI 2021: 80 ML/MIN/{1.73_M2}
EGFRCR SERPLBLD CKD-EPI 2021: 80 ML/MIN/{1.73_M2}
EOSINOPHIL # BLD: 0 K/MCL (ref 0–0.5)
EOSINOPHIL NFR BLD: 0 %
ERYTHROCYTE [DISTWIDTH] IN BLOOD: 14.1 % (ref 11–15)
FASTING DURATION TIME PATIENT: ABNORMAL H
GLUCOSE SERPL-MCNC: 140 MG/DL (ref 70–99)
GRAM STN SPEC: ABNORMAL
GRAM STN SPEC: ABNORMAL
HCT VFR BLD CALC: 31.4 % (ref 39–51)
HGB BLD-MCNC: 10 G/DL (ref 13–17)
IMM GRANULOCYTES # BLD AUTO: 0.2 K/MCL (ref 0–0.2)
IMM GRANULOCYTES # BLD: 1 %
LEFT INTERNAL DIMENSION IN SYSTOLE (LVSD): 1.2
LEFT VENTRICULAR INTERNAL DIMENSION IN DIASTOLE (LVDD): 3.2
LEFT VENTRICULAR POSTERIOR WALL IN END DIASTOLE (LVPW): 4.9
LV EF: NORMAL %
LYMPHOCYTES # BLD: 0.8 K/MCL (ref 1–4)
LYMPHOCYTES NFR BLD: 4 %
MCH RBC QN AUTO: 33.9 PG (ref 26–34)
MCHC RBC AUTO-ENTMCNC: 31.8 G/DL (ref 32–36.5)
MCV RBC AUTO: 106.4 FL (ref 78–100)
MONOCYTES # BLD: 1.4 K/MCL (ref 0.3–0.9)
MONOCYTES NFR BLD: 7 %
MV E TISSUE VEL MED (MESV): 10.8
MV E WAVE VEL/E TISSUE VEL MED(MSR): 7.07
MV PEAK A VELOCITY (MVPAV): 196
MV PEAK E VELOCITY (MVPEV): 1.25
NEUTROPHILS # BLD: 17.3 K/MCL (ref 1.8–7.7)
NEUTROPHILS NFR BLD: 88 %
NRBC BLD MANUAL-RTO: 0 /100 WBC
PLATELET # BLD AUTO: 179 K/MCL (ref 140–450)
POTASSIUM SERPL-SCNC: 3.9 MMOL/L (ref 3.4–5.1)
RAINBOW EXTRA TUBES HOLD SPECIMEN: NORMAL
RBC # BLD: 2.95 MIL/MCL (ref 4.5–5.9)
RV END SYSTOLIC LONGITUDINAL STRAIN FREE WALL (RVGS): 1.9
SODIUM SERPL-SCNC: 148 MMOL/L (ref 135–145)
TRICUSPID VALVE ANNULAR PEAK VELOCITY (TVAPV): 27
TRICUSPID VALVE PEAK REGURGITATION VELOCITY (TRPV): 3.6
WBC # BLD: 19.7 K/MCL (ref 4.2–11)

## 2024-08-20 PROCEDURE — 97166 OT EVAL MOD COMPLEX 45 MIN: CPT

## 2024-08-20 PROCEDURE — 10006031 HB ROOM CHARGE TELEMETRY

## 2024-08-20 PROCEDURE — 97535 SELF CARE MNGMENT TRAINING: CPT

## 2024-08-20 PROCEDURE — 97162 PT EVAL MOD COMPLEX 30 MIN: CPT

## 2024-08-20 PROCEDURE — 93306 TTE W/DOPPLER COMPLETE: CPT | Performed by: SPECIALIST

## 2024-08-20 PROCEDURE — 10002801 HB RX 250 W/O HCPCS: Performed by: INTERNAL MEDICINE

## 2024-08-20 PROCEDURE — 10002800 HB RX 250 W HCPCS: Performed by: INTERNAL MEDICINE

## 2024-08-20 PROCEDURE — 93306 TTE W/DOPPLER COMPLETE: CPT

## 2024-08-20 PROCEDURE — 10002803 HB RX 637: Performed by: INTERNAL MEDICINE

## 2024-08-20 PROCEDURE — 80048 BASIC METABOLIC PNL TOTAL CA: CPT | Performed by: INTERNAL MEDICINE

## 2024-08-20 PROCEDURE — 85025 COMPLETE CBC W/AUTO DIFF WBC: CPT | Performed by: INTERNAL MEDICINE

## 2024-08-20 PROCEDURE — 82565 ASSAY OF CREATININE: CPT | Performed by: INTERNAL MEDICINE

## 2024-08-20 PROCEDURE — 36415 COLL VENOUS BLD VENIPUNCTURE: CPT | Performed by: INTERNAL MEDICINE

## 2024-08-20 PROCEDURE — 10002807 HB RX 258: Performed by: INTERNAL MEDICINE

## 2024-08-20 PROCEDURE — 97112 NEUROMUSCULAR REEDUCATION: CPT

## 2024-08-20 RX ORDER — SODIUM CHLORIDE 450 MG/100ML
INJECTION, SOLUTION INTRAVENOUS CONTINUOUS
Status: DISCONTINUED | OUTPATIENT
Start: 2024-08-20 | End: 2024-08-22

## 2024-08-20 RX ADMIN — VANCOMYCIN HYDROCHLORIDE 750 MG: 750 INJECTION, POWDER, LYOPHILIZED, FOR SOLUTION INTRAVENOUS at 10:25

## 2024-08-20 RX ADMIN — PANTOPRAZOLE SODIUM 40 MG: 40 TABLET, DELAYED RELEASE ORAL at 10:21

## 2024-08-20 RX ADMIN — AMLODIPINE BESYLATE 5 MG: 5 TABLET ORAL at 10:21

## 2024-08-20 RX ADMIN — VENLAFAXINE 37.5 MG: 37.5 TABLET ORAL at 21:06

## 2024-08-20 RX ADMIN — ARIPIPRAZOLE 2 MG: 2 TABLET ORAL at 21:04

## 2024-08-20 RX ADMIN — DONEPEZIL HYDROCHLORIDE 10 MG: 10 TABLET ORAL at 21:05

## 2024-08-20 RX ADMIN — VENLAFAXINE 37.5 MG: 37.5 TABLET ORAL at 10:21

## 2024-08-20 RX ADMIN — SODIUM CHLORIDE: 4.5 INJECTION, SOLUTION INTRAVENOUS at 18:13

## 2024-08-20 RX ADMIN — MIRTAZAPINE 15 MG: 15 TABLET, FILM COATED ORAL at 21:05

## 2024-08-20 RX ADMIN — VANCOMYCIN HYDROCHLORIDE 750 MG: 750 INJECTION, POWDER, LYOPHILIZED, FOR SOLUTION INTRAVENOUS at 21:18

## 2024-08-20 ASSESSMENT — COGNITIVE AND FUNCTIONAL STATUS - GENERAL
HELP NEEDED DRESSING REGULAR UPPER BODY CLOTHING: A LOT
DAILY_ACTIVITY_RAW_SCORE: 11
HELP NEEDED FOR PERSONAL GROOMING: A LITTLE
HELP NEEDED FOR BATHING: TOTAL
HELP NEEDED DRESSING REGULAR LOWER BODY CLOTHING: TOTAL
BASIC_MOBILITY_RAW_SCORE: 10
DAILY_ACTIVITY_CONVERTED_SCORE: 29.04
BASIC_MOBILITY_CONVERTED_SCORE: 28.13
HELP NEEDED FOR TOILETING: TOTAL

## 2024-08-20 ASSESSMENT — PAIN SCALES - GENERAL
PAINLEVEL_OUTOF10: 0
PAINLEVEL_OUTOF10: 0

## 2024-08-20 ASSESSMENT — ENCOUNTER SYMPTOMS: PAIN SEVERITY NOW: 0

## 2024-08-20 ASSESSMENT — PAIN SCALES - PAIN ASSESSMENT IN ADVANCED DEMENTIA (PAINAD)
BODYLANGUAGE: RELAXED
TOTALSCORE: 0
FACIALEXPRESSION: SMILING OR INEXPRESSIVE
CONSOLABILITY: NO NEED TO CONSOLE
BREATHING: NORMAL

## 2024-08-20 ASSESSMENT — PAIN SCALES - WONG BAKER: WONGBAKER_NUMERICALRESPONSE: 0

## 2024-08-20 ASSESSMENT — ACTIVITIES OF DAILY LIVING (ADL)
HOME_MANAGEMENT_TIME_ENTRY: 15
PRIOR_ADL_TOILETING: MAXIMAL ASSIST (MAX)
GROOMING: SUPERVISION (SUPV)
PRIOR_ADL_BATHING: MODERATE ASSIST (MOD)
EATING: SUPERVISION (SUPV)

## 2024-08-21 LAB
ANION GAP SERPL CALC-SCNC: 7 MMOL/L (ref 7–19)
BACTERIA UR CULT: ABNORMAL
BASOPHILS # BLD: 0 K/MCL (ref 0–0.3)
BASOPHILS NFR BLD: 0 %
BUN SERPL-MCNC: 34 MG/DL (ref 6–20)
BUN/CREAT SERPL: 44 (ref 7–25)
CALCIUM SERPL-MCNC: 9.6 MG/DL (ref 8.4–10.2)
CHLORIDE SERPL-SCNC: 120 MMOL/L (ref 97–110)
CO2 SERPL-SCNC: 28 MMOL/L (ref 21–32)
CREAT SERPL-MCNC: 0.77 MG/DL (ref 0.67–1.17)
DEPRECATED RDW RBC: 53.1 FL (ref 39–50)
EGFRCR SERPLBLD CKD-EPI 2021: 86 ML/MIN/{1.73_M2}
EOSINOPHIL # BLD: 0.1 K/MCL (ref 0–0.5)
EOSINOPHIL NFR BLD: 1 %
ERYTHROCYTE [DISTWIDTH] IN BLOOD: 14.1 % (ref 11–15)
FASTING DURATION TIME PATIENT: ABNORMAL H
GLUCOSE SERPL-MCNC: 139 MG/DL (ref 70–99)
HCT VFR BLD CALC: 31.6 % (ref 39–51)
HGB BLD-MCNC: 10.1 G/DL (ref 13–17)
IMM GRANULOCYTES # BLD AUTO: 0.1 K/MCL (ref 0–0.2)
IMM GRANULOCYTES # BLD: 1 %
LYMPHOCYTES # BLD: 0.8 K/MCL (ref 1–4)
LYMPHOCYTES NFR BLD: 5 %
MCH RBC QN AUTO: 32.9 PG (ref 26–34)
MCHC RBC AUTO-ENTMCNC: 32 G/DL (ref 32–36.5)
MCV RBC AUTO: 102.9 FL (ref 78–100)
MONOCYTES # BLD: 1 K/MCL (ref 0.3–0.9)
MONOCYTES NFR BLD: 7 %
NEUTROPHILS # BLD: 13.2 K/MCL (ref 1.8–7.7)
NEUTROPHILS NFR BLD: 86 %
NRBC BLD MANUAL-RTO: 0 /100 WBC
PLATELET # BLD AUTO: 173 K/MCL (ref 140–450)
POTASSIUM SERPL-SCNC: 3.3 MMOL/L (ref 3.4–5.1)
POTASSIUM SERPL-SCNC: 3.7 MMOL/L (ref 3.4–5.1)
RBC # BLD: 3.07 MIL/MCL (ref 4.5–5.9)
SODIUM SERPL-SCNC: 152 MMOL/L (ref 135–145)
WBC # BLD: 15.2 K/MCL (ref 4.2–11)

## 2024-08-21 PROCEDURE — 10002800 HB RX 250 W HCPCS: Performed by: INTERNAL MEDICINE

## 2024-08-21 PROCEDURE — 10006031 HB ROOM CHARGE TELEMETRY

## 2024-08-21 PROCEDURE — 85025 COMPLETE CBC W/AUTO DIFF WBC: CPT | Performed by: INTERNAL MEDICINE

## 2024-08-21 PROCEDURE — 84132 ASSAY OF SERUM POTASSIUM: CPT | Performed by: INTERNAL MEDICINE

## 2024-08-21 PROCEDURE — 97535 SELF CARE MNGMENT TRAINING: CPT

## 2024-08-21 PROCEDURE — 10004281 HB COUNTER-STAFF TIME PER 15 MIN

## 2024-08-21 PROCEDURE — 80048 BASIC METABOLIC PNL TOTAL CA: CPT | Performed by: INTERNAL MEDICINE

## 2024-08-21 PROCEDURE — 87040 BLOOD CULTURE FOR BACTERIA: CPT | Performed by: INTERNAL MEDICINE

## 2024-08-21 PROCEDURE — 0DJ08ZZ INSPECTION OF UPPER INTESTINAL TRACT, VIA NATURAL OR ARTIFICIAL OPENING ENDOSCOPIC: ICD-10-PCS | Performed by: INTERNAL MEDICINE

## 2024-08-21 PROCEDURE — 10002803 HB RX 637: Performed by: INTERNAL MEDICINE

## 2024-08-21 PROCEDURE — 10002801 HB RX 250 W/O HCPCS: Performed by: INTERNAL MEDICINE

## 2024-08-21 PROCEDURE — 36415 COLL VENOUS BLD VENIPUNCTURE: CPT | Performed by: INTERNAL MEDICINE

## 2024-08-21 PROCEDURE — 10002807 HB RX 258: Performed by: INTERNAL MEDICINE

## 2024-08-21 PROCEDURE — B246ZZ4 ULTRASONOGRAPHY OF RIGHT AND LEFT HEART, TRANSESOPHAGEAL: ICD-10-PCS | Performed by: INTERNAL MEDICINE

## 2024-08-21 RX ORDER — POTASSIUM CHLORIDE 1500 MG/1
40 TABLET, EXTENDED RELEASE ORAL ONCE
Status: COMPLETED | OUTPATIENT
Start: 2024-08-21 | End: 2024-08-21

## 2024-08-21 RX ADMIN — VANCOMYCIN HYDROCHLORIDE 750 MG: 750 INJECTION, POWDER, LYOPHILIZED, FOR SOLUTION INTRAVENOUS at 10:06

## 2024-08-21 RX ADMIN — VENLAFAXINE 37.5 MG: 37.5 TABLET ORAL at 21:05

## 2024-08-21 RX ADMIN — SODIUM CHLORIDE: 4.5 INJECTION, SOLUTION INTRAVENOUS at 06:32

## 2024-08-21 RX ADMIN — SODIUM CHLORIDE: 4.5 INJECTION, SOLUTION INTRAVENOUS at 22:45

## 2024-08-21 RX ADMIN — VENLAFAXINE 37.5 MG: 37.5 TABLET ORAL at 10:00

## 2024-08-21 RX ADMIN — AMLODIPINE BESYLATE 5 MG: 5 TABLET ORAL at 10:00

## 2024-08-21 RX ADMIN — VANCOMYCIN HYDROCHLORIDE 750 MG: 750 INJECTION, POWDER, LYOPHILIZED, FOR SOLUTION INTRAVENOUS at 21:15

## 2024-08-21 RX ADMIN — MIRTAZAPINE 15 MG: 15 TABLET, FILM COATED ORAL at 21:05

## 2024-08-21 RX ADMIN — HYDRALAZINE HYDROCHLORIDE 10 MG: 20 INJECTION, SOLUTION INTRAMUSCULAR; INTRAVENOUS at 21:07

## 2024-08-21 RX ADMIN — POTASSIUM CHLORIDE 40 MEQ: 1500 TABLET, EXTENDED RELEASE ORAL at 10:00

## 2024-08-21 RX ADMIN — DONEPEZIL HYDROCHLORIDE 10 MG: 10 TABLET ORAL at 21:05

## 2024-08-21 RX ADMIN — PANTOPRAZOLE SODIUM 40 MG: 40 TABLET, DELAYED RELEASE ORAL at 10:01

## 2024-08-21 ASSESSMENT — PAIN SCALES - PAIN ASSESSMENT IN ADVANCED DEMENTIA (PAINAD)
FACIALEXPRESSION: SMILING OR INEXPRESSIVE
BODYLANGUAGE: RELAXED
TOTALSCORE: 0
CONSOLABILITY: NO NEED TO CONSOLE
BREATHING: NORMAL

## 2024-08-21 ASSESSMENT — COGNITIVE AND FUNCTIONAL STATUS - GENERAL
HELP NEEDED FOR TOILETING: TOTAL
HELP NEEDED FOR BATHING: TOTAL
DAILY_ACTIVITY_RAW_SCORE: 11
HELP NEEDED DRESSING REGULAR UPPER BODY CLOTHING: A LOT
DAILY_ACTIVITY_CONVERTED_SCORE: 29.04
HELP NEEDED FOR PERSONAL GROOMING: A LITTLE
HELP NEEDED DRESSING REGULAR LOWER BODY CLOTHING: TOTAL

## 2024-08-21 ASSESSMENT — PAIN SCALES - WONG BAKER: WONGBAKER_NUMERICALRESPONSE: 0

## 2024-08-21 ASSESSMENT — ACTIVITIES OF DAILY LIVING (ADL): HOME_MANAGEMENT_TIME_ENTRY: 24

## 2024-08-21 ASSESSMENT — PAIN SCALES - GENERAL
PAINLEVEL_OUTOF10: 0
PAINLEVEL_OUTOF10: 0

## 2024-08-22 ENCOUNTER — APPOINTMENT (OUTPATIENT)
Dept: CARDIOLOGY | Age: 89
DRG: 698 | End: 2024-08-22
Attending: INTERNAL MEDICINE

## 2024-08-22 LAB
ANION GAP SERPL CALC-SCNC: 3 MMOL/L (ref 7–19)
BACTERIA BLD CULT: ABNORMAL
BASOPHILS # BLD: 0 K/MCL (ref 0–0.3)
BASOPHILS NFR BLD: 0 %
BUN SERPL-MCNC: 27 MG/DL (ref 6–20)
BUN/CREAT SERPL: 42 (ref 7–25)
CALCIUM SERPL-MCNC: 9.4 MG/DL (ref 8.4–10.2)
CHLORIDE SERPL-SCNC: 121 MMOL/L (ref 97–110)
CO2 SERPL-SCNC: 30 MMOL/L (ref 21–32)
CREAT SERPL-MCNC: 0.64 MG/DL (ref 0.67–1.17)
DEPRECATED RDW RBC: 54.5 FL (ref 39–50)
EGFRCR SERPLBLD CKD-EPI 2021: >90 ML/MIN/{1.73_M2}
EOSINOPHIL # BLD: 0.2 K/MCL (ref 0–0.5)
EOSINOPHIL NFR BLD: 2 %
ERYTHROCYTE [DISTWIDTH] IN BLOOD: 14.2 % (ref 11–15)
FASTING DURATION TIME PATIENT: ABNORMAL H
GLUCOSE SERPL-MCNC: 127 MG/DL (ref 70–99)
GRAM STN SPEC: ABNORMAL
HCT VFR BLD CALC: 33 % (ref 39–51)
HGB BLD-MCNC: 10.6 G/DL (ref 13–17)
IMM GRANULOCYTES # BLD AUTO: 0.2 K/MCL (ref 0–0.2)
IMM GRANULOCYTES # BLD: 2 %
LV EF: NORMAL %
LYMPHOCYTES # BLD: 1 K/MCL (ref 1–4)
LYMPHOCYTES NFR BLD: 9 %
MCH RBC QN AUTO: 33.2 PG (ref 26–34)
MCHC RBC AUTO-ENTMCNC: 32.1 G/DL (ref 32–36.5)
MCV RBC AUTO: 103.4 FL (ref 78–100)
MONOCYTES # BLD: 0.9 K/MCL (ref 0.3–0.9)
MONOCYTES NFR BLD: 8 %
NEUTROPHILS # BLD: 8.8 K/MCL (ref 1.8–7.7)
NEUTROPHILS NFR BLD: 79 %
NRBC BLD MANUAL-RTO: 0 /100 WBC
PLATELET # BLD AUTO: 183 K/MCL (ref 140–450)
POTASSIUM SERPL-SCNC: 3.3 MMOL/L (ref 3.4–5.1)
RBC # BLD: 3.19 MIL/MCL (ref 4.5–5.9)
SODIUM SERPL-SCNC: 151 MMOL/L (ref 135–145)
WBC # BLD: 10.9 K/MCL (ref 4.2–11)

## 2024-08-22 PROCEDURE — 97530 THERAPEUTIC ACTIVITIES: CPT

## 2024-08-22 PROCEDURE — 10002807 HB RX 258: Performed by: INTERNAL MEDICINE

## 2024-08-22 PROCEDURE — 10002800 HB RX 250 W HCPCS: Performed by: INTERNAL MEDICINE

## 2024-08-22 PROCEDURE — 10002803 HB RX 637: Performed by: INTERNAL MEDICINE

## 2024-08-22 PROCEDURE — 10004281 HB COUNTER-STAFF TIME PER 15 MIN

## 2024-08-22 PROCEDURE — 13000001 HB PHASE II RECOVERY EA 30 MINUTES

## 2024-08-22 PROCEDURE — 80202 ASSAY OF VANCOMYCIN: CPT | Performed by: INTERNAL MEDICINE

## 2024-08-22 PROCEDURE — 10006031 HB ROOM CHARGE TELEMETRY

## 2024-08-22 PROCEDURE — 93319 3D ECHO IMG CGEN CAR ANOMAL: CPT | Performed by: INTERNAL MEDICINE

## 2024-08-22 PROCEDURE — 93320 DOPPLER ECHO COMPLETE: CPT

## 2024-08-22 PROCEDURE — 93312 ECHO TRANSESOPHAGEAL: CPT | Performed by: INTERNAL MEDICINE

## 2024-08-22 PROCEDURE — 80048 BASIC METABOLIC PNL TOTAL CA: CPT | Performed by: INTERNAL MEDICINE

## 2024-08-22 PROCEDURE — 85025 COMPLETE CBC W/AUTO DIFF WBC: CPT | Performed by: INTERNAL MEDICINE

## 2024-08-22 PROCEDURE — 84132 ASSAY OF SERUM POTASSIUM: CPT | Performed by: INTERNAL MEDICINE

## 2024-08-22 PROCEDURE — 99152 MOD SED SAME PHYS/QHP 5/>YRS: CPT

## 2024-08-22 PROCEDURE — 36415 COLL VENOUS BLD VENIPUNCTURE: CPT | Performed by: INTERNAL MEDICINE

## 2024-08-22 PROCEDURE — 93320 DOPPLER ECHO COMPLETE: CPT | Performed by: INTERNAL MEDICINE

## 2024-08-22 RX ORDER — MIDAZOLAM HYDROCHLORIDE 1 MG/ML
INJECTION, SOLUTION INTRAMUSCULAR; INTRAVENOUS PRN
Status: COMPLETED | OUTPATIENT
Start: 2024-08-22 | End: 2024-08-22

## 2024-08-22 RX ORDER — FLUMAZENIL 0.1 MG/ML
INJECTION, SOLUTION INTRAVENOUS
Status: DISCONTINUED
Start: 2024-08-22 | End: 2024-08-22 | Stop reason: WASHOUT

## 2024-08-22 RX ORDER — MIDAZOLAM HYDROCHLORIDE 5 MG/5ML
INJECTION, SOLUTION INTRAMUSCULAR; INTRAVENOUS
Status: DISPENSED
Start: 2024-08-22 | End: 2024-08-23

## 2024-08-22 RX ORDER — NALOXONE HCL 0.4 MG/ML
VIAL (ML) INJECTION
Status: DISCONTINUED
Start: 2024-08-22 | End: 2024-08-22 | Stop reason: WASHOUT

## 2024-08-22 RX ADMIN — ARIPIPRAZOLE 2 MG: 2 TABLET ORAL at 21:08

## 2024-08-22 RX ADMIN — DONEPEZIL HYDROCHLORIDE 10 MG: 10 TABLET ORAL at 21:08

## 2024-08-22 RX ADMIN — VANCOMYCIN HYDROCHLORIDE 750 MG: 750 INJECTION, POWDER, LYOPHILIZED, FOR SOLUTION INTRAVENOUS at 08:38

## 2024-08-22 RX ADMIN — VENLAFAXINE 37.5 MG: 37.5 TABLET ORAL at 21:09

## 2024-08-22 RX ADMIN — MIDAZOLAM HYDROCHLORIDE 1 MG: 1 INJECTION, SOLUTION INTRAMUSCULAR; INTRAVENOUS at 13:38

## 2024-08-22 RX ADMIN — VENLAFAXINE 37.5 MG: 37.5 TABLET ORAL at 08:38

## 2024-08-22 RX ADMIN — PANTOPRAZOLE SODIUM 40 MG: 40 TABLET, DELAYED RELEASE ORAL at 08:38

## 2024-08-22 RX ADMIN — FENTANYL CITRATE 25 MCG: 50 INJECTION INTRAMUSCULAR; INTRAVENOUS at 13:38

## 2024-08-22 RX ADMIN — MIRTAZAPINE 15 MG: 15 TABLET, FILM COATED ORAL at 21:08

## 2024-08-22 RX ADMIN — AMLODIPINE BESYLATE 5 MG: 5 TABLET ORAL at 08:38

## 2024-08-22 ASSESSMENT — ENCOUNTER SYMPTOMS: PAIN SEVERITY NOW: 0

## 2024-08-22 ASSESSMENT — PAIN SCALES - WONG BAKER
WONGBAKER_NUMERICALRESPONSE: 0

## 2024-08-22 ASSESSMENT — PAIN SCALES - PAIN ASSESSMENT IN ADVANCED DEMENTIA (PAINAD)
BODYLANGUAGE: TENSE, DISTRESSED, FIDGETING
CONSOLABILITY: NO NEED TO CONSOLE
TOTALSCORE: 2
NEGVOCALIZATION: OCCASIONAL MOAN OR GROAN, LOW LEVELS OF SPEECH WITH A NEGATIVE OR DISAPPROVING QUALITY
BREATHING: NORMAL
FACIALEXPRESSION: SMILING OR INEXPRESSIVE
BODYLANGUAGE: TENSE, DISTRESSED, FIDGETING
CONSOLABILITY: NO NEED TO CONSOLE
FACIALEXPRESSION: SMILING OR INEXPRESSIVE
BREATHING: NORMAL
NEGVOCALIZATION: OCCASIONAL MOAN OR GROAN, LOW LEVELS OF SPEECH WITH A NEGATIVE OR DISAPPROVING QUALITY
TOTALSCORE: 2

## 2024-08-22 ASSESSMENT — COGNITIVE AND FUNCTIONAL STATUS - GENERAL
BASIC_MOBILITY_CONVERTED_SCORE: 28.13
BASIC_MOBILITY_RAW_SCORE: 10

## 2024-08-23 ENCOUNTER — APPOINTMENT (OUTPATIENT)
Dept: GENERAL RADIOLOGY | Age: 89
DRG: 698 | End: 2024-08-23
Attending: INTERNAL MEDICINE

## 2024-08-23 LAB
ANION GAP SERPL CALC-SCNC: 5 MMOL/L (ref 7–19)
BASOPHILS # BLD: 0.1 K/MCL (ref 0–0.3)
BASOPHILS NFR BLD: 1 %
BUN SERPL-MCNC: 24 MG/DL (ref 6–20)
BUN/CREAT SERPL: 34 (ref 7–25)
CALCIUM SERPL-MCNC: 9.5 MG/DL (ref 8.4–10.2)
CHLORIDE SERPL-SCNC: 119 MMOL/L (ref 97–110)
CO2 SERPL-SCNC: 30 MMOL/L (ref 21–32)
CREAT SERPL-MCNC: 0.7 MG/DL (ref 0.67–1.17)
DEPRECATED RDW RBC: 55.1 FL (ref 39–50)
EGFRCR SERPLBLD CKD-EPI 2021: 89 ML/MIN/{1.73_M2}
EOSINOPHIL # BLD: 0.2 K/MCL (ref 0–0.5)
EOSINOPHIL NFR BLD: 2 %
ERYTHROCYTE [DISTWIDTH] IN BLOOD: 14.2 % (ref 11–15)
FASTING DURATION TIME PATIENT: ABNORMAL H
GLUCOSE BLDC GLUCOMTR-MCNC: 152 MG/DL (ref 70–99)
GLUCOSE SERPL-MCNC: 112 MG/DL (ref 70–99)
HCT VFR BLD CALC: 36.2 % (ref 39–51)
HGB BLD-MCNC: 11.4 G/DL (ref 13–17)
IMM GRANULOCYTES # BLD AUTO: 0.2 K/MCL (ref 0–0.2)
IMM GRANULOCYTES # BLD: 2 %
LYMPHOCYTES # BLD: 1 K/MCL (ref 1–4)
LYMPHOCYTES NFR BLD: 10 %
MCH RBC QN AUTO: 32.9 PG (ref 26–34)
MCHC RBC AUTO-ENTMCNC: 31.5 G/DL (ref 32–36.5)
MCV RBC AUTO: 104.3 FL (ref 78–100)
MONOCYTES # BLD: 0.9 K/MCL (ref 0.3–0.9)
MONOCYTES NFR BLD: 8 %
NEUTROPHILS # BLD: 7.8 K/MCL (ref 1.8–7.7)
NEUTROPHILS NFR BLD: 77 %
NRBC BLD MANUAL-RTO: 0 /100 WBC
PLATELET # BLD AUTO: 193 K/MCL (ref 140–450)
POTASSIUM SERPL-SCNC: 3.3 MMOL/L (ref 3.4–5.1)
POTASSIUM SERPL-SCNC: 3.4 MMOL/L (ref 3.4–5.1)
POTASSIUM SERPL-SCNC: 3.7 MMOL/L (ref 3.4–5.1)
RBC # BLD: 3.47 MIL/MCL (ref 4.5–5.9)
SODIUM SERPL-SCNC: 151 MMOL/L (ref 135–145)
VANCOMYCIN TROUGH SERPL-MCNC: 12.2 MCG/ML (ref 10–20)
WBC # BLD: 10.1 K/MCL (ref 4.2–11)

## 2024-08-23 PROCEDURE — 85025 COMPLETE CBC W/AUTO DIFF WBC: CPT | Performed by: INTERNAL MEDICINE

## 2024-08-23 PROCEDURE — 10002803 HB RX 637: Performed by: INTERNAL MEDICINE

## 2024-08-23 PROCEDURE — 10002807 HB RX 258: Performed by: INTERNAL MEDICINE

## 2024-08-23 PROCEDURE — 10002800 HB RX 250 W HCPCS: Performed by: INTERNAL MEDICINE

## 2024-08-23 PROCEDURE — 80048 BASIC METABOLIC PNL TOTAL CA: CPT | Performed by: INTERNAL MEDICINE

## 2024-08-23 PROCEDURE — C1751 CATH, INF, PER/CENT/MIDLINE: HCPCS

## 2024-08-23 PROCEDURE — 71045 X-RAY EXAM CHEST 1 VIEW: CPT

## 2024-08-23 PROCEDURE — 36573 INSJ PICC RS&I 5 YR+: CPT

## 2024-08-23 PROCEDURE — 10006023 HB SUPPLY 272

## 2024-08-23 PROCEDURE — 36415 COLL VENOUS BLD VENIPUNCTURE: CPT | Performed by: INTERNAL MEDICINE

## 2024-08-23 PROCEDURE — 10006031 HB ROOM CHARGE TELEMETRY

## 2024-08-23 PROCEDURE — 10004651 HB RX, NO CHARGE ITEM: Performed by: INTERNAL MEDICINE

## 2024-08-23 PROCEDURE — 10004281 HB COUNTER-STAFF TIME PER 15 MIN

## 2024-08-23 PROCEDURE — 84132 ASSAY OF SERUM POTASSIUM: CPT | Performed by: INTERNAL MEDICINE

## 2024-08-23 RX ORDER — 0.9 % SODIUM CHLORIDE 0.9 %
20 VIAL (ML) INJECTION PRN
Status: DISCONTINUED | OUTPATIENT
Start: 2024-08-23 | End: 2024-08-24 | Stop reason: HOSPADM

## 2024-08-23 RX ORDER — 0.9 % SODIUM CHLORIDE 0.9 %
10 VIAL (ML) INJECTION EVERY 12 HOURS SCHEDULED
Status: DISCONTINUED | OUTPATIENT
Start: 2024-08-23 | End: 2024-08-24 | Stop reason: HOSPADM

## 2024-08-23 RX ORDER — POTASSIUM CHLORIDE 1.5 G/1.58G
40 POWDER, FOR SOLUTION ORAL ONCE
Status: COMPLETED | OUTPATIENT
Start: 2024-08-23 | End: 2024-08-23

## 2024-08-23 RX ORDER — 0.9 % SODIUM CHLORIDE 0.9 %
10 VIAL (ML) INJECTION PRN
Status: DISCONTINUED | OUTPATIENT
Start: 2024-08-23 | End: 2024-08-24 | Stop reason: HOSPADM

## 2024-08-23 RX ADMIN — SODIUM CHLORIDE 25 ML: 9 INJECTION, SOLUTION INTRAVENOUS at 21:48

## 2024-08-23 RX ADMIN — DONEPEZIL HYDROCHLORIDE 10 MG: 10 TABLET ORAL at 21:52

## 2024-08-23 RX ADMIN — PANTOPRAZOLE SODIUM 40 MG: 40 TABLET, DELAYED RELEASE ORAL at 09:22

## 2024-08-23 RX ADMIN — POTASSIUM CHLORIDE 40 MEQ: 1.5 POWDER, FOR SOLUTION ORAL at 09:23

## 2024-08-23 RX ADMIN — VENLAFAXINE 37.5 MG: 37.5 TABLET ORAL at 21:52

## 2024-08-23 RX ADMIN — SODIUM CHLORIDE, PRESERVATIVE FREE 10 ML: 5 INJECTION INTRAVENOUS at 21:52

## 2024-08-23 RX ADMIN — AMLODIPINE BESYLATE 5 MG: 5 TABLET ORAL at 09:22

## 2024-08-23 RX ADMIN — VENLAFAXINE 37.5 MG: 37.5 TABLET ORAL at 09:23

## 2024-08-23 RX ADMIN — VANCOMYCIN HYDROCHLORIDE 750 MG: 750 INJECTION, POWDER, LYOPHILIZED, FOR SOLUTION INTRAVENOUS at 01:20

## 2024-08-23 RX ADMIN — VANCOMYCIN HYDROCHLORIDE 750 MG: 750 INJECTION, POWDER, LYOPHILIZED, FOR SOLUTION INTRAVENOUS at 21:50

## 2024-08-23 RX ADMIN — MIRTAZAPINE 15 MG: 15 TABLET, FILM COATED ORAL at 21:50

## 2024-08-23 RX ADMIN — VANCOMYCIN HYDROCHLORIDE 750 MG: 750 INJECTION, POWDER, LYOPHILIZED, FOR SOLUTION INTRAVENOUS at 17:45

## 2024-08-23 ASSESSMENT — PAIN SCALES - GENERAL
PAINLEVEL_OUTOF10: 0
PAINLEVEL_OUTOF10: 0

## 2024-08-23 ASSESSMENT — PAIN SCALES - PAIN ASSESSMENT IN ADVANCED DEMENTIA (PAINAD)
TOTALSCORE: 0
FACIALEXPRESSION: SMILING OR INEXPRESSIVE
BREATHING: NORMAL
BODYLANGUAGE: RELAXED
CONSOLABILITY: NO NEED TO CONSOLE

## 2024-08-24 VITALS
SYSTOLIC BLOOD PRESSURE: 154 MMHG | HEART RATE: 88 BPM | DIASTOLIC BLOOD PRESSURE: 78 MMHG | HEIGHT: 67 IN | BODY MASS INDEX: 22.8 KG/M2 | WEIGHT: 145.28 LBS | RESPIRATION RATE: 18 BRPM | OXYGEN SATURATION: 97 % | TEMPERATURE: 98.2 F

## 2024-08-24 LAB
ANION GAP SERPL CALC-SCNC: 6 MMOL/L (ref 7–19)
BACTERIA BLD CULT: NORMAL
BASOPHILS # BLD: 0 K/MCL (ref 0–0.3)
BASOPHILS NFR BLD: 0 %
BUN SERPL-MCNC: 25 MG/DL (ref 6–20)
BUN/CREAT SERPL: 32 (ref 7–25)
CALCIUM SERPL-MCNC: 9.4 MG/DL (ref 8.4–10.2)
CHLORIDE SERPL-SCNC: 120 MMOL/L (ref 97–110)
CO2 SERPL-SCNC: 29 MMOL/L (ref 21–32)
CREAT SERPL-MCNC: 0.78 MG/DL (ref 0.67–1.17)
DEPRECATED RDW RBC: 52.8 FL (ref 39–50)
EGFRCR SERPLBLD CKD-EPI 2021: 86 ML/MIN/{1.73_M2}
EOSINOPHIL # BLD: 0.4 K/MCL (ref 0–0.5)
EOSINOPHIL NFR BLD: 4 %
ERYTHROCYTE [DISTWIDTH] IN BLOOD: 14 % (ref 11–15)
FASTING DURATION TIME PATIENT: ABNORMAL H
GLUCOSE SERPL-MCNC: 117 MG/DL (ref 70–99)
HCT VFR BLD CALC: 33.7 % (ref 39–51)
HGB BLD-MCNC: 10.9 G/DL (ref 13–17)
IMM GRANULOCYTES # BLD AUTO: 0.2 K/MCL (ref 0–0.2)
IMM GRANULOCYTES # BLD: 2 %
LYMPHOCYTES # BLD: 1.2 K/MCL (ref 1–4)
LYMPHOCYTES NFR BLD: 11 %
MCH RBC QN AUTO: 33.1 PG (ref 26–34)
MCHC RBC AUTO-ENTMCNC: 32.3 G/DL (ref 32–36.5)
MCV RBC AUTO: 102.4 FL (ref 78–100)
MONOCYTES # BLD: 1 K/MCL (ref 0.3–0.9)
MONOCYTES NFR BLD: 9 %
NEUTROPHILS # BLD: 8.1 K/MCL (ref 1.8–7.7)
NEUTROPHILS NFR BLD: 74 %
NRBC BLD MANUAL-RTO: 0 /100 WBC
PLATELET # BLD AUTO: 206 K/MCL (ref 140–450)
POTASSIUM SERPL-SCNC: 3.1 MMOL/L (ref 3.4–5.1)
RBC # BLD: 3.29 MIL/MCL (ref 4.5–5.9)
SODIUM SERPL-SCNC: 152 MMOL/L (ref 135–145)
WBC # BLD: 11 K/MCL (ref 4.2–11)

## 2024-08-24 PROCEDURE — 10002800 HB RX 250 W HCPCS: Performed by: INTERNAL MEDICINE

## 2024-08-24 PROCEDURE — 10006023 HB SUPPLY 272

## 2024-08-24 PROCEDURE — 80048 BASIC METABOLIC PNL TOTAL CA: CPT | Performed by: INTERNAL MEDICINE

## 2024-08-24 PROCEDURE — C1751 CATH, INF, PER/CENT/MIDLINE: HCPCS

## 2024-08-24 PROCEDURE — 10004651 HB RX, NO CHARGE ITEM: Performed by: INTERNAL MEDICINE

## 2024-08-24 PROCEDURE — 10002807 HB RX 258: Performed by: INTERNAL MEDICINE

## 2024-08-24 PROCEDURE — 36573 INSJ PICC RS&I 5 YR+: CPT

## 2024-08-24 PROCEDURE — 10004281 HB COUNTER-STAFF TIME PER 15 MIN

## 2024-08-24 PROCEDURE — 85025 COMPLETE CBC W/AUTO DIFF WBC: CPT | Performed by: INTERNAL MEDICINE

## 2024-08-24 PROCEDURE — 36415 COLL VENOUS BLD VENIPUNCTURE: CPT | Performed by: INTERNAL MEDICINE

## 2024-08-24 PROCEDURE — 10002803 HB RX 637: Performed by: INTERNAL MEDICINE

## 2024-08-24 RX ORDER — 0.9 % SODIUM CHLORIDE 0.9 %
10 VIAL (ML) INJECTION PRN
Status: DISCONTINUED | OUTPATIENT
Start: 2024-08-24 | End: 2024-08-24 | Stop reason: HOSPADM

## 2024-08-24 RX ORDER — 0.9 % SODIUM CHLORIDE 0.9 %
20 VIAL (ML) INJECTION PRN
Status: DISCONTINUED | OUTPATIENT
Start: 2024-08-24 | End: 2024-08-24 | Stop reason: HOSPADM

## 2024-08-24 RX ORDER — POTASSIUM CHLORIDE 1500 MG/1
40 TABLET, EXTENDED RELEASE ORAL ONCE
Status: COMPLETED | OUTPATIENT
Start: 2024-08-24 | End: 2024-08-24

## 2024-08-24 RX ORDER — 0.9 % SODIUM CHLORIDE 0.9 %
10 VIAL (ML) INJECTION EVERY 12 HOURS SCHEDULED
Status: DISCONTINUED | OUTPATIENT
Start: 2024-08-24 | End: 2024-08-24 | Stop reason: HOSPADM

## 2024-08-24 RX ADMIN — SODIUM CHLORIDE, PRESERVATIVE FREE 10 ML: 5 INJECTION INTRAVENOUS at 10:34

## 2024-08-24 RX ADMIN — VENLAFAXINE 37.5 MG: 37.5 TABLET ORAL at 10:34

## 2024-08-24 RX ADMIN — VANCOMYCIN HYDROCHLORIDE 750 MG: 750 INJECTION, POWDER, LYOPHILIZED, FOR SOLUTION INTRAVENOUS at 10:33

## 2024-08-24 RX ADMIN — POTASSIUM CHLORIDE 40 MEQ: 1500 TABLET, EXTENDED RELEASE ORAL at 10:34

## 2024-08-24 RX ADMIN — PANTOPRAZOLE SODIUM 40 MG: 40 TABLET, DELAYED RELEASE ORAL at 10:34

## 2024-08-24 RX ADMIN — HYDRALAZINE HYDROCHLORIDE 10 MG: 20 INJECTION, SOLUTION INTRAMUSCULAR; INTRAVENOUS at 12:39

## 2024-08-24 RX ADMIN — AMLODIPINE BESYLATE 5 MG: 5 TABLET ORAL at 10:34

## 2024-08-24 ASSESSMENT — PAIN SCALES - GENERAL: PAINLEVEL_OUTOF10: 0

## 2024-08-26 LAB
BACTERIA BLD CULT: NORMAL
BACTERIA BLD CULT: NORMAL

## 2024-10-24 PROBLEM — F03.90 DEMENTIA WITHOUT BEHAVIORAL DISTURBANCE, PSYCHOTIC DISTURBANCE, MOOD DISTURBANCE, OR ANXIETY, UNSPECIFIED DEMENTIA SEVERITY, UNSPECIFIED DEMENTIA TYPE (HCC): Status: ACTIVE | Noted: 2024-10-24

## 2024-10-24 PROBLEM — F03.90 DEMENTIA (HCC): Status: ACTIVE | Noted: 2024-10-24

## 2024-11-05 NOTE — CONSULTS
Health Maintenance       DTaP/Tdap/Td Vaccine (6 - Td or Tdap)  Overdue since 9/15/2015    Influenza Vaccine (1)  Overdue since 9/1/2024    COVID-19 Vaccine (1 - 2024-25 season)  Never done           Following review of the above:  Patient is not proceeding with: COVID-19, Dtap/Tdap/Td, and Influenza    Note: Refer to final orders and clinician documentation.        Recent PHQ 2/9 Score    PHQ 2:  PHQ 2 Score Adult PHQ 2 Score Adult PHQ 2 Interpretation Little interest or pleasure in activity?   11/5/2024   2:16 PM 1 No further screening needed 0       PHQ 9:  PHQ 9 Score Adult PHQ 9 Score Adult PHQ 9 Interpretation   4/15/2024  10:03 AM 12 Moderate Depression     PHQ-2/9 Depression Screening  Little interest or pleasure in activity?: Not at all  Feeling down, depressed or hopeless?: Several days  Initial depression screening score:: 1  PHQ2 Interpretation: No further screening needed      [facility]  Report of Urology Consultation    Fernando Ramos Jr. Patient Status:  Inpatient    1935 MRN [id number]   Location [facility] 5SW/SE Attending Jose Roberto Kothari MD   Hosp Day # 1 PCP [facility]     Reason for Consultation:  Bilateral renal calculi    History of Present Illness:  Fernando Ramos Jr. is a a(n) 88 year old male. He has a history of a large right renal calculus, approximately 1.5 cm and a partial staghorn calculus on the left with a renal pelvic stone approximately 2 x 1.6 cm. He has ra history of recurrent UTI and had a recent culture positive for Proteus. He was being treated with cephalexin and was brought in for lethargy. He has a history of Parkinson's and has some degree of dementia. The patient did not have fever, dysuria, or hematuria. He did not have flank pain.     History:  Past Medical History:   Diagnosis Date    Anxiety state     BPH (benign prostatic hyperplasia)     Dementia ([facility])     Depression     Dysphagia     Febrile illness 2021    High blood pressure     Kidney stones     Other and unspecified hyperlipidemia     Pneumonia due to organism     Seizure disorder (HCC)     because of venalfaxine    Skin cancer     melanoma    Unspecified essential hypertension      Past Surgical History:   Procedure Laterality Date    EGD  2022    ESOPHAGOGASTRODUODENOSCOPY with Botox injection    EGD  2021    Large food impaction in the mid and distal esophagus    HEMIARTHROPLASTY HIP Right 2022    Right Cemented Hemiarthroplasty    LITHOTRIPSY      TRANSURETHRAL DESTRUCTION, PROSTATE TISSUE; WATER-INDUC       Family History   Problem Relation Age of Onset    Alcohol and Other Disorders Associated Father     Heart Disorder Father     Depression Father     Heart Disorder Mother       reports that he quit smoking about 63 years ago. His smoking use included cigarettes. He has never used smokeless tobacco. He reports that he does not  currently use alcohol. He reports that he does not use drugs.    Allergies:  No Known Allergies    Medications:    Current Facility-Administered Medications:     ARIPiprazole (Abilify) tab 2 mg, 2 mg, Oral, QOD    carbidopa-levodopa (SINEMET)  MG per tab 0.5 tablet, 0.5 tablet, Oral, TID    carbidopa-levodopa (SINEMET)  MG per tab 1 tablet, 1 tablet, Oral, TID    mirtazapine (Remeron) tab 15 mg, 15 mg, Oral, Nightly    aspirin DR tab 81 mg, 81 mg, Oral, Daily    donepezil (Aricept) tab 10 mg, 10 mg, Oral, Nightly    atorvastatin (Lipitor) tab 20 mg, 20 mg, Oral, Nightly    venlafaxine (Effexor) tab 37.5 mg, 37.5 mg, Oral, BID    sodium chloride 0.9% infusion, , Intravenous, Continuous    heparin (Porcine) 5000 UNIT/ML injection 5,000 Units, 5,000 Units, Subcutaneous, 2 times per day    acetaminophen (Tylenol Extra Strength) tab 500 mg, 500 mg, Oral, Q4H PRN    ondansetron (Zofran) 4 MG/2ML injection 4 mg, 4 mg, Intravenous, Q6H PRN    cefTRIAXone (Rocephin) 1 g in D5W 100 mL IVPB-ADD, 1 g, Intravenous, Q24H    Review of Systems:  Pertinent items are noted in HPI.    Physical Exam:  Awake, cooperative, in no distress.  HEENT: Normocephalic.  Chest: Respirations non labored.  Abdomen: Soft, non tender.  : No flank tenderness.    Laboratory Data:  Lab Results   Component Value Date    WBC 4.7 03/12/2024    HGB 11.0 03/12/2024    HCT 32.5 03/12/2024    .0 03/12/2024    CREATSERUM 0.60 03/12/2024    BUN 12 03/12/2024     03/12/2024    K 4.1 03/12/2024     03/12/2024    CO2 30.0 03/12/2024    GLU 80 03/12/2024    CA 9.6 03/12/2024    ALB 3.7 03/11/2024    ALKPHO 101 03/11/2024    BILT 0.3 03/11/2024    TP 6.9 03/11/2024    AST 22 03/11/2024    ALT 35 03/11/2024    PGLU 114 03/11/2024       Imaging:       Impression:  Patient Active Problem List   Diagnosis    Major depressive disorder, recurrent episode, in full remission (HCC)    Community acquired pneumonia of right lower lobe of lung     Weakness of both lower extremities    Cystitis    Weakness generalized    Fever    Hyperglycemia    HTN (hypertension)    Febrile illness    Pressure injury of right heel, stage 4 ([facility])    Food impaction of esophagus    Esophageal dysphagia    Esophageal obstruction due to food impaction    Displaced fracture of right femoral neck ([facility])    Closed fracture of neck of right femur, initial encounter ([facility])    Acute febrile illness    Sepsis due to urinary tract infection ([facility])    Weakness    SIRS (systemic inflammatory response syndrome) ([facility])    Staghorn calculus    Acute cystitis without hematuria    Urinary tract infection without hematuria, site unspecified    Parkinson's disease without dyskinesia or fluctuating manifestations    Delirium due to another medical condition    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 It is not clear his lethargy on admission was from a UTI as he had no voiding symptoms, flank pain, fever, dysuria, or hematuria. He may complete a course of antibiotics and should then be put on methenamine or antibiotic prophylaxis. We will check a renal US and KUB to assess his current stone burden and make sure he does not have significant hydronephrosis.     Thank you for allowing me to participate in the care of your patient.    Deshawn Fenton MD  3/12/2024  9:22 AM

## 2024-11-17 ENCOUNTER — HOSPITAL ENCOUNTER (INPATIENT)
Facility: HOSPITAL | Age: 89
LOS: 5 days | Discharge: NURSING, REHAB OR LONG-TERM CARE | End: 2024-11-22
Attending: EMERGENCY MEDICINE | Admitting: INTERNAL MEDICINE
Payer: MEDICARE

## 2024-11-17 ENCOUNTER — APPOINTMENT (OUTPATIENT)
Dept: GENERAL RADIOLOGY | Facility: HOSPITAL | Age: 89
End: 2024-11-17
Attending: EMERGENCY MEDICINE
Payer: MEDICARE

## 2024-11-17 ENCOUNTER — HOSPITAL ENCOUNTER (INPATIENT)
Facility: HOSPITAL | Age: 89
LOS: 5 days | Discharge: NURSING, REHAB OR LONG-TERM CARE | DRG: 698 | End: 2024-11-22
Attending: EMERGENCY MEDICINE | Admitting: INTERNAL MEDICINE
Payer: MEDICARE

## 2024-11-17 ENCOUNTER — APPOINTMENT (OUTPATIENT)
Dept: GENERAL RADIOLOGY | Facility: HOSPITAL | Age: 89
DRG: 698 | End: 2024-11-17
Attending: EMERGENCY MEDICINE
Payer: MEDICARE

## 2024-11-17 DIAGNOSIS — N30.01 ACUTE CYSTITIS WITH HEMATURIA: ICD-10-CM

## 2024-11-17 DIAGNOSIS — A41.9 SEPSIS WITHOUT ACUTE ORGAN DYSFUNCTION, DUE TO UNSPECIFIED ORGANISM (HCC): Primary | ICD-10-CM

## 2024-11-17 LAB
ADENOVIRUS PCR:: NOT DETECTED
ALBUMIN SERPL-MCNC: 4.4 G/DL (ref 3.2–4.8)
ALBUMIN/GLOB SERPL: 1.1 {RATIO} (ref 1–2)
ALP LIVER SERPL-CCNC: 101 U/L
ALT SERPL-CCNC: 21 U/L
ANION GAP SERPL CALC-SCNC: 7 MMOL/L (ref 0–18)
APTT PPP: 30.1 SECONDS (ref 23–36)
AST SERPL-CCNC: 26 U/L (ref ?–34)
ATRIAL RATE: 92 BPM
B PARAPERT DNA SPEC QL NAA+PROBE: NOT DETECTED
B PERT DNA SPEC QL NAA+PROBE: NOT DETECTED
BASOPHILS # BLD AUTO: 0.06 X10(3) UL (ref 0–0.2)
BASOPHILS NFR BLD AUTO: 0.3 %
BILIRUB SERPL-MCNC: 0.5 MG/DL (ref 0.2–1.1)
BILIRUB UR QL: NEGATIVE
BUN BLD-MCNC: 20 MG/DL (ref 9–23)
BUN/CREAT SERPL: 19.2 (ref 10–20)
C PNEUM DNA SPEC QL NAA+PROBE: NOT DETECTED
CALCIUM BLD-MCNC: 10.1 MG/DL (ref 8.7–10.4)
CHLORIDE SERPL-SCNC: 108 MMOL/L (ref 98–112)
CO2 SERPL-SCNC: 27 MMOL/L (ref 21–32)
COLOR UR: YELLOW
CORONAVIRUS 229E PCR:: NOT DETECTED
CORONAVIRUS HKU1 PCR:: NOT DETECTED
CORONAVIRUS NL63 PCR:: NOT DETECTED
CORONAVIRUS OC43 PCR:: NOT DETECTED
CREAT BLD-MCNC: 1.04 MG/DL
DEPRECATED RDW RBC AUTO: 50.4 FL (ref 35.1–46.3)
EGFRCR SERPLBLD CKD-EPI 2021: 69 ML/MIN/1.73M2 (ref 60–?)
EOSINOPHIL # BLD AUTO: 0.04 X10(3) UL (ref 0–0.7)
EOSINOPHIL NFR BLD AUTO: 0.2 %
ERYTHROCYTE [DISTWIDTH] IN BLOOD BY AUTOMATED COUNT: 13.5 % (ref 11–15)
FLUAV + FLUBV RNA SPEC NAA+PROBE: NEGATIVE
FLUAV + FLUBV RNA SPEC NAA+PROBE: NEGATIVE
FLUAV RNA SPEC QL NAA+PROBE: NOT DETECTED
FLUBV RNA SPEC QL NAA+PROBE: NOT DETECTED
GLOBULIN PLAS-MCNC: 4 G/DL (ref 2–3.5)
GLUCOSE BLD-MCNC: 156 MG/DL (ref 70–99)
GLUCOSE UR-MCNC: NORMAL MG/DL
HCT VFR BLD AUTO: 37.8 %
HGB BLD-MCNC: 12.9 G/DL
IMM GRANULOCYTES # BLD AUTO: 0.14 X10(3) UL (ref 0–1)
IMM GRANULOCYTES NFR BLD: 0.6 %
INR BLD: 1.31 (ref 0.8–1.2)
KETONES UR-MCNC: NEGATIVE MG/DL
LACTATE SERPL-SCNC: 1.7 MMOL/L (ref 0.5–2)
LACTATE SERPL-SCNC: 2.1 MMOL/L (ref 0.5–2)
LACTATE SERPL-SCNC: 4.5 MMOL/L (ref 0.5–2)
LEUKOCYTE ESTERASE UR QL STRIP.AUTO: 500
LYMPHOCYTES # BLD AUTO: 0.87 X10(3) UL (ref 1–4)
LYMPHOCYTES NFR BLD AUTO: 4 %
MCH RBC QN AUTO: 34.8 PG (ref 26–34)
MCHC RBC AUTO-ENTMCNC: 34.1 G/DL (ref 31–37)
MCV RBC AUTO: 101.9 FL
METAPNEUMOVIRUS PCR:: NOT DETECTED
MONOCYTES # BLD AUTO: 1.06 X10(3) UL (ref 0.1–1)
MONOCYTES NFR BLD AUTO: 4.9 %
MRSA DNA SPEC QL NAA+PROBE: POSITIVE
MYCOPLASMA PNEUMONIA PCR:: NOT DETECTED
NEUTROPHILS # BLD AUTO: 19.53 X10 (3) UL (ref 1.5–7.7)
NEUTROPHILS # BLD AUTO: 19.53 X10(3) UL (ref 1.5–7.7)
NEUTROPHILS NFR BLD AUTO: 90 %
NITRITE UR QL STRIP.AUTO: NEGATIVE
OSMOLALITY SERPL CALC.SUM OF ELEC: 300 MOSM/KG (ref 275–295)
P AXIS: 39 DEGREES
P-R INTERVAL: 190 MS
PARAINFLUENZA 1 PCR:: NOT DETECTED
PARAINFLUENZA 2 PCR:: NOT DETECTED
PARAINFLUENZA 3 PCR:: NOT DETECTED
PARAINFLUENZA 4 PCR:: NOT DETECTED
PH UR: 6 [PH] (ref 5–8)
PLATELET # BLD AUTO: 193 10(3)UL (ref 150–450)
POTASSIUM SERPL-SCNC: 4.4 MMOL/L (ref 3.5–5.1)
PROCALCITONIN SERPL-MCNC: 0.42 NG/ML (ref ?–0.05)
PROT SERPL-MCNC: 8.4 G/DL (ref 5.7–8.2)
PROT UR-MCNC: 100 MG/DL
PROTHROMBIN TIME: 17 SECONDS (ref 11.6–14.8)
Q-T INTERVAL: 328 MS
QRS DURATION: 86 MS
QTC CALCULATION (BEZET): 405 MS
R AXIS: -2 DEGREES
RBC # BLD AUTO: 3.71 X10(6)UL
RBC #/AREA URNS AUTO: >10 /HPF
RHINOVIRUS/ENTERO PCR:: NOT DETECTED
RSV RNA SPEC NAA+PROBE: NEGATIVE
RSV RNA SPEC QL NAA+PROBE: NOT DETECTED
SARS-COV-2 RNA NPH QL NAA+NON-PROBE: NOT DETECTED
SARS-COV-2 RNA RESP QL NAA+PROBE: NOT DETECTED
SODIUM SERPL-SCNC: 142 MMOL/L (ref 136–145)
SP GR UR STRIP: 1.01 (ref 1–1.03)
T AXIS: 38 DEGREES
UROBILINOGEN UR STRIP-ACNC: NORMAL
VENTRICULAR RATE: 92 BPM
WBC # BLD AUTO: 21.7 X10(3) UL (ref 4–11)
WBC #/AREA URNS AUTO: >50 /HPF
WBC CLUMPS UR QL AUTO: PRESENT /HPF

## 2024-11-17 PROCEDURE — 85730 THROMBOPLASTIN TIME PARTIAL: CPT | Performed by: EMERGENCY MEDICINE

## 2024-11-17 PROCEDURE — 87186 SC STD MICRODIL/AGAR DIL: CPT | Performed by: EMERGENCY MEDICINE

## 2024-11-17 PROCEDURE — 84145 PROCALCITONIN (PCT): CPT | Performed by: EMERGENCY MEDICINE

## 2024-11-17 PROCEDURE — 80053 COMPREHEN METABOLIC PANEL: CPT | Performed by: EMERGENCY MEDICINE

## 2024-11-17 PROCEDURE — 85025 COMPLETE CBC W/AUTO DIFF WBC: CPT | Performed by: EMERGENCY MEDICINE

## 2024-11-17 PROCEDURE — 93005 ELECTROCARDIOGRAM TRACING: CPT

## 2024-11-17 PROCEDURE — 81001 URINALYSIS AUTO W/SCOPE: CPT | Performed by: EMERGENCY MEDICINE

## 2024-11-17 PROCEDURE — 96367 TX/PROPH/DG ADDL SEQ IV INF: CPT

## 2024-11-17 PROCEDURE — 96365 THER/PROPH/DIAG IV INF INIT: CPT

## 2024-11-17 PROCEDURE — 99285 EMERGENCY DEPT VISIT HI MDM: CPT

## 2024-11-17 PROCEDURE — 99291 CRITICAL CARE FIRST HOUR: CPT

## 2024-11-17 PROCEDURE — 87641 MR-STAPH DNA AMP PROBE: CPT | Performed by: EMERGENCY MEDICINE

## 2024-11-17 PROCEDURE — 0202U NFCT DS 22 TRGT SARS-COV-2: CPT | Performed by: INTERNAL MEDICINE

## 2024-11-17 PROCEDURE — 93010 ELECTROCARDIOGRAM REPORT: CPT

## 2024-11-17 PROCEDURE — 96361 HYDRATE IV INFUSION ADD-ON: CPT

## 2024-11-17 PROCEDURE — 85610 PROTHROMBIN TIME: CPT | Performed by: EMERGENCY MEDICINE

## 2024-11-17 PROCEDURE — 83605 ASSAY OF LACTIC ACID: CPT | Performed by: EMERGENCY MEDICINE

## 2024-11-17 PROCEDURE — 87077 CULTURE AEROBIC IDENTIFY: CPT | Performed by: EMERGENCY MEDICINE

## 2024-11-17 PROCEDURE — 87040 BLOOD CULTURE FOR BACTERIA: CPT | Performed by: EMERGENCY MEDICINE

## 2024-11-17 PROCEDURE — 0241U SARS-COV-2/FLU A AND B/RSV BY PCR (GENEXPERT): CPT | Performed by: EMERGENCY MEDICINE

## 2024-11-17 PROCEDURE — 92610 EVALUATE SWALLOWING FUNCTION: CPT

## 2024-11-17 PROCEDURE — 87086 URINE CULTURE/COLONY COUNT: CPT | Performed by: EMERGENCY MEDICINE

## 2024-11-17 PROCEDURE — 71045 X-RAY EXAM CHEST 1 VIEW: CPT | Performed by: EMERGENCY MEDICINE

## 2024-11-17 RX ORDER — ACETAMINOPHEN 650 MG/1
650 SUPPOSITORY RECTAL ONCE
Status: COMPLETED | OUTPATIENT
Start: 2024-11-17 | End: 2024-11-17

## 2024-11-17 RX ORDER — HEPARIN SODIUM 5000 [USP'U]/ML
5000 INJECTION, SOLUTION INTRAVENOUS; SUBCUTANEOUS EVERY 8 HOURS SCHEDULED
Status: DISCONTINUED | OUTPATIENT
Start: 2024-11-17 | End: 2024-11-22

## 2024-11-17 RX ORDER — AMLODIPINE BESYLATE 5 MG/1
5 TABLET ORAL DAILY
Status: DISCONTINUED | OUTPATIENT
Start: 2024-11-17 | End: 2024-11-22

## 2024-11-17 RX ORDER — ASPIRIN 81 MG/1
81 TABLET ORAL DAILY
Status: DISCONTINUED | OUTPATIENT
Start: 2024-11-17 | End: 2024-11-22

## 2024-11-17 RX ORDER — DONEPEZIL HYDROCHLORIDE 10 MG/1
10 TABLET, FILM COATED ORAL NIGHTLY
Status: DISCONTINUED | OUTPATIENT
Start: 2024-11-17 | End: 2024-11-22

## 2024-11-17 RX ORDER — CARBIDOPA AND LEVODOPA 25; 100 MG/1; MG/1
0.5 TABLET ORAL 3 TIMES DAILY
Status: DISCONTINUED | OUTPATIENT
Start: 2024-11-17 | End: 2024-11-22

## 2024-11-17 RX ORDER — HYDROXYZINE HYDROCHLORIDE 25 MG/1
25 TABLET, FILM COATED ORAL 3 TIMES DAILY PRN
Status: DISCONTINUED | OUTPATIENT
Start: 2024-11-17 | End: 2024-11-22

## 2024-11-17 RX ORDER — ACETAMINOPHEN 325 MG/1
650 TABLET ORAL EVERY 6 HOURS PRN
Status: DISCONTINUED | OUTPATIENT
Start: 2024-11-17 | End: 2024-11-22

## 2024-11-17 RX ORDER — POLYETHYLENE GLYCOL 3350 17 G/17G
17 POWDER, FOR SOLUTION ORAL EVERY OTHER DAY
Status: DISCONTINUED | OUTPATIENT
Start: 2024-11-17 | End: 2024-11-22

## 2024-11-17 RX ORDER — ARIPIPRAZOLE 2 MG/1
2 TABLET ORAL DAILY
Status: DISCONTINUED | OUTPATIENT
Start: 2024-11-17 | End: 2024-11-22

## 2024-11-17 RX ORDER — SODIUM CHLORIDE 9 MG/ML
INJECTION, SOLUTION INTRAVENOUS CONTINUOUS
Status: DISCONTINUED | OUTPATIENT
Start: 2024-11-17 | End: 2024-11-22

## 2024-11-17 RX ORDER — VENLAFAXINE 37.5 MG/1
37.5 TABLET ORAL 2 TIMES DAILY
Status: DISCONTINUED | OUTPATIENT
Start: 2024-11-17 | End: 2024-11-22

## 2024-11-17 RX ORDER — MIRTAZAPINE 15 MG/1
15 TABLET, FILM COATED ORAL NIGHTLY
Status: DISCONTINUED | OUTPATIENT
Start: 2024-11-17 | End: 2024-11-22

## 2024-11-17 RX ORDER — ACETAMINOPHEN 500 MG
1000 TABLET ORAL EVERY 8 HOURS PRN
Status: DISCONTINUED | OUTPATIENT
Start: 2024-11-17 | End: 2024-11-22

## 2024-11-17 RX ORDER — ATORVASTATIN CALCIUM 10 MG/1
10 TABLET, FILM COATED ORAL NIGHTLY
Status: DISCONTINUED | OUTPATIENT
Start: 2024-11-17 | End: 2024-11-22

## 2024-11-17 NOTE — ED QUICK NOTES
Orders for admission, patient is aware of plan and ready to go upstairs. Any questions, please call ED RN kenn at extension [phone].     Patient Covid vaccination status: Fully vaccinated     COVID Test Ordered in ED: SARS-CoV-2/Flu A and B/RSV by PCR (GeneXpert)    COVID Suspicion at Admission: N/A    Running Infusions:      Mental Status/LOC at time of transport: aox1    Other pertinent information: 3-hr post positive lactic due 0720  CIWA score: N/A   NIH score:  N/A

## 2024-11-17 NOTE — H&P
[facility]  part of [facility]    History & Physical    Fernando Ramos Jr. Patient Status:  Inpatient    1935 MRN [id number]   Location [facility] 5SW/SE Attending Catrina Kohler MD   Hosp Day # 0 PCP ALINE HERRERA MD     Date:  2024  Date of Admission:  2024    History of Present Illness:  Fernando Ramos Jr. is a(n) 89 year old male with past medical history of Parkinson's disease, dementia, hypertension, hyperlipidemia, BPH presenting to ER from home due to altered mental status, fever.  Per wife patient has been lethargic recently with poor oral intake.  He was just discharged from rehab yesterday.  Patient was noted to be more confused than his baseline.  No reported nausea, vomiting, abdominal pain, diarrhea.      History:  Past Medical History:    Achalasia    Recurrent esophageal food impactions    Anxiety state    Aortic atherosclerosis (HCC)    CXR 11-    BPH (benign prostatic hyperplasia)    Dementia (HCC)    Depression with anxiety    Dyslipidemia    High blood pressure    High cholesterol    History of melanoma    melanoma    History of pneumonia    History of recurrent UTIs    Kidney stones    Bilateral staghorn calculi    Macrocytic anemia    Mitral regurgitation    Echo 3 with EF 60-65%, mild-moderate MR, marked LAE    Osteoarthritis    Parkinson's disease (HCC)    Primary hypertension    Seizure disorder (HCC)    because of venalfaxine     Past Surgical History:   Procedure Laterality Date    Egd  2022    ESOPHAGOGASTRODUODENOSCOPY with Botox injection    Egd  2021    Large food impaction in the mid and distal esophagus    Hemiarthroplasty hip Right 2022    Right Cemented Hemiarthroplasty    Lithotripsy      Total knee replacement      Transurethral destruction, prostate tissue; water-induc       Family History   Problem Relation Age of Onset    Alcohol and Other Disorders Associated Father     Heart Disorder Father      Depression Father     Heart Disorder Mother       reports that he quit smoking about 64 years ago. His smoking use included cigarettes. He started smoking about 67 years ago. He has never used smokeless tobacco. He reports that he does not currently use alcohol. He reports that he does not use drugs.    Allergies:  Allergies[1]    Home Medications:  Prescriptions Prior to Admission[2]    Review of Systems:  Constitutional: negative for fatigue and fevers  Eyes: negative  Ears, nose, mouth, throat, and face: negative  Respiratory: negative for cough, dyspnea on exertion, hemoptysis, sputum and wheezing  Cardiovascular: negative for chest pain, dyspnea, orthopnea, palpitations and syncope  Gastrointestinal: negative for abdominal pain, change in bowel habits, constipation, diarrhea and vomiting.  Musculoskeletal: negative for back pain, neck pain and stiff joints  Neurological: negative for dizziness, headaches, tremors and weakness  Endocrine: negative  Allergic/Immunologic: negative    Physical Exam:  /84 (BP Location: Right arm)   Pulse 94   Temp 98 °F (36.7 °C) (Axillary)   Resp 20   Wt 145 lb (65.8 kg)   SpO2 96%   BMI 22.05 kg/m²     General Appearance:  Alert, Awake, ill-appearing, dry  HEENT: PERRLA, dry mucosa  Neck: Supple, no carotid bruit or JVD  Lungs: Clear to auscultation bilaterally, No wheezes, crackles  Heart: Regular rate and rhythm, S1 and S2 normal, no murmur, rub or gallop  Abdomen: Soft, non-tender, non distended, Bs+, no organomegaly   Extremities: No cyanosis, edema   Pulses: 2+ and symmetric all extremities  Neurologic: Intact motor and sensory.  Confused, not following command, rigidity  Back: Symmetric, Normal ROM, no CVA tenderness    Laboratory Data:   Lab Results   Component Value Date    WBC 21.7 11/17/2024    HGB 12.9 11/17/2024    HCT 37.8 11/17/2024    .0 11/17/2024    CREATSERUM 1.04 11/17/2024    BUN 20 11/17/2024     11/17/2024    K 4.4 11/17/2024    CL  108 11/17/2024    CO2 27.0 11/17/2024     11/17/2024    CA 10.1 11/17/2024    ALB 4.4 11/17/2024    ALKPHO 101 11/17/2024    BILT 0.5 11/17/2024    TP 8.4 11/17/2024    AST 26 11/17/2024    ALT 21 11/17/2024    PTT 30.1 11/17/2024    INR 1.31 11/17/2024       Imaging:  Reviewed     Assessment:  Patient Active Problem List   Diagnosis    Major depressive disorder, recurrent episode, in full remission (formerly [facility])    Community acquired pneumonia of right lower lobe of lung    Weakness of both lower extremities    Cystitis    Weakness generalized    Fever    Hyperglycemia    HTN (hypertension)    Febrile illness    Pressure injury of right heel, stage 4 (formerly [facility])    Food impaction of esophagus    Esophageal dysphagia    Esophageal obstruction due to food impaction    Displaced fracture of right femoral neck (formerly [facility])    Closed fracture of neck of right femur, initial encounter (formerly [facility])    Acute febrile illness    Sepsis due to urinary tract infection (formerly [facility])    Weakness    SIRS (systemic inflammatory response syndrome) (formerly [facility])    Staghorn calculus    Acute cystitis without hematuria    Urinary tract infection without hematuria, site unspecified    Parkinson's disease without dyskinesia or fluctuating manifestations (formerly [facility])    Delirium due to another medical condition    Neuroleptic induced parkinsonism (CODE) (formerly [facility])    Moderate dementia with mood disturbance (formerly [facility])    Acute urinary tract infection    Somnolence    Recurrent UTI    Staghorn kidney stones    UTI (urinary tract infection)    Primary hypertension    Depression with anxiety    Achalasia    Kidney stones    History of recurrent UTIs    Dyslipidemia    Parkinson's disease (HCC)    Aortic atherosclerosis (HCC)    Mitral regurgitation    History of melanoma    History of pneumonia    Macrocytic anemia    Dementia (HCC)    Dementia without behavioral disturbance, psychotic disturbance, mood disturbance, or anxiety, unspecified dementia severity, unspecified dementia type (formerly [facility])     Sepsis without acute organ dysfunction, due to unspecified organism (HCC)    Acute cystitis with hematuria       1.  Severe sepsis  2.  UTI  3.  Fever, chills, leukocytosis from above  4.  History of Parkinson's disease  5.  History of hypertension  6.  Hyperlipidemia  7.  BPH  8.   dementia  9.  Lactic acidosis  - Leukocytosis      Plan:  Patient is admitted to medical floor  Received IV ceftriaxone in ER along with vancomycin  Will continue IV ceftriaxone  Follow blood culture, urine culture  IV hydration  Trend lactic acid  ID consult  Speech and swallow eval  PT/OT  Resume other home medication  CODE STATUS: Full code  DVT/GI prophylaxis  Medication reconciliation done  Discussed with nursing        **Certification      PHYSICIAN Certification of Need for Inpatient Hospitalization -     Patient will require inpatient services that will reasonably be expected to span two midnight's based on the clinical documentation in H+P.   Based on patients current state of illness, I anticipate that, after discharge, patient will require TBD.      Catrina Kohler MD  11/17/2024  10:33 AM         [1] No Known Allergies  [2]   Medications Prior to Admission   Medication Sig Dispense Refill Last Dose/Taking    cephALEXin 500 MG Oral Cap Take 1 capsule (500 mg total) by mouth 3 (three) times daily. 15 capsule 0     carbidopa-levodopa  MG Oral Tab Take 0.5 tablets by mouth 3 (three) times daily.       amLODIPine 5 MG Oral Tab Take 1 tablet (5 mg total) by mouth daily. 30 tablet 0     donepezil 10 MG Oral Tab Take 1 tablet (10 mg total) by mouth nightly.       venlafaxine 37.5 MG Oral Tab Take 1 tablet (37.5 mg total) by mouth 2 (two) times daily.       hydrOXYzine Pamoate 25 MG Oral Cap hydroxyzine pamoate 25 mg capsule   TAKE ONE CAPSULE BY MOUTH DAILY AS NEEDED       polyethylene glycol, PEG 3350, 17 g Oral Powd Pack Take 17 g by mouth every other day. Patient takes every three days       acetaminophen 500 MG Oral Tab Take  2 tablets (1,000 mg total) by mouth every 8 (eight) hours as needed for Pain.   Past Month    melatonin 1 MG Oral Tab Take 2 tablets (2 mg total) by mouth nightly.       ARIPiprazole 2 MG Oral Tab Take 1 tablet (2 mg total) by mouth every other day. 1 tablet 0     Cholecalciferol (VITAMIN D) 50 MCG (2000 UT) Oral Tab Take by mouth nightly.       aspirin 81 MG Oral Tab Take 1 tablet (81 mg total) by mouth daily.   Past Week    mirtazapine 15 MG Oral Tab TAKE ONE TABLET BY MOUTH AT BEDTIME 90 tablet 3     Coenzyme Q10 (COQ-10) 100 MG Oral Cap Take 100 mg by mouth daily.       Simvastatin 40 MG Oral Tab Take 1 tablet (40 mg total) by mouth nightly.

## 2024-11-17 NOTE — SLP NOTE
ADULT SWALLOWING EVALUATION    ASSESSMENT    ASSESSMENT/OVERALL IMPRESSION:  Pt was seen for a clinical bedside swallow assessment and the exam is indicative of possible oropharyngeal dysphagia.     RN was consulted prior to the visit. Pt's spouse was present during the assessment. Pt was generally lethargic and unable to answer simple questions at this time. Pt consented to participate in the assessment, nevertheless. Per spouse, pt is on nectar thick liquids and minced and moist solids at home with no swallowing challenges. Pt has Parkinsonism and has been seen by our service at [facility] in the past.   Oral motor assessment could not be completed because of pt's alertness. Trial swallows were administered with nectar thick liquids (via spoon and cup) and soft solids. Pt exhibited limited labial seal across trials, however no anterior spillage was noted. Oral bolus prep/mastication for the soft solid was WFL, as was oral transit. Hyolaryngeal excursion appeared to be mildly reduced. No overt s/s of laryngeal penetration or aspiration was noted for either consistency. No remarkable oral residue was observed. Respiratory status was stable; however voice was weak throughout the session.     Recommendations:  Diet: Nectar/mildly thick liquids and minced and moist solids.  Tx: SLP to f/u x1-2 for meal assessments.    Consider GI consult for possible esophageal components, given pt has known achalasia.        RECOMMENDATIONS   Diet Recommendations - Solids: Mechanical soft ground/ Minced & Moist  Diet Recommendations - Liquids: Nectar thick liquids/ Mildly thick                        Compensatory Strategies Recommended: Slow rate;Small bites and sips;Alternate consistencies  Aspiration Precautions: Slow rate;Small bites and sips;Cueing to swallow;Upright position  Medication Administration Recommendations: Crushed in puree  Treatment Plan/Recommendations: Aspiration precautions    HISTORY   MEDICAL HISTORY  Reason for  Referral: Other (Comment) (Achalasia)    Problem List  Principal Problem:    Sepsis without acute organ dysfunction, due to unspecified organism (HCC)  Active Problems:    Acute cystitis with hematuria      Past Medical History  Past Medical History:    Achalasia    Recurrent esophageal food impactions    Anxiety state    Aortic atherosclerosis (HCC)    CXR 11-23    BPH (benign prostatic hyperplasia)    Dementia (HCC)    Depression with anxiety    Dyslipidemia    High blood pressure    High cholesterol    History of melanoma    melanoma    History of pneumonia    History of recurrent UTIs    Kidney stones    Bilateral staghorn calculi    Macrocytic anemia    Mitral regurgitation    Echo 3-23 with EF 60-65%, mild-moderate MR, marked LAE    Osteoarthritis    Parkinson's disease (HCC)    Primary hypertension    Seizure disorder ([facility])    because of venalfaxine       Prior Living Situation: Home with spouse  Diet Prior to Admission: Mechanical soft ground/ Minced & Moist;Nectar thick liquids/ Mildly thick  Precautions: Aspiration;Seizure      SWALLOWING HISTORY  Current Diet Consistency: Regular;Thin liquids  Dysphagia History: Pt has a long history of dysphagia. Last visit at [facility] in 08/2024 indicative of possible pharyngeal dysfunction. Pt's baseline diet is nectar thick liquids and minced and moist solids.   Imaging Results: Chest Xray completed on 11/17. At the time of writing this report, Chest Xray results are yet to be made available.     SUBJECTIVE       OBJECTIVE   ORAL MOTOR EXAMINATION  Dentition: Functional  Symmetry: Within Functional Limits  Strength: Reduced right facial;Reduced left facial  Tone: Reduced right facial;Reduced left facial  Range of Motion: Unable to assess  Rate of Motion: Unable to assess    Voice Quality: Weak  Respiratory Status: Unlabored  Consistencies Trialed: Nectar thick liquids;Soft solid  Method of Presentation: Staff/Clinician assistance;Spoon;Cup  Patient Positioning:  Upright    Oral Phase of Swallow: Impaired  Bolus Retrieval: Impaired  Bilabial Seal: Impaired  Bolus Formation: Impaired  Bolus Propulsion: Impaired  Mastication: Impaired  Retention: Impaired    Pharyngeal Phase of Swallow: Impaired  Laryngeal Elevation Timing: Appears intact  Laryngeal Elevation Strength: Appears impaired  Laryngeal Elevation Coordination: Appears impaired  (Please note: Silent aspiration cannot be evaluated clinically. Videofluoroscopic Swallow Study is required to rule-out silent aspiration.)    Esophageal Phase of Swallow: No complaints consistent with possible esophageal involvement  Comments: Pt has a history of Achalasia.               GOALS  Goal #1 The patient will tolerate minced and moist solid consistency and mildly thick liquids without overt signs or symptoms of aspiration with 100 % accuracy over 2 session(s).  In Progress   Goal #2 The patient/family/caregiver will demonstrate understanding and implementation of aspiration precautions and swallow strategies independently over 2 session(s).    In Progress     FOLLOW UP  Treatment Plan/Recommendations: Aspiration precautions  Number of Visits to Meet Established Goals: 2  Follow Up Needed (Documentation Required): Yes  SLP Follow-up Date: 11/18/24    Thank you for your referral.   If you have any questions, please contact Zeferino Fuentes, SULEMA Fuentes, Ph.D., [facility]-SLP  Speech-Language Pathologist  Phone number Ext.: [phone]

## 2024-11-17 NOTE — ED INITIAL ASSESSMENT (HPI)
Patient to ED via EMS from home. Patient recently discharged from nursing home.     Patient with \"Parkinson's-like\" condition. Wife reports that patient has been more shaky than usual, and that his skin felt warm.     Patient with dementia, AOx1 at baseline.

## 2024-11-17 NOTE — ED PROVIDER NOTES
[place] Emergency Department Note  Patient: Fernando Ramos Jr. Age: 89 year old Sex: male      MRN: [id number]  : 1935    Patient Seen in: [facility] Emergency Department    History     Chief Complaint   Patient presents with    Fever     Stated Complaint: fever    History obtained from: ems, pt's wife, patient      This is an 89-year-old male history of dementia and parkinsonism, hypertension, hyperlipidemia, BPH, presents via EMS from home due to fever, altered mental status.  Per his wife who provides majority of history he was just discharged literally yesterday from rehab facility to go home.  States that they went to dinner and he was okay but then seemed more confused and felt warm to her tonight.  He was found to be febrile per EMS and then transported to the ER for further management.  Per wife he has not had any recent cough, vomiting, diarrhea and she is not sure what could be causing his symptoms.  Patient currently denies complaints, denies any areas of pain in his chest, no abdominal pain or headache, denies nausea.  Further history limited secondary to patient clinical condition.    Review of Systems:  Review of Systems  Positive for stated complaint: fever. Constitutional and vital signs reviewed. All other systems reviewed and negative except as noted above.    Patient History:  Past Medical History:    Achalasia    Recurrent esophageal food impactions    Anxiety state    Aortic atherosclerosis (HCC)    CXR 11-    BPH (benign prostatic hyperplasia)    Dementia (HCC)    Depression with anxiety    Dyslipidemia    High blood pressure    High cholesterol    History of melanoma    melanoma    History of pneumonia    History of recurrent UTIs    Kidney stones    Bilateral staghorn calculi    Macrocytic anemia    Mitral regurgitation    Echo 3 with EF 60-65%, mild-moderate MR, marked LAE    Osteoarthritis    Parkinson's disease (HCC)    Primary hypertension    Seizure disorder (HCC)     because of venalfaxine       Past Surgical History:   Procedure Laterality Date    Egd  2022    ESOPHAGOGASTRODUODENOSCOPY with Botox injection    Egd  2021    Large food impaction in the mid and distal esophagus    Hemiarthroplasty hip Right 2022    Right Cemented Hemiarthroplasty    Lithotripsy      Total knee replacement      Transurethral destruction, prostate tissue; water-induc          Family History   Problem Relation Age of Onset    Alcohol and Other Disorders Associated Father     Heart Disorder Father     Depression Father     Heart Disorder Mother        Specific Social Determinants of Health:   Social History     Socioeconomic History    Marital status:    Tobacco Use    Smoking status: Former     Current packs/day: 0.00     Types: Cigarettes     Start date: 1957     Quit date: 1960     Years since quittin.2    Smokeless tobacco: Never   Vaping Use    Vaping status: Never Used   Substance and Sexual Activity    Alcohol use: Not Currently     Comment: occasionally    Drug use: Never   Other Topics Concern     Service No    Blood Transfusions No    Caffeine Concern No    Sleep Concern No    Stress Concern Yes    Special Diet Yes    Back Care No    Exercise No    Bike Helmet No    Seat Belt No     Social Drivers of Health     Financial Resource Strain: Low Risk  (2024)    Received from [facility]    Financial Resource Strain     In the past year, have you or any family members you live with been unable to get any of the following when it was really needed? Check all that apply.: None   Food Insecurity: Unknown (10/24/2024)    Food Insecurity     Food Insecurity: Patient unable to answer   Transportation Needs: Unknown (10/24/2024)    Transportation Needs     Lack of Transportation: Patient unable to answer   Recent Concern: Transportation Needs - At Risk (2024)    Received from [facility]    Transportation Needs     In the  past 12 months, has lack of reliable transportation kept you from medical appointments, meetings, work or from getting things needed for daily living? : Yes   Physical Activity: Patient Declined (7/23/2024)    Received from [facility]    Exercise Vital Sign     Days of Exercise per Week: Patient declined     Minutes of Exercise per Session: Patient declined   Stress: Patient Declined (7/23/2024)    Received from [facility]    [employer] [place] [employer] - Occupational Stress Questionnaire     Feeling of Stress : Patient declined   Social Connections: Low Risk  (8/18/2024)    Received from [facility]    Social Connections     How often do you see or talk to people that you care about and feel close to? (For example: talking to friends on the phone, visiting friends or family, going to Religious or club meetings): 5 or more times a week   Housing Stability: Unknown (10/24/2024)    Housing Stability     Housing Instability: Patient unable to answer           PSFH elements reviewed from today and agreed except as otherwise stated in HPI.    Physical Exam     ED Triage Vitals   BP 11/17/24 0409 (!) 183/87   Pulse 11/17/24 0409 89   Resp 11/17/24 0409 21   Temp 11/17/24 0454 (!) 102.4 °F (39.1 °C)   Temp src 11/17/24 0454 Rectal   SpO2 11/17/24 0409 97 %   O2 Device 11/17/24 0409 None (Room air)       Current:/62   Pulse 83   Temp (!) 102.4 °F (39.1 °C) (Rectal)   Resp 21   SpO2 94%         Physical Exam  Vitals and nursing note reviewed.   Constitutional:       General: He is not in acute distress.     Appearance: He is ill-appearing.   HENT:      Head: Normocephalic and atraumatic.      Mouth/Throat:      Mouth: Mucous membranes are dry.      Pharynx: Oropharynx is clear.   Eyes:      Conjunctiva/sclera: Conjunctivae normal.   Cardiovascular:      Rate and Rhythm: Normal rate and regular rhythm.      Heart sounds: No murmur heard.  Pulmonary:      Effort: Pulmonary effort is normal. No  respiratory distress.      Breath sounds: No wheezing or rales.   Abdominal:      General: There is no distension.      Palpations: Abdomen is soft.      Tenderness: There is no abdominal tenderness. There is no guarding or rebound.   Musculoskeletal:         General: No deformity.   Skin:     General: Skin is warm and dry.      Capillary Refill: Capillary refill takes less than 2 seconds.   Neurological:      General: No focal deficit present.      Mental Status: He is alert. He is confused.      GCS: GCS eye subscore is 4. GCS verbal subscore is 4. GCS motor subscore is 6.      Comments: Oriented to self only. Generally tremulous at rest.          ED Course   Labs:   Labs Reviewed   CBC WITH DIFFERENTIAL WITH PLATELET - Abnormal; Notable for the following components:       Result Value    WBC 21.7 (*)     RBC 3.71 (*)     HGB 12.9 (*)     HCT 37.8 (*)     .9 (*)     MCH 34.8 (*)     RDW-SD 50.4 (*)     Neutrophil Absolute Prelim 19.53 (*)     Neutrophil Absolute 19.53 (*)     Lymphocyte Absolute 0.87 (*)     Monocyte Absolute 1.06 (*)     All other components within normal limits   COMP METABOLIC PANEL (14) - Abnormal; Notable for the following components:    Glucose 156 (*)     Calculated Osmolality 300 (*)     Total Protein 8.4 (*)     Globulin  4.0 (*)     All other components within normal limits   PROCALCITONIN - Abnormal; Notable for the following components:    Procalcitonin 0.42 (*)     All other components within normal limits   LACTIC ACID, PLASMA - Abnormal; Notable for the following components:    Lactic Acid 2.1 (*)     All other components within normal limits   URINALYSIS WITH CULTURE REFLEX - Abnormal; Notable for the following components:    Clarity Urine Ex.Turbid (*)     Blood Urine 3+ (*)     Protein Urine 100 (*)     Leukocyte Esterase Urine 500 (*)     WBC Urine >50 (*)     RBC Urine >10 (*)     WBC Clump Present (*)     All other components within normal limits   SARS-COV-2/FLU A AND  B/RSV BY PCR (GENEXPERT) - Normal    Narrative:     This test is intended for the qualitative detection and differentiation of SARS-CoV-2, influenza A, influenza B, and respiratory syncytial virus (RSV) viral RNA in nasopharyngeal or nares swabs from individuals suspected of respiratory viral infection consistent with COVID-19 by their healthcare provider. Signs and symptoms of respiratory viral infection due to SARS-CoV-2, influenza, and RSV can be similar.    Test performed using the Xpert Xpress SARS-CoV-2/FLU/RSV (real time RT-PCR)  assay on the GeneXpert instrument, [employer], [place] [address].   This test is being used under the Food and Drug Administration's Emergency Use Authorization.    The authorized Fact Sheet for Healthcare Providers for this assay is available upon request from the laboratory.   LACTIC ACID REFLEX POST POSTIVE   PROTHROMBIN TIME (PT)   PTT, ACTIVATED   BLOOD CULTURE   BLOOD CULTURE   MRSA SCREEN BY PCR   URINE CULTURE, ROUTINE     Radiology findings:    No results found.    EKG as interpreted by me: nsr rate 92 bpm, L axis deviation, nonspecific changes in inferior leads, no stemi   Cardiac Monitor: Interpreted by me.   Pulse Readings from Last 1 Encounters:   11/17/24 83   , sinus,     External non-ED records reviewed independently by me:   urine culture from October 26, 2024   URINE CULTURE 10,000 - 50,000 CFU/ML Escherichia coli Abnormal         Resulting Agency: [facility] ([facility], [facility])     Susceptibility     Escherichia coli     Not Specified    Ampicillin >=32 Resistant    Ampicillin + Sulbactam >=32 Resistant    Cefazolin 16 Sensitive    Ciprofloxacin <=0.25 Sensitive    Gentamicin <=1 Sensitive    Levofloxacin <=0.12 Sensitive    Meropenem <=0.25 Sensitive    Nitrofurantoin <=16 Sensitive    Piperacillin + Tazobactam 64 Intermediate    Trimethoprim/Sulfa >=320 Resistant              Linear View          MDM   This patient presents with fever, shaking/chills, per wife more confusion  than his baseline tonight.     Differential diagnoses considered includes, but is not limited to:   Sepsis  Uti  Pneumonia  Viral syndrome  Electrolyte abnormality     Will obtain the following tests: cbc, cmp, procal, bcx, lactic acid, UA/Ucx, cxr, ecg, cov flu rsv swab, mrsa nares   Will administer the following medications/therapies: tylenol UT, IV NS bolus, empiric rocephin and vanco     Please see ED course for my independent review of these tests/imaging results.    Chronic conditions affecting care: dementia and parkinsonism, hypertension, hyperlipidemia, BPH      ED Course as of 11/17/24 0536  ------------------------------------------------------------  Time: 11/17 0430  Value: WBC(!): 21.7  Comment: Pt with significant leukocytosis and left shift. Minimal anemia. Normal platelets   ------------------------------------------------------------  Time: 11/17 0454  Value: LACTIC ACID(!): 2.1  Comment: IV NS 1L bolus ordered. Not c/w severe sepsis or shock   ------------------------------------------------------------  Time: 11/17 0454  Value: Temp(!): 102.4 °F (39.1 °C)  Comment: UT tylenol ordered   ------------------------------------------------------------  Time: 11/17 0501  Comment: Cmp unremarkable   ------------------------------------------------------------  Time: 11/17 0507  Comment: Cov flu rsv neg   ------------------------------------------------------------  Time: 11/17 0515  Value: PROCALCITONIN(!): 0.42  Comment: Procal a bit elevated likely 2/2 underlying infectious process   ------------------------------------------------------------  Time: 11/17 0520  Comment: On my interpretation of pt cxr possible LLL lung consolidation vs atelectasis, no cardiomegaly. No pneumothorax   ------------------------------------------------------------  Time: 11/17 0534  Comment: UA no bacteria however has WBC, leuk esterase and rbc concerning for uti w/microscopic hematuria may be 2/2 straight cath. IV rocephin  ordered. Case d/w Dr. Glaser accepts admission.   ------------------------------------------------------------  Time: 11/17 0536  Value: XR CHEST AP PORTABLE  (CPT=71045)  Comment: XR portable chest 1 view(s)      IMPRESSION:    Comparison: 1/17/2024    Small lung volumes bilaterally.  Ill-defined infrahilar opacities bilaterally suggesting atelectasis or infiltrate.  Small left pleural effusion not excluded.  Cardiomediastinal contours are unchanged, with note again made of extensive calcification in the aortic arch and descending aorta.              Procedures:  Procedures    Critical Care:  Due to a high probability of clinically significant, life threatening deterioration, the patient required my highest level of preparedness to intervene emergently and I personally spent 35 minutes of critical care time directly and personally managing the patient. This critical care time included obtaining a history; examining the patient; pulse oximetry; ordering and review of studies; arranging urgent treatment with development of a management plan; evaluation of patient's response to treatment; frequent reassessment; and, discussions with other providers.    This critical care time was performed to assess and manage the high probability of imminent, life-threatening deterioration that could result in multi-organ failure. It was exclusive of separately billable procedures and treating other patients and teaching time.      Disposition and Plan     Clinical Impression:  1. Sepsis without acute organ dysfunction, due to unspecified organism (HCC)    2. Acute cystitis with hematuria        Disposition:  Admit        Hospital Problems       Present on Admission  Date Reviewed: 12/5/2023            ICD-10-CM Noted POA    * (Principal) Sepsis without acute organ dysfunction, due to unspecified organism (HCC) A41.9 11/17/2024 Unknown        This note may have been created using voice dictation technology and may include inadvertent  errors.      Magui Sequeira,   Attending Physician   Emergency Medicine

## 2024-11-18 LAB
ANION GAP SERPL CALC-SCNC: 9 MMOL/L (ref 0–18)
BASOPHILS # BLD AUTO: 0.03 X10(3) UL (ref 0–0.2)
BASOPHILS NFR BLD AUTO: 0.2 %
BUN BLD-MCNC: 25 MG/DL (ref 9–23)
BUN/CREAT SERPL: 28.4 (ref 10–20)
CALCIUM BLD-MCNC: 9.9 MG/DL (ref 8.7–10.4)
CHLORIDE SERPL-SCNC: 118 MMOL/L (ref 98–112)
CO2 SERPL-SCNC: 20 MMOL/L (ref 21–32)
CREAT BLD-MCNC: 0.88 MG/DL
DEPRECATED RDW RBC AUTO: 55.5 FL (ref 35.1–46.3)
EGFRCR SERPLBLD CKD-EPI 2021: 83 ML/MIN/1.73M2 (ref 60–?)
EOSINOPHIL # BLD AUTO: 0 X10(3) UL (ref 0–0.7)
EOSINOPHIL NFR BLD AUTO: 0 %
ERYTHROCYTE [DISTWIDTH] IN BLOOD BY AUTOMATED COUNT: 13.7 % (ref 11–15)
GLUCOSE BLD-MCNC: 121 MG/DL (ref 70–99)
HCT VFR BLD AUTO: 34.9 %
HGB BLD-MCNC: 10.5 G/DL
IMM GRANULOCYTES # BLD AUTO: 0.09 X10(3) UL (ref 0–1)
IMM GRANULOCYTES NFR BLD: 0.6 %
LYMPHOCYTES # BLD AUTO: 0.53 X10(3) UL (ref 1–4)
LYMPHOCYTES NFR BLD AUTO: 3.7 %
MCH RBC QN AUTO: 32.9 PG (ref 26–34)
MCHC RBC AUTO-ENTMCNC: 30.1 G/DL (ref 31–37)
MCV RBC AUTO: 109.4 FL
MONOCYTES # BLD AUTO: 0.71 X10(3) UL (ref 0.1–1)
MONOCYTES NFR BLD AUTO: 5 %
NEUTROPHILS # BLD AUTO: 12.83 X10 (3) UL (ref 1.5–7.7)
NEUTROPHILS # BLD AUTO: 12.83 X10(3) UL (ref 1.5–7.7)
NEUTROPHILS NFR BLD AUTO: 90.5 %
OSMOLALITY SERPL CALC.SUM OF ELEC: 310 MOSM/KG (ref 275–295)
PLATELET # BLD AUTO: 150 10(3)UL (ref 150–450)
PLATELETS.RETICULATED NFR BLD AUTO: 6.6 % (ref 0–7)
POTASSIUM SERPL-SCNC: 4.2 MMOL/L (ref 3.5–5.1)
RBC # BLD AUTO: 3.19 X10(6)UL
SODIUM SERPL-SCNC: 147 MMOL/L (ref 136–145)
WBC # BLD AUTO: 14.2 X10(3) UL (ref 4–11)

## 2024-11-18 PROCEDURE — 85025 COMPLETE CBC W/AUTO DIFF WBC: CPT | Performed by: INTERNAL MEDICINE

## 2024-11-18 PROCEDURE — 80048 BASIC METABOLIC PNL TOTAL CA: CPT | Performed by: INTERNAL MEDICINE

## 2024-11-18 PROCEDURE — 97166 OT EVAL MOD COMPLEX 45 MIN: CPT

## 2024-11-18 PROCEDURE — 97530 THERAPEUTIC ACTIVITIES: CPT

## 2024-11-18 PROCEDURE — 97162 PT EVAL MOD COMPLEX 30 MIN: CPT

## 2024-11-18 NOTE — PROGRESS NOTES
[facility]  part of [facility]    Progress Note    Fernando Ramos Jr. Patient Status:  Inpatient    1935 MRN [id number]   Location [facility] [place] 5SW/SE Attending Aline Glaser MD   Hosp Day # 1 PCP ALINE GLASER MD     Chief Complaint: fever    Subjective:     Constitutional:  Positive for activity change and appetite change.   Respiratory:  Negative for cough and shortness of breath.    Cardiovascular:  Negative for chest pain and palpitations.   Gastrointestinal:  Negative for abdominal pain.   Pt sitting in bed eating breakfast. Wife at bedside providing history. No overnight events.     Objective:   Blood pressure 144/78, pulse 72, temperature 98.1 °F (36.7 °C), temperature source Axillary, resp. rate 18, weight 145 lb (65.8 kg), SpO2 97%.  Physical Exam  Constitutional:       Appearance: He is ill-appearing.   HENT:      Head: Atraumatic.   Cardiovascular:      Rate and Rhythm: Normal rate and regular rhythm.   Pulmonary:      Effort: Pulmonary effort is normal.      Breath sounds: Normal breath sounds.   Abdominal:      General: Bowel sounds are normal.   Skin:     General: Skin is warm.   Neurological:      Mental Status: He is alert. Mental status is at baseline.   Psychiatric:         Mood and Affect: Mood normal.         Thought Content: Thought content normal.         Results:   Lab Results   Component Value Date    WBC 21.7 (H) 2024    HGB 12.9 (L) 2024    HCT 37.8 (L) 2024    .0 2024    CREATSERUM 1.04 2024    BUN 20 2024     2024    K 4.4 2024     2024    CO2 27.0 2024     (H) 2024    CA 10.1 2024    ALB 4.4 2024    ALKPHO 101 2024    BILT 0.5 2024    TP 8.4 (H) 2024    AST 26 2024    ALT 21 2024    PTT 30.1 2024    INR 1.31 (H) 2024    TSH 0.889 2024    LIP 48 2023    ESRML 26 (H) 2021    CRP 1.22 (H)  05/03/2021    MG 1.9 08/02/2024    PHOS 3.5 01/21/2024    TROPHS 20 01/17/2024     03/14/2023    B12 418 03/13/2024       XR CHEST AP PORTABLE  (CPT=71045)    Result Date: 11/17/2024  CONCLUSION:   Mild bilateral lower lobe reticulonodular densities and subtle bilateral lower lobe airspace/alveolar opacities.  Findings may be secondary to interstitial edema or mild pneumonia (either from bacterial or atypical viral etiology).  Continued follow-up imaging recommended to ensure resolution.     Dictated by (CST): Omar Reid MD on 11/17/2024 at 11:17 AM     Finalized by (CST): Omar Reid MD on 11/17/2024 at 11:17 AM         EKG    Result Date: 11/17/2024  Normal sinus rhythm Nonspecific ST abnormality Abnormal ECG When compared with ECG of 31-JUL-2024 16:45, No significant change was found Confirmed by WENCESLAO SCOTT, DALTON ([id number]) on 11/17/2024 7:35:06 AM     Assessment & Plan:     Sepsis without acute organ dysfunction, due to unspecified organism (HCC)  Cont IV cefepime  Cont IVF  Elevated lactic acid on admission  WBC of 21K  Blood cx NGTD  ID consulted      Acute cystitis with hematuria  Cont IVF   Cont IV abx as per ID  ID following, recs appreciated      Dementia  Stable  At baseline  Cont aricept      HTN  Cont amlodipine      Parkinson's disease  Cont sinemet  Fall precautions  PT/OT      Achalasia  Swallow eval  Diet as per ST      Depression  Cont Abilify, remeron, effexory      FULL CODE  DVT px: subcutaneous heparin      **Certification      PHYSICIAN Certification of Need for Inpatient Hospitalization - Initial Certification    Patient will require inpatient services that will reasonably be expected to span two midnight's based on the clinical documentation in H+P.   Based on patients current state of illness, I anticipate that, after discharge, patient will require TBD.      Katerina Swain MD  11/18/2024

## 2024-11-18 NOTE — PLAN OF CARE
Fall and seizure precautions in place. Patient turned frequently, mepilex to sacrum and heels/ankles in place, bilateral multi podus boots in place.   Nurse monitored patient for pain and fever, tylenol administered as needed.   Fall precautions in place and call light in reach.   Spouse at bedside.     Problem: Patient Centered Care  Goal: Patient preferences are identified and integrated in the patient's plan of care  Description: Interventions:  - What would you like us to know as we care for you? To be left alone  - Provide timely, complete, and accurate information to patient/family  - Incorporate patient and family knowledge, values, beliefs, and cultural backgrounds into the planning and delivery of care  - Encourage patient/family to participate in care and decision-making at the level they choose  - Honor patient and family perspectives and choices  Outcome: Progressing     Problem: Patient/Family Goals  Goal: Patient/Family Long Term Goal  Description: Patient's Long Term Goal: Free of infection    Interventions:  - monitor vitals and labs  - See additional Care Plan goals for specific interventions  Outcome: Progressing  Goal: Patient/Family Short Term Goal  Description: Patient's Short Term Goal: free of skin breakdown    Interventions:   - frequent turning  - See additional Care Plan goals for specific interventions  Outcome: Progressing     Problem: SKIN/TISSUE INTEGRITY - ADULT  Goal: Skin integrity remains intact  Description: INTERVENTIONS  - Assess and document risk factors for pressure ulcer development  - Assess and document skin integrity  - Monitor for areas of redness and/or skin breakdown  - Initiate interventions, skin care algorithm/standards of care as needed  Outcome: Progressing  Goal: Incision(s), wounds(s) or drain site(s) healing without S/S of infection  Description: INTERVENTIONS:  - Assess and document risk factors for pressure ulcer development  - Assess and document skin  integrity  - Assess and document dressing/incision, wound bed, drain sites and surrounding tissue  - Implement wound care per orders  - Initiate isolation precautions as appropriate  - Initiate Pressure Ulcer prevention bundle as indicated  Outcome: Progressing     Problem: HEMATOLOGIC - ADULT  Goal: Free from bleeding injury  Description: (Example usage: patient with low platelets)  INTERVENTIONS:  - Avoid intramuscular injections, enemas and rectal medication administration  - Ensure safe mobilization of patient  - Hold pressure on venipuncture sites to achieve adequate hemostasis  - Assess for signs and symptoms of internal bleeding  - Monitor lab trends  - Patient is to report abnormal signs of bleeding to staff  - Avoid use of toothpicks and dental floss  - Use electric shaver for shaving  - Use soft bristle tooth brush  - Limit straining and forceful nose blowing  Outcome: Progressing     Problem: MUSCULOSKELETAL - ADULT  Goal: Return mobility to safest level of function  Description: INTERVENTIONS:  - Assess patient stability and activity tolerance for standing, transferring and ambulating w/ or w/o assistive devices  - Assist with transfers and ambulation using safe patient handling equipment as needed  - Ensure adequate protection for wounds/incisions during mobilization  - Obtain PT/OT consults as needed  - Advance activity as appropriate  - Communicate ordered activity level and limitations with patient/family  Outcome: Progressing     Problem: NEUROLOGICAL - ADULT  Goal: Absence of seizures  Description: INTERVENTIONS  - Monitor for seizure activity  - Administer anti-seizure medications as ordered  - Monitor neurological status  Outcome: Progressing     Problem: Impaired Functional Mobility  Goal: Achieve highest/safest level of mobility/gait  Description: Interventions:  - Assess patient's functional ability and stability  - Promote increasing activity/tolerance for mobility and gait  - Educate and  engage patient/family in tolerated activity level and precautions  - Recommend use of  RW for transfers and ambulation  Outcome: Progressing     Problem: Impaired Activities of Daily Living  Goal: Achieve highest/safest level of independence in self care  Description: Interventions:  - Assess ability and encourage patient to participate in ADLs to maximize function  - Promote sitting position while performing ADLs such as feeding, grooming, and bathing  - Educate and encourage patient/family in tolerated functional activity level and precautions during self-care  - Provide support under elbow of weak side to prevent shoulder subluxation  Outcome: Progressing     Problem: Impaired Swallowing  Goal: Minimize aspiration risk  Description: Interventions:  - Patient should be alert and upright for all feedings (90 degrees preferred)  - Offer food and liquids at a slow rate  - No straws  - Encourage small bites of food and small sips of liquid  - Offer pills one at a time, crush or deliver with applesauce as needed  - Discontinue feeding and notify MD (or speech pathologist) if coughing or persistent throat clearing or wet/gurgly vocal quality is noted  Outcome: Progressing     Problem: Impaired Cognition  Goal: Patient will exhibit improved attention, thought processing and/or memory  Description: Interventions:  - Minimize distractions in the room when full attention is required  Outcome: Progressing     Problem: PAIN - ADULT  Goal: Verbalizes/displays adequate comfort level or patient's stated pain goal  Description: INTERVENTIONS:  - Encourage pt to monitor pain and request assistance  - Assess pain using appropriate pain scale  - Administer analgesics based on type and severity of pain and evaluate response  - Implement non-pharmacological measures as appropriate and evaluate response  - Consider cultural and social influences on pain and pain management  - Manage/alleviate anxiety  - Utilize distraction and/or  relaxation techniques  - Monitor for opioid side effects  - Notify MD/LIP if interventions unsuccessful or patient reports new pain  - Anticipate increased pain with activity and pre-medicate as appropriate  Outcome: Progressing

## 2024-11-18 NOTE — CONSULTS
[facility]  part of [facility] ID CONSULT NOTE    Fernando Ramos Jr. Patient Status:  Inpatient    1935 MRN [id number]   Location [facility] 5SW/SE Attending Catrina Kohler MD   Hosp Day # 0 PCP ALINE HERRERA MD       Reason for Consultation:  Fevers    ASSESSMENT:    Antibiotics: Vanc and CTX    # Fevers  # Community-acquired pneumonia  # Urinary tract infection  # Dementia  # Parkinson's disease      PLAN:    -Continue Vanc and CTX for now  -F/u cultures  -Obtain RPP  -Depending clinical course, may need CT AP  -Follow fever curve, wbc  -Reviewed labs, micro, imaging reports, available old records  -Discussed with primary attending and RN    History of Present Illness:  Fernando Ramos Jr. is a a(n) 89 year old male with history of Parkinson's, dementia, and BPH who presents for altered mental status and fever.  Patient was recently discharged from rehab and noted to be more confused than his baseline and thus his wife brought him in.    In the ED, patient was febrile to 102.4, but hemodynamically stable.  Labs with WBC 21.7, lactic acid 2.1, urinalysis with 500 leukocyte esterase, >50 WBC.  CXR demonstrating bilateral lower lobe densities and alveolar opacities.    History:  Past Medical History:    Achalasia    Recurrent esophageal food impactions    Anxiety state    Aortic atherosclerosis (HCC)    CXR     BPH (benign prostatic hyperplasia)    Dementia (HCC)    Depression with anxiety    Dyslipidemia    High blood pressure    High cholesterol    History of melanoma    melanoma    History of pneumonia    History of recurrent UTIs    Kidney stones    Bilateral staghorn calculi    Macrocytic anemia    Mitral regurgitation    Echo 3-23 with EF 60-65%, mild-moderate MR, marked LAE    Osteoarthritis    Parkinson's disease (HCC)    Primary hypertension    Seizure disorder (HCC)    because of venalfaxine     Past Surgical History:   Procedure Laterality Date    Egd   05/23/2022    ESOPHAGOGASTRODUODENOSCOPY with Botox injection    Egd  07/23/2021    Large food impaction in the mid and distal esophagus    Hemiarthroplasty hip Right 03/22/2022    Right Cemented Hemiarthroplasty    Lithotripsy      Total knee replacement      Transurethral destruction, prostate tissue; water-induc       Family History   Problem Relation Age of Onset    Alcohol and Other Disorders Associated Father     Heart Disorder Father     Depression Father     Heart Disorder Mother       reports that he quit smoking about 64 years ago. His smoking use included cigarettes. He started smoking about 67 years ago. He has never used smokeless tobacco. He reports that he does not currently use alcohol. He reports that he does not use drugs.    Allergies:  Allergies[1]    Medications:    Current Facility-Administered Medications:     influenza virus trivalent high dose PF (Fluzone HD) 0.5 mL injection (ages >/= 65 years) 0.5 mL, 0.5 mL, Intramuscular, Prior to discharge    acetaminophen (Tylenol Extra Strength) tab 1,000 mg, 1,000 mg, Oral, Q8H PRN    amLODIPine (Norvasc) tab 5 mg, 5 mg, Oral, Daily    aspirin DR tab 81 mg, 81 mg, Oral, Daily    carbidopa-levodopa (SINEMET)  MG per tab 0.5 tablet, 0.5 tablet, Oral, TID    ARIPiprazole (Abilify) tab 2 mg, 2 mg, Oral, Daily    donepezil (Aricept) tab 10 mg, 10 mg, Oral, Nightly    hydrOXYzine (Atarax) tab 25 mg, 25 mg, Oral, TID PRN    melatonin tab 2 mg, 2 mg, Oral, Nightly    mirtazapine (Remeron) tab 15 mg, 15 mg, Oral, Nightly    polyethylene glycol (PEG 3350) (Miralax) 17 g oral packet 17 g, 17 g, Oral, QOD    atorvastatin (Lipitor) tab 10 mg, 10 mg, Oral, Nightly    venlafaxine (Effexor) tab 37.5 mg, 37.5 mg, Oral, BID    acetaminophen (Tylenol) tab 650 mg, 650 mg, Oral, Q6H PRN    [START ON 11/18/2024] cefTRIAXone (Rocephin) 1 g in sodium chloride 0.9% 100 mL IVPB-ADDV, 1 g, Intravenous, Q24H    sodium chloride 0.9% infusion, , Intravenous, Continuous     heparin (Porcine) 5000 UNIT/ML injection 5,000 Units, 5,000 Units, Subcutaneous, Q8H [facility]    Review of Systems:  ROS  Per HPI    Physical Exam:  Vital signs: Blood pressure 146/61, pulse 80, temperature 100 °F (37.8 °C), temperature source Axillary, resp. rate 18, weight 145 lb (65.8 kg), SpO2 100%.    Physical Exam  Constitutional:       Comments: Chronically ill appearing. Lethargic   Cardiovascular:      Rate and Rhythm: Normal rate.   Pulmonary:      Effort: No respiratory distress.   Abdominal:      Palpations: Abdomen is soft.   Musculoskeletal:         General: No swelling.         Laboratory Data: Reviewed in EMR    Microbiology: Reviewed in EMR    Radiology: Reviewed    Thank you for allowing us to participate in the care of this patient. Please do not hesitate to call if you have any questions.     We will continue to follow with you and will make further recommendations based on patient's progress.    MD Adam [facility]  [phone]  11/17/2024         [1] No Known Allergies

## 2024-11-18 NOTE — PHYSICAL THERAPY NOTE
PHYSICAL THERAPY EVALUATION - INPATIENT     Room Number: 549/549-A  Evaluation Date: 11/18/2024  Type of Evaluation: Initial   Physician Order: PT Eval and Treat    Presenting Problem: sepsis  Co-Morbidities : Parkinson's disease, dementia, hypertension, hyperlipidemia, BPH  Reason for Therapy: Mobility Dysfunction and Discharge Planning    PHYSICAL THERAPY ASSESSMENT   Patient is a 89 year old male admitted 11/17/2024 for sepsis.  Prior to admission, patient's baseline is ambulatory at home, was recently discharged back home after [facility] stay.  Patient is currently functioning below baseline with bed mobility, transfers, and gait.  Patient is requiring maximum assist as a result of the following impairments: decreased functional strength, decreased endurance/aerobic capacity, and medical status.  Physical Therapy will continue to follow for duration of hospitalization.    Patient will benefit from continued skilled PT Services to promote return to prior level of function and safety with continuous assistance and gradual rehabilitative therapy .    PLAN DURING HOSPITALIZATION  Nursing Mobility Recommendation : Lift Equipment  PT Device Recommendation: Mechanical lift  PT Treatment Plan: Bed mobility;Body mechanics;Endurance;Patient education;Family education;Range of motion;Neuromuscular re-educate;Gait training;Stoop training;Transfer training;Balance training  Rehab Potential : Good  Frequency (Obs): 5x/week     PHYSICAL THERAPY MEDICAL/SOCIAL HISTORY   History related to current admission: sepsis      Problem List  Principal Problem:    Sepsis without acute organ dysfunction, due to unspecified organism (HCC)  Active Problems:    Acute cystitis with hematuria      HOME SITUATION  Type of Home:  (from home with spouse, was discharged home from [facility] 1 day before admission)                                       Prior Level of [place]: from home with spouse     SUBJECTIVE  \"Whatever.\" - limited  participation    PHYSICAL THERAPY EXAMINATION   OBJECTIVE  Precautions: Bed/chair alarm  Fall Risk: High fall risk    WEIGHT BEARING RESTRICTION       PAIN ASSESSMENT     Location: body aches       COGNITION  Oriented x 2, disinterested in therapy     BALANCE  Static Sitting: Poor +  Dynamic Sitting: Poor +  Static Standing: Poor  Dynamic Standing: Poor -    AM-PAC '6-Clicks' INPATIENT SHORT FORM - BASIC MOBILITY  How much difficulty does the patient currently have...  Patient Difficulty: Turning over in bed (including adjusting bedclothes, sheets and blankets)?: Unable   Patient Difficulty: Sitting down on and standing up from a chair with arms (e.g., wheelchair, bedside commode, etc.): Unable   Patient Difficulty: Moving from lying on back to sitting on the side of the bed?: Unable   How much help from another person does the patient currently need...   Help from Another: Moving to and from a bed to a chair (including a wheelchair)?: Total   Help from Another: Need to walk in hospital room?: Total   Help from Another: Climbing 3-5 steps with a railing?: Total     AM-PAC Score:  Raw Score: 6   Approx Degree of Impairment: 100%   Standardized Score (AM-PAC Scale): 23.55   CMS Modifier (G-Code): CN    FUNCTIONAL ABILITY STATUS  Functional Mobility/Gait Assessment  Gait Assistance: Not tested (max x 2 SPT)  Rolling: maximum assist  Supine to Sit: maximum assist  Sit to Supine: maximum assist  Sit to Stand: maximum assist, max x 2 SPT     Exercise/Education Provided:  Bed mobility  Body mechanics  Functional activity tolerated  Transfer training    The patient's Approx Degree of Impairment: 100% has been calculated based on documentation in the [facility] '6 clicks' Inpatient Basic Mobility Short Form.  Research supports that patients with this level of impairment may benefit from LTAC.  Final disposition will be made by interdisciplinary medical team.    Patient End of Session: Up in chair;Needs met;RN aware of  session/findings;Call light within reach;All patient questions and concerns addressed;Family present    CURRENT GOALS  Goals to be met by: 12/10  Patient Goal Patient's self-stated goal is: unstated    Goal #1 Patient is able to demonstrate supine - sit EOB @ level: minimum assistance     Goal #1   Current Status    Goal #2 Patient is able to demonstrate transfers Sit to/from Stand at assistance level: minimum assistance with walker - rolling     Goal #2  Current Status    Goal #3 Patient is able to ambulate 25 feet with assist device: walker - rolling at assistance level: minimum assistance   Goal #3   Current Status            Goal #5 Patient to demonstrate independence with home activity/exercise instructions provided to patient in preparation for discharge.   Goal #5   Current Status    Goal #6    Goal #6  Current Status      Patient Evaluation Complexity Level:  History Moderate - 1 or 2 personal factors and/or co-morbidities   Examination of body systems Moderate - addressing a total of 3 or more elements   Clinical Presentation  Moderate - Evolving   Clinical Decision Making  Moderate Complexity     Therapeutic Activity:  12 minutes

## 2024-11-18 NOTE — PLAN OF CARE
No acute changes. Up in chair. Wife at bedside. Safety precautions in place.    Wife was concerned that patient would not tolerate 2 16 ounce bottles of thickened contrast after eating breakfast and lunch today, she stated she would prefer if the patient drank the contrast tomorrow morning and had the scan tomorrow morning. Notified JADE Tan and CT department. Endorsed to night shift RN, Divina.     Problem: Patient Centered Care  Goal: Patient preferences are identified and integrated in the patient's plan of care  Description: Interventions:  - What would you like us to know as we care for you? From home with wife  - Provide timely, complete, and accurate information to patient/family  - Incorporate patient and family knowledge, values, beliefs, and cultural backgrounds into the planning and delivery of care  - Encourage patient/family to participate in care and decision-making at the level they choose  - Honor patient and family perspectives and choices  Outcome: Progressing     Problem: SKIN/TISSUE INTEGRITY - ADULT  Goal: Skin integrity remains intact  Description: INTERVENTIONS  - Assess and document risk factors for pressure ulcer development  - Assess and document skin integrity  - Monitor for areas of redness and/or skin breakdown  - Initiate interventions, skin care algorithm/standards of care as needed  Outcome: Progressing  Goal: Incision(s), wounds(s) or drain site(s) healing without S/S of infection  Description: INTERVENTIONS:  - Assess and document risk factors for pressure ulcer development  - Assess and document skin integrity  - Assess and document dressing/incision, wound bed, drain sites and surrounding tissue  - Implement wound care per orders  - Initiate isolation precautions as appropriate  - Initiate Pressure Ulcer prevention bundle as indicated  Outcome: Progressing     Problem: HEMATOLOGIC - ADULT  Goal: Free from bleeding injury  Description: (Example usage: patient with low  platelets)  INTERVENTIONS:  - Avoid intramuscular injections, enemas and rectal medication administration  - Ensure safe mobilization of patient  - Hold pressure on venipuncture sites to achieve adequate hemostasis  - Assess for signs and symptoms of internal bleeding  - Monitor lab trends  - Patient is to report abnormal signs of bleeding to staff  - Avoid use of toothpicks and dental floss  - Use electric shaver for shaving  - Use soft bristle tooth brush  - Limit straining and forceful nose blowing  Outcome: Progressing     Problem: MUSCULOSKELETAL - ADULT  Goal: Return mobility to safest level of function  Description: INTERVENTIONS:  - Assess patient stability and activity tolerance for standing, transferring and ambulating w/ or w/o assistive devices  - Assist with transfers and ambulation using safe patient handling equipment as needed  - Ensure adequate protection for wounds/incisions during mobilization  - Obtain PT/OT consults as needed  - Advance activity as appropriate  - Communicate ordered activity level and limitations with patient/family  Outcome: Progressing     Problem: NEUROLOGICAL - ADULT  Goal: Absence of seizures  Description: INTERVENTIONS  - Monitor for seizure activity  - Administer anti-seizure medications as ordered  - Monitor neurological status  Outcome: Progressing

## 2024-11-18 NOTE — PLAN OF CARE
Pt sleepy. Follows commands. Vitals stable. No fevers this afternoon. Pt is eating. Wife at bedside and updated on plan of care  Problem: Patient Centered Care  Goal: Patient preferences are identified and integrated in the patient's plan of care  Description: Interventions:  - What would you like us to know as we care for you?   - Provide timely, complete, and accurate information to patient/family  - Incorporate patient and family knowledge, values, beliefs, and cultural backgrounds into the planning and delivery of care  - Encourage patient/family to participate in care and decision-making at the level they choose  - Honor patient and family perspectives and choices  Outcome: Progressing     Problem: Patient/Family Goals  Goal: Patient/Family Long Term Goal  Description: Patient's Long Term Goal:     Interventions:  -   - See additional Care Plan goals for specific interventions  Outcome: Progressing  Goal: Patient/Family Short Term Goal  Description: Patient's Short Term Goal:     Interventions:   -   - See additional Care Plan goals for specific interventions  Outcome: Progressing

## 2024-11-18 NOTE — CM/SW NOTE
11/18/24 1400   CM/SW Referral Data   Referral Source    Reason for Referral Discharge planning   Informant Spouse/Significant Other   Medical Hx   Does patient have an established PCP? Yes  (Gigi Teraciarra)   Patient Info   Patient's Current Mental Status at Time of Assessment Confused or unable to complete assessment   Patient's Home Environment House   Number of Levels in Home 2   Number of Stair in Home 20   Patient lives with Spouse/Significant other   Patient Status Prior to Admission   Independent with ADLs and Mobility Yes   Services in place prior to admission DME/Supplies at home   Type of DME/Supplies Standard Walker;Wheelchair;Shower Chair   Discharge Needs   Anticipated D/C needs Subacute rehab     Pt discussed during nursing rounds. Dx sepsis 2/2 UTI, dementia. Home w/spouse, Pt now WC bound after multiple hospitalizations. [facility] discharge [facility] a couple of days ago after he exhausted his 100 MC days for [facility]. Anticipated therapy need: Gradual Rehabilitative Therapy. Pt's spouse confirmed that he was discharged from [facility] after exhausting his remaining MC days. Pt's spouse requesting respite placement until she can come up with a plan for LTC. Spouse requested [facility] Referrals sent in AIDIN, each facility notified that pt will dc for 2 week respite stay. Tuesday SW will need to follow up on cost and choice.    Plan: TBD    / to remain available for support and/or discharge planning.     GURJIT Hester    [phone]

## 2024-11-18 NOTE — CDS QUERY
Dear Dr. Swain,   Clinical information (provided below) indicates pneumonia.     Please specify type of pneumonia in the progress notes:    (  )  Aspiration pneumonia                  Please document specific aspirate (food, liquids, etc.)    (  )  Bacterial (specify organism)____________     ( X ) Viral pneumonia    (  ) Other (please specify)________     2. Please specify the organism causing the pneumonia, if known     Note: CAP (Community-acquired), HAP (Hospital-acquired), and HCAP (Healthcare-associated) indicate where the pneumonia was acquired, not a specific type.   _______________________________________________________________________   Clinical Indicators:   ID: Community-acquired pneumonia  11/17/2024 CXR: Mild bilateral lower lobe reticulonodular densities and subtle bilateral lower lobe airspace/alveolar opacities.  Findings may be secondary to interstitial edema or mild pneumonia (either from bacterial or atypical viral etiology).  Treatment: Rocephin IVPB, Vancomycin IVPB  Risk factors: Recent discharge from rehab (11-16)    If you have any questions, please contact Clinical :  Elvie Esparza RN CCDS at [phone]     Thank You!     THIS FORM IS A PERMANENT PART OF THE MEDICAL RECORD

## 2024-11-18 NOTE — OCCUPATIONAL THERAPY NOTE
OCCUPATIONAL THERAPY EVALUATION - INPATIENT     Room Number: 549/549-A  Evaluation Date: 11/18/2024  Type of Evaluation: Initial  Presenting Problem: Sepsis    Physician Order: IP Consult to Occupational Therapy  Reason for Therapy: ADL/IADL Dysfunction and Discharge Planning    OCCUPATIONAL THERAPY ASSESSMENT   Patient is a 89 year old male admitted 11/17/2024 for Sepsis; weakness.  Prior to admission, patient's baseline was from home from a rehab stay <1 hour per spouse; was up and ambulatory with therapy staff but at baseline pt does require assist for self care.  Patient is currently functioning below baseline with ADLs and functional mobility.  Patient is requiring up to total A with ADL and Max A x2 for fxl mobility as a result of the following impairments: decreased functional strength, decreased functional reach, decreased endurance, impaired standing and sitting balance, impaired motor planning, decreased muscular endurance, cognitive deficits (low arousal, orientation, command following), medical status, decreased insight to deficits, and decreased safety awareness. Occupational Therapy will continue to follow for duration of hospitalization.    Patient will benefit from continued skilled OT Services to promote return to prior level of function and safety with continuous assistance and gradual rehabilitative therapy.    PLAN DURING HOSPITALIZATION  OT Device Recommendations: TBD  OT Treatment Plan: Balance activities;Energy conservation/work simplification techniques;ADL training;Functional transfer training;Endurance training;Patient/Family education;Patient/Family training;Compensatory technique education     OCCUPATIONAL THERAPY MEDICAL/SOCIAL HISTORY   Problem List   Principal Problem:    Sepsis without acute organ dysfunction, due to unspecified organism (HCC)  Active Problems:    Acute cystitis with hematuria    HOME SITUATION  Type of Home: -- (from home with spouse, was discharged home from [facility]  day before admission)      SUBJECTIVE  Patient agitated and lethargic; wanting to stay in bed but encouraged by supportive spouse to sit up for breakfast     OCCUPATIONAL THERAPY EXAMINATION   OBJECTIVE  Precautions: Bed/chair alarm  Fall Risk: High fall risk    WEIGHT BEARING RESTRICTION     PAIN ASSESSMENT  Ratin    COGNITION  Overall Cognitive Status:  Impaired    RANGE OF MOTION   Upper extremity ROM is within functional limits     STRENGTH ASSESSMENT  Upper extremity strength is within functional limits     ACTIVITIES OF DAILY LIVING ASSESSMENT  AM-PAC ‘6-Clicks’ Inpatient Daily Activity Short Form  How much help from another person does the patient currently need…  -   Putting on and taking off regular lower body clothing?: A Lot  -   Bathing (including washing, rinsing, drying)?: A Lot  -   Toileting, which includes using toilet, bedpan or urinal? : A Lot  -   Putting on and taking off regular upper body clothing?: A Lot  -   Taking care of personal grooming such as brushing teeth?: A Little  -   Eating meals?: A Little    AM-PAC Score:  Score: 14  Approx Degree of Impairment: 59.67%  Standardized Score (AM-PAC Scale): 33.39  CMS Modifier (G-Code): CK    FUNCTIONAL TRANSFER ASSESSMENT  Sit to Stand: Edge of Bed  Edge of Bed: Maximum Assist (x2)    BED MOBILITY  Rolling: Maximum Assist  Supine to Sit : Maximum Assist  Sit to Supine (OT): Maximum Assist  Scooting: Max A    BALANCE ASSESSMENT  Static Sitting: Minimal Assist  Static Standing: Maximum Assist (x2)    FUNCTIONAL ADL ASSESSMENT  Eating: Minimal Assist  Grooming Seated: Not Tested  Bathing Seated: Maximum Assist  UB Dressing Seated: Minimal Assist  LB Dressing Seated: Dependent  Toileting Seated: Dependent  Skilled Therapy Provided: Patient care coordinated with PT; RN provided consent to proceed; pt only tolerating from bed to chair; confused and slightly agitated but not combative; supportive spouse present and providing encouagement; pt  requiring x2A for all mobility and position changes; dependent for LE self care; sitting balance at EOB with Min-Mod A; Max A x2 for Squat pivot t/f to bedside chair; alarm set; Continue skilled occupational therapy while IP to maximize patient function and increase patient participation, safety, and independence with basic ADL and everyday activities.       EDUCATION PROVIDED  Patient Education : Role of Occupational Therapy; Plan of Care; Discharge Recommendations  Patient's Response to Education: Demonstrates Disinterest  Family/Caregiver Education : Role of Occupational Therapy; Discharge Recommendations; Plan of Care  Family/Caregiver's Response to Education: Verbalized Understanding    The patient's Approx Degree of Impairment: 59.67% has been calculated based on documentation in the [facility] '6 clicks' Inpatient Daily Activity Short Form.  Research supports that patients with this level of impairment may benefit from GR.  Final disposition will be made by interdisciplinary medical team.    Patient End of Session: Up in chair;Needs met;Call light within reach;RN aware of session/findings;All patient questions and concerns addressed;Alarm set    OT Goals  Patient self-stated goal is: no goals stated      Patient will complete LE dressing with Min A  Comment:     Patient will complete toilet transfer with Min A  Comment:     Patient will complete self care task at sink level with Min A   Comment:     Comment:         Goals  on:   Frequency:3x week     Patient Evaluation Complexity Level:   Occupational Profile/Medical History MODERATE - Expanded review of history including review of medical or therapy record   Specific performance deficits impacting engagement in ADL/IADL MODERATE  3 - 5 performance deficits   Client Assessment/Performance Deficits MODERATE - Comorbidities and min to mod modifications of tasks    Clinical Decision Making MODERATE - Analysis of occupational profile, detailed assessments,  several treatment options    Overall Complexity MODERATE     OT Session Time: 15 minutes  Self-Care Home Management: 0 minutes  Therapeutic Activity: 15 minutes    Cody Santos, Occupational Therapist, OTR/L ext [phone]

## 2024-11-18 NOTE — PROGRESS NOTES
INFECTIOUS DISEASE PROGRESS NOTE  [facility]  part of [facility] ID PROGRESS NOTE    Fernando Ramos Jr. Patient Status:  Inpatient    1935 MRN [id number]   Location [facility] 5SW/SE Attending Aline Glaser MD   Hosp Day # 1 PCP ALINE GLASER MD     Subjective:  ROS reviewed. Wife at bedside and she reports he is doing better today. Tmax 100.    ASSESSMENT:    Antibiotics: Cefepime  Vanc and CTX     # Fevers with recent cysto with stent replacement  at OSH   -Urine cx PSAR, E. coli  # Dementia  # Parkinson's disease        PLAN:  - Stop ceftriaxone and start cefepime.  - Check CT A/P.  - Follow fever curve, wbc.  - Reviewed labs, micro, imaging reports, available old records.  - Discussed with wife, RN.     History of Present Illness:  89 year old male with history of Parkinson's, dementia, and BPH who presents for altered mental status and fever.  Patient was recently discharged from rehab and noted to be more confused than his baseline and thus his wife brought him in. Had recent cysto with stent replacement on  at OSH.     In the ED, patient was febrile to 102.4, but hemodynamically stable.  Labs with WBC 21.7, lactic acid 2.1, urinalysis with 500 leukocyte esterase, >50 WBC.  CXR demonstrating bilateral lower lobe densities and alveolar opacities.    Physical Exam:  /78 (BP Location: Right arm)   Pulse 72   Temp 98.1 °F (36.7 °C) (Axillary)   Resp 18   Wt 145 lb (65.8 kg)   SpO2 97%   BMI 22.05 kg/m²     Gen:   Awake, in bed  HEENT:  EOMI, neck supple  CV/lungs:  RRR, CTAB  Abdom:  Soft, NT/ND, +BS  Skin/extrem:  No rashes, no c/c/e  :   Primofit+  Lines:  PIV+    Laboratory Data: Reviewed    Microbiology: Reviewed    Radiology: Reviewed      [facility]  [phone]  2024

## 2024-11-19 ENCOUNTER — APPOINTMENT (OUTPATIENT)
Dept: CT IMAGING | Facility: HOSPITAL | Age: 89
End: 2024-11-19
Attending: PHYSICIAN ASSISTANT
Payer: MEDICARE

## 2024-11-19 ENCOUNTER — APPOINTMENT (OUTPATIENT)
Dept: CT IMAGING | Facility: HOSPITAL | Age: 89
DRG: 698 | End: 2024-11-19
Attending: PHYSICIAN ASSISTANT
Payer: MEDICARE

## 2024-11-19 LAB
ANION GAP SERPL CALC-SCNC: 3 MMOL/L (ref 0–18)
BASOPHILS # BLD AUTO: 0.02 X10(3) UL (ref 0–0.2)
BASOPHILS NFR BLD AUTO: 0.2 %
BUN BLD-MCNC: 27 MG/DL (ref 9–23)
BUN/CREAT SERPL: 33.3 (ref 10–20)
CALCIUM BLD-MCNC: 9.5 MG/DL (ref 8.7–10.4)
CHLORIDE SERPL-SCNC: 118 MMOL/L (ref 98–112)
CO2 SERPL-SCNC: 29 MMOL/L (ref 21–32)
CREAT BLD-MCNC: 0.81 MG/DL
DEPRECATED RDW RBC AUTO: 52.3 FL (ref 35.1–46.3)
EGFRCR SERPLBLD CKD-EPI 2021: 85 ML/MIN/1.73M2 (ref 60–?)
EOSINOPHIL # BLD AUTO: 0.01 X10(3) UL (ref 0–0.7)
EOSINOPHIL NFR BLD AUTO: 0.1 %
ERYTHROCYTE [DISTWIDTH] IN BLOOD BY AUTOMATED COUNT: 13.6 % (ref 11–15)
GLUCOSE BLD-MCNC: 108 MG/DL (ref 70–99)
HCT VFR BLD AUTO: 29.3 %
HGB BLD-MCNC: 9.5 G/DL
IMM GRANULOCYTES # BLD AUTO: 0.04 X10(3) UL (ref 0–1)
IMM GRANULOCYTES NFR BLD: 0.5 %
LYMPHOCYTES # BLD AUTO: 0.73 X10(3) UL (ref 1–4)
LYMPHOCYTES NFR BLD AUTO: 9 %
MCH RBC QN AUTO: 33.8 PG (ref 26–34)
MCHC RBC AUTO-ENTMCNC: 32.4 G/DL (ref 31–37)
MCV RBC AUTO: 104.3 FL
MONOCYTES # BLD AUTO: 0.62 X10(3) UL (ref 0.1–1)
MONOCYTES NFR BLD AUTO: 7.7 %
NEUTROPHILS # BLD AUTO: 6.65 X10 (3) UL (ref 1.5–7.7)
NEUTROPHILS # BLD AUTO: 6.65 X10(3) UL (ref 1.5–7.7)
NEUTROPHILS NFR BLD AUTO: 82.5 %
OSMOLALITY SERPL CALC.SUM OF ELEC: 316 MOSM/KG (ref 275–295)
PLATELET # BLD AUTO: 129 10(3)UL (ref 150–450)
POTASSIUM SERPL-SCNC: 3.7 MMOL/L (ref 3.5–5.1)
RBC # BLD AUTO: 2.81 X10(6)UL
SODIUM SERPL-SCNC: 150 MMOL/L (ref 136–145)
WBC # BLD AUTO: 8.1 X10(3) UL (ref 4–11)

## 2024-11-19 PROCEDURE — 92526 ORAL FUNCTION THERAPY: CPT

## 2024-11-19 PROCEDURE — 85025 COMPLETE CBC W/AUTO DIFF WBC: CPT | Performed by: INTERNAL MEDICINE

## 2024-11-19 PROCEDURE — 74177 CT ABD & PELVIS W/CONTRAST: CPT | Performed by: PHYSICIAN ASSISTANT

## 2024-11-19 PROCEDURE — 80048 BASIC METABOLIC PNL TOTAL CA: CPT | Performed by: INTERNAL MEDICINE

## 2024-11-19 NOTE — CM/SW NOTE
Pt discussed in RN DC Rounds. SW followed up with [facility] rates for [facility] and rehab, -475.00 for private, 405.00 for semi private. BTE - 245.00- max of 14 days, payment up front. Wife was called and notified above, wife wishes for [facility] private. SW reserved [facility] via aidin, notified that they are choice, and to reach out to the wife for payment for private room.     Plan: Pending medical clearance, DC to [facility] and rehab, * paymeny    SW/CM to remain available for support and/or discharge planning.     Nuvia Sarah, MSW, LSW   x [phone]

## 2024-11-19 NOTE — PROGRESS NOTES
INFECTIOUS DISEASE PROGRESS NOTE  [facility]  part of [facility] ID PROGRESS NOTE    Fernando Ramos Jr. Patient Status:  Inpatient    1935 MRN [id number]   Location [facility] 5SW/SE Attending Aline Glaser MD   Hosp Day # 2 PCP ALINE GLASER MD     Subjective:  ROS reviewed. In bed. Just returned from CT scan.    ASSESSMENT:    Antibiotics: Cefepime  Vanc and CTX     # Fevers with recent cysto with stent replacement  at OSH   -Urine cx PSAR, ESBL E. Coli   -CT A/P with ureteritis  # Dementia  # Parkinson's disease        PLAN:  - Stop cefepime and start meropenem. Anticipate continued IV abx on DC via midline.  - Follow fever curve, wbc.  - Reviewed labs, micro, imaging reports, available old records.  - Discussed with wife, RN.     History of Present Illness:  89 year old male with history of Parkinson's, dementia, and BPH who presents for altered mental status and fever.  Patient was recently discharged from rehab and noted to be more confused than his baseline and thus his wife brought him in. Had recent cysto with stent replacement on  at OSH.     In the ED, patient was febrile to 102.4, but hemodynamically stable.  Labs with WBC 21.7, lactic acid 2.1, urinalysis with 500 leukocyte esterase, >50 WBC.  CXR demonstrating bilateral lower lobe densities and alveolar opacities.    Physical Exam:  /66 (BP Location: Right arm)   Pulse 57   Temp 98.6 °F (37 °C) (Oral)   Resp 16   Wt 145 lb (65.8 kg)   SpO2 93%   BMI 22.05 kg/m²     Gen:   Awake, in bed  HEENT:  EOMI, neck supple  CV/lungs:  RRR, CTAB  Abdom:  Soft, NT/ND, +BS  Skin/extrem:  No rashes, no c/c/e  :   Primofit+  Lines:  PIV+    Laboratory Data: Reviewed    Microbiology: Reviewed    Radiology: Reviewed      [facility]  [phone]  2024

## 2024-11-19 NOTE — PROGRESS NOTES
[facility]  part of [facility]    Progress Note    Fernando Ramos Jr. Patient Status:  Inpatient    1935 MRN [id number]   Location [facility] 5SW/SE Attending Aline Glaser MD   Hosp Day # 2 PCP ALINE GLASER MD     Chief Complaint: fever    Subjective:     Constitutional:  Positive for activity change and appetite change.   Respiratory:  Negative for cough and shortness of breath.    Cardiovascular:  Negative for chest pain and palpitations.   Gastrointestinal:  Negative for abdominal pain.   Pt sitting in bed, A&Ox3. Wife at bedside, questions answered. No overnight events.     Objective:   Blood pressure 143/66, pulse 57, temperature 98.6 °F (37 °C), temperature source Oral, resp. rate 16, weight 145 lb (65.8 kg), SpO2 93%.  Physical Exam  Constitutional:       Appearance: He is ill-appearing.   HENT:      Head: Atraumatic.   Cardiovascular:      Rate and Rhythm: Normal rate and regular rhythm.   Pulmonary:      Effort: Pulmonary effort is normal.      Breath sounds: Normal breath sounds.   Abdominal:      General: Bowel sounds are normal.   Skin:     General: Skin is warm.   Neurological:      Mental Status: He is alert. Mental status is at baseline.   Psychiatric:         Mood and Affect: Mood normal.         Thought Content: Thought content normal.         Results:   Lab Results   Component Value Date    WBC 8.1 2024    HGB 9.5 (L) 2024    HCT 29.3 (L) 2024    .0 (L) 2024    CREATSERUM 0.81 2024    BUN 27 (H) 2024     (H) 2024    K 3.7 2024     (H) 2024    CO2 29.0 2024     (H) 2024    CA 9.5 2024    ALB 4.4 2024    ALKPHO 101 2024    BILT 0.5 2024    TP 8.4 (H) 2024    AST 26 2024    ALT 21 2024    PTT 30.1 2024    INR 1.31 (H) 2024    TSH 0.889 2024    LIP 48 2023    ESRML 26 (H) 2021    CRP 1.22 (H)  05/03/2021    MG 1.9 08/02/2024    PHOS 3.5 01/21/2024    TROPHS 20 01/17/2024     03/14/2023    B12 418 03/13/2024       No results found.        Assessment & Plan:     Sepsis without acute organ dysfunction, due to unspecified organism (HCC)  Cont IV cefepime  Cont IVF  Elevated lactic acid on admission, now normalized  WBC of 21K, now 8K  Blood cx NGTD  ID consulted      Acute cystitis with hematuria  Cont IVF   Cont IV abx as per ID  ID following, recs appreciated  Urine cx >100K pseudomonas, >100K ESBL      Dementia  Stable  At baseline  Cont aricept      HTN  Cont amlodipine      Parkinson's disease  Cont sinemet  Fall precautions  PT/OT      Achalasia  Swallow eval  Diet as per ST      Depression  Cont Abilify, remeron, effexor      FULL CODE  DVT px: subcutaneous heparin      Dispo: [facility] when medically stable      **Certification      PHYSICIAN Certification of Need for Inpatient Hospitalization - Initial Certification    Patient will require inpatient services that will reasonably be expected to span two midnight's based on the clinical documentation in H+P.   Based on patients current state of illness, I anticipate that, after discharge, patient will require TBD.      Katerina Swain MD  11/19/2024

## 2024-11-19 NOTE — PLAN OF CARE
Problem: Patient Centered Care  Goal: Patient preferences are identified and integrated in the patient's plan of care  Description: Interventions:  - What would you like us to know as we care for you? \"I am from home with my wife.\"  - Provide timely, complete, and accurate information to patient/family  - Incorporate patient and family knowledge, values, beliefs, and cultural backgrounds into the planning and delivery of care  - Encourage patient/family to participate in care and decision-making at the level they choose  - Honor patient and family perspectives and choices  Outcome: Progressing     Problem: Patient/Family Goals  Goal: Patient/Family Long Term Goal  Description: Patient's Long Term Goal: \"to be discharge\"    Interventions:  -Follow up MD appt  -Take meds as prescribed  -Use of assistive device if needed  - See additional Care Plan goals for specific interventions  Outcome: Progressing  Goal: Patient/Family Short Term Goal  Description: Patient's Short Term Goal: \"to prevent pain and infection\"    Interventions:   -Monitor VS  -Check labs results  -Administer IVF and IV ABT as ordered  -Provide pain meds as prescribed  - See additional Care Plan goals for specific interventions  Outcome: Progressing     Problem: SKIN/TISSUE INTEGRITY - ADULT  Goal: Skin integrity remains intact  Description: INTERVENTIONS  - Assess and document risk factors for pressure ulcer development  - Assess and document skin integrity  - Monitor for areas of redness and/or skin breakdown  - Initiate interventions, skin care algorithm/standards of care as needed  Outcome: Progressing  Goal: Incision(s), wounds(s) or drain site(s) healing without S/S of infection  Description: INTERVENTIONS:  - Assess and document risk factors for pressure ulcer development  - Assess and document skin integrity  - Assess and document dressing/incision, wound bed, drain sites and surrounding tissue  - Implement wound care per orders  - Initiate  isolation precautions as appropriate  - Initiate Pressure Ulcer prevention bundle as indicated  Outcome: Progressing     Problem: HEMATOLOGIC - ADULT  Goal: Free from bleeding injury  Description: (Example usage: patient with low platelets)  INTERVENTIONS:  - Avoid intramuscular injections, enemas and rectal medication administration  - Ensure safe mobilization of patient  - Hold pressure on venipuncture sites to achieve adequate hemostasis  - Assess for signs and symptoms of internal bleeding  - Monitor lab trends  - Patient is to report abnormal signs of bleeding to staff  - Avoid use of toothpicks and dental floss  - Use electric shaver for shaving  - Use soft bristle tooth brush  - Limit straining and forceful nose blowing  Outcome: Progressing     Problem: MUSCULOSKELETAL - ADULT  Goal: Return mobility to safest level of function  Description: INTERVENTIONS:  - Assess patient stability and activity tolerance for standing, transferring and ambulating w/ or w/o assistive devices  - Assist with transfers and ambulation using safe patient handling equipment as needed  - Ensure adequate protection for wounds/incisions during mobilization  - Obtain PT/OT consults as needed  - Advance activity as appropriate  - Communicate ordered activity level and limitations with patient/family  Outcome: Progressing     Problem: NEUROLOGICAL - ADULT  Goal: Absence of seizures  Description: INTERVENTIONS  - Monitor for seizure activity  - Administer anti-seizure medications as ordered  - Monitor neurological status  Outcome: Progressing     Problem: Impaired Functional Mobility  Goal: Achieve highest/safest level of mobility/gait  Description: Interventions:  - Assess patient's functional ability and stability  - Promote increasing activity/tolerance for mobility and gait  - Educate and engage patient/family in tolerated activity level and precautions  - Recommend use of sit-stand lift for transfers  Outcome: Progressing      Problem: Impaired Activities of Daily Living  Goal: Achieve highest/safest level of independence in self care  Description: Interventions:  - Assess ability and encourage patient to participate in ADLs to maximize function  - Promote sitting position while performing ADLs such as feeding, grooming, and bathing  - Educate and encourage patient/family in tolerated functional activity level and precautions during self-care  - Encourage patient to incorporate impaired side during daily activities to promote function  Outcome: Progressing     Problem: Impaired Swallowing  Goal: Minimize aspiration risk  Description: Interventions:  - Patient should be alert and upright for all feedings (90 degrees preferred)  - Offer food and liquids at a slow rate  - No straws  - Encourage small bites of food and small sips of liquid  - Offer pills one at a time, crush or deliver with applesauce as needed  - Discontinue feeding and notify MD (or speech pathologist) if coughing or persistent throat clearing or wet/gurgly vocal quality is noted  Outcome: Progressing     Problem: Impaired Cognition  Goal: Patient will exhibit improved attention, thought processing and/or memory  Description: Interventions:  - Minimize distractions in the room when full attention is required  Outcome: Progressing     Problem: PAIN - ADULT  Goal: Verbalizes/displays adequate comfort level or patient's stated pain goal  Description: INTERVENTIONS:  - Encourage pt to monitor pain and request assistance  - Assess pain using appropriate pain scale  - Administer analgesics based on type and severity of pain and evaluate response  - Implement non-pharmacological measures as appropriate and evaluate response  - Consider cultural and social influences on pain and pain management  - Manage/alleviate anxiety  - Utilize distraction and/or relaxation techniques  - Monitor for opioid side effects  - Notify MD/LIP if interventions unsuccessful or patient reports new  pain  - Anticipate increased pain with activity and pre-medicate as appropriate  Outcome: Progressing

## 2024-11-19 NOTE — PLAN OF CARE
Problem: Patient Centered Care  Goal: Patient preferences are identified and integrated in the patient's plan of care  Description: Interventions:  - What would you like us to know as we care for you? \"I am from home with my wife.\"  - Provide timely, complete, and accurate information to patient/family  - Incorporate patient and family knowledge, values, beliefs, and cultural backgrounds into the planning and delivery of care  - Encourage patient/family to participate in care and decision-making at the level they choose  - Honor patient and family perspectives and choices  Outcome: Progressing     Problem: Patient/Family Goals  Goal: Patient/Family Long Term Goal  Description: Patient's Long Term Goal: \"to be discharged\"    Interventions:  -Follow up MD appt  -Take meds as prescribed  -Use of assistive device if needed  - See additional Care Plan goals for specific interventions  Outcome: Progressing  Goal: Patient/Family Short Term Goal  Description: Patient's Short Term Goal: \"to prevent pain and infection\"    Interventions:   -Monitor VS  -Check labs results  -Administer IVF and IV ABT as ordered  -Provide pain meds as prescribed,  - See additional Care Plan goals for specific interventions  Outcome: Progressing     Problem: SKIN/TISSUE INTEGRITY - ADULT  Goal: Skin integrity remains intact  Description: INTERVENTIONS  - Assess and document risk factors for pressure ulcer development  - Assess and document skin integrity  - Monitor for areas of redness and/or skin breakdown  - Initiate interventions, skin care algorithm/standards of care as needed  Outcome: Progressing  Goal: Incision(s), wounds(s) or drain site(s) healing without S/S of infection  Description: INTERVENTIONS:  - Assess and document risk factors for pressure ulcer development  - Assess and document skin integrity  - Assess and document dressing/incision, wound bed, drain sites and surrounding tissue  - Implement wound care per orders  - Initiate  isolation precautions as appropriate  - Initiate Pressure Ulcer prevention bundle as indicated  Outcome: Progressing     Problem: HEMATOLOGIC - ADULT  Goal: Free from bleeding injury  Description: (Example usage: patient with low platelets)  INTERVENTIONS:  - Avoid intramuscular injections, enemas and rectal medication administration  - Ensure safe mobilization of patient  - Hold pressure on venipuncture sites to achieve adequate hemostasis  - Assess for signs and symptoms of internal bleeding  - Monitor lab trends  - Patient is to report abnormal signs of bleeding to staff  - Avoid use of toothpicks and dental floss  - Use electric shaver for shaving  - Use soft bristle tooth brush  - Limit straining and forceful nose blowing  Outcome: Progressing     Problem: MUSCULOSKELETAL - ADULT  Goal: Return mobility to safest level of function  Description: INTERVENTIONS:  - Assess patient stability and activity tolerance for standing, transferring and ambulating w/ or w/o assistive devices  - Assist with transfers and ambulation using safe patient handling equipment as needed  - Ensure adequate protection for wounds/incisions during mobilization  - Obtain PT/OT consults as needed  - Advance activity as appropriate  - Communicate ordered activity level and limitations with patient/family  Outcome: Progressing     Problem: NEUROLOGICAL - ADULT  Goal: Absence of seizures  Description: INTERVENTIONS  - Monitor for seizure activity  - Administer anti-seizure medications as ordered  - Monitor neurological status  Outcome: Progressing     Problem: Impaired Functional Mobility  Goal: Achieve highest/safest level of mobility/gait  Description: Interventions:  - Assess patient's functional ability and stability  - Promote increasing activity/tolerance for mobility and gait  - Educate and engage patient/family in tolerated activity level and precautions  Outcome: Progressing     Problem: Impaired Activities of Daily Living  Goal:  Achieve highest/safest level of independence in self care  Description: Interventions:  - Assess ability and encourage patient to participate in ADLs to maximize function  - Promote sitting position while performing ADLs such as feeding, grooming, and bathing  - Educate and encourage patient/family in tolerated functional activity level and precautions during self-care  Outcome: Progressing     Problem: Impaired Swallowing  Goal: Minimize aspiration risk  Description: Interventions:  - Patient should be alert and upright for all feedings (90 degrees preferred)  - Offer food and liquids at a slow rate  - No straws  - Encourage small bites of food and small sips of liquid  - Offer pills one at a time, crush or deliver with applesauce as needed  - Discontinue feeding and notify MD (or speech pathologist) if coughing or persistent throat clearing or wet/gurgly vocal quality is noted  Outcome: Progressing     Problem: Impaired Cognition  Goal: Patient will exhibit improved attention, thought processing and/or memory  Description: Interventions:  Outcome: Progressing     Problem: PAIN - ADULT  Goal: Verbalizes/displays adequate comfort level or patient's stated pain goal  Description: INTERVENTIONS:  - Encourage pt to monitor pain and request assistance  - Assess pain using appropriate pain scale  - Administer analgesics based on type and severity of pain and evaluate response  - Implement non-pharmacological measures as appropriate and evaluate response  - Consider cultural and social influences on pain and pain management  - Manage/alleviate anxiety  - Utilize distraction and/or relaxation techniques  - Monitor for opioid side effects  - Notify MD/LIP if interventions unsuccessful or patient reports new pain  - Anticipate increased pain with activity and pre-medicate as appropriate  Outcome: Progressing

## 2024-11-19 NOTE — SLP NOTE
SPEECH DAILY NOTE - INPATIENT    ASSESSMENT & PLAN   ASSESSMENT  PPE REQUIRED. THIS THERAPIST WORE GLOVES AND GOWN FOR DURATION OF SESSION. HANDS WASHED UPON ENTRANCE/EXIT.    SLP f/u for ongoing dysphagia tx/meal assessment per recommendations of minced & moist/mildly thick liquids per BSE. RN reports pt tolerates diet and medication well with no overt clinical s/s aspiration. Pt denies any swallowing challenges.     Pt positioned upright in bed, alert/cooperative/spouse present. Pt afebrile, tolerating room air with oxygen status 93% prior to the start of oral trials. SLP reviewed aspiration precautions and safe swallowing compensatory strategies with the patient. Safe swallow guidelines remain written on the white board in purple. Patient and family v/u. Provided 1:1 assistance, pt tolerates soft solids and mildly thick liquids via tsp with no overt clinical signs/symptoms of aspiration. Oxygen status remained stable t/o the entire session. Recommend remain on current diet with adherence to aspiration precautions and swallow strategies. No further swallow services warranted at this time. Please re consult if needed. RN alerted with results and recommendations.     MOST RECENT CXR 11/17  CONCLUSION:   Mild bilateral lower lobe reticulonodular densities and subtle bilateral lower lobe airspace/alveolar opacities.  Findings may be secondary to interstitial edema or mild pneumonia (either from bacterial or atypical viral etiology).  Continued follow-up imaging recommended to ensure resolution.       Diet Recommendations - Solids: Mechanical soft ground/ Minced & Moist  Diet Recommendations - Liquids: Nectar thick liquids/ Mildly thick    Compensatory Strategies Recommended: Slow rate;Small bites and sips;Alternate consistencies  Aspiration Precautions: Slow rate;Small bites and sips;Cueing to swallow;Upright position  Medication Administration Recommendations: Crushed in puree    Patient Experiencing Pain: No               Treatment Plan  Treatment Plan/Recommendations: No further inpatient SLP service warranted;Aspiration precautions    Interdisciplinary Communication: Discussed with RN  Plan posted at bedside          GOALS  Goal #1 The patient will tolerate minced and moist solid consistency and mildly thick liquids without overt signs or symptoms of aspiration with 100 % accuracy over 2 session(s).  Goal Met 11/19   Goal #2 The patient/family/caregiver will demonstrate understanding and implementation of aspiration precautions and swallow strategies independently over 2 session(s).    Goal Met 11/19     FOLLOW UP  Follow Up Needed (Documentation Required): No  SLP Follow-up Date: 11/19/24  Number of Visits to Meet Established Goals: 2    Session: 1    If you have any questions, please contact SULEMA Man M.S. CCC-SLP  Speech Language Pathologist  Phone Number Ext. [phone]

## 2024-11-20 ENCOUNTER — APPOINTMENT (OUTPATIENT)
Dept: PICC SERVICES | Facility: HOSPITAL | Age: 89
DRG: 698 | End: 2024-11-20
Attending: PHYSICIAN ASSISTANT
Payer: MEDICARE

## 2024-11-20 ENCOUNTER — APPOINTMENT (OUTPATIENT)
Dept: PICC SERVICES | Facility: HOSPITAL | Age: 89
End: 2024-11-20
Attending: PHYSICIAN ASSISTANT
Payer: MEDICARE

## 2024-11-20 PROCEDURE — 05HC33Z INSERTION OF INFUSION DEVICE INTO LEFT BASILIC VEIN, PERCUTANEOUS APPROACH: ICD-10-PCS | Performed by: INTERNAL MEDICINE

## 2024-11-20 PROCEDURE — 36410 VNPNXR 3YR/> PHY/QHP DX/THER: CPT

## 2024-11-20 NOTE — PLAN OF CARE
Problem: Patient Centered Care  Goal: Patient preferences are identified and integrated in the patient's plan of care  Description: Interventions:  - What would you like us to know as we care for you? \"I am from home with my wife.\"  - Provide timely, complete, and accurate information to patient/family  - Incorporate patient and family knowledge, values, beliefs, and cultural backgrounds into the planning and delivery of care  - Encourage patient/family to participate in care and decision-making at the level they choose  - Honor patient and family perspectives and choices  Outcome: Progressing     Problem: Patient/Family Goals  Goal: Patient/Family Long Term Goal  Description: Patient's Long Term Goal: \"to be discharged\"    Interventions:  -Follow up MD appt  -Take meds as prescribed  -Use of assistive device if needed  - See additional Care Plan goals for specific interventions  Outcome: Progressing  Goal: Patient/Family Short Term Goal  Description: Patient's Short Term Goal: \"to prevent pain and infection\"    Interventions:   -Monitor VS  -Check labs results  -Administer IVF and IV ABT as ordered  -Provide pain meds as prescribed,  - See additional Care Plan goals for specific interventions  Outcome: Progressing     Problem: SKIN/TISSUE INTEGRITY - ADULT  Goal: Skin integrity remains intact  Description: INTERVENTIONS  - Assess and document risk factors for pressure ulcer development  - Assess and document skin integrity  - Monitor for areas of redness and/or skin breakdown  - Initiate interventions, skin care algorithm/standards of care as needed  Outcome: Progressing  Goal: Incision(s), wounds(s) or drain site(s) healing without S/S of infection  Description: INTERVENTIONS:  - Assess and document risk factors for pressure ulcer development  - Assess and document skin integrity  - Assess and document dressing/incision, wound bed, drain sites and surrounding tissue  - Implement wound care per orders  - Initiate  isolation precautions as appropriate  - Initiate Pressure Ulcer prevention bundle as indicated  Outcome: Progressing     Problem: HEMATOLOGIC - ADULT  Goal: Free from bleeding injury  Description: (Example usage: patient with low platelets)  INTERVENTIONS:  - Avoid intramuscular injections, enemas and rectal medication administration  - Ensure safe mobilization of patient  - Hold pressure on venipuncture sites to achieve adequate hemostasis  - Assess for signs and symptoms of internal bleeding  - Monitor lab trends  - Patient is to report abnormal signs of bleeding to staff  - Avoid use of toothpicks and dental floss  - Use electric shaver for shaving  - Use soft bristle tooth brush  - Limit straining and forceful nose blowing  Outcome: Progressing     Problem: MUSCULOSKELETAL - ADULT  Goal: Return mobility to safest level of function  Description: INTERVENTIONS:  - Assess patient stability and activity tolerance for standing, transferring and ambulating w/ or w/o assistive devices  - Assist with transfers and ambulation using safe patient handling equipment as needed  - Ensure adequate protection for wounds/incisions during mobilization  - Obtain PT/OT consults as needed  - Advance activity as appropriate  - Communicate ordered activity level and limitations with patient/family  Outcome: Progressing     Problem: NEUROLOGICAL - ADULT  Goal: Absence of seizures  Description: INTERVENTIONS  - Monitor for seizure activity  - Administer anti-seizure medications as ordered  - Monitor neurological status  Outcome: Progressing     Problem: Impaired Functional Mobility  Goal: Achieve highest/safest level of mobility/gait  Description: Interventions:  - Assess patient's functional ability and stability  - Promote increasing activity/tolerance for mobility and gait  - Educate and engage patient/family in tolerated activity level and precautions  - Recommend use of total lift for transfers  Outcome: Progressing     Problem:  Impaired Activities of Daily Living  Goal: Achieve highest/safest level of independence in self care  Description: Interventions:  - Assess ability and encourage patient to participate in ADLs to maximize function  - Promote sitting position while performing ADLs such as feeding, grooming, and bathing  - Educate and encourage patient/family in tolerated functional activity level and precautions during self-care  - Encourage patient to incorporate impaired side during daily activities to promote function  Outcome: Progressing     Problem: Impaired Swallowing  Goal: Minimize aspiration risk  Description: Interventions:  - Patient should be alert and upright for all feedings (90 degrees preferred)  - Offer food and liquids at a slow rate  - No straws  - Encourage small bites of food and small sips of liquid  - Offer pills one at a time, crush or deliver with applesauce as needed  - Discontinue feeding and notify MD (or speech pathologist) if coughing or persistent throat clearing or wet/gurgly vocal quality is noted  Outcome: Progressing     Problem: Impaired Cognition  Goal: Patient will exhibit improved attention, thought processing and/or memory  Description: Interventions:  - Minimize distractions in the room when full attention is required  Outcome: Progressing     Problem: PAIN - ADULT  Goal: Verbalizes/displays adequate comfort level or patient's stated pain goal  Description: INTERVENTIONS:  - Encourage pt to monitor pain and request assistance  - Assess pain using appropriate pain scale  - Administer analgesics based on type and severity of pain and evaluate response  - Implement non-pharmacological measures as appropriate and evaluate response  - Consider cultural and social influences on pain and pain management  - Manage/alleviate anxiety  - Utilize distraction and/or relaxation techniques  - Monitor for opioid side effects  - Notify MD/LIP if interventions unsuccessful or patient reports new pain  -  Anticipate increased pain with activity and pre-medicate as appropriate  Outcome: Progressing

## 2024-11-20 NOTE — PROGRESS NOTES
INFECTIOUS DISEASE PROGRESS NOTE  [facility]  part of [facility] ID PROGRESS NOTE    Fernando Ramos Jr. Patient Status:  Inpatient    1935 MRN [id number]   Location [facility] 5SW/SE Attending Aline Glaser MD   Hosp Day # 3 PCP ALINE GLASER MD     Subjective:  ROS reviewed. In bed. Just returned from CT scan.    ASSESSMENT:    Antibiotics: Meropenem  Vanc and CTX, cefepime     # Fevers with recent cysto with stent replacement  at OSH, likely pyelonephritis   -Urine cx PSAR, ESBL E. Coli   -CT A/P with ureteritis  # Dementia  # Parkinson's disease     PLAN:  - Continue on meropenem, day 2 of  (EOT 24). Midline in place.  - Follow fever curve, wbc.  - Reviewed labs, micro, imaging reports, available old records.  - Discussed with wife, RN.     History of Present Illness:  89 year old male with history of Parkinson's, dementia, and BPH who presents for altered mental status and fever.  Patient was recently discharged from rehab and noted to be more confused than his baseline and thus his wife brought him in. Had recent cysto with stent replacement on  at OSH.     In the ED, patient was febrile to 102.4, but hemodynamically stable.  Labs with WBC 21.7, lactic acid 2.1, urinalysis with 500 leukocyte esterase, >50 WBC.  CXR demonstrating bilateral lower lobe densities and alveolar opacities.    Physical Exam:  /53 (BP Location: Right arm)   Pulse 59   Temp 98.7 °F (37.1 °C) (Axillary)   Resp 18   Wt 145 lb (65.8 kg)   SpO2 93%   BMI 22.05 kg/m²     Gen:   Awake, in bed  HEENT:  EOMI, neck supple  CV/lungs:  RRR, CTAB  Abdom:  Soft, no TTP  Skin/extrem:  No rashes, no c/c/e  :   Primofit+  Lines:  Midline+    Laboratory Data: Reviewed    Microbiology: Reviewed    Radiology: Reviewed      [facility]  [phone]  2024

## 2024-11-20 NOTE — CDS QUERY
Dear Dr. Swain    Please clarify the relationship, if any, between Acute cystitis with hematuria    and  bilateral nephroureteral stents      Are the conditions:    ( X) Due to or associated with each other     ( ) Unrelated to each other    ( ) Other(please specify):__________    ( ) Unable to determine    Clinical Indicators:  11- ID Progress note: Fevers with recent cysto with stent replacement 11/14 at OSH               -Urine cx PSAR, E. coli    11- Dr. Swain: Acute cystitis with hematuria     11-  CT abdomen/pelvis: KIDNEYS: Staghorn left renal calculus measuring approximately 2.7 cm is unchanged.  Multiple nonobstructing bilateral caliceal calculi.  There is a 0.8 cm calculus seen within the left renal pelvis.  Bilateral double-J nephroureteral stents.     Treatment: Meropenem IVPB    Risk factors; Ureteral stents    If you have any questions, please contact Clinical :  Elvie Esparza RN at [phone]     Thank You!  THIS FORM IS A PERMANENT PART OF THE MEDICAL RECORD

## 2024-11-20 NOTE — PROGRESS NOTES
[facility]  part of [facility]    Progress Note    Fernando Ramos Jr. Patient Status:  Inpatient    1935 MRN [id number]   Location [facility] 5SW/SE Attending Aline Glaser MD   Hosp Day # 3 PCP ALINE GLASER MD     Chief Complaint: fever    Subjective:     Constitutional:  Positive for activity change and appetite change.   Respiratory:  Negative for cough and shortness of breath.    Cardiovascular:  Negative for chest pain and palpitations.   Gastrointestinal:  Negative for abdominal pain.   Pt resting in bed in NAD. Wife at bedside, questions answered. No overnight events. Midline placed today.    Objective:   Blood pressure 154/69, pulse 65, temperature 98.1 °F (36.7 °C), temperature source Oral, resp. rate 16, weight 145 lb (65.8 kg), SpO2 99%.  Physical Exam  Constitutional:       Appearance: He is ill-appearing.   HENT:      Head: Atraumatic.   Cardiovascular:      Rate and Rhythm: Normal rate and regular rhythm.   Pulmonary:      Effort: Pulmonary effort is normal.      Breath sounds: Normal breath sounds.   Abdominal:      General: Bowel sounds are normal.   Skin:     General: Skin is warm.   Neurological:      Mental Status: He is alert. Mental status is at baseline.   Psychiatric:         Mood and Affect: Mood normal.         Thought Content: Thought content normal.         Results:   Lab Results   Component Value Date    WBC 8.1 2024    HGB 9.5 (L) 2024    HCT 29.3 (L) 2024    .0 (L) 2024    CREATSERUM 0.81 2024    BUN 27 (H) 2024     (H) 2024    K 3.7 2024     (H) 2024    CO2 29.0 2024     (H) 2024    CA 9.5 2024    ALB 4.4 2024    ALKPHO 101 2024    BILT 0.5 2024    TP 8.4 (H) 2024    AST 26 2024    ALT 21 2024    PTT 30.1 2024    INR 1.31 (H) 2024    TSH 0.889 2024    LIP 48 2023    ESRML 26 (H)  05/03/2021    CRP 1.22 (H) 05/03/2021    MG 1.9 08/02/2024    PHOS 3.5 01/21/2024    TROPHS 20 01/17/2024     03/14/2023    B12 418 03/13/2024       CT ABDOMEN+PELVIS(CONTRAST ONLY)(CPT=74177)    Result Date: 11/19/2024  CONCLUSION:   Acute cystitis.  Bilateral ureteritis.  No abscess or pyelonephritis.  Nonobstructing bilateral caliceal calculi.  2.7 cm staghorn left renal calculus.  Nonobstructing left calculus within the left renal pelvis.  Multiple other incidental findings as described in the body of the report.      Dictated by (CST): Omar Reid MD on 11/19/2024 at 1:54 PM     Finalized by (CST): Omar Reid MD on 11/19/2024 at 2:01 PM               Assessment & Plan:     Sepsis without acute organ dysfunction, due to unspecified organism (HCC)  Cont IV meropenem  Cont IVF  Elevated lactic acid on admission, now normalized  WBC of 21K, now 8K  Blood cx NGTD  ID consulted      Acute cystitis with hematuria  Cont IVF   Cont IV abx as per ID  ID following, recs appreciated  Urine cx >100K pseudomonas, >100K ESBL      Dementia  Stable  At baseline  Cont aricept      HTN  Cont amlodipine      Parkinson's disease  Cont sinemet  Fall precautions  PT/OT      Achalasia  Swallow eval  Diet as per ST      Depression  Cont Abilify, remeron, effexor      FULL CODE  DVT px: subcutaneous heparin      Dispo: DC to [facility] in AM if stable overnight      **Certification      PHYSICIAN Certification of Need for Inpatient Hospitalization - Initial Certification    Patient will require inpatient services that will reasonably be expected to span two midnight's based on the clinical documentation in H+P.   Based on patients current state of illness, I anticipate that, after discharge, patient will require TBD.      Katerina Swain MD  11/20/2024

## 2024-11-20 NOTE — PLAN OF CARE
No acute changes. Midline placed today. Up in chair for lunch & dinner. Safety precautions in place.     Problem: Patient Centered Care  Goal: Patient preferences are identified and integrated in the patient's plan of care  Description: Interventions:  - What would you like us to know as we care for you? \"I am from home with my wife.\"  - Provide timely, complete, and accurate information to patient/family  - Incorporate patient and family knowledge, values, beliefs, and cultural backgrounds into the planning and delivery of care  - Encourage patient/family to participate in care and decision-making at the level they choose  - Honor patient and family perspectives and choices  Outcome: Progressing     Problem: Patient/Family Goals  Goal: Patient/Family Long Term Goal  Description: Patient's Long Term Goal: \"to be discharged\"    Interventions:  -Follow up MD appt  -Take meds as prescribed  -Use of assistive device if needed  - See additional Care Plan goals for specific interventions  Outcome: Progressing  Goal: Patient/Family Short Term Goal  Description: Patient's Short Term Goal: \"to prevent pain and infection\"    Interventions:   -Monitor VS  -Check labs results  -Administer IVF and IV ABT as ordered  -Provide pain meds as prescribed,  - See additional Care Plan goals for specific interventions  Outcome: Progressing     Problem: SKIN/TISSUE INTEGRITY - ADULT  Goal: Skin integrity remains intact  Description: INTERVENTIONS  - Assess and document risk factors for pressure ulcer development  - Assess and document skin integrity  - Monitor for areas of redness and/or skin breakdown  - Initiate interventions, skin care algorithm/standards of care as needed  Outcome: Progressing  Goal: Incision(s), wounds(s) or drain site(s) healing without S/S of infection  Description: INTERVENTIONS:  - Assess and document risk factors for pressure ulcer development  - Assess and document skin integrity  - Assess and document  dressing/incision, wound bed, drain sites and surrounding tissue  - Implement wound care per orders  - Initiate isolation precautions as appropriate  - Initiate Pressure Ulcer prevention bundle as indicated  Outcome: Progressing     Problem: HEMATOLOGIC - ADULT  Goal: Free from bleeding injury  Description: (Example usage: patient with low platelets)  INTERVENTIONS:  - Avoid intramuscular injections, enemas and rectal medication administration  - Ensure safe mobilization of patient  - Hold pressure on venipuncture sites to achieve adequate hemostasis  - Assess for signs and symptoms of internal bleeding  - Monitor lab trends  - Patient is to report abnormal signs of bleeding to staff  - Avoid use of toothpicks and dental floss  - Use electric shaver for shaving  - Use soft bristle tooth brush  - Limit straining and forceful nose blowing  Outcome: Progressing     Problem: MUSCULOSKELETAL - ADULT  Goal: Return mobility to safest level of function  Description: INTERVENTIONS:  - Assess patient stability and activity tolerance for standing, transferring and ambulating w/ or w/o assistive devices  - Assist with transfers and ambulation using safe patient handling equipment as needed  - Ensure adequate protection for wounds/incisions during mobilization  - Obtain PT/OT consults as needed  - Advance activity as appropriate  - Communicate ordered activity level and limitations with patient/family  Outcome: Progressing     Problem: NEUROLOGICAL - ADULT  Goal: Absence of seizures  Description: INTERVENTIONS  - Monitor for seizure activity  - Administer anti-seizure medications as ordered  - Monitor neurological status  Outcome: Progressing     Problem: Impaired Functional Mobility  Goal: Achieve highest/safest level of mobility/gait  Description: Interventions:  - Assess patient's functional ability and stability  - Promote increasing activity/tolerance for mobility and gait  - Educate and engage patient/family in tolerated  activity level and precautions  Outcome: Progressing     Problem: Impaired Activities of Daily Living  Goal: Achieve highest/safest level of independence in self care  Description: Interventions:  - Assess ability and encourage patient to participate in ADLs to maximize function  - Promote sitting position while performing ADLs such as feeding, grooming, and bathing  - Educate and encourage patient/family in tolerated functional activity level and precautions during self-care  Outcome: Progressing     Problem: Impaired Swallowing  Goal: Minimize aspiration risk  Description: Interventions:  - Patient should be alert and upright for all feedings (90 degrees preferred)  - Offer food and liquids at a slow rate  - No straws  - Encourage small bites of food and small sips of liquid  - Offer pills one at a time, crush or deliver with applesauce as needed  - Discontinue feeding and notify MD (or speech pathologist) if coughing or persistent throat clearing or wet/gurgly vocal quality is noted  Outcome: Progressing

## 2024-11-21 PROCEDURE — 97530 THERAPEUTIC ACTIVITIES: CPT

## 2024-11-21 PROCEDURE — 97535 SELF CARE MNGMENT TRAINING: CPT

## 2024-11-21 RX ORDER — HYDRALAZINE HYDROCHLORIDE 25 MG/1
25 TABLET, FILM COATED ORAL EVERY 4 HOURS PRN
Status: DISCONTINUED | OUTPATIENT
Start: 2024-11-21 | End: 2024-11-22

## 2024-11-21 NOTE — PLAN OF CARE
Patient kept clean and dry, turned frequently. Fall precautions in place. Call light in reach.     Problem: Patient Centered Care  Goal: Patient preferences are identified and integrated in the patient's plan of care  Description: Interventions:  - What would you like us to know as we care for you? \"I am from home with my wife.\"  - Provide timely, complete, and accurate information to patient/family  - Incorporate patient and family knowledge, values, beliefs, and cultural backgrounds into the planning and delivery of care  - Encourage patient/family to participate in care and decision-making at the level they choose  - Honor patient and family perspectives and choices  Outcome: Progressing     Problem: Patient/Family Goals  Goal: Patient/Family Long Term Goal  Description: Patient's Long Term Goal: \"to be discharged\"    Interventions:  -Follow up MD appt  -Take meds as prescribed  -Use of assistive device if needed  - See additional Care Plan goals for specific interventions  Outcome: Progressing  Goal: Patient/Family Short Term Goal  Description: Patient's Short Term Goal: \"to prevent pain and infection\"    Interventions:   -Monitor VS  -Check labs results  -Administer IVF and IV ABT as ordered  -Provide pain meds as prescribed,  - See additional Care Plan goals for specific interventions  Outcome: Progressing     Problem: SKIN/TISSUE INTEGRITY - ADULT  Goal: Skin integrity remains intact  Description: INTERVENTIONS  - Assess and document risk factors for pressure ulcer development  - Assess and document skin integrity  - Monitor for areas of redness and/or skin breakdown  - Initiate interventions, skin care algorithm/standards of care as needed  Outcome: Progressing  Goal: Incision(s), wounds(s) or drain site(s) healing without S/S of infection  Description: INTERVENTIONS:  - Assess and document risk factors for pressure ulcer development  - Assess and document skin integrity  - Assess and document  dressing/incision, wound bed, drain sites and surrounding tissue  - Implement wound care per orders  - Initiate isolation precautions as appropriate  - Initiate Pressure Ulcer prevention bundle as indicated  Outcome: Progressing     Problem: HEMATOLOGIC - ADULT  Goal: Free from bleeding injury  Description: (Example usage: patient with low platelets)  INTERVENTIONS:  - Avoid intramuscular injections, enemas and rectal medication administration  - Ensure safe mobilization of patient  - Hold pressure on venipuncture sites to achieve adequate hemostasis  - Assess for signs and symptoms of internal bleeding  - Monitor lab trends  - Patient is to report abnormal signs of bleeding to staff  - Avoid use of toothpicks and dental floss  - Use electric shaver for shaving  - Use soft bristle tooth brush  - Limit straining and forceful nose blowing  Outcome: Progressing     Problem: MUSCULOSKELETAL - ADULT  Goal: Return mobility to safest level of function  Description: INTERVENTIONS:  - Assess patient stability and activity tolerance for standing, transferring and ambulating w/ or w/o assistive devices  - Assist with transfers and ambulation using safe patient handling equipment as needed  - Ensure adequate protection for wounds/incisions during mobilization  - Obtain PT/OT consults as needed  - Advance activity as appropriate  - Communicate ordered activity level and limitations with patient/family  Outcome: Progressing     Problem: NEUROLOGICAL - ADULT  Goal: Absence of seizures  Description: INTERVENTIONS  - Monitor for seizure activity  - Administer anti-seizure medications as ordered  - Monitor neurological status  Outcome: Progressing     Problem: Impaired Functional Mobility  Goal: Achieve highest/safest level of mobility/gait  Description: Interventions:  - Assess patient's functional ability and stability  - Promote increasing activity/tolerance for mobility and gait  - Educate and engage patient/family in tolerated  activity level and precautions  - Recommend use of total lift for transfers  Outcome: Progressing     Problem: Impaired Activities of Daily Living  Goal: Achieve highest/safest level of independence in self care  Description: Interventions:  - Assess ability and encourage patient to participate in ADLs to maximize function  - Promote sitting position while performing ADLs such as feeding, grooming, and bathing  - Educate and encourage patient/family in tolerated functional activity level and precautions during self-care  - Provide support under elbow of weak side to prevent shoulder subluxation  Outcome: Progressing     Problem: Impaired Swallowing  Goal: Minimize aspiration risk  Description: Interventions:  - Patient should be alert and upright for all feedings (90 degrees preferred)  - Offer food and liquids at a slow rate  - No straws  - Encourage small bites of food and small sips of liquid  - Offer pills one at a time, crush or deliver with applesauce as needed  - Discontinue feeding and notify MD (or speech pathologist) if coughing or persistent throat clearing or wet/gurgly vocal quality is noted  Outcome: Progressing     Problem: Impaired Cognition  Goal: Patient will exhibit improved attention, thought processing and/or memory  Description: Interventions:  - Minimize distractions in the room when full attention is required  Outcome: Progressing     Problem: PAIN - ADULT  Goal: Verbalizes/displays adequate comfort level or patient's stated pain goal  Description: INTERVENTIONS:  - Encourage pt to monitor pain and request assistance  - Assess pain using appropriate pain scale  - Administer analgesics based on type and severity of pain and evaluate response  - Implement non-pharmacological measures as appropriate and evaluate response  - Consider cultural and social influences on pain and pain management  - Manage/alleviate anxiety  - Utilize distraction and/or relaxation techniques  - Monitor for opioid side  effects  - Notify MD/LIP if interventions unsuccessful or patient reports new pain  - Anticipate increased pain with activity and pre-medicate as appropriate  Outcome: Progressing

## 2024-11-21 NOTE — PROGRESS NOTES
[facility]  part of [facility]    Progress Note    Fernando Ramos Jr. Patient Status:  Inpatient    1935 MRN [id number]   Location [facility] 5SW/SE Attending Aline Glaser MD   Hosp Day # 4 PCP ALINE GLASER MD     Chief Complaint: fever    Subjective:     Constitutional:  Positive for activity change and appetite change.   Respiratory:  Negative for cough and shortness of breath.    Cardiovascular:  Negative for chest pain and palpitations.   Gastrointestinal:  Negative for abdominal pain.   Pt resting in bed in NAD. Wife at bedside, questions answered. No overnight events.     Objective:   Blood pressure (!) 186/84, pulse 74, temperature 98.2 °F (36.8 °C), temperature source Axillary, resp. rate 16, weight 145 lb (65.8 kg), SpO2 95%.  Physical Exam  Constitutional:       Appearance: He is ill-appearing.   HENT:      Head: Atraumatic.   Cardiovascular:      Rate and Rhythm: Normal rate and regular rhythm.   Pulmonary:      Effort: Pulmonary effort is normal.      Breath sounds: Normal breath sounds.   Abdominal:      General: Bowel sounds are normal.   Skin:     General: Skin is warm.   Neurological:      Mental Status: He is alert. Mental status is at baseline.   Psychiatric:         Mood and Affect: Mood normal.         Thought Content: Thought content normal.         Results:   Lab Results   Component Value Date    WBC 8.1 2024    HGB 9.5 (L) 2024    HCT 29.3 (L) 2024    .0 (L) 2024    CREATSERUM 0.81 2024    BUN 27 (H) 2024     (H) 2024    K 3.7 2024     (H) 2024    CO2 29.0 2024     (H) 2024    CA 9.5 2024    ALB 4.4 2024    ALKPHO 101 2024    BILT 0.5 2024    TP 8.4 (H) 2024    AST 26 2024    ALT 21 2024    PTT 30.1 2024    INR 1.31 (H) 2024    TSH 0.889 2024    LIP 48 2023    ESRML 26 (H) 2021    CRP  1.22 (H) 05/03/2021    MG 1.9 08/02/2024    PHOS 3.5 01/21/2024    TROPHS 20 01/17/2024     03/14/2023    B12 418 03/13/2024       No results found.        Assessment & Plan:   *Sepsis without acute organ dysfunction, due to unspecified organism (HCC)  Cont IV meropenem  Cont IVF  Elevated lactic acid on admission, now normalized  WBC of 21K, now 8K  Blood cx NGTD  ID following     *Acute cystitis with hematuria  Cont IVF   Cont IV abx as per ID  ID following, recs appreciated  Urine cx >100K pseudomonas, >100K ESBL    *Dementia  Stable  At baseline  Cont aricept    *HTN  Cont amlodipine    *Parkinson's disease  Cont sinemet  Fall precautions  PT/OT    *Achalasia  Swallow eval  Diet as per ST    *Depression  Cont Abilify, remeron, effexor      FULL CODE  DVT px: subcutaneous heparin      Dispo: d/w ID regarding need for ureteral stent exchange and timing, patient to follow up outpatient with his urologist, likely to SNF tomorrow        **Certification      PHYSICIAN Certification of Need for Inpatient Hospitalization - Initial Certification    Patient will require inpatient services that will reasonably be expected to span two midnight's based on the clinical documentation in H+P.   Based on patients current state of illness, I anticipate that, after discharge, patient will require TBD.    Gigi Glaser MD  11/21/2024

## 2024-11-21 NOTE — PHYSICAL THERAPY NOTE
PHYSICAL THERAPY TREATMENT NOTE - INPATIENT     Room Number: 549/549-A       Presenting Problem: sepsis  Co-Morbidities : Parkinson's disease, dementia, hypertension, hyperlipidemia, BPH    Problem List  Principal Problem:    Sepsis without acute organ dysfunction, due to unspecified organism (HCC)  Active Problems:    Acute cystitis with hematuria      PHYSICAL THERAPY ASSESSMENT   Per RN pt with some agitation earlier in shift >pt was sleeping , spouse in room .  Pt easily wakes and agreeable to therapy participation.  Spouse is helpful throughout session .     Patient demonstrates steady progress this session, goals  remain in progress.      Patient is requiring  2 person mod to max assist   as a result of the following impairments: decreased functional strength, decreased endurance/aerobic capacity, pain, impaired sitting and standing  balance, impaired coordination, impaired motor planning, decreased muscular endurance, cognitive deficits (orientated to self , following one step directions inconsistently with mod cues and need for redirection , pt was easily redirected during session , overall pt remained cooperative ), medical status, and limited B LE  ROM.     Patient continues to function below baseline with bed mobility, transfers, gait, and standing prolonged periods.  Next session anticipate patient to progress bed mobility, transfers, gait, maintaining seated position, and standing prolonged periods.  Physical Therapy will continue to follow patient for duration of hospitalization.    Patient continues to benefit from continued skilled PT services: to promote return to prior level of function and safety with continuous assistance and gradual rehabilitative therapy . DC Plan for rehab facility as next level of care per spouse.  SW team working with pt on optimal DC Plan.  Per spouse pt was at rehab recently after DC from rehab they went to stay at hotel one night for rest to prepare to negotiate stairs  for entry into their home .  At the hotel the first night pt became unwell with need for admission.   Pt family with goal for rehab as next level of care. Pt spouse is retired speech therapist.      PLAN DURING HOSPITALIZATION  Nursing Mobility Recommendation : Lift Equipment (vs 2 A with RW / gait belt SPT only)  PT Device Recommendation: Mechanical lift  PT Treatment Plan: Bed mobility;Body mechanics;Endurance;Patient education;Energy conservation;Family education;Gait training;Balance training;Transfer training;Strengthening;Neuromuscular re-educate  Frequency (Obs): 5x/week     SUBJECTIVE  Agreeable to mobility    denies pain     OBJECTIVE  Precautions: Cardiac;Bed/chair alarm (VACC plan)    WEIGHT BEARING RESTRICTION       PAIN ASSESSMENT   Ratin  Location: body aches       BALANCE  Static Sitting: Poor +  Dynamic Sitting: Poor  Static Standing: Poor  Dynamic Standing: Poor -    ACTIVITY TOLERANCE  Pulse: 74 (activity)        BP: (!) 186/84 (post therapy activity RN informed ,  resting /93)              O2 WALK  Oxygen Therapy  SPO2% on Room Air at Rest: 98  SPO2% Ambulation on Room Air: 96 (post activity to chair)    AM-PAC '6-Clicks' INPATIENT SHORT FORM - BASIC MOBILITY  How much difficulty does the patient currently have...  Patient Difficulty: Turning over in bed (including adjusting bedclothes, sheets and blankets)?: A Lot   Patient Difficulty: Sitting down on and standing up from a chair with arms (e.g., wheelchair, bedside commode, etc.): A Lot   Patient Difficulty: Moving from lying on back to sitting on the side of the bed?: A Lot   How much help from another person does the patient currently need...   Help from Another: Moving to and from a bed to a chair (including a wheelchair)?: A Lot   Help from Another: Need to walk in hospital room?: Total   Help from Another: Climbing 3-5 steps with a railing?: Total     AM-PAC Score:  Raw Score: 10   Approx Degree of Impairment: 76.75%    Standardized Score (AM-PAC Scale): 32.29   CMS Modifier (G-Code): CL    FUNCTIONAL ABILITY STATUS  Functional Mobility/Gait Assessment  Gait Assistance: Not tested  Rolling: maximum assist  Supine to Sit:  mod to max assist of 2 log roll sequencing encouraged.  Once pt sitting requiring full trunk support with strong posterior lean.   Dependent donning of shoes for activity .  Mod assist of 2 to scoot toward EOB     Sit to Stand:  2A SPT with RW bed to chair mod to max assist for safety provided.  Max cues needed, therapist moving RW and full support of pt . Pt with flexed posture and difficulty moving LE for steps .  Once near chair with mod assist of 2 for sit.  Rest provided.  Mod assist of 2 for sit to stand from chair , pt standing with mod support of 2 and cues for upright.  Pt with flexed posture and generalized weakness throughout observed  with inability to progress to ambulation requesting need to sit after brief stand.    Pt declined to attempt ambulation .       Skilled Therapy Provided: Pt and spouse education , fxn mobility training as above.  Encouraged ROM of B LE in sitting and supine asking pt to return demonstration . Spouse reports she works with pt throughout the the day and DC planning   Patient received supine in bed, agreeable to physical therapy. Vital signs monitored as noted above, no adverse symptoms and patient stable during session.     Education Provided To: Patient;Family/Caregiver   Patient Education: Role of Physical Therapy;Plan of Care;Discharge Recommendations;Functional Transfer Techniques;Fall Prevention;Posture/Positioning;LE HEP for ROM;LE HEP for Strengthening   Patient's Response to Education: Requires Further Education;Demonstrates Poor Carry Over to Information      The patient's Approx Degree of Impairment: 76.75% has been calculated based on documentation in the [facility] '6 clicks' Inpatient Daily Activity Short Form.  Research supports that patients with this level of  impairment may benefit from SIVA.  Final disposition will be made by interdisciplinary medical team.        Patient End of Session: Up in chair;Needs met;Call light within reach;RN aware of session/findings;All patient questions and concerns addressed;Alarm set;Family present    CURRENT GOALS   Goals to be met by: 12/10  Patient Goal Patient's self-stated goal is: unstated    Goal #1 Patient is able to demonstrate supine - sit EOB @ level: minimum assistance      Goal #1   Current Status  in progress    Goal #2 Patient is able to demonstrate transfers Sit to/from Stand at assistance level: minimum assistance with walker - rolling      Goal #2  Current Status  in progress    Goal #3 Patient is able to ambulate 25 feet with assist device: walker - rolling at assistance level: minimum assistance   Goal #3   Current Status  not met                Goal #5 Patient to demonstrate independence with home activity/exercise instructions provided to patient in preparation for discharge.   Goal #5   Current Status  in progress    Goal #6     Goal #6  Current Status     Therapy activity 2 units

## 2024-11-21 NOTE — OCCUPATIONAL THERAPY NOTE
OCCUPATIONAL THERAPY TREATMENT NOTE - INPATIENT        Room Number: 549/549-A     Presenting Problem: Sepsis    Problem List  Principal Problem:    Sepsis without acute organ dysfunction, due to unspecified organism (HCC)  Active Problems:    Acute cystitis with hematuria      OCCUPATIONAL THERAPY ASSESSMENT   Patient demonstrates limited progress this session, goals remain in progress.    Patient is requiring minimal assist, moderate assist, and max assist x2  as a result of the following impairments: decreased functional strength, decreased endurance, impaired sitting/standing balance, impaired coordination, impaired motor planning, decreased muscular endurance, and medical status.    Patient continues to function below baseline with ADLs, bed mobility, and functional mobility.  Next session anticipate patient to progress lower body dressing, bed mobility, transfers, static sitting balance, and dynamic sitting balance.  Occupational Therapy will continue to follow patient for duration of hospitalization.    Patient continues to benefit from continued skilled OT services: to promote return to prior level of function and safety with continuous assistance and gradual rehabilitative therapy.     PLAN DURING HOSPITALIZATION  OT Device Recommendations: TBD  OT Treatment Plan: Balance activities;Energy conservation/work simplification techniques;ADL training;Functional transfer training;Endurance training;Compensatory technique education     SUBJECTIVE  \"You're welcome\"     OBJECTIVE  Precautions: Cardiac;Bed/chair alarm (VACC plan)    PAIN ASSESSMENT  Ratin    ACTIVITY TOLERANCE  Pulse: 70 (74 w/ activity)  Heart Rate Source: Monitor     BP: (!) 147/93 (186/84 after activity)  BP Location: Right arm  BP Method: Automatic  Patient Position: Sitting    O2 SATURATIONS  Oxygen Therapy  SPO2% on Room Air at Rest: 98  SPO2% Ambulation on Room Air: 96 (post activity to chair)    ACTIVITIES OF DAILY LIVING  ASSESSMENT  AM-PAC ‘6-Clicks’ Inpatient Daily Activity Short Form  How much help from another person does the patient currently need…  -   Putting on and taking off regular lower body clothing?: A Lot  -   Bathing (including washing, rinsing, drying)?: A Lot  -   Toileting, which includes using toilet, bedpan or urinal? : A Lot  -   Putting on and taking off regular upper body clothing?: A Lot  -   Taking care of personal grooming such as brushing teeth?: A Little  -   Eating meals?: A Little    AM-PAC Score:  Score: 14  Approx Degree of Impairment: 59.67%  Standardized Score (AM-PAC Scale): 33.39  CMS Modifier (G-Code): CK    FUNCTIONAL TRANSFER ASSESSMENT  Sit to Stand: Edge of Bed; Chair  Edge of Bed: Maximum Assist x2  Chair: Moderate Assist x2  SPT w/ RW bed to chair: max assist x2    BED MOBILITY  Rolling: Maximum Assist   Supine to Sit : Maximum Assist x2      BALANCE ASSESSMENT  Static Sitting: Moderate Assist - Min assist (fluctuating while seated EOB; required frequent cues to maintain upright posture/avoid posterior lean)   Static Standing: Maximum Assist x2 (with posterior lean requiring frequent verbal/tactile cues to correct)     FUNCTIONAL ADL ASSESSMENT  LB Dressing Seated: Dependent (to do gym shoes seated EOB; mod assist to maintain static sitting EOB)  UB Dressing Seated: Minimal assist (to don new gown from beside recliner)    Skilled Therapy Provided:   RN cleared pt for participation. Session coordinated with PT. Pt received laying in bed with wife present. Pt agreeable to participation in therapy with encouragement from therapists and wife. Pt benefited from increased time for processing, verbal cues to maintain posture/positioning, and RW for support.     To assess pt's participation during tasks while also providing intermittent education to maximize participation, OT facilitated the following: bed mobility, LB dressing, and simulated commode transfer to beside recliner. Pt presenting with  significant posterior lean during static sitting, static standing, and functional mobility/transfers. Pt requiring frequent verbal cues for positioning, but demonstrated frustration with cueing (\"I know!\"). Pt declined to attempt functional mobility after completing SPT to beside recliner and 1 sit<>stand using RW for support (mod assist x2).      EDUCATION PROVIDED  Patient Education : Role of Occupational Therapy; Plan of Care; Functional Transfer Techniques; Fall Prevention; Posture/Positioning; Proper Body Mechanics  Patient's Response to Education: Verbalized Understanding  Family/Caregiver Education : Role of Occupational Therapy; Plan of Care  Family/Caregiver's Response to Education: Verbalized Understanding    The patient's Approx Degree of Impairment: 59.67% has been calculated based on documentation in the [facility] '6 clicks' Inpatient Daily Activity Short Form.  Research supports that patients with this level of impairment may benefit from rehab.  Final disposition will be made by interdisciplinary medical team.    Patient End of Session: Up in chair;Needs met;Call light within reach;All patient questions and concerns addressed;RN aware of session/findings;Hospital anti-slip socks;Alarm set;Family present    OT Goals:  Patient self-stated goal is: no goals stated      Patient will complete LE dressing with Min A  Comment: ongoing -dependent    Patient will complete toilet transfer with Min A  Comment: ongoing- max assist x2    Patient will complete self care task at sink level with Min A   Comment: ongoing     Comment:         Goals  on:   Frequency:3x week   OT Session Time: 23 minutes  Self-Care Home Management: 23 minutes

## 2024-11-21 NOTE — CM/SW NOTE
Pt discussed in RN DC Rounds. SW placed ambulance on will call for 11/21/2024 for today for possible discharge to [facility] and rehab.    Pt requires an ambulance according to the RN due to being a max assist. SW called and ordered an Ambulance from [facility] to transport pt to [facility]  at WILL CALL.  PCS flow sheet completed. RN to attached to AVS and print out.       SW/DC to remain available for support and/or discharge planning.     Nuvia Sarah, MSW, LSW   x [phone]

## 2024-11-22 VITALS
WEIGHT: 145 LBS | DIASTOLIC BLOOD PRESSURE: 68 MMHG | RESPIRATION RATE: 16 BRPM | OXYGEN SATURATION: 97 % | SYSTOLIC BLOOD PRESSURE: 130 MMHG | TEMPERATURE: 97 F | BODY MASS INDEX: 22 KG/M2 | HEART RATE: 69 BPM

## 2024-11-22 NOTE — CM/SW NOTE
11/22/24 1100   Discharge disposition   Expected discharge disposition subacute   Post Acute Care Provider [facility]   Discharge transportation [facility]     Pt discussed in RN DC rounds. Pt received MDO for discharge. Pt requires an ambulance according to the RN due to being a max assist. ECTOR called and ordered an Ambulance from [facility] to transport pt to [facility]  at 3:00 PM.  PCS flow sheet completed. RN to attached to AVS and print out.  RN to call report to [place] [phone]. Wife is at bedside aware of DC.     155pm- SW was informed that wife was requesting to see if she can ride in ambulaance with patient. Sw called [place] spoke to Jyoti, who informed SW that wife can ride with ambulance, but will have to sit in the front seat with the .     ECTOR/DC to remain available for support and/or discharge planning.     Nuvia Sarah, MSW, LSW   x [phone]

## 2024-11-22 NOTE — PROGRESS NOTES
INFECTIOUS DISEASE PROGRESS NOTE  [facility]  part of [facility] ID PROGRESS NOTE    Fernando Ramos Jr. Patient Status:  Inpatient    1935 MRN [id number]   Location [facility] 5SW/SE Attending Aline Glaser MD   Hosp Day # 5 PCP ALINE GLASER MD     Subjective:  ROS reviewed. In chair. About to eat lunch.    ASSESSMENT:    Antibiotics: Meropenem  Vanc and CTX, cefepime     # Fevers with recent cysto with stent replacement  at OSH, likely pyelonephritis   -Urine cx PSAR, ESBL E. Coli   -CT A/P with ureteritis  # Dementia  # Parkinson's disease     PLAN:  - Continue on meropenem, day 4 of  (EOT 24). Midline in place.  - Follow fever curve, wbc.  - Reviewed labs, micro, imaging reports, available old records.  - Discussed with wife, RN.     History of Present Illness:  89 year old male with history of Parkinson's, dementia, and BPH who presents for altered mental status and fever.  Patient was recently discharged from rehab and noted to be more confused than his baseline and thus his wife brought him in. Had recent cysto with stent replacement on  at OSH.     In the ED, patient was febrile to 102.4, but hemodynamically stable.  Labs with WBC 21.7, lactic acid 2.1, urinalysis with 500 leukocyte esterase, >50 WBC.  CXR demonstrating bilateral lower lobe densities and alveolar opacities.    Physical Exam:  /78 (BP Location: Right arm)   Pulse 69   Temp 97.6 °F (36.4 °C) (Axillary)   Resp 16   Wt 145 lb (65.8 kg)   SpO2 96%   BMI 22.05 kg/m²     Gen:   Awake, in chair  HEENT:  EOMI, neck supple  CV/lungs:  RRR, CTAB  Abdom:  Soft, no TTP  Skin/extrem:  No rashes, no c/c/e  :   Primofit+  Lines:  Midline+    Laboratory Data: Reviewed    Microbiology: Reviewed    Radiology: Reviewed      [facility]  [phone]  2024

## 2024-11-22 NOTE — PLAN OF CARE
Problem: Patient Centered Care  Goal: Patient preferences are identified and integrated in the patient's plan of care  Description: Interventions:  - What would you like us to know as we care for you? \"I am from home with my wife.\"  - Provide timely, complete, and accurate information to patient/family  - Incorporate patient and family knowledge, values, beliefs, and cultural backgrounds into the planning and delivery of care  - Encourage patient/family to participate in care and decision-making at the level they choose  - Honor patient and family perspectives and choices  Outcome: Progressing     Problem: Patient/Family Goals  Goal: Patient/Family Long Term Goal  Description: Patient's Long Term Goal: \"to be discharged\"    Interventions:  -Follow up MD appt  -Take meds as prescribed  -Use of assistive device if needed  - See additional Care Plan goals for specific interventions  Outcome: Progressing  Goal: Patient/Family Short Term Goal  Description: Patient's Short Term Goal: \"to prevent pain and infection\"    Interventions:   -Monitor VS  -Check labs results  -Administer IVF and IV ABT as ordered  -Provide pain meds as prescribed,  - See additional Care Plan goals for specific interventions  Outcome: Progressing     Problem: SKIN/TISSUE INTEGRITY - ADULT  Goal: Skin integrity remains intact  Description: INTERVENTIONS  - Assess and document risk factors for pressure ulcer development  - Assess and document skin integrity  - Monitor for areas of redness and/or skin breakdown  - Initiate interventions, skin care algorithm/standards of care as needed  Outcome: Progressing  Goal: Incision(s), wounds(s) or drain site(s) healing without S/S of infection  Description: INTERVENTIONS:  - Assess and document risk factors for pressure ulcer development  - Assess and document skin integrity  - Assess and document dressing/incision, wound bed, drain sites and surrounding tissue  - Implement wound care per orders  - Initiate  isolation precautions as appropriate  - Initiate Pressure Ulcer prevention bundle as indicated  Outcome: Progressing     Problem: HEMATOLOGIC - ADULT  Goal: Free from bleeding injury  Description: (Example usage: patient with low platelets)  INTERVENTIONS:  - Avoid intramuscular injections, enemas and rectal medication administration  - Ensure safe mobilization of patient  - Hold pressure on venipuncture sites to achieve adequate hemostasis  - Assess for signs and symptoms of internal bleeding  - Monitor lab trends  - Patient is to report abnormal signs of bleeding to staff  - Avoid use of toothpicks and dental floss  - Use electric shaver for shaving  - Use soft bristle tooth brush  - Limit straining and forceful nose blowing  Outcome: Progressing     Problem: MUSCULOSKELETAL - ADULT  Goal: Return mobility to safest level of function  Description: INTERVENTIONS:  - Assess patient stability and activity tolerance for standing, transferring and ambulating w/ or w/o assistive devices  - Assist with transfers and ambulation using safe patient handling equipment as needed  - Ensure adequate protection for wounds/incisions during mobilization  - Obtain PT/OT consults as needed  - Advance activity as appropriate  - Communicate ordered activity level and limitations with patient/family  Outcome: Progressing     Problem: NEUROLOGICAL - ADULT  Goal: Absence of seizures  Description: INTERVENTIONS  - Monitor for seizure activity  - Administer anti-seizure medications as ordered  - Monitor neurological status  Outcome: Progressing     Problem: Impaired Functional Mobility  Goal: Achieve highest/safest level of mobility/gait  Description: Interventions:  - Assess patient's functional ability and stability  - Promote increasing activity/tolerance for mobility and gait  - Educate and engage patient/family in tolerated activity level and precautions  - Recommend use of total lift for transfers  Outcome: Progressing     Problem:  Impaired Activities of Daily Living  Goal: Achieve highest/safest level of independence in self care  Description: Interventions:  - Assess ability and encourage patient to participate in ADLs to maximize function  - Promote sitting position while performing ADLs such as feeding, grooming, and bathing  - Educate and encourage patient/family in tolerated functional activity level and precautions during self-care  - Provide support under elbow of weak side to prevent shoulder subluxation  Outcome: Progressing     Problem: Impaired Cognition  Goal: Patient will exhibit improved attention, thought processing and/or memory  Description: Interventions:  - Encourage use of hearing aids  Outcome: Progressing     Problem: PAIN - ADULT  Goal: Verbalizes/displays adequate comfort level or patient's stated pain goal  Description: INTERVENTIONS:  - Encourage pt to monitor pain and request assistance  - Assess pain using appropriate pain scale  - Administer analgesics based on type and severity of pain and evaluate response  - Implement non-pharmacological measures as appropriate and evaluate response  - Consider cultural and social influences on pain and pain management  - Manage/alleviate anxiety  - Utilize distraction and/or relaxation techniques  - Monitor for opioid side effects  - Notify MD/LIP if interventions unsuccessful or patient reports new pain  - Anticipate increased pain with activity and pre-medicate as appropriate  Outcome: Progressing     Problem: Impaired Swallowing  Goal: Minimize aspiration risk  Description: Interventions:  - Patient should be alert and upright for all feedings (90 degrees preferred)  - Offer food and liquids at a slow rate  - No straws  - Encourage small bites of food and small sips of liquid  - Offer pills one at a time, crush or deliver with applesauce as needed  - Discontinue feeding and notify MD (or speech pathologist) if coughing or persistent throat clearing or wet/gurgly vocal  quality is noted  Outcome: Not Progressing

## 2024-11-22 NOTE — DISCHARGE SUMMARY
[facility]  part of [facility]    Discharge Summary    Fernando Ramos JrAva Patient Status:  Inpatient    1935 MRN [id number]   Location [facility] 5SW/SE Attending Gigi Glaser MD   Hosp Day # 5 PCP GIGI GLASER MD     Date of Admission: 2024   Date of Discharge: 2024    Admitting Diagnosis:   Acute cystitis with hematuria [N30.01]  Sepsis without acute organ dysfunction, due to unspecified organism (HCC) [A41.9]      Discharge Diagnosis:   .Principal Problem:    Sepsis without acute organ dysfunction, due to unspecified organism (HCC)  Active Problems:    Acute cystitis with hematuria        HPI: Per Dr. Kohler  Fernando Ramos Jr. is a(n) 89 year old male with past medical history of Parkinson's disease, dementia, hypertension, hyperlipidemia, BPH presenting to ER from home due to altered mental status, fever.  Per wife patient has been lethargic recently with poor oral intake.  He was just discharged from rehab yesterday.  Patient was noted to be more confused than his baseline.  No reported nausea, vomiting, abdominal pain, diarrhea.       Hospital Course:   *Sepsis without acute organ dysfunction, due to unspecified organism (HCC)  Cont IV meropenem x 14 days (EOT )  Cont IVF  Elevated lactic acid on admission, now normalized  WBC of 21K, now 8K  Blood cx NGTD  ID following      *Acute cystitis with hematuria  Cont IVF   Cont IV abx as per ID  ID following, recs appreciated  Urine cx >100K pseudomonas, >100K ESBL     *Dementia  Stable  At baseline  Cont aricept     *HTN  Cont amlodipine     *Parkinson's disease  Cont sinemet  Fall precautions  PT/OT     *Achalasia  Swallow eval  Diet as per ST     *Depression  Cont Abilify, remeron, effexor    Discharge Physical Exam:   Blood pressure 159/78, pulse 69, temperature 97.6 °F (36.4 °C), temperature source Axillary, resp. rate 16, weight 145 lb (65.8 kg), SpO2 96%.  Physical Exam  Constitutional:        Appearance: He is ill-appearing.   HENT:      Head: Atraumatic.   Cardiovascular:      Rate and Rhythm: Normal rate and regular rhythm.   Pulmonary:      Effort: Pulmonary effort is normal.      Breath sounds: Normal breath sounds.   Abdominal:      General: Bowel sounds are normal.   Skin:     General: Skin is warm.   Neurological:      Mental Status: He is alert. Mental status is at baseline.   Psychiatric:         Mood and Affect: Mood normal.         Thought Content: Thought content normal.         Consultants         Provider   Role Specialty     Red Au MD      Consulting Physician INFECTIOUS DISEASES                Discharge Plan:   Discharge Medications:   Current Discharge Medication List        START taking these medications    Details   sodium chloride 0.9% SOLN 100 mL with meropenem 500 MG SOLR 500 mg Inject 500 mg into the vein every 8 (eight) hours for 12 days.  Qty: 1 Bag, Refills: 0           CONTINUE these medications which have NOT CHANGED    Details   acetaminophen 500 MG Oral Tab Take 2 tablets (1,000 mg total) by mouth every 8 (eight) hours as needed for Pain.      aspirin 81 MG Oral Tab Take 1 tablet (81 mg total) by mouth daily.      carbidopa-levodopa  MG Oral Tab Take 0.5 tablets by mouth 3 (three) times daily.      amLODIPine 5 MG Oral Tab Take 1 tablet (5 mg total) by mouth daily.  Qty: 30 tablet, Refills: 0      donepezil 10 MG Oral Tab Take 1 tablet (10 mg total) by mouth nightly.    Associated Diagnoses: Major depressive disorder, recurrent episode, in full remission (HCC)      venlafaxine 37.5 MG Oral Tab Take 1 tablet (37.5 mg total) by mouth 2 (two) times daily.      hydrOXYzine Pamoate 25 MG Oral Cap hydroxyzine pamoate 25 mg capsule   TAKE ONE CAPSULE BY MOUTH DAILY AS NEEDED      polyethylene glycol, PEG 3350, 17 g Oral Powd Pack Take 17 g by mouth every other day. Patient takes every three days      melatonin 1 MG Oral Tab Take 2 tablets (2 mg total) by mouth  nightly.      ARIPiprazole 2 MG Oral Tab Take 1 tablet (2 mg total) by mouth every other day.  Qty: 1 tablet, Refills: 0      Cholecalciferol (VITAMIN D) 50 MCG (2000 UT) Oral Tab Take by mouth nightly.      mirtazapine 15 MG Oral Tab TAKE ONE TABLET BY MOUTH AT BEDTIME  Qty: 90 tablet, Refills: 3    Associated Diagnoses: Major depressive disorder, recurrent episode, in full remission (HCC)      Coenzyme Q10 (COQ-10) 100 MG Oral Cap Take 100 mg by mouth daily.      Simvastatin 40 MG Oral Tab Take 1 tablet (40 mg total) by mouth nightly.           STOP taking these medications       cephALEXin 500 MG Oral Cap                     Discharge Diet: As tolerated  Discharge Condition: Stable  Discharge Activity: As tolerated  Disposition: Transfer to Skilled Nursing Facility: [facility]      ALINE HERRERA MD  Office: [phone]  11/22/2024

## 2024-12-17 ENCOUNTER — APPOINTMENT (OUTPATIENT)
Dept: GENERAL RADIOLOGY | Age: 89
DRG: 871 | End: 2024-12-17
Attending: STUDENT IN AN ORGANIZED HEALTH CARE EDUCATION/TRAINING PROGRAM

## 2024-12-17 ENCOUNTER — APPOINTMENT (OUTPATIENT)
Dept: CT IMAGING | Age: 89
DRG: 871 | End: 2024-12-17
Attending: STUDENT IN AN ORGANIZED HEALTH CARE EDUCATION/TRAINING PROGRAM

## 2024-12-17 ENCOUNTER — HOSPITAL ENCOUNTER (INPATIENT)
Age: 89
Discharge: NURSING, REHAB OR LONG-TERM CARE | DRG: 871 | End: 2024-12-17
Attending: STUDENT IN AN ORGANIZED HEALTH CARE EDUCATION/TRAINING PROGRAM | Admitting: INTERNAL MEDICINE

## 2024-12-17 DIAGNOSIS — E87.0 HYPERNATREMIA: Primary | ICD-10-CM

## 2024-12-17 DIAGNOSIS — J69.0 ASPIRATION PNEUMONIA OF LEFT LOWER LOBE, UNSPECIFIED ASPIRATION PNEUMONIA TYPE  (CMD): ICD-10-CM

## 2024-12-17 DIAGNOSIS — R41.82 ALTERED MENTAL STATUS, UNSPECIFIED ALTERED MENTAL STATUS TYPE: ICD-10-CM

## 2024-12-17 DIAGNOSIS — R13.12 DYSPHAGIA, OROPHARYNGEAL PHASE: ICD-10-CM

## 2024-12-17 DIAGNOSIS — J96.01 ACUTE HYPOXIC RESPIRATORY FAILURE  (CMD): ICD-10-CM

## 2024-12-17 LAB
ALBUMIN SERPL-MCNC: 2.3 G/DL (ref 3.4–5)
ALBUMIN/GLOB SERPL: 0.4 {RATIO} (ref 1–2.4)
ALP SERPL-CCNC: 205 UNITS/L (ref 45–117)
ALT SERPL-CCNC: 177 UNITS/L
ANION GAP SERPL CALC-SCNC: 4 MMOL/L (ref 7–19)
APPEARANCE UR: ABNORMAL
AST SERPL-CCNC: 123 UNITS/L
BACTERIA #/AREA URNS HPF: ABNORMAL /HPF
BASOPHILS # BLD: 0.1 K/MCL (ref 0–0.3)
BASOPHILS NFR BLD: 0 %
BILIRUB SERPL-MCNC: 0.5 MG/DL (ref 0.2–1)
BILIRUB UR QL STRIP: NEGATIVE
BUN SERPL-MCNC: 58 MG/DL (ref 6–20)
BUN/CREAT SERPL: 34 (ref 7–25)
CALCIUM SERPL-MCNC: 10 MG/DL (ref 8.4–10.2)
CHLORIDE SERPL-SCNC: 138 MMOL/L (ref 97–110)
CO2 SERPL-SCNC: 31 MMOL/L (ref 21–32)
COLOR UR: ABNORMAL
CREAT SERPL-MCNC: 1.7 MG/DL (ref 0.67–1.17)
CREAT UR-MCNC: 84.7 MG/DL
DEPRECATED RDW RBC: 58.6 FL (ref 39–50)
EGFRCR SERPLBLD CKD-EPI 2021: 38 ML/MIN/{1.73_M2}
EOSINOPHIL # BLD: 0.2 K/MCL (ref 0–0.5)
EOSINOPHIL NFR BLD: 1 %
ERYTHROCYTE [DISTWIDTH] IN BLOOD: 14.7 % (ref 11–15)
FASTING DURATION TIME PATIENT: ABNORMAL H
FLUAV RNA RESP QL NAA+PROBE: NOT DETECTED
FLUBV RNA RESP QL NAA+PROBE: NOT DETECTED
GLOBULIN SER-MCNC: 6.1 G/DL (ref 2–4)
GLUCOSE BLDC GLUCOMTR-MCNC: 152 MG/DL (ref 70–99)
GLUCOSE SERPL-MCNC: 200 MG/DL (ref 70–99)
GLUCOSE UR STRIP-MCNC: NEGATIVE MG/DL
HCT VFR BLD CALC: 42.7 % (ref 39–51)
HGB BLD-MCNC: 12.7 G/DL (ref 13–17)
HGB UR QL STRIP: ABNORMAL
HYALINE CASTS #/AREA URNS LPF: ABNORMAL /LPF
IMM GRANULOCYTES # BLD AUTO: 0.4 K/MCL (ref 0–0.2)
IMM GRANULOCYTES # BLD: 2 %
INR PPP: 1.3
KETONES UR STRIP-MCNC: NEGATIVE MG/DL
LACTATE BLDV-SCNC: 1.4 MMOL/L (ref 0–2)
LACTATE BLDV-SCNC: 2.2 MMOL/L (ref 0–2)
LEUKOCYTE ESTERASE UR QL STRIP: ABNORMAL
LYMPHOCYTES # BLD: 1.9 K/MCL (ref 1–4)
LYMPHOCYTES NFR BLD: 10 %
MAGNESIUM SERPL-MCNC: 3.1 MG/DL (ref 1.7–2.4)
MCH RBC QN AUTO: 31.8 PG (ref 26–34)
MCHC RBC AUTO-ENTMCNC: 29.7 G/DL (ref 32–36.5)
MCV RBC AUTO: 106.8 FL (ref 78–100)
MONOCYTES # BLD: 1.1 K/MCL (ref 0.3–0.9)
MONOCYTES NFR BLD: 6 %
MUCOUS THREADS URNS QL MICRO: PRESENT
NEUTROPHILS # BLD: 15.8 K/MCL (ref 1.8–7.7)
NEUTROPHILS NFR BLD: 81 %
NITRITE UR QL STRIP: NEGATIVE
NRBC BLD MANUAL-RTO: 0 /100 WBC
NT-PROBNP SERPL-MCNC: 491 PG/ML
OSMOLALITY SERPL: 391 MOSM/KG (ref 275–300)
OSMOLALITY UR: 445 MOSM/KG (ref 50–1200)
PH UR STRIP: 6 [PH] (ref 5–7)
PLATELET # BLD AUTO: 327 K/MCL (ref 140–450)
POTASSIUM SERPL-SCNC: 4.2 MMOL/L (ref 3.4–5.1)
PROCALCITONIN SERPL IA-MCNC: 0.24 NG/ML
PROT SERPL-MCNC: 8.4 G/DL (ref 6.4–8.2)
PROT UR STRIP-MCNC: 100 MG/DL
PROTHROMBIN TIME: 13.5 SEC (ref 9.7–11.8)
RAINBOW EXTRA TUBES HOLD SPECIMEN: NORMAL
RBC # BLD: 4 MIL/MCL (ref 4.5–5.9)
RBC #/AREA URNS HPF: >100 /HPF
RSV AG NPH QL IA.RAPID: NOT DETECTED
SARS-COV-2 RNA RESP QL NAA+PROBE: NOT DETECTED
SERVICE CMNT-IMP: NORMAL
SERVICE CMNT-IMP: NORMAL
SODIUM SERPL-SCNC: 168 MMOL/L (ref 135–145)
SODIUM SERPL-SCNC: 169 MMOL/L (ref 135–145)
SODIUM SERPL-SCNC: 169 MMOL/L (ref 135–145)
SODIUM UR-SCNC: 40 MMOL/L
SP GR UR STRIP: 1.02 (ref 1–1.03)
SQUAMOUS #/AREA URNS HPF: ABNORMAL /HPF
TROPONIN I SERPL DL<=0.01 NG/ML-MCNC: 56 NG/L
UROBILINOGEN UR STRIP-MCNC: 0.2 MG/DL
WBC # BLD: 19.4 K/MCL (ref 4.2–11)
WBC #/AREA URNS HPF: >100 /HPF
YEAST URNS QL MICRO: PRESENT

## 2024-12-17 PROCEDURE — 83605 ASSAY OF LACTIC ACID: CPT | Performed by: STUDENT IN AN ORGANIZED HEALTH CARE EDUCATION/TRAINING PROGRAM

## 2024-12-17 PROCEDURE — 71045 X-RAY EXAM CHEST 1 VIEW: CPT

## 2024-12-17 PROCEDURE — 10002800 HB RX 250 W HCPCS: Performed by: STUDENT IN AN ORGANIZED HEALTH CARE EDUCATION/TRAINING PROGRAM

## 2024-12-17 PROCEDURE — 10002807 HB RX 258: Performed by: STUDENT IN AN ORGANIZED HEALTH CARE EDUCATION/TRAINING PROGRAM

## 2024-12-17 PROCEDURE — 85025 COMPLETE CBC W/AUTO DIFF WBC: CPT | Performed by: STUDENT IN AN ORGANIZED HEALTH CARE EDUCATION/TRAINING PROGRAM

## 2024-12-17 PROCEDURE — 74176 CT ABD & PELVIS W/O CONTRAST: CPT

## 2024-12-17 PROCEDURE — 93010 ELECTROCARDIOGRAM REPORT: CPT | Performed by: INTERNAL MEDICINE

## 2024-12-17 PROCEDURE — 83735 ASSAY OF MAGNESIUM: CPT | Performed by: INTERNAL MEDICINE

## 2024-12-17 PROCEDURE — 80053 COMPREHEN METABOLIC PANEL: CPT | Performed by: STUDENT IN AN ORGANIZED HEALTH CARE EDUCATION/TRAINING PROGRAM

## 2024-12-17 PROCEDURE — 99291 CRITICAL CARE FIRST HOUR: CPT

## 2024-12-17 PROCEDURE — 10003585 HB ROOM CHARGE INTERMEDIATE

## 2024-12-17 PROCEDURE — 84300 ASSAY OF URINE SODIUM: CPT | Performed by: STUDENT IN AN ORGANIZED HEALTH CARE EDUCATION/TRAINING PROGRAM

## 2024-12-17 PROCEDURE — 71250 CT THORAX DX C-: CPT

## 2024-12-17 PROCEDURE — 93005 ELECTROCARDIOGRAM TRACING: CPT | Performed by: INTERNAL MEDICINE

## 2024-12-17 PROCEDURE — 83930 ASSAY OF BLOOD OSMOLALITY: CPT | Performed by: STUDENT IN AN ORGANIZED HEALTH CARE EDUCATION/TRAINING PROGRAM

## 2024-12-17 PROCEDURE — 81001 URINALYSIS AUTO W/SCOPE: CPT | Performed by: STUDENT IN AN ORGANIZED HEALTH CARE EDUCATION/TRAINING PROGRAM

## 2024-12-17 PROCEDURE — 10002801 HB RX 250 W/O HCPCS: Performed by: INTERNAL MEDICINE

## 2024-12-17 PROCEDURE — 82570 ASSAY OF URINE CREATININE: CPT | Performed by: STUDENT IN AN ORGANIZED HEALTH CARE EDUCATION/TRAINING PROGRAM

## 2024-12-17 PROCEDURE — 10002801 HB RX 250 W/O HCPCS

## 2024-12-17 PROCEDURE — 83880 ASSAY OF NATRIURETIC PEPTIDE: CPT | Performed by: STUDENT IN AN ORGANIZED HEALTH CARE EDUCATION/TRAINING PROGRAM

## 2024-12-17 PROCEDURE — 87040 BLOOD CULTURE FOR BACTERIA: CPT | Performed by: STUDENT IN AN ORGANIZED HEALTH CARE EDUCATION/TRAINING PROGRAM

## 2024-12-17 PROCEDURE — 0241U COVID/FLU/RSV PANEL: CPT | Performed by: STUDENT IN AN ORGANIZED HEALTH CARE EDUCATION/TRAINING PROGRAM

## 2024-12-17 PROCEDURE — 85610 PROTHROMBIN TIME: CPT | Performed by: STUDENT IN AN ORGANIZED HEALTH CARE EDUCATION/TRAINING PROGRAM

## 2024-12-17 PROCEDURE — 84295 ASSAY OF SERUM SODIUM: CPT

## 2024-12-17 PROCEDURE — 70450 CT HEAD/BRAIN W/O DYE: CPT

## 2024-12-17 PROCEDURE — 87086 URINE CULTURE/COLONY COUNT: CPT | Performed by: STUDENT IN AN ORGANIZED HEALTH CARE EDUCATION/TRAINING PROGRAM

## 2024-12-17 PROCEDURE — 93005 ELECTROCARDIOGRAM TRACING: CPT | Performed by: STUDENT IN AN ORGANIZED HEALTH CARE EDUCATION/TRAINING PROGRAM

## 2024-12-17 PROCEDURE — 83935 ASSAY OF URINE OSMOLALITY: CPT | Performed by: STUDENT IN AN ORGANIZED HEALTH CARE EDUCATION/TRAINING PROGRAM

## 2024-12-17 PROCEDURE — 36415 COLL VENOUS BLD VENIPUNCTURE: CPT | Performed by: INTERNAL MEDICINE

## 2024-12-17 PROCEDURE — 84145 PROCALCITONIN (PCT): CPT | Performed by: STUDENT IN AN ORGANIZED HEALTH CARE EDUCATION/TRAINING PROGRAM

## 2024-12-17 PROCEDURE — 99291 CRITICAL CARE FIRST HOUR: CPT | Performed by: INTERNAL MEDICINE

## 2024-12-17 PROCEDURE — 84484 ASSAY OF TROPONIN QUANT: CPT | Performed by: STUDENT IN AN ORGANIZED HEALTH CARE EDUCATION/TRAINING PROGRAM

## 2024-12-17 RX ORDER — METOPROLOL TARTRATE 1 MG/ML
2.5 INJECTION, SOLUTION INTRAVENOUS EVERY 6 HOURS SCHEDULED
Status: DISCONTINUED | OUTPATIENT
Start: 2024-12-17 | End: 2024-12-21

## 2024-12-17 RX ORDER — SIMVASTATIN 40 MG
40 TABLET ORAL NIGHTLY
Status: ON HOLD | COMMUNITY
End: 2025-01-02 | Stop reason: HOSPADM

## 2024-12-17 RX ORDER — CARBIDOPA AND LEVODOPA 25; 100 MG/1; MG/1
0.5 TABLET ORAL 3 TIMES DAILY
Status: ON HOLD | COMMUNITY

## 2024-12-17 RX ORDER — DEXTROSE MONOHYDRATE AND SODIUM CHLORIDE 5; .45 G/100ML; G/100ML
INJECTION, SOLUTION INTRAVENOUS CONTINUOUS
Status: DISCONTINUED | OUTPATIENT
Start: 2024-12-17 | End: 2024-12-18

## 2024-12-17 RX ORDER — FERROUS SULFATE 325(65) MG
325 TABLET, DELAYED RELEASE (ENTERIC COATED) ORAL
Status: ON HOLD | COMMUNITY

## 2024-12-17 RX ORDER — ERTAPENEM 1 G/1
1 INJECTION, POWDER, LYOPHILIZED, FOR SOLUTION INTRAMUSCULAR; INTRAVENOUS DAILY
Status: ON HOLD | COMMUNITY
Start: 2024-12-16 | End: 2025-01-02 | Stop reason: HOSPADM

## 2024-12-17 RX ORDER — ASPIRIN 81 MG/1
81 TABLET, CHEWABLE ORAL DAILY
Status: ON HOLD | COMMUNITY

## 2024-12-17 RX ORDER — FOLIC ACID 1 MG/1
1 TABLET ORAL DAILY
Status: ON HOLD | COMMUNITY

## 2024-12-17 RX ORDER — POLYETHYLENE GLYCOL 3350 17 G/17G
17 POWDER, FOR SOLUTION ORAL DAILY PRN
Status: ON HOLD | COMMUNITY

## 2024-12-17 RX ORDER — ACETAMINOPHEN 500 MG
1000 TABLET ORAL EVERY 8 HOURS PRN
Status: ON HOLD | COMMUNITY

## 2024-12-17 RX ORDER — LIDOCAINE HYDROCHLORIDE 20 MG/ML
10 JELLY TOPICAL
Status: DISCONTINUED | OUTPATIENT
Start: 2024-12-17 | End: 2025-01-02 | Stop reason: HOSPADM

## 2024-12-17 RX ORDER — CEPHALEXIN 500 MG/1
500 CAPSULE ORAL 3 TIMES DAILY
Status: ON HOLD | COMMUNITY
End: 2025-01-02 | Stop reason: HOSPADM

## 2024-12-17 RX ADMIN — MEROPENEM 1 G: 1 INJECTION, POWDER, FOR SOLUTION INTRAVENOUS at 15:21

## 2024-12-17 RX ADMIN — DEXTROSE AND SODIUM CHLORIDE: 5; 450 INJECTION, SOLUTION INTRAVENOUS at 15:43

## 2024-12-17 RX ADMIN — SODIUM CHLORIDE 1000 ML: 9 INJECTION, SOLUTION INTRAVENOUS at 13:40

## 2024-12-17 RX ADMIN — METOROPROLOL TARTRATE 2.5 MG: 5 INJECTION, SOLUTION INTRAVENOUS at 21:55

## 2024-12-17 SDOH — ECONOMIC STABILITY: HOUSING INSECURITY: WHAT IS YOUR LIVING SITUATION TODAY?: LONG TERM CARE FACILITY

## 2024-12-17 SDOH — ECONOMIC STABILITY: TRANSPORTATION INSECURITY
IN THE PAST 12 MONTHS, HAS LACK OF RELIABLE TRANSPORTATION KEPT YOU FROM MEDICAL APPOINTMENTS, MEETINGS, WORK OR FROM GETTING THINGS NEEDED FOR DAILY LIVING?: NO

## 2024-12-17 SDOH — SOCIAL STABILITY: SOCIAL NETWORK
HOW OFTEN DO YOU SEE OR TALK TO PEOPLE THAT YOU CARE ABOUT AND FEEL CLOSE TO? (FOR EXAMPLE: TALKING TO FRIENDS ON THE PHONE, VISITING FRIENDS OR FAMILY, GOING TO CHURCH OR CLUB MEETINGS): 3 TO 5 TIMES A WEEK

## 2024-12-17 SDOH — SOCIAL STABILITY: SOCIAL INSECURITY: HOW OFTEN DOES ANYONE, INCLUDING FAMILY AND FRIENDS, PHYSICALLY HURT YOU?: PATIENT UNABLE TO ANSWER

## 2024-12-17 SDOH — SOCIAL STABILITY: SOCIAL INSECURITY: HOW OFTEN DOES ANYONE, INCLUDING FAMILY AND FRIENDS, SCREAM OR CURSE AT YOU?: PATIENT UNABLE TO ANSWER

## 2024-12-17 SDOH — ECONOMIC STABILITY: INCOME INSECURITY: IN THE PAST 12 MONTHS, HAS THE ELECTRIC, GAS, OIL, OR WATER COMPANY THREATENED TO SHUT OFF SERVICE IN YOUR HOME?: NO

## 2024-12-17 SDOH — SOCIAL STABILITY: SOCIAL INSECURITY: HOW OFTEN DOES ANYONE, INCLUDING FAMILY AND FRIENDS, THREATEN YOU WITH HARM?: NEVER

## 2024-12-17 SDOH — ECONOMIC STABILITY: FOOD INSECURITY: WITHIN THE PAST 12 MONTHS, THE FOOD YOU BOUGHT JUST DIDN'T LAST AND YOU DIDN'T HAVE MONEY TO GET MORE.: NEVER TRUE

## 2024-12-17 SDOH — ECONOMIC STABILITY: HOUSING INSECURITY: DO YOU HAVE PROBLEMS WITH ANY OF THE FOLLOWING?: NONE OF THE ABOVE

## 2024-12-17 SDOH — ECONOMIC STABILITY: HOUSING INSECURITY: WHAT IS YOUR LIVING SITUATION TODAY?: I HAVE A STEADY PLACE TO LIVE

## 2024-12-17 SDOH — SOCIAL STABILITY: SOCIAL NETWORK: SUPPORT SYSTEMS: FAMILY MEMBERS;SPOUSE

## 2024-12-17 SDOH — HEALTH STABILITY: PHYSICAL HEALTH: DO YOU HAVE SERIOUS DIFFICULTY WALKING OR CLIMBING STAIRS?: YES

## 2024-12-17 SDOH — SOCIAL STABILITY: SOCIAL INSECURITY: HOW OFTEN DOES ANYONE, INCLUDING FAMILY AND FRIENDS, INSULT OR TALK DOWN TO YOU?: PATIENT UNABLE TO ANSWER

## 2024-12-17 SDOH — SOCIAL STABILITY: SOCIAL INSECURITY: HOW OFTEN DOES ANYONE, INCLUDING FAMILY AND FRIENDS, SCREAM OR CURSE AT YOU?: NEVER

## 2024-12-17 SDOH — SOCIAL STABILITY: SOCIAL INSECURITY: HOW OFTEN DOES ANYONE, INCLUDING FAMILY AND FRIENDS, INSULT OR TALK DOWN TO YOU?: NEVER

## 2024-12-17 SDOH — ECONOMIC STABILITY: HOUSING INSECURITY: WHAT IS YOUR LIVING SITUATION TODAY?: SUPERVISED LIVING;OTHER FACILITY RESIDENTS

## 2024-12-17 SDOH — HEALTH STABILITY: PHYSICAL HEALTH: DO YOU HAVE DIFFICULTY DRESSING OR BATHING?: YES

## 2024-12-17 SDOH — SOCIAL STABILITY: SOCIAL INSECURITY: HOW OFTEN DOES ANYONE, INCLUDING FAMILY AND FRIENDS, PHYSICALLY HURT YOU?: NEVER

## 2024-12-17 SDOH — HEALTH STABILITY: GENERAL
BECAUSE OF A PHYSICAL, MENTAL, OR EMOTIONAL CONDITION, DO YOU HAVE SERIOUS DIFFICULTY CONCENTRATING, REMEMBERING OR MAKING DECISIONS?: NO

## 2024-12-17 SDOH — HEALTH STABILITY: GENERAL: BECAUSE OF A PHYSICAL, MENTAL, OR EMOTIONAL CONDITION, DO YOU HAVE DIFFICULTY DOING ERRANDS ALONE?: YES

## 2024-12-17 SDOH — SOCIAL STABILITY: SOCIAL INSECURITY: HOW OFTEN DOES ANYONE, INCLUDING FAMILY AND FRIENDS, THREATEN YOU WITH HARM?: PATIENT UNABLE TO ANSWER

## 2024-12-17 SDOH — ECONOMIC STABILITY: GENERAL: WOULD YOU LIKE HELP WITH ANY OF THE FOLLOWING NEEDS?: I DON'T WANT HELP WITH ANY OF THESE

## 2024-12-17 SDOH — ECONOMIC STABILITY: GENERAL

## 2024-12-17 ASSESSMENT — LIFESTYLE VARIABLES
HOW OFTEN DO YOU HAVE A DRINK CONTAINING ALCOHOL: NEVER
ALCOHOL_USE_STATUS: NO OR LOW RISK WITH VALIDATED TOOL
HOW OFTEN DO YOU HAVE A DRINK CONTAINING ALCOHOL: NEVER
HOW MANY STANDARD DRINKS CONTAINING ALCOHOL DO YOU HAVE ON A TYPICAL DAY: 0,1 OR 2
ALCOHOL_USE_STATUS: NO OR LOW RISK WITH VALIDATED TOOL
HOW MANY STANDARD DRINKS CONTAINING ALCOHOL DO YOU HAVE ON A TYPICAL DAY: 0,1 OR 2
HOW OFTEN DO YOU HAVE 6 OR MORE DRINKS ON ONE OCCASION: NEVER
AUDIT-C TOTAL SCORE: 0

## 2024-12-17 ASSESSMENT — ACTIVITIES OF DAILY LIVING (ADL)
TOILETING: NEEDS ASSISTANCE
ADL_SCORE: 6
FEEDING: INDEPENDENT
ADL_SHORT_OF_BREATH: NO
ADL_BEFORE_ADMISSION: NEEDS/REQUIRES ASSISTANCE
DRESSING: NEEDS ASSISTANCE
BATHING: NEEDS ASSISTANCE
RECENT_DECLINE_ADL: YES, DECLINE IN BATHING/DRESSING/FEEDING, COLLABORATE WITH PROVIDER (T)

## 2024-12-17 ASSESSMENT — PATIENT HEALTH QUESTIONNAIRE - PHQ9: IS PATIENT ABLE TO COMPLETE PHQ2 OR PHQ9: NO, DEFER TO LATER TIME

## 2024-12-17 ASSESSMENT — PAIN SCALES - GENERAL
PAINLEVEL_OUTOF10: 0
PAINLEVEL_OUTOF10: 0

## 2024-12-18 ENCOUNTER — APPOINTMENT (OUTPATIENT)
Dept: GENERAL RADIOLOGY | Age: 89
DRG: 871 | End: 2024-12-18
Attending: INTERNAL MEDICINE

## 2024-12-18 LAB
ALBUMIN SERPL-MCNC: 1.9 G/DL (ref 3.4–5)
ALBUMIN/GLOB SERPL: 0.4 {RATIO} (ref 1–2.4)
ALP SERPL-CCNC: 152 UNITS/L (ref 45–117)
ALT SERPL-CCNC: 118 UNITS/L
ANION GAP SERPL CALC-SCNC: 4 MMOL/L (ref 7–19)
AST SERPL-CCNC: 53 UNITS/L
ATRIAL RATE (BPM): 104
ATRIAL RATE (BPM): 88
BACTERIA UR CULT: NO GROWTH
BILIRUB SERPL-MCNC: 0.4 MG/DL (ref 0.2–1)
BUN SERPL-MCNC: 48 MG/DL (ref 6–20)
BUN/CREAT SERPL: 40 (ref 7–25)
CALCIUM SERPL-MCNC: 8.9 MG/DL (ref 8.4–10.2)
CHLORIDE SERPL-SCNC: 141 MMOL/L (ref 97–110)
CO2 SERPL-SCNC: 29 MMOL/L (ref 21–32)
CREAT SERPL-MCNC: 1.19 MG/DL (ref 0.67–1.17)
EGFRCR SERPLBLD CKD-EPI 2021: 59 ML/MIN/{1.73_M2}
FASTING DURATION TIME PATIENT: ABNORMAL H
GLOBULIN SER-MCNC: 5.1 G/DL (ref 2–4)
GLUCOSE SERPL-MCNC: 202 MG/DL (ref 70–99)
MRSA DNA SPEC QL NAA+PROBE: DETECTED
P AXIS (DEGREES): 17
P AXIS (DEGREES): 21
POTASSIUM SERPL-SCNC: 3.7 MMOL/L (ref 3.4–5.1)
PR-INTERVAL (MSEC): 154
PR-INTERVAL (MSEC): 158
PROT SERPL-MCNC: 7 G/DL (ref 6.4–8.2)
QRS-INTERVAL (MSEC): 78
QRS-INTERVAL (MSEC): 82
QT-INTERVAL (MSEC): 342
QT-INTERVAL (MSEC): 344
QTC: 416
QTC: 450
R AXIS (DEGREES): -11
R AXIS (DEGREES): -18
REPORT TEXT: NORMAL
REPORT TEXT: NORMAL
SODIUM SERPL-SCNC: 161 MMOL/L (ref 135–145)
SODIUM SERPL-SCNC: 163 MMOL/L (ref 135–145)
SODIUM SERPL-SCNC: 165 MMOL/L (ref 135–145)
SODIUM SERPL-SCNC: 169 MMOL/L (ref 135–145)
SODIUM SERPL-SCNC: 170 MMOL/L (ref 135–145)
T AXIS (DEGREES): -51
T AXIS (DEGREES): 18
VENTRICULAR RATE EKG/MIN (BPM): 104
VENTRICULAR RATE EKG/MIN (BPM): 88

## 2024-12-18 PROCEDURE — 10002800 HB RX 250 W HCPCS: Performed by: INTERNAL MEDICINE

## 2024-12-18 PROCEDURE — 84295 ASSAY OF SERUM SODIUM: CPT

## 2024-12-18 PROCEDURE — 10002807 HB RX 258: Performed by: INTERNAL MEDICINE

## 2024-12-18 PROCEDURE — 74018 RADEX ABDOMEN 1 VIEW: CPT

## 2024-12-18 PROCEDURE — 36415 COLL VENOUS BLD VENIPUNCTURE: CPT

## 2024-12-18 PROCEDURE — 80053 COMPREHEN METABOLIC PANEL: CPT

## 2024-12-18 PROCEDURE — 87641 MR-STAPH DNA AMP PROBE: CPT | Performed by: INTERNAL MEDICINE

## 2024-12-18 PROCEDURE — 10002801 HB RX 250 W/O HCPCS

## 2024-12-18 PROCEDURE — 84295 ASSAY OF SERUM SODIUM: CPT | Performed by: INTERNAL MEDICINE

## 2024-12-18 PROCEDURE — 99221 1ST HOSP IP/OBS SF/LOW 40: CPT | Performed by: STUDENT IN AN ORGANIZED HEALTH CARE EDUCATION/TRAINING PROGRAM

## 2024-12-18 PROCEDURE — 10002801 HB RX 250 W/O HCPCS: Performed by: INTERNAL MEDICINE

## 2024-12-18 PROCEDURE — 10003585 HB ROOM CHARGE INTERMEDIATE

## 2024-12-18 PROCEDURE — 99223 1ST HOSP IP/OBS HIGH 75: CPT | Performed by: NURSE PRACTITIONER

## 2024-12-18 RX ORDER — DEXTROSE MONOHYDRATE 50 MG/ML
INJECTION, SOLUTION INTRAVENOUS CONTINUOUS
Status: DISCONTINUED | OUTPATIENT
Start: 2024-12-18 | End: 2024-12-19

## 2024-12-18 RX ADMIN — DEXTROSE AND SODIUM CHLORIDE: 5; 450 INJECTION, SOLUTION INTRAVENOUS at 03:40

## 2024-12-18 RX ADMIN — VANCOMYCIN HYDROCHLORIDE 1250 MG: 10 INJECTION, POWDER, LYOPHILIZED, FOR SOLUTION INTRAVENOUS at 14:28

## 2024-12-18 RX ADMIN — MEROPENEM 1 G: 1 INJECTION INTRAVENOUS at 16:12

## 2024-12-18 RX ADMIN — METOROPROLOL TARTRATE 2.5 MG: 5 INJECTION, SOLUTION INTRAVENOUS at 05:53

## 2024-12-18 RX ADMIN — METOROPROLOL TARTRATE 2.5 MG: 5 INJECTION, SOLUTION INTRAVENOUS at 00:53

## 2024-12-18 RX ADMIN — DEXTROSE MONOHYDRATE: 50 INJECTION, SOLUTION INTRAVENOUS at 08:25

## 2024-12-18 RX ADMIN — DEXTROSE AND SODIUM CHLORIDE: 5; 450 INJECTION, SOLUTION INTRAVENOUS at 00:53

## 2024-12-18 RX ADMIN — MEROPENEM 1 G: 1 INJECTION INTRAVENOUS at 05:52

## 2024-12-18 ASSESSMENT — PAIN SCALES - GENERAL
PAINLEVEL_OUTOF10: 0

## 2024-12-19 LAB
ANION GAP SERPL CALC-SCNC: 5 MMOL/L (ref 7–19)
BUN SERPL-MCNC: 37 MG/DL (ref 6–20)
BUN/CREAT SERPL: 35 (ref 7–25)
CALCIUM SERPL-MCNC: 8.8 MG/DL (ref 8.4–10.2)
CHLORIDE SERPL-SCNC: 129 MMOL/L (ref 97–110)
CO2 SERPL-SCNC: 29 MMOL/L (ref 21–32)
CREAT SERPL-MCNC: 1.07 MG/DL (ref 0.67–1.17)
EGFRCR SERPLBLD CKD-EPI 2021: 67 ML/MIN/{1.73_M2}
FASTING DURATION TIME PATIENT: ABNORMAL H
GLUCOSE BLDC GLUCOMTR-MCNC: 123 MG/DL (ref 70–99)
GLUCOSE BLDC GLUCOMTR-MCNC: 150 MG/DL (ref 70–99)
GLUCOSE SERPL-MCNC: 122 MG/DL (ref 70–99)
MAGNESIUM SERPL-MCNC: 2.7 MG/DL (ref 1.7–2.4)
PHOSPHATE SERPL-MCNC: 3.3 MG/DL (ref 2.4–4.7)
POTASSIUM SERPL-SCNC: 3.6 MMOL/L (ref 3.4–5.1)
SODIUM SERPL-SCNC: 154 MMOL/L (ref 135–145)
SODIUM SERPL-SCNC: 158 MMOL/L (ref 135–145)
SODIUM SERPL-SCNC: 159 MMOL/L (ref 135–145)

## 2024-12-19 PROCEDURE — 99232 SBSQ HOSP IP/OBS MODERATE 35: CPT | Performed by: STUDENT IN AN ORGANIZED HEALTH CARE EDUCATION/TRAINING PROGRAM

## 2024-12-19 PROCEDURE — 99497 ADVNCD CARE PLAN 30 MIN: CPT | Performed by: NURSE PRACTITIONER

## 2024-12-19 PROCEDURE — 99233 SBSQ HOSP IP/OBS HIGH 50: CPT | Performed by: NURSE PRACTITIONER

## 2024-12-19 PROCEDURE — 84295 ASSAY OF SERUM SODIUM: CPT | Performed by: INTERNAL MEDICINE

## 2024-12-19 PROCEDURE — 10002800 HB RX 250 W HCPCS: Performed by: INTERNAL MEDICINE

## 2024-12-19 PROCEDURE — 84100 ASSAY OF PHOSPHORUS: CPT

## 2024-12-19 PROCEDURE — 10002801 HB RX 250 W/O HCPCS: Performed by: INTERNAL MEDICINE

## 2024-12-19 PROCEDURE — 10002807 HB RX 258: Performed by: INTERNAL MEDICINE

## 2024-12-19 PROCEDURE — 10003585 HB ROOM CHARGE INTERMEDIATE

## 2024-12-19 PROCEDURE — 83735 ASSAY OF MAGNESIUM: CPT

## 2024-12-19 PROCEDURE — 36415 COLL VENOUS BLD VENIPUNCTURE: CPT | Performed by: INTERNAL MEDICINE

## 2024-12-19 PROCEDURE — 92610 EVALUATE SWALLOWING FUNCTION: CPT

## 2024-12-19 PROCEDURE — G0316 PROLONGED IP OR OBS CARE EM SERVICE BEYOND PRIM SERVICE EA ADD 15 MIN: HCPCS | Performed by: NURSE PRACTITIONER

## 2024-12-19 PROCEDURE — 80048 BASIC METABOLIC PNL TOTAL CA: CPT

## 2024-12-19 RX ORDER — DEXTROSE MONOHYDRATE 50 MG/ML
INJECTION, SOLUTION INTRAVENOUS CONTINUOUS
Status: DISCONTINUED | OUTPATIENT
Start: 2024-12-19 | End: 2024-12-21

## 2024-12-19 RX ADMIN — MEROPENEM 1 G: 1 INJECTION INTRAVENOUS at 03:07

## 2024-12-19 RX ADMIN — METOROPROLOL TARTRATE 2.5 MG: 5 INJECTION, SOLUTION INTRAVENOUS at 12:34

## 2024-12-19 RX ADMIN — VANCOMYCIN HYDROCHLORIDE 750 MG: 750 INJECTION, POWDER, LYOPHILIZED, FOR SOLUTION INTRAVENOUS at 08:52

## 2024-12-19 RX ADMIN — DEXTROSE MONOHYDRATE: 50 INJECTION, SOLUTION INTRAVENOUS at 19:28

## 2024-12-19 RX ADMIN — METOROPROLOL TARTRATE 2.5 MG: 5 INJECTION, SOLUTION INTRAVENOUS at 06:13

## 2024-12-19 RX ADMIN — MEROPENEM 1 G: 1 INJECTION INTRAVENOUS at 15:47

## 2024-12-19 ASSESSMENT — COGNITIVE AND FUNCTIONAL STATUS - GENERAL
TAKING CARE OF COMPLICATED TASKS: UNABLE
REMEMBERING 5 ERRANDS WITH NO LIST: UNABLE
REMEMBERING TO TAKE MEDICATION: UNABLE
REMEMBERING WHERE THINGS ARE: UNABLE
APPLIED_COGNITIVE_CONVERTED_SCORE: 15.17
UNDERSTANDING 10 TO 15 MIN SPEECH: UNABLE
FOLLOWS FAMILIAR CONVERSATION: A LOT
APPLIED_COGNITIVE_RAW_SCORE: 7

## 2024-12-19 ASSESSMENT — PAIN SCALES - GENERAL
PAINLEVEL_OUTOF10: 0

## 2024-12-20 LAB
ALBUMIN SERPL-MCNC: 1.8 G/DL (ref 3.4–5)
ALBUMIN/GLOB SERPL: 0.4 {RATIO} (ref 1–2.4)
ALP SERPL-CCNC: 205 UNITS/L (ref 45–117)
ALT SERPL-CCNC: 152 UNITS/L
ANION GAP SERPL CALC-SCNC: 5 MMOL/L (ref 7–19)
AST SERPL-CCNC: 161 UNITS/L
BILIRUB SERPL-MCNC: 0.5 MG/DL (ref 0.2–1)
BUN SERPL-MCNC: 27 MG/DL (ref 6–20)
BUN/CREAT SERPL: 28 (ref 7–25)
CALCIUM SERPL-MCNC: 9.2 MG/DL (ref 8.4–10.2)
CHLORIDE SERPL-SCNC: 123 MMOL/L (ref 97–110)
CO2 SERPL-SCNC: 29 MMOL/L (ref 21–32)
CREAT SERPL-MCNC: 0.95 MG/DL (ref 0.67–1.17)
DEPRECATED RDW RBC: 54 FL (ref 39–50)
EGFRCR SERPLBLD CKD-EPI 2021: 77 ML/MIN/{1.73_M2}
ERYTHROCYTE [DISTWIDTH] IN BLOOD: 13.9 % (ref 11–15)
FASTING DURATION TIME PATIENT: ABNORMAL H
GLOBULIN SER-MCNC: 4.8 G/DL (ref 2–4)
GLUCOSE SERPL-MCNC: 139 MG/DL (ref 70–99)
HCT VFR BLD CALC: 30 % (ref 39–51)
HGB BLD-MCNC: 9.2 G/DL (ref 13–17)
MCH RBC QN AUTO: 32.3 PG (ref 26–34)
MCHC RBC AUTO-ENTMCNC: 30.7 G/DL (ref 32–36.5)
MCV RBC AUTO: 105.3 FL (ref 78–100)
NRBC BLD MANUAL-RTO: 0 /100 WBC
PLATELET # BLD AUTO: 149 K/MCL (ref 140–450)
POTASSIUM SERPL-SCNC: 4.4 MMOL/L (ref 3.4–5.1)
PROT SERPL-MCNC: 6.6 G/DL (ref 6.4–8.2)
RBC # BLD: 2.85 MIL/MCL (ref 4.5–5.9)
SODIUM SERPL-SCNC: 150 MMOL/L (ref 135–145)
SODIUM SERPL-SCNC: 153 MMOL/L (ref 135–145)
WBC # BLD: 10.4 K/MCL (ref 4.2–11)

## 2024-12-20 PROCEDURE — 36415 COLL VENOUS BLD VENIPUNCTURE: CPT | Performed by: INTERNAL MEDICINE

## 2024-12-20 PROCEDURE — 10002800 HB RX 250 W HCPCS: Performed by: INTERNAL MEDICINE

## 2024-12-20 PROCEDURE — 10003585 HB ROOM CHARGE INTERMEDIATE

## 2024-12-20 PROCEDURE — 80053 COMPREHEN METABOLIC PANEL: CPT | Performed by: INTERNAL MEDICINE

## 2024-12-20 PROCEDURE — 10002807 HB RX 258: Performed by: INTERNAL MEDICINE

## 2024-12-20 PROCEDURE — 92526 ORAL FUNCTION THERAPY: CPT

## 2024-12-20 PROCEDURE — 84295 ASSAY OF SERUM SODIUM: CPT | Performed by: INTERNAL MEDICINE

## 2024-12-20 PROCEDURE — 85027 COMPLETE CBC AUTOMATED: CPT | Performed by: INTERNAL MEDICINE

## 2024-12-20 PROCEDURE — 10002801 HB RX 250 W/O HCPCS: Performed by: INTERNAL MEDICINE

## 2024-12-20 RX ADMIN — VANCOMYCIN HYDROCHLORIDE 750 MG: 750 INJECTION, POWDER, LYOPHILIZED, FOR SOLUTION INTRAVENOUS at 09:15

## 2024-12-20 RX ADMIN — MEROPENEM 1 G: 1 INJECTION INTRAVENOUS at 15:32

## 2024-12-20 RX ADMIN — MEROPENEM 1 G: 1 INJECTION INTRAVENOUS at 03:11

## 2024-12-20 RX ADMIN — DEXTROSE MONOHYDRATE: 50 INJECTION, SOLUTION INTRAVENOUS at 17:43

## 2024-12-20 RX ADMIN — METOROPROLOL TARTRATE 2.5 MG: 5 INJECTION, SOLUTION INTRAVENOUS at 05:36

## 2024-12-20 RX ADMIN — METOROPROLOL TARTRATE 2.5 MG: 5 INJECTION, SOLUTION INTRAVENOUS at 00:26

## 2024-12-20 SDOH — SOCIAL STABILITY: SOCIAL INSECURITY: HOW OFTEN DOES ANYONE, INCLUDING FAMILY AND FRIENDS, PHYSICALLY HURT YOU?: NEVER

## 2024-12-20 SDOH — SOCIAL STABILITY: SOCIAL INSECURITY: HOW OFTEN DOES ANYONE, INCLUDING FAMILY AND FRIENDS, SCREAM OR CURSE AT YOU?: NEVER

## 2024-12-20 SDOH — SOCIAL STABILITY: SOCIAL INSECURITY: HOW OFTEN DOES ANYONE, INCLUDING FAMILY AND FRIENDS, THREATEN YOU WITH HARM?: NEVER

## 2024-12-20 SDOH — SOCIAL STABILITY: SOCIAL INSECURITY: HOW OFTEN DOES ANYONE, INCLUDING FAMILY AND FRIENDS, INSULT OR TALK DOWN TO YOU?: NEVER

## 2024-12-20 ASSESSMENT — ORIENTATION MEMORY CONCENTRATION TEST (OMCT)
OMCT SCORE: 14
SAY THE MONTHS IN REVERSE ORDER STARTING WITH LAST MONTH: 1 ERROR
WHAT TIME IS IT (NO WATCH OR CLOCK): INCORRECT
OMCT INTERPRETATION: 11 OR GREATER: MODERATE TO SEVERE COGNITIVE IMPAIRMENT
WHAT YEAR IS IT NOW (MUST BE EXACT): CORRECT
REPEAT THE NAME AND ADDRESS I ASKED YOU TO REMEMBER: 2 ERRORS
COUNT BACKWARDS FROM 20 TO 1: 1 ERROR
WHAT MONTH IS IT NOW: INCORRECT

## 2024-12-20 ASSESSMENT — ENCOUNTER SYMPTOMS
SHORTNESS OF BREATH: 0
FEVER: 0

## 2024-12-20 ASSESSMENT — PAIN SCALES - GENERAL
PAINLEVEL_OUTOF10: 0

## 2024-12-20 ASSESSMENT — PATIENT HEALTH QUESTIONNAIRE - PHQ9
2. FEELING DOWN, DEPRESSED OR HOPELESS: NOT AT ALL
CLINICAL INTERPRETATION OF PHQ2 SCORE: NO FURTHER SCREENING NEEDED
1. LITTLE INTEREST OR PLEASURE IN DOING THINGS: NOT AT ALL
SUM OF ALL RESPONSES TO PHQ9 QUESTIONS 1 AND 2: 0
IS PATIENT ABLE TO COMPLETE PHQ2 OR PHQ9: YES
SUM OF ALL RESPONSES TO PHQ9 QUESTIONS 1 AND 2: 0

## 2024-12-21 ENCOUNTER — APPOINTMENT (OUTPATIENT)
Dept: GENERAL RADIOLOGY | Age: 89
DRG: 871 | End: 2024-12-21
Attending: INTERNAL MEDICINE

## 2024-12-21 VITALS
OXYGEN SATURATION: 96 % | DIASTOLIC BLOOD PRESSURE: 65 MMHG | HEART RATE: 64 BPM | HEIGHT: 67 IN | SYSTOLIC BLOOD PRESSURE: 135 MMHG | BODY MASS INDEX: 20.45 KG/M2 | RESPIRATION RATE: 18 BRPM | WEIGHT: 130.29 LBS | TEMPERATURE: 98.4 F

## 2024-12-21 LAB
ANION GAP SERPL CALC-SCNC: 5 MMOL/L (ref 7–19)
BACTERIA BLD CULT: NORMAL
BACTERIA BLD CULT: NORMAL
BUN SERPL-MCNC: 19 MG/DL (ref 6–20)
BUN/CREAT SERPL: 24 (ref 7–25)
CALCIUM SERPL-MCNC: 8.8 MG/DL (ref 8.4–10.2)
CHLORIDE SERPL-SCNC: 117 MMOL/L (ref 97–110)
CO2 SERPL-SCNC: 30 MMOL/L (ref 21–32)
CREAT SERPL-MCNC: 0.78 MG/DL (ref 0.67–1.17)
EGFRCR SERPLBLD CKD-EPI 2021: 86 ML/MIN/{1.73_M2}
FASTING DURATION TIME PATIENT: ABNORMAL H
GLUCOSE SERPL-MCNC: 149 MG/DL (ref 70–99)
POTASSIUM SERPL-SCNC: 4.2 MMOL/L (ref 3.4–5.1)
RAINBOW EXTRA TUBES HOLD SPECIMEN: NORMAL
SODIUM SERPL-SCNC: 144 MMOL/L (ref 135–145)
SODIUM SERPL-SCNC: 148 MMOL/L (ref 135–145)
VANCOMYCIN TROUGH SERPL-MCNC: 9.7 MCG/ML (ref 10–20)

## 2024-12-21 PROCEDURE — 10002807 HB RX 258: Performed by: INTERNAL MEDICINE

## 2024-12-21 PROCEDURE — 10002800 HB RX 250 W HCPCS: Performed by: INTERNAL MEDICINE

## 2024-12-21 PROCEDURE — 92526 ORAL FUNCTION THERAPY: CPT

## 2024-12-21 PROCEDURE — 84295 ASSAY OF SERUM SODIUM: CPT | Performed by: INTERNAL MEDICINE

## 2024-12-21 PROCEDURE — 80048 BASIC METABOLIC PNL TOTAL CA: CPT | Performed by: INTERNAL MEDICINE

## 2024-12-21 PROCEDURE — 80202 ASSAY OF VANCOMYCIN: CPT | Performed by: INTERNAL MEDICINE

## 2024-12-21 PROCEDURE — 10002803 HB RX 637: Performed by: NURSE PRACTITIONER

## 2024-12-21 PROCEDURE — 10004651 HB RX, NO CHARGE ITEM: Performed by: INTERNAL MEDICINE

## 2024-12-21 PROCEDURE — 10002801 HB RX 250 W/O HCPCS: Performed by: INTERNAL MEDICINE

## 2024-12-21 PROCEDURE — 10002803 HB RX 637: Performed by: INTERNAL MEDICINE

## 2024-12-21 PROCEDURE — 10006031 HB ROOM CHARGE TELEMETRY

## 2024-12-21 PROCEDURE — 36415 COLL VENOUS BLD VENIPUNCTURE: CPT | Performed by: INTERNAL MEDICINE

## 2024-12-21 RX ORDER — ASPIRIN 81 MG/1
81 TABLET, CHEWABLE ORAL DAILY
Status: DISCONTINUED | OUTPATIENT
Start: 2024-12-21 | End: 2025-01-02 | Stop reason: HOSPADM

## 2024-12-21 RX ORDER — DONEPEZIL HYDROCHLORIDE 10 MG/1
10 TABLET, FILM COATED ORAL NIGHTLY
Status: DISCONTINUED | OUTPATIENT
Start: 2024-12-21 | End: 2025-01-02 | Stop reason: HOSPADM

## 2024-12-21 RX ORDER — VENLAFAXINE 37.5 MG/1
37.5 TABLET ORAL 2 TIMES DAILY
Status: DISCONTINUED | OUTPATIENT
Start: 2024-12-21 | End: 2025-01-02 | Stop reason: HOSPADM

## 2024-12-21 RX ORDER — ATORVASTATIN CALCIUM 20 MG/1
20 TABLET, FILM COATED ORAL NIGHTLY
Status: DISCONTINUED | OUTPATIENT
Start: 2024-12-21 | End: 2025-01-02 | Stop reason: HOSPADM

## 2024-12-21 RX ORDER — CARBIDOPA AND LEVODOPA 25; 100 MG/1; MG/1
0.5 TABLET ORAL 3 TIMES DAILY
Status: DISCONTINUED | OUTPATIENT
Start: 2024-12-21 | End: 2025-01-02 | Stop reason: HOSPADM

## 2024-12-21 RX ORDER — METOPROLOL TARTRATE 25 MG/1
25 TABLET, FILM COATED ORAL EVERY 12 HOURS SCHEDULED
Status: DISCONTINUED | OUTPATIENT
Start: 2024-12-21 | End: 2025-01-02 | Stop reason: HOSPADM

## 2024-12-21 RX ORDER — MIRTAZAPINE 15 MG/1
15 TABLET, FILM COATED ORAL NIGHTLY
Status: DISCONTINUED | OUTPATIENT
Start: 2024-12-21 | End: 2025-01-02 | Stop reason: HOSPADM

## 2024-12-21 RX ORDER — ARIPIPRAZOLE 2 MG/1
2 TABLET ORAL EVERY OTHER DAY
Status: DISCONTINUED | OUTPATIENT
Start: 2024-12-21 | End: 2025-01-02 | Stop reason: HOSPADM

## 2024-12-21 RX ORDER — FOLIC ACID 1 MG/1
1 TABLET ORAL DAILY
Status: DISCONTINUED | OUTPATIENT
Start: 2024-12-21 | End: 2025-01-02 | Stop reason: HOSPADM

## 2024-12-21 RX ORDER — AMLODIPINE BESYLATE 5 MG/1
5 TABLET ORAL DAILY
Status: DISCONTINUED | OUTPATIENT
Start: 2024-12-21 | End: 2025-01-02 | Stop reason: HOSPADM

## 2024-12-21 RX ADMIN — METOPROLOL TARTRATE 25 MG: 25 TABLET, FILM COATED ORAL at 11:41

## 2024-12-21 RX ADMIN — METOPROLOL TARTRATE 25 MG: 25 TABLET, FILM COATED ORAL at 22:09

## 2024-12-21 RX ADMIN — FOLIC ACID 1 MG: 1 TABLET ORAL at 11:41

## 2024-12-21 RX ADMIN — VENLAFAXINE HYDROCHLORIDE 37.5 MG: 37.5 TABLET ORAL at 13:35

## 2024-12-21 RX ADMIN — MEROPENEM 1 G: 1 INJECTION INTRAVENOUS at 03:29

## 2024-12-21 RX ADMIN — CARBIDOPA AND LEVODOPA 0.5 TABLET: 25; 100 TABLET ORAL at 13:35

## 2024-12-21 RX ADMIN — VANCOMYCIN HYDROCHLORIDE 750 MG: 750 INJECTION, POWDER, LYOPHILIZED, FOR SOLUTION INTRAVENOUS at 10:00

## 2024-12-21 RX ADMIN — VENLAFAXINE HYDROCHLORIDE 37.5 MG: 37.5 TABLET ORAL at 22:10

## 2024-12-21 RX ADMIN — MIRTAZAPINE 15 MG: 15 TABLET, FILM COATED ORAL at 22:09

## 2024-12-21 RX ADMIN — AMLODIPINE BESYLATE 5 MG: 5 TABLET ORAL at 11:41

## 2024-12-21 RX ADMIN — METOROPROLOL TARTRATE 2.5 MG: 5 INJECTION, SOLUTION INTRAVENOUS at 06:27

## 2024-12-21 RX ADMIN — DONEPEZIL HYDROCHLORIDE 10 MG: 10 TABLET ORAL at 22:09

## 2024-12-21 RX ADMIN — METOROPROLOL TARTRATE 2.5 MG: 5 INJECTION, SOLUTION INTRAVENOUS at 00:12

## 2024-12-21 RX ADMIN — ARIPIPRAZOLE 2 MG: 2 TABLET ORAL at 22:10

## 2024-12-21 RX ADMIN — CARBIDOPA AND LEVODOPA 0.5 TABLET: 25; 100 TABLET ORAL at 22:09

## 2024-12-21 RX ADMIN — ASPIRIN 81 MG CHEWABLE TABLET 81 MG: 81 TABLET CHEWABLE at 11:40

## 2024-12-21 RX ADMIN — DEXTROSE MONOHYDRATE: 50 INJECTION, SOLUTION INTRAVENOUS at 15:24

## 2024-12-21 RX ADMIN — MEROPENEM 1 G: 1 INJECTION INTRAVENOUS at 15:25

## 2024-12-21 ASSESSMENT — PAIN SCALES - GENERAL
PAINLEVEL_OUTOF10: 0

## 2024-12-21 ASSESSMENT — ENCOUNTER SYMPTOMS
FEVER: 0
SHORTNESS OF BREATH: 0
WEAKNESS: 1

## 2024-12-22 LAB
ANION GAP SERPL CALC-SCNC: 6 MMOL/L (ref 7–19)
BACTERIA BLD CULT: NORMAL
BACTERIA BLD CULT: NORMAL
BUN SERPL-MCNC: 19 MG/DL (ref 6–20)
BUN/CREAT SERPL: 25 (ref 7–25)
CALCIUM SERPL-MCNC: 8.8 MG/DL (ref 8.4–10.2)
CHLORIDE SERPL-SCNC: 115 MMOL/L (ref 97–110)
CO2 SERPL-SCNC: 29 MMOL/L (ref 21–32)
CREAT SERPL-MCNC: 0.75 MG/DL (ref 0.67–1.17)
EGFRCR SERPLBLD CKD-EPI 2021: 87 ML/MIN/{1.73_M2}
FASTING DURATION TIME PATIENT: ABNORMAL H
GLUCOSE SERPL-MCNC: 146 MG/DL (ref 70–99)
POTASSIUM SERPL-SCNC: 4.2 MMOL/L (ref 3.4–5.1)
RAINBOW EXTRA TUBES HOLD SPECIMEN: NORMAL
SODIUM SERPL-SCNC: 146 MMOL/L (ref 135–145)

## 2024-12-22 PROCEDURE — 36415 COLL VENOUS BLD VENIPUNCTURE: CPT | Performed by: INTERNAL MEDICINE

## 2024-12-22 PROCEDURE — 10002807 HB RX 258: Performed by: INTERNAL MEDICINE

## 2024-12-22 PROCEDURE — 10002803 HB RX 637: Performed by: INTERNAL MEDICINE

## 2024-12-22 PROCEDURE — 10006031 HB ROOM CHARGE TELEMETRY

## 2024-12-22 PROCEDURE — 10002800 HB RX 250 W HCPCS: Performed by: INTERNAL MEDICINE

## 2024-12-22 PROCEDURE — 10002803 HB RX 637: Performed by: NURSE PRACTITIONER

## 2024-12-22 PROCEDURE — 10004651 HB RX, NO CHARGE ITEM: Performed by: INTERNAL MEDICINE

## 2024-12-22 PROCEDURE — 80048 BASIC METABOLIC PNL TOTAL CA: CPT | Performed by: INTERNAL MEDICINE

## 2024-12-22 RX ADMIN — CARBIDOPA AND LEVODOPA 0.5 TABLET: 25; 100 TABLET ORAL at 20:01

## 2024-12-22 RX ADMIN — MIRTAZAPINE 15 MG: 15 TABLET, FILM COATED ORAL at 20:01

## 2024-12-22 RX ADMIN — ASPIRIN 81 MG CHEWABLE TABLET 81 MG: 81 TABLET CHEWABLE at 08:32

## 2024-12-22 RX ADMIN — DONEPEZIL HYDROCHLORIDE 10 MG: 10 TABLET ORAL at 20:01

## 2024-12-22 RX ADMIN — VENLAFAXINE HYDROCHLORIDE 37.5 MG: 37.5 TABLET ORAL at 20:01

## 2024-12-22 RX ADMIN — METOPROLOL TARTRATE 25 MG: 25 TABLET, FILM COATED ORAL at 20:01

## 2024-12-22 RX ADMIN — VANCOMYCIN HYDROCHLORIDE 750 MG: 750 INJECTION, POWDER, LYOPHILIZED, FOR SOLUTION INTRAVENOUS at 09:09

## 2024-12-22 RX ADMIN — FOLIC ACID 1 MG: 1 TABLET ORAL at 08:32

## 2024-12-22 RX ADMIN — VENLAFAXINE HYDROCHLORIDE 37.5 MG: 37.5 TABLET ORAL at 08:32

## 2024-12-22 RX ADMIN — MEROPENEM 1 G: 1 INJECTION INTRAVENOUS at 14:12

## 2024-12-22 RX ADMIN — MEROPENEM 1 G: 1 INJECTION INTRAVENOUS at 02:33

## 2024-12-22 RX ADMIN — CARBIDOPA AND LEVODOPA 0.5 TABLET: 25; 100 TABLET ORAL at 13:01

## 2024-12-22 RX ADMIN — METOPROLOL TARTRATE 25 MG: 25 TABLET, FILM COATED ORAL at 08:32

## 2024-12-22 RX ADMIN — CARBIDOPA AND LEVODOPA 0.5 TABLET: 25; 100 TABLET ORAL at 08:32

## 2024-12-22 RX ADMIN — AMLODIPINE BESYLATE 5 MG: 5 TABLET ORAL at 08:32

## 2024-12-22 ASSESSMENT — PAIN SCALES - GENERAL
PAINLEVEL_OUTOF10: 0

## 2024-12-22 ASSESSMENT — ENCOUNTER SYMPTOMS
WEAKNESS: 1
FEVER: 0
SHORTNESS OF BREATH: 0

## 2024-12-23 ENCOUNTER — APPOINTMENT (OUTPATIENT)
Dept: GENERAL RADIOLOGY | Age: 89
DRG: 871 | End: 2024-12-23
Attending: INTERNAL MEDICINE

## 2024-12-23 LAB
ANION GAP SERPL CALC-SCNC: 5 MMOL/L (ref 7–19)
BUN SERPL-MCNC: 20 MG/DL (ref 6–20)
BUN/CREAT SERPL: 33 (ref 7–25)
CALCIUM SERPL-MCNC: 8.9 MG/DL (ref 8.4–10.2)
CHLORIDE SERPL-SCNC: 111 MMOL/L (ref 97–110)
CO2 SERPL-SCNC: 29 MMOL/L (ref 21–32)
CREAT SERPL-MCNC: 0.6 MG/DL (ref 0.67–1.17)
EGFRCR SERPLBLD CKD-EPI 2021: >90 ML/MIN/{1.73_M2}
FASTING DURATION TIME PATIENT: ABNORMAL H
GLUCOSE SERPL-MCNC: 146 MG/DL (ref 70–99)
POTASSIUM SERPL-SCNC: 4.2 MMOL/L (ref 3.4–5.1)
RAINBOW EXTRA TUBES HOLD SPECIMEN: NORMAL
SODIUM SERPL-SCNC: 141 MMOL/L (ref 135–145)

## 2024-12-23 PROCEDURE — 10002803 HB RX 637: Performed by: NURSE PRACTITIONER

## 2024-12-23 PROCEDURE — 74230 X-RAY XM SWLNG FUNCJ C+: CPT

## 2024-12-23 PROCEDURE — 92526 ORAL FUNCTION THERAPY: CPT

## 2024-12-23 PROCEDURE — 80048 BASIC METABOLIC PNL TOTAL CA: CPT

## 2024-12-23 PROCEDURE — 10002803 HB RX 637: Performed by: INTERNAL MEDICINE

## 2024-12-23 PROCEDURE — 99232 SBSQ HOSP IP/OBS MODERATE 35: CPT | Performed by: STUDENT IN AN ORGANIZED HEALTH CARE EDUCATION/TRAINING PROGRAM

## 2024-12-23 PROCEDURE — 36415 COLL VENOUS BLD VENIPUNCTURE: CPT

## 2024-12-23 PROCEDURE — 10004651 HB RX, NO CHARGE ITEM: Performed by: INTERNAL MEDICINE

## 2024-12-23 PROCEDURE — 10006031 HB ROOM CHARGE TELEMETRY

## 2024-12-23 PROCEDURE — 92611 MOTION FLUOROSCOPY/SWALLOW: CPT

## 2024-12-23 RX ADMIN — MIRTAZAPINE 15 MG: 15 TABLET, FILM COATED ORAL at 21:47

## 2024-12-23 RX ADMIN — VENLAFAXINE HYDROCHLORIDE 37.5 MG: 37.5 TABLET ORAL at 09:29

## 2024-12-23 RX ADMIN — DONEPEZIL HYDROCHLORIDE 10 MG: 10 TABLET ORAL at 21:48

## 2024-12-23 RX ADMIN — CARBIDOPA AND LEVODOPA 0.5 TABLET: 25; 100 TABLET ORAL at 13:42

## 2024-12-23 RX ADMIN — METOPROLOL TARTRATE 25 MG: 25 TABLET, FILM COATED ORAL at 09:29

## 2024-12-23 RX ADMIN — VENLAFAXINE HYDROCHLORIDE 37.5 MG: 37.5 TABLET ORAL at 21:47

## 2024-12-23 RX ADMIN — CARBIDOPA AND LEVODOPA 0.5 TABLET: 25; 100 TABLET ORAL at 21:47

## 2024-12-23 RX ADMIN — AMLODIPINE BESYLATE 5 MG: 5 TABLET ORAL at 09:29

## 2024-12-23 RX ADMIN — CARBIDOPA AND LEVODOPA 0.5 TABLET: 25; 100 TABLET ORAL at 09:29

## 2024-12-23 RX ADMIN — METOPROLOL TARTRATE 25 MG: 25 TABLET, FILM COATED ORAL at 21:47

## 2024-12-23 RX ADMIN — FOLIC ACID 1 MG: 1 TABLET ORAL at 09:29

## 2024-12-23 RX ADMIN — ARIPIPRAZOLE 2 MG: 2 TABLET ORAL at 21:47

## 2024-12-23 RX ADMIN — ASPIRIN 81 MG CHEWABLE TABLET 81 MG: 81 TABLET CHEWABLE at 09:29

## 2024-12-23 ASSESSMENT — PAIN SCALES - PAIN ASSESSMENT IN ADVANCED DEMENTIA (PAINAD)
TOTALSCORE: 1
FACIALEXPRESSION: SMILING OR INEXPRESSIVE
BODYLANGUAGE: RELAXED
NEGVOCALIZATION: OCCASIONAL MOAN OR GROAN, LOW LEVELS OF SPEECH WITH A NEGATIVE OR DISAPPROVING QUALITY
CONSOLABILITY: NO NEED TO CONSOLE
BREATHING: NORMAL

## 2024-12-23 ASSESSMENT — ENCOUNTER SYMPTOMS
WEAKNESS: 1
FEVER: 0
SHORTNESS OF BREATH: 0

## 2024-12-23 ASSESSMENT — PAIN SCALES - GENERAL: PAINLEVEL_OUTOF10: 0

## 2024-12-24 LAB
ANION GAP SERPL CALC-SCNC: 9 MMOL/L (ref 7–19)
BASOPHILS # BLD: 0 K/MCL (ref 0–0.3)
BASOPHILS NFR BLD: 0 %
BUN SERPL-MCNC: 21 MG/DL (ref 6–20)
BUN/CREAT SERPL: 33 (ref 7–25)
CALCIUM SERPL-MCNC: 9 MG/DL (ref 8.4–10.2)
CHLORIDE SERPL-SCNC: 109 MMOL/L (ref 97–110)
CO2 SERPL-SCNC: 26 MMOL/L (ref 21–32)
CREAT SERPL-MCNC: 0.63 MG/DL (ref 0.67–1.17)
DEPRECATED RDW RBC: 48 FL (ref 39–50)
EGFRCR SERPLBLD CKD-EPI 2021: >90 ML/MIN/{1.73_M2}
EOSINOPHIL # BLD: 0.2 K/MCL (ref 0–0.5)
EOSINOPHIL NFR BLD: 3 %
ERYTHROCYTE [DISTWIDTH] IN BLOOD: 13 % (ref 11–15)
FASTING DURATION TIME PATIENT: ABNORMAL H
GLUCOSE BLDC GLUCOMTR-MCNC: 114 MG/DL (ref 70–99)
GLUCOSE BLDC GLUCOMTR-MCNC: 116 MG/DL (ref 70–99)
GLUCOSE SERPL-MCNC: 127 MG/DL (ref 70–99)
HCT VFR BLD CALC: 32 % (ref 39–51)
HGB BLD-MCNC: 9.9 G/DL (ref 13–17)
IMM GRANULOCYTES # BLD AUTO: 0.1 K/MCL (ref 0–0.2)
IMM GRANULOCYTES # BLD: 1 %
LYMPHOCYTES # BLD: 1.3 K/MCL (ref 1–4)
LYMPHOCYTES NFR BLD: 20 %
MAGNESIUM SERPL-MCNC: 2.7 MG/DL (ref 1.7–2.4)
MCH RBC QN AUTO: 31.8 PG (ref 26–34)
MCHC RBC AUTO-ENTMCNC: 30.9 G/DL (ref 32–36.5)
MCV RBC AUTO: 102.9 FL (ref 78–100)
MONOCYTES # BLD: 0.8 K/MCL (ref 0.3–0.9)
MONOCYTES NFR BLD: 12 %
NEUTROPHILS # BLD: 4.3 K/MCL (ref 1.8–7.7)
NEUTROPHILS NFR BLD: 64 %
NRBC BLD MANUAL-RTO: 0 /100 WBC
PHOSPHATE SERPL-MCNC: 2.6 MG/DL (ref 2.4–4.7)
PLATELET # BLD AUTO: 131 K/MCL (ref 140–450)
POTASSIUM SERPL-SCNC: 4.7 MMOL/L (ref 3.4–5.1)
RBC # BLD: 3.11 MIL/MCL (ref 4.5–5.9)
SODIUM SERPL-SCNC: 139 MMOL/L (ref 135–145)
WBC # BLD: 6.8 K/MCL (ref 4.2–11)

## 2024-12-24 PROCEDURE — 10002803 HB RX 637: Performed by: INTERNAL MEDICINE

## 2024-12-24 PROCEDURE — 99233 SBSQ HOSP IP/OBS HIGH 50: CPT | Performed by: NURSE PRACTITIONER

## 2024-12-24 PROCEDURE — 83735 ASSAY OF MAGNESIUM: CPT | Performed by: INTERNAL MEDICINE

## 2024-12-24 PROCEDURE — 10004651 HB RX, NO CHARGE ITEM: Performed by: INTERNAL MEDICINE

## 2024-12-24 PROCEDURE — 84100 ASSAY OF PHOSPHORUS: CPT | Performed by: INTERNAL MEDICINE

## 2024-12-24 PROCEDURE — 85025 COMPLETE CBC W/AUTO DIFF WBC: CPT | Performed by: INTERNAL MEDICINE

## 2024-12-24 PROCEDURE — 36415 COLL VENOUS BLD VENIPUNCTURE: CPT | Performed by: INTERNAL MEDICINE

## 2024-12-24 PROCEDURE — 10006031 HB ROOM CHARGE TELEMETRY

## 2024-12-24 PROCEDURE — 10002803 HB RX 637: Performed by: NURSE PRACTITIONER

## 2024-12-24 PROCEDURE — 80048 BASIC METABOLIC PNL TOTAL CA: CPT

## 2024-12-24 RX ADMIN — MIRTAZAPINE 15 MG: 15 TABLET, FILM COATED ORAL at 21:36

## 2024-12-24 RX ADMIN — ASPIRIN 81 MG CHEWABLE TABLET 81 MG: 81 TABLET CHEWABLE at 10:34

## 2024-12-24 RX ADMIN — DONEPEZIL HYDROCHLORIDE 10 MG: 10 TABLET ORAL at 21:36

## 2024-12-24 RX ADMIN — CARBIDOPA AND LEVODOPA 0.5 TABLET: 25; 100 TABLET ORAL at 21:36

## 2024-12-24 RX ADMIN — VENLAFAXINE HYDROCHLORIDE 37.5 MG: 37.5 TABLET ORAL at 10:35

## 2024-12-24 RX ADMIN — FOLIC ACID 1 MG: 1 TABLET ORAL at 10:34

## 2024-12-24 RX ADMIN — CARBIDOPA AND LEVODOPA 0.5 TABLET: 25; 100 TABLET ORAL at 14:00

## 2024-12-24 RX ADMIN — CARBIDOPA AND LEVODOPA 0.5 TABLET: 25; 100 TABLET ORAL at 10:34

## 2024-12-24 RX ADMIN — METOPROLOL TARTRATE 25 MG: 25 TABLET, FILM COATED ORAL at 10:34

## 2024-12-24 RX ADMIN — AMLODIPINE BESYLATE 5 MG: 5 TABLET ORAL at 10:35

## 2024-12-24 RX ADMIN — METOPROLOL TARTRATE 25 MG: 25 TABLET, FILM COATED ORAL at 21:36

## 2024-12-24 RX ADMIN — VENLAFAXINE HYDROCHLORIDE 37.5 MG: 37.5 TABLET ORAL at 21:36

## 2024-12-24 ASSESSMENT — PAIN SCALES - PAIN ASSESSMENT IN ADVANCED DEMENTIA (PAINAD)
TOTALSCORE: 1
NEGVOCALIZATION: OCCASIONAL MOAN OR GROAN, LOW LEVELS OF SPEECH WITH A NEGATIVE OR DISAPPROVING QUALITY
BODYLANGUAGE: RELAXED
FACIALEXPRESSION: SMILING OR INEXPRESSIVE
BREATHING: NORMAL
CONSOLABILITY: NO NEED TO CONSOLE

## 2024-12-24 ASSESSMENT — ENCOUNTER SYMPTOMS
WEAKNESS: 1
SHORTNESS OF BREATH: 0
FEVER: 0

## 2024-12-25 LAB
ANION GAP SERPL CALC-SCNC: 5 MMOL/L (ref 7–19)
BUN SERPL-MCNC: 22 MG/DL (ref 6–20)
BUN/CREAT SERPL: 33 (ref 7–25)
CALCIUM SERPL-MCNC: 9.3 MG/DL (ref 8.4–10.2)
CHLORIDE SERPL-SCNC: 108 MMOL/L (ref 97–110)
CO2 SERPL-SCNC: 29 MMOL/L (ref 21–32)
CREAT SERPL-MCNC: 0.66 MG/DL (ref 0.67–1.17)
EGFRCR SERPLBLD CKD-EPI 2021: 90 ML/MIN/{1.73_M2}
FASTING DURATION TIME PATIENT: ABNORMAL H
GLUCOSE BLDC GLUCOMTR-MCNC: 103 MG/DL (ref 70–99)
GLUCOSE BLDC GLUCOMTR-MCNC: 111 MG/DL (ref 70–99)
GLUCOSE BLDC GLUCOMTR-MCNC: 98 MG/DL (ref 70–99)
GLUCOSE SERPL-MCNC: 115 MG/DL (ref 70–99)
POTASSIUM SERPL-SCNC: 4.4 MMOL/L (ref 3.4–5.1)
SODIUM SERPL-SCNC: 138 MMOL/L (ref 135–145)

## 2024-12-25 PROCEDURE — 10002803 HB RX 637: Performed by: NURSE PRACTITIONER

## 2024-12-25 PROCEDURE — 80048 BASIC METABOLIC PNL TOTAL CA: CPT

## 2024-12-25 PROCEDURE — 10002803 HB RX 637: Performed by: INTERNAL MEDICINE

## 2024-12-25 PROCEDURE — 10004651 HB RX, NO CHARGE ITEM: Performed by: INTERNAL MEDICINE

## 2024-12-25 PROCEDURE — 10006031 HB ROOM CHARGE TELEMETRY

## 2024-12-25 PROCEDURE — 36415 COLL VENOUS BLD VENIPUNCTURE: CPT

## 2024-12-25 RX ADMIN — CARBIDOPA AND LEVODOPA 0.5 TABLET: 25; 100 TABLET ORAL at 20:30

## 2024-12-25 RX ADMIN — CARBIDOPA AND LEVODOPA 0.5 TABLET: 25; 100 TABLET ORAL at 08:58

## 2024-12-25 RX ADMIN — CARBIDOPA AND LEVODOPA 0.5 TABLET: 25; 100 TABLET ORAL at 13:21

## 2024-12-25 RX ADMIN — DONEPEZIL HYDROCHLORIDE 10 MG: 10 TABLET ORAL at 20:30

## 2024-12-25 RX ADMIN — VENLAFAXINE HYDROCHLORIDE 37.5 MG: 37.5 TABLET ORAL at 20:30

## 2024-12-25 RX ADMIN — MIRTAZAPINE 15 MG: 15 TABLET, FILM COATED ORAL at 20:30

## 2024-12-25 RX ADMIN — ARIPIPRAZOLE 2 MG: 2 TABLET ORAL at 20:30

## 2024-12-25 RX ADMIN — AMLODIPINE BESYLATE 5 MG: 5 TABLET ORAL at 08:58

## 2024-12-25 RX ADMIN — METOPROLOL TARTRATE 25 MG: 25 TABLET, FILM COATED ORAL at 20:30

## 2024-12-25 RX ADMIN — METOPROLOL TARTRATE 25 MG: 25 TABLET, FILM COATED ORAL at 08:58

## 2024-12-25 RX ADMIN — ASPIRIN 81 MG CHEWABLE TABLET 81 MG: 81 TABLET CHEWABLE at 08:58

## 2024-12-25 RX ADMIN — FOLIC ACID 1 MG: 1 TABLET ORAL at 08:58

## 2024-12-25 RX ADMIN — VENLAFAXINE HYDROCHLORIDE 37.5 MG: 37.5 TABLET ORAL at 08:58

## 2024-12-25 ASSESSMENT — PAIN SCALES - PAIN ASSESSMENT IN ADVANCED DEMENTIA (PAINAD)
CONSOLABILITY: NO NEED TO CONSOLE
NEGVOCALIZATION: OCCASIONAL MOAN OR GROAN, LOW LEVELS OF SPEECH WITH A NEGATIVE OR DISAPPROVING QUALITY
FACIALEXPRESSION: SMILING OR INEXPRESSIVE
BODYLANGUAGE: RELAXED
FACIALEXPRESSION: SMILING OR INEXPRESSIVE
NEGVOCALIZATION: OCCASIONAL MOAN OR GROAN, LOW LEVELS OF SPEECH WITH A NEGATIVE OR DISAPPROVING QUALITY
CONSOLABILITY: NO NEED TO CONSOLE
TOTALSCORE: 1
TOTALSCORE: 1
BREATHING: NORMAL
BREATHING: NORMAL
BODYLANGUAGE: RELAXED

## 2024-12-25 ASSESSMENT — PAIN SCALES - GENERAL
PAINLEVEL_OUTOF10: 0
PAINLEVEL_OUTOF10: 0

## 2024-12-25 ASSESSMENT — ENCOUNTER SYMPTOMS
WEAKNESS: 1
FEVER: 0
SHORTNESS OF BREATH: 0

## 2024-12-26 ENCOUNTER — ANESTHESIA EVENT (OUTPATIENT)
Dept: GASTROENTEROLOGY | Age: 89
End: 2024-12-26

## 2024-12-26 ENCOUNTER — APPOINTMENT (OUTPATIENT)
Dept: GASTROENTEROLOGY | Age: 89
DRG: 871 | End: 2024-12-26
Attending: INTERNAL MEDICINE

## 2024-12-26 ENCOUNTER — ANESTHESIA (OUTPATIENT)
Dept: GASTROENTEROLOGY | Age: 89
End: 2024-12-26

## 2024-12-26 ENCOUNTER — APPOINTMENT (OUTPATIENT)
Dept: GENERAL RADIOLOGY | Age: 89
DRG: 871 | End: 2024-12-26
Attending: INTERNAL MEDICINE

## 2024-12-26 LAB — GLUCOSE BLDC GLUCOMTR-MCNC: 101 MG/DL (ref 70–99)

## 2024-12-26 PROCEDURE — 10004451 HB PACU RECOVERY 1ST 30 MINUTES

## 2024-12-26 PROCEDURE — 10002800 HB RX 250 W HCPCS

## 2024-12-26 PROCEDURE — 13000001 HB PHASE II RECOVERY EA 30 MINUTES

## 2024-12-26 PROCEDURE — 10002801 HB RX 250 W/O HCPCS

## 2024-12-26 PROCEDURE — 0DJ08ZZ INSPECTION OF UPPER INTESTINAL TRACT, VIA NATURAL OR ARTIFICIAL OPENING ENDOSCOPIC: ICD-10-PCS | Performed by: INTERNAL MEDICINE

## 2024-12-26 PROCEDURE — 10002807 HB RX 258

## 2024-12-26 PROCEDURE — 10006031 HB ROOM CHARGE TELEMETRY

## 2024-12-26 PROCEDURE — 13000024 HB GI COMPLEX CASE S/U + 1ST 15 MIN

## 2024-12-26 PROCEDURE — 10002803 HB RX 637: Performed by: INTERNAL MEDICINE

## 2024-12-26 PROCEDURE — 92526 ORAL FUNCTION THERAPY: CPT

## 2024-12-26 PROCEDURE — 10002803 HB RX 637: Performed by: NURSE PRACTITIONER

## 2024-12-26 PROCEDURE — 71045 X-RAY EXAM CHEST 1 VIEW: CPT

## 2024-12-26 PROCEDURE — 13000008 HB ANESTHESIA MAC OUTSIDE OR

## 2024-12-26 PROCEDURE — 99233 SBSQ HOSP IP/OBS HIGH 50: CPT | Performed by: NURSE PRACTITIONER

## 2024-12-26 RX ORDER — PROPOFOL 10 MG/ML
INJECTION, EMULSION INTRAVENOUS PRN
Status: DISCONTINUED | OUTPATIENT
Start: 2024-12-26 | End: 2024-12-26

## 2024-12-26 RX ORDER — SODIUM CHLORIDE 9 MG/ML
INJECTION, SOLUTION INTRAVENOUS CONTINUOUS PRN
Status: DISCONTINUED | OUTPATIENT
Start: 2024-12-26 | End: 2024-12-26

## 2024-12-26 RX ORDER — MIDAZOLAM HYDROCHLORIDE 1 MG/ML
INJECTION, SOLUTION INTRAMUSCULAR; INTRAVENOUS PRN
Status: DISCONTINUED | OUTPATIENT
Start: 2024-12-26 | End: 2024-12-26

## 2024-12-26 RX ORDER — SODIUM CHLORIDE 9 MG/ML
INJECTION, SOLUTION INTRAVENOUS ONCE
Status: DISCONTINUED | OUTPATIENT
Start: 2024-12-26 | End: 2025-01-02 | Stop reason: HOSPADM

## 2024-12-26 RX ORDER — KETAMINE HCL IN NACL, ISO-OSM 100MG/10ML
SYRINGE (ML) INJECTION PRN
Status: DISCONTINUED | OUTPATIENT
Start: 2024-12-26 | End: 2024-12-26

## 2024-12-26 RX ADMIN — MIRTAZAPINE 15 MG: 15 TABLET, FILM COATED ORAL at 20:34

## 2024-12-26 RX ADMIN — METOPROLOL TARTRATE 25 MG: 25 TABLET, FILM COATED ORAL at 20:34

## 2024-12-26 RX ADMIN — PROPOFOL 20 MG: 10 INJECTION, EMULSION INTRAVENOUS at 14:56

## 2024-12-26 RX ADMIN — CARBIDOPA AND LEVODOPA 0.5 TABLET: 25; 100 TABLET ORAL at 20:34

## 2024-12-26 RX ADMIN — PROPOFOL INJECTABLE EMULSION 100 MCG/KG/MIN: 10 INJECTION, EMULSION INTRAVENOUS at 14:51

## 2024-12-26 RX ADMIN — MIDAZOLAM HYDROCHLORIDE 2 MG: 1 INJECTION, SOLUTION INTRAMUSCULAR; INTRAVENOUS at 14:50

## 2024-12-26 RX ADMIN — DONEPEZIL HYDROCHLORIDE 10 MG: 10 TABLET ORAL at 20:34

## 2024-12-26 RX ADMIN — PROPOFOL 20 MG: 10 INJECTION, EMULSION INTRAVENOUS at 14:54

## 2024-12-26 RX ADMIN — Medication 25 MG: at 14:53

## 2024-12-26 RX ADMIN — PROPOFOL 20 MG: 10 INJECTION, EMULSION INTRAVENOUS at 14:53

## 2024-12-26 RX ADMIN — VENLAFAXINE HYDROCHLORIDE 37.5 MG: 37.5 TABLET ORAL at 20:34

## 2024-12-26 RX ADMIN — SODIUM CHLORIDE: 9 INJECTION, SOLUTION INTRAVENOUS at 14:49

## 2024-12-26 ASSESSMENT — PAIN SCALES - PAIN ASSESSMENT IN ADVANCED DEMENTIA (PAINAD)
BREATHING: NORMAL
TOTALSCORE: 0
CONSOLABILITY: NO NEED TO CONSOLE
BODYLANGUAGE: RELAXED
FACIALEXPRESSION: SMILING OR INEXPRESSIVE
TOTALSCORE: 0
BODYLANGUAGE: RELAXED
BREATHING: NORMAL
CONSOLABILITY: NO NEED TO CONSOLE
FACIALEXPRESSION: SMILING OR INEXPRESSIVE

## 2024-12-26 ASSESSMENT — PAIN SCALES - GENERAL
PAINLEVEL_OUTOF10: 0
PAINLEVEL_OUTOF10: 0

## 2024-12-26 ASSESSMENT — PAIN SCALES - WONG BAKER
WONGBAKER_NUMERICALRESPONSE: 0

## 2024-12-26 ASSESSMENT — ENCOUNTER SYMPTOMS
FEVER: 0
SHORTNESS OF BREATH: 0
WEAKNESS: 1

## 2024-12-27 LAB
GLUCOSE BLDC GLUCOMTR-MCNC: 117 MG/DL (ref 70–99)
GLUCOSE BLDC GLUCOMTR-MCNC: 72 MG/DL (ref 70–99)
GLUCOSE BLDC GLUCOMTR-MCNC: 85 MG/DL (ref 70–99)
GLUCOSE BLDC GLUCOMTR-MCNC: 92 MG/DL (ref 70–99)

## 2024-12-27 PROCEDURE — 10002803 HB RX 637: Performed by: NURSE PRACTITIONER

## 2024-12-27 PROCEDURE — 10006031 HB ROOM CHARGE TELEMETRY

## 2024-12-27 PROCEDURE — 0DH63UZ INSERTION OF FEEDING DEVICE INTO STOMACH, PERCUTANEOUS APPROACH: ICD-10-PCS | Performed by: RADIOLOGY

## 2024-12-27 PROCEDURE — 10002803 HB RX 637: Performed by: INTERNAL MEDICINE

## 2024-12-27 PROCEDURE — 10004651 HB RX, NO CHARGE ITEM: Performed by: INTERNAL MEDICINE

## 2024-12-27 PROCEDURE — 99233 SBSQ HOSP IP/OBS HIGH 50: CPT | Performed by: NURSE PRACTITIONER

## 2024-12-27 RX ADMIN — VENLAFAXINE HYDROCHLORIDE 37.5 MG: 37.5 TABLET ORAL at 09:47

## 2024-12-27 RX ADMIN — CARBIDOPA AND LEVODOPA 0.5 TABLET: 25; 100 TABLET ORAL at 22:14

## 2024-12-27 RX ADMIN — ASPIRIN 81 MG CHEWABLE TABLET 81 MG: 81 TABLET CHEWABLE at 09:46

## 2024-12-27 RX ADMIN — AMLODIPINE BESYLATE 5 MG: 5 TABLET ORAL at 09:47

## 2024-12-27 RX ADMIN — CARBIDOPA AND LEVODOPA 0.5 TABLET: 25; 100 TABLET ORAL at 09:47

## 2024-12-27 RX ADMIN — DONEPEZIL HYDROCHLORIDE 10 MG: 10 TABLET ORAL at 22:14

## 2024-12-27 RX ADMIN — VENLAFAXINE HYDROCHLORIDE 37.5 MG: 37.5 TABLET ORAL at 22:14

## 2024-12-27 RX ADMIN — FOLIC ACID 1 MG: 1 TABLET ORAL at 09:46

## 2024-12-27 RX ADMIN — METOPROLOL TARTRATE 25 MG: 25 TABLET, FILM COATED ORAL at 22:14

## 2024-12-27 RX ADMIN — CARBIDOPA AND LEVODOPA 0.5 TABLET: 25; 100 TABLET ORAL at 13:36

## 2024-12-27 RX ADMIN — METOPROLOL TARTRATE 25 MG: 25 TABLET, FILM COATED ORAL at 09:46

## 2024-12-27 RX ADMIN — MIRTAZAPINE 15 MG: 15 TABLET, FILM COATED ORAL at 22:14

## 2024-12-27 RX ADMIN — ARIPIPRAZOLE 2 MG: 2 TABLET ORAL at 22:14

## 2024-12-27 ASSESSMENT — PAIN SCALES - PAIN ASSESSMENT IN ADVANCED DEMENTIA (PAINAD)
FACIALEXPRESSION: SMILING OR INEXPRESSIVE
CONSOLABILITY: NO NEED TO CONSOLE
BODYLANGUAGE: RELAXED
BODYLANGUAGE: RELAXED
TOTALSCORE: 0
BREATHING: NORMAL
CONSOLABILITY: NO NEED TO CONSOLE
FACIALEXPRESSION: SMILING OR INEXPRESSIVE
BREATHING: NORMAL
TOTALSCORE: 0

## 2024-12-27 ASSESSMENT — PAIN SCALES - WONG BAKER
WONGBAKER_NUMERICALRESPONSE: 0
WONGBAKER_NUMERICALRESPONSE: 0

## 2024-12-27 ASSESSMENT — PAIN SCALES - GENERAL
PAINLEVEL_OUTOF10: 0
PAINLEVEL_OUTOF10: 0

## 2024-12-28 LAB
ANION GAP SERPL CALC-SCNC: 7 MMOL/L (ref 7–19)
BUN SERPL-MCNC: 29 MG/DL (ref 6–20)
BUN/CREAT SERPL: 35 (ref 7–25)
CALCIUM SERPL-MCNC: 9.7 MG/DL (ref 8.4–10.2)
CHLORIDE SERPL-SCNC: 109 MMOL/L (ref 97–110)
CO2 SERPL-SCNC: 28 MMOL/L (ref 21–32)
CREAT SERPL-MCNC: 0.83 MG/DL (ref 0.67–1.17)
EGFRCR SERPLBLD CKD-EPI 2021: 84 ML/MIN/{1.73_M2}
FASTING DURATION TIME PATIENT: ABNORMAL H
GLUCOSE BLDC GLUCOMTR-MCNC: 117 MG/DL (ref 70–99)
GLUCOSE BLDC GLUCOMTR-MCNC: 157 MG/DL (ref 70–99)
GLUCOSE BLDC GLUCOMTR-MCNC: 82 MG/DL (ref 70–99)
GLUCOSE SERPL-MCNC: 126 MG/DL (ref 70–99)
POTASSIUM SERPL-SCNC: 4 MMOL/L (ref 3.4–5.1)
RAINBOW EXTRA TUBES HOLD SPECIMEN: NORMAL
SODIUM SERPL-SCNC: 140 MMOL/L (ref 135–145)

## 2024-12-28 PROCEDURE — 10002803 HB RX 637: Performed by: NURSE PRACTITIONER

## 2024-12-28 PROCEDURE — 80048 BASIC METABOLIC PNL TOTAL CA: CPT

## 2024-12-28 PROCEDURE — 10002803 HB RX 637: Performed by: INTERNAL MEDICINE

## 2024-12-28 PROCEDURE — 36415 COLL VENOUS BLD VENIPUNCTURE: CPT | Performed by: INTERNAL MEDICINE

## 2024-12-28 PROCEDURE — 10006031 HB ROOM CHARGE TELEMETRY

## 2024-12-28 PROCEDURE — 10004651 HB RX, NO CHARGE ITEM: Performed by: INTERNAL MEDICINE

## 2024-12-28 RX ADMIN — VENLAFAXINE HYDROCHLORIDE 37.5 MG: 37.5 TABLET ORAL at 21:12

## 2024-12-28 RX ADMIN — CARBIDOPA AND LEVODOPA 0.5 TABLET: 25; 100 TABLET ORAL at 14:15

## 2024-12-28 RX ADMIN — ASPIRIN 81 MG CHEWABLE TABLET 81 MG: 81 TABLET CHEWABLE at 08:40

## 2024-12-28 RX ADMIN — FOLIC ACID 1 MG: 1 TABLET ORAL at 08:40

## 2024-12-28 RX ADMIN — METOPROLOL TARTRATE 25 MG: 25 TABLET, FILM COATED ORAL at 08:40

## 2024-12-28 RX ADMIN — VENLAFAXINE HYDROCHLORIDE 37.5 MG: 37.5 TABLET ORAL at 08:40

## 2024-12-28 RX ADMIN — CARBIDOPA AND LEVODOPA 0.5 TABLET: 25; 100 TABLET ORAL at 08:40

## 2024-12-28 RX ADMIN — CARBIDOPA AND LEVODOPA 0.5 TABLET: 25; 100 TABLET ORAL at 21:12

## 2024-12-28 RX ADMIN — AMLODIPINE BESYLATE 5 MG: 5 TABLET ORAL at 08:40

## 2024-12-28 RX ADMIN — METOPROLOL TARTRATE 25 MG: 25 TABLET, FILM COATED ORAL at 21:12

## 2024-12-28 RX ADMIN — DONEPEZIL HYDROCHLORIDE 10 MG: 10 TABLET ORAL at 21:11

## 2024-12-28 RX ADMIN — MIRTAZAPINE 15 MG: 15 TABLET, FILM COATED ORAL at 21:11

## 2024-12-28 ASSESSMENT — PAIN SCALES - PAIN ASSESSMENT IN ADVANCED DEMENTIA (PAINAD)
CONSOLABILITY: NO NEED TO CONSOLE
BREATHING: NORMAL
BODYLANGUAGE: RELAXED
CONSOLABILITY: NO NEED TO CONSOLE
FACIALEXPRESSION: SMILING OR INEXPRESSIVE
TOTALSCORE: 0
BODYLANGUAGE: RELAXED
TOTALSCORE: 0
FACIALEXPRESSION: SMILING OR INEXPRESSIVE
BREATHING: NORMAL

## 2024-12-28 ASSESSMENT — ENCOUNTER SYMPTOMS
SHORTNESS OF BREATH: 0
FEVER: 0
WEAKNESS: 1

## 2024-12-28 ASSESSMENT — PAIN SCALES - WONG BAKER: WONGBAKER_NUMERICALRESPONSE: 0

## 2024-12-29 LAB
GLUCOSE BLDC GLUCOMTR-MCNC: 114 MG/DL (ref 70–99)
GLUCOSE BLDC GLUCOMTR-MCNC: 123 MG/DL (ref 70–99)
GLUCOSE BLDC GLUCOMTR-MCNC: 159 MG/DL (ref 70–99)

## 2024-12-29 PROCEDURE — 97535 SELF CARE MNGMENT TRAINING: CPT

## 2024-12-29 PROCEDURE — 10004651 HB RX, NO CHARGE ITEM: Performed by: INTERNAL MEDICINE

## 2024-12-29 PROCEDURE — 10002803 HB RX 637: Performed by: INTERNAL MEDICINE

## 2024-12-29 PROCEDURE — 97163 PT EVAL HIGH COMPLEX 45 MIN: CPT

## 2024-12-29 PROCEDURE — 10006031 HB ROOM CHARGE TELEMETRY

## 2024-12-29 PROCEDURE — 97530 THERAPEUTIC ACTIVITIES: CPT

## 2024-12-29 PROCEDURE — 97166 OT EVAL MOD COMPLEX 45 MIN: CPT

## 2024-12-29 PROCEDURE — 10002803 HB RX 637: Performed by: NURSE PRACTITIONER

## 2024-12-29 RX ADMIN — FOLIC ACID 1 MG: 1 TABLET ORAL at 09:16

## 2024-12-29 RX ADMIN — CARBIDOPA AND LEVODOPA 0.5 TABLET: 25; 100 TABLET ORAL at 09:16

## 2024-12-29 RX ADMIN — METOPROLOL TARTRATE 25 MG: 25 TABLET, FILM COATED ORAL at 09:17

## 2024-12-29 RX ADMIN — AMLODIPINE BESYLATE 5 MG: 5 TABLET ORAL at 09:16

## 2024-12-29 RX ADMIN — VENLAFAXINE HYDROCHLORIDE 37.5 MG: 37.5 TABLET ORAL at 22:28

## 2024-12-29 RX ADMIN — MIRTAZAPINE 15 MG: 15 TABLET, FILM COATED ORAL at 22:27

## 2024-12-29 RX ADMIN — ARIPIPRAZOLE 2 MG: 2 TABLET ORAL at 22:28

## 2024-12-29 RX ADMIN — CARBIDOPA AND LEVODOPA 0.5 TABLET: 25; 100 TABLET ORAL at 22:27

## 2024-12-29 RX ADMIN — CARBIDOPA AND LEVODOPA 0.5 TABLET: 25; 100 TABLET ORAL at 13:27

## 2024-12-29 RX ADMIN — METOPROLOL TARTRATE 25 MG: 25 TABLET, FILM COATED ORAL at 22:28

## 2024-12-29 RX ADMIN — DONEPEZIL HYDROCHLORIDE 10 MG: 10 TABLET ORAL at 22:27

## 2024-12-29 RX ADMIN — VENLAFAXINE HYDROCHLORIDE 37.5 MG: 37.5 TABLET ORAL at 09:17

## 2024-12-29 RX ADMIN — ASPIRIN 81 MG CHEWABLE TABLET 81 MG: 81 TABLET CHEWABLE at 09:15

## 2024-12-29 ASSESSMENT — ACTIVITIES OF DAILY LIVING (ADL)
PRIOR_ADL: MINIMAL ASSIST (MIN)
PRIOR_ADL_TOILETING: MAXIMAL ASSIST (MAX)
HOME_MANAGEMENT_TIME_ENTRY: 10

## 2024-12-29 ASSESSMENT — PAIN SCALES - PAIN ASSESSMENT IN ADVANCED DEMENTIA (PAINAD)
FACIALEXPRESSION: SMILING OR INEXPRESSIVE
BREATHING: NORMAL
CONSOLABILITY: NO NEED TO CONSOLE
BREATHING: NORMAL
TOTALSCORE: 0
FACIALEXPRESSION: SMILING OR INEXPRESSIVE
TOTALSCORE: 0
BODYLANGUAGE: RELAXED
CONSOLABILITY: NO NEED TO CONSOLE
BODYLANGUAGE: RELAXED

## 2024-12-29 ASSESSMENT — COGNITIVE AND FUNCTIONAL STATUS - GENERAL
DAILY_ACTIVITY_RAW_SCORE: 7
DAILY_ACTIVITY_CONVERTED_SCORE: 20.13
BASIC_MOBILITY_RAW_SCORE: 8
HELP NEEDED DRESSING REGULAR LOWER BODY CLOTHING: TOTAL
HELP NEEDED FOR BATHING: TOTAL
HELP NEEDED DRESSING REGULAR UPPER BODY CLOTHING: TOTAL
BASIC_MOBILITY_CONVERTED_SCORE: 22.61
HELP NEEDED FOR TOILETING: TOTAL
HELP NEEDED FOR PERSONAL GROOMING: A LOT

## 2024-12-29 ASSESSMENT — ENCOUNTER SYMPTOMS
WEAKNESS: 1
SHORTNESS OF BREATH: 0
FEVER: 0

## 2024-12-29 ASSESSMENT — PAIN SCALES - WONG BAKER: WONGBAKER_NUMERICALRESPONSE: 0

## 2024-12-30 ENCOUNTER — APPOINTMENT (OUTPATIENT)
Dept: INTERVENTIONAL RADIOLOGY/VASCULAR | Age: 89
DRG: 871 | End: 2024-12-30
Attending: INTERNAL MEDICINE

## 2024-12-30 LAB — GLUCOSE BLDC GLUCOMTR-MCNC: 110 MG/DL (ref 70–99)

## 2024-12-30 PROCEDURE — 10002803 HB RX 637: Performed by: NURSE PRACTITIONER

## 2024-12-30 PROCEDURE — 10002803 HB RX 637: Performed by: INTERNAL MEDICINE

## 2024-12-30 PROCEDURE — 99152 MOD SED SAME PHYS/QHP 5/>YRS: CPT

## 2024-12-30 PROCEDURE — 10006027 HB SUPPLY 278

## 2024-12-30 PROCEDURE — C1769 GUIDE WIRE: HCPCS

## 2024-12-30 PROCEDURE — 10002800 HB RX 250 W HCPCS: Performed by: RADIOLOGY

## 2024-12-30 PROCEDURE — 10002801 HB RX 250 W/O HCPCS: Performed by: RADIOLOGY

## 2024-12-30 PROCEDURE — 99233 SBSQ HOSP IP/OBS HIGH 50: CPT | Performed by: NURSE PRACTITIONER

## 2024-12-30 PROCEDURE — 10006023 HB SUPPLY 272

## 2024-12-30 PROCEDURE — 49440 PLACE GASTROSTOMY TUBE PERC: CPT

## 2024-12-30 PROCEDURE — 10006031 HB ROOM CHARGE TELEMETRY

## 2024-12-30 PROCEDURE — 10002805 HB CONTRAST AGENT: Performed by: INTERNAL MEDICINE

## 2024-12-30 RX ORDER — MIDAZOLAM HYDROCHLORIDE 1 MG/ML
INJECTION, SOLUTION INTRAMUSCULAR; INTRAVENOUS PRN
Status: COMPLETED | OUTPATIENT
Start: 2024-12-30 | End: 2024-12-30

## 2024-12-30 RX ORDER — LIDOCAINE HYDROCHLORIDE 10 MG/ML
INJECTION, SOLUTION INFILTRATION; PERINEURAL PRN
Status: COMPLETED | OUTPATIENT
Start: 2024-12-30 | End: 2024-12-30

## 2024-12-30 RX ADMIN — DONEPEZIL HYDROCHLORIDE 10 MG: 10 TABLET ORAL at 21:22

## 2024-12-30 RX ADMIN — CARBIDOPA AND LEVODOPA 0.5 TABLET: 25; 100 TABLET ORAL at 10:05

## 2024-12-30 RX ADMIN — METOPROLOL TARTRATE 25 MG: 25 TABLET, FILM COATED ORAL at 10:07

## 2024-12-30 RX ADMIN — CARBIDOPA AND LEVODOPA 0.5 TABLET: 25; 100 TABLET ORAL at 21:21

## 2024-12-30 RX ADMIN — VENLAFAXINE HYDROCHLORIDE 37.5 MG: 37.5 TABLET ORAL at 10:05

## 2024-12-30 RX ADMIN — FENTANYL CITRATE 25 MCG: 50 INJECTION INTRAMUSCULAR; INTRAVENOUS at 13:57

## 2024-12-30 RX ADMIN — LIDOCAINE HYDROCHLORIDE 8 ML: 10 INJECTION, SOLUTION INFILTRATION; PERINEURAL at 14:12

## 2024-12-30 RX ADMIN — METOPROLOL TARTRATE 25 MG: 25 TABLET, FILM COATED ORAL at 21:22

## 2024-12-30 RX ADMIN — MIDAZOLAM HYDROCHLORIDE 0.5 MG: 1 INJECTION, SOLUTION INTRAMUSCULAR; INTRAVENOUS at 13:57

## 2024-12-30 RX ADMIN — MIRTAZAPINE 15 MG: 15 TABLET, FILM COATED ORAL at 21:22

## 2024-12-30 RX ADMIN — VENLAFAXINE HYDROCHLORIDE 37.5 MG: 37.5 TABLET ORAL at 21:22

## 2024-12-30 RX ADMIN — IOHEXOL 100 ML: 350 INJECTION, SOLUTION INTRAVENOUS at 14:17

## 2024-12-30 RX ADMIN — AMLODIPINE BESYLATE 5 MG: 5 TABLET ORAL at 10:06

## 2024-12-30 ASSESSMENT — PAIN SCALES - PAIN ASSESSMENT IN ADVANCED DEMENTIA (PAINAD)
FACIALEXPRESSION: SMILING OR INEXPRESSIVE
TOTALSCORE: 0
BREATHING: NORMAL
BREATHING: NORMAL
BODYLANGUAGE: RELAXED
FACIALEXPRESSION: SMILING OR INEXPRESSIVE
TOTALSCORE: 0
CONSOLABILITY: NO NEED TO CONSOLE
BODYLANGUAGE: RELAXED
CONSOLABILITY: NO NEED TO CONSOLE

## 2024-12-30 ASSESSMENT — ENCOUNTER SYMPTOMS
FEVER: 0
SHORTNESS OF BREATH: 0
WEAKNESS: 1

## 2024-12-30 ASSESSMENT — PAIN SCALES - GENERAL
PAINLEVEL_OUTOF10: 0
PAINLEVEL_OUTOF10: 0

## 2024-12-30 ASSESSMENT — PAIN SCALES - WONG BAKER
WONGBAKER_NUMERICALRESPONSE: 0

## 2024-12-31 LAB
GLUCOSE BLDC GLUCOMTR-MCNC: 122 MG/DL (ref 70–99)
GLUCOSE BLDC GLUCOMTR-MCNC: 134 MG/DL (ref 70–99)
GLUCOSE BLDC GLUCOMTR-MCNC: 68 MG/DL (ref 70–99)

## 2024-12-31 PROCEDURE — 10002807 HB RX 258: Performed by: INTERNAL MEDICINE

## 2024-12-31 PROCEDURE — 99233 SBSQ HOSP IP/OBS HIGH 50: CPT | Performed by: NURSE PRACTITIONER

## 2024-12-31 PROCEDURE — 10004651 HB RX, NO CHARGE ITEM: Performed by: INTERNAL MEDICINE

## 2024-12-31 PROCEDURE — 10002803 HB RX 637: Performed by: INTERNAL MEDICINE

## 2024-12-31 PROCEDURE — 97530 THERAPEUTIC ACTIVITIES: CPT

## 2024-12-31 PROCEDURE — 10006031 HB ROOM CHARGE TELEMETRY

## 2024-12-31 PROCEDURE — 10002803 HB RX 637: Performed by: NURSE PRACTITIONER

## 2024-12-31 RX ORDER — DEXTROSE MONOHYDRATE AND SODIUM CHLORIDE 5; .9 G/100ML; G/100ML
INJECTION, SOLUTION INTRAVENOUS CONTINUOUS
Status: DISCONTINUED | OUTPATIENT
Start: 2024-12-31 | End: 2025-01-02 | Stop reason: HOSPADM

## 2024-12-31 RX ADMIN — METOPROLOL TARTRATE 25 MG: 25 TABLET, FILM COATED ORAL at 09:58

## 2024-12-31 RX ADMIN — VENLAFAXINE HYDROCHLORIDE 37.5 MG: 37.5 TABLET ORAL at 09:59

## 2024-12-31 RX ADMIN — ASPIRIN 81 MG CHEWABLE TABLET 81 MG: 81 TABLET CHEWABLE at 09:59

## 2024-12-31 RX ADMIN — METOPROLOL TARTRATE 25 MG: 25 TABLET, FILM COATED ORAL at 20:54

## 2024-12-31 RX ADMIN — ARIPIPRAZOLE 2 MG: 2 TABLET ORAL at 20:55

## 2024-12-31 RX ADMIN — CARBIDOPA AND LEVODOPA 0.5 TABLET: 25; 100 TABLET ORAL at 20:54

## 2024-12-31 RX ADMIN — CARBIDOPA AND LEVODOPA 0.5 TABLET: 25; 100 TABLET ORAL at 09:58

## 2024-12-31 RX ADMIN — AMLODIPINE BESYLATE 5 MG: 5 TABLET ORAL at 09:58

## 2024-12-31 RX ADMIN — VENLAFAXINE HYDROCHLORIDE 37.5 MG: 37.5 TABLET ORAL at 20:54

## 2024-12-31 RX ADMIN — DONEPEZIL HYDROCHLORIDE 10 MG: 10 TABLET ORAL at 20:55

## 2024-12-31 RX ADMIN — MIRTAZAPINE 15 MG: 15 TABLET, FILM COATED ORAL at 20:54

## 2024-12-31 RX ADMIN — CARBIDOPA AND LEVODOPA 0.5 TABLET: 25; 100 TABLET ORAL at 13:15

## 2024-12-31 RX ADMIN — FOLIC ACID 1 MG: 1 TABLET ORAL at 09:58

## 2024-12-31 RX ADMIN — DEXTROSE AND SODIUM CHLORIDE: 5; 900 INJECTION, SOLUTION INTRAVENOUS at 03:40

## 2024-12-31 ASSESSMENT — PAIN SCALES - PAIN ASSESSMENT IN ADVANCED DEMENTIA (PAINAD)
FACIALEXPRESSION: SMILING OR INEXPRESSIVE
BODYLANGUAGE: RELAXED
TOTALSCORE: 0
BREATHING: NORMAL
BREATHING: NORMAL
TOTALSCORE: 0
BODYLANGUAGE: RELAXED
CONSOLABILITY: NO NEED TO CONSOLE
CONSOLABILITY: NO NEED TO CONSOLE
FACIALEXPRESSION: SMILING OR INEXPRESSIVE

## 2024-12-31 ASSESSMENT — COGNITIVE AND FUNCTIONAL STATUS - GENERAL
DAILY_ACTIVITY_CONVERTED_SCORE: 22.86
HELP NEEDED FOR PERSONAL GROOMING: A LOT
HELP NEEDED FOR TOILETING: TOTAL
DAILY_ACTIVITY_RAW_SCORE: 8
HELP NEEDED DRESSING REGULAR UPPER BODY CLOTHING: A LOT
HELP NEEDED FOR BATHING: TOTAL
HELP NEEDED DRESSING REGULAR LOWER BODY CLOTHING: TOTAL

## 2024-12-31 ASSESSMENT — ENCOUNTER SYMPTOMS
SHORTNESS OF BREATH: 0
WEAKNESS: 1
PAIN SEVERITY NOW: 2
FEVER: 0

## 2025-01-01 ENCOUNTER — APPOINTMENT (OUTPATIENT)
Dept: GENERAL RADIOLOGY | Age: 89
DRG: 871 | End: 2025-01-01
Attending: EMERGENCY MEDICINE

## 2025-01-01 ENCOUNTER — HOSPITAL ENCOUNTER (INPATIENT)
Age: 89
LOS: 10 days | DRG: 951 | End: 2025-03-21
Attending: INTERNAL MEDICINE

## 2025-01-01 ENCOUNTER — APPOINTMENT (OUTPATIENT)
Dept: CT IMAGING | Age: 89
DRG: 871 | End: 2025-01-01
Attending: EMERGENCY MEDICINE

## 2025-01-01 ENCOUNTER — EXTERNAL LAB (OUTPATIENT)
Dept: HEALTH INFORMATION MANAGEMENT | Facility: OTHER | Age: 89
End: 2025-01-01

## 2025-01-01 VITALS
DIASTOLIC BLOOD PRESSURE: 42 MMHG | RESPIRATION RATE: 22 BRPM | OXYGEN SATURATION: 68 % | TEMPERATURE: 97.9 F | HEART RATE: 90 BPM | SYSTOLIC BLOOD PRESSURE: 80 MMHG

## 2025-01-01 LAB
ANION GAP SERPL CALC-SCNC: 7 MMOL/L (ref 7–19)
BASOPHILS # BLD: 0 K/MCL (ref 0–0.3)
BASOPHILS # BLD: 0.04 THO/MM3 (ref 0–0.1)
BASOPHILS NFR BLD: 0 %
BASOPHILS NFR BLD: 0.4 %
BUN SERPL-MCNC: 27 MG/DL (ref 6–20)
BUN SERPL-MCNC: 41 MG/DL (ref 7–28)
BUN/CREAT SERPL: 33 (ref 7–25)
CALCIUM SERPL-MCNC: 9.5 MG/DL (ref 8.7–10.5)
CALCIUM SERPL-MCNC: 9.7 MG/DL (ref 8.4–10.2)
CHLORIDE SERPL-SCNC: 103 MEQ/L (ref 99–110)
CHLORIDE SERPL-SCNC: 110 MMOL/L (ref 97–110)
CO2 SERPL-SCNC: 29 MMOL/L (ref 21–32)
CO2 SERPL-SCNC: 34 MMOL/L (ref 18–30)
CREAT SERPL-MCNC: 0.81 MG/DL (ref 0.44–1.32)
CREAT SERPL-MCNC: 0.83 MG/DL (ref 0.67–1.17)
DEPRECATED RDW RBC: 53.7 FL (ref 39–50)
EGFRCR SERPLBLD CKD-EPI 2021: 84 ML/MIN/{1.73_M2}
EGFRCR SERPLBLD CKD-EPI 2021: >60 ML/M/1.73M2
EGFRCR SERPLBLD CKD-EPI 2021: >60 ML/M/1.73M2
EOSINOPHIL # BLD: 0 K/MCL (ref 0–0.5)
EOSINOPHIL # BLD: 0.28 THO/MM3 (ref 0–0.5)
EOSINOPHIL NFR BLD: 0 %
EOSINOPHIL NFR BLD: 3 %
ERYTHROCYTE [DISTWIDTH] IN BLOOD: 14.4 % (ref 11–15)
ERYTHROCYTE [DISTWIDTH] IN BLOOD: 17.2 % (ref 11.5–15.2)
FASTING DURATION TIME PATIENT: ABNORMAL H
GLUCOSE BLDC GLUCOMTR-MCNC: 167 MG/DL (ref 70–99)
GLUCOSE BLDC GLUCOMTR-MCNC: 172 MG/DL (ref 70–99)
GLUCOSE BLDC GLUCOMTR-MCNC: 186 MG/DL (ref 70–99)
GLUCOSE SERPL-MCNC: 158 MG/DL (ref 70–110)
GLUCOSE SERPL-MCNC: 179 MG/DL (ref 70–99)
HCT VFR BLD CALC: 26 % (ref 42–52)
HCT VFR BLD CALC: 30.3 % (ref 39–51)
HGB BLD-MCNC: 7.8 G/DL (ref 14–18)
HGB BLD-MCNC: 9.5 G/DL (ref 13–17)
IMM GRANULOCYTES # BLD AUTO: 0 K/MCL (ref 0–0.2)
IMM GRANULOCYTES # BLD: 0 %
IMM GRANULOCYTES # BLD: 0.15 THO/MM3 (ref 0–0.1)
IMM GRANULOCYTES NFR BLD: 1.6 %
LENGTH OF FAST TIME PATIENT: ABNORMAL H
LYMPHOCYTES # BLD: 0.9 K/MCL (ref 1–4)
LYMPHOCYTES # BLD: 1.46 THO/MM3 (ref 0.8–3)
LYMPHOCYTES NFR BLD: 13 %
LYMPHOCYTES NFR BLD: 15.6 %
MAGNESIUM SERPL-MCNC: 2.3 MG/DL (ref 1.7–2.4)
MCH RBC QN AUTO: 32.1 PG (ref 26–34)
MCH RBC QN AUTO: 32.9 PG (ref 27–310)
MCHC RBC AUTO-ENTMCNC: 30 G/DL (ref 32–36)
MCHC RBC AUTO-ENTMCNC: 31.4 G/DL (ref 32–36.5)
MCV RBC AUTO: 102.4 FL (ref 78–100)
MCV RBC AUTO: 109.7 FL (ref 80–94)
MONOCYTES # BLD: 0.89 THO/MM3 (ref 0.2–1)
MONOCYTES # BLD: 0.9 K/MCL (ref 0.3–0.9)
MONOCYTES NFR BLD: 13 %
MONOCYTES NFR BLD: 9.5 %
NEUTROPHILS # BLD: 5 K/MCL (ref 1.8–7.7)
NEUTROPHILS # BLD: 6.54 THO/MM3 (ref 1.4–6.8)
NEUTROPHILS NFR BLD: 69.9 %
NEUTROPHILS NFR BLD: 74 %
NRBC BLD MANUAL-RTO: 0 /100 WBC
PHOSPHATE SERPL-MCNC: 2.6 MG/DL (ref 2.4–4.7)
PLATELET # BLD AUTO: 317 K/MCL (ref 140–450)
PLATELET # BLD: 293 THO/MM3 (ref 150–400)
PMV BLD AUTO: 11.4 FL (ref 9.5–13.1)
POTASSIUM SERPL-SCNC: 4.1 MEQ/L (ref 3.6–5)
POTASSIUM SERPL-SCNC: 4.3 MMOL/L (ref 3.4–5.1)
RBC # BLD: 2.37 MIL/MM3 (ref 4.7–6.1)
RBC # BLD: 2.96 MIL/MCL (ref 4.5–5.9)
SODIUM SERPL-SCNC: 142 MMOL/L (ref 135–145)
SODIUM SERPL-SCNC: 145 MEQ/L (ref 138–147)
WBC # BLD: 6.9 K/MCL (ref 4.2–11)
WBC # BLD: 9.36 THO/MM3 (ref 4.8–10.8)

## 2025-01-01 PROCEDURE — 70450 CT HEAD/BRAIN W/O DYE: CPT

## 2025-01-01 PROCEDURE — 10002803 HB RX 637: Performed by: NURSE PRACTITIONER

## 2025-01-01 PROCEDURE — 10004557 HB ROOM CHARGE HOSPICE OR RESPITE

## 2025-01-01 PROCEDURE — 10002803 HB RX 637: Performed by: PEDIATRICS

## 2025-01-01 PROCEDURE — 85025 COMPLETE CBC W/AUTO DIFF WBC: CPT | Performed by: INTERNAL MEDICINE

## 2025-01-01 PROCEDURE — 74177 CT ABD & PELVIS W/CONTRAST: CPT

## 2025-01-01 PROCEDURE — 10004651 HB RX, NO CHARGE ITEM: Performed by: INTERNAL MEDICINE

## 2025-01-01 PROCEDURE — 83735 ASSAY OF MAGNESIUM: CPT | Performed by: INTERNAL MEDICINE

## 2025-01-01 PROCEDURE — 84100 ASSAY OF PHOSPHORUS: CPT | Performed by: INTERNAL MEDICINE

## 2025-01-01 PROCEDURE — 10002800 HB RX 250 W HCPCS: Performed by: INTERNAL MEDICINE

## 2025-01-01 PROCEDURE — 80048 BASIC METABOLIC PNL TOTAL CA: CPT | Performed by: INTERNAL MEDICINE

## 2025-01-01 PROCEDURE — 71260 CT THORAX DX C+: CPT

## 2025-01-01 PROCEDURE — 36415 COLL VENOUS BLD VENIPUNCTURE: CPT | Performed by: INTERNAL MEDICINE

## 2025-01-01 PROCEDURE — 71045 X-RAY EXAM CHEST 1 VIEW: CPT

## 2025-01-01 PROCEDURE — 10002803 HB RX 637: Performed by: INTERNAL MEDICINE

## 2025-01-01 PROCEDURE — 10006031 HB ROOM CHARGE TELEMETRY

## 2025-01-01 RX ORDER — 0.9 % SODIUM CHLORIDE 0.9 %
10 VIAL (ML) INJECTION PRN
Status: DISCONTINUED | OUTPATIENT
Start: 2025-01-01 | End: 2025-03-21 | Stop reason: HOSPADM

## 2025-01-01 RX ORDER — GLYCOPYRROLATE 0.2 MG/ML
0.4 INJECTION, SOLUTION INTRAMUSCULAR; INTRAVENOUS EVERY 6 HOURS PRN
Status: DISCONTINUED | OUTPATIENT
Start: 2025-01-01 | End: 2025-03-21 | Stop reason: HOSPADM

## 2025-01-01 RX ORDER — LORAZEPAM 2 MG/ML
1 INJECTION INTRAMUSCULAR
Status: DISCONTINUED | OUTPATIENT
Start: 2025-01-01 | End: 2025-01-01

## 2025-01-01 RX ORDER — ACETAMINOPHEN 325 MG/1
650 TABLET ORAL EVERY 4 HOURS PRN
Status: DISCONTINUED | OUTPATIENT
Start: 2025-01-01 | End: 2025-03-21 | Stop reason: HOSPADM

## 2025-01-01 RX ORDER — CARBOXYMETHYLCELLULOSE SODIUM 5 MG/ML
1 SOLUTION/ DROPS OPHTHALMIC PRN
Status: DISCONTINUED | OUTPATIENT
Start: 2025-01-01 | End: 2025-03-21 | Stop reason: HOSPADM

## 2025-01-01 RX ORDER — BISACODYL 10 MG
10 SUPPOSITORY, RECTAL RECTAL DAILY PRN
Status: DISCONTINUED | OUTPATIENT
Start: 2025-01-01 | End: 2025-03-21 | Stop reason: HOSPADM

## 2025-01-01 RX ORDER — ONDANSETRON 2 MG/ML
4 INJECTION INTRAMUSCULAR; INTRAVENOUS EVERY 12 HOURS PRN
Status: DISCONTINUED | OUTPATIENT
Start: 2025-01-01 | End: 2025-03-21 | Stop reason: HOSPADM

## 2025-01-01 RX ORDER — LORAZEPAM 2 MG/ML
1 INJECTION INTRAMUSCULAR
Status: DISCONTINUED | OUTPATIENT
Start: 2025-01-01 | End: 2025-03-21 | Stop reason: HOSPADM

## 2025-01-01 RX ORDER — ACETAMINOPHEN 650 MG/1
650 SUPPOSITORY RECTAL EVERY 4 HOURS PRN
Status: DISCONTINUED | OUTPATIENT
Start: 2025-01-01 | End: 2025-03-21 | Stop reason: HOSPADM

## 2025-01-01 RX ORDER — AMOXICILLIN 250 MG
1 CAPSULE ORAL 2 TIMES DAILY PRN
Status: DISCONTINUED | OUTPATIENT
Start: 2025-01-01 | End: 2025-03-21 | Stop reason: HOSPADM

## 2025-01-01 RX ORDER — LORAZEPAM 2 MG/ML
1 INJECTION INTRAMUSCULAR EVERY 8 HOURS
Status: DISCONTINUED | OUTPATIENT
Start: 2025-01-01 | End: 2025-01-01

## 2025-01-01 RX ORDER — SCOPOLAMINE 1 MG/3D
1 PATCH, EXTENDED RELEASE TRANSDERMAL
Status: DISCONTINUED | OUTPATIENT
Start: 2025-01-01 | End: 2025-03-21 | Stop reason: HOSPADM

## 2025-01-01 RX ORDER — 0.9 % SODIUM CHLORIDE 0.9 %
2 VIAL (ML) INJECTION EVERY 12 HOURS SCHEDULED
Status: DISCONTINUED | OUTPATIENT
Start: 2025-01-01 | End: 2025-03-21 | Stop reason: HOSPADM

## 2025-01-01 RX ADMIN — MORPHINE SULFATE 3 MG: 2 INJECTION, SOLUTION INTRAMUSCULAR; INTRAVENOUS at 17:37

## 2025-01-01 RX ADMIN — LORAZEPAM 1 MG: 2 INJECTION INTRAMUSCULAR; INTRAVENOUS at 08:14

## 2025-01-01 RX ADMIN — MORPHINE SULFATE 3 MG: 2 INJECTION, SOLUTION INTRAMUSCULAR; INTRAVENOUS at 05:02

## 2025-01-01 RX ADMIN — SODIUM CHLORIDE, PRESERVATIVE FREE 2 ML: 5 INJECTION INTRAVENOUS at 08:44

## 2025-01-01 RX ADMIN — LORAZEPAM 1 MG: 2 INJECTION INTRAMUSCULAR; INTRAVENOUS at 17:00

## 2025-01-01 RX ADMIN — MORPHINE SULFATE 3 MG: 2 INJECTION, SOLUTION INTRAMUSCULAR; INTRAVENOUS at 08:37

## 2025-01-01 RX ADMIN — SODIUM CHLORIDE, PRESERVATIVE FREE 2 ML: 5 INJECTION INTRAVENOUS at 08:58

## 2025-01-01 RX ADMIN — METOPROLOL TARTRATE 25 MG: 25 TABLET, FILM COATED ORAL at 10:43

## 2025-01-01 RX ADMIN — LORAZEPAM 1 MG: 2 INJECTION INTRAMUSCULAR; INTRAVENOUS at 20:00

## 2025-01-01 RX ADMIN — LORAZEPAM 1 MG: 2 INJECTION INTRAMUSCULAR; INTRAVENOUS at 00:20

## 2025-01-01 RX ADMIN — SODIUM CHLORIDE, PRESERVATIVE FREE 2 ML: 5 INJECTION INTRAVENOUS at 08:51

## 2025-01-01 RX ADMIN — MORPHINE SULFATE 3 MG: 2 INJECTION, SOLUTION INTRAMUSCULAR; INTRAVENOUS at 12:58

## 2025-01-01 RX ADMIN — LORAZEPAM 1 MG: 2 INJECTION INTRAMUSCULAR; INTRAVENOUS at 04:28

## 2025-01-01 RX ADMIN — MORPHINE SULFATE 3 MG: 2 INJECTION, SOLUTION INTRAMUSCULAR; INTRAVENOUS at 13:17

## 2025-01-01 RX ADMIN — LORAZEPAM 1 MG: 2 INJECTION INTRAMUSCULAR; INTRAVENOUS at 08:52

## 2025-01-01 RX ADMIN — LORAZEPAM 1 MG: 2 INJECTION INTRAMUSCULAR; INTRAVENOUS at 20:34

## 2025-01-01 RX ADMIN — MORPHINE SULFATE 3 MG: 2 INJECTION, SOLUTION INTRAMUSCULAR; INTRAVENOUS at 12:27

## 2025-01-01 RX ADMIN — LORAZEPAM 1 MG: 2 INJECTION INTRAMUSCULAR; INTRAVENOUS at 13:12

## 2025-01-01 RX ADMIN — SODIUM CHLORIDE, PRESERVATIVE FREE 2 ML: 5 INJECTION INTRAVENOUS at 09:23

## 2025-01-01 RX ADMIN — MORPHINE SULFATE 3 MG: 2 INJECTION, SOLUTION INTRAMUSCULAR; INTRAVENOUS at 20:15

## 2025-01-01 RX ADMIN — MORPHINE SULFATE 3 MG: 2 INJECTION, SOLUTION INTRAMUSCULAR; INTRAVENOUS at 08:52

## 2025-01-01 RX ADMIN — LORAZEPAM 1 MG: 2 INJECTION INTRAMUSCULAR; INTRAVENOUS at 00:29

## 2025-01-01 RX ADMIN — SODIUM CHLORIDE, PRESERVATIVE FREE 2 ML: 5 INJECTION INTRAVENOUS at 10:05

## 2025-01-01 RX ADMIN — LORAZEPAM 1 MG: 2 INJECTION INTRAMUSCULAR; INTRAVENOUS at 12:23

## 2025-01-01 RX ADMIN — MORPHINE SULFATE 3 MG: 2 INJECTION, SOLUTION INTRAMUSCULAR; INTRAVENOUS at 20:02

## 2025-01-01 RX ADMIN — LORAZEPAM 1 MG: 2 INJECTION INTRAMUSCULAR; INTRAVENOUS at 16:16

## 2025-01-01 RX ADMIN — LORAZEPAM 1 MG: 2 INJECTION INTRAMUSCULAR; INTRAVENOUS at 09:17

## 2025-01-01 RX ADMIN — LORAZEPAM 1 MG: 2 INJECTION INTRAMUSCULAR; INTRAVENOUS at 04:13

## 2025-01-01 RX ADMIN — MORPHINE SULFATE 3 MG: 2 INJECTION, SOLUTION INTRAMUSCULAR; INTRAVENOUS at 04:14

## 2025-01-01 RX ADMIN — VENLAFAXINE HYDROCHLORIDE 37.5 MG: 37.5 TABLET ORAL at 22:14

## 2025-01-01 RX ADMIN — FOLIC ACID 1 MG: 1 TABLET ORAL at 10:37

## 2025-01-01 RX ADMIN — MORPHINE SULFATE 3 MG: 2 INJECTION, SOLUTION INTRAMUSCULAR; INTRAVENOUS at 17:29

## 2025-01-01 RX ADMIN — LORAZEPAM 1 MG: 2 INJECTION INTRAMUSCULAR; INTRAVENOUS at 04:03

## 2025-01-01 RX ADMIN — MORPHINE SULFATE 3 MG: 2 INJECTION, SOLUTION INTRAMUSCULAR; INTRAVENOUS at 04:38

## 2025-01-01 RX ADMIN — SODIUM CHLORIDE, PRESERVATIVE FREE 2 ML: 5 INJECTION INTRAVENOUS at 20:08

## 2025-01-01 RX ADMIN — LORAZEPAM 1 MG: 2 INJECTION INTRAMUSCULAR; INTRAVENOUS at 23:45

## 2025-01-01 RX ADMIN — MORPHINE SULFATE 3 MG: 2 INJECTION, SOLUTION INTRAMUSCULAR; INTRAVENOUS at 16:07

## 2025-01-01 RX ADMIN — MORPHINE SULFATE 3 MG: 2 INJECTION, SOLUTION INTRAMUSCULAR; INTRAVENOUS at 04:24

## 2025-01-01 RX ADMIN — LORAZEPAM 1 MG: 2 INJECTION INTRAMUSCULAR; INTRAVENOUS at 16:07

## 2025-01-01 RX ADMIN — MORPHINE SULFATE 3 MG: 2 INJECTION, SOLUTION INTRAMUSCULAR; INTRAVENOUS at 13:13

## 2025-01-01 RX ADMIN — MORPHINE SULFATE 3 MG: 2 INJECTION, SOLUTION INTRAMUSCULAR; INTRAVENOUS at 04:22

## 2025-01-01 RX ADMIN — LORAZEPAM 1 MG: 2 INJECTION INTRAMUSCULAR; INTRAVENOUS at 13:33

## 2025-01-01 RX ADMIN — SODIUM CHLORIDE, PRESERVATIVE FREE 2 ML: 5 INJECTION INTRAVENOUS at 08:14

## 2025-01-01 RX ADMIN — LORAZEPAM 1 MG: 2 INJECTION INTRAMUSCULAR; INTRAVENOUS at 17:36

## 2025-01-01 RX ADMIN — GLYCOPYRROLATE 0.4 MG: 0.2 INJECTION, SOLUTION INTRAMUSCULAR; INTRAVENOUS at 23:51

## 2025-01-01 RX ADMIN — LORAZEPAM 1 MG: 2 INJECTION INTRAMUSCULAR; INTRAVENOUS at 20:49

## 2025-01-01 RX ADMIN — LORAZEPAM 1 MG: 2 INJECTION INTRAMUSCULAR; INTRAVENOUS at 08:37

## 2025-01-01 RX ADMIN — MORPHINE SULFATE 3 MG: 2 INJECTION, SOLUTION INTRAMUSCULAR; INTRAVENOUS at 00:20

## 2025-01-01 RX ADMIN — MORPHINE SULFATE 3 MG: 2 INJECTION, SOLUTION INTRAMUSCULAR; INTRAVENOUS at 08:51

## 2025-01-01 RX ADMIN — LORAZEPAM 1 MG: 2 INJECTION INTRAMUSCULAR; INTRAVENOUS at 00:19

## 2025-01-01 RX ADMIN — LORAZEPAM 1 MG: 2 INJECTION INTRAMUSCULAR; INTRAVENOUS at 12:07

## 2025-01-01 RX ADMIN — LORAZEPAM 1 MG: 2 INJECTION INTRAMUSCULAR; INTRAVENOUS at 20:43

## 2025-01-01 RX ADMIN — AMLODIPINE BESYLATE 5 MG: 5 TABLET ORAL at 10:37

## 2025-01-01 RX ADMIN — MORPHINE SULFATE 3 MG: 2 INJECTION, SOLUTION INTRAMUSCULAR; INTRAVENOUS at 00:28

## 2025-01-01 RX ADMIN — MIRTAZAPINE 15 MG: 15 TABLET, FILM COATED ORAL at 22:15

## 2025-01-01 RX ADMIN — MORPHINE SULFATE 2 MG: 2 INJECTION, SOLUTION INTRAMUSCULAR; INTRAVENOUS at 21:40

## 2025-01-01 RX ADMIN — MORPHINE SULFATE 3 MG: 2 INJECTION, SOLUTION INTRAMUSCULAR; INTRAVENOUS at 00:12

## 2025-01-01 RX ADMIN — SODIUM CHLORIDE, PRESERVATIVE FREE 2 ML: 5 INJECTION INTRAVENOUS at 08:15

## 2025-01-01 RX ADMIN — SODIUM CHLORIDE, PRESERVATIVE FREE 2 ML: 5 INJECTION INTRAVENOUS at 21:37

## 2025-01-01 RX ADMIN — SODIUM CHLORIDE, PRESERVATIVE FREE 2 ML: 5 INJECTION INTRAVENOUS at 20:00

## 2025-01-01 RX ADMIN — MORPHINE SULFATE 3 MG: 2 INJECTION, SOLUTION INTRAMUSCULAR; INTRAVENOUS at 04:20

## 2025-01-01 RX ADMIN — LORAZEPAM 1 MG: 2 INJECTION INTRAMUSCULAR; INTRAVENOUS at 05:03

## 2025-01-01 RX ADMIN — LORAZEPAM 1 MG: 2 INJECTION INTRAMUSCULAR; INTRAVENOUS at 12:33

## 2025-01-01 RX ADMIN — MORPHINE SULFATE 3 MG: 2 INJECTION, SOLUTION INTRAMUSCULAR; INTRAVENOUS at 08:15

## 2025-01-01 RX ADMIN — LORAZEPAM 1 MG: 2 INJECTION INTRAMUSCULAR; INTRAVENOUS at 16:10

## 2025-01-01 RX ADMIN — MORPHINE SULFATE 3 MG: 2 INJECTION, SOLUTION INTRAMUSCULAR; INTRAVENOUS at 12:59

## 2025-01-01 RX ADMIN — LORAZEPAM 1 MG: 2 INJECTION INTRAMUSCULAR; INTRAVENOUS at 00:03

## 2025-01-01 RX ADMIN — VENLAFAXINE HYDROCHLORIDE 37.5 MG: 37.5 TABLET ORAL at 10:37

## 2025-01-01 RX ADMIN — MORPHINE SULFATE 3 MG: 2 INJECTION, SOLUTION INTRAMUSCULAR; INTRAVENOUS at 19:59

## 2025-01-01 RX ADMIN — LORAZEPAM 1 MG: 2 INJECTION INTRAMUSCULAR; INTRAVENOUS at 04:24

## 2025-01-01 RX ADMIN — SCOLOPAMINE TRANSDERMAL SYSTEM 1 PATCH: 1 PATCH, EXTENDED RELEASE TRANSDERMAL at 09:49

## 2025-01-01 RX ADMIN — MORPHINE SULFATE 2 MG: 2 INJECTION, SOLUTION INTRAMUSCULAR; INTRAVENOUS at 17:43

## 2025-01-01 RX ADMIN — LORAZEPAM 1 MG: 2 INJECTION INTRAMUSCULAR; INTRAVENOUS at 08:13

## 2025-01-01 RX ADMIN — LORAZEPAM 1 MG: 2 INJECTION INTRAMUSCULAR; INTRAVENOUS at 13:40

## 2025-01-01 RX ADMIN — MORPHINE SULFATE 3 MG: 2 INJECTION, SOLUTION INTRAMUSCULAR; INTRAVENOUS at 00:29

## 2025-01-01 RX ADMIN — SODIUM CHLORIDE, PRESERVATIVE FREE 2 ML: 5 INJECTION INTRAVENOUS at 20:57

## 2025-01-01 RX ADMIN — DONEPEZIL HYDROCHLORIDE 10 MG: 10 TABLET ORAL at 22:15

## 2025-01-01 RX ADMIN — LORAZEPAM 1 MG: 2 INJECTION INTRAMUSCULAR; INTRAVENOUS at 12:58

## 2025-01-01 RX ADMIN — MORPHINE SULFATE 3 MG: 2 INJECTION, SOLUTION INTRAMUSCULAR; INTRAVENOUS at 17:00

## 2025-01-01 RX ADMIN — MORPHINE SULFATE 3 MG: 2 INJECTION, SOLUTION INTRAMUSCULAR; INTRAVENOUS at 20:53

## 2025-01-01 RX ADMIN — LORAZEPAM 1 MG: 2 INJECTION INTRAMUSCULAR; INTRAVENOUS at 16:05

## 2025-01-01 RX ADMIN — SODIUM CHLORIDE, PRESERVATIVE FREE 2 ML: 5 INJECTION INTRAVENOUS at 20:21

## 2025-01-01 RX ADMIN — LORAZEPAM 1 MG: 2 INJECTION INTRAMUSCULAR; INTRAVENOUS at 04:20

## 2025-01-01 RX ADMIN — CARBIDOPA AND LEVODOPA 0.5 TABLET: 25; 100 TABLET ORAL at 22:15

## 2025-01-01 RX ADMIN — MORPHINE SULFATE 3 MG: 2 INJECTION, SOLUTION INTRAMUSCULAR; INTRAVENOUS at 12:08

## 2025-01-01 RX ADMIN — MORPHINE SULFATE 3 MG: 2 INJECTION, SOLUTION INTRAMUSCULAR; INTRAVENOUS at 16:38

## 2025-01-01 RX ADMIN — LORAZEPAM 1 MG: 2 INJECTION INTRAMUSCULAR; INTRAVENOUS at 00:28

## 2025-01-01 RX ADMIN — MORPHINE SULFATE 2 MG: 2 INJECTION, SOLUTION INTRAMUSCULAR; INTRAVENOUS at 05:46

## 2025-01-01 RX ADMIN — LORAZEPAM 1 MG: 2 INJECTION INTRAMUSCULAR; INTRAVENOUS at 08:45

## 2025-01-01 RX ADMIN — CARBIDOPA AND LEVODOPA 0.5 TABLET: 25; 100 TABLET ORAL at 10:36

## 2025-01-01 RX ADMIN — MORPHINE SULFATE 3 MG: 2 INJECTION, SOLUTION INTRAMUSCULAR; INTRAVENOUS at 08:16

## 2025-01-01 RX ADMIN — MORPHINE SULFATE 3 MG: 2 INJECTION, SOLUTION INTRAMUSCULAR; INTRAVENOUS at 09:22

## 2025-01-01 RX ADMIN — LORAZEPAM 1 MG: 2 INJECTION INTRAMUSCULAR; INTRAVENOUS at 17:29

## 2025-01-01 RX ADMIN — LORAZEPAM 1 MG: 2 INJECTION INTRAMUSCULAR; INTRAVENOUS at 21:35

## 2025-01-01 RX ADMIN — LORAZEPAM 1 MG: 2 INJECTION INTRAMUSCULAR; INTRAVENOUS at 05:45

## 2025-01-01 RX ADMIN — MORPHINE SULFATE 3 MG: 2 INJECTION, SOLUTION INTRAMUSCULAR; INTRAVENOUS at 16:16

## 2025-01-01 RX ADMIN — SODIUM CHLORIDE, PRESERVATIVE FREE 2 ML: 5 INJECTION INTRAVENOUS at 08:35

## 2025-01-01 RX ADMIN — LORAZEPAM 1 MG: 2 INJECTION INTRAMUSCULAR; INTRAVENOUS at 16:37

## 2025-01-01 RX ADMIN — LORAZEPAM 1 MG: 2 INJECTION INTRAMUSCULAR; INTRAVENOUS at 16:08

## 2025-01-01 RX ADMIN — MORPHINE SULFATE 3 MG: 2 INJECTION, SOLUTION INTRAMUSCULAR; INTRAVENOUS at 12:23

## 2025-01-01 RX ADMIN — MORPHINE SULFATE 3 MG: 2 INJECTION, SOLUTION INTRAMUSCULAR; INTRAVENOUS at 00:04

## 2025-01-01 RX ADMIN — SODIUM CHLORIDE, PRESERVATIVE FREE 2 ML: 5 INJECTION INTRAVENOUS at 20:49

## 2025-01-01 RX ADMIN — MORPHINE SULFATE 2 MG: 2 INJECTION, SOLUTION INTRAMUSCULAR; INTRAVENOUS at 13:33

## 2025-01-01 RX ADMIN — CARBIDOPA AND LEVODOPA 0.5 TABLET: 25; 100 TABLET ORAL at 14:30

## 2025-01-01 RX ADMIN — SODIUM CHLORIDE, PRESERVATIVE FREE 2 ML: 5 INJECTION INTRAVENOUS at 20:46

## 2025-01-01 RX ADMIN — LORAZEPAM 1 MG: 2 INJECTION INTRAMUSCULAR; INTRAVENOUS at 21:40

## 2025-01-01 RX ADMIN — SODIUM CHLORIDE, PRESERVATIVE FREE 2 ML: 5 INJECTION INTRAVENOUS at 21:40

## 2025-01-01 RX ADMIN — SODIUM CHLORIDE, PRESERVATIVE FREE 2 ML: 5 INJECTION INTRAVENOUS at 08:45

## 2025-01-01 RX ADMIN — MORPHINE SULFATE 3 MG: 2 INJECTION, SOLUTION INTRAMUSCULAR; INTRAVENOUS at 00:21

## 2025-01-01 RX ADMIN — SCOLOPAMINE TRANSDERMAL SYSTEM 1 PATCH: 1 PATCH, EXTENDED RELEASE TRANSDERMAL at 08:20

## 2025-01-01 RX ADMIN — MORPHINE SULFATE 3 MG: 2 INJECTION, SOLUTION INTRAMUSCULAR; INTRAVENOUS at 04:28

## 2025-01-01 RX ADMIN — SCOLOPAMINE TRANSDERMAL SYSTEM 1 PATCH: 1 PATCH, EXTENDED RELEASE TRANSDERMAL at 16:16

## 2025-01-01 RX ADMIN — MORPHINE SULFATE 3 MG: 2 INJECTION, SOLUTION INTRAMUSCULAR; INTRAVENOUS at 21:36

## 2025-01-01 RX ADMIN — MORPHINE SULFATE 3 MG: 2 INJECTION, SOLUTION INTRAMUSCULAR; INTRAVENOUS at 00:15

## 2025-01-01 RX ADMIN — LORAZEPAM 1 MG: 2 INJECTION INTRAMUSCULAR; INTRAVENOUS at 08:32

## 2025-01-01 RX ADMIN — LORAZEPAM 1 MG: 2 INJECTION INTRAMUSCULAR; INTRAVENOUS at 13:16

## 2025-01-01 RX ADMIN — METOPROLOL TARTRATE 25 MG: 25 TABLET, FILM COATED ORAL at 22:15

## 2025-01-01 RX ADMIN — MORPHINE SULFATE 2 MG: 2 INJECTION, SOLUTION INTRAMUSCULAR; INTRAVENOUS at 01:32

## 2025-01-01 RX ADMIN — LORAZEPAM 1 MG: 2 INJECTION INTRAMUSCULAR; INTRAVENOUS at 19:59

## 2025-01-01 RX ADMIN — MORPHINE SULFATE 3 MG: 2 INJECTION, SOLUTION INTRAMUSCULAR; INTRAVENOUS at 12:33

## 2025-01-01 RX ADMIN — LORAZEPAM 1 MG: 2 INJECTION INTRAMUSCULAR; INTRAVENOUS at 20:52

## 2025-01-01 RX ADMIN — LORAZEPAM 1 MG: 2 INJECTION INTRAMUSCULAR; INTRAVENOUS at 08:54

## 2025-01-01 RX ADMIN — MORPHINE SULFATE 3 MG: 2 INJECTION, SOLUTION INTRAMUSCULAR; INTRAVENOUS at 16:11

## 2025-01-01 RX ADMIN — SODIUM CHLORIDE, PRESERVATIVE FREE 2 ML: 5 INJECTION INTRAVENOUS at 20:36

## 2025-01-01 RX ADMIN — MORPHINE SULFATE 3 MG: 2 INJECTION, SOLUTION INTRAMUSCULAR; INTRAVENOUS at 13:42

## 2025-01-01 RX ADMIN — LORAZEPAM 1 MG: 2 INJECTION INTRAMUSCULAR; INTRAVENOUS at 04:34

## 2025-01-01 RX ADMIN — MORPHINE SULFATE 3 MG: 2 INJECTION, SOLUTION INTRAMUSCULAR; INTRAVENOUS at 08:53

## 2025-01-01 RX ADMIN — MORPHINE SULFATE 2 MG: 2 INJECTION, SOLUTION INTRAMUSCULAR; INTRAVENOUS at 10:03

## 2025-01-01 RX ADMIN — LORAZEPAM 1 MG: 2 INJECTION INTRAMUSCULAR; INTRAVENOUS at 20:13

## 2025-01-01 RX ADMIN — MORPHINE SULFATE 3 MG: 2 INJECTION, SOLUTION INTRAMUSCULAR; INTRAVENOUS at 04:03

## 2025-01-01 RX ADMIN — MORPHINE SULFATE 3 MG: 2 INJECTION, SOLUTION INTRAMUSCULAR; INTRAVENOUS at 08:33

## 2025-01-01 RX ADMIN — ASPIRIN 81 MG CHEWABLE TABLET 81 MG: 81 TABLET CHEWABLE at 10:37

## 2025-01-01 RX ADMIN — LORAZEPAM 1 MG: 2 INJECTION INTRAMUSCULAR; INTRAVENOUS at 00:21

## 2025-01-01 RX ADMIN — LORAZEPAM 1 MG: 2 INJECTION INTRAMUSCULAR; INTRAVENOUS at 12:24

## 2025-01-01 RX ADMIN — MORPHINE SULFATE 3 MG: 2 INJECTION, SOLUTION INTRAMUSCULAR; INTRAVENOUS at 23:45

## 2025-01-01 RX ADMIN — MORPHINE SULFATE 3 MG: 2 INJECTION, SOLUTION INTRAMUSCULAR; INTRAVENOUS at 20:45

## 2025-01-01 RX ADMIN — LORAZEPAM 1 MG: 2 INJECTION INTRAMUSCULAR; INTRAVENOUS at 04:38

## 2025-01-01 RX ADMIN — MORPHINE SULFATE 3 MG: 2 INJECTION, SOLUTION INTRAMUSCULAR; INTRAVENOUS at 20:50

## 2025-01-01 RX ADMIN — MORPHINE SULFATE 3 MG: 2 INJECTION, SOLUTION INTRAMUSCULAR; INTRAVENOUS at 20:35

## 2025-01-01 ASSESSMENT — ENCOUNTER SYMPTOMS
FEVER: 0
WEAKNESS: 1
SHORTNESS OF BREATH: 0

## 2025-01-01 ASSESSMENT — PAIN SCALES - PAIN ASSESSMENT IN ADVANCED DEMENTIA (PAINAD)
BODYLANGUAGE: RELAXED
BREATHING: NORMAL
BODYLANGUAGE: RELAXED
TOTALSCORE: 0
FACIALEXPRESSION: SMILING OR INEXPRESSIVE
TOTALSCORE: 0
BREATHING: NORMAL
BODYLANGUAGE: RELAXED
BREATHING: NORMAL
FACIALEXPRESSION: SMILING OR INEXPRESSIVE
FACIALEXPRESSION: SMILING OR INEXPRESSIVE
TOTALSCORE: 0
BREATHING: NORMAL
BREATHING: NORMAL
CONSOLABILITY: NO NEED TO CONSOLE
CONSOLABILITY: NO NEED TO CONSOLE
BREATHING: OCCASIONAL LABORED BREATHING, SHORT PERIOD OF HYPERVENTILATION
BREATHING: NORMAL
BREATHING: NORMAL
BODYLANGUAGE: RELAXED
BREATHING: NORMAL
FACIALEXPRESSION: SMILING OR INEXPRESSIVE
TOTALSCORE: 0
TOTALSCORE: 1
FACIALEXPRESSION: SMILING OR INEXPRESSIVE
TOTALSCORE: 0
CONSOLABILITY: NO NEED TO CONSOLE
CONSOLABILITY: NO NEED TO CONSOLE
BREATHING: NORMAL
TOTALSCORE: 0
BODYLANGUAGE: RELAXED
TOTALSCORE: 0
TOTALSCORE: 1
FACIALEXPRESSION: SMILING OR INEXPRESSIVE
BODYLANGUAGE: RELAXED
BREATHING: NORMAL
BODYLANGUAGE: RELAXED
CONSOLABILITY: NO NEED TO CONSOLE
BODYLANGUAGE: TENSE, DISTRESSED, FIDGETING
BODYLANGUAGE: RELAXED
CONSOLABILITY: NO NEED TO CONSOLE
TOTALSCORE: 1
NEGVOCALIZATION: OCCASIONAL MOAN OR GROAN, LOW LEVELS OF SPEECH WITH A NEGATIVE OR DISAPPROVING QUALITY
BODYLANGUAGE: RELAXED
BREATHING: NORMAL
BODYLANGUAGE: TENSE, DISTRESSED, FIDGETING
FACIALEXPRESSION: SMILING OR INEXPRESSIVE
BODYLANGUAGE: RELAXED
TOTALSCORE: 0
TOTALSCORE: 0
CONSOLABILITY: NO NEED TO CONSOLE
CONSOLABILITY: NO NEED TO CONSOLE
FACIALEXPRESSION: SMILING OR INEXPRESSIVE
NEGVOCALIZATION: OCCASIONAL MOAN OR GROAN, LOW LEVELS OF SPEECH WITH A NEGATIVE OR DISAPPROVING QUALITY
BREATHING: OCCASIONAL LABORED BREATHING, SHORT PERIOD OF HYPERVENTILATION
TOTALSCORE: 0
TOTALSCORE: 2
CONSOLABILITY: NO NEED TO CONSOLE
CONSOLABILITY: NO NEED TO CONSOLE
FACIALEXPRESSION: SMILING OR INEXPRESSIVE
BODYLANGUAGE: RELAXED
BODYLANGUAGE: RELAXED
FACIALEXPRESSION: SMILING OR INEXPRESSIVE
FACIALEXPRESSION: SMILING OR INEXPRESSIVE
TOTALSCORE: 0
FACIALEXPRESSION: SMILING OR INEXPRESSIVE
BODYLANGUAGE: RELAXED
NEGVOCALIZATION: OCCASIONAL MOAN OR GROAN, LOW LEVELS OF SPEECH WITH A NEGATIVE OR DISAPPROVING QUALITY
FACIALEXPRESSION: SMILING OR INEXPRESSIVE
BREATHING: NORMAL
BREATHING: NORMAL
FACIALEXPRESSION: SMILING OR INEXPRESSIVE
CONSOLABILITY: NO NEED TO CONSOLE
TOTALSCORE: 0
TOTALSCORE: 1
FACIALEXPRESSION: SMILING OR INEXPRESSIVE
CONSOLABILITY: NO NEED TO CONSOLE
TOTALSCORE: 0
FACIALEXPRESSION: SMILING OR INEXPRESSIVE
BODYLANGUAGE: RELAXED
CONSOLABILITY: NO NEED TO CONSOLE
CONSOLABILITY: NO NEED TO CONSOLE
TOTALSCORE: 0
TOTALSCORE: 0
BREATHING: NORMAL
BREATHING: NORMAL
BODYLANGUAGE: RELAXED
CONSOLABILITY: NO NEED TO CONSOLE
TOTALSCORE: 0
BREATHING: NORMAL
TOTALSCORE: 0
TOTALSCORE: 1
BREATHING: NORMAL
FACIALEXPRESSION: SMILING OR INEXPRESSIVE
CONSOLABILITY: NO NEED TO CONSOLE
BODYLANGUAGE: RELAXED
CONSOLABILITY: NO NEED TO CONSOLE
FACIALEXPRESSION: SAD. FRIGHTENED. FROWNING
BODYLANGUAGE: RELAXED
CONSOLABILITY: NO NEED TO CONSOLE
BODYLANGUAGE: RELAXED
FACIALEXPRESSION: SMILING OR INEXPRESSIVE
BODYLANGUAGE: RELAXED
TOTALSCORE: 1
BODYLANGUAGE: RELAXED
BREATHING: NORMAL
BODYLANGUAGE: RELAXED
CONSOLABILITY: NO NEED TO CONSOLE
CONSOLABILITY: NO NEED TO CONSOLE
FACIALEXPRESSION: SMILING OR INEXPRESSIVE
CONSOLABILITY: NO NEED TO CONSOLE
BREATHING: NORMAL
FACIALEXPRESSION: SMILING OR INEXPRESSIVE
FACIALEXPRESSION: SMILING OR INEXPRESSIVE
TOTALSCORE: 0
BREATHING: NORMAL
CONSOLABILITY: NO NEED TO CONSOLE
BREATHING: NORMAL
CONSOLABILITY: NO NEED TO CONSOLE
TOTALSCORE: 2
BODYLANGUAGE: RELAXED
BREATHING: NORMAL
TOTALSCORE: 0
BODYLANGUAGE: RELAXED
CONSOLABILITY: NO NEED TO CONSOLE
BREATHING: NORMAL
NEGVOCALIZATION: OCCASIONAL MOAN OR GROAN, LOW LEVELS OF SPEECH WITH A NEGATIVE OR DISAPPROVING QUALITY
CONSOLABILITY: NO NEED TO CONSOLE
BREATHING: NORMAL
NEGVOCALIZATION: OCCASIONAL MOAN OR GROAN, LOW LEVELS OF SPEECH WITH A NEGATIVE OR DISAPPROVING QUALITY
FACIALEXPRESSION: SMILING OR INEXPRESSIVE
CONSOLABILITY: NO NEED TO CONSOLE
FACIALEXPRESSION: SAD. FRIGHTENED. FROWNING
BREATHING: NORMAL
CONSOLABILITY: NO NEED TO CONSOLE
FACIALEXPRESSION: SMILING OR INEXPRESSIVE
BREATHING: NORMAL
FACIALEXPRESSION: SMILING OR INEXPRESSIVE
CONSOLABILITY: NO NEED TO CONSOLE
BREATHING: NORMAL
FACIALEXPRESSION: SMILING OR INEXPRESSIVE
BODYLANGUAGE: RELAXED
CONSOLABILITY: NO NEED TO CONSOLE
BREATHING: NORMAL
TOTALSCORE: 0
BODYLANGUAGE: RELAXED
BODYLANGUAGE: RELAXED
FACIALEXPRESSION: SAD. FRIGHTENED. FROWNING

## 2025-01-01 ASSESSMENT — PAIN SCALES - WONG BAKER
WONGBAKER_NUMERICALRESPONSE: 0
WONGBAKER_NUMERICALRESPONSE: 0

## 2025-01-01 ASSESSMENT — PATIENT HEALTH QUESTIONNAIRE - PHQ9: IS PATIENT ABLE TO COMPLETE PHQ2 OR PHQ9: NO, PATIENT WILL NEVER BE ABLE TO COMPLETE

## 2025-01-01 ASSESSMENT — PAIN SCALES - GENERAL
PAINLEVEL_OUTOF10: 0
PAINLEVEL_OUTOF10: 0

## 2025-01-02 VITALS
SYSTOLIC BLOOD PRESSURE: 162 MMHG | OXYGEN SATURATION: 97 % | BODY MASS INDEX: 22.49 KG/M2 | RESPIRATION RATE: 16 BRPM | WEIGHT: 143.3 LBS | TEMPERATURE: 98.2 F | DIASTOLIC BLOOD PRESSURE: 76 MMHG | HEIGHT: 67 IN | HEART RATE: 87 BPM

## 2025-01-02 LAB
GLUCOSE BLDC GLUCOMTR-MCNC: 166 MG/DL (ref 70–99)
GLUCOSE BLDC GLUCOMTR-MCNC: 185 MG/DL (ref 70–99)

## 2025-01-02 PROCEDURE — 10004651 HB RX, NO CHARGE ITEM: Performed by: INTERNAL MEDICINE

## 2025-01-02 PROCEDURE — 10002803 HB RX 637: Performed by: INTERNAL MEDICINE

## 2025-01-02 PROCEDURE — 10002803 HB RX 637: Performed by: NURSE PRACTITIONER

## 2025-01-02 RX ORDER — METOPROLOL TARTRATE 25 MG/1
25 TABLET, FILM COATED ORAL EVERY 12 HOURS SCHEDULED
Qty: 60 TABLET | Refills: 0 | Status: ON HOLD | OUTPATIENT
Start: 2025-01-02 | End: 2025-02-01

## 2025-01-02 RX ADMIN — CARBIDOPA AND LEVODOPA 0.5 TABLET: 25; 100 TABLET ORAL at 08:42

## 2025-01-02 RX ADMIN — METOPROLOL TARTRATE 25 MG: 25 TABLET, FILM COATED ORAL at 08:42

## 2025-01-02 RX ADMIN — FOLIC ACID 1 MG: 1 TABLET ORAL at 08:42

## 2025-01-02 RX ADMIN — ASPIRIN 81 MG CHEWABLE TABLET 81 MG: 81 TABLET CHEWABLE at 08:42

## 2025-01-02 RX ADMIN — VENLAFAXINE HYDROCHLORIDE 37.5 MG: 37.5 TABLET ORAL at 08:42

## 2025-01-02 RX ADMIN — AMLODIPINE BESYLATE 5 MG: 5 TABLET ORAL at 08:42

## 2025-01-02 ASSESSMENT — PAIN SCALES - PAIN ASSESSMENT IN ADVANCED DEMENTIA (PAINAD)
BODYLANGUAGE: RELAXED
TOTALSCORE: 0
CONSOLABILITY: NO NEED TO CONSOLE
FACIALEXPRESSION: SMILING OR INEXPRESSIVE
BREATHING: NORMAL

## 2025-01-02 ASSESSMENT — PAIN SCALES - WONG BAKER: WONGBAKER_NUMERICALRESPONSE: 0

## 2025-01-02 ASSESSMENT — PAIN SCALES - GENERAL: PAINLEVEL_OUTOF10: 0

## 2025-01-06 ENCOUNTER — EXTERNAL LAB (OUTPATIENT)
Dept: HEALTH INFORMATION MANAGEMENT | Facility: OTHER | Age: 89
End: 2025-01-06

## 2025-01-06 LAB
ALBUMIN SERPL-MCNC: 3 G/DL (ref 3.2–4.8)
ALBUMIN/GLOB SERPL: 0.7 {RATIO} (ref 1–2)
ALP SERPL-CCNC: 155 U/L (ref 45–117)
ALT SERPL-CCNC: 47 U/L (ref 10–49)
ANION GAP SERPL CALC-SCNC: 6 MMOL/L (ref 0–18)
AST SERPL-CCNC: 71 U/L
BASOPHILS # BLD: 0.07 X10(3) UL (ref 0–0.2)
BASOPHILS NFR BLD: 0.7 %
BILIRUB SERPL-MCNC: <0.2 MG/DL (ref 0.2–1.1)
BUN SERPL-MCNC: 56 MG/DL (ref 9–23)
BUN/CREAT SERPL: 56.6 (ref 10–20)
CALCIUM SERPL-MCNC: 9.7 MG/DL (ref 8.7–10.4)
CALCULATED OSMO: 349 MOSM/KG (ref 275–295)
CHLORIDE SERPL-SCNC: 117 MMOL/L (ref 98–112)
CO2 SERPL-SCNC: 36 MMOL/L (ref 21–32)
CREAT SERPL-MCNC: 0.99 MG/DL (ref 0.7–1.3)
EOSINOPHIL # BLD: 0.02 X10(3) UL (ref 0–0.7)
EOSINOPHIL NFR BLD: 0.2 %
ERYTHROCYTE [DISTWIDTH] IN BLOOD BY AUTOMATED COUNT: 59.9 FL (ref 35.1–46.3)
ERYTHROCYTE [DISTWIDTH] IN BLOOD: 15.3 % (ref 11–15)
GFR SERPLBLD SCHWARTZ-ARVRAT: 73 ML/MIN/1.73M2
GLOBULIN SER-MCNC: 4.5 G/DL (ref 2–3.5)
GLUCOSE SERPL-MCNC: 194 MG/DL (ref 70–99)
HCT VFR BLD CALC: 32.9 % (ref 39–53)
HGB BLD-MCNC: 9.9 G/DL (ref 13–17.5)
IMM GRANULOCYTES # BLD: 0.21 X10(3) UL (ref 0–1)
IMM GRANULOCYTES NFR BLD: 2.1 %
LENGTH OF FAST TIME PATIENT: NO H
LYMPHOCYTES # BLD: 1.48 X10(3) UL (ref 1–4)
LYMPHOCYTES NFR BLD: 14.9 %
MCH RBC QN AUTO: 32.2 PG (ref 26–34)
MCHC RBC AUTO-ENTMCNC: 30.1 G/DL (ref 31–37)
MCV RBC AUTO: 107.2 FL (ref 80–100)
MONOCYTES # BLD: 0.81 X10(3) UL (ref 0.1–1)
MONOCYTES NFR BLD: 8.2 %
NEUTROPHILS # BLD: 7.32 X10(3) UL (ref 1.5–7.7)
NEUTROPHILS NFR BLD: 73.9 %
PLATELET # BLD: 238 10(3)UL (ref 150–450)
POTASSIUM SERPL-SCNC: 4.3 MMOL/L (ref 3.5–5.1)
PROT SERPL-MCNC: 7.5 G/DL (ref 5.7–8.2)
RBC # BLD: 3.07 X10(6)/UL (ref 3.8–5.8)
SODIUM SERPL-SCNC: 159 MMOL/L (ref 136–145)
WBC # BLD: 9.9 X10(3) UL (ref 4–11)

## 2025-01-07 ENCOUNTER — HOSPITAL ENCOUNTER (INPATIENT)
Age: 89
Discharge: NURSING, REHAB OR LONG-TERM CARE | DRG: 871 | End: 2025-01-07
Attending: EMERGENCY MEDICINE | Admitting: INTERNAL MEDICINE

## 2025-01-07 ENCOUNTER — APPOINTMENT (OUTPATIENT)
Dept: GENERAL RADIOLOGY | Age: 89
DRG: 871 | End: 2025-01-07
Attending: EMERGENCY MEDICINE

## 2025-01-07 DIAGNOSIS — J69.0 ASPIRATION PNEUMONIA, UNSPECIFIED ASPIRATION PNEUMONIA TYPE, UNSPECIFIED LATERALITY, UNSPECIFIED PART OF LUNG  (CMD): Primary | ICD-10-CM

## 2025-01-07 DIAGNOSIS — T83.511S URINARY TRACT INFECTION ASSOCIATED WITH CATHETERIZATION OF URINARY TRACT, UNSPECIFIED INDWELLING URINARY CATHETER TYPE, SEQUELA: ICD-10-CM

## 2025-01-07 DIAGNOSIS — N39.0 URINARY TRACT INFECTION ASSOCIATED WITH CATHETERIZATION OF URINARY TRACT, UNSPECIFIED INDWELLING URINARY CATHETER TYPE, SEQUELA: ICD-10-CM

## 2025-01-07 LAB
ALBUMIN SERPL-MCNC: 1.8 G/DL (ref 3.4–5)
ALBUMIN SERPL-MCNC: 1.9 G/DL (ref 3.4–5)
ALBUMIN/GLOB SERPL: 0.3 {RATIO} (ref 1–2.4)
ALP SERPL-CCNC: 185 UNITS/L (ref 45–117)
ALP SERPL-CCNC: 208 UNITS/L (ref 45–117)
ALT SERPL-CCNC: 91 UNITS/L
ALT SERPL-CCNC: 95 UNITS/L
ANION GAP SERPL CALC-SCNC: 4 MMOL/L (ref 7–19)
ANION GAP SERPL CALC-SCNC: 7 MMOL/L (ref 7–19)
APPEARANCE UR: ABNORMAL
APTT PPP: 22 SEC (ref 22–32)
AST SERPL-CCNC: 101 UNITS/L
AST SERPL-CCNC: 108 UNITS/L
BACTERIA #/AREA URNS HPF: ABNORMAL /HPF
BASOPHILS # BLD: 0.1 K/MCL (ref 0–0.3)
BASOPHILS NFR BLD: 1 %
BILIRUB CONJ SERPL-MCNC: <0.1 MG/DL (ref 0–0.2)
BILIRUB SERPL-MCNC: 0.2 MG/DL (ref 0.2–1)
BILIRUB SERPL-MCNC: 0.3 MG/DL (ref 0.2–1)
BILIRUB UR QL STRIP: NEGATIVE
BUN SERPL-MCNC: 52 MG/DL (ref 6–20)
BUN SERPL-MCNC: 54 MG/DL (ref 6–20)
BUN/CREAT SERPL: 55 (ref 7–25)
BUN/CREAT SERPL: 60 (ref 7–25)
CALCIUM SERPL-MCNC: 9.5 MG/DL (ref 8.4–10.2)
CALCIUM SERPL-MCNC: 9.9 MG/DL (ref 8.4–10.2)
CHLORIDE SERPL-SCNC: 116 MMOL/L (ref 97–110)
CHLORIDE SERPL-SCNC: 121 MMOL/L (ref 97–110)
CO2 SERPL-SCNC: 32 MMOL/L (ref 21–32)
CO2 SERPL-SCNC: 36 MMOL/L (ref 21–32)
COLOR UR: ABNORMAL
CREAT SERPL-MCNC: 0.9 MG/DL (ref 0.67–1.17)
CREAT SERPL-MCNC: 0.94 MG/DL (ref 0.67–1.17)
DEPRECATED RDW RBC: 58.7 FL (ref 39–50)
EGFRCR SERPLBLD CKD-EPI 2021: 78 ML/MIN/{1.73_M2}
EGFRCR SERPLBLD CKD-EPI 2021: 82 ML/MIN/{1.73_M2}
EOSINOPHIL # BLD: 0 K/MCL (ref 0–0.5)
EOSINOPHIL NFR BLD: 0 %
ERYTHROCYTE [DISTWIDTH] IN BLOOD: 15.1 % (ref 11–15)
FASTING DURATION TIME PATIENT: ABNORMAL H
FASTING DURATION TIME PATIENT: ABNORMAL H
GLOBULIN SER-MCNC: 6.1 G/DL (ref 2–4)
GLUCOSE BLDC GLUCOMTR-MCNC: 94 MG/DL (ref 70–99)
GLUCOSE SERPL-MCNC: 109 MG/DL (ref 70–99)
GLUCOSE SERPL-MCNC: 230 MG/DL (ref 70–99)
GLUCOSE UR STRIP-MCNC: NEGATIVE MG/DL
HCT VFR BLD CALC: 33.1 % (ref 39–51)
HGB BLD-MCNC: 10 G/DL (ref 13–17)
HGB UR QL STRIP: ABNORMAL
HYALINE CASTS #/AREA URNS LPF: ABNORMAL /LPF
IMM GRANULOCYTES # BLD AUTO: 0.3 K/MCL (ref 0–0.2)
IMM GRANULOCYTES # BLD: 2 %
INR PPP: 1.1
KETONES UR STRIP-MCNC: NEGATIVE MG/DL
LACTATE BLDV-SCNC: 2.4 MMOL/L (ref 0–2)
LACTATE BLDV-SCNC: 3.2 MMOL/L (ref 0–2)
LACTATE BLDV-SCNC: 5.4 MMOL/L (ref 0–2)
LEUKOCYTE ESTERASE UR QL STRIP: ABNORMAL
LYMPHOCYTES # BLD: 1.2 K/MCL (ref 1–4)
LYMPHOCYTES NFR BLD: 10 %
MAGNESIUM SERPL-MCNC: 2.7 MG/DL (ref 1.7–2.4)
MCH RBC QN AUTO: 32.5 PG (ref 26–34)
MCHC RBC AUTO-ENTMCNC: 30.2 G/DL (ref 32–36.5)
MCV RBC AUTO: 107.5 FL (ref 78–100)
MONOCYTES # BLD: 0.8 K/MCL (ref 0.3–0.9)
MONOCYTES NFR BLD: 6 %
NEUTROPHILS # BLD: 10.3 K/MCL (ref 1.8–7.7)
NEUTROPHILS NFR BLD: 81 %
NITRITE UR QL STRIP: NEGATIVE
NRBC BLD MANUAL-RTO: 0 /100 WBC
OSMOLALITY SERPL: 355 MOSM/KG (ref 275–300)
PH UR STRIP: 6.5 [PH] (ref 5–7)
PHOSPHATE SERPL-MCNC: 2.6 MG/DL (ref 2.4–4.7)
PLATELET # BLD AUTO: 251 K/MCL (ref 140–450)
POTASSIUM SERPL-SCNC: 3.8 MMOL/L (ref 3.4–5.1)
POTASSIUM SERPL-SCNC: 4 MMOL/L (ref 3.4–5.1)
PROT SERPL-MCNC: 7.9 G/DL (ref 6.4–8.2)
PROT SERPL-MCNC: 8.5 G/DL (ref 6.4–8.2)
PROT UR STRIP-MCNC: 300 MG/DL
PROTHROMBIN TIME: 12 SEC (ref 9.7–11.8)
RAINBOW EXTRA TUBES HOLD SPECIMEN: NORMAL
RAINBOW EXTRA TUBES HOLD SPECIMEN: NORMAL
RBC # BLD: 3.08 MIL/MCL (ref 4.5–5.9)
RBC #/AREA URNS HPF: >100 /HPF
SODIUM SERPL-SCNC: 152 MMOL/L (ref 135–145)
SODIUM SERPL-SCNC: 156 MMOL/L (ref 135–145)
SP GR UR STRIP: 1.01 (ref 1–1.03)
SQUAMOUS #/AREA URNS HPF: ABNORMAL /HPF
UROBILINOGEN UR STRIP-MCNC: 0.2 MG/DL
WBC # BLD: 12.6 K/MCL (ref 4.2–11)
WBC #/AREA URNS HPF: >100 /HPF

## 2025-01-07 PROCEDURE — 83930 ASSAY OF BLOOD OSMOLALITY: CPT | Performed by: EMERGENCY MEDICINE

## 2025-01-07 PROCEDURE — 84100 ASSAY OF PHOSPHORUS: CPT

## 2025-01-07 PROCEDURE — 87154 CUL TYP ID BLD PTHGN 6+ TRGT: CPT | Performed by: EMERGENCY MEDICINE

## 2025-01-07 PROCEDURE — 85025 COMPLETE CBC W/AUTO DIFF WBC: CPT | Performed by: EMERGENCY MEDICINE

## 2025-01-07 PROCEDURE — 10002800 HB RX 250 W HCPCS: Performed by: INTERNAL MEDICINE

## 2025-01-07 PROCEDURE — 85730 THROMBOPLASTIN TIME PARTIAL: CPT | Performed by: INTERNAL MEDICINE

## 2025-01-07 PROCEDURE — 10000002 HB ROOM CHARGE MED SURG

## 2025-01-07 PROCEDURE — 80048 BASIC METABOLIC PNL TOTAL CA: CPT | Performed by: EMERGENCY MEDICINE

## 2025-01-07 PROCEDURE — 71045 X-RAY EXAM CHEST 1 VIEW: CPT

## 2025-01-07 PROCEDURE — 36415 COLL VENOUS BLD VENIPUNCTURE: CPT | Performed by: INTERNAL MEDICINE

## 2025-01-07 PROCEDURE — 83735 ASSAY OF MAGNESIUM: CPT | Performed by: EMERGENCY MEDICINE

## 2025-01-07 PROCEDURE — 87040 BLOOD CULTURE FOR BACTERIA: CPT | Performed by: EMERGENCY MEDICINE

## 2025-01-07 PROCEDURE — 83605 ASSAY OF LACTIC ACID: CPT | Performed by: EMERGENCY MEDICINE

## 2025-01-07 PROCEDURE — 10002807 HB RX 258

## 2025-01-07 PROCEDURE — 99284 EMERGENCY DEPT VISIT MOD MDM: CPT

## 2025-01-07 PROCEDURE — 10002807 HB RX 258: Performed by: INTERNAL MEDICINE

## 2025-01-07 PROCEDURE — 87086 URINE CULTURE/COLONY COUNT: CPT | Performed by: INTERNAL MEDICINE

## 2025-01-07 PROCEDURE — 81001 URINALYSIS AUTO W/SCOPE: CPT | Performed by: INTERNAL MEDICINE

## 2025-01-07 PROCEDURE — 80053 COMPREHEN METABOLIC PANEL: CPT | Performed by: INTERNAL MEDICINE

## 2025-01-07 PROCEDURE — 83605 ASSAY OF LACTIC ACID: CPT | Performed by: INTERNAL MEDICINE

## 2025-01-07 PROCEDURE — 10002803 HB RX 637: Performed by: EMERGENCY MEDICINE

## 2025-01-07 PROCEDURE — 10002803 HB RX 637: Performed by: INTERNAL MEDICINE

## 2025-01-07 PROCEDURE — 10002807 HB RX 258: Performed by: EMERGENCY MEDICINE

## 2025-01-07 PROCEDURE — 85610 PROTHROMBIN TIME: CPT | Performed by: INTERNAL MEDICINE

## 2025-01-07 PROCEDURE — 80076 HEPATIC FUNCTION PANEL: CPT | Performed by: EMERGENCY MEDICINE

## 2025-01-07 PROCEDURE — 10002800 HB RX 250 W HCPCS: Performed by: EMERGENCY MEDICINE

## 2025-01-07 RX ORDER — ASCORBIC ACID 500 MG
500 TABLET ORAL DAILY
COMMUNITY

## 2025-01-07 RX ORDER — CARBIDOPA AND LEVODOPA 25; 100 MG/1; MG/1
0.5 TABLET ORAL 3 TIMES DAILY
Status: DISCONTINUED | OUTPATIENT
Start: 2025-01-07 | End: 2025-01-14 | Stop reason: HOSPADM

## 2025-01-07 RX ORDER — DEXTROSE MONOHYDRATE 50 MG/ML
INJECTION, SOLUTION INTRAVENOUS CONTINUOUS
Status: DISCONTINUED | OUTPATIENT
Start: 2025-01-07 | End: 2025-01-09

## 2025-01-07 RX ORDER — DONEPEZIL HYDROCHLORIDE 10 MG/1
10 TABLET, FILM COATED ORAL NIGHTLY
Status: DISCONTINUED | OUTPATIENT
Start: 2025-01-07 | End: 2025-01-14 | Stop reason: HOSPADM

## 2025-01-07 RX ORDER — ASPIRIN 81 MG/1
81 TABLET, CHEWABLE ORAL DAILY
Status: DISCONTINUED | OUTPATIENT
Start: 2025-01-08 | End: 2025-01-14 | Stop reason: HOSPADM

## 2025-01-07 RX ORDER — ARIPIPRAZOLE 2 MG/1
2 TABLET ORAL EVERY OTHER DAY
Status: DISCONTINUED | OUTPATIENT
Start: 2025-01-07 | End: 2025-01-14 | Stop reason: HOSPADM

## 2025-01-07 RX ORDER — MULTIVIT-MIN/FERROUS FUMARATE 15 MG
1 TABLET ORAL DAILY
COMMUNITY

## 2025-01-07 RX ORDER — ACETAMINOPHEN 650 MG/1
650 SUPPOSITORY RECTAL ONCE
Status: COMPLETED | OUTPATIENT
Start: 2025-01-07 | End: 2025-01-07

## 2025-01-07 RX ADMIN — SODIUM CHLORIDE 1000 ML: 9 INJECTION, SOLUTION INTRAVENOUS at 16:40

## 2025-01-07 RX ADMIN — ARIPIPRAZOLE 2 MG: 2 TABLET ORAL at 21:27

## 2025-01-07 RX ADMIN — DONEPEZIL HYDROCHLORIDE 10 MG: 10 TABLET ORAL at 21:27

## 2025-01-07 RX ADMIN — PIPERACILLIN SODIUM AND TAZOBACTAM SODIUM 4.5 G: 4; .5 INJECTION, POWDER, FOR SOLUTION INTRAVENOUS at 15:57

## 2025-01-07 RX ADMIN — SODIUM CHLORIDE 851 ML: 9 INJECTION, SOLUTION INTRAVENOUS at 20:46

## 2025-01-07 RX ADMIN — PIPERACILLIN SODIUM AND TAZOBACTAM SODIUM 3.38 G: 3; .375 INJECTION, POWDER, FOR SOLUTION INTRAVENOUS at 21:46

## 2025-01-07 RX ADMIN — CARBIDOPA AND LEVODOPA 0.5 TABLET: 25; 100 TABLET ORAL at 21:27

## 2025-01-07 RX ADMIN — ACETAMINOPHEN 650 MG: 650 SUPPOSITORY RECTAL at 15:54

## 2025-01-07 RX ADMIN — DEXTROSE MONOHYDRATE: 50 INJECTION, SOLUTION INTRAVENOUS at 18:35

## 2025-01-07 ASSESSMENT — LIFESTYLE VARIABLES
HOW MANY STANDARD DRINKS CONTAINING ALCOHOL DO YOU HAVE ON A TYPICAL DAY: 0,1 OR 2
HOW OFTEN DO YOU HAVE A DRINK CONTAINING ALCOHOL: NEVER
ALCOHOL_USE_STATUS: NO OR LOW RISK WITH VALIDATED TOOL

## 2025-01-07 ASSESSMENT — ACTIVITIES OF DAILY LIVING (ADL)
DRESSING: DEPENDENT
BATHING: DEPENDENT
ADL_SHORT_OF_BREATH: NO
RECENT_DECLINE_ADL: NO
TOILETING: DEPENDENT
FEEDING: DEPENDENT
ADL_BEFORE_ADMISSION: NEEDS/REQUIRES ASSISTANCE
ADL_SCORE: 0

## 2025-01-08 LAB
A BAUMANNII DNA BLD POS QL NAA+NON-PROBE: NOT DETECTED
ALBUMIN SERPL-MCNC: 1.5 G/DL (ref 3.4–5)
ALBUMIN/GLOB SERPL: 0.3 {RATIO} (ref 1–2.4)
ALP SERPL-CCNC: 165 UNITS/L (ref 45–117)
ALT SERPL-CCNC: 60 UNITS/L
ANION GAP SERPL CALC-SCNC: 5 MMOL/L (ref 7–19)
ANION GAP SERPL CALC-SCNC: 7 MMOL/L (ref 7–19)
AST SERPL-CCNC: 64 UNITS/L
B FRAGILIS DNA BLD POS QL NAA+NON-PROBE: NOT DETECTED
BILIRUB SERPL-MCNC: 0.3 MG/DL (ref 0.2–1)
BLACTX-M ISLT/SPM QL: DETECTED
BLAIMP ISLT/SPM QL: NOT DETECTED
BLAKPC ANORECTLISLT QL NAA+PROBE: NOT DETECTED
BLANDM ANORECTLISLT QL NAA+PROBE: NOT DETECTED
BLAOXA-48 ISLT/SPM QL: NOT DETECTED
BLAVIM ISLT/SPM QL: NOT DETECTED
BUN SERPL-MCNC: 37 MG/DL (ref 6–20)
BUN SERPL-MCNC: 40 MG/DL (ref 6–20)
BUN/CREAT SERPL: 40 (ref 7–25)
BUN/CREAT SERPL: 40 (ref 7–25)
C ALBICANS DNA BLD POS QL NAA+NON-PROBE: NOT DETECTED
C AURIS DNA BLD POS QL NAA+NON-PROBE: NOT DETECTED
C GATTII+NEOFOR DNA BLD POS QL NAA+N-PRB: NOT DETECTED
C GLABRATA DNA BLD POS QL NAA+NON-PROBE: NOT DETECTED
C KRUSEI DNA BLD POS QL NAA+NON-PROBE: NOT DETECTED
C PARAP DNA BLD POS QL NAA+NON-PROBE: NOT DETECTED
C TROPICLS DNA BLD POS QL NAA+NON-PROBE: NOT DETECTED
CALCIUM SERPL-MCNC: 9 MG/DL (ref 8.4–10.2)
CALCIUM SERPL-MCNC: 9.2 MG/DL (ref 8.4–10.2)
CHLORIDE SERPL-SCNC: 118 MMOL/L (ref 97–110)
CHLORIDE SERPL-SCNC: 120 MMOL/L (ref 97–110)
CO2 SERPL-SCNC: 28 MMOL/L (ref 21–32)
CO2 SERPL-SCNC: 31 MMOL/L (ref 21–32)
COLISTIN RES MCR-1 ISLT/SPM QL: NOT DETECTED
CREAT SERPL-MCNC: 0.92 MG/DL (ref 0.67–1.17)
CREAT SERPL-MCNC: 1 MG/DL (ref 0.67–1.17)
E CLOAC COMP DNA BLD POS NAA+NON-PROBE: NOT DETECTED
E COLI DNA BLD POS QL NAA+NON-PROBE: DETECTED
E FAECALIS DNA BLD POS QL NAA+NON-PROBE: NOT DETECTED
E FAECIUM DNA BLD POS QL NAA+NON-PROBE: NOT DETECTED
EGFRCR SERPLBLD CKD-EPI 2021: 72 ML/MIN/{1.73_M2}
EGFRCR SERPLBLD CKD-EPI 2021: 80 ML/MIN/{1.73_M2}
FASTING DURATION TIME PATIENT: ABNORMAL H
FASTING DURATION TIME PATIENT: ABNORMAL H
FLUAV RNA RESP QL NAA+PROBE: NOT DETECTED
FLUBV RNA RESP QL NAA+PROBE: NOT DETECTED
GLOBULIN SER-MCNC: 5.4 G/DL (ref 2–4)
GLUCOSE BLDC GLUCOMTR-MCNC: 133 MG/DL (ref 70–99)
GLUCOSE BLDC GLUCOMTR-MCNC: 139 MG/DL (ref 70–99)
GLUCOSE BLDC GLUCOMTR-MCNC: 144 MG/DL (ref 70–99)
GLUCOSE BLDC GLUCOMTR-MCNC: 186 MG/DL (ref 70–99)
GLUCOSE BLDC GLUCOMTR-MCNC: 245 MG/DL (ref 70–99)
GLUCOSE BLDC GLUCOMTR-MCNC: 76 MG/DL (ref 70–99)
GLUCOSE SERPL-MCNC: 154 MG/DL (ref 70–99)
GLUCOSE SERPL-MCNC: 214 MG/DL (ref 70–99)
GP B STREP DNA CSF QL NAA+NON-PROBE: NOT DETECTED
HAEM INFLU DNA BLD POS QL NAA+NON-PROBE: NOT DETECTED
K OXYTOCA DNA BLD POS QL NAA+NON-PROBE: NOT DETECTED
KLEBSIELLA SP DNA BLD POS QL NAA+NON-PRB: NOT DETECTED
KLEBSIELLA SP DNA BLD POS QL NAA+NON-PRB: NOT DETECTED
L MONOCYTOG DNA BLD POS QL NAA+NON-PROBE: NOT DETECTED
L PNEUMO1 AG UR QL IA.RAPID: NORMAL
MRSA DNA SPEC QL NAA+PROBE: DETECTED
N MEN DNA BLD POS QL NAA+NON-PROBE: NOT DETECTED
P AERUGINOSA DNA BLD POS NAA+NON-PROBE: NOT DETECTED
POTASSIUM SERPL-SCNC: 3.3 MMOL/L (ref 3.4–5.1)
POTASSIUM SERPL-SCNC: 4 MMOL/L (ref 3.4–5.1)
PROT SERPL-MCNC: 6.9 G/DL (ref 6.4–8.2)
PROTEUS SP DNA BLD POS QL NAA+NON-PROBE: NOT DETECTED
RAINBOW EXTRA TUBES HOLD SPECIMEN: NORMAL
RSV AG NPH QL IA.RAPID: NOT DETECTED
S LUGDUNENSIS DNA BLD POS QL NAA+NON-PRB: NOT DETECTED
S MALTOPHILIA DNA BLD POS QL NAA+NON-PRB: NOT DETECTED
S MARCESCENS DNA BLD POS NAA+NON-PROBE: NOT DETECTED
S PNEUM AG UR QL IA.RAPID: NORMAL
S PNEUM DNA BLD POS QL NAA+NON-PROBE: NOT DETECTED
S PYO DNA BLD POS QL NAA+NON-PROBE: NOT DETECTED
SALMONELLA DNA BLD POS QL NAA+NON-PROBE: NOT DETECTED
SARS-COV-2 N GENE CT SPEC QN NAA N2: 15.4
SARS-COV-2 RNA RESP QL NAA+PROBE: DETECTED
SERVICE CMNT-IMP: ABNORMAL
SODIUM SERPL-SCNC: 150 MMOL/L (ref 135–145)
SODIUM SERPL-SCNC: 152 MMOL/L (ref 135–145)
STAPHYLOCOCCUS AUREUS: NOT DETECTED
STAPHYLOCOCCUS SPECIES: NOT DETECTED
STREPTOCOCCUS DNA BLD POS NAA+NON-PROBE: NOT DETECTED

## 2025-01-08 PROCEDURE — 10002807 HB RX 258: Performed by: INTERNAL MEDICINE

## 2025-01-08 PROCEDURE — 10002807 HB RX 258

## 2025-01-08 PROCEDURE — 10004651 HB RX, NO CHARGE ITEM: Performed by: INTERNAL MEDICINE

## 2025-01-08 PROCEDURE — 36415 COLL VENOUS BLD VENIPUNCTURE: CPT | Performed by: INTERNAL MEDICINE

## 2025-01-08 PROCEDURE — 96372 THER/PROPH/DIAG INJ SC/IM: CPT | Performed by: INTERNAL MEDICINE

## 2025-01-08 PROCEDURE — 80048 BASIC METABOLIC PNL TOTAL CA: CPT

## 2025-01-08 PROCEDURE — 80053 COMPREHEN METABOLIC PANEL: CPT | Performed by: INTERNAL MEDICINE

## 2025-01-08 PROCEDURE — 10002801 HB RX 250 W/O HCPCS: Performed by: INTERNAL MEDICINE

## 2025-01-08 PROCEDURE — 10002803 HB RX 637

## 2025-01-08 PROCEDURE — 87899 AGENT NOS ASSAY W/OPTIC: CPT | Performed by: INTERNAL MEDICINE

## 2025-01-08 PROCEDURE — 10002800 HB RX 250 W HCPCS: Performed by: INTERNAL MEDICINE

## 2025-01-08 PROCEDURE — 99223 1ST HOSP IP/OBS HIGH 75: CPT | Performed by: NURSE PRACTITIONER

## 2025-01-08 PROCEDURE — 10002803 HB RX 637: Performed by: INTERNAL MEDICINE

## 2025-01-08 PROCEDURE — 87186 SC STD MICRODIL/AGAR DIL: CPT | Performed by: INTERNAL MEDICINE

## 2025-01-08 PROCEDURE — 99497 ADVNCD CARE PLAN 30 MIN: CPT | Performed by: NURSE PRACTITIONER

## 2025-01-08 PROCEDURE — 0241U COVID/FLU/RSV PANEL: CPT | Performed by: INTERNAL MEDICINE

## 2025-01-08 PROCEDURE — 10006031 HB ROOM CHARGE TELEMETRY

## 2025-01-08 PROCEDURE — 87641 MR-STAPH DNA AMP PROBE: CPT | Performed by: INTERNAL MEDICINE

## 2025-01-08 RX ORDER — DEXTROSE MONOHYDRATE 25 G/50ML
25 INJECTION, SOLUTION INTRAVENOUS PRN
Status: DISCONTINUED | OUTPATIENT
Start: 2025-01-08 | End: 2025-01-14 | Stop reason: HOSPADM

## 2025-01-08 RX ORDER — AMLODIPINE BESYLATE 5 MG/1
5 TABLET ORAL DAILY
Status: DISCONTINUED | OUTPATIENT
Start: 2025-01-08 | End: 2025-01-14 | Stop reason: HOSPADM

## 2025-01-08 RX ORDER — DEXTROSE MONOHYDRATE 25 G/50ML
12.5 INJECTION, SOLUTION INTRAVENOUS PRN
Status: DISCONTINUED | OUTPATIENT
Start: 2025-01-08 | End: 2025-01-14 | Stop reason: HOSPADM

## 2025-01-08 RX ORDER — POTASSIUM CHLORIDE 1.5 G/1.58G
40 POWDER, FOR SOLUTION ORAL ONCE
Status: COMPLETED | OUTPATIENT
Start: 2025-01-08 | End: 2025-01-08

## 2025-01-08 RX ORDER — NICOTINE POLACRILEX 4 MG
30 LOZENGE BUCCAL PRN
Status: DISCONTINUED | OUTPATIENT
Start: 2025-01-08 | End: 2025-01-14 | Stop reason: HOSPADM

## 2025-01-08 RX ORDER — NICOTINE POLACRILEX 4 MG
15 LOZENGE BUCCAL PRN
Status: DISCONTINUED | OUTPATIENT
Start: 2025-01-08 | End: 2025-01-14 | Stop reason: HOSPADM

## 2025-01-08 RX ADMIN — PIPERACILLIN SODIUM AND TAZOBACTAM SODIUM 3.38 G: 3; .375 INJECTION, POWDER, FOR SOLUTION INTRAVENOUS at 05:27

## 2025-01-08 RX ADMIN — INSULIN LISPRO 2 UNITS: 100 INJECTION, SOLUTION INTRAVENOUS; SUBCUTANEOUS at 13:51

## 2025-01-08 RX ADMIN — DOXYCYCLINE 100 MG: 100 INJECTION, POWDER, LYOPHILIZED, FOR SOLUTION INTRAVENOUS at 11:20

## 2025-01-08 RX ADMIN — DEXTROSE MONOHYDRATE: 50 INJECTION, SOLUTION INTRAVENOUS at 21:19

## 2025-01-08 RX ADMIN — POTASSIUM CHLORIDE 40 MEQ: 1.5 POWDER, FOR SOLUTION ORAL at 18:03

## 2025-01-08 RX ADMIN — AMLODIPINE BESYLATE 5 MG: 5 TABLET ORAL at 13:52

## 2025-01-08 RX ADMIN — CARBIDOPA AND LEVODOPA 0.5 TABLET: 25; 100 TABLET ORAL at 07:52

## 2025-01-08 RX ADMIN — MEROPENEM 1 G: 1 INJECTION INTRAVENOUS at 18:07

## 2025-01-08 RX ADMIN — DOXYCYCLINE 100 MG: 100 INJECTION, POWDER, LYOPHILIZED, FOR SOLUTION INTRAVENOUS at 21:29

## 2025-01-08 RX ADMIN — DONEPEZIL HYDROCHLORIDE 10 MG: 10 TABLET ORAL at 21:30

## 2025-01-08 RX ADMIN — VANCOMYCIN HYDROCHLORIDE 1250 MG: 10 INJECTION, POWDER, LYOPHILIZED, FOR SOLUTION INTRAVENOUS at 12:29

## 2025-01-08 RX ADMIN — ASPIRIN 81 MG CHEWABLE TABLET 81 MG: 81 TABLET CHEWABLE at 07:51

## 2025-01-08 RX ADMIN — DEXTROSE MONOHYDRATE: 50 INJECTION, SOLUTION INTRAVENOUS at 05:31

## 2025-01-08 RX ADMIN — CARBIDOPA AND LEVODOPA 0.5 TABLET: 25; 100 TABLET ORAL at 13:52

## 2025-01-08 RX ADMIN — REMDESIVIR 200 MG: 100 INJECTION, POWDER, LYOPHILIZED, FOR SOLUTION INTRAVENOUS at 16:47

## 2025-01-08 RX ADMIN — PIPERACILLIN SODIUM AND TAZOBACTAM SODIUM 3.38 G: 3; .375 INJECTION, POWDER, FOR SOLUTION INTRAVENOUS at 14:44

## 2025-01-08 RX ADMIN — CARBIDOPA AND LEVODOPA 0.5 TABLET: 25; 100 TABLET ORAL at 21:29

## 2025-01-08 ASSESSMENT — COGNITIVE AND FUNCTIONAL STATUS - GENERAL: DO YOU HAVE DIFFICULTY DRESSING OR BATHING: YES

## 2025-01-09 LAB
ALBUMIN SERPL-MCNC: 1.4 G/DL (ref 3.4–5)
ALBUMIN/GLOB SERPL: 0.3 {RATIO} (ref 1–2.4)
ALP SERPL-CCNC: 149 UNITS/L (ref 45–117)
ALT SERPL-CCNC: 59 UNITS/L
ANION GAP SERPL CALC-SCNC: 6 MMOL/L (ref 7–19)
AST SERPL-CCNC: 49 UNITS/L
BACTERIA UR CULT: ABNORMAL
BILIRUB SERPL-MCNC: 0.3 MG/DL (ref 0.2–1)
BUN SERPL-MCNC: 26 MG/DL (ref 6–20)
BUN/CREAT SERPL: 32 (ref 7–25)
CALCIUM SERPL-MCNC: 9 MG/DL (ref 8.4–10.2)
CHLORIDE SERPL-SCNC: 118 MMOL/L (ref 97–110)
CO2 SERPL-SCNC: 28 MMOL/L (ref 21–32)
CREAT SERPL-MCNC: 0.82 MG/DL (ref 0.67–1.17)
EGFRCR SERPLBLD CKD-EPI 2021: 84 ML/MIN/{1.73_M2}
FASTING DURATION TIME PATIENT: ABNORMAL H
GLOBULIN SER-MCNC: 5.6 G/DL (ref 2–4)
GLUCOSE BLDC GLUCOMTR-MCNC: 100 MG/DL (ref 70–99)
GLUCOSE BLDC GLUCOMTR-MCNC: 122 MG/DL (ref 70–99)
GLUCOSE BLDC GLUCOMTR-MCNC: 160 MG/DL (ref 70–99)
GLUCOSE BLDC GLUCOMTR-MCNC: 219 MG/DL (ref 70–99)
GLUCOSE SERPL-MCNC: 133 MG/DL (ref 70–99)
POTASSIUM SERPL-SCNC: 3.6 MMOL/L (ref 3.4–5.1)
PROT SERPL-MCNC: 7 G/DL (ref 6.4–8.2)
RAINBOW EXTRA TUBES HOLD SPECIMEN: NORMAL
SODIUM SERPL-SCNC: 148 MMOL/L (ref 135–145)

## 2025-01-09 PROCEDURE — 36415 COLL VENOUS BLD VENIPUNCTURE: CPT | Performed by: INTERNAL MEDICINE

## 2025-01-09 PROCEDURE — 10002807 HB RX 258: Performed by: INTERNAL MEDICINE

## 2025-01-09 PROCEDURE — 80053 COMPREHEN METABOLIC PANEL: CPT | Performed by: INTERNAL MEDICINE

## 2025-01-09 PROCEDURE — 10002807 HB RX 258

## 2025-01-09 PROCEDURE — 10006031 HB ROOM CHARGE TELEMETRY

## 2025-01-09 PROCEDURE — 10002800 HB RX 250 W HCPCS: Performed by: INTERNAL MEDICINE

## 2025-01-09 PROCEDURE — 10002803 HB RX 637: Performed by: INTERNAL MEDICINE

## 2025-01-09 PROCEDURE — 96372 THER/PROPH/DIAG INJ SC/IM: CPT | Performed by: INTERNAL MEDICINE

## 2025-01-09 PROCEDURE — 10002801 HB RX 250 W/O HCPCS: Performed by: INTERNAL MEDICINE

## 2025-01-09 PROCEDURE — 10002803 HB RX 637

## 2025-01-09 PROCEDURE — 99233 SBSQ HOSP IP/OBS HIGH 50: CPT | Performed by: NURSE PRACTITIONER

## 2025-01-09 PROCEDURE — 10004651 HB RX, NO CHARGE ITEM: Performed by: INTERNAL MEDICINE

## 2025-01-09 RX ORDER — VENLAFAXINE 37.5 MG/1
37.5 TABLET ORAL 2 TIMES DAILY WITH MEALS
Status: DISCONTINUED | OUTPATIENT
Start: 2025-01-09 | End: 2025-01-14 | Stop reason: HOSPADM

## 2025-01-09 RX ORDER — VENLAFAXINE 37.5 MG/1
37.5 TABLET ORAL 2 TIMES DAILY WITH MEALS
Status: DISCONTINUED | OUTPATIENT
Start: 2025-01-09 | End: 2025-01-09

## 2025-01-09 RX ADMIN — VANCOMYCIN HYDROCHLORIDE 750 MG: 750 INJECTION, POWDER, LYOPHILIZED, FOR SOLUTION INTRAVENOUS at 12:02

## 2025-01-09 RX ADMIN — CARBIDOPA AND LEVODOPA 0.5 TABLET: 25; 100 TABLET ORAL at 08:14

## 2025-01-09 RX ADMIN — VENLAFAXINE HYDROCHLORIDE 37.5 MG: 37.5 TABLET ORAL at 12:15

## 2025-01-09 RX ADMIN — CARBIDOPA AND LEVODOPA 0.5 TABLET: 25; 100 TABLET ORAL at 20:01

## 2025-01-09 RX ADMIN — CARBIDOPA AND LEVODOPA 0.5 TABLET: 25; 100 TABLET ORAL at 13:10

## 2025-01-09 RX ADMIN — ASPIRIN 81 MG CHEWABLE TABLET 81 MG: 81 TABLET CHEWABLE at 08:14

## 2025-01-09 RX ADMIN — ARIPIPRAZOLE 2 MG: 2 TABLET ORAL at 20:00

## 2025-01-09 RX ADMIN — DEXTROSE MONOHYDRATE: 50 INJECTION, SOLUTION INTRAVENOUS at 09:39

## 2025-01-09 RX ADMIN — REMDESIVIR 100 MG: 100 INJECTION, POWDER, LYOPHILIZED, FOR SOLUTION INTRAVENOUS at 09:37

## 2025-01-09 RX ADMIN — VENLAFAXINE HYDROCHLORIDE 37.5 MG: 37.5 TABLET ORAL at 17:29

## 2025-01-09 RX ADMIN — DONEPEZIL HYDROCHLORIDE 10 MG: 10 TABLET ORAL at 20:00

## 2025-01-09 RX ADMIN — DOXYCYCLINE 100 MG: 100 INJECTION, POWDER, LYOPHILIZED, FOR SOLUTION INTRAVENOUS at 08:14

## 2025-01-09 RX ADMIN — MEROPENEM 1 G: 1 INJECTION INTRAVENOUS at 06:08

## 2025-01-09 RX ADMIN — AMLODIPINE BESYLATE 5 MG: 5 TABLET ORAL at 08:14

## 2025-01-09 RX ADMIN — MEROPENEM 1 G: 1 INJECTION INTRAVENOUS at 17:28

## 2025-01-09 RX ADMIN — INSULIN LISPRO 2 UNITS: 100 INJECTION, SOLUTION INTRAVENOUS; SUBCUTANEOUS at 12:02

## 2025-01-09 ASSESSMENT — PAIN SCALES - PAIN ASSESSMENT IN ADVANCED DEMENTIA (PAINAD)
CONSOLABILITY: NO NEED TO CONSOLE
BREATHING: NORMAL
FACIALEXPRESSION: SMILING OR INEXPRESSIVE
FACIALEXPRESSION: SMILING OR INEXPRESSIVE
BODYLANGUAGE: TENSE, DISTRESSED, FIDGETING
BREATHING: NORMAL
CONSOLABILITY: NO NEED TO CONSOLE
BODYLANGUAGE: RELAXED
TOTALSCORE: 0
TOTALSCORE: 1

## 2025-01-10 LAB
ALBUMIN SERPL-MCNC: 1.5 G/DL (ref 3.4–5)
ALBUMIN/GLOB SERPL: 0.3 {RATIO} (ref 1–2.4)
ALP SERPL-CCNC: 145 UNITS/L (ref 45–117)
ALT SERPL-CCNC: 52 UNITS/L
ANION GAP SERPL CALC-SCNC: 5 MMOL/L (ref 7–19)
AST SERPL-CCNC: 37 UNITS/L
BACTERIA BLD CULT: ABNORMAL
BILIRUB SERPL-MCNC: 0.3 MG/DL (ref 0.2–1)
BUN SERPL-MCNC: 28 MG/DL (ref 6–20)
BUN/CREAT SERPL: 41 (ref 7–25)
CALCIUM SERPL-MCNC: 9.3 MG/DL (ref 8.4–10.2)
CHLORIDE SERPL-SCNC: 116 MMOL/L (ref 97–110)
CO2 SERPL-SCNC: 29 MMOL/L (ref 21–32)
CREAT SERPL-MCNC: 0.69 MG/DL (ref 0.67–1.17)
EGFRCR SERPLBLD CKD-EPI 2021: 89 ML/MIN/{1.73_M2}
FASTING DURATION TIME PATIENT: ABNORMAL H
GLOBULIN SER-MCNC: 5.3 G/DL (ref 2–4)
GLUCOSE BLDC GLUCOMTR-MCNC: 136 MG/DL (ref 70–99)
GLUCOSE BLDC GLUCOMTR-MCNC: 138 MG/DL (ref 70–99)
GLUCOSE BLDC GLUCOMTR-MCNC: 144 MG/DL (ref 70–99)
GLUCOSE BLDC GLUCOMTR-MCNC: 86 MG/DL (ref 70–99)
GLUCOSE BLDC GLUCOMTR-MCNC: 96 MG/DL (ref 70–99)
GLUCOSE SERPL-MCNC: 102 MG/DL (ref 70–99)
GRAM STN SPEC: ABNORMAL
POTASSIUM SERPL-SCNC: 3.8 MMOL/L (ref 3.4–5.1)
PROT SERPL-MCNC: 6.8 G/DL (ref 6.4–8.2)
RAINBOW EXTRA TUBES HOLD SPECIMEN: NORMAL
SODIUM SERPL-SCNC: 146 MMOL/L (ref 135–145)

## 2025-01-10 PROCEDURE — 10002803 HB RX 637: Performed by: INTERNAL MEDICINE

## 2025-01-10 PROCEDURE — 10002803 HB RX 637

## 2025-01-10 PROCEDURE — 10006031 HB ROOM CHARGE TELEMETRY

## 2025-01-10 PROCEDURE — 10002800 HB RX 250 W HCPCS: Performed by: INTERNAL MEDICINE

## 2025-01-10 PROCEDURE — 80053 COMPREHEN METABOLIC PANEL: CPT | Performed by: INTERNAL MEDICINE

## 2025-01-10 PROCEDURE — 10004651 HB RX, NO CHARGE ITEM: Performed by: INTERNAL MEDICINE

## 2025-01-10 PROCEDURE — 36415 COLL VENOUS BLD VENIPUNCTURE: CPT | Performed by: INTERNAL MEDICINE

## 2025-01-10 PROCEDURE — 10002807 HB RX 258: Performed by: INTERNAL MEDICINE

## 2025-01-10 PROCEDURE — 99233 SBSQ HOSP IP/OBS HIGH 50: CPT | Performed by: NURSE PRACTITIONER

## 2025-01-10 RX ORDER — POTASSIUM CHLORIDE 1.5 G/1.58G
40 POWDER, FOR SOLUTION ORAL ONCE
Status: COMPLETED | OUTPATIENT
Start: 2025-01-10 | End: 2025-01-10

## 2025-01-10 RX ADMIN — VENLAFAXINE HYDROCHLORIDE 37.5 MG: 37.5 TABLET ORAL at 08:15

## 2025-01-10 RX ADMIN — ASPIRIN 81 MG CHEWABLE TABLET 81 MG: 81 TABLET CHEWABLE at 08:15

## 2025-01-10 RX ADMIN — DONEPEZIL HYDROCHLORIDE 10 MG: 10 TABLET ORAL at 20:09

## 2025-01-10 RX ADMIN — AMLODIPINE BESYLATE 5 MG: 5 TABLET ORAL at 08:15

## 2025-01-10 RX ADMIN — CARBIDOPA AND LEVODOPA 0.5 TABLET: 25; 100 TABLET ORAL at 20:09

## 2025-01-10 RX ADMIN — REMDESIVIR 100 MG: 100 INJECTION, POWDER, LYOPHILIZED, FOR SOLUTION INTRAVENOUS at 08:30

## 2025-01-10 RX ADMIN — VENLAFAXINE HYDROCHLORIDE 37.5 MG: 37.5 TABLET ORAL at 17:39

## 2025-01-10 RX ADMIN — CARBIDOPA AND LEVODOPA 0.5 TABLET: 25; 100 TABLET ORAL at 08:15

## 2025-01-10 RX ADMIN — POTASSIUM CHLORIDE 40 MEQ: 1.5 POWDER, FOR SOLUTION ORAL at 12:22

## 2025-01-10 RX ADMIN — VANCOMYCIN HYDROCHLORIDE 750 MG: 750 INJECTION, POWDER, LYOPHILIZED, FOR SOLUTION INTRAVENOUS at 12:33

## 2025-01-10 RX ADMIN — CARBIDOPA AND LEVODOPA 0.5 TABLET: 25; 100 TABLET ORAL at 14:58

## 2025-01-10 RX ADMIN — MEROPENEM 1 G: 1 INJECTION INTRAVENOUS at 17:46

## 2025-01-10 RX ADMIN — MEROPENEM 1 G: 1 INJECTION INTRAVENOUS at 05:34

## 2025-01-10 ASSESSMENT — PAIN SCALES - PAIN ASSESSMENT IN ADVANCED DEMENTIA (PAINAD)
FACIALEXPRESSION: SMILING OR INEXPRESSIVE
CONSOLABILITY: NO NEED TO CONSOLE
BODYLANGUAGE: RELAXED
TOTALSCORE: 0
CONSOLABILITY: NO NEED TO CONSOLE
TOTALSCORE: 0
FACIALEXPRESSION: SMILING OR INEXPRESSIVE
BODYLANGUAGE: RELAXED
BREATHING: NORMAL
BREATHING: NORMAL

## 2025-01-11 ENCOUNTER — APPOINTMENT (OUTPATIENT)
Dept: CT IMAGING | Age: 89
DRG: 871 | End: 2025-01-11
Attending: INTERNAL MEDICINE

## 2025-01-11 LAB
ALBUMIN SERPL-MCNC: 1.5 G/DL (ref 3.4–5)
ALBUMIN/GLOB SERPL: 0.3 {RATIO} (ref 1–2.4)
ALP SERPL-CCNC: 151 UNITS/L (ref 45–117)
ALT SERPL-CCNC: 59 UNITS/L
ANION GAP SERPL CALC-SCNC: 6 MMOL/L (ref 7–19)
AST SERPL-CCNC: 48 UNITS/L
BILIRUB SERPL-MCNC: 0.2 MG/DL (ref 0.2–1)
BUN SERPL-MCNC: 31 MG/DL (ref 6–20)
BUN/CREAT SERPL: 44 (ref 7–25)
CALCIUM SERPL-MCNC: 9.1 MG/DL (ref 8.4–10.2)
CHLORIDE SERPL-SCNC: 114 MMOL/L (ref 97–110)
CO2 SERPL-SCNC: 28 MMOL/L (ref 21–32)
CREAT SERPL-MCNC: 0.71 MG/DL (ref 0.67–1.17)
EGFRCR SERPLBLD CKD-EPI 2021: 88 ML/MIN/{1.73_M2}
FASTING DURATION TIME PATIENT: ABNORMAL H
GLOBULIN SER-MCNC: 5.6 G/DL (ref 2–4)
GLUCOSE BLDC GLUCOMTR-MCNC: 119 MG/DL (ref 70–99)
GLUCOSE BLDC GLUCOMTR-MCNC: 124 MG/DL (ref 70–99)
GLUCOSE BLDC GLUCOMTR-MCNC: 152 MG/DL (ref 70–99)
GLUCOSE BLDC GLUCOMTR-MCNC: 153 MG/DL (ref 70–99)
GLUCOSE BLDC GLUCOMTR-MCNC: 165 MG/DL (ref 70–99)
GLUCOSE BLDC GLUCOMTR-MCNC: 64 MG/DL (ref 70–99)
GLUCOSE SERPL-MCNC: 100 MG/DL (ref 70–99)
POTASSIUM SERPL-SCNC: 4.2 MMOL/L (ref 3.4–5.1)
PROT SERPL-MCNC: 7.1 G/DL (ref 6.4–8.2)
RAINBOW EXTRA TUBES HOLD SPECIMEN: NORMAL
SODIUM SERPL-SCNC: 144 MMOL/L (ref 135–145)
VANCOMYCIN TROUGH SERPL-MCNC: 9.6 MCG/ML (ref 10–20)

## 2025-01-11 PROCEDURE — 36415 COLL VENOUS BLD VENIPUNCTURE: CPT | Performed by: INTERNAL MEDICINE

## 2025-01-11 PROCEDURE — 10002800 HB RX 250 W HCPCS: Performed by: INTERNAL MEDICINE

## 2025-01-11 PROCEDURE — 80053 COMPREHEN METABOLIC PANEL: CPT | Performed by: INTERNAL MEDICINE

## 2025-01-11 PROCEDURE — 96372 THER/PROPH/DIAG INJ SC/IM: CPT | Performed by: INTERNAL MEDICINE

## 2025-01-11 PROCEDURE — 74177 CT ABD & PELVIS W/CONTRAST: CPT

## 2025-01-11 PROCEDURE — 10002805 HB CONTRAST AGENT: Performed by: INTERNAL MEDICINE

## 2025-01-11 PROCEDURE — 80202 ASSAY OF VANCOMYCIN: CPT | Performed by: INTERNAL MEDICINE

## 2025-01-11 PROCEDURE — 10006031 HB ROOM CHARGE TELEMETRY

## 2025-01-11 PROCEDURE — 10002807 HB RX 258: Performed by: INTERNAL MEDICINE

## 2025-01-11 PROCEDURE — 10002803 HB RX 637: Performed by: INTERNAL MEDICINE

## 2025-01-11 PROCEDURE — 10002803 HB RX 637

## 2025-01-11 PROCEDURE — 10004651 HB RX, NO CHARGE ITEM: Performed by: INTERNAL MEDICINE

## 2025-01-11 RX ADMIN — VENLAFAXINE HYDROCHLORIDE 37.5 MG: 37.5 TABLET ORAL at 09:50

## 2025-01-11 RX ADMIN — DONEPEZIL HYDROCHLORIDE 10 MG: 10 TABLET ORAL at 22:26

## 2025-01-11 RX ADMIN — IOHEXOL 12.5 ML: 350 INJECTION, SOLUTION INTRAVENOUS at 15:06

## 2025-01-11 RX ADMIN — IOHEXOL 12.5 ML: 350 INJECTION, SOLUTION INTRAVENOUS at 14:00

## 2025-01-11 RX ADMIN — CARBIDOPA AND LEVODOPA 0.5 TABLET: 25; 100 TABLET ORAL at 13:42

## 2025-01-11 RX ADMIN — INSULIN LISPRO 1 UNITS: 100 INJECTION, SOLUTION INTRAVENOUS; SUBCUTANEOUS at 13:49

## 2025-01-11 RX ADMIN — CARBIDOPA AND LEVODOPA 0.5 TABLET: 25; 100 TABLET ORAL at 09:50

## 2025-01-11 RX ADMIN — VANCOMYCIN HYDROCHLORIDE 750 MG: 750 INJECTION, POWDER, LYOPHILIZED, FOR SOLUTION INTRAVENOUS at 13:47

## 2025-01-11 RX ADMIN — ASPIRIN 81 MG CHEWABLE TABLET 81 MG: 81 TABLET CHEWABLE at 09:50

## 2025-01-11 RX ADMIN — REMDESIVIR 100 MG: 100 INJECTION, POWDER, LYOPHILIZED, FOR SOLUTION INTRAVENOUS at 09:58

## 2025-01-11 RX ADMIN — MEROPENEM 1 G: 1 INJECTION INTRAVENOUS at 05:53

## 2025-01-11 RX ADMIN — CARBIDOPA AND LEVODOPA 0.5 TABLET: 25; 100 TABLET ORAL at 22:26

## 2025-01-11 RX ADMIN — ARIPIPRAZOLE 2 MG: 2 TABLET ORAL at 22:26

## 2025-01-11 RX ADMIN — MEROPENEM 1 G: 1 INJECTION INTRAVENOUS at 18:29

## 2025-01-11 RX ADMIN — AMLODIPINE BESYLATE 5 MG: 5 TABLET ORAL at 09:50

## 2025-01-11 RX ADMIN — VENLAFAXINE HYDROCHLORIDE 37.5 MG: 37.5 TABLET ORAL at 18:23

## 2025-01-11 RX ADMIN — IOHEXOL 80 ML: 350 INJECTION, SOLUTION INTRAVENOUS at 17:59

## 2025-01-11 ASSESSMENT — PAIN SCALES - PAIN ASSESSMENT IN ADVANCED DEMENTIA (PAINAD)
BODYLANGUAGE: RELAXED
BREATHING: NORMAL
TOTALSCORE: 0
FACIALEXPRESSION: SMILING OR INEXPRESSIVE
CONSOLABILITY: NO NEED TO CONSOLE
BREATHING: NORMAL
TOTALSCORE: 0
FACIALEXPRESSION: SMILING OR INEXPRESSIVE
BODYLANGUAGE: RELAXED
CONSOLABILITY: NO NEED TO CONSOLE

## 2025-01-12 VITALS
HEART RATE: 78 BPM | HEIGHT: 67 IN | DIASTOLIC BLOOD PRESSURE: 73 MMHG | BODY MASS INDEX: 21.35 KG/M2 | WEIGHT: 136.02 LBS | RESPIRATION RATE: 18 BRPM | SYSTOLIC BLOOD PRESSURE: 131 MMHG | TEMPERATURE: 97.5 F | OXYGEN SATURATION: 93 %

## 2025-01-12 LAB
ALBUMIN SERPL-MCNC: 1.5 G/DL (ref 3.4–5)
ALBUMIN/GLOB SERPL: 0.3 {RATIO} (ref 1–2.4)
ALP SERPL-CCNC: 154 UNITS/L (ref 45–117)
ALT SERPL-CCNC: 51 UNITS/L
ANION GAP SERPL CALC-SCNC: 4 MMOL/L (ref 7–19)
AST SERPL-CCNC: 45 UNITS/L
BACTERIA BLD CULT: NORMAL
BACTERIA SPT AEROBE CULT: ABNORMAL
BACTERIA SPT AEROBE CULT: ABNORMAL
BASOPHILS # BLD: 0 K/MCL (ref 0–0.3)
BASOPHILS NFR BLD: 0 %
BILIRUB SERPL-MCNC: 0.3 MG/DL (ref 0.2–1)
BUN SERPL-MCNC: 29 MG/DL (ref 6–20)
BUN/CREAT SERPL: 41 (ref 7–25)
CALCIUM SERPL-MCNC: 9.2 MG/DL (ref 8.4–10.2)
CHLORIDE SERPL-SCNC: 115 MMOL/L (ref 97–110)
CO2 SERPL-SCNC: 29 MMOL/L (ref 21–32)
CREAT SERPL-MCNC: 0.71 MG/DL (ref 0.67–1.17)
DEPRECATED RDW RBC: 52.4 FL (ref 39–50)
EGFRCR SERPLBLD CKD-EPI 2021: 88 ML/MIN/{1.73_M2}
EOSINOPHIL # BLD: 0.3 K/MCL (ref 0–0.5)
EOSINOPHIL NFR BLD: 5 %
ERYTHROCYTE [DISTWIDTH] IN BLOOD: 14.5 % (ref 11–15)
FASTING DURATION TIME PATIENT: ABNORMAL H
GLOBULIN SER-MCNC: 5.8 G/DL (ref 2–4)
GLUCOSE BLDC GLUCOMTR-MCNC: 103 MG/DL (ref 70–99)
GLUCOSE BLDC GLUCOMTR-MCNC: 112 MG/DL (ref 70–99)
GLUCOSE BLDC GLUCOMTR-MCNC: 164 MG/DL (ref 70–99)
GLUCOSE BLDC GLUCOMTR-MCNC: 181 MG/DL (ref 70–99)
GLUCOSE SERPL-MCNC: 118 MG/DL (ref 70–99)
GRAM STN SPEC: ABNORMAL
HCT VFR BLD CALC: 26 % (ref 39–51)
HGB BLD-MCNC: 8.3 G/DL (ref 13–17)
IMM GRANULOCYTES # BLD AUTO: 0.1 K/MCL (ref 0–0.2)
IMM GRANULOCYTES # BLD: 1 %
LYMPHOCYTES # BLD: 1.1 K/MCL (ref 1–4)
LYMPHOCYTES NFR BLD: 20 %
MAGNESIUM SERPL-MCNC: 2.6 MG/DL (ref 1.7–2.4)
MCH RBC QN AUTO: 31.8 PG (ref 26–34)
MCHC RBC AUTO-ENTMCNC: 31.9 G/DL (ref 32–36.5)
MCV RBC AUTO: 99.6 FL (ref 78–100)
MONOCYTES # BLD: 0.4 K/MCL (ref 0.3–0.9)
MONOCYTES NFR BLD: 8 %
NEUTROPHILS # BLD: 3.5 K/MCL (ref 1.8–7.7)
NEUTROPHILS NFR BLD: 66 %
NRBC BLD MANUAL-RTO: 0 /100 WBC
PHOSPHATE SERPL-MCNC: 3.1 MG/DL (ref 2.4–4.7)
PLATELET # BLD AUTO: 174 K/MCL (ref 140–450)
POTASSIUM SERPL-SCNC: 3.9 MMOL/L (ref 3.4–5.1)
PROT SERPL-MCNC: 7.3 G/DL (ref 6.4–8.2)
RBC # BLD: 2.61 MIL/MCL (ref 4.5–5.9)
SODIUM SERPL-SCNC: 144 MMOL/L (ref 135–145)
WBC # BLD: 5.4 K/MCL (ref 4.2–11)

## 2025-01-12 PROCEDURE — 10006031 HB ROOM CHARGE TELEMETRY

## 2025-01-12 PROCEDURE — 96372 THER/PROPH/DIAG INJ SC/IM: CPT | Performed by: INTERNAL MEDICINE

## 2025-01-12 PROCEDURE — 80053 COMPREHEN METABOLIC PANEL: CPT | Performed by: INTERNAL MEDICINE

## 2025-01-12 PROCEDURE — 36415 COLL VENOUS BLD VENIPUNCTURE: CPT | Performed by: INTERNAL MEDICINE

## 2025-01-12 PROCEDURE — 84100 ASSAY OF PHOSPHORUS: CPT | Performed by: INTERNAL MEDICINE

## 2025-01-12 PROCEDURE — 10002807 HB RX 258: Performed by: INTERNAL MEDICINE

## 2025-01-12 PROCEDURE — 10004651 HB RX, NO CHARGE ITEM: Performed by: INTERNAL MEDICINE

## 2025-01-12 PROCEDURE — 10002803 HB RX 637

## 2025-01-12 PROCEDURE — 85025 COMPLETE CBC W/AUTO DIFF WBC: CPT | Performed by: INTERNAL MEDICINE

## 2025-01-12 PROCEDURE — 10002800 HB RX 250 W HCPCS: Performed by: INTERNAL MEDICINE

## 2025-01-12 PROCEDURE — 83735 ASSAY OF MAGNESIUM: CPT | Performed by: INTERNAL MEDICINE

## 2025-01-12 PROCEDURE — 10002803 HB RX 637: Performed by: INTERNAL MEDICINE

## 2025-01-12 RX ORDER — GUAIFENESIN 200 MG/10ML
200 LIQUID ORAL EVERY 4 HOURS PRN
Status: DISCONTINUED | OUTPATIENT
Start: 2025-01-12 | End: 2025-01-14 | Stop reason: HOSPADM

## 2025-01-12 RX ADMIN — INSULIN LISPRO 1 UNITS: 100 INJECTION, SOLUTION INTRAVENOUS; SUBCUTANEOUS at 22:45

## 2025-01-12 RX ADMIN — ASPIRIN 81 MG CHEWABLE TABLET 81 MG: 81 TABLET CHEWABLE at 09:59

## 2025-01-12 RX ADMIN — MEROPENEM 1 G: 1 INJECTION INTRAVENOUS at 17:29

## 2025-01-12 RX ADMIN — CARBIDOPA AND LEVODOPA 0.5 TABLET: 25; 100 TABLET ORAL at 14:36

## 2025-01-12 RX ADMIN — VENLAFAXINE HYDROCHLORIDE 37.5 MG: 37.5 TABLET ORAL at 09:58

## 2025-01-12 RX ADMIN — AMLODIPINE BESYLATE 5 MG: 5 TABLET ORAL at 09:59

## 2025-01-12 RX ADMIN — INSULIN LISPRO 1 UNITS: 100 INJECTION, SOLUTION INTRAVENOUS; SUBCUTANEOUS at 12:34

## 2025-01-12 RX ADMIN — VENLAFAXINE HYDROCHLORIDE 37.5 MG: 37.5 TABLET ORAL at 17:27

## 2025-01-12 RX ADMIN — CARBIDOPA AND LEVODOPA 0.5 TABLET: 25; 100 TABLET ORAL at 22:46

## 2025-01-12 RX ADMIN — VANCOMYCIN HYDROCHLORIDE 750 MG: 750 INJECTION, POWDER, LYOPHILIZED, FOR SOLUTION INTRAVENOUS at 12:33

## 2025-01-12 RX ADMIN — DONEPEZIL HYDROCHLORIDE 10 MG: 10 TABLET ORAL at 22:45

## 2025-01-12 RX ADMIN — CARBIDOPA AND LEVODOPA 0.5 TABLET: 25; 100 TABLET ORAL at 09:58

## 2025-01-12 RX ADMIN — REMDESIVIR 100 MG: 100 INJECTION, POWDER, LYOPHILIZED, FOR SOLUTION INTRAVENOUS at 09:59

## 2025-01-12 RX ADMIN — GUAIFENESIN 200 MG: 100 LIQUID ORAL at 17:27

## 2025-01-12 RX ADMIN — MEROPENEM 1 G: 1 INJECTION INTRAVENOUS at 06:55

## 2025-01-12 ASSESSMENT — PAIN SCALES - PAIN ASSESSMENT IN ADVANCED DEMENTIA (PAINAD)
TOTALSCORE: 2
FACIALEXPRESSION: SMILING OR INEXPRESSIVE
TOTALSCORE: 2
BODYLANGUAGE: RELAXED
BREATHING: NORMAL
FACIALEXPRESSION: SMILING OR INEXPRESSIVE
BREATHING: NORMAL
NEGVOCALIZATION: OCCASIONAL MOAN OR GROAN, LOW LEVELS OF SPEECH WITH A NEGATIVE OR DISAPPROVING QUALITY
BODYLANGUAGE: RELAXED
BREATHING: NORMAL
CONSOLABILITY: DISTRACTED OR REASSURED BY VOICE OR TOUCH
FACIALEXPRESSION: SMILING OR INEXPRESSIVE
CONSOLABILITY: DISTRACTED OR REASSURED BY VOICE OR TOUCH
NEGVOCALIZATION: OCCASIONAL MOAN OR GROAN, LOW LEVELS OF SPEECH WITH A NEGATIVE OR DISAPPROVING QUALITY
BODYLANGUAGE: RELAXED
CONSOLABILITY: NO NEED TO CONSOLE
TOTALSCORE: 0

## 2025-01-13 LAB
ANION GAP SERPL CALC-SCNC: 6 MMOL/L (ref 7–19)
BUN SERPL-MCNC: 32 MG/DL (ref 6–20)
BUN/CREAT SERPL: 48 (ref 7–25)
CALCIUM SERPL-MCNC: 9.5 MG/DL (ref 8.4–10.2)
CHLORIDE SERPL-SCNC: 114 MMOL/L (ref 97–110)
CO2 SERPL-SCNC: 29 MMOL/L (ref 21–32)
CREAT SERPL-MCNC: 0.67 MG/DL (ref 0.67–1.17)
EGFRCR SERPLBLD CKD-EPI 2021: 90 ML/MIN/{1.73_M2}
FASTING DURATION TIME PATIENT: ABNORMAL H
GLUCOSE BLDC GLUCOMTR-MCNC: 112 MG/DL (ref 70–99)
GLUCOSE BLDC GLUCOMTR-MCNC: 124 MG/DL (ref 70–99)
GLUCOSE BLDC GLUCOMTR-MCNC: 144 MG/DL (ref 70–99)
GLUCOSE BLDC GLUCOMTR-MCNC: 162 MG/DL (ref 70–99)
GLUCOSE BLDC GLUCOMTR-MCNC: 97 MG/DL (ref 70–99)
GLUCOSE SERPL-MCNC: 156 MG/DL (ref 70–99)
POTASSIUM SERPL-SCNC: 4.4 MMOL/L (ref 3.4–5.1)
SODIUM SERPL-SCNC: 145 MMOL/L (ref 135–145)

## 2025-01-13 PROCEDURE — 10002807 HB RX 258: Performed by: INTERNAL MEDICINE

## 2025-01-13 PROCEDURE — 10002800 HB RX 250 W HCPCS: Performed by: INTERNAL MEDICINE

## 2025-01-13 PROCEDURE — 80048 BASIC METABOLIC PNL TOTAL CA: CPT

## 2025-01-13 PROCEDURE — 10004651 HB RX, NO CHARGE ITEM: Performed by: INTERNAL MEDICINE

## 2025-01-13 PROCEDURE — 36415 COLL VENOUS BLD VENIPUNCTURE: CPT

## 2025-01-13 PROCEDURE — 10002803 HB RX 637: Performed by: INTERNAL MEDICINE

## 2025-01-13 PROCEDURE — 96372 THER/PROPH/DIAG INJ SC/IM: CPT | Performed by: INTERNAL MEDICINE

## 2025-01-13 PROCEDURE — 10006031 HB ROOM CHARGE TELEMETRY

## 2025-01-13 PROCEDURE — 10002803 HB RX 637

## 2025-01-13 RX ADMIN — INSULIN LISPRO 1 UNITS: 100 INJECTION, SOLUTION INTRAVENOUS; SUBCUTANEOUS at 12:51

## 2025-01-13 RX ADMIN — MEROPENEM 1 G: 1 INJECTION INTRAVENOUS at 18:07

## 2025-01-13 RX ADMIN — MEROPENEM 1 G: 1 INJECTION INTRAVENOUS at 06:24

## 2025-01-13 RX ADMIN — CARBIDOPA AND LEVODOPA 0.5 TABLET: 25; 100 TABLET ORAL at 21:29

## 2025-01-13 RX ADMIN — ARIPIPRAZOLE 2 MG: 2 TABLET ORAL at 21:29

## 2025-01-13 RX ADMIN — CARBIDOPA AND LEVODOPA 0.5 TABLET: 25; 100 TABLET ORAL at 08:46

## 2025-01-13 RX ADMIN — DONEPEZIL HYDROCHLORIDE 10 MG: 10 TABLET ORAL at 21:29

## 2025-01-13 RX ADMIN — ASPIRIN 81 MG CHEWABLE TABLET 81 MG: 81 TABLET CHEWABLE at 08:46

## 2025-01-13 RX ADMIN — VENLAFAXINE HYDROCHLORIDE 37.5 MG: 37.5 TABLET ORAL at 08:46

## 2025-01-13 RX ADMIN — VENLAFAXINE HYDROCHLORIDE 37.5 MG: 37.5 TABLET ORAL at 18:08

## 2025-01-13 RX ADMIN — VANCOMYCIN HYDROCHLORIDE 750 MG: 750 INJECTION, POWDER, LYOPHILIZED, FOR SOLUTION INTRAVENOUS at 12:50

## 2025-01-13 RX ADMIN — AMLODIPINE BESYLATE 5 MG: 5 TABLET ORAL at 08:46

## 2025-01-13 RX ADMIN — CARBIDOPA AND LEVODOPA 0.5 TABLET: 25; 100 TABLET ORAL at 14:44

## 2025-01-13 ASSESSMENT — PAIN SCALES - PAIN ASSESSMENT IN ADVANCED DEMENTIA (PAINAD)
FACIALEXPRESSION: SMILING OR INEXPRESSIVE
TOTALSCORE: 0
BREATHING: NORMAL
BODYLANGUAGE: RELAXED
CONSOLABILITY: NO NEED TO CONSOLE
BREATHING: NORMAL
CONSOLABILITY: NO NEED TO CONSOLE
BODYLANGUAGE: RELAXED
FACIALEXPRESSION: SMILING OR INEXPRESSIVE
TOTALSCORE: 0

## 2025-01-14 VITALS
TEMPERATURE: 97.7 F | DIASTOLIC BLOOD PRESSURE: 68 MMHG | SYSTOLIC BLOOD PRESSURE: 127 MMHG | WEIGHT: 136.02 LBS | OXYGEN SATURATION: 94 % | RESPIRATION RATE: 18 BRPM | BODY MASS INDEX: 21.35 KG/M2 | HEIGHT: 67 IN | HEART RATE: 79 BPM

## 2025-01-14 LAB
ANION GAP SERPL CALC-SCNC: 7 MMOL/L (ref 7–19)
BUN SERPL-MCNC: 33 MG/DL (ref 6–20)
BUN/CREAT SERPL: 50 (ref 7–25)
CALCIUM SERPL-MCNC: 9.3 MG/DL (ref 8.4–10.2)
CHLORIDE SERPL-SCNC: 114 MMOL/L (ref 97–110)
CO2 SERPL-SCNC: 30 MMOL/L (ref 21–32)
CREAT SERPL-MCNC: 0.66 MG/DL (ref 0.67–1.17)
EGFRCR SERPLBLD CKD-EPI 2021: 90 ML/MIN/{1.73_M2}
FASTING DURATION TIME PATIENT: ABNORMAL H
GLUCOSE BLDC GLUCOMTR-MCNC: 115 MG/DL (ref 70–99)
GLUCOSE BLDC GLUCOMTR-MCNC: 137 MG/DL (ref 70–99)
GLUCOSE SERPL-MCNC: 104 MG/DL (ref 70–99)
POTASSIUM SERPL-SCNC: 4.3 MMOL/L (ref 3.4–5.1)
RAINBOW EXTRA TUBES HOLD SPECIMEN: NORMAL
SODIUM SERPL-SCNC: 147 MMOL/L (ref 135–145)

## 2025-01-14 PROCEDURE — 36415 COLL VENOUS BLD VENIPUNCTURE: CPT | Performed by: INTERNAL MEDICINE

## 2025-01-14 PROCEDURE — 10002807 HB RX 258: Performed by: INTERNAL MEDICINE

## 2025-01-14 PROCEDURE — 10004651 HB RX, NO CHARGE ITEM: Performed by: INTERNAL MEDICINE

## 2025-01-14 PROCEDURE — 10002803 HB RX 637: Performed by: INTERNAL MEDICINE

## 2025-01-14 PROCEDURE — 10002800 HB RX 250 W HCPCS: Performed by: INTERNAL MEDICINE

## 2025-01-14 PROCEDURE — 10004281 HB COUNTER-STAFF TIME PER 15 MIN

## 2025-01-14 PROCEDURE — 80048 BASIC METABOLIC PNL TOTAL CA: CPT

## 2025-01-14 PROCEDURE — 10002803 HB RX 637

## 2025-01-14 PROCEDURE — 13003291 HB INS MIDLINE W/GUIDE INCL CATH

## 2025-01-14 RX ORDER — 0.9 % SODIUM CHLORIDE 0.9 %
10 VIAL (ML) INJECTION EVERY 12 HOURS SCHEDULED
Status: DISCONTINUED | OUTPATIENT
Start: 2025-01-14 | End: 2025-01-14 | Stop reason: HOSPADM

## 2025-01-14 RX ORDER — 0.9 % SODIUM CHLORIDE 0.9 %
10 VIAL (ML) INJECTION PRN
Status: DISCONTINUED | OUTPATIENT
Start: 2025-01-14 | End: 2025-01-14 | Stop reason: HOSPADM

## 2025-01-14 RX ADMIN — VENLAFAXINE HYDROCHLORIDE 37.5 MG: 37.5 TABLET ORAL at 08:47

## 2025-01-14 RX ADMIN — MEROPENEM 1 G: 1 INJECTION, POWDER, FOR SOLUTION INTRAVENOUS at 14:56

## 2025-01-14 RX ADMIN — VANCOMYCIN HYDROCHLORIDE 750 MG: 750 INJECTION, POWDER, LYOPHILIZED, FOR SOLUTION INTRAVENOUS at 12:33

## 2025-01-14 RX ADMIN — CARBIDOPA AND LEVODOPA 0.5 TABLET: 25; 100 TABLET ORAL at 08:47

## 2025-01-14 RX ADMIN — AMLODIPINE BESYLATE 5 MG: 5 TABLET ORAL at 08:47

## 2025-01-14 RX ADMIN — CARBIDOPA AND LEVODOPA 0.5 TABLET: 25; 100 TABLET ORAL at 14:56

## 2025-01-14 RX ADMIN — MEROPENEM 1 G: 1 INJECTION INTRAVENOUS at 06:31

## 2025-01-14 RX ADMIN — ASPIRIN 81 MG CHEWABLE TABLET 81 MG: 81 TABLET CHEWABLE at 08:47

## 2025-01-14 RX ADMIN — VENLAFAXINE HYDROCHLORIDE 37.5 MG: 37.5 TABLET ORAL at 17:54

## 2025-01-14 ASSESSMENT — PAIN SCALES - PAIN ASSESSMENT IN ADVANCED DEMENTIA (PAINAD)
TOTALSCORE: 0
BREATHING: NORMAL
CONSOLABILITY: NO NEED TO CONSOLE
FACIALEXPRESSION: SMILING OR INEXPRESSIVE
BODYLANGUAGE: RELAXED

## 2025-01-15 LAB
BACTERIA SPT AEROBE CULT: ABNORMAL
BACTERIA SPT AEROBE CULT: ABNORMAL
GRAM STN SPEC: ABNORMAL

## 2025-02-02 ENCOUNTER — APPOINTMENT (OUTPATIENT)
Dept: GENERAL RADIOLOGY | Age: 89
DRG: 393 | End: 2025-02-02
Attending: STUDENT IN AN ORGANIZED HEALTH CARE EDUCATION/TRAINING PROGRAM

## 2025-02-02 ENCOUNTER — HOSPITAL ENCOUNTER (INPATIENT)
Age: 89
LOS: 7 days | Discharge: NURSING, REHAB OR LONG-TERM CARE | DRG: 393 | End: 2025-02-11
Attending: STUDENT IN AN ORGANIZED HEALTH CARE EDUCATION/TRAINING PROGRAM | Admitting: INTERNAL MEDICINE

## 2025-02-02 DIAGNOSIS — T85.528A GASTROJEJUNOSTOMY TUBE DISLODGEMENT: Primary | ICD-10-CM

## 2025-02-02 LAB
ANION GAP SERPL CALC-SCNC: 10 MMOL/L (ref 7–19)
BASOPHILS # BLD: 0 K/MCL (ref 0–0.3)
BASOPHILS NFR BLD: 0 %
BUN SERPL-MCNC: 38 MG/DL (ref 6–20)
BUN/CREAT SERPL: 38 (ref 7–25)
CALCIUM SERPL-MCNC: 10.3 MG/DL (ref 8.4–10.2)
CHLORIDE SERPL-SCNC: 110 MMOL/L (ref 97–110)
CO2 SERPL-SCNC: 26 MMOL/L (ref 21–32)
CREAT SERPL-MCNC: 1.01 MG/DL (ref 0.67–1.17)
DEPRECATED RDW RBC: 69 FL (ref 39–50)
EGFRCR SERPLBLD CKD-EPI 2021: 72 ML/MIN/{1.73_M2}
EOSINOPHIL # BLD: 0 K/MCL (ref 0–0.5)
EOSINOPHIL NFR BLD: 0 %
ERYTHROCYTE [DISTWIDTH] IN BLOOD: 18 % (ref 11–15)
FASTING DURATION TIME PATIENT: ABNORMAL H
GLUCOSE BLDC GLUCOMTR-MCNC: 112 MG/DL (ref 70–99)
GLUCOSE BLDC GLUCOMTR-MCNC: 125 MG/DL (ref 70–99)
GLUCOSE BLDC GLUCOMTR-MCNC: 131 MG/DL (ref 70–99)
GLUCOSE BLDC GLUCOMTR-MCNC: 138 MG/DL (ref 70–99)
GLUCOSE SERPL-MCNC: 134 MG/DL (ref 70–99)
HCT VFR BLD CALC: 25.7 % (ref 39–51)
HGB BLD-MCNC: 7.9 G/DL (ref 13–17)
IMM GRANULOCYTES # BLD AUTO: 0.2 K/MCL (ref 0–0.2)
IMM GRANULOCYTES # BLD: 2 %
LYMPHOCYTES # BLD: 1.7 K/MCL (ref 1–4)
LYMPHOCYTES NFR BLD: 18 %
MCH RBC QN AUTO: 32.5 PG (ref 26–34)
MCHC RBC AUTO-ENTMCNC: 30.7 G/DL (ref 32–36.5)
MCV RBC AUTO: 105.8 FL (ref 78–100)
MONOCYTES # BLD: 1.1 K/MCL (ref 0.3–0.9)
MONOCYTES NFR BLD: 11 %
NEUTROPHILS # BLD: 6.7 K/MCL (ref 1.8–7.7)
NEUTROPHILS NFR BLD: 69 %
NRBC BLD MANUAL-RTO: 0 /100 WBC
PLATELET # BLD AUTO: 393 K/MCL (ref 140–450)
POTASSIUM SERPL-SCNC: 4 MMOL/L (ref 3.4–5.1)
RBC # BLD: 2.43 MIL/MCL (ref 4.5–5.9)
SODIUM SERPL-SCNC: 142 MMOL/L (ref 135–145)
WBC # BLD: 9.7 K/MCL (ref 4.2–11)

## 2025-02-02 PROCEDURE — 92610 EVALUATE SWALLOWING FUNCTION: CPT

## 2025-02-02 PROCEDURE — 80048 BASIC METABOLIC PNL TOTAL CA: CPT | Performed by: STUDENT IN AN ORGANIZED HEALTH CARE EDUCATION/TRAINING PROGRAM

## 2025-02-02 PROCEDURE — 43762 RPLC GTUBE NO REVJ TRC: CPT

## 2025-02-02 PROCEDURE — 82962 GLUCOSE BLOOD TEST: CPT

## 2025-02-02 PROCEDURE — 71045 X-RAY EXAM CHEST 1 VIEW: CPT

## 2025-02-02 PROCEDURE — 3E0G76Z INTRODUCTION OF NUTRITIONAL SUBSTANCE INTO UPPER GI, VIA NATURAL OR ARTIFICIAL OPENING: ICD-10-PCS | Performed by: INTERNAL MEDICINE

## 2025-02-02 PROCEDURE — 99284 EMERGENCY DEPT VISIT MOD MDM: CPT

## 2025-02-02 PROCEDURE — G0378 HOSPITAL OBSERVATION PER HR: HCPCS

## 2025-02-02 PROCEDURE — 10002801 HB RX 250 W/O HCPCS: Performed by: INTERNAL MEDICINE

## 2025-02-02 PROCEDURE — 85025 COMPLETE CBC W/AUTO DIFF WBC: CPT | Performed by: STUDENT IN AN ORGANIZED HEALTH CARE EDUCATION/TRAINING PROGRAM

## 2025-02-02 PROCEDURE — 36415 COLL VENOUS BLD VENIPUNCTURE: CPT | Performed by: STUDENT IN AN ORGANIZED HEALTH CARE EDUCATION/TRAINING PROGRAM

## 2025-02-02 RX ORDER — CARBIDOPA AND LEVODOPA 25; 100 MG/1; MG/1
0.5 TABLET ORAL 3 TIMES DAILY
Status: DISCONTINUED | OUTPATIENT
Start: 2025-02-02 | End: 2025-02-11 | Stop reason: HOSPADM

## 2025-02-02 RX ORDER — DEXTROSE MONOHYDRATE AND SODIUM CHLORIDE 5; .45 G/100ML; G/100ML
INJECTION, SOLUTION INTRAVENOUS CONTINUOUS
Status: DISCONTINUED | OUTPATIENT
Start: 2025-02-02 | End: 2025-02-06

## 2025-02-02 RX ORDER — DONEPEZIL HYDROCHLORIDE 10 MG/1
10 TABLET, FILM COATED ORAL NIGHTLY
Status: DISCONTINUED | OUTPATIENT
Start: 2025-02-02 | End: 2025-02-11 | Stop reason: HOSPADM

## 2025-02-02 RX ORDER — AMLODIPINE BESYLATE 5 MG/1
5 TABLET ORAL DAILY
Status: DISCONTINUED | OUTPATIENT
Start: 2025-02-02 | End: 2025-02-11 | Stop reason: HOSPADM

## 2025-02-02 RX ORDER — MIRTAZAPINE 15 MG/1
15 TABLET, FILM COATED ORAL NIGHTLY
Status: DISCONTINUED | OUTPATIENT
Start: 2025-02-02 | End: 2025-02-11 | Stop reason: HOSPADM

## 2025-02-02 RX ORDER — ARIPIPRAZOLE 2 MG/1
2 TABLET ORAL EVERY OTHER DAY
Status: DISCONTINUED | OUTPATIENT
Start: 2025-02-02 | End: 2025-02-11 | Stop reason: HOSPADM

## 2025-02-02 RX ORDER — VENLAFAXINE 37.5 MG/1
37.5 TABLET ORAL 2 TIMES DAILY
Status: DISCONTINUED | OUTPATIENT
Start: 2025-02-02 | End: 2025-02-11 | Stop reason: HOSPADM

## 2025-02-02 RX ADMIN — DEXTROSE AND SODIUM CHLORIDE: 5; 450 INJECTION, SOLUTION INTRAVENOUS at 20:59

## 2025-02-02 ASSESSMENT — ENCOUNTER SYMPTOMS
NEUROLOGICAL NEGATIVE: 1
RESPIRATORY NEGATIVE: 1
EYES NEGATIVE: 1
PSYCHIATRIC NEGATIVE: 1
GASTROINTESTINAL NEGATIVE: 1
CONSTITUTIONAL NEGATIVE: 1

## 2025-02-02 ASSESSMENT — PAIN SCALES - PAIN ASSESSMENT IN ADVANCED DEMENTIA (PAINAD)
FACIALEXPRESSION: SMILING OR INEXPRESSIVE
CONSOLABILITY: NO NEED TO CONSOLE
BODYLANGUAGE: RELAXED
CONSOLABILITY: NO NEED TO CONSOLE
BREATHING: NORMAL
NEGVOCALIZATION: OCCASIONAL MOAN OR GROAN, LOW LEVELS OF SPEECH WITH A NEGATIVE OR DISAPPROVING QUALITY
TOTALSCORE: 1
FACIALEXPRESSION: SMILING OR INEXPRESSIVE
BODYLANGUAGE: TENSE, DISTRESSED, FIDGETING
BREATHING: NORMAL
TOTALSCORE: 1

## 2025-02-02 ASSESSMENT — COGNITIVE AND FUNCTIONAL STATUS - GENERAL
FOLLOWS FAMILIAR CONVERSATION: UNABLE
REMEMBERING TO TAKE MEDICATION: UNABLE
APPLIED_COGNITIVE_CONVERTED_SCORE: 7.69
APPLIED_COGNITIVE_RAW_SCORE: 6
REMEMBERING WHERE THINGS ARE: UNABLE
UNDERSTANDING 10 TO 15 MIN SPEECH: UNABLE
TAKING CARE OF COMPLICATED TASKS: UNABLE
REMEMBERING 5 ERRANDS WITH NO LIST: UNABLE

## 2025-02-03 ENCOUNTER — APPOINTMENT (OUTPATIENT)
Dept: GENERAL RADIOLOGY | Age: 89
DRG: 393 | End: 2025-02-03
Attending: INTERNAL MEDICINE

## 2025-02-03 LAB
GLUCOSE BLDC GLUCOMTR-MCNC: 136 MG/DL (ref 70–99)
GLUCOSE BLDC GLUCOMTR-MCNC: 137 MG/DL (ref 70–99)

## 2025-02-03 PROCEDURE — 82962 GLUCOSE BLOOD TEST: CPT

## 2025-02-03 PROCEDURE — 10002801 HB RX 250 W/O HCPCS: Performed by: INTERNAL MEDICINE

## 2025-02-03 PROCEDURE — 74230 X-RAY XM SWLNG FUNCJ C+: CPT

## 2025-02-03 PROCEDURE — G0378 HOSPITAL OBSERVATION PER HR: HCPCS

## 2025-02-03 PROCEDURE — 92611 MOTION FLUOROSCOPY/SWALLOW: CPT

## 2025-02-03 PROCEDURE — 10002800 HB RX 250 W HCPCS: Performed by: INTERNAL MEDICINE

## 2025-02-03 RX ORDER — ACETAMINOPHEN 325 MG/1
650 TABLET ORAL EVERY 6 HOURS PRN
Status: DISCONTINUED | OUTPATIENT
Start: 2025-02-03 | End: 2025-02-11 | Stop reason: HOSPADM

## 2025-02-03 RX ADMIN — MORPHINE SULFATE 1 MG: 2 INJECTION, SOLUTION INTRAMUSCULAR; INTRAVENOUS at 00:53

## 2025-02-03 RX ADMIN — DEXTROSE AND SODIUM CHLORIDE: 5; 450 INJECTION, SOLUTION INTRAVENOUS at 08:22

## 2025-02-03 RX ADMIN — DEXTROSE AND SODIUM CHLORIDE: 5; 450 INJECTION, SOLUTION INTRAVENOUS at 19:36

## 2025-02-03 SDOH — ECONOMIC STABILITY: HOUSING INSECURITY: WHAT IS YOUR LIVING SITUATION TODAY?: LONG TERM CARE FACILITY

## 2025-02-03 SDOH — ECONOMIC STABILITY: GENERAL: WOULD YOU LIKE HELP WITH ANY OF THE FOLLOWING NEEDS?: I DON'T WANT HELP WITH ANY OF THESE

## 2025-02-03 SDOH — ECONOMIC STABILITY: HOUSING INSECURITY: DO YOU HAVE PROBLEMS WITH ANY OF THE FOLLOWING?: NONE OF THE ABOVE

## 2025-02-03 SDOH — ECONOMIC STABILITY: INCOME INSECURITY: IN THE PAST 12 MONTHS, HAS THE ELECTRIC, GAS, OIL, OR WATER COMPANY THREATENED TO SHUT OFF SERVICE IN YOUR HOME?: NO

## 2025-02-03 SDOH — SOCIAL STABILITY: SOCIAL INSECURITY: HOW OFTEN DOES ANYONE, INCLUDING FAMILY AND FRIENDS, THREATEN YOU WITH HARM?: NEVER

## 2025-02-03 SDOH — HEALTH STABILITY: PHYSICAL HEALTH: DO YOU HAVE DIFFICULTY DRESSING OR BATHING?: NO

## 2025-02-03 SDOH — ECONOMIC STABILITY: FOOD INSECURITY: WITHIN THE PAST 12 MONTHS, THE FOOD YOU BOUGHT JUST DIDN'T LAST AND YOU DIDN'T HAVE MONEY TO GET MORE.: NEVER TRUE

## 2025-02-03 SDOH — SOCIAL STABILITY: SOCIAL INSECURITY: HOW OFTEN DOES ANYONE, INCLUDING FAMILY AND FRIENDS, INSULT OR TALK DOWN TO YOU?: NEVER

## 2025-02-03 SDOH — SOCIAL STABILITY: SOCIAL INSECURITY: HOW OFTEN DOES ANYONE, INCLUDING FAMILY AND FRIENDS, PHYSICALLY HURT YOU?: NEVER

## 2025-02-03 SDOH — SOCIAL STABILITY: SOCIAL INSECURITY: HOW OFTEN DOES ANYONE, INCLUDING FAMILY AND FRIENDS, INSULT OR TALK DOWN TO YOU?: PATIENT UNABLE TO ANSWER

## 2025-02-03 SDOH — ECONOMIC STABILITY: HOUSING INSECURITY: WHAT IS YOUR LIVING SITUATION TODAY?: I HAVE A STEADY PLACE TO LIVE

## 2025-02-03 SDOH — SOCIAL STABILITY: SOCIAL INSECURITY: HOW OFTEN DOES ANYONE, INCLUDING FAMILY AND FRIENDS, SCREAM OR CURSE AT YOU?: PATIENT UNABLE TO ANSWER

## 2025-02-03 SDOH — SOCIAL STABILITY: SOCIAL INSECURITY: HOW OFTEN DOES ANYONE, INCLUDING FAMILY AND FRIENDS, SCREAM OR CURSE AT YOU?: NEVER

## 2025-02-03 SDOH — HEALTH STABILITY: GENERAL: BECAUSE OF A PHYSICAL, MENTAL, OR EMOTIONAL CONDITION, DO YOU HAVE DIFFICULTY DOING ERRANDS ALONE?: YES

## 2025-02-03 SDOH — HEALTH STABILITY: PHYSICAL HEALTH: DO YOU HAVE SERIOUS DIFFICULTY WALKING OR CLIMBING STAIRS?: NO

## 2025-02-03 SDOH — SOCIAL STABILITY: SOCIAL INSECURITY: HOW OFTEN DOES ANYONE, INCLUDING FAMILY AND FRIENDS, PHYSICALLY HURT YOU?: PATIENT UNABLE TO ANSWER

## 2025-02-03 SDOH — SOCIAL STABILITY: SOCIAL INSECURITY: HOW OFTEN DOES ANYONE, INCLUDING FAMILY AND FRIENDS, THREATEN YOU WITH HARM?: PATIENT UNABLE TO ANSWER

## 2025-02-03 SDOH — ECONOMIC STABILITY: GENERAL

## 2025-02-03 ASSESSMENT — PAIN SCALES - PAIN ASSESSMENT IN ADVANCED DEMENTIA (PAINAD)
TOTALSCORE: 0
BREATHING: OCCASIONAL LABORED BREATHING, SHORT PERIOD OF HYPERVENTILATION
BREATHING: NORMAL
BREATHING: NORMAL
CONSOLABILITY: NO NEED TO CONSOLE
BODYLANGUAGE: RELAXED
CONSOLABILITY: DISTRACTED OR REASSURED BY VOICE OR TOUCH
TOTALSCORE: 7
FACIALEXPRESSION: FACIAL GRIMACING
FACIALEXPRESSION: SMILING OR INEXPRESSIVE
TOTALSCORE: 0
CONSOLABILITY: NO NEED TO CONSOLE
BODYLANGUAGE: RELAXED
FACIALEXPRESSION: SMILING OR INEXPRESSIVE
BODYLANGUAGE: TENSE, DISTRESSED, FIDGETING
NEGVOCALIZATION: REPEATED TROUBLED CALLING OUT. LOUD MOANING OR GROANING. CRYING

## 2025-02-03 ASSESSMENT — ACTIVITIES OF DAILY LIVING (ADL)
BATHING: NEEDS ASSISTANCE
ADL_SHORT_OF_BREATH: NO
ADL_SCORE: 7
DRESSING: NEEDS ASSISTANCE
FEEDING: INDEPENDENT
ADL_BEFORE_ADMISSION: NEEDS/REQUIRES ASSISTANCE
RECENT_DECLINE_ADL: YES, DECLINE IN BATHING/DRESSING/FEEDING, COLLABORATE WITH PROVIDER (T);YES, DECLINE IN AMBULATION/TRANSFERRING, COLLABORATE WITH PROVIDER (T)
TOILETING: NEEDS ASSISTANCE

## 2025-02-03 ASSESSMENT — PATIENT HEALTH QUESTIONNAIRE - PHQ9
CLINICAL INTERPRETATION OF PHQ2 SCORE: NO FURTHER SCREENING NEEDED
1. LITTLE INTEREST OR PLEASURE IN DOING THINGS: NOT AT ALL
2. FEELING DOWN, DEPRESSED OR HOPELESS: NOT AT ALL
SUM OF ALL RESPONSES TO PHQ9 QUESTIONS 1 AND 2: 0
SUM OF ALL RESPONSES TO PHQ9 QUESTIONS 1 AND 2: 0
IS PATIENT ABLE TO COMPLETE PHQ2 OR PHQ9: YES

## 2025-02-03 ASSESSMENT — COLUMBIA-SUICIDE SEVERITY RATING SCALE - C-SSRS
1. WITHIN THE PAST MONTH, HAVE YOU WISHED YOU WERE DEAD OR WISHED YOU COULD GO TO SLEEP AND NOT WAKE UP?: NO
6. HAVE YOU EVER DONE ANYTHING, STARTED TO DO ANYTHING, OR PREPARED TO DO ANYTHING TO END YOUR LIFE?: NO
IS THE PATIENT ABLE TO COMPLETE C-SSRS: YES
2. HAVE YOU ACTUALLY HAD ANY THOUGHTS OF KILLING YOURSELF?: NO

## 2025-02-04 LAB
GLUCOSE BLDC GLUCOMTR-MCNC: 127 MG/DL (ref 70–99)
GLUCOSE BLDC GLUCOMTR-MCNC: 129 MG/DL (ref 70–99)
GLUCOSE BLDC GLUCOMTR-MCNC: 134 MG/DL (ref 70–99)
GLUCOSE BLDC GLUCOMTR-MCNC: 135 MG/DL (ref 70–99)

## 2025-02-04 PROCEDURE — 10002801 HB RX 250 W/O HCPCS: Performed by: INTERNAL MEDICINE

## 2025-02-04 PROCEDURE — 10000002 HB ROOM CHARGE MED SURG

## 2025-02-04 RX ADMIN — DEXTROSE AND SODIUM CHLORIDE: 5; 450 INJECTION, SOLUTION INTRAVENOUS at 19:09

## 2025-02-04 ASSESSMENT — PAIN SCALES - PAIN ASSESSMENT IN ADVANCED DEMENTIA (PAINAD)
TOTALSCORE: 0
BODYLANGUAGE: RELAXED
FACIALEXPRESSION: SMILING OR INEXPRESSIVE
BREATHING: NORMAL
CONSOLABILITY: NO NEED TO CONSOLE
CONSOLABILITY: NO NEED TO CONSOLE
TOTALSCORE: 0
FACIALEXPRESSION: SMILING OR INEXPRESSIVE
BREATHING: NORMAL
BODYLANGUAGE: RELAXED

## 2025-02-05 ENCOUNTER — APPOINTMENT (OUTPATIENT)
Dept: INTERVENTIONAL RADIOLOGY/VASCULAR | Age: 89
DRG: 393 | End: 2025-02-05
Attending: INTERNAL MEDICINE

## 2025-02-05 LAB
GLUCOSE BLDC GLUCOMTR-MCNC: 125 MG/DL (ref 70–99)
GLUCOSE BLDC GLUCOMTR-MCNC: 147 MG/DL (ref 70–99)
GLUCOSE BLDC GLUCOMTR-MCNC: 177 MG/DL (ref 70–99)

## 2025-02-05 PROCEDURE — 10002803 HB RX 637: Performed by: INTERNAL MEDICINE

## 2025-02-05 PROCEDURE — 0DH63UZ INSERTION OF FEEDING DEVICE INTO STOMACH, PERCUTANEOUS APPROACH: ICD-10-PCS | Performed by: RADIOLOGY

## 2025-02-05 PROCEDURE — 99152 MOD SED SAME PHYS/QHP 5/>YRS: CPT

## 2025-02-05 PROCEDURE — C1894 INTRO/SHEATH, NON-LASER: HCPCS

## 2025-02-05 PROCEDURE — 10002801 HB RX 250 W/O HCPCS: Performed by: RADIOLOGY

## 2025-02-05 PROCEDURE — 10002801 HB RX 250 W/O HCPCS: Performed by: INTERNAL MEDICINE

## 2025-02-05 PROCEDURE — 10006023 HB SUPPLY 272

## 2025-02-05 PROCEDURE — 10000002 HB ROOM CHARGE MED SURG

## 2025-02-05 PROCEDURE — 10002805 HB CONTRAST AGENT: Performed by: INTERNAL MEDICINE

## 2025-02-05 PROCEDURE — 10002800 HB RX 250 W HCPCS: Performed by: RADIOLOGY

## 2025-02-05 PROCEDURE — C1769 GUIDE WIRE: HCPCS

## 2025-02-05 PROCEDURE — 10006027 HB SUPPLY 278

## 2025-02-05 PROCEDURE — 49440 PLACE GASTROSTOMY TUBE PERC: CPT

## 2025-02-05 RX ORDER — LIDOCAINE HYDROCHLORIDE 10 MG/ML
INJECTION, SOLUTION INFILTRATION; PERINEURAL PRN
Status: COMPLETED | OUTPATIENT
Start: 2025-02-05 | End: 2025-02-05

## 2025-02-05 RX ORDER — MIDAZOLAM HYDROCHLORIDE 1 MG/ML
INJECTION, SOLUTION INTRAMUSCULAR; INTRAVENOUS PRN
Status: COMPLETED | OUTPATIENT
Start: 2025-02-05 | End: 2025-02-05

## 2025-02-05 RX ADMIN — IOHEXOL 20 ML: 350 INJECTION, SOLUTION INTRAVENOUS at 17:13

## 2025-02-05 RX ADMIN — MIDAZOLAM HYDROCHLORIDE 0.5 MG: 1 INJECTION, SOLUTION INTRAMUSCULAR; INTRAVENOUS at 16:58

## 2025-02-05 RX ADMIN — DONEPEZIL HYDROCHLORIDE 10 MG: 10 TABLET ORAL at 21:24

## 2025-02-05 RX ADMIN — DEXTROSE AND SODIUM CHLORIDE: 5; 450 INJECTION, SOLUTION INTRAVENOUS at 18:52

## 2025-02-05 RX ADMIN — FENTANYL CITRATE 50 MCG: 50 INJECTION INTRAMUSCULAR; INTRAVENOUS at 16:59

## 2025-02-05 RX ADMIN — CARBIDOPA AND LEVODOPA 0.5 TABLET: 25; 100 TABLET ORAL at 21:24

## 2025-02-05 RX ADMIN — VENLAFAXINE HYDROCHLORIDE 37.5 MG: 37.5 TABLET ORAL at 21:24

## 2025-02-05 RX ADMIN — LIDOCAINE HYDROCHLORIDE 7 ML: 10 INJECTION, SOLUTION INFILTRATION; PERINEURAL at 17:11

## 2025-02-05 RX ADMIN — MIRTAZAPINE 15 MG: 15 TABLET, FILM COATED ORAL at 21:24

## 2025-02-05 ASSESSMENT — PAIN SCALES - PAIN ASSESSMENT IN ADVANCED DEMENTIA (PAINAD)
CONSOLABILITY: NO NEED TO CONSOLE
FACIALEXPRESSION: SMILING OR INEXPRESSIVE
CONSOLABILITY: NO NEED TO CONSOLE
BREATHING: NORMAL
FACIALEXPRESSION: SMILING OR INEXPRESSIVE
BODYLANGUAGE: RELAXED
BODYLANGUAGE: RELAXED
TOTALSCORE: 0
BREATHING: NORMAL
TOTALSCORE: 0

## 2025-02-05 ASSESSMENT — PAIN SCALES - WONG BAKER: WONGBAKER_NUMERICALRESPONSE: 0

## 2025-02-05 ASSESSMENT — PAIN SCALES - GENERAL: PAINLEVEL_OUTOF10: 0

## 2025-02-06 LAB
ANION GAP SERPL CALC-SCNC: 8 MMOL/L (ref 7–19)
APPEARANCE UR: ABNORMAL
BACTERIA #/AREA URNS HPF: ABNORMAL /HPF
BILIRUB UR QL STRIP: NEGATIVE
BUN SERPL-MCNC: 56 MG/DL (ref 6–20)
BUN/CREAT SERPL: 35 (ref 7–25)
CALCIUM SERPL-MCNC: 9.7 MG/DL (ref 8.4–10.2)
CHLORIDE SERPL-SCNC: 120 MMOL/L (ref 97–110)
CO2 SERPL-SCNC: 24 MMOL/L (ref 21–32)
COLOR UR: COLORLESS
CREAT SERPL-MCNC: 1.6 MG/DL (ref 0.67–1.17)
EGFRCR SERPLBLD CKD-EPI 2021: 41 ML/MIN/{1.73_M2}
FASTING DURATION TIME PATIENT: ABNORMAL H
GLUCOSE SERPL-MCNC: 165 MG/DL (ref 70–99)
GLUCOSE UR STRIP-MCNC: NEGATIVE MG/DL
HGB UR QL STRIP: ABNORMAL
HYALINE CASTS #/AREA URNS LPF: ABNORMAL /LPF
KETONES UR STRIP-MCNC: NEGATIVE MG/DL
LEUKOCYTE ESTERASE UR QL STRIP: ABNORMAL
MAGNESIUM SERPL-MCNC: 2.9 MG/DL (ref 1.7–2.4)
NITRITE UR QL STRIP: NEGATIVE
PH UR STRIP: 6 [PH] (ref 5–7)
PHOSPHATE SERPL-MCNC: 2.9 MG/DL (ref 2.4–4.7)
POTASSIUM SERPL-SCNC: 3 MMOL/L (ref 3.4–5.1)
PROT UR STRIP-MCNC: 100 MG/DL
RAINBOW EXTRA TUBES HOLD SPECIMEN: NORMAL
RBC #/AREA URNS HPF: ABNORMAL /HPF
SODIUM SERPL-SCNC: 149 MMOL/L (ref 135–145)
SP GR UR STRIP: 1.01 (ref 1–1.03)
SQUAMOUS #/AREA URNS HPF: ABNORMAL /HPF
UROBILINOGEN UR STRIP-MCNC: 0.2 MG/DL
WBC #/AREA URNS HPF: >100 /HPF

## 2025-02-06 PROCEDURE — 36415 COLL VENOUS BLD VENIPUNCTURE: CPT

## 2025-02-06 PROCEDURE — 10000002 HB ROOM CHARGE MED SURG

## 2025-02-06 PROCEDURE — 80048 BASIC METABOLIC PNL TOTAL CA: CPT

## 2025-02-06 PROCEDURE — 81001 URINALYSIS AUTO W/SCOPE: CPT | Performed by: INTERNAL MEDICINE

## 2025-02-06 PROCEDURE — 83735 ASSAY OF MAGNESIUM: CPT

## 2025-02-06 PROCEDURE — 84100 ASSAY OF PHOSPHORUS: CPT

## 2025-02-06 PROCEDURE — 10002803 HB RX 637: Performed by: INTERNAL MEDICINE

## 2025-02-06 PROCEDURE — 87086 URINE CULTURE/COLONY COUNT: CPT | Performed by: INTERNAL MEDICINE

## 2025-02-06 RX ADMIN — VENLAFAXINE HYDROCHLORIDE 37.5 MG: 37.5 TABLET ORAL at 20:31

## 2025-02-06 RX ADMIN — MIRTAZAPINE 15 MG: 15 TABLET, FILM COATED ORAL at 20:31

## 2025-02-06 RX ADMIN — AMLODIPINE BESYLATE 5 MG: 5 TABLET ORAL at 08:40

## 2025-02-06 RX ADMIN — CARBIDOPA AND LEVODOPA 0.5 TABLET: 25; 100 TABLET ORAL at 15:54

## 2025-02-06 RX ADMIN — VENLAFAXINE HYDROCHLORIDE 37.5 MG: 37.5 TABLET ORAL at 08:41

## 2025-02-06 RX ADMIN — CARBIDOPA AND LEVODOPA 0.5 TABLET: 25; 100 TABLET ORAL at 20:31

## 2025-02-06 RX ADMIN — DONEPEZIL HYDROCHLORIDE 10 MG: 10 TABLET ORAL at 20:31

## 2025-02-06 RX ADMIN — ARIPIPRAZOLE 2 MG: 2 TABLET ORAL at 20:31

## 2025-02-06 RX ADMIN — CARBIDOPA AND LEVODOPA 0.5 TABLET: 25; 100 TABLET ORAL at 08:41

## 2025-02-06 ASSESSMENT — PAIN SCALES - PAIN ASSESSMENT IN ADVANCED DEMENTIA (PAINAD)
CONSOLABILITY: NO NEED TO CONSOLE
TOTALSCORE: 0
CONSOLABILITY: NO NEED TO CONSOLE
BREATHING: NORMAL
FACIALEXPRESSION: SMILING OR INEXPRESSIVE
TOTALSCORE: 0
BODYLANGUAGE: RELAXED
BODYLANGUAGE: RELAXED
BREATHING: NORMAL
FACIALEXPRESSION: SMILING OR INEXPRESSIVE

## 2025-02-06 ASSESSMENT — PAIN SCALES - GENERAL
PAINLEVEL_OUTOF10: 0
PAINLEVEL_OUTOF10: 0

## 2025-02-06 ASSESSMENT — PAIN SCALES - WONG BAKER
WONGBAKER_NUMERICALRESPONSE: 0
WONGBAKER_NUMERICALRESPONSE: 0

## 2025-02-07 LAB
ANION GAP SERPL CALC-SCNC: 5 MMOL/L (ref 7–19)
ANION GAP SERPL CALC-SCNC: 6 MMOL/L (ref 7–19)
BUN SERPL-MCNC: 56 MG/DL (ref 6–20)
BUN SERPL-MCNC: 64 MG/DL (ref 6–20)
BUN/CREAT SERPL: 42 (ref 7–25)
BUN/CREAT SERPL: 48 (ref 7–25)
CALCIUM SERPL-MCNC: 9.3 MG/DL (ref 8.4–10.2)
CALCIUM SERPL-MCNC: 9.6 MG/DL (ref 8.4–10.2)
CHLORIDE SERPL-SCNC: 121 MMOL/L (ref 97–110)
CHLORIDE SERPL-SCNC: 122 MMOL/L (ref 97–110)
CO2 SERPL-SCNC: 28 MMOL/L (ref 21–32)
CO2 SERPL-SCNC: 28 MMOL/L (ref 21–32)
CREAT SERPL-MCNC: 1.17 MG/DL (ref 0.67–1.17)
CREAT SERPL-MCNC: 1.54 MG/DL (ref 0.67–1.17)
EGFRCR SERPLBLD CKD-EPI 2021: 43 ML/MIN/{1.73_M2}
EGFRCR SERPLBLD CKD-EPI 2021: 60 ML/MIN/{1.73_M2}
FASTING DURATION TIME PATIENT: ABNORMAL H
FASTING DURATION TIME PATIENT: ABNORMAL H
GLUCOSE BLDC GLUCOMTR-MCNC: 149 MG/DL (ref 70–99)
GLUCOSE SERPL-MCNC: 164 MG/DL (ref 70–99)
GLUCOSE SERPL-MCNC: 166 MG/DL (ref 70–99)
POTASSIUM SERPL-SCNC: 3.7 MMOL/L (ref 3.4–5.1)
POTASSIUM SERPL-SCNC: 3.9 MMOL/L (ref 3.4–5.1)
SODIUM SERPL-SCNC: 151 MMOL/L (ref 135–145)
SODIUM SERPL-SCNC: 151 MMOL/L (ref 135–145)

## 2025-02-07 PROCEDURE — 36415 COLL VENOUS BLD VENIPUNCTURE: CPT

## 2025-02-07 PROCEDURE — 10002807 HB RX 258: Performed by: INTERNAL MEDICINE

## 2025-02-07 PROCEDURE — 10002801 HB RX 250 W/O HCPCS: Performed by: INTERNAL MEDICINE

## 2025-02-07 PROCEDURE — 10002803 HB RX 637: Performed by: INTERNAL MEDICINE

## 2025-02-07 PROCEDURE — 80048 BASIC METABOLIC PNL TOTAL CA: CPT | Performed by: INTERNAL MEDICINE

## 2025-02-07 PROCEDURE — 10000002 HB ROOM CHARGE MED SURG

## 2025-02-07 PROCEDURE — 80048 BASIC METABOLIC PNL TOTAL CA: CPT

## 2025-02-07 PROCEDURE — 10002800 HB RX 250 W HCPCS: Performed by: INTERNAL MEDICINE

## 2025-02-07 RX ORDER — SODIUM CHLORIDE 450 MG/100ML
INJECTION, SOLUTION INTRAVENOUS CONTINUOUS
Status: DISCONTINUED | OUTPATIENT
Start: 2025-02-07 | End: 2025-02-09

## 2025-02-07 RX ADMIN — VENLAFAXINE HYDROCHLORIDE 37.5 MG: 37.5 TABLET ORAL at 22:03

## 2025-02-07 RX ADMIN — CARBIDOPA AND LEVODOPA 0.5 TABLET: 25; 100 TABLET ORAL at 10:21

## 2025-02-07 RX ADMIN — AMLODIPINE BESYLATE 5 MG: 5 TABLET ORAL at 10:21

## 2025-02-07 RX ADMIN — SODIUM CHLORIDE, POTASSIUM CHLORIDE, SODIUM LACTATE AND CALCIUM CHLORIDE 1000 ML: 600; 310; 30; 20 INJECTION, SOLUTION INTRAVENOUS at 14:59

## 2025-02-07 RX ADMIN — CARBIDOPA AND LEVODOPA 0.5 TABLET: 25; 100 TABLET ORAL at 14:03

## 2025-02-07 RX ADMIN — SODIUM CHLORIDE: 4.5 INJECTION, SOLUTION INTRAVENOUS at 23:20

## 2025-02-07 RX ADMIN — CARBIDOPA AND LEVODOPA 0.5 TABLET: 25; 100 TABLET ORAL at 22:03

## 2025-02-07 RX ADMIN — DONEPEZIL HYDROCHLORIDE 10 MG: 10 TABLET ORAL at 22:03

## 2025-02-07 RX ADMIN — MIRTAZAPINE 15 MG: 15 TABLET, FILM COATED ORAL at 22:03

## 2025-02-07 RX ADMIN — WATER 2000 MG: 1 INJECTION INTRAMUSCULAR; INTRAVENOUS; SUBCUTANEOUS at 10:21

## 2025-02-07 RX ADMIN — VENLAFAXINE HYDROCHLORIDE 37.5 MG: 37.5 TABLET ORAL at 10:21

## 2025-02-07 RX ADMIN — SODIUM CHLORIDE: 4.5 INJECTION, SOLUTION INTRAVENOUS at 12:19

## 2025-02-07 ASSESSMENT — PAIN SCALES - GENERAL: PAINLEVEL_OUTOF10: 0

## 2025-02-08 ENCOUNTER — APPOINTMENT (OUTPATIENT)
Dept: ULTRASOUND IMAGING | Age: 89
DRG: 393 | End: 2025-02-08
Attending: INTERNAL MEDICINE

## 2025-02-08 LAB
ALBUMIN SERPL-MCNC: 1.5 G/DL (ref 3.4–5)
ANION GAP SERPL CALC-SCNC: 6 MMOL/L (ref 7–19)
ANION GAP SERPL CALC-SCNC: 8 MMOL/L (ref 7–19)
BUN SERPL-MCNC: 40 MG/DL (ref 6–20)
BUN SERPL-MCNC: 46 MG/DL (ref 6–20)
BUN/CREAT SERPL: 45 (ref 7–25)
BUN/CREAT SERPL: 48 (ref 7–25)
CALCIUM SERPL-MCNC: 8.5 MG/DL (ref 8.4–10.2)
CALCIUM SERPL-MCNC: 8.9 MG/DL (ref 8.4–10.2)
CHLORIDE SERPL-SCNC: 114 MMOL/L (ref 97–110)
CHLORIDE SERPL-SCNC: 117 MMOL/L (ref 97–110)
CO2 SERPL-SCNC: 28 MMOL/L (ref 21–32)
CO2 SERPL-SCNC: 28 MMOL/L (ref 21–32)
CREAT SERPL-MCNC: 0.89 MG/DL (ref 0.67–1.17)
CREAT SERPL-MCNC: 0.96 MG/DL (ref 0.67–1.17)
EGFRCR SERPLBLD CKD-EPI 2021: 76 ML/MIN/{1.73_M2}
EGFRCR SERPLBLD CKD-EPI 2021: 82 ML/MIN/{1.73_M2}
FASTING DURATION TIME PATIENT: ABNORMAL H
FASTING DURATION TIME PATIENT: ABNORMAL H
GLUCOSE BLDC GLUCOMTR-MCNC: 154 MG/DL (ref 70–99)
GLUCOSE BLDC GLUCOMTR-MCNC: 297 MG/DL (ref 70–99)
GLUCOSE BLDC GLUCOMTR-MCNC: 97 MG/DL (ref 70–99)
GLUCOSE SERPL-MCNC: 154 MG/DL (ref 70–99)
GLUCOSE SERPL-MCNC: 336 MG/DL (ref 70–99)
PHOSPHATE SERPL-MCNC: 2.2 MG/DL (ref 2.4–4.7)
POTASSIUM SERPL-SCNC: 3.4 MMOL/L (ref 3.4–5.1)
POTASSIUM SERPL-SCNC: 4.1 MMOL/L (ref 3.4–5.1)
RAINBOW EXTRA TUBES HOLD SPECIMEN: NORMAL
SODIUM SERPL-SCNC: 145 MMOL/L (ref 135–145)
SODIUM SERPL-SCNC: 149 MMOL/L (ref 135–145)

## 2025-02-08 PROCEDURE — 76770 US EXAM ABDO BACK WALL COMP: CPT

## 2025-02-08 PROCEDURE — 10000002 HB ROOM CHARGE MED SURG

## 2025-02-08 PROCEDURE — 10002803 HB RX 637: Performed by: INTERNAL MEDICINE

## 2025-02-08 PROCEDURE — 10002807 HB RX 258: Performed by: INTERNAL MEDICINE

## 2025-02-08 PROCEDURE — 10002801 HB RX 250 W/O HCPCS: Performed by: INTERNAL MEDICINE

## 2025-02-08 PROCEDURE — 36415 COLL VENOUS BLD VENIPUNCTURE: CPT | Performed by: INTERNAL MEDICINE

## 2025-02-08 PROCEDURE — 80048 BASIC METABOLIC PNL TOTAL CA: CPT | Performed by: INTERNAL MEDICINE

## 2025-02-08 PROCEDURE — 80069 RENAL FUNCTION PANEL: CPT | Performed by: INTERNAL MEDICINE

## 2025-02-08 PROCEDURE — 83970 ASSAY OF PARATHORMONE: CPT | Performed by: INTERNAL MEDICINE

## 2025-02-08 RX ADMIN — POTASSIUM PHOSPHATE, MONOBASIC AND POTASSIUM PHOSPHATE, DIBASIC 45 MMOL: 224; 236 INJECTION, SOLUTION, CONCENTRATE INTRAVENOUS at 12:43

## 2025-02-08 RX ADMIN — MIRTAZAPINE 15 MG: 15 TABLET, FILM COATED ORAL at 21:50

## 2025-02-08 RX ADMIN — CARBIDOPA AND LEVODOPA 0.5 TABLET: 25; 100 TABLET ORAL at 21:50

## 2025-02-08 RX ADMIN — ARIPIPRAZOLE 2 MG: 2 TABLET ORAL at 21:50

## 2025-02-08 RX ADMIN — SODIUM CHLORIDE: 4.5 INJECTION, SOLUTION INTRAVENOUS at 20:49

## 2025-02-08 RX ADMIN — VENLAFAXINE HYDROCHLORIDE 37.5 MG: 37.5 TABLET ORAL at 21:50

## 2025-02-08 RX ADMIN — DONEPEZIL HYDROCHLORIDE 10 MG: 10 TABLET ORAL at 21:49

## 2025-02-08 RX ADMIN — DEXTROSE MONOHYDRATE 1000 ML: 50 INJECTION, SOLUTION INTRAVENOUS at 05:22

## 2025-02-08 RX ADMIN — SODIUM CHLORIDE: 4.5 INJECTION, SOLUTION INTRAVENOUS at 09:14

## 2025-02-08 RX ADMIN — VENLAFAXINE HYDROCHLORIDE 37.5 MG: 37.5 TABLET ORAL at 11:27

## 2025-02-08 RX ADMIN — CARBIDOPA AND LEVODOPA 0.5 TABLET: 25; 100 TABLET ORAL at 16:20

## 2025-02-08 RX ADMIN — CARBIDOPA AND LEVODOPA 0.5 TABLET: 25; 100 TABLET ORAL at 11:27

## 2025-02-08 ASSESSMENT — PAIN SCALES - PAIN ASSESSMENT IN ADVANCED DEMENTIA (PAINAD)
BODYLANGUAGE: RELAXED
BREATHING: NORMAL
CONSOLABILITY: NO NEED TO CONSOLE
FACIALEXPRESSION: SMILING OR INEXPRESSIVE
TOTALSCORE: 0
TOTALSCORE: 0
BODYLANGUAGE: RELAXED
CONSOLABILITY: NO NEED TO CONSOLE
FACIALEXPRESSION: SMILING OR INEXPRESSIVE
BREATHING: NORMAL

## 2025-02-08 ASSESSMENT — PAIN SCALES - GENERAL: PAINLEVEL_OUTOF10: 0

## 2025-02-09 LAB
ALBUMIN SERPL-MCNC: 1.5 G/DL (ref 3.4–5)
ANION GAP SERPL CALC-SCNC: 7 MMOL/L (ref 7–19)
BACTERIA UR CULT: ABNORMAL
BACTERIA UR CULT: ABNORMAL
BUN SERPL-MCNC: 32 MG/DL (ref 6–20)
BUN/CREAT SERPL: 34 (ref 7–25)
CALCIUM SERPL-MCNC: 8.7 MG/DL (ref 8.4–10.2)
CHLORIDE SERPL-SCNC: 115 MMOL/L (ref 97–110)
CO2 SERPL-SCNC: 27 MMOL/L (ref 21–32)
CREAT SERPL-MCNC: 0.94 MG/DL (ref 0.67–1.17)
EGFRCR SERPLBLD CKD-EPI 2021: 78 ML/MIN/{1.73_M2}
FASTING DURATION TIME PATIENT: ABNORMAL H
GLUCOSE BLDC GLUCOMTR-MCNC: 115 MG/DL (ref 70–99)
GLUCOSE BLDC GLUCOMTR-MCNC: 163 MG/DL (ref 70–99)
GLUCOSE BLDC GLUCOMTR-MCNC: 96 MG/DL (ref 70–99)
GLUCOSE SERPL-MCNC: 113 MG/DL (ref 70–99)
PHOSPHATE SERPL-MCNC: 3.6 MG/DL (ref 2.4–4.7)
POTASSIUM SERPL-SCNC: 3.9 MMOL/L (ref 3.4–5.1)
PTH-INTACT SERPL-MCNC: 147 PG/ML (ref 19–88)
RAINBOW EXTRA TUBES HOLD SPECIMEN: NORMAL
SODIUM SERPL-SCNC: 145 MMOL/L (ref 135–145)

## 2025-02-09 PROCEDURE — 80069 RENAL FUNCTION PANEL: CPT | Performed by: INTERNAL MEDICINE

## 2025-02-09 PROCEDURE — 36415 COLL VENOUS BLD VENIPUNCTURE: CPT | Performed by: INTERNAL MEDICINE

## 2025-02-09 PROCEDURE — 10002801 HB RX 250 W/O HCPCS: Performed by: INTERNAL MEDICINE

## 2025-02-09 PROCEDURE — 10002803 HB RX 637: Performed by: INTERNAL MEDICINE

## 2025-02-09 PROCEDURE — 10000002 HB ROOM CHARGE MED SURG

## 2025-02-09 RX ADMIN — CARBIDOPA AND LEVODOPA 0.5 TABLET: 25; 100 TABLET ORAL at 20:38

## 2025-02-09 RX ADMIN — CARBIDOPA AND LEVODOPA 0.5 TABLET: 25; 100 TABLET ORAL at 14:39

## 2025-02-09 RX ADMIN — DONEPEZIL HYDROCHLORIDE 10 MG: 10 TABLET ORAL at 20:39

## 2025-02-09 RX ADMIN — CARBIDOPA AND LEVODOPA 0.5 TABLET: 25; 100 TABLET ORAL at 08:18

## 2025-02-09 RX ADMIN — VENLAFAXINE HYDROCHLORIDE 37.5 MG: 37.5 TABLET ORAL at 08:18

## 2025-02-09 RX ADMIN — SODIUM CHLORIDE: 4.5 INJECTION, SOLUTION INTRAVENOUS at 03:16

## 2025-02-09 RX ADMIN — VENLAFAXINE HYDROCHLORIDE 37.5 MG: 37.5 TABLET ORAL at 20:39

## 2025-02-09 RX ADMIN — MIRTAZAPINE 15 MG: 15 TABLET, FILM COATED ORAL at 20:39

## 2025-02-09 ASSESSMENT — PAIN SCALES - PAIN ASSESSMENT IN ADVANCED DEMENTIA (PAINAD)
BODYLANGUAGE: RELAXED
CONSOLABILITY: NO NEED TO CONSOLE
BREATHING: NORMAL
TOTALSCORE: 0
BODYLANGUAGE: RELAXED
CONSOLABILITY: NO NEED TO CONSOLE
FACIALEXPRESSION: SMILING OR INEXPRESSIVE
FACIALEXPRESSION: SMILING OR INEXPRESSIVE
BREATHING: NORMAL
TOTALSCORE: 0

## 2025-02-10 ENCOUNTER — APPOINTMENT (OUTPATIENT)
Dept: INTERVENTIONAL RADIOLOGY/VASCULAR | Age: 89
DRG: 393 | End: 2025-02-10
Attending: INTERNAL MEDICINE

## 2025-02-10 LAB
ALBUMIN SERPL-MCNC: 1.5 G/DL (ref 3.4–5)
ANION GAP SERPL CALC-SCNC: 7 MMOL/L (ref 7–19)
BUN SERPL-MCNC: 26 MG/DL (ref 6–20)
BUN/CREAT SERPL: 31 (ref 7–25)
CALCIUM SERPL-MCNC: 9 MG/DL (ref 8.4–10.2)
CHLORIDE SERPL-SCNC: 116 MMOL/L (ref 97–110)
CO2 SERPL-SCNC: 28 MMOL/L (ref 21–32)
CREAT SERPL-MCNC: 0.84 MG/DL (ref 0.67–1.17)
EGFRCR SERPLBLD CKD-EPI 2021: 84 ML/MIN/{1.73_M2}
FASTING DURATION TIME PATIENT: ABNORMAL H
GLUCOSE BLDC GLUCOMTR-MCNC: 102 MG/DL (ref 70–99)
GLUCOSE BLDC GLUCOMTR-MCNC: 131 MG/DL (ref 70–99)
GLUCOSE SERPL-MCNC: 108 MG/DL (ref 70–99)
PHOSPHATE SERPL-MCNC: 3.2 MG/DL (ref 2.4–4.7)
POTASSIUM SERPL-SCNC: 4.3 MMOL/L (ref 3.4–5.1)
RAINBOW EXTRA TUBES HOLD SPECIMEN: NORMAL
SODIUM SERPL-SCNC: 147 MMOL/L (ref 135–145)

## 2025-02-10 PROCEDURE — 10002803 HB RX 637: Performed by: INTERNAL MEDICINE

## 2025-02-10 PROCEDURE — C1769 GUIDE WIRE: HCPCS

## 2025-02-10 PROCEDURE — 36415 COLL VENOUS BLD VENIPUNCTURE: CPT | Performed by: INTERNAL MEDICINE

## 2025-02-10 PROCEDURE — 10006023 HB SUPPLY 272

## 2025-02-10 PROCEDURE — 10002801 HB RX 250 W/O HCPCS

## 2025-02-10 PROCEDURE — 80069 RENAL FUNCTION PANEL: CPT | Performed by: INTERNAL MEDICINE

## 2025-02-10 PROCEDURE — C1894 INTRO/SHEATH, NON-LASER: HCPCS

## 2025-02-10 PROCEDURE — 49440 PLACE GASTROSTOMY TUBE PERC: CPT

## 2025-02-10 PROCEDURE — 0DH63UZ INSERTION OF FEEDING DEVICE INTO STOMACH, PERCUTANEOUS APPROACH: ICD-10-PCS | Performed by: RADIOLOGY

## 2025-02-10 PROCEDURE — 10002805 HB CONTRAST AGENT: Performed by: INTERNAL MEDICINE

## 2025-02-10 PROCEDURE — 10000002 HB ROOM CHARGE MED SURG

## 2025-02-10 PROCEDURE — 10006027 HB SUPPLY 278

## 2025-02-10 RX ORDER — DEXTROSE MONOHYDRATE AND SODIUM CHLORIDE 5; .45 G/100ML; G/100ML
INJECTION, SOLUTION INTRAVENOUS CONTINUOUS
Status: DISCONTINUED | OUTPATIENT
Start: 2025-02-10 | End: 2025-02-10

## 2025-02-10 RX ADMIN — VENLAFAXINE HYDROCHLORIDE 37.5 MG: 37.5 TABLET ORAL at 21:56

## 2025-02-10 RX ADMIN — ARIPIPRAZOLE 2 MG: 2 TABLET ORAL at 21:56

## 2025-02-10 RX ADMIN — DONEPEZIL HYDROCHLORIDE 10 MG: 10 TABLET ORAL at 21:56

## 2025-02-10 RX ADMIN — DEXTROSE AND SODIUM CHLORIDE: 5; 450 INJECTION, SOLUTION INTRAVENOUS at 12:22

## 2025-02-10 RX ADMIN — IOHEXOL 20 ML: 350 INJECTION, SOLUTION INTRAVENOUS at 16:14

## 2025-02-10 RX ADMIN — CARBIDOPA AND LEVODOPA 0.5 TABLET: 25; 100 TABLET ORAL at 21:56

## 2025-02-10 RX ADMIN — MIRTAZAPINE 15 MG: 15 TABLET, FILM COATED ORAL at 21:56

## 2025-02-10 ASSESSMENT — PAIN SCALES - PAIN ASSESSMENT IN ADVANCED DEMENTIA (PAINAD)
CONSOLABILITY: NO NEED TO CONSOLE
CONSOLABILITY: NO NEED TO CONSOLE
BODYLANGUAGE: RELAXED
BREATHING: NORMAL
BREATHING: NORMAL
CONSOLABILITY: NO NEED TO CONSOLE
FACIALEXPRESSION: SMILING OR INEXPRESSIVE
TOTALSCORE: 0
BREATHING: NORMAL
TOTALSCORE: 0
FACIALEXPRESSION: SMILING OR INEXPRESSIVE
BODYLANGUAGE: RELAXED
TOTALSCORE: 0
BODYLANGUAGE: RELAXED
FACIALEXPRESSION: SMILING OR INEXPRESSIVE

## 2025-02-10 ASSESSMENT — PAIN SCALES - GENERAL: PAINLEVEL_OUTOF10: 0

## 2025-02-11 VITALS
TEMPERATURE: 97.2 F | WEIGHT: 151.9 LBS | RESPIRATION RATE: 18 BRPM | HEART RATE: 71 BPM | OXYGEN SATURATION: 99 % | HEIGHT: 67 IN | SYSTOLIC BLOOD PRESSURE: 111 MMHG | DIASTOLIC BLOOD PRESSURE: 64 MMHG | BODY MASS INDEX: 23.84 KG/M2

## 2025-02-11 LAB
25(OH)D3+25(OH)D2 SERPL-MCNC: 41.3 NG/ML (ref 30–100)
ANION GAP SERPL CALC-SCNC: 8 MMOL/L (ref 7–19)
BUN SERPL-MCNC: 26 MG/DL (ref 6–20)
BUN/CREAT SERPL: 33 (ref 7–25)
CALCIUM SERPL-MCNC: 9 MG/DL (ref 8.4–10.2)
CHLORIDE SERPL-SCNC: 117 MMOL/L (ref 97–110)
CO2 SERPL-SCNC: 29 MMOL/L (ref 21–32)
CREAT SERPL-MCNC: 0.8 MG/DL (ref 0.67–1.17)
EGFRCR SERPLBLD CKD-EPI 2021: 85 ML/MIN/{1.73_M2}
FASTING DURATION TIME PATIENT: ABNORMAL H
GLUCOSE BLDC GLUCOMTR-MCNC: 118 MG/DL (ref 70–99)
GLUCOSE BLDC GLUCOMTR-MCNC: 213 MG/DL (ref 70–99)
GLUCOSE SERPL-MCNC: 134 MG/DL (ref 70–99)
POTASSIUM SERPL-SCNC: 4 MMOL/L (ref 3.4–5.1)
SODIUM SERPL-SCNC: 150 MMOL/L (ref 135–145)

## 2025-02-11 PROCEDURE — 10002803 HB RX 637: Performed by: INTERNAL MEDICINE

## 2025-02-11 PROCEDURE — 80048 BASIC METABOLIC PNL TOTAL CA: CPT

## 2025-02-11 PROCEDURE — 36415 COLL VENOUS BLD VENIPUNCTURE: CPT

## 2025-02-11 PROCEDURE — 82306 VITAMIN D 25 HYDROXY: CPT

## 2025-02-11 RX ADMIN — CARBIDOPA AND LEVODOPA 0.5 TABLET: 25; 100 TABLET ORAL at 08:45

## 2025-02-11 RX ADMIN — AMLODIPINE BESYLATE 5 MG: 5 TABLET ORAL at 08:45

## 2025-02-11 RX ADMIN — CARBIDOPA AND LEVODOPA 0.5 TABLET: 25; 100 TABLET ORAL at 14:02

## 2025-02-11 RX ADMIN — VENLAFAXINE HYDROCHLORIDE 37.5 MG: 37.5 TABLET ORAL at 08:45

## 2025-02-11 ASSESSMENT — PAIN SCALES - PAIN ASSESSMENT IN ADVANCED DEMENTIA (PAINAD)
TOTALSCORE: 0
BODYLANGUAGE: RELAXED
CONSOLABILITY: NO NEED TO CONSOLE
FACIALEXPRESSION: SMILING OR INEXPRESSIVE
BREATHING: NORMAL

## 2025-02-11 ASSESSMENT — PAIN SCALES - GENERAL: PAINLEVEL_OUTOF10: 0

## 2025-02-12 ENCOUNTER — HOSPITAL ENCOUNTER (EMERGENCY)
Age: 89
Discharge: HOME | End: 2025-02-13
Attending: EMERGENCY MEDICINE

## 2025-02-12 DIAGNOSIS — D64.9 ANEMIA, UNSPECIFIED TYPE: Primary | ICD-10-CM

## 2025-02-12 LAB
ANION GAP SERPL CALC-SCNC: 6 MMOL/L (ref 7–19)
BASOPHILS # BLD: 0 K/MCL (ref 0–0.3)
BASOPHILS NFR BLD: 0 %
BLOOD EXPIRATION DATE: NORMAL
BUN SERPL-MCNC: 27 MG/DL (ref 6–20)
BUN/CREAT SERPL: 36 (ref 7–25)
CALCIUM SERPL-MCNC: 9.1 MG/DL (ref 8.4–10.2)
CHLORIDE SERPL-SCNC: 112 MMOL/L (ref 97–110)
CO2 SERPL-SCNC: 31 MMOL/L (ref 21–32)
CREAT SERPL-MCNC: 0.76 MG/DL (ref 0.67–1.17)
CROSSMATCH RESULT: NORMAL
DEPRECATED RDW RBC: 69.8 FL (ref 39–50)
DISPENSE STATUS: NORMAL
EGFRCR SERPLBLD CKD-EPI 2021: 86 ML/MIN/{1.73_M2}
EOSINOPHIL # BLD: 0.2 K/MCL (ref 0–0.5)
EOSINOPHIL NFR BLD: 4 %
ERYTHROCYTE [DISTWIDTH] IN BLOOD: 17.6 % (ref 11–15)
FASTING DURATION TIME PATIENT: ABNORMAL H
GLUCOSE SERPL-MCNC: 172 MG/DL (ref 70–99)
HCT VFR BLD CALC: 20.7 % (ref 39–51)
HGB BLD-MCNC: 6.2 G/DL (ref 13–17)
IMM GRANULOCYTES # BLD AUTO: 0.1 K/MCL (ref 0–0.2)
IMM GRANULOCYTES # BLD: 1 %
ISBT BLOOD TYPE: 5100
ISSUE DATE/TIME: NORMAL
LYMPHOCYTES # BLD: 1.3 K/MCL (ref 1–4)
LYMPHOCYTES NFR BLD: 20 %
MCH RBC QN AUTO: 32.6 PG (ref 26–34)
MCHC RBC AUTO-ENTMCNC: 30 G/DL (ref 32–36.5)
MCV RBC AUTO: 108.9 FL (ref 78–100)
MONOCYTES # BLD: 0.5 K/MCL (ref 0.3–0.9)
MONOCYTES NFR BLD: 8 %
NEUTROPHILS # BLD: 4.2 K/MCL (ref 1.8–7.7)
NEUTROPHILS NFR BLD: 67 %
NRBC BLD MANUAL-RTO: 0 /100 WBC
PLATELET # BLD AUTO: 261 K/MCL (ref 140–450)
POTASSIUM SERPL-SCNC: 3.9 MMOL/L (ref 3.4–5.1)
PRODUCT CODE: NORMAL
PRODUCT DESCRIPTION: NORMAL
PRODUCT ID: NORMAL
RBC # BLD: 1.9 MIL/MCL (ref 4.5–5.9)
SODIUM SERPL-SCNC: 145 MMOL/L (ref 135–145)
UNIT BLOOD TYPE: NORMAL
UNIT NUMBER: NORMAL
WBC # BLD: 6.3 K/MCL (ref 4.2–11)

## 2025-02-12 PROCEDURE — 99285 EMERGENCY DEPT VISIT HI MDM: CPT

## 2025-02-12 PROCEDURE — 36415 COLL VENOUS BLD VENIPUNCTURE: CPT

## 2025-02-12 PROCEDURE — 36430 TRANSFUSION BLD/BLD COMPNT: CPT

## 2025-02-12 PROCEDURE — 85025 COMPLETE CBC W/AUTO DIFF WBC: CPT | Performed by: EMERGENCY MEDICINE

## 2025-02-12 PROCEDURE — 80048 BASIC METABOLIC PNL TOTAL CA: CPT | Performed by: EMERGENCY MEDICINE

## 2025-02-12 RX ORDER — SODIUM CHLORIDE 9 MG/ML
INJECTION, SOLUTION INTRAVENOUS CONTINUOUS PRN
Status: DISCONTINUED | OUTPATIENT
Start: 2025-02-12 | End: 2025-02-13 | Stop reason: HOSPADM

## 2025-02-13 VITALS
TEMPERATURE: 97.9 F | DIASTOLIC BLOOD PRESSURE: 75 MMHG | RESPIRATION RATE: 18 BRPM | HEART RATE: 76 BPM | BODY MASS INDEX: 21.97 KG/M2 | OXYGEN SATURATION: 99 % | SYSTOLIC BLOOD PRESSURE: 130 MMHG | WEIGHT: 140 LBS | HEIGHT: 67 IN

## 2025-02-13 LAB
ABO + RH BLD: NORMAL
BLD GP AB SCN SERPL QL GEL: NEGATIVE
RAINBOW EXTRA TUBES HOLD SPECIMEN: NORMAL
TYPE AND SCREEN EXPIRATION DATE: NORMAL

## 2025-02-13 PROCEDURE — 86923 COMPATIBILITY TEST ELECTRIC: CPT

## 2025-02-13 PROCEDURE — 36430 TRANSFUSION BLD/BLD COMPNT: CPT

## 2025-02-13 PROCEDURE — 86850 RBC ANTIBODY SCREEN: CPT | Performed by: EMERGENCY MEDICINE

## 2025-03-07 ENCOUNTER — APPOINTMENT (OUTPATIENT)
Dept: GENERAL RADIOLOGY | Age: 89
DRG: 871 | End: 2025-03-07
Attending: EMERGENCY MEDICINE

## 2025-03-07 ENCOUNTER — HOSPITAL ENCOUNTER (INPATIENT)
Age: 89
Discharge: STILL ADMITTED OR ADMINISTRATIVE | DRG: 871 | End: 2025-03-07
Attending: EMERGENCY MEDICINE | Admitting: EMERGENCY MEDICINE

## 2025-03-07 DIAGNOSIS — R65.20 SEVERE SEPSIS (CMD): ICD-10-CM

## 2025-03-07 DIAGNOSIS — R06.03 RESPIRATORY DISTRESS: ICD-10-CM

## 2025-03-07 DIAGNOSIS — E87.0 HYPERNATREMIA: ICD-10-CM

## 2025-03-07 DIAGNOSIS — A41.9 SEVERE SEPSIS (CMD): ICD-10-CM

## 2025-03-07 DIAGNOSIS — N39.0 URINARY TRACT INFECTION WITHOUT HEMATURIA, SITE UNSPECIFIED: ICD-10-CM

## 2025-03-07 DIAGNOSIS — R41.82 ALTERED MENTAL STATUS, UNSPECIFIED ALTERED MENTAL STATUS TYPE: Primary | ICD-10-CM

## 2025-03-07 DIAGNOSIS — R41.89 UNRESPONSIVE: ICD-10-CM

## 2025-03-07 LAB
ALBUMIN SERPL-MCNC: 1.9 G/DL (ref 3.4–5)
ALBUMIN/GLOB SERPL: 0.3 {RATIO} (ref 1–2.4)
ALP SERPL-CCNC: 164 UNITS/L (ref 45–117)
ALT SERPL-CCNC: 51 UNITS/L
ANION GAP SERPL CALC-SCNC: 6 MMOL/L (ref 7–19)
ANION GAP SERPL CALC-SCNC: 7 MMOL/L (ref 7–19)
ANION GAP SERPL CALC-SCNC: 9 MMOL/L (ref 7–19)
APPEARANCE UR: ABNORMAL
APTT PPP: 23 SEC (ref 22–32)
AST SERPL-CCNC: 77 UNITS/L
ATRIAL RATE (BPM): 98
BACTERIA #/AREA URNS HPF: ABNORMAL /HPF
BASE EXCESS / DEFICIT, ARTERIAL - RESPIRATORY: 13 MMOL/L (ref -2–3)
BASOPHILS # BLD: 0.2 K/MCL (ref 0–0.3)
BASOPHILS NFR BLD: 1 %
BDY SITE: ABNORMAL
BILIRUB SERPL-MCNC: 0.2 MG/DL (ref 0.2–1)
BILIRUB UR QL STRIP: NEGATIVE
BODY TEMPERATURE: 38.1 DEGREES C
BUN SERPL-MCNC: 110 MG/DL (ref 6–20)
BUN SERPL-MCNC: 124 MG/DL (ref 6–20)
BUN SERPL-MCNC: 82 MG/DL (ref 6–20)
BUN/CREAT SERPL: 82 (ref 7–25)
BUN/CREAT SERPL: 89 (ref 7–25)
BUN/CREAT SERPL: 91 (ref 7–25)
CALCIUM SERPL-MCNC: 8.5 MG/DL (ref 8.4–10.2)
CALCIUM SERPL-MCNC: 9.3 MG/DL (ref 8.4–10.2)
CALCIUM SERPL-MCNC: 9.7 MG/DL (ref 8.4–10.2)
CHLORIDE SERPL-SCNC: 121 MMOL/L (ref 97–110)
CHLORIDE SERPL-SCNC: 123 MMOL/L (ref 97–110)
CHLORIDE SERPL-SCNC: 128 MMOL/L (ref 97–110)
CO2 SERPL-SCNC: 26 MMOL/L (ref 21–32)
CO2 SERPL-SCNC: 31 MMOL/L (ref 21–32)
CO2 SERPL-SCNC: 35 MMOL/L (ref 21–32)
COHGB MFR BLDV: 1.8 %
COLOR UR: YELLOW
CONDITION: ABNORMAL
CREAT SERPL-MCNC: 1 MG/DL (ref 0.67–1.17)
CREAT SERPL-MCNC: 1.21 MG/DL (ref 0.67–1.17)
CREAT SERPL-MCNC: 1.4 MG/DL (ref 0.67–1.17)
DEPRECATED RDW RBC: 76.5 FL (ref 39–50)
EGFRCR SERPLBLD CKD-EPI 2021: 48 ML/MIN/{1.73_M2}
EGFRCR SERPLBLD CKD-EPI 2021: 58 ML/MIN/{1.73_M2}
EGFRCR SERPLBLD CKD-EPI 2021: 72 ML/MIN/{1.73_M2}
ERYTHROCYTE [DISTWIDTH] IN BLOOD: 17.8 % (ref 11–15)
FASTING DURATION TIME PATIENT: ABNORMAL H
FIO2 ON VENT: 60 %
FLUAV RNA RESP QL NAA+PROBE: NOT DETECTED
FLUBV RNA RESP QL NAA+PROBE: NOT DETECTED
GLOBULIN SER-MCNC: 6.9 G/DL (ref 2–4)
GLUCOSE BLDC GLUCOMTR-MCNC: 150 MG/DL (ref 70–99)
GLUCOSE BLDC GLUCOMTR-MCNC: 237 MG/DL (ref 70–99)
GLUCOSE SERPL-MCNC: 157 MG/DL (ref 70–99)
GLUCOSE SERPL-MCNC: 323 MG/DL (ref 70–99)
GLUCOSE SERPL-MCNC: 357 MG/DL (ref 70–99)
GLUCOSE UR STRIP-MCNC: NEGATIVE MG/DL
HBA1C MFR BLD: 6 % (ref 4.5–5.6)
HCO3 BLDA-SCNC: 38 MMOL/L (ref 22–28)
HCT VFR BLD CALC: 26 % (ref 39–51)
HCT VFR BLD CALC: 28.8 % (ref 39–51)
HGB BLD-MCNC: 8.3 G/DL (ref 13–17)
HGB BLD-MCNC: 8.6 G/DL (ref 13–17)
HGB UR QL STRIP: ABNORMAL
HOROWITZ INDEX BLDA+IHG-RTO: 103 MM[HG] (ref 300–500)
HYALINE CASTS #/AREA URNS LPF: ABNORMAL /LPF
INR PPP: 1.2
KETONES UR STRIP-MCNC: NEGATIVE MG/DL
L PNEUMO1 AG UR QL IA.RAPID: NORMAL
LACTATE BLDV-SCNC: 3.3 MMOL/L (ref 0–2)
LACTATE BLDV-SCNC: 5.1 MMOL/L (ref 0–2)
LACTATE BLDV-SCNC: 5.9 MMOL/L (ref 0–2)
LACTATE BLDV-SCNC: 6.4 MMOL/L (ref 0–2)
LEUKOCYTE ESTERASE UR QL STRIP: ABNORMAL
LYMPHOCYTES # BLD: 2.2 K/MCL (ref 1–4)
LYMPHOCYTES NFR BLD: 9 %
MCH RBC QN AUTO: 33.2 PG (ref 26–34)
MCHC RBC AUTO-ENTMCNC: 28.8 G/DL (ref 32–36.5)
MCV RBC AUTO: 115.2 FL (ref 78–100)
METHGB MFR BLDMV: 0.5 %
MONOCYTES # BLD: 0.5 K/MCL (ref 0.3–0.9)
MONOCYTES NFR BLD: 2 %
MRSA DNA SPEC QL NAA+PROBE: DETECTED
MUCOUS THREADS URNS QL MICRO: PRESENT
NEUTROPHILS # BLD: 21.5 K/MCL (ref 1.8–7.7)
NEUTS BAND NFR BLD: 2 % (ref 0–10)
NEUTS SEG NFR BLD: 86 %
NITRITE UR QL STRIP: NEGATIVE
NRBC BLD MANUAL-RTO: 0 /100 WBC
OXYHGB MFR BLDA: 91.5 % (ref 94–98)
P AXIS (DEGREES): 36
PCO2 BLDA: 48 MM HG (ref 35–48)
PH BLDA: 7.5 UNITS (ref 7.35–7.45)
PH UR STRIP: 6 [PH] (ref 5–7)
PLAT MORPH BLD: NORMAL
PLATELET # BLD AUTO: 298 K/MCL (ref 140–450)
PO2 BLDA: 67 MM HG (ref 83–108)
POTASSIUM SERPL-SCNC: 3.8 MMOL/L (ref 3.4–5.1)
POTASSIUM SERPL-SCNC: 3.8 MMOL/L (ref 3.4–5.1)
POTASSIUM SERPL-SCNC: 4 MMOL/L (ref 3.4–5.1)
PR-INTERVAL (MSEC): 158
PROCALCITONIN SERPL IA-MCNC: 0.42 NG/ML
PROT SERPL-MCNC: 8.8 G/DL (ref 6.4–8.2)
PROT UR STRIP-MCNC: 300 MG/DL
PROTHROMBIN TIME: 12.9 SEC (ref 9.7–11.8)
QRS-INTERVAL (MSEC): 84
QT-INTERVAL (MSEC): 448
QTC: 572
R AXIS (DEGREES): -16
RBC # BLD: 2.5 MIL/MCL (ref 4.5–5.9)
RBC #/AREA URNS HPF: ABNORMAL /HPF
RBC MORPH BLD: NORMAL
REPORT TEXT: NORMAL
RSV AG NPH QL IA.RAPID: NOT DETECTED
S PNEUM AG UR QL IA.RAPID: NORMAL
SAO2 % BLDA: 11 % (ref 15–23)
SAO2 % BLDA: 94 % (ref 95–99)
SARS-COV-2 RNA RESP QL NAA+PROBE: NOT DETECTED
SERVICE CMNT-IMP: NORMAL
SERVICE CMNT-IMP: NORMAL
SODIUM SERPL-SCNC: 154 MMOL/L (ref 135–145)
SODIUM SERPL-SCNC: 158 MMOL/L (ref 135–145)
SODIUM SERPL-SCNC: 162 MMOL/L (ref 135–145)
SP GR UR STRIP: 1.01 (ref 1–1.03)
SQUAMOUS #/AREA URNS HPF: ABNORMAL /HPF
T AXIS (DEGREES): 59
UROBILINOGEN UR STRIP-MCNC: 0.2 MG/DL
VENTRICULAR RATE EKG/MIN (BPM): 98
WBC # BLD: 24.4 K/MCL (ref 4.2–11)
WBC #/AREA URNS HPF: >100 /HPF
WBC MORPH BLD: NORMAL

## 2025-03-07 PROCEDURE — 82375 ASSAY CARBOXYHB QUANT: CPT

## 2025-03-07 PROCEDURE — 10002805 HB CONTRAST AGENT: Performed by: EMERGENCY MEDICINE

## 2025-03-07 PROCEDURE — 80048 BASIC METABOLIC PNL TOTAL CA: CPT | Performed by: EMERGENCY MEDICINE

## 2025-03-07 PROCEDURE — 87641 MR-STAPH DNA AMP PROBE: CPT

## 2025-03-07 PROCEDURE — 85610 PROTHROMBIN TIME: CPT | Performed by: EMERGENCY MEDICINE

## 2025-03-07 PROCEDURE — 5A1945Z RESPIRATORY VENTILATION, 24-96 CONSECUTIVE HOURS: ICD-10-PCS | Performed by: EMERGENCY MEDICINE

## 2025-03-07 PROCEDURE — 10002807 HB RX 258: Performed by: EMERGENCY MEDICINE

## 2025-03-07 PROCEDURE — 94002 VENT MGMT INPAT INIT DAY: CPT

## 2025-03-07 PROCEDURE — 96374 THER/PROPH/DIAG INJ IV PUSH: CPT

## 2025-03-07 PROCEDURE — 99291 CRITICAL CARE FIRST HOUR: CPT

## 2025-03-07 PROCEDURE — 80053 COMPREHEN METABOLIC PANEL: CPT | Performed by: EMERGENCY MEDICINE

## 2025-03-07 PROCEDURE — 10002803 HB RX 637: Performed by: EMERGENCY MEDICINE

## 2025-03-07 PROCEDURE — 99291 CRITICAL CARE FIRST HOUR: CPT | Performed by: EMERGENCY MEDICINE

## 2025-03-07 PROCEDURE — 10002801 HB RX 250 W/O HCPCS: Performed by: EMERGENCY MEDICINE

## 2025-03-07 PROCEDURE — 10000008 HB ROOM CHARGE ICU OR CCU

## 2025-03-07 PROCEDURE — 87899 AGENT NOS ASSAY W/OPTIC: CPT

## 2025-03-07 PROCEDURE — 87040 BLOOD CULTURE FOR BACTERIA: CPT | Performed by: EMERGENCY MEDICINE

## 2025-03-07 PROCEDURE — 96361 HYDRATE IV INFUSION ADD-ON: CPT

## 2025-03-07 PROCEDURE — 0BH17EZ INSERTION OF ENDOTRACHEAL AIRWAY INTO TRACHEA, VIA NATURAL OR ARTIFICIAL OPENING: ICD-10-PCS | Performed by: EMERGENCY MEDICINE

## 2025-03-07 PROCEDURE — 85018 HEMOGLOBIN: CPT

## 2025-03-07 PROCEDURE — 10004180 HB COUNTER-TRANSPORT

## 2025-03-07 PROCEDURE — 51702 INSERT TEMP BLADDER CATH: CPT

## 2025-03-07 PROCEDURE — 96372 THER/PROPH/DIAG INJ SC/IM: CPT | Performed by: EMERGENCY MEDICINE

## 2025-03-07 PROCEDURE — 0241U COVID/FLU/RSV PANEL: CPT | Performed by: EMERGENCY MEDICINE

## 2025-03-07 PROCEDURE — 84145 PROCALCITONIN (PCT): CPT | Performed by: EMERGENCY MEDICINE

## 2025-03-07 PROCEDURE — 10002800 HB RX 250 W HCPCS

## 2025-03-07 PROCEDURE — 83605 ASSAY OF LACTIC ACID: CPT | Performed by: EMERGENCY MEDICINE

## 2025-03-07 PROCEDURE — 93010 ELECTROCARDIOGRAM REPORT: CPT | Performed by: INTERNAL MEDICINE

## 2025-03-07 PROCEDURE — 10002800 HB RX 250 W HCPCS: Performed by: EMERGENCY MEDICINE

## 2025-03-07 PROCEDURE — 10004651 HB RX, NO CHARGE ITEM: Performed by: EMERGENCY MEDICINE

## 2025-03-07 PROCEDURE — 87186 SC STD MICRODIL/AGAR DIL: CPT | Performed by: UROLOGY

## 2025-03-07 PROCEDURE — 71045 X-RAY EXAM CHEST 1 VIEW: CPT

## 2025-03-07 PROCEDURE — 93005 ELECTROCARDIOGRAM TRACING: CPT | Performed by: EMERGENCY MEDICINE

## 2025-03-07 PROCEDURE — 10002803 HB RX 637

## 2025-03-07 PROCEDURE — 87205 SMEAR GRAM STAIN: CPT | Performed by: EMERGENCY MEDICINE

## 2025-03-07 PROCEDURE — 85730 THROMBOPLASTIN TIME PARTIAL: CPT | Performed by: EMERGENCY MEDICINE

## 2025-03-07 PROCEDURE — 85027 COMPLETE CBC AUTOMATED: CPT | Performed by: EMERGENCY MEDICINE

## 2025-03-07 PROCEDURE — 31500 INSERT EMERGENCY AIRWAY: CPT | Performed by: EMERGENCY MEDICINE

## 2025-03-07 PROCEDURE — 81001 URINALYSIS AUTO W/SCOPE: CPT | Performed by: EMERGENCY MEDICINE

## 2025-03-07 PROCEDURE — 36415 COLL VENOUS BLD VENIPUNCTURE: CPT | Performed by: EMERGENCY MEDICINE

## 2025-03-07 PROCEDURE — 83036 HEMOGLOBIN GLYCOSYLATED A1C: CPT | Performed by: EMERGENCY MEDICINE

## 2025-03-07 RX ORDER — NICOTINE POLACRILEX 4 MG
30 LOZENGE BUCCAL PRN
Status: DISCONTINUED | OUTPATIENT
Start: 2025-03-07 | End: 2025-03-11

## 2025-03-07 RX ORDER — CARBOXYMETHYLCELLULOSE SODIUM 5 MG/ML
1 SOLUTION/ DROPS OPHTHALMIC
Status: DISCONTINUED | OUTPATIENT
Start: 2025-03-07 | End: 2025-03-10

## 2025-03-07 RX ORDER — BISACODYL 10 MG
10 SUPPOSITORY, RECTAL RECTAL DAILY PRN
Status: DISCONTINUED | OUTPATIENT
Start: 2025-03-07 | End: 2025-03-11

## 2025-03-07 RX ORDER — DONEPEZIL HYDROCHLORIDE 10 MG/1
10 TABLET, FILM COATED ORAL NIGHTLY
Status: DISCONTINUED | OUTPATIENT
Start: 2025-03-07 | End: 2025-03-10

## 2025-03-07 RX ORDER — DEXTROSE MONOHYDRATE 25 G/50ML
25 INJECTION, SOLUTION INTRAVENOUS PRN
Status: DISCONTINUED | OUTPATIENT
Start: 2025-03-07 | End: 2025-03-11

## 2025-03-07 RX ORDER — ONDANSETRON 2 MG/ML
4 INJECTION INTRAMUSCULAR; INTRAVENOUS EVERY 6 HOURS PRN
Status: DISCONTINUED | OUTPATIENT
Start: 2025-03-07 | End: 2025-03-07

## 2025-03-07 RX ORDER — 0.9 % SODIUM CHLORIDE 0.9 %
10 VIAL (ML) INJECTION PRN
Status: DISCONTINUED | OUTPATIENT
Start: 2025-03-07 | End: 2025-03-11 | Stop reason: HOSPADM

## 2025-03-07 RX ORDER — POLYETHYLENE GLYCOL 3350 17 G/17G
17 POWDER, FOR SOLUTION ORAL DAILY PRN
Status: DISCONTINUED | OUTPATIENT
Start: 2025-03-07 | End: 2025-03-11

## 2025-03-07 RX ORDER — ETOMIDATE 2 MG/ML
20 INJECTION INTRAVENOUS ONCE
Status: COMPLETED | OUTPATIENT
Start: 2025-03-07 | End: 2025-03-07

## 2025-03-07 RX ORDER — CARBOXYMETHYLCELLULOSE SODIUM 5 MG/ML
1 SOLUTION/ DROPS OPHTHALMIC PRN
Status: DISCONTINUED | OUTPATIENT
Start: 2025-03-07 | End: 2025-03-11

## 2025-03-07 RX ORDER — HEPARIN SODIUM 5000 [USP'U]/ML
5000 INJECTION, SOLUTION INTRAVENOUS; SUBCUTANEOUS EVERY 8 HOURS SCHEDULED
Status: DISCONTINUED | OUTPATIENT
Start: 2025-03-07 | End: 2025-03-10

## 2025-03-07 RX ORDER — FAMOTIDINE 20 MG/1
20 TABLET, FILM COATED ORAL DAILY
Status: DISCONTINUED | OUTPATIENT
Start: 2025-03-07 | End: 2025-03-10

## 2025-03-07 RX ORDER — ASPIRIN 81 MG/1
81 TABLET, CHEWABLE ORAL DAILY
Status: DISCONTINUED | OUTPATIENT
Start: 2025-03-07 | End: 2025-03-10

## 2025-03-07 RX ORDER — POLYETHYLENE GLYCOL 3350 17 G/17G
17 POWDER, FOR SOLUTION ORAL DAILY PRN
Status: DISCONTINUED | OUTPATIENT
Start: 2025-03-07 | End: 2025-03-10

## 2025-03-07 RX ORDER — BISACODYL 10 MG
10 SUPPOSITORY, RECTAL RECTAL DAILY PRN
Status: DISCONTINUED | OUTPATIENT
Start: 2025-03-07 | End: 2025-03-10

## 2025-03-07 RX ORDER — MIRTAZAPINE 15 MG/1
15 TABLET, FILM COATED ORAL NIGHTLY
Status: DISCONTINUED | OUTPATIENT
Start: 2025-03-07 | End: 2025-03-10

## 2025-03-07 RX ORDER — ARIPIPRAZOLE 2 MG/1
2 TABLET ORAL EVERY OTHER DAY
Status: DISCONTINUED | OUTPATIENT
Start: 2025-03-07 | End: 2025-03-10

## 2025-03-07 RX ORDER — FOLIC ACID 1 MG/1
1 TABLET ORAL DAILY
Status: DISCONTINUED | OUTPATIENT
Start: 2025-03-07 | End: 2025-03-10

## 2025-03-07 RX ORDER — NOREPINEPHRINE BITARTRATE/D5W 8 MG/250ML
0-100 PLASTIC BAG, INJECTION (ML) INTRAVENOUS CONTINUOUS
Status: DISCONTINUED | OUTPATIENT
Start: 2025-03-07 | End: 2025-03-10

## 2025-03-07 RX ORDER — DEXTROSE MONOHYDRATE 50 MG/ML
INJECTION, SOLUTION INTRAVENOUS CONTINUOUS
Status: DISCONTINUED | OUTPATIENT
Start: 2025-03-07 | End: 2025-03-09

## 2025-03-07 RX ORDER — CHLORHEXIDINE GLUCONATE ORAL RINSE 1.2 MG/ML
15 SOLUTION DENTAL PRN
Status: DISCONTINUED | OUTPATIENT
Start: 2025-03-07 | End: 2025-03-10

## 2025-03-07 RX ORDER — AMOXICILLIN 250 MG
2 CAPSULE ORAL AT BEDTIME
Status: DISCONTINUED | OUTPATIENT
Start: 2025-03-07 | End: 2025-03-07

## 2025-03-07 RX ORDER — CARBIDOPA AND LEVODOPA 25; 100 MG/1; MG/1
0.5 TABLET ORAL 3 TIMES DAILY
Status: DISCONTINUED | OUTPATIENT
Start: 2025-03-07 | End: 2025-03-10

## 2025-03-07 RX ORDER — DEXTROSE MONOHYDRATE 25 G/50ML
12.5 INJECTION, SOLUTION INTRAVENOUS PRN
Status: DISCONTINUED | OUTPATIENT
Start: 2025-03-07 | End: 2025-03-11

## 2025-03-07 RX ORDER — ROCURONIUM BROMIDE 10 MG/ML
70 INJECTION, SOLUTION INTRAVENOUS ONCE
Status: COMPLETED | OUTPATIENT
Start: 2025-03-07 | End: 2025-03-07

## 2025-03-07 RX ORDER — NICOTINE POLACRILEX 4 MG
15 LOZENGE BUCCAL PRN
Status: DISCONTINUED | OUTPATIENT
Start: 2025-03-07 | End: 2025-03-11

## 2025-03-07 RX ORDER — 0.9 % SODIUM CHLORIDE 0.9 %
2 VIAL (ML) INJECTION EVERY 12 HOURS SCHEDULED
Status: DISCONTINUED | OUTPATIENT
Start: 2025-03-07 | End: 2025-03-10

## 2025-03-07 RX ORDER — ACETAMINOPHEN 325 MG/1
650 TABLET ORAL EVERY 4 HOURS PRN
Status: DISCONTINUED | OUTPATIENT
Start: 2025-03-07 | End: 2025-03-11 | Stop reason: HOSPADM

## 2025-03-07 RX ORDER — AMOXICILLIN 250 MG
2 CAPSULE ORAL DAILY
Status: DISCONTINUED | OUTPATIENT
Start: 2025-03-07 | End: 2025-03-10

## 2025-03-07 RX ORDER — NOREPINEPHRINE BITARTRATE/D5W 8 MG/250ML
PLASTIC BAG, INJECTION (ML) INTRAVENOUS
Status: DISPENSED
Start: 2025-03-07 | End: 2025-03-07

## 2025-03-07 RX ORDER — VENLAFAXINE 37.5 MG/1
37.5 TABLET ORAL 2 TIMES DAILY
Status: DISCONTINUED | OUTPATIENT
Start: 2025-03-07 | End: 2025-03-10

## 2025-03-07 RX ORDER — ASCORBIC ACID 500 MG
500 TABLET ORAL DAILY
Status: DISCONTINUED | OUTPATIENT
Start: 2025-03-07 | End: 2025-03-10

## 2025-03-07 RX ORDER — CHLORHEXIDINE GLUCONATE ORAL RINSE 1.2 MG/ML
15 SOLUTION DENTAL EVERY 12 HOURS SCHEDULED
Status: DISCONTINUED | OUTPATIENT
Start: 2025-03-07 | End: 2025-03-10

## 2025-03-07 RX ORDER — CARBOXYMETHYLCELLULOSE SODIUM 5 MG/ML
1 SOLUTION/ DROPS OPHTHALMIC PRN
Status: DISCONTINUED | OUTPATIENT
Start: 2025-03-07 | End: 2025-03-10

## 2025-03-07 RX ORDER — ACETAMINOPHEN 650 MG/1
650 SUPPOSITORY RECTAL EVERY 4 HOURS PRN
Status: DISCONTINUED | OUTPATIENT
Start: 2025-03-07 | End: 2025-03-11 | Stop reason: HOSPADM

## 2025-03-07 RX ORDER — POLYETHYLENE GLYCOL 3350 17 G/17G
17 POWDER, FOR SOLUTION ORAL 2 TIMES DAILY
Status: DISCONTINUED | OUTPATIENT
Start: 2025-03-07 | End: 2025-03-10

## 2025-03-07 RX ADMIN — DEXTROSE MONOHYDRATE: 50 INJECTION, SOLUTION INTRAVENOUS at 16:56

## 2025-03-07 RX ADMIN — SODIUM CHLORIDE 1000 ML: 9 INJECTION, SOLUTION INTRAVENOUS at 08:50

## 2025-03-07 RX ADMIN — CARBOXYMETHYLCELLULOSE SODIUM 1 DROP: 0.5 SOLUTION/ DROPS OPHTHALMIC at 20:29

## 2025-03-07 RX ADMIN — POLYETHYLENE GLYCOL (3350) 17 G: 17 POWDER, FOR SOLUTION ORAL at 21:24

## 2025-03-07 RX ADMIN — SENNOSIDES AND DOCUSATE SODIUM 2 TABLET: 50; 8.6 TABLET ORAL at 16:33

## 2025-03-07 RX ADMIN — NOREPINEPHRINE BITARTRATE 3 MCG/MIN: 8 INJECTION, SOLUTION INTRAVENOUS at 17:13

## 2025-03-07 RX ADMIN — CARBIDOPA AND LEVODOPA 0.5 TABLET: 25; 100 TABLET ORAL at 21:22

## 2025-03-07 RX ADMIN — VENLAFAXINE HYDROCHLORIDE 37.5 MG: 37.5 TABLET ORAL at 21:23

## 2025-03-07 RX ADMIN — SODIUM CHLORIDE, POTASSIUM CHLORIDE, SODIUM LACTATE AND CALCIUM CHLORIDE 1000 ML: 600; 310; 30; 20 INJECTION, SOLUTION INTRAVENOUS at 17:15

## 2025-03-07 RX ADMIN — ASPIRIN 81 MG CHEWABLE TABLET 81 MG: 81 TABLET CHEWABLE at 16:33

## 2025-03-07 RX ADMIN — SODIUM CHLORIDE 1000 ML: 9 INJECTION, SOLUTION INTRAVENOUS at 10:09

## 2025-03-07 RX ADMIN — DONEPEZIL HYDROCHLORIDE 10 MG: 10 TABLET ORAL at 21:23

## 2025-03-07 RX ADMIN — OXYCODONE HYDROCHLORIDE AND ACETAMINOPHEN 500 MG: 500 TABLET ORAL at 16:33

## 2025-03-07 RX ADMIN — ARIPIPRAZOLE 2 MG: 2 TABLET ORAL at 21:21

## 2025-03-07 RX ADMIN — MEROPENEM 1 G: 1 INJECTION INTRAVENOUS at 21:35

## 2025-03-07 RX ADMIN — CHLORHEXIDINE GLUCONATE 15 ML: 1.2 LIQUID BUCCAL at 20:31

## 2025-03-07 RX ADMIN — INSULIN LISPRO 2 UNITS: 100 INJECTION, SOLUTION INTRAVENOUS; SUBCUTANEOUS at 16:53

## 2025-03-07 RX ADMIN — ETOMIDATE 20 MG: 2 INJECTION INTRAVENOUS at 09:05

## 2025-03-07 RX ADMIN — FAMOTIDINE 20 MG: 20 TABLET, FILM COATED ORAL at 16:33

## 2025-03-07 RX ADMIN — Medication 25 MCG/HR: at 12:41

## 2025-03-07 RX ADMIN — ROCURONIUM BROMIDE 70 MG: 10 INJECTION, SOLUTION INTRAVENOUS at 09:05

## 2025-03-07 RX ADMIN — FOLIC ACID 1 MG: 1 TABLET ORAL at 16:33

## 2025-03-07 RX ADMIN — CARBOXYMETHYLCELLULOSE SODIUM 1 DROP: 0.5 SOLUTION/ DROPS OPHTHALMIC at 15:00

## 2025-03-07 RX ADMIN — IOHEXOL 80 ML: 350 INJECTION, SOLUTION INTRAVENOUS at 10:47

## 2025-03-07 RX ADMIN — MEROPENEM 1 G: 1 INJECTION, POWDER, FOR SOLUTION INTRAVENOUS at 10:50

## 2025-03-07 RX ADMIN — PROPOFOL INJECTABLE EMULSION 10 MCG/KG/MIN: 10 INJECTION, EMULSION INTRAVENOUS at 09:39

## 2025-03-07 RX ADMIN — POLYETHYLENE GLYCOL (3350) 17 G: 17 POWDER, FOR SOLUTION ORAL at 16:33

## 2025-03-07 RX ADMIN — MIRTAZAPINE 15 MG: 15 TABLET, FILM COATED ORAL at 21:23

## 2025-03-07 RX ADMIN — SODIUM CHLORIDE, POTASSIUM CHLORIDE, SODIUM LACTATE AND CALCIUM CHLORIDE 1000 ML: 600; 310; 30; 20 INJECTION, SOLUTION INTRAVENOUS at 14:54

## 2025-03-07 RX ADMIN — CHLORHEXIDINE GLUCONATE 15 ML: 1.2 LIQUID BUCCAL at 16:33

## 2025-03-07 RX ADMIN — VANCOMYCIN HYDROCHLORIDE 1250 MG: 10 INJECTION, POWDER, LYOPHILIZED, FOR SOLUTION INTRAVENOUS at 12:51

## 2025-03-07 RX ADMIN — HEPARIN SODIUM 5000 UNITS: 5000 INJECTION INTRAVENOUS; SUBCUTANEOUS at 22:00

## 2025-03-07 RX ADMIN — PROPOFOL INJECTABLE EMULSION 20 MCG/KG/MIN: 10 INJECTION, EMULSION INTRAVENOUS at 12:58

## 2025-03-07 ASSESSMENT — PAIN SCALES - BEHAVIORAL PAIN SCALE (BPS)
BPS_SCORE: 3
BPS_SCORE: 3

## 2025-03-07 ASSESSMENT — PAIN SCALES - PAIN ASSESSMENT IN ADVANCED DEMENTIA (PAINAD)
BREATHING: NORMAL
CONSOLABILITY: NO NEED TO CONSOLE
BODYLANGUAGE: RELAXED

## 2025-03-07 ASSESSMENT — PATIENT HEALTH QUESTIONNAIRE - PHQ9: IS PATIENT ABLE TO COMPLETE PHQ2 OR PHQ9: NO, PATIENT WILL NEVER BE ABLE TO COMPLETE

## 2025-03-08 PROBLEM — R06.03 RESPIRATORY DISTRESS: Status: ACTIVE | Noted: 2025-01-01

## 2025-03-08 PROBLEM — R65.20 SEVERE SEPSIS (CMD): Status: ACTIVE | Noted: 2025-03-08

## 2025-03-08 PROBLEM — A41.9 SEVERE SEPSIS (CMD): Status: ACTIVE | Noted: 2025-01-01

## 2025-03-08 PROBLEM — R41.82 ALTERED MENTAL STATUS: Status: ACTIVE | Noted: 2025-01-01

## 2025-03-08 LAB
ANION GAP SERPL CALC-SCNC: 5 MMOL/L (ref 7–19)
ANION GAP SERPL CALC-SCNC: 6 MMOL/L (ref 7–19)
ANION GAP SERPL CALC-SCNC: 6 MMOL/L (ref 7–19)
BACTERIA SPT AEROBE CULT: NORMAL
BUN SERPL-MCNC: 61 MG/DL (ref 6–20)
BUN SERPL-MCNC: 71 MG/DL (ref 6–20)
BUN SERPL-MCNC: 75 MG/DL (ref 6–20)
BUN/CREAT SERPL: 73 (ref 7–25)
BUN/CREAT SERPL: 79 (ref 7–25)
BUN/CREAT SERPL: 83 (ref 7–25)
CALCIUM SERPL-MCNC: 8.7 MG/DL (ref 8.4–10.2)
CALCIUM SERPL-MCNC: 9 MG/DL (ref 8.4–10.2)
CALCIUM SERPL-MCNC: 9.1 MG/DL (ref 8.4–10.2)
CHLORIDE SERPL-SCNC: 117 MMOL/L (ref 97–110)
CHLORIDE SERPL-SCNC: 121 MMOL/L (ref 97–110)
CHLORIDE SERPL-SCNC: 122 MMOL/L (ref 97–110)
CO2 SERPL-SCNC: 29 MMOL/L (ref 21–32)
CO2 SERPL-SCNC: 30 MMOL/L (ref 21–32)
CO2 SERPL-SCNC: 32 MMOL/L (ref 21–32)
CREAT SERPL-MCNC: 0.84 MG/DL (ref 0.67–1.17)
CREAT SERPL-MCNC: 0.86 MG/DL (ref 0.67–1.17)
CREAT SERPL-MCNC: 0.95 MG/DL (ref 0.67–1.17)
DEPRECATED RDW RBC: 74.3 FL (ref 39–50)
EGFRCR SERPLBLD CKD-EPI 2021: 77 ML/MIN/{1.73_M2}
EGFRCR SERPLBLD CKD-EPI 2021: 83 ML/MIN/{1.73_M2}
EGFRCR SERPLBLD CKD-EPI 2021: 84 ML/MIN/{1.73_M2}
ERYTHROCYTE [DISTWIDTH] IN BLOOD: 17.5 % (ref 11–15)
FASTING DURATION TIME PATIENT: ABNORMAL H
GLUCOSE BLDC GLUCOMTR-MCNC: 129 MG/DL (ref 70–99)
GLUCOSE BLDC GLUCOMTR-MCNC: 165 MG/DL (ref 70–99)
GLUCOSE BLDC GLUCOMTR-MCNC: 189 MG/DL (ref 70–99)
GLUCOSE BLDC GLUCOMTR-MCNC: 287 MG/DL (ref 70–99)
GLUCOSE SERPL-MCNC: 146 MG/DL (ref 70–99)
GLUCOSE SERPL-MCNC: 174 MG/DL (ref 70–99)
GLUCOSE SERPL-MCNC: 251 MG/DL (ref 70–99)
GRAM STN SPEC: NORMAL
HCT VFR BLD CALC: 25.3 % (ref 39–51)
HGB BLD-MCNC: 7.3 G/DL (ref 13–17)
HYPOCHROMIA BLD QL SMEAR: ABNORMAL
LACTATE BLDV-SCNC: 2 MMOL/L (ref 0–2)
LYMPHOCYTES # BLD: 1.6 K/MCL (ref 1–4)
LYMPHOCYTES NFR BLD: 7 %
MACROCYTES BLD QL SMEAR: ABNORMAL
MAGNESIUM SERPL-MCNC: 2.7 MG/DL (ref 1.7–2.4)
MCH RBC QN AUTO: 33.3 PG (ref 26–34)
MCHC RBC AUTO-ENTMCNC: 28.9 G/DL (ref 32–36.5)
MCV RBC AUTO: 115.5 FL (ref 78–100)
MONOCYTES # BLD: 0.2 K/MCL (ref 0.3–0.9)
MONOCYTES NFR BLD: 1 %
NEUTROPHILS # BLD: 20.6 K/MCL (ref 1.8–7.7)
NEUTS BAND NFR BLD: 1 % (ref 0–10)
NEUTS SEG NFR BLD: 91 %
NRBC BLD MANUAL-RTO: 0 /100 WBC
PHOSPHATE SERPL-MCNC: 1.9 MG/DL (ref 2.4–4.7)
PLAT MORPH BLD: NORMAL
PLATELET # BLD AUTO: 216 K/MCL (ref 140–450)
POTASSIUM SERPL-SCNC: 3.2 MMOL/L (ref 3.4–5.1)
POTASSIUM SERPL-SCNC: 3.7 MMOL/L (ref 3.4–5.1)
POTASSIUM SERPL-SCNC: 3.8 MMOL/L (ref 3.4–5.1)
PROCALCITONIN SERPL IA-MCNC: 0.44 NG/ML
RBC # BLD: 2.19 MIL/MCL (ref 4.5–5.9)
SODIUM SERPL-SCNC: 150 MMOL/L (ref 135–145)
SODIUM SERPL-SCNC: 153 MMOL/L (ref 135–145)
SODIUM SERPL-SCNC: 154 MMOL/L (ref 135–145)
TRIGL SERPL-MCNC: 210 MG/DL
WBC # BLD: 22.4 K/MCL (ref 4.2–11)
WBC MORPH BLD: NORMAL

## 2025-03-08 PROCEDURE — 10002807 HB RX 258: Performed by: INTERNAL MEDICINE

## 2025-03-08 PROCEDURE — 85027 COMPLETE CBC AUTOMATED: CPT

## 2025-03-08 PROCEDURE — 83605 ASSAY OF LACTIC ACID: CPT | Performed by: INTERNAL MEDICINE

## 2025-03-08 PROCEDURE — 10004651 HB RX, NO CHARGE ITEM: Performed by: EMERGENCY MEDICINE

## 2025-03-08 PROCEDURE — 96372 THER/PROPH/DIAG INJ SC/IM: CPT | Performed by: EMERGENCY MEDICINE

## 2025-03-08 PROCEDURE — 10002800 HB RX 250 W HCPCS: Performed by: EMERGENCY MEDICINE

## 2025-03-08 PROCEDURE — 10002807 HB RX 258: Performed by: EMERGENCY MEDICINE

## 2025-03-08 PROCEDURE — 99291 CRITICAL CARE FIRST HOUR: CPT | Performed by: INTERNAL MEDICINE

## 2025-03-08 PROCEDURE — 80048 BASIC METABOLIC PNL TOTAL CA: CPT | Performed by: EMERGENCY MEDICINE

## 2025-03-08 PROCEDURE — 10004180 HB COUNTER-TRANSPORT

## 2025-03-08 PROCEDURE — 10002800 HB RX 250 W HCPCS

## 2025-03-08 PROCEDURE — 84100 ASSAY OF PHOSPHORUS: CPT

## 2025-03-08 PROCEDURE — 10000008 HB ROOM CHARGE ICU OR CCU

## 2025-03-08 PROCEDURE — 10002801 HB RX 250 W/O HCPCS: Performed by: EMERGENCY MEDICINE

## 2025-03-08 PROCEDURE — 10002803 HB RX 637: Performed by: EMERGENCY MEDICINE

## 2025-03-08 PROCEDURE — 84145 PROCALCITONIN (PCT): CPT | Performed by: INTERNAL MEDICINE

## 2025-03-08 PROCEDURE — 94003 VENT MGMT INPAT SUBQ DAY: CPT

## 2025-03-08 PROCEDURE — 83735 ASSAY OF MAGNESIUM: CPT | Performed by: EMERGENCY MEDICINE

## 2025-03-08 PROCEDURE — 36415 COLL VENOUS BLD VENIPUNCTURE: CPT | Performed by: EMERGENCY MEDICINE

## 2025-03-08 PROCEDURE — 84478 ASSAY OF TRIGLYCERIDES: CPT | Performed by: EMERGENCY MEDICINE

## 2025-03-08 RX ORDER — DEXMEDETOMIDINE HYDROCHLORIDE 4 UG/ML
0-1.5 INJECTION, SOLUTION INTRAVENOUS CONTINUOUS
Status: DISCONTINUED | OUTPATIENT
Start: 2025-03-08 | End: 2025-03-09

## 2025-03-08 RX ORDER — POTASSIUM CHLORIDE 14.9 MG/ML
20 INJECTION INTRAVENOUS ONCE
Status: DISCONTINUED | OUTPATIENT
Start: 2025-03-08 | End: 2025-03-08 | Stop reason: SDUPTHER

## 2025-03-08 RX ORDER — POTASSIUM CHLORIDE 14.9 MG/ML
20 INJECTION INTRAVENOUS ONCE
Status: COMPLETED | OUTPATIENT
Start: 2025-03-08 | End: 2025-03-08

## 2025-03-08 RX ADMIN — POLYETHYLENE GLYCOL (3350) 17 G: 17 POWDER, FOR SOLUTION ORAL at 21:41

## 2025-03-08 RX ADMIN — CARBIDOPA AND LEVODOPA 0.5 TABLET: 25; 100 TABLET ORAL at 14:10

## 2025-03-08 RX ADMIN — DONEPEZIL HYDROCHLORIDE 10 MG: 10 TABLET ORAL at 21:42

## 2025-03-08 RX ADMIN — SODIUM CHLORIDE, PRESERVATIVE FREE 2 ML: 5 INJECTION INTRAVENOUS at 20:43

## 2025-03-08 RX ADMIN — DEXMEDETOMIDINE HYDROCHLORIDE 0.4 MCG/KG/HR: 400 INJECTION INTRAVENOUS at 20:42

## 2025-03-08 RX ADMIN — MEROPENEM 1 G: 1 INJECTION INTRAVENOUS at 22:09

## 2025-03-08 RX ADMIN — CARBOXYMETHYLCELLULOSE SODIUM 1 DROP: 0.5 SOLUTION/ DROPS OPHTHALMIC at 03:47

## 2025-03-08 RX ADMIN — FOLIC ACID 1 MG: 1 TABLET ORAL at 08:50

## 2025-03-08 RX ADMIN — SENNOSIDES AND DOCUSATE SODIUM 2 TABLET: 50; 8.6 TABLET ORAL at 08:50

## 2025-03-08 RX ADMIN — PROPOFOL INJECTABLE EMULSION 15 MCG/KG/MIN: 10 INJECTION, EMULSION INTRAVENOUS at 01:35

## 2025-03-08 RX ADMIN — CHLORHEXIDINE GLUCONATE 15 ML: 1.2 LIQUID BUCCAL at 20:43

## 2025-03-08 RX ADMIN — HEPARIN SODIUM 5000 UNITS: 5000 INJECTION INTRAVENOUS; SUBCUTANEOUS at 14:08

## 2025-03-08 RX ADMIN — CARBIDOPA AND LEVODOPA 0.5 TABLET: 25; 100 TABLET ORAL at 08:54

## 2025-03-08 RX ADMIN — CARBOXYMETHYLCELLULOSE SODIUM 1 DROP: 0.5 SOLUTION/ DROPS OPHTHALMIC at 20:41

## 2025-03-08 RX ADMIN — DEXTROSE MONOHYDRATE: 50 INJECTION, SOLUTION INTRAVENOUS at 00:21

## 2025-03-08 RX ADMIN — DEXTROSE MONOHYDRATE: 50 INJECTION, SOLUTION INTRAVENOUS at 22:38

## 2025-03-08 RX ADMIN — CARBIDOPA AND LEVODOPA 0.5 TABLET: 25; 100 TABLET ORAL at 22:15

## 2025-03-08 RX ADMIN — ASPIRIN 81 MG CHEWABLE TABLET 81 MG: 81 TABLET CHEWABLE at 08:50

## 2025-03-08 RX ADMIN — VENLAFAXINE HYDROCHLORIDE 37.5 MG: 37.5 TABLET ORAL at 08:50

## 2025-03-08 RX ADMIN — VANCOMYCIN HYDROCHLORIDE 1000 MG: 1 INJECTION, POWDER, LYOPHILIZED, FOR SOLUTION INTRAVENOUS at 11:56

## 2025-03-08 RX ADMIN — CARBOXYMETHYLCELLULOSE SODIUM 1 DROP: 0.5 SOLUTION/ DROPS OPHTHALMIC at 15:32

## 2025-03-08 RX ADMIN — CHLORHEXIDINE GLUCONATE 15 ML: 1.2 LIQUID BUCCAL at 09:00

## 2025-03-08 RX ADMIN — POTASSIUM CHLORIDE 20 MEQ: 14.9 INJECTION, SOLUTION INTRAVENOUS at 04:39

## 2025-03-08 RX ADMIN — HEPARIN SODIUM 5000 UNITS: 5000 INJECTION INTRAVENOUS; SUBCUTANEOUS at 22:09

## 2025-03-08 RX ADMIN — INSULIN LISPRO 6 UNITS: 100 INJECTION, SOLUTION INTRAVENOUS; SUBCUTANEOUS at 00:25

## 2025-03-08 RX ADMIN — POLYETHYLENE GLYCOL (3350) 17 G: 17 POWDER, FOR SOLUTION ORAL at 08:50

## 2025-03-08 RX ADMIN — DEXTROSE MONOHYDRATE: 50 INJECTION, SOLUTION INTRAVENOUS at 08:47

## 2025-03-08 RX ADMIN — FAMOTIDINE 20 MG: 20 TABLET, FILM COATED ORAL at 08:50

## 2025-03-08 RX ADMIN — HEPARIN SODIUM 5000 UNITS: 5000 INJECTION INTRAVENOUS; SUBCUTANEOUS at 06:11

## 2025-03-08 RX ADMIN — INSULIN LISPRO 2 UNITS: 100 INJECTION, SOLUTION INTRAVENOUS; SUBCUTANEOUS at 06:11

## 2025-03-08 RX ADMIN — SODIUM CHLORIDE, PRESERVATIVE FREE 2 ML: 5 INJECTION INTRAVENOUS at 08:48

## 2025-03-08 RX ADMIN — DEXTROSE MONOHYDRATE: 50 INJECTION, SOLUTION INTRAVENOUS at 15:35

## 2025-03-08 RX ADMIN — INSULIN LISPRO 2 UNITS: 100 INJECTION, SOLUTION INTRAVENOUS; SUBCUTANEOUS at 11:56

## 2025-03-08 RX ADMIN — POTASSIUM PHOSPHATE, MONOBASIC AND POTASSIUM PHOSPHATE, DIBASIC 30 MMOL: 224; 236 INJECTION, SOLUTION, CONCENTRATE INTRAVENOUS at 06:49

## 2025-03-08 RX ADMIN — CARBOXYMETHYLCELLULOSE SODIUM 1 DROP: 0.5 SOLUTION/ DROPS OPHTHALMIC at 11:57

## 2025-03-08 RX ADMIN — OXYCODONE HYDROCHLORIDE AND ACETAMINOPHEN 500 MG: 500 TABLET ORAL at 08:50

## 2025-03-08 RX ADMIN — CARBOXYMETHYLCELLULOSE SODIUM 1 DROP: 0.5 SOLUTION/ DROPS OPHTHALMIC at 08:36

## 2025-03-08 RX ADMIN — MEROPENEM 1 G: 1 INJECTION INTRAVENOUS at 09:07

## 2025-03-08 RX ADMIN — VENLAFAXINE HYDROCHLORIDE 37.5 MG: 37.5 TABLET ORAL at 21:42

## 2025-03-08 RX ADMIN — CARBOXYMETHYLCELLULOSE SODIUM 1 DROP: 0.5 SOLUTION/ DROPS OPHTHALMIC at 00:17

## 2025-03-08 ASSESSMENT — PAIN SCALES - BEHAVIORAL PAIN SCALE (BPS)
BPS_SCORE: 3
BPS_SCORE: 4
BPS_SCORE: 4
BPS_SCORE: 3
BPS_SCORE: 4
BPS_SCORE: 3

## 2025-03-09 VITALS
WEIGHT: 142.2 LBS | HEIGHT: 67 IN | BODY MASS INDEX: 22.32 KG/M2 | TEMPERATURE: 97.5 F | HEART RATE: 93 BPM | OXYGEN SATURATION: 97 % | DIASTOLIC BLOOD PRESSURE: 56 MMHG | SYSTOLIC BLOOD PRESSURE: 107 MMHG | RESPIRATION RATE: 19 BRPM

## 2025-03-09 LAB
ABO + RH BLD: NORMAL
ANION GAP SERPL CALC-SCNC: 5 MMOL/L (ref 7–19)
ANION GAP SERPL CALC-SCNC: 6 MMOL/L (ref 7–19)
ANION GAP SERPL CALC-SCNC: 9 MMOL/L (ref 7–19)
BACTERIA BLD CULT: NORMAL
BACTERIA BLD CULT: NORMAL
BACTERIA UR CULT: ABNORMAL
BACTERIA UR CULT: ABNORMAL
BLD GP AB SCN SERPL QL GEL: NEGATIVE
BLOOD EXPIRATION DATE: NORMAL
BUN SERPL-MCNC: 48 MG/DL (ref 6–20)
BUN SERPL-MCNC: 49 MG/DL (ref 6–20)
BUN SERPL-MCNC: 50 MG/DL (ref 6–20)
BUN/CREAT SERPL: 68 (ref 7–25)
BUN/CREAT SERPL: 69 (ref 7–25)
BUN/CREAT SERPL: 71 (ref 7–25)
CALCIUM SERPL-MCNC: 8.3 MG/DL (ref 8.4–10.2)
CALCIUM SERPL-MCNC: 8.7 MG/DL (ref 8.4–10.2)
CALCIUM SERPL-MCNC: 8.9 MG/DL (ref 8.4–10.2)
CHLORIDE SERPL-SCNC: 113 MMOL/L (ref 97–110)
CHLORIDE SERPL-SCNC: 113 MMOL/L (ref 97–110)
CHLORIDE SERPL-SCNC: 114 MMOL/L (ref 97–110)
CO2 SERPL-SCNC: 27 MMOL/L (ref 21–32)
CO2 SERPL-SCNC: 31 MMOL/L (ref 21–32)
CO2 SERPL-SCNC: 33 MMOL/L (ref 21–32)
CREAT SERPL-MCNC: 0.7 MG/DL (ref 0.67–1.17)
CREAT SERPL-MCNC: 0.71 MG/DL (ref 0.67–1.17)
CREAT SERPL-MCNC: 0.71 MG/DL (ref 0.67–1.17)
CROSSMATCH RESULT: NORMAL
DEPRECATED RDW RBC: 67.6 FL (ref 39–50)
DEPRECATED RDW RBC: 67.7 FL (ref 39–50)
DEPRECATED RDW RBC: 69.5 FL (ref 39–50)
DISPENSE STATUS: NORMAL
EGFRCR SERPLBLD CKD-EPI 2021: 88 ML/MIN/{1.73_M2}
EGFRCR SERPLBLD CKD-EPI 2021: 88 ML/MIN/{1.73_M2}
EGFRCR SERPLBLD CKD-EPI 2021: 89 ML/MIN/{1.73_M2}
EOSINOPHIL # BLD: 0.3 K/MCL (ref 0–0.5)
EOSINOPHIL # BLD: 0.5 K/MCL (ref 0–0.5)
EOSINOPHIL NFR BLD: 2 %
EOSINOPHIL NFR BLD: 3 %
ERYTHROCYTE [DISTWIDTH] IN BLOOD: 16.5 % (ref 11–15)
ERYTHROCYTE [DISTWIDTH] IN BLOOD: 16.7 % (ref 11–15)
ERYTHROCYTE [DISTWIDTH] IN BLOOD: 18.5 % (ref 11–15)
FASTING DURATION TIME PATIENT: ABNORMAL H
FERRITIN SERPL-MCNC: 1828 NG/ML (ref 26–388)
GLUCOSE BLDC GLUCOMTR-MCNC: 114 MG/DL (ref 70–99)
GLUCOSE BLDC GLUCOMTR-MCNC: 123 MG/DL (ref 70–99)
GLUCOSE BLDC GLUCOMTR-MCNC: 124 MG/DL (ref 70–99)
GLUCOSE BLDC GLUCOMTR-MCNC: 138 MG/DL (ref 70–99)
GLUCOSE SERPL-MCNC: 103 MG/DL (ref 70–99)
GLUCOSE SERPL-MCNC: 121 MG/DL (ref 70–99)
GLUCOSE SERPL-MCNC: 122 MG/DL (ref 70–99)
HCT VFR BLD CALC: 21.1 % (ref 39–51)
HCT VFR BLD CALC: 22.3 % (ref 39–51)
HCT VFR BLD CALC: 23.1 % (ref 39–51)
HGB BLD-MCNC: 6.3 G/DL (ref 13–17)
HGB BLD-MCNC: 6.6 G/DL (ref 13–17)
HGB BLD-MCNC: 7.3 G/DL (ref 13–17)
HGB RETIC QN AUTO: 31.6 PG (ref 28.6–36.3)
HYPOCHROMIA BLD QL SMEAR: ABNORMAL
IMM RETICS NFR: 21.1 % (ref 1.5–16)
IRON SATN MFR SERPL: 35 % (ref 15–45)
IRON SERPL-MCNC: 40 MCG/DL (ref 65–175)
ISBT BLOOD TYPE: 5100
ISSUE DATE/TIME: NORMAL
LYMPHOCYTES # BLD: 2 K/MCL (ref 1–4)
LYMPHOCYTES # BLD: 2.1 K/MCL (ref 1–4)
LYMPHOCYTES NFR BLD: 12 %
LYMPHOCYTES NFR BLD: 13 %
MACROCYTES BLD QL SMEAR: ABNORMAL
MACROCYTES BLD QL SMEAR: ABNORMAL
MAGNESIUM SERPL-MCNC: 2.1 MG/DL (ref 1.7–2.4)
MCH RBC QN AUTO: 33 PG (ref 26–34)
MCH RBC QN AUTO: 33.3 PG (ref 26–34)
MCH RBC QN AUTO: 33.5 PG (ref 26–34)
MCHC RBC AUTO-ENTMCNC: 29.6 G/DL (ref 32–36.5)
MCHC RBC AUTO-ENTMCNC: 29.9 G/DL (ref 32–36.5)
MCHC RBC AUTO-ENTMCNC: 31.6 G/DL (ref 32–36.5)
MCV RBC AUTO: 104.5 FL (ref 78–100)
MCV RBC AUTO: 111.6 FL (ref 78–100)
MCV RBC AUTO: 113.2 FL (ref 78–100)
MONOCYTES # BLD: 0.2 K/MCL (ref 0.3–0.9)
MONOCYTES # BLD: 0.2 K/MCL (ref 0.3–0.9)
MONOCYTES NFR BLD: 1 %
MONOCYTES NFR BLD: 1 %
MYELOCYTES # BLD MANUAL: 2 %
NEUTROPHILS # BLD: 13.4 K/MCL (ref 1.8–7.7)
NEUTROPHILS # BLD: 14.1 K/MCL (ref 1.8–7.7)
NEUTS BAND NFR BLD: 1 % (ref 0–10)
NEUTS BAND NFR BLD: 2 % (ref 0–10)
NEUTS SEG NFR BLD: 81 %
NEUTS SEG NFR BLD: 82 %
NRBC BLD MANUAL-RTO: 0 /100 WBC
PHOSPHATE SERPL-MCNC: 3.2 MG/DL (ref 2.4–4.7)
PLAT MORPH BLD: NORMAL
PLAT MORPH BLD: NORMAL
PLATELET # BLD AUTO: 160 K/MCL (ref 140–450)
PLATELET # BLD AUTO: 178 K/MCL (ref 140–450)
PLATELET # BLD AUTO: 183 K/MCL (ref 140–450)
POTASSIUM SERPL-SCNC: 3.9 MMOL/L (ref 3.4–5.1)
POTASSIUM SERPL-SCNC: 4.1 MMOL/L (ref 3.4–5.1)
POTASSIUM SERPL-SCNC: 4.6 MMOL/L (ref 3.4–5.1)
PRODUCT CODE: NORMAL
PRODUCT DESCRIPTION: NORMAL
PRODUCT ID: NORMAL
RAINBOW EXTRA TUBES HOLD SPECIMEN: NORMAL
RAINBOW EXTRA TUBES HOLD SPECIMEN: NORMAL
RBC # BLD: 1.89 MIL/MCL (ref 4.5–5.9)
RBC # BLD: 1.97 MIL/MCL (ref 4.5–5.9)
RBC # BLD: 2.21 MIL/MCL (ref 4.5–5.9)
RETICS #: 17 K/MCL (ref 10–120)
RETICS/RBC NFR: 0.9 % (ref 0.3–2.5)
SODIUM SERPL-SCNC: 144 MMOL/L (ref 135–145)
SODIUM SERPL-SCNC: 147 MMOL/L (ref 135–145)
SODIUM SERPL-SCNC: 147 MMOL/L (ref 135–145)
TIBC SERPL-MCNC: 115 MCG/DL (ref 250–450)
TSH SERPL-ACNC: 1.55 MCUNITS/ML (ref 0.35–5)
TYPE AND SCREEN EXPIRATION DATE: NORMAL
UNIT BLOOD TYPE: NORMAL
UNIT NUMBER: NORMAL
WBC # BLD: 14.4 K/MCL (ref 4.2–11)
WBC # BLD: 16.2 K/MCL (ref 4.2–11)
WBC # BLD: 17 K/MCL (ref 4.2–11)
WBC MORPH BLD: NORMAL
WBC MORPH BLD: NORMAL

## 2025-03-09 PROCEDURE — 10002807 HB RX 258: Performed by: INTERNAL MEDICINE

## 2025-03-09 PROCEDURE — 10004651 HB RX, NO CHARGE ITEM: Performed by: EMERGENCY MEDICINE

## 2025-03-09 PROCEDURE — 85027 COMPLETE CBC AUTOMATED: CPT

## 2025-03-09 PROCEDURE — 82607 VITAMIN B-12: CPT | Performed by: INTERNAL MEDICINE

## 2025-03-09 PROCEDURE — 80048 BASIC METABOLIC PNL TOTAL CA: CPT | Performed by: EMERGENCY MEDICINE

## 2025-03-09 PROCEDURE — 94668 MNPJ CHEST WALL SBSQ: CPT

## 2025-03-09 PROCEDURE — 94640 AIRWAY INHALATION TREATMENT: CPT

## 2025-03-09 PROCEDURE — 83540 ASSAY OF IRON: CPT | Performed by: INTERNAL MEDICINE

## 2025-03-09 PROCEDURE — 99291 CRITICAL CARE FIRST HOUR: CPT | Performed by: INTERNAL MEDICINE

## 2025-03-09 PROCEDURE — 84443 ASSAY THYROID STIM HORMONE: CPT | Performed by: INTERNAL MEDICINE

## 2025-03-09 PROCEDURE — 84100 ASSAY OF PHOSPHORUS: CPT | Performed by: EMERGENCY MEDICINE

## 2025-03-09 PROCEDURE — 86923 COMPATIBILITY TEST ELECTRIC: CPT

## 2025-03-09 PROCEDURE — 36415 COLL VENOUS BLD VENIPUNCTURE: CPT | Performed by: EMERGENCY MEDICINE

## 2025-03-09 PROCEDURE — 94667 MNPJ CHEST WALL 1ST: CPT

## 2025-03-09 PROCEDURE — 94664 DEMO&/EVAL PT USE INHALER: CPT

## 2025-03-09 PROCEDURE — 86850 RBC ANTIBODY SCREEN: CPT | Performed by: EMERGENCY MEDICINE

## 2025-03-09 PROCEDURE — 10002807 HB RX 258: Performed by: EMERGENCY MEDICINE

## 2025-03-09 PROCEDURE — 83735 ASSAY OF MAGNESIUM: CPT | Performed by: EMERGENCY MEDICINE

## 2025-03-09 PROCEDURE — 85046 RETICYTE/HGB CONCENTRATE: CPT | Performed by: INTERNAL MEDICINE

## 2025-03-09 PROCEDURE — 10002019 HB COUNTER RESP ASSESSMENT

## 2025-03-09 PROCEDURE — 85027 COMPLETE CBC AUTOMATED: CPT | Performed by: EMERGENCY MEDICINE

## 2025-03-09 PROCEDURE — 10002800 HB RX 250 W HCPCS: Performed by: INTERNAL MEDICINE

## 2025-03-09 PROCEDURE — 10002800 HB RX 250 W HCPCS: Performed by: EMERGENCY MEDICINE

## 2025-03-09 PROCEDURE — 82728 ASSAY OF FERRITIN: CPT | Performed by: INTERNAL MEDICINE

## 2025-03-09 PROCEDURE — 85027 COMPLETE CBC AUTOMATED: CPT | Performed by: INTERNAL MEDICINE

## 2025-03-09 PROCEDURE — 80048 BASIC METABOLIC PNL TOTAL CA: CPT | Performed by: INTERNAL MEDICINE

## 2025-03-09 PROCEDURE — 10000008 HB ROOM CHARGE ICU OR CCU

## 2025-03-09 PROCEDURE — 10002803 HB RX 637: Performed by: EMERGENCY MEDICINE

## 2025-03-09 PROCEDURE — 10002801 HB RX 250 W/O HCPCS: Performed by: INTERNAL MEDICINE

## 2025-03-09 PROCEDURE — 96372 THER/PROPH/DIAG INJ SC/IM: CPT | Performed by: EMERGENCY MEDICINE

## 2025-03-09 PROCEDURE — 94003 VENT MGMT INPAT SUBQ DAY: CPT

## 2025-03-09 PROCEDURE — 10004180 HB COUNTER-TRANSPORT

## 2025-03-09 RX ORDER — IPRATROPIUM BROMIDE AND ALBUTEROL SULFATE 2.5; .5 MG/3ML; MG/3ML
3 SOLUTION RESPIRATORY (INHALATION)
Status: DISCONTINUED | OUTPATIENT
Start: 2025-03-09 | End: 2025-03-10

## 2025-03-09 RX ORDER — DEXTROSE MONOHYDRATE 50 MG/ML
INJECTION, SOLUTION INTRAVENOUS CONTINUOUS
Status: DISCONTINUED | OUTPATIENT
Start: 2025-03-09 | End: 2025-03-10

## 2025-03-09 RX ORDER — ACETYLCYSTEINE 200 MG/ML
400 SOLUTION ORAL; RESPIRATORY (INHALATION)
Status: DISCONTINUED | OUTPATIENT
Start: 2025-03-09 | End: 2025-03-10

## 2025-03-09 RX ORDER — SODIUM CHLORIDE 9 MG/ML
INJECTION, SOLUTION INTRAVENOUS CONTINUOUS PRN
Status: ACTIVE | OUTPATIENT
Start: 2025-03-09 | End: 2025-03-09

## 2025-03-09 RX ADMIN — ASPIRIN 81 MG CHEWABLE TABLET 81 MG: 81 TABLET CHEWABLE at 09:08

## 2025-03-09 RX ADMIN — CARBOXYMETHYLCELLULOSE SODIUM 1 DROP: 0.5 SOLUTION/ DROPS OPHTHALMIC at 04:16

## 2025-03-09 RX ADMIN — CARBIDOPA AND LEVODOPA 0.5 TABLET: 25; 100 TABLET ORAL at 21:00

## 2025-03-09 RX ADMIN — DEXTROSE MONOHYDRATE: 50 INJECTION, SOLUTION INTRAVENOUS at 14:45

## 2025-03-09 RX ADMIN — CARBOXYMETHYLCELLULOSE SODIUM 1 DROP: 0.5 SOLUTION/ DROPS OPHTHALMIC at 20:33

## 2025-03-09 RX ADMIN — ARIPIPRAZOLE 2 MG: 2 TABLET ORAL at 09:08

## 2025-03-09 RX ADMIN — VENLAFAXINE HYDROCHLORIDE 37.5 MG: 37.5 TABLET ORAL at 09:08

## 2025-03-09 RX ADMIN — ACETYLCYSTEINE 400 MG: 200 SOLUTION ORAL; RESPIRATORY (INHALATION) at 13:57

## 2025-03-09 RX ADMIN — HEPARIN SODIUM 5000 UNITS: 5000 INJECTION INTRAVENOUS; SUBCUTANEOUS at 21:02

## 2025-03-09 RX ADMIN — POLYETHYLENE GLYCOL (3350) 17 G: 17 POWDER, FOR SOLUTION ORAL at 09:08

## 2025-03-09 RX ADMIN — CHLORHEXIDINE GLUCONATE 15 ML: 1.2 LIQUID BUCCAL at 20:33

## 2025-03-09 RX ADMIN — CARBOXYMETHYLCELLULOSE SODIUM 1 DROP: 0.5 SOLUTION/ DROPS OPHTHALMIC at 15:50

## 2025-03-09 RX ADMIN — MEROPENEM 1 G: 1 INJECTION INTRAVENOUS at 09:07

## 2025-03-09 RX ADMIN — SENNOSIDES AND DOCUSATE SODIUM 2 TABLET: 50; 8.6 TABLET ORAL at 09:08

## 2025-03-09 RX ADMIN — VANCOMYCIN HYDROCHLORIDE 1000 MG: 1 INJECTION, POWDER, LYOPHILIZED, FOR SOLUTION INTRAVENOUS at 12:25

## 2025-03-09 RX ADMIN — CARBOXYMETHYLCELLULOSE SODIUM 1 DROP: 0.5 SOLUTION/ DROPS OPHTHALMIC at 00:28

## 2025-03-09 RX ADMIN — ACETYLCYSTEINE 400 MG: 200 SOLUTION ORAL; RESPIRATORY (INHALATION) at 21:30

## 2025-03-09 RX ADMIN — POLYETHYLENE GLYCOL (3350) 17 G: 17 POWDER, FOR SOLUTION ORAL at 20:33

## 2025-03-09 RX ADMIN — CHLORHEXIDINE GLUCONATE 15 ML: 1.2 LIQUID BUCCAL at 09:08

## 2025-03-09 RX ADMIN — IPRATROPIUM BROMIDE AND ALBUTEROL SULFATE 3 ML: 2.5; .5 SOLUTION RESPIRATORY (INHALATION) at 13:57

## 2025-03-09 RX ADMIN — SODIUM CHLORIDE, PRESERVATIVE FREE 2 ML: 5 INJECTION INTRAVENOUS at 09:08

## 2025-03-09 RX ADMIN — CARBIDOPA AND LEVODOPA 0.5 TABLET: 25; 100 TABLET ORAL at 14:45

## 2025-03-09 RX ADMIN — HEPARIN SODIUM 5000 UNITS: 5000 INJECTION INTRAVENOUS; SUBCUTANEOUS at 14:43

## 2025-03-09 RX ADMIN — CARBOXYMETHYLCELLULOSE SODIUM 1 DROP: 0.5 SOLUTION/ DROPS OPHTHALMIC at 12:22

## 2025-03-09 RX ADMIN — IPRATROPIUM BROMIDE AND ALBUTEROL SULFATE 3 ML: 2.5; .5 SOLUTION RESPIRATORY (INHALATION) at 21:29

## 2025-03-09 RX ADMIN — FOLIC ACID 1 MG: 1 TABLET ORAL at 09:08

## 2025-03-09 RX ADMIN — HEPARIN SODIUM 5000 UNITS: 5000 INJECTION INTRAVENOUS; SUBCUTANEOUS at 06:05

## 2025-03-09 RX ADMIN — FAMOTIDINE 20 MG: 20 TABLET, FILM COATED ORAL at 09:08

## 2025-03-09 RX ADMIN — CARBIDOPA AND LEVODOPA 0.5 TABLET: 25; 100 TABLET ORAL at 09:08

## 2025-03-09 RX ADMIN — OXYCODONE HYDROCHLORIDE AND ACETAMINOPHEN 500 MG: 500 TABLET ORAL at 09:08

## 2025-03-09 RX ADMIN — SODIUM CHLORIDE, PRESERVATIVE FREE 2 ML: 5 INJECTION INTRAVENOUS at 20:38

## 2025-03-09 RX ADMIN — DONEPEZIL HYDROCHLORIDE 10 MG: 10 TABLET ORAL at 21:00

## 2025-03-09 RX ADMIN — MEROPENEM 1 G: 1 INJECTION INTRAVENOUS at 21:35

## 2025-03-09 RX ADMIN — VENLAFAXINE HYDROCHLORIDE 37.5 MG: 37.5 TABLET ORAL at 21:00

## 2025-03-09 RX ADMIN — CARBOXYMETHYLCELLULOSE SODIUM 1 DROP: 0.5 SOLUTION/ DROPS OPHTHALMIC at 08:55

## 2025-03-09 ASSESSMENT — PATIENT HEALTH QUESTIONNAIRE - PHQ9: IS PATIENT ABLE TO COMPLETE PHQ2 OR PHQ9: NO, PATIENT WILL NEVER BE ABLE TO COMPLETE

## 2025-03-09 ASSESSMENT — PAIN SCALES - BEHAVIORAL PAIN SCALE (BPS)
BPS_SCORE: 4
BPS_SCORE: 3
BPS_SCORE: 4
BPS_SCORE: 3

## 2025-03-09 ASSESSMENT — PULMONARY FUNCTION TESTS: FEV1/FVC: UNABLE TO OBTAIN, OR GREATER THAN 70%

## 2025-03-10 LAB
ANION GAP SERPL CALC-SCNC: 6 MMOL/L (ref 7–19)
BACTERIA UR CULT: ABNORMAL
BASE EXCESS / DEFICIT, ARTERIAL - RESPIRATORY: 5 MMOL/L (ref -2–3)
BDY SITE: ABNORMAL
BODY TEMPERATURE: 37 DEGREES C
BUN SERPL-MCNC: 44 MG/DL (ref 6–20)
BUN/CREAT SERPL: 57 (ref 7–25)
CALCIUM SERPL-MCNC: 9.2 MG/DL (ref 8.4–10.2)
CHLORIDE SERPL-SCNC: 112 MMOL/L (ref 97–110)
CO2 SERPL-SCNC: 32 MMOL/L (ref 21–32)
CONDITION: ABNORMAL
CREAT SERPL-MCNC: 0.77 MG/DL (ref 0.67–1.17)
DEPRECATED RDW RBC: 68.3 FL (ref 39–50)
EGFRCR SERPLBLD CKD-EPI 2021: 86 ML/MIN/{1.73_M2}
EOSINOPHIL # BLD: 0.5 K/MCL (ref 0–0.5)
EOSINOPHIL NFR BLD: 4 %
ERYTHROCYTE [DISTWIDTH] IN BLOOD: 18.1 % (ref 11–15)
FASTING DURATION TIME PATIENT: ABNORMAL H
FIO2 ON VENT: 40 %
FOLATE SERPL-MCNC: 22 NG/ML
GLUCOSE BLDC GLUCOMTR-MCNC: 119 MG/DL (ref 70–99)
GLUCOSE SERPL-MCNC: 113 MG/DL (ref 70–99)
HCO3 BLDA-SCNC: 30 MMOL/L (ref 22–28)
HCT VFR BLD CALC: 24.9 % (ref 39–51)
HGB BLD-MCNC: 7.8 G/DL (ref 13–17)
HOROWITZ INDEX BLDA+IHG-RTO: 268 MM[HG] (ref 300–500)
LYMPHOCYTES # BLD: 1.4 K/MCL (ref 1–4)
LYMPHOCYTES NFR BLD: 11 %
MAGNESIUM SERPL-MCNC: 2.3 MG/DL (ref 1.7–2.4)
MCH RBC QN AUTO: 32.8 PG (ref 26–34)
MCHC RBC AUTO-ENTMCNC: 31.3 G/DL (ref 32–36.5)
MCV RBC AUTO: 104.6 FL (ref 78–100)
METAMYELOCYTES NFR BLD: 2 % (ref 0–2)
MONOCYTES # BLD: 0.8 K/MCL (ref 0.3–0.9)
MONOCYTES NFR BLD: 6 %
NEUTROPHILS # BLD: 9.9 K/MCL (ref 1.8–7.7)
NEUTS BAND NFR BLD: 2 % (ref 0–10)
NEUTS SEG NFR BLD: 75 %
NRBC BLD MANUAL-RTO: 0 /100 WBC
PCO2 BLDA: 45 MM HG (ref 35–48)
PH BLDA: 7.43 UNITS (ref 7.35–7.45)
PHOSPHATE SERPL-MCNC: 3.1 MG/DL (ref 2.4–4.7)
PLAT MORPH BLD: NORMAL
PLATELET # BLD AUTO: 156 K/MCL (ref 140–450)
PO2 BLDA: 107 MM HG (ref 83–108)
POTASSIUM SERPL-SCNC: 4.1 MMOL/L (ref 3.4–5.1)
RBC # BLD: 2.38 MIL/MCL (ref 4.5–5.9)
RBC MORPH BLD: NORMAL
SAO2 % BLDA: 98 % (ref 95–99)
SODIUM SERPL-SCNC: 146 MMOL/L (ref 135–145)
VIT B12 SERPL-MCNC: 364 PG/ML (ref 211–911)
WBC # BLD: 12.8 K/MCL (ref 4.2–11)
WBC MORPH BLD: NORMAL

## 2025-03-10 PROCEDURE — 10004651 HB RX, NO CHARGE ITEM: Performed by: EMERGENCY MEDICINE

## 2025-03-10 PROCEDURE — 10002800 HB RX 250 W HCPCS: Performed by: INTERNAL MEDICINE

## 2025-03-10 PROCEDURE — 99292 CRITICAL CARE ADDL 30 MIN: CPT | Performed by: INTERNAL MEDICINE

## 2025-03-10 PROCEDURE — 10002800 HB RX 250 W HCPCS

## 2025-03-10 PROCEDURE — 84100 ASSAY OF PHOSPHORUS: CPT | Performed by: EMERGENCY MEDICINE

## 2025-03-10 PROCEDURE — 94640 AIRWAY INHALATION TREATMENT: CPT

## 2025-03-10 PROCEDURE — 83735 ASSAY OF MAGNESIUM: CPT | Performed by: EMERGENCY MEDICINE

## 2025-03-10 PROCEDURE — 10004180 HB COUNTER-TRANSPORT

## 2025-03-10 PROCEDURE — 10000008 HB ROOM CHARGE ICU OR CCU

## 2025-03-10 PROCEDURE — 10002800 HB RX 250 W HCPCS: Performed by: EMERGENCY MEDICINE

## 2025-03-10 PROCEDURE — 36415 COLL VENOUS BLD VENIPUNCTURE: CPT

## 2025-03-10 PROCEDURE — 10002807 HB RX 258: Performed by: INTERNAL MEDICINE

## 2025-03-10 PROCEDURE — 96372 THER/PROPH/DIAG INJ SC/IM: CPT | Performed by: EMERGENCY MEDICINE

## 2025-03-10 PROCEDURE — 10004651 HB RX, NO CHARGE ITEM: Performed by: INTERNAL MEDICINE

## 2025-03-10 PROCEDURE — 82803 BLOOD GASES ANY COMBINATION: CPT

## 2025-03-10 PROCEDURE — 10002801 HB RX 250 W/O HCPCS

## 2025-03-10 PROCEDURE — 10002803 HB RX 637: Performed by: EMERGENCY MEDICINE

## 2025-03-10 PROCEDURE — 94668 MNPJ CHEST WALL SBSQ: CPT

## 2025-03-10 PROCEDURE — 99291 CRITICAL CARE FIRST HOUR: CPT | Performed by: INTERNAL MEDICINE

## 2025-03-10 PROCEDURE — 94003 VENT MGMT INPAT SUBQ DAY: CPT

## 2025-03-10 PROCEDURE — 85027 COMPLETE CBC AUTOMATED: CPT

## 2025-03-10 PROCEDURE — 80048 BASIC METABOLIC PNL TOTAL CA: CPT | Performed by: INTERNAL MEDICINE

## 2025-03-10 PROCEDURE — 10002801 HB RX 250 W/O HCPCS: Performed by: INTERNAL MEDICINE

## 2025-03-10 RX ORDER — LORAZEPAM 2 MG/ML
2 INJECTION INTRAMUSCULAR
Status: DISCONTINUED | OUTPATIENT
Start: 2025-03-10 | End: 2025-03-11 | Stop reason: HOSPADM

## 2025-03-10 RX ORDER — LORAZEPAM 2 MG/ML
INJECTION INTRAMUSCULAR
Status: COMPLETED
Start: 2025-03-10 | End: 2025-03-10

## 2025-03-10 RX ORDER — GLYCOPYRROLATE 1 MG/5ML
0.5 SOLUTION ORAL EVERY 6 HOURS PRN
Status: DISCONTINUED | OUTPATIENT
Start: 2025-03-10 | End: 2025-03-11 | Stop reason: HOSPADM

## 2025-03-10 RX ORDER — MAGNESIUM HYDROXIDE/ALUMINUM HYDROXICE/SIMETHICONE 120; 1200; 1200 MG/30ML; MG/30ML; MG/30ML
30 SUSPENSION ORAL PRN
Status: DISCONTINUED | OUTPATIENT
Start: 2025-03-10 | End: 2025-03-11 | Stop reason: HOSPADM

## 2025-03-10 RX ORDER — NOREPINEPHRINE BITARTRATE/D5W 8 MG/250ML
PLASTIC BAG, INJECTION (ML) INTRAVENOUS
Status: COMPLETED
Start: 2025-03-10 | End: 2025-03-10

## 2025-03-10 RX ORDER — GLYCOPYRROLATE 0.2 MG/ML
0.4 INJECTION, SOLUTION INTRAMUSCULAR; INTRAVENOUS EVERY 6 HOURS PRN
Status: DISCONTINUED | OUTPATIENT
Start: 2025-03-10 | End: 2025-03-11 | Stop reason: HOSPADM

## 2025-03-10 RX ORDER — 0.9 % SODIUM CHLORIDE 0.9 %
2 VIAL (ML) INJECTION EVERY 12 HOURS SCHEDULED
Status: DISCONTINUED | OUTPATIENT
Start: 2025-03-10 | End: 2025-03-11 | Stop reason: HOSPADM

## 2025-03-10 RX ORDER — LACTULOSE 10 G/15ML
20 SOLUTION ORAL DAILY PRN
Status: DISCONTINUED | OUTPATIENT
Start: 2025-03-10 | End: 2025-03-11

## 2025-03-10 RX ORDER — GLYCOPYRROLATE 1 MG/1
0.5 TABLET ORAL EVERY 6 HOURS PRN
Status: DISCONTINUED | OUTPATIENT
Start: 2025-03-10 | End: 2025-03-11 | Stop reason: HOSPADM

## 2025-03-10 RX ORDER — AMOXICILLIN 250 MG
2 CAPSULE ORAL 2 TIMES DAILY PRN
Status: DISCONTINUED | OUTPATIENT
Start: 2025-03-10 | End: 2025-03-11

## 2025-03-10 RX ORDER — NOREPINEPHRINE BITARTRATE/D5W 8 MG/250ML
0-100 PLASTIC BAG, INJECTION (ML) INTRAVENOUS CONTINUOUS
Status: DISCONTINUED | OUTPATIENT
Start: 2025-03-10 | End: 2025-03-10

## 2025-03-10 RX ORDER — CARBOXYMETHYLCELLULOSE SODIUM 5 MG/ML
1 SOLUTION/ DROPS OPHTHALMIC PRN
Status: DISCONTINUED | OUTPATIENT
Start: 2025-03-10 | End: 2025-03-11 | Stop reason: HOSPADM

## 2025-03-10 RX ORDER — LIDOCAINE HYDROCHLORIDE 20 MG/ML
10 JELLY TOPICAL PRN
Status: DISCONTINUED | OUTPATIENT
Start: 2025-03-10 | End: 2025-03-11

## 2025-03-10 RX ORDER — BISACODYL 10 MG
10 SUPPOSITORY, RECTAL RECTAL DAILY PRN
Status: DISCONTINUED | OUTPATIENT
Start: 2025-03-10 | End: 2025-03-11 | Stop reason: HOSPADM

## 2025-03-10 RX ORDER — 0.9 % SODIUM CHLORIDE 0.9 %
10 VIAL (ML) INJECTION PRN
Status: DISCONTINUED | OUTPATIENT
Start: 2025-03-10 | End: 2025-03-11 | Stop reason: HOSPADM

## 2025-03-10 RX ADMIN — IPRATROPIUM BROMIDE AND ALBUTEROL SULFATE 3 ML: 2.5; .5 SOLUTION RESPIRATORY (INHALATION) at 01:06

## 2025-03-10 RX ADMIN — CARBOXYMETHYLCELLULOSE SODIUM 1 DROP: 0.5 SOLUTION/ DROPS OPHTHALMIC at 04:07

## 2025-03-10 RX ADMIN — SODIUM CHLORIDE, PRESERVATIVE FREE 2 ML: 5 INJECTION INTRAVENOUS at 21:31

## 2025-03-10 RX ADMIN — CHLORHEXIDINE GLUCONATE 15 ML: 1.2 LIQUID BUCCAL at 08:54

## 2025-03-10 RX ADMIN — CARBOXYMETHYLCELLULOSE SODIUM 1 DROP: 0.5 SOLUTION/ DROPS OPHTHALMIC at 00:23

## 2025-03-10 RX ADMIN — ACETYLCYSTEINE 400 MG: 200 SOLUTION ORAL; RESPIRATORY (INHALATION) at 01:06

## 2025-03-10 RX ADMIN — FAMOTIDINE 20 MG: 20 TABLET, FILM COATED ORAL at 08:33

## 2025-03-10 RX ADMIN — MORPHINE SULFATE 2 MG: 2 INJECTION, SOLUTION INTRAMUSCULAR; INTRAVENOUS at 13:54

## 2025-03-10 RX ADMIN — LORAZEPAM 2 MG: 2 INJECTION INTRAMUSCULAR; INTRAVENOUS at 13:54

## 2025-03-10 RX ADMIN — MEROPENEM 1 G: 1 INJECTION INTRAVENOUS at 08:30

## 2025-03-10 RX ADMIN — CARBOXYMETHYLCELLULOSE SODIUM 1 DROP: 0.5 SOLUTION/ DROPS OPHTHALMIC at 08:31

## 2025-03-10 RX ADMIN — OXYCODONE HYDROCHLORIDE AND ACETAMINOPHEN 500 MG: 500 TABLET ORAL at 08:32

## 2025-03-10 RX ADMIN — Medication 4 MCG/MIN: at 13:25

## 2025-03-10 RX ADMIN — SODIUM CHLORIDE, PRESERVATIVE FREE 2 ML: 5 INJECTION INTRAVENOUS at 08:32

## 2025-03-10 RX ADMIN — FOLIC ACID 1 MG: 1 TABLET ORAL at 08:33

## 2025-03-10 RX ADMIN — HEPARIN SODIUM 5000 UNITS: 5000 INJECTION INTRAVENOUS; SUBCUTANEOUS at 06:06

## 2025-03-10 RX ADMIN — IPRATROPIUM BROMIDE AND ALBUTEROL SULFATE 3 ML: 2.5; .5 SOLUTION RESPIRATORY (INHALATION) at 08:25

## 2025-03-10 RX ADMIN — CARBIDOPA AND LEVODOPA 0.5 TABLET: 25; 100 TABLET ORAL at 08:33

## 2025-03-10 RX ADMIN — VENLAFAXINE HYDROCHLORIDE 37.5 MG: 37.5 TABLET ORAL at 08:32

## 2025-03-10 RX ADMIN — NOREPINEPHRINE BITARTRATE 4 MCG/MIN: 8 INJECTION, SOLUTION INTRAVENOUS at 13:25

## 2025-03-10 RX ADMIN — ACETYLCYSTEINE 400 MG: 200 SOLUTION ORAL; RESPIRATORY (INHALATION) at 08:25

## 2025-03-10 RX ADMIN — MORPHINE SULFATE 2 MG: 2 INJECTION, SOLUTION INTRAMUSCULAR; INTRAVENOUS at 16:41

## 2025-03-10 RX ADMIN — ASPIRIN 81 MG CHEWABLE TABLET 81 MG: 81 TABLET CHEWABLE at 08:32

## 2025-03-10 ASSESSMENT — PAIN SCALES - BEHAVIORAL PAIN SCALE (BPS)
BPS_SCORE: 3

## 2025-03-11 VITALS
OXYGEN SATURATION: 89 % | DIASTOLIC BLOOD PRESSURE: 56 MMHG | RESPIRATION RATE: 22 BRPM | HEIGHT: 67 IN | SYSTOLIC BLOOD PRESSURE: 101 MMHG | BODY MASS INDEX: 17.99 KG/M2 | TEMPERATURE: 97.3 F | WEIGHT: 114.64 LBS | HEART RATE: 88 BPM

## 2025-03-11 PROCEDURE — 10002800 HB RX 250 W HCPCS: Performed by: INTERNAL MEDICINE

## 2025-03-11 PROCEDURE — 99232 SBSQ HOSP IP/OBS MODERATE 35: CPT | Performed by: NURSE PRACTITIONER

## 2025-03-11 PROCEDURE — 10002803 HB RX 637: Performed by: INTERNAL MEDICINE

## 2025-03-11 PROCEDURE — 10004651 HB RX, NO CHARGE ITEM: Performed by: INTERNAL MEDICINE

## 2025-03-11 RX ORDER — ONDANSETRON 2 MG/ML
4 INJECTION INTRAMUSCULAR; INTRAVENOUS EVERY 12 HOURS PRN
Status: CANCELLED | OUTPATIENT
Start: 2025-03-11

## 2025-03-11 RX ORDER — 0.9 % SODIUM CHLORIDE 0.9 %
2 VIAL (ML) INJECTION EVERY 12 HOURS SCHEDULED
Status: CANCELLED | OUTPATIENT
Start: 2025-03-11

## 2025-03-11 RX ORDER — CARBOXYMETHYLCELLULOSE SODIUM 5 MG/ML
1 SOLUTION/ DROPS OPHTHALMIC PRN
Status: CANCELLED | OUTPATIENT
Start: 2025-03-11

## 2025-03-11 RX ORDER — LORAZEPAM 2 MG/ML
1 INJECTION INTRAMUSCULAR EVERY 8 HOURS
Status: CANCELLED | OUTPATIENT
Start: 2025-03-11

## 2025-03-11 RX ORDER — ACETAMINOPHEN 325 MG/1
650 TABLET ORAL EVERY 4 HOURS PRN
Status: CANCELLED | OUTPATIENT
Start: 2025-03-11

## 2025-03-11 RX ORDER — BISACODYL 10 MG
10 SUPPOSITORY, RECTAL RECTAL DAILY PRN
Status: CANCELLED | OUTPATIENT
Start: 2025-03-11

## 2025-03-11 RX ORDER — 0.9 % SODIUM CHLORIDE 0.9 %
10 VIAL (ML) INJECTION PRN
Status: CANCELLED | OUTPATIENT
Start: 2025-03-11

## 2025-03-11 RX ORDER — GLYCOPYRROLATE 0.2 MG/ML
0.4 INJECTION, SOLUTION INTRAMUSCULAR; INTRAVENOUS EVERY 6 HOURS PRN
Status: CANCELLED | OUTPATIENT
Start: 2025-03-11

## 2025-03-11 RX ORDER — AMOXICILLIN 250 MG
1 CAPSULE ORAL 2 TIMES DAILY PRN
Status: CANCELLED | OUTPATIENT
Start: 2025-03-11

## 2025-03-11 RX ORDER — ACETAMINOPHEN 650 MG/1
650 SUPPOSITORY RECTAL EVERY 4 HOURS PRN
Status: CANCELLED | OUTPATIENT
Start: 2025-03-11

## 2025-03-11 RX ORDER — LORAZEPAM 2 MG/ML
1 INJECTION INTRAMUSCULAR
Status: CANCELLED | OUTPATIENT
Start: 2025-03-11

## 2025-03-11 RX ADMIN — LORAZEPAM 2 MG: 2 INJECTION INTRAMUSCULAR; INTRAVENOUS at 12:11

## 2025-03-11 RX ADMIN — MORPHINE SULFATE 2 MG: 2 INJECTION, SOLUTION INTRAMUSCULAR; INTRAVENOUS at 09:33

## 2025-03-11 RX ADMIN — MORPHINE SULFATE 2 MG: 2 INJECTION, SOLUTION INTRAMUSCULAR; INTRAVENOUS at 00:38

## 2025-03-11 RX ADMIN — GLYCOPYRROLATE 0.5 MG: 1 LIQUID ORAL at 00:27

## 2025-03-11 RX ADMIN — LORAZEPAM 2 MG: 2 INJECTION INTRAMUSCULAR; INTRAVENOUS at 09:33

## 2025-03-11 RX ADMIN — SODIUM CHLORIDE, PRESERVATIVE FREE 2 ML: 5 INJECTION INTRAVENOUS at 09:43

## 2025-03-11 RX ADMIN — MORPHINE SULFATE 2 MG: 2 INJECTION, SOLUTION INTRAMUSCULAR; INTRAVENOUS at 12:11

## 2025-03-11 SDOH — HEALTH STABILITY: PHYSICAL HEALTH: DO YOU HAVE SERIOUS DIFFICULTY WALKING OR CLIMBING STAIRS?: NO

## 2025-03-11 SDOH — ECONOMIC STABILITY: HOUSING INSECURITY: WHAT IS YOUR LIVING SITUATION TODAY?: I HAVE A STEADY PLACE TO LIVE

## 2025-03-11 SDOH — HEALTH STABILITY: GENERAL: BECAUSE OF A PHYSICAL, MENTAL, OR EMOTIONAL CONDITION, DO YOU HAVE DIFFICULTY DOING ERRANDS ALONE?: NO

## 2025-03-11 SDOH — ECONOMIC STABILITY: HOUSING INSECURITY: WHAT IS YOUR LIVING SITUATION TODAY?: OTHER FACILITY RESIDENTS

## 2025-03-11 SDOH — ECONOMIC STABILITY: GENERAL

## 2025-03-11 SDOH — SOCIAL STABILITY: SOCIAL INSECURITY: HOW OFTEN DOES ANYONE, INCLUDING FAMILY AND FRIENDS, INSULT OR TALK DOWN TO YOU?: PATIENT DECLINED

## 2025-03-11 SDOH — ECONOMIC STABILITY: FOOD INSECURITY: WITHIN THE PAST 12 MONTHS, THE FOOD YOU BOUGHT JUST DIDN'T LAST AND YOU DIDN'T HAVE MONEY TO GET MORE.: NEVER TRUE

## 2025-03-11 SDOH — ECONOMIC STABILITY: INCOME INSECURITY: IN THE PAST 12 MONTHS, HAS THE ELECTRIC, GAS, OIL, OR WATER COMPANY THREATENED TO SHUT OFF SERVICE IN YOUR HOME?: NO

## 2025-03-11 SDOH — SOCIAL STABILITY: SOCIAL INSECURITY: HOW OFTEN DOES ANYONE, INCLUDING FAMILY AND FRIENDS, THREATEN YOU WITH HARM?: PATIENT DECLINED

## 2025-03-11 SDOH — SOCIAL STABILITY: SOCIAL INSECURITY: HOW OFTEN DOES ANYONE, INCLUDING FAMILY AND FRIENDS, SCREAM OR CURSE AT YOU?: PATIENT DECLINED

## 2025-03-11 SDOH — ECONOMIC STABILITY: HOUSING INSECURITY: DO YOU HAVE PROBLEMS WITH ANY OF THE FOLLOWING?: NONE OF THE ABOVE

## 2025-03-11 SDOH — HEALTH STABILITY: PHYSICAL HEALTH: DO YOU HAVE DIFFICULTY DRESSING OR BATHING?: YES

## 2025-03-11 SDOH — SOCIAL STABILITY: SOCIAL NETWORK
HOW OFTEN DO YOU SEE OR TALK TO PEOPLE THAT YOU CARE ABOUT AND FEEL CLOSE TO? (FOR EXAMPLE: TALKING TO FRIENDS ON THE PHONE, VISITING FRIENDS OR FAMILY, GOING TO CHURCH OR CLUB MEETINGS): 1 OR 2 TIMES A WEEK

## 2025-03-11 SDOH — HEALTH STABILITY: PHYSICAL HEALTH: DO YOU HAVE DIFFICULTY DRESSING OR BATHING?: NO

## 2025-03-11 SDOH — ECONOMIC STABILITY: HOUSING INSECURITY: WHAT IS YOUR LIVING SITUATION TODAY?: ASSISTED LIVING

## 2025-03-11 SDOH — SOCIAL STABILITY: SOCIAL INSECURITY: HOW OFTEN DOES ANYONE, INCLUDING FAMILY AND FRIENDS, PHYSICALLY HURT YOU?: PATIENT DECLINED

## 2025-03-11 SDOH — HEALTH STABILITY: PHYSICAL HEALTH: DO YOU HAVE SERIOUS DIFFICULTY WALKING OR CLIMBING STAIRS?: YES

## 2025-03-11 SDOH — ECONOMIC STABILITY: GENERAL: WOULD YOU LIKE HELP WITH ANY OF THE FOLLOWING NEEDS?: I DON'T WANT HELP WITH ANY OF THESE

## 2025-03-11 SDOH — HEALTH STABILITY: GENERAL: BECAUSE OF A PHYSICAL, MENTAL, OR EMOTIONAL CONDITION, DO YOU HAVE DIFFICULTY DOING ERRANDS ALONE?: YES

## 2025-03-11 ASSESSMENT — LIFESTYLE VARIABLES
HOW OFTEN DO YOU HAVE A DRINK CONTAINING ALCOHOL: NEVER
HOW MANY STANDARD DRINKS CONTAINING ALCOHOL DO YOU HAVE ON A TYPICAL DAY: 0,1 OR 2
ALCOHOL_USE_STATUS: NO OR LOW RISK WITH VALIDATED TOOL
AUDIT-C TOTAL SCORE: 0
HOW OFTEN DO YOU HAVE 6 OR MORE DRINKS ON ONE OCCASION: NEVER

## 2025-03-11 ASSESSMENT — PAIN SCALES - PAIN ASSESSMENT IN ADVANCED DEMENTIA (PAINAD)
BREATHING: OCCASIONAL LABORED BREATHING, SHORT PERIOD OF HYPERVENTILATION
BODYLANGUAGE: RELAXED
FACIALEXPRESSION: SAD. FRIGHTENED. FROWNING
CONSOLABILITY: NO NEED TO CONSOLE
TOTALSCORE: 2

## 2025-03-11 ASSESSMENT — ACTIVITIES OF DAILY LIVING (ADL)
FEEDING: NEEDS ASSISTANCE
DRESSING: NEEDS ASSISTANCE
BATHING: NEEDS ASSISTANCE
ADL_SHORT_OF_BREATH: NO
ADL_SCORE: 5
RECENT_DECLINE_ADL: NO
ADL_BEFORE_ADMISSION: NEEDS/REQUIRES ASSISTANCE
TOILETING: NEEDS ASSISTANCE

## 2025-03-12 LAB
BACTERIA BLD CULT: NORMAL
BACTERIA BLD CULT: NORMAL

## (undated) DIAGNOSIS — R53.1 WEAKNESS: ICD-10-CM

## (undated) DIAGNOSIS — S72.91XD CLOSED FRACTURE OF RIGHT FEMUR WITH ROUTINE HEALING, UNSPECIFIED FRACTURE MORPHOLOGY, UNSPECIFIED PORTION OF FEMUR, SUBSEQUENT ENCOUNTER: ICD-10-CM
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
## (undated) DIAGNOSIS — F03.91 DEMENTIA WITH BEHAVIORAL DISTURBANCE, UNSPECIFIED DEMENTIA TYPE (HCC): ICD-10-CM

## (undated) DIAGNOSIS — F03.90 DEMENTIA WITHOUT BEHAVIORAL DISTURBANCE, UNSPECIFIED DEMENTIA TYPE (HCC): ICD-10-CM

## (undated) DIAGNOSIS — Z48.89 ENCOUNTER FOR POST SURGICAL WOUND CHECK: ICD-10-CM

## (undated) DIAGNOSIS — Z11.59 ENCOUNTER FOR SCREENING FOR OTHER VIRAL DISEASES: Primary | ICD-10-CM

## (undated) DIAGNOSIS — R13.10 DYSPHAGIA, UNSPECIFIED TYPE: Primary | ICD-10-CM

## (undated) DIAGNOSIS — Z71.89 ENCOUNTER FOR MEDICATION REVIEW AND COUNSELING: ICD-10-CM

## (undated) DIAGNOSIS — R13.10 DYSPHAGIA, UNSPECIFIED TYPE: ICD-10-CM

## (undated) DIAGNOSIS — Z09 FOLLOW-UP EXAM: ICD-10-CM

## (undated) DIAGNOSIS — Z01.818 PRE-PROCEDURAL EXAMINATION: ICD-10-CM
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
## (undated) DIAGNOSIS — R13.19 ESOPHAGEAL DYSPHAGIA: ICD-10-CM

## (undated) DIAGNOSIS — I10 PRIMARY HYPERTENSION: ICD-10-CM

## (undated) DIAGNOSIS — R63.0 POOR APPETITE: ICD-10-CM

## (undated) DIAGNOSIS — Z01.818 PREOP EXAMINATION: Primary | ICD-10-CM

## (undated) DIAGNOSIS — Z71.2 ENCOUNTER TO DISCUSS TEST RESULTS: ICD-10-CM

## (undated) DIAGNOSIS — S72.001A CLOSED FRACTURE OF NECK OF RIGHT FEMUR, INITIAL ENCOUNTER (HCC): ICD-10-CM

## (undated) DIAGNOSIS — Z87.81 S/P RIGHT HIP FRACTURE: ICD-10-CM

## (undated) DIAGNOSIS — Z91.81 AT HIGH RISK FOR INJURY RELATED TO FALL: ICD-10-CM

## (undated) DEVICE — HOOD, PEEL-AWAY: Brand: FLYTE

## (undated) DEVICE — CONMED SCOPE SAVER BITE BLOCK, 20X27 MM: Brand: SCOPE SAVER

## (undated) DEVICE — 60 ML SYRINGE CATHETER TIP: Brand: MONOJECT

## (undated) DEVICE — DRAPE SRG U 124X80IN U REINF

## (undated) DEVICE — 2T11 #2 PDO 36 X 36: Brand: 2T11 #2 PDO 36 X 36

## (undated) DEVICE — KIT CLEAN ENDOKIT 1.1OZ GOWNX2

## (undated) DEVICE — HANDPIECE SET WITH HIGH FLOW TIP AND SUCTION TUBE: Brand: INTERPULSE

## (undated) DEVICE — NEEDLE CONTRAST INTERJECT 25G

## (undated) DEVICE — KIT ENDO ORCAPOD 160/180/190

## (undated) DEVICE — NET SPECIMEN RETRIEVAL BLUE

## (undated) DEVICE — LINE MNTR ADLT SET O2 INTMD

## (undated) DEVICE — DISPOSABLE DISTAL ATTACHMENT: Brand: DISPOSABLE DISTAL ATTACHMENT

## (undated) DEVICE — Device: Brand: CUSTOM PROCEDURE KIT

## (undated) DEVICE — 35 ML SYRINGE REGULAR TIP: Brand: MONOJECT

## (undated) DEVICE — EXOFIN TISSUE ADHESIVE 1.0ML

## (undated) DEVICE — RAT TOOTH/ALLIGATOR GRASPING FORCEPS

## (undated) DEVICE — 3 ML SYRINGE LUER-LOCK TIP: Brand: MONOJECT

## (undated) DEVICE — Device: Brand: DEFENDO AIR/WATER/SUCTION AND BIOPSY VALVE

## (undated) DEVICE — Device

## (undated) DEVICE — ANTERIOR HIP: Brand: MEDLINE INDUSTRIES, INC.

## (undated) DEVICE — DEVICE SPCMN RTRVL RAPTOR MINI

## (undated) DEVICE — SNARE 9MM 230CM 2.4MM EXACTO

## (undated) DEVICE — Device: Brand: STABLECUT®

## (undated) DEVICE — GAMMEX® PI HYBRID SIZE 8, STERILE POWDER-FREE SURGICAL GLOVE, POLYISOPRENE AND NEOPRENE BLEND: Brand: GAMMEX

## (undated) DEVICE — MEDI-VAC NON-CONDUCTIVE SUCTION TUBING 6MM X 1.8M (6FT.) L: Brand: CARDINAL HEALTH

## (undated) DEVICE — GAMMEX® NON-LATEX PI ORTHO SIZE 8.5, STERILE POLYISOPRENE POWDER-FREE SURGICAL GLOVE: Brand: GAMMEX

## (undated) DEVICE — SOL  .9 1000ML BTL

## (undated) DEVICE — INTENDED FOR TISSUE SEPARATION, AND OTHER PROCEDURES THAT REQUIRE A SHARP SURGICAL BLADE TO PUNCTURE OR CUT.: Brand: BARD-PARKER ® STAINLESS STEEL BLADES

## (undated) DEVICE — SOL  .9 3000ML

## (undated) DEVICE — WOUND RETRACTOR AND PROTECTOR: Brand: ALEXIS O WOUND PROTECTOR-RETRACTOR

## (undated) DEVICE — SYRINGE MNJCT 35ML LF STRL LL

## (undated) DEVICE — 6 ML SYRINGE LUER-LOCK TIP: Brand: MONOJECT

## (undated) DEVICE — HOOD: Brand: FLYTE

## (undated) DEVICE — DRESSING AQUACEL AG SP 3.5X10

## (undated) DEVICE — NEEDLE SPINAL 18X3-1/2 PINK.

## (undated) DEVICE — DEVICE SPEC RTRVL TLN 2.5MM

## (undated) DEVICE — SOLUTION SURG DURA PREP HAZMAT

## (undated) DEVICE — SUTURE VICRYL 2-0 CT-1

## (undated) DEVICE — SUTURE MONOCRYL 3-0 Y936H

## (undated) DEVICE — CHLORAPREP 26ML APPLICATOR

## (undated) DEVICE — 1016 S-DRAPE IRRIG POUCH 10/BOX: Brand: STERI-DRAPE™

## (undated) DEVICE — BOWL CEMENT MIX QUICK-VAC

## (undated) NOTE — IP AVS SNAPSHOT
Patient Demographics     Address  [address]  [address]  [address] [facility] [address] Phone  [phone] [facility]  [phone] (Mobile) *Preferred* E-mail Address  [email or web address]      Emergency Contact(s)     Name Relation Home Work Mobile    Talle known as: PROTONIX  Start taking on: July 27, 2021  Next dose due: Tomorrow morning      Take 1 tablet (40 mg total) by mouth every morning before breakfast.   Paige Lopez MD         Potassium Chloride ER 10 MEQ Tbcr  Commonly known as: K-DUR      T 0851   Temp  97.8 °F (36.6 °C) Filed at 07/26/2021 0851   SpO2  94 % Filed at 07/26/2021 0851      Patient's Most Recent Weight       Most Recent Value   Patient Weight  79.4 kg (175 lb)         Lab Results Last 24 Hours      RAINBOW DRAW LAVENDER [[id number] Kenny Reis MD at 7/23/2021  9:34 PM     Author: Yashira Guerrero MD Service: Gastroenterology Author Type: Physician    Filed: 7/23/2021  9:34 PM Date of Service: 7/23/2021  8:49 PM Status: Signed    : Yashira Guerrero MD (Physician)     Consult Orders    1 current alcohol use. He reports that he does not use drugs.     Allergies:  No Known Allergies    Medications:    Current Facility-Administered Medications:   •  sodium chloride 0.9% IV bolus 500 mL, 500 mL, Intravenous, Once **FOLLOWED BY** 0.9% NaCl infus Moderate to severe distention of the upper thoracic esophagus with partially imaged ingested contents/debris in the midthoracic esophagus at and just above the level of the maris.   These findings are compatible with clinical history of food bolus/impactio [i.e. polypectomy, control of bleeding, dilatation etc.] as indicated. Cristina Levy MD  [facility] Gastroenterology  7/23/2021    This note was partially prepared using [employer] voice recognition dictation software.  As a result, errors may occ generalized     Fever     Hyperglycemia     HTN (hypertension)     Febrile illness     Pressure injury of right heel, stage 4 (HCC)     Food impaction of esophagus     Esophageal dysphagia     Esophageal obstruction due to food impaction      Reason for Ad suggestive of achalasia  Pt also found to have distal esophageal diveritulum  Speech therapy to evaluate because pt has been having difficulty swallowing of late   Likely will need VFSS  Also will need esophageal manometry evaluation as OP  Cont PPI BID CoQ-10 100 MG Caps      Take by mouth 2 (two) times a day. Refills: 0     Enalapril Maleate 20 MG Tabs  Commonly known as: VASOTEC      Take 20 mg by mouth nightly.    Refills: 0     hydrochlorothiazide 12.5 MG Caps  Commonly known as: MICROZIDE      Take PHYSICAL THERAPY EVALUATION - INPATIENT     Room Number: 103/103-A  Evaluation Date: 7/24/2021  Type of Evaluation: Initial   Physician Order: PT Eval and Treat    Presenting Problem: Esophageal obstruction due to food impaction  Reason for Therapy:  Zhang Brooks Form.  Research supports that patients with this level of impairment may benefit from SIVA. Wife has preference for OP PT services if pt is able to progress with PT however she is open to SIVA if this is necessary.     Patient will benefit from continued IP Arousal/Alertness:  delayed responses to stimuli  · Orientation Level:  oriented to person, disoriented to place, disoriented to time and disoriented to situation  · Following Commands:  follows one step commands with increased time and follows one step co Recommendations of IP PT, safety precautions. Patient End of Session: In bed;Needs met;Call light within reach;RN aware of session/findings; All patient questions and concerns addressed; Family present    CURRENT GOALS    Goals to be met by: 7/31/21  Darcie Chacon 81year old male admitted 7/23/2021 for food impaction s/p EGD with removal of foreign substance. RN approved session. OT wore mask and gloves. Pt did not wear a mask. Pt's wife (who is an SLP) was present and very supportive. Pt is Lime. Pt is AxO to self. lethargy, impaired balance, decreased endurance, general weakness, tremors, disorientation. These deficits manifest functionally while performing functional mobility and ADLs.    The patient is below baseline and would benefit from skilled inpatient OT to floor walk up condo. Wife assists with ADLs, ambulation (using hand held assist), driving, IADLs. He was able to do the stairs independently. He has a tub with a GB and shower chair.      SUBJECTIVE  Pt agreeable to tx    OCCUPATIONAL THERAPY EXAMINATION ASSESSMENT  Supine to Sit : Moderate assistance (Mod A X2)  Sit to Stand:  Moderate assistance (Mod A x2)  Toilet Transfer: NT  Shower Transfer: NT  Chair Transfer: NT    Bedroom Mobility: Mod A x2 to take side steps along the bed with bilateral HHA    HOME reports the impacted food was a piece of steak. Pt on solid/thin liquids at home, with spouse (a retired school SLP). Spouse verbalized that Pt \"sometimes coughs on liquids\" at home.        PMH of dysphagia:  BSE 10/08/20 recommended soft/mildly thick liq straw. BSE results/recommendations discussed with Pt and spouse; good understanding. Swallowing precautions written on white board in Pt room. PLAN:    To f/u x2 sessions/meals for dysphagia treatment.  Will ensure safe tolerance of diet and reinfo Oral Phase of Swallow: Impaired  Bolus Propulsion: Impaired  Mastication: Impaired  Retention: Impaired    (Please note: Silent aspiration cannot be evaluated clinically.  Videofluoroscopic Swallow Study is required to rule-out silent aspiration.)    Esopha Interventions:   - advance diet per GI as tolerated.   - See additional Care Plan goals for specific interventions

## (undated) NOTE — LETTER
[address] [place] [address]      Authorization for Surgical Operation and Procedure     Date:___________                                                                                                         Time:__________  1. I hereby authorize* Surgery not found *, my physician and his/her assistants (if applicable), which may include medical students, residents, and/or fellows, to perform the following surgical operation/ procedure and administer such anesthesia as may be determined necessary by my physician:  Operation/Procedure name (s )Right Hip Reduction and Internal fixation versus Hemiarthroplasty versus Total Hip Arthroplastyy   on [facility].   2.   I recognize that during the surgical operation/procedure, unforeseen conditions may necessitate additional or different procedures than those listed above. I, therefore, further authorize and request that the above-named surgeon, assistants, or designees perform such procedures as are, in their judgment, necessary and desirable. 3.   My surgeon/physician has discussed prior to my surgery the potential benefits, risks and side effects of this procedure; the likelihood of achieving goals; and potential problems that might occur during recuperation. They also discussed reasonable alternatives to the procedure, including risks, benefits, and side effects related to the alternatives and risks related to not receiving this procedure. I have had all my questions answered and I acknowledge that no guarantee has been made as to the result that may be obtained. 4.   Should the need arise during my operation or immediate post-operative period, I also consent to the administration of blood and/or blood products.   Further, I understand that despite careful testing and screening of blood or blood products by collecting agencies, I may still be subject to ill effects as a result of receiving a blood transfusion and/or blood products. The following are some, but not all, of the potential risks that can occur: fever and allergic reactions, hemolytic reactions, transmission of diseases such as Hepatitis, AIDS and Cytomegalovirus (CMV) and fluid overload. In the event that I wish to have an autologous transfusion of my own blood, or a directed donor transfusion. I will discuss this with my physician. 5.   I authorize the use of any specimen, organs, tissues, body parts or foreign objects that may be removed from my body during the operation/procedure for diagnosis, research or teaching purposes and their subsequent disposal by hospital authorities. I also authorize the release of specimen test results and/or written reports to my treating physician on the hospital medical staff or other referring or consulting physicians involved in my care, at the discretion of the Pathologist or my treating physician. 6.   I consent to the photographing or videotaping of the operations or procedures to be performed, including appropriate portions of my body for medical, scientific, or educational purposes, provided my identity is not revealed by the pictures or by descriptive texts accompanying them. If the procedure has been photographed/videotaped, the surgeon will obtain the original picture, image, videotape or CD. The hospital will not be responsible for storage, release or maintenance of the picture, image, tape or CD.    7.   I consent to the presence of a  or observers in the operating room as deemed necessary by my physician or their designees. 8.   I recognize that in the event my procedure results in extended X-Ray/fluoroscopy time, I may develop a skin reaction. 9.  If I have a Do Not Attempt Resuscitation (DNAR) order in place, that status will be suspended while in the operating room, procedural suite, and during the recovery period unless otherwise explicitly stated by me (or a person authorized to consent on my behalf). The surgeon or my attending physician will determine when the applicable recovery period ends for purposes of reinstating the DNAR order. 10. Patients having a sterilization procedure: I understand that if the procedure is successful the results will be permanent and it will therefore be impossible for me to inseminate, conceive, or bear children. I also understand that the procedure is intended to result in sterility, although the result has not been guaranteed. 11. I acknowledge that my physician has explained sedation/analgesia administration to me including the risk and benefits I consent to the administration of sedation/analgesia as may be necessary or desirable in the judgment of my physician. I CERTIFY THAT I HAVE READ AND FULLY UNDERSTAND THE ABOVE CONSENT TO OPERATION and/or OTHER PROCEDURE.    _________________________________________  __________________________________  Signature of Patient     Signature of Responsible Person         ___________________________________         Printed Name of Responsible Person           _________________________________                  Relationship to Patient  _________________________________________  ______________________________  Signature of Witness          Date  Time    STATEMENT OF PHYSICIAN My signature below affirms that prior to the time of the procedure; I have explained to the patient and/or his/her legal representative, the risks and benefits involved in the proposed treatment and any reasonable alternative to the proposed treatment. I have also explained the risks and benefits involved in refusal of the proposed treatment and alternatives to the proposed treatment and have answered the patient's questions.  If I have a significant financial interest in a co-management agreement or a significant financial interest in any product or implant, or other significant relationship used in this procedure/surgery, I have disclosed this and had a discussion with my patient.     _______________________________________________________________ _____________________________  Clarice Listen of Physician)                                                                                         (Date)                                   (Time)        Patient Name: Ole Umana.     : 1935   Printed: 3/22/2022      Medical Record #: [id number]                                              Page 1 of 1

## (undated) NOTE — LETTER
[facility]  Administration of Anesthesia  1. Leonardo Granegr., or _________________________________ acting on his behalf, (Patient) (Dependent/Representative) request to receive anesthesia for my pending procedure/operation/treatment. A physician (anesthesiologist) alone or an anesthesiologist working with a nurse anesthetist may administer my anesthesia. 2. I understand that my anesthesiologist is not an employee or agent of the hospital, but is an independent medical practitioner who has been permitted to use its facilities for the care and treatment of his/her patients. 3. I acknowledge that a physician from [facility] or their designate(s), recommended anesthesia for me using her/his medical judgment. The type(s) of anesthesia I may receive include:                a) General Anesthesia, b) Spinal/Epidural Anesthesia, c) Regional Anesthesia or d) Monitored Anesthesia Care. 4. If my spinal, regional or monitored anesthesia care (local) is not satisfactory for my comfort, or if my medical condition requires, I consent to the administration of general anesthesia. 5. I am aware that the practice of anesthesiology is not an exact science and that some foreseeable risks or consequences may occur. Some common risks/consequences include sore throat and hoarseness, nausea and vomiting, muscle soreness, backache, damage to the mouth/teeth/vocal cords and eye injury. I understand that more rare but serious potential risks of anesthesia include blood pressure changes, drug reactions, cardiac arrest, brain damage, paralysis or death. These risks apply to whether I have general, spinal/epidural, regional or monitored anesthesia care. 6. OBSTETRIC PATIENTS: Specific risks/consequences of spinal/epidural anesthesia may include itching, low blood pressure, difficulty urinating, slowing of the baby's heart rate and headache.  Rare risks include infections, high spinal block, spinal bleeding, seizure, cardiac arrest and death. 7. AWARENESS: I understand that it is possible (but unlikely) to have explicit memory of events from the operating room while under general anesthesia. 8. ELECTROCONVULSIVE THERAPY PATIENTS: This consent serves for all treatments in a single course of therapy. 9. I understand that I must inform my anesthesiologist when I last ate and/or drank to minimize the risk of anesthesia. 10. If I am pregnant, or may pregnant, I understand that elective surgery should be postponed until after the baby is born. Anesthetics cross the placenta and may temporarily anesthetize the baby. Although fetal complications of anesthesia during pregnancy are rare, they may include birth defects, premature labor, brain damage and death. 11. I certify that I informed the anesthesiologist, to the best of my ability, about medication I take including blood thinners, anticoagulants, herbal remedies, narcotics and recreational drugs (e.g. cocaine, marijuana, PCP). Failure to inform my anesthesiologist about these medicines may increase my risk of anesthetic complications. The nature and purpose of my anesthetic management was explained to me. I had the opportunity to ask questions and the answers and information provided meet my satisfaction.   I retain the right to withdraw this consent at any time prior to the administration of said anesthetic.    ___________________________________________________           _____________________________________________________  Patient Signature                                                                                      Witness Signature                ___________________________________________________           _____________________________________________________  Date/Time                                                                                               Responsible person in case of minor/ unconscious pt /Relationship    My signature below affirms that prior to the time of the procedure, I have explained to the patient and/or his/her guardian, the risks and benefits of undergoing anesthesia, as well as any reasonable alternatives. ___________________________________________________            _____________________________________________________  Physician Signature                            Date/Time  Patient Name: Trena Eli.      : 1935     Printed: 2022      Medical Record #: [id number]                              Page 1 of 1    ----------ANESTHESIA CONSENT----------

## (undated) NOTE — IP AVS SNAPSHOT
Patient Demographics     Address  [address]  [address]  [facility] [address] Phone  [phone] (Home)  [phone] (Mobile) *Preferred* E-mail Address  [email or web address]      Patient Contacts     Name Relation Home Work Mobile    Serena Ramos Spouse   [phone]      Allergies as of 1/22/2024  Review status set to Review Complete on 1/17/2024   No Known Allergies     Code Status Information     Code Status    Full Code      Patient Instructions    None        Your Home Meds List      TAKE these medications       Instructions Authorizing Provider Morning Afternoon Evening As Needed   acetaminophen 500 MG Tabs  Commonly known as: Tylenol Extra Strength      Take 2 tablets (1,000 mg total) by mouth every 8 (eight) hours as needed for Pain.          ARIPiprazole 2 MG Tabs  Commonly known as: Abilify      Take 1 tablet (2 mg total) by mouth every other day.   Catrina Kohler         aspirin 81 MG Tabs      Take 1 tablet (81 mg total) by mouth daily.          carbidopa-levodopa  MG Tabs  Commonly known as: SINEMET      Take 0.5 tablets by mouth 3 (three) times daily for 14 days.  Stop taking on: February 4, 2024   Woo Cartagena         carbidopa-levodopa  MG Tabs  Commonly known as: SINEMET  Start taking on: February 3, 2024      Take 1 tablet by mouth 3 (three) times daily.   Woo Cartagena         CoQ-10 100 MG Caps      Take 100 mg by mouth daily.          donepezil 10 MG Tabs  Commonly known as: Aricept      Take 1 tablet (10 mg total) by mouth nightly. TAKE ONE TABLET BY MOUTH EVERY MORNING   Asrar Ahmed         hydrOXYzine Pamoate 25 MG Caps  Commonly known as: VISTARIL      hydroxyzine pamoate 25 mg capsule   TAKE ONE CAPSULE BY MOUTH DAILY AS NEEDED          melatonin 1 MG Tabs      Take 2 tablets (2 mg total) by mouth nightly.          methenamine 1 g Tabs  Commonly known as: Hiprex      Take 1 tablet (1 g total) by mouth 2 (two) times daily.   Woo Cartagena          mirtazapine 15 MG Tabs  Commonly known as: Remeron      TAKE ONE TABLET BY MOUTH AT BEDTIME   Niko Dunbar         polyethylene glycol (PEG 3350) 17 g Pack  Commonly known as: Miralax      Take 17 g by mouth every other day.          simvastatin 40 MG Tabs  Commonly known as: Zocor      Take 1 tablet (40 mg total) by mouth nightly.          venlafaxine 37.5 MG Tabs  Commonly known as: Effexor      Take 1 tablet (37.5 mg total) by mouth 2 (two) times daily.          Vitamin D 50 MCG (2000 UT) Tabs      Take by mouth nightly.                Where to Get Your Medications      These medications were sent to [id number] [place] [facility] [address] [phone], [phone]  [address] [address]    Phone: [phone]   carbidopa-levodopa  MG Tabs  carbidopa-levodopa  MG Tabs  methenamine 1 g Tabs           [phone] - MAR ACTION REPORT  (last 48 hrs)    ** SITE UNKNOWN **     Order ID Medication Name Action Time Action Reason Comments    [id number] ARIPiprazole (Abilify) tab 2 mg 01/21/24 0833 Given      [id number] aspirin DR tab 81 mg 01/20/24 0901 Given      [id number] aspirin DR tab 81 mg 01/21/24 0833 Given      [id number] carbidopa-levodopa (SINEMET)  MG per tab 0.5 tablet 01/20/24 1721 Given      [id number] carbidopa-levodopa (SINEMET)  MG per tab 0.5 tablet 01/21/24 0834 Given      [id number] carbidopa-levodopa (SINEMET)  MG per tab 0.5 tablet 01/21/24 1258 Given      [id number] carbidopa-levodopa (SINEMET)  MG per tab 0.5 tablet 01/21/24 1713 Given      [id number] carbidopa-levodopa (SINEMET)  MG per tab 0.5 tablet 01/22/24 0650 Given      [id number] cefTRIAXone (Rocephin) 1 g in D5W 100 mL IVPB-ADD 01/20/24 1404 New Bag      [id number] cefTRIAXone (Rocephin) 1 g in D5W 100 mL IVPB-ADD 01/21/24 1456 New Bag      [id number] donepezil (Aricept) tab 10 mg 01/20/24 2045 Given      [id number] donepezil (Aricept) tab 10 mg 01/21/24 2121 Given      [id number] melatonin  tab 2 mg 01/20/24 2045 Given      [id number] melatonin tab 2 mg 01/21/24 2121 Given      [id number] mirtazapine (REMERON SOL-TAB) disintegrating tab 15 mg 01/20/24 2045 Given      [id number] mirtazapine (REMERON SOL-TAB) disintegrating tab 15 mg 01/21/24 2121 Given      [id number] polyethylene glycol (PEG 3350) (Miralax) 17 g oral packet 17 g 01/21/24 0834 Given      [id number] pravastatin (Pravachol) tab 20 mg 01/20/24 2045 Given      [id number] pravastatin (Pravachol) tab 20 mg 01/21/24 2121 Given      [id number] venlafaxine (Effexor) tab 37.5 mg 01/20/24 0901 Given      [id number] venlafaxine (Effexor) tab 37.5 mg 01/20/24 2045 Given      [id number] venlafaxine (Effexor) tab 37.5 mg 01/21/24 0833 Given      [id number] venlafaxine (Effexor) tab 37.5 mg 01/21/24 2121 Given      [id number] vitamin E (Alpha-E) cap 400 Units 01/20/24 0901 Given      [id number] vitamin E (Alpha-E) cap 400 Units 01/21/24 0833 Given            LEFT LOWER ABDOMEN     Order ID Medication Name Action Time Action Reason Comments    [id number] heparin (Porcine) 5000 UNIT/ML injection 5,000 Units 01/21/24 2127 Given            RIGHT LOWER ABDOMEN     Order ID Medication Name Action Time Action Reason Comments    [id number] heparin (Porcine) 5000 UNIT/ML injection 5,000 Units 01/20/24 0901 Given      [id number] heparin (Porcine) 5000 UNIT/ML injection 5,000 Units 01/21/24 0834 Given            RIGHT UPPER ABDOMEN     Order ID Medication Name Action Time Action Reason Comments    [id number] heparin (Porcine) 5000 UNIT/ML injection 5,000 Units 01/20/24 2045 Given              Recent Vital Signs    Flowsheet Row Most Recent Value   /86 Filed at 01/22/2024 0546   Pulse 54 Filed at 01/22/2024 0546   Resp 18 Filed at 01/22/2024 0546   Temp 98.2 °F (36.8 °C) Filed at 01/22/2024 0546   SpO2 96 % Filed at 01/22/2024 0546      Patient's Most Recent Weight    Flowsheet Row Most Recent Value   Patient Weight 65.9 kg (145 lb 3.2 oz)      Lab Results Last 24 Hours     No matching results found     Microbiology Results (All)     Procedure Component Value Units Date/Time    Blood Culture [[id number]] Collected: 01/17/24 1413    Order Status: Completed Lab Status: Preliminary result Updated: 01/21/24 1500    Specimen: Blood,peripheral      Blood Culture Result No Growth 4 Days    Narrative:      Aerobic Bottle Volume - 5 mL  Anaerobic Bottle Volume - 5 mL    Blood Culture [[id number]] Collected: 01/17/24 1413    Order Status: Completed Lab Status: Preliminary result Updated: 01/21/24 1500    Specimen: Blood,peripheral      Blood Culture Result No Growth 4 Days    SARS-CoV-2/Flu A and B/RSV by PCR (GeneXpert) [[id number]]  (Normal) Collected: 01/17/24 1026    Order Status: Completed Lab Status: Final result Updated: 01/17/24 1109    Specimen: Other from Nares      SARS-CoV-2 (COVID-19) - (GeneXpert) Not Detected     Influenza A by PCR Negative     Influenza B by PCR Negative     RSV by PCR Negative    Narrative:      This test is intended for the qualitative detection and differentiation of SARS-CoV-2, influenza A, influenza B, and respiratory syncytial virus (RSV) viral RNA in nasopharyngeal or nares swabs from individuals suspected of respiratory viral infection consistent with COVID-19 by their healthcare provider. Signs and symptoms of respiratory viral infection due to SARS-CoV-2, influenza, and RSV can be similar.    Test performed using the Xpert Xpress SARS-CoV-2/FLU/RSV (real time RT-PCR)  assay on the GeneXpert instrument, [employer], [place] [address].   This test is being used under the Food and Drug Administration's Emergency Use Authorization.    The authorized Fact Sheet for Healthcare Providers for this assay is available upon request from the laboratory.      Pending Labs     Order Current Status    Blood Culture Preliminary result    Blood Culture Preliminary result         H&P - H&P Note      H&P signed by Otilio Francis MD at 1/18/2024 10:20 AM   Version 1 of 1     Author: Otilio Francis MD Service: Hospitalist Author Type: Physician    Filed: 1/18/2024 10:20 AM Status: Signed    : Otilio Francis MD (Physician)       [facility]    PATIENT'S NAME: VIET MCKINLEY JR.   ATTENDING PHYSICIAN: Terry Bates MD   PATIENT ACCOUNT#:   [id number]    LOCATION:  [place] [address]  MEDICAL RECORD #:   [id number]       YOB: 1935  ADMISSION DATE:       01/17/2024    HISTORY AND PHYSICAL EXAMINATION    (Addendum added 01/18/2024)    CHIEF COMPLAINT:  Mobility decline and urinary tract infection.    HISTORY OF PRESENT ILLNESS:  The patient is an 88-year-old  male who currently resides in a nursing facility, brought in today by his wife for progressive weakness for the last 2 days.  The patient had similar hospitalizations last year.  He has known recurrent urinary tract infection and underlying enlarged prostate.  Today, CBC showed white blood cell count of 9.2, no left shift.  Hemoglobin stable at 12.7.  MCV is elevated at 101.3.  Reviewing the records, the patient had history of vitamin 12 deficiency based on a blood test done in October 202.  Chemistry, magnesium, troponin, TSH, GeneXpert viral panel were negative.  Urinalysis showed evidence of gross urinary tract infection.  CT scan of the brain and chest x-ray were unremarkable.    PAST MEDICAL HISTORY:  Per the record, the patient has history of kidney stones, dementia, osteoarthritis, hypertension, hyperlipidemia, benign prostatic hypertrophy.  Imaging studies in the past showed bilateral staghorn calculi possibly serving as a nidus for recurrent urinary tract infections.  He has history of anxiety and seizure disorder.    PAST SURGICAL HISTORY:  Cystoscopy and lithotripsy and transurethral resection of the prostate.    MEDICATIONS:  Please see medication reconciliation list.    FAMILY HISTORY:  Positive for heart disease.    SOCIAL HISTORY:  Ex-tobacco user.  No current tobacco,  alcohol, or drug use.  He requires assistance in his basic activities of daily living.      REVIEW OF SYSTEMS:  Per the wife, the patient had rapid decline in his mobility over the last 12 months, now he cannot even stand up or walk on his own.  He is almost bedbound.  Other 12-point review of systems difficult to obtain from the patient himself.  He wears diapers and he is not able to get to the bathroom.      PHYSICAL EXAMINATION:    GENERAL:  Alert, able to answer questions, tremors noted upper extremities.  VITAL SIGNS:  Temperature 97.6, pulse 73, respiratory rate 14, blood pressure 121/71, pulse ox 100% on room air.  HEENT:  Atraumatic.  Oropharynx clear.  Dry mucous membranes.    NECK:  Supple.  No lymphadenopathy.  Trachea midline.  Full range of motion.  LUNGS:  Clear to auscultation bilaterally.  Normal respiratory effort.   HEART:  Regular rate and rhythm.  S1 and S2 auscultated.  No murmur.  ABDOMEN:  Soft, nondistended.  No tenderness.  Positive bowel sounds.  EXTREMITIES:  No edema, clubbing, or cyanosis.  NEUROLOGIC:  Upper extremity tremors and cogwheel rigidity noted.    ASSESSMENT AND PLAN:    1.   Recurrent urinary tract infection.  Prior urine cultures positive for Proteus mirabilis and imaging studies showing bilateral staghorn kidney stones.   2.   Patient supposed to be on methenamine suppressive therapy, but this has not been done in the last few weeks per the wife.  3.   Clinical manifestation suggestive of Parkinson disease.  4.   Macrocytic anemia, rule out vitamin B12 deficiency.    The patient will be admitted to general medical floor.  Obtain Urology consult.  Also start him on IV Rocephin, IV fluids.  Fall and aspiration precautions.  Further recommendations to follow.    ADDENDUM (Job [id number]):    ASSESSMENT AND PLAN:  Vitamin B12 of 427, which is low normal.  Lactic acid 4.5.  Patient was given IV fluid sepsis bolus.  We will continue to monitor his hemodynamic status and trend  lactic acid level.  Follow up on blood cultures.    Dictated By Otilio Francis MD  d: 2024 14:16:14  t: 2024 14:22:01  [facility] [id number]  FB/    cc: Terry Bates MD      Electronically signed by Otilio Francis MD on 2024 10:20 AM              Consults - MD Consult Notes      Consults signed by Pop Ruffin MD at 2024  1:12 AM     Author: Pop Ruffin MD Service: Psychiatry Author Type: Physician    Filed: 2024  1:12 AM Date of Service: 2024  3:58 PM Status: Signed    : Pop Ruffin MD (Physician)     Consult Orders    1. Consult to PsychIATRY [[id number]] ordered by Woo Cartagena MD at [address] [facility]             [facility]    Report of Consultation    Fernando Ramos  Patient Status:  Inpatient    1935 MRN [id number]   Location [facility] 5SW/SE Attending Woo Cartagena MD   Hosp Day # 3 PCP [facility]     Date of Admission:  2024  Date of Consult: 2024  Reason for Consultation:   Patient presented with increased confusion and history of depression, Woo Brown MD requested psychiatric consult for evaluation and advice.    Consult Duration     The patient seen for initial psychiatric consult evaluation.   Record reviewed, communication with attending, communication with RN and patient seen face to face evaluation.    History of Present Illness:   Patient is a 88 year old   male with history of osteoarthritis, HTN, hyperlipidemia, BPH and frequent UTI with history of depression who presented to the hospital for another UTI.  Patient in the medical floor with some confusion indicated psych consult for evaluation and advice.    The patient is seen today in the presence of his wife who seem very attentive to his medical needs.  The patient laying down in her recliner with noticeable tremor somewhat disheveled trying to eat his yogurt with noticeable coordination  difficulty.    Patient and wife admitted long-term history of depression and the patient has been on mirtazapine and aripiprazole.  They understand that he has a parkinsonian possibly resulted from medication.  Otherwise his wife who reported that patient have severe episode of depression hopelessness and suicidality and she had difficulty understanding worried that, from because he is generally a pleasant person but feels the medication has made significant shift and worry about changing them.    In meantime the patient presented the pleasant with no sign of hopelessness or helplessness reporting he is feeling happy and he is content.    The patient admitted that he is forgetful sometime but according to his wife it is on and off not consistent.  Patient reporting some difficulty sleeping at time otherwise today denying any hallucination, delusional ideation or any bizarre behavior.    Wife indicating that maybe he need follow-up outpatient when he is in more stable to place otherwise there is no agitation and no indication for immediate need for medication changes.    Past Psychiatric/Medication History:  1. Prior diagnoses: History of depression  2. Past psychiatric inpatient: Few psychiatric admission in the past none recently  3. Past outpatient history: He take his medication from his primary physician  4. Past suicide history: Previous suicidal ideation far in the past  5. Medication history: Mirtazapine and Abilify    Social History:   Marital status:   Living Situation: Lives at home with his wife  Education/Occupation: Retired  ETOH: (H/O withdrawal and Rehab): No history of alcohol  Substance: No history of substance      Family History:  No psychiatric family history  Medical History:   Past Medical History  Past Medical History:   Diagnosis Date    Anxiety state     BPH (benign prostatic hyperplasia)     Dementia (HCC)     Depression     Dysphagia     Febrile illness 02/13/2021    High blood  pressure     Kidney stones     Other and unspecified hyperlipidemia     Pneumonia due to organism     Seizure disorder (HCC)     because of venalfaxine    Skin cancer     melanoma    Unspecified essential hypertension        Past Surgical History  Past Surgical History:   Procedure Laterality Date    EGD  2022    ESOPHAGOGASTRODUODENOSCOPY with Botox injection    EGD  2021    Large food impaction in the mid and distal esophagus    HEMIARTHROPLASTY HIP Right 2022    Right Cemented Hemiarthroplasty    LITHOTRIPSY      TRANSURETHRAL DESTRUCTION, PROSTATE TISSUE; WATER-INDUC         Family History  Family History   Problem Relation Age of Onset    Alcohol and Other Disorders Associated Father     Heart Disorder Father     Depression Father     Heart Disorder Mother        Social History  Social History     Socioeconomic History    Marital status:    Tobacco Use    Smoking status: Former     Years: 3     Types: Cigarettes     Quit date: 1960     Years since quittin.4    Smokeless tobacco: Never   Vaping Use    Vaping Use: Never used   Substance and Sexual Activity    Alcohol use: Yes     Comment: occasionally    Drug use: Never     Social Determinants of Health     Food Insecurity: No Food Insecurity (2024)    Food Insecurity     Food Insecurity: Never true   Transportation Needs: No Transportation Needs (2024)    Transportation Needs     Lack of Transportation: No   Housing Stability: Low Risk  (2024)    Housing Stability     Housing Instability: No           Current Medications:  Current Facility-Administered Medications   Medication Dose Route Frequency    cefTRIAXone (Rocephin) 1 g in D5W 100 mL IVPB-ADD  1 g Intravenous Q24H    heparin (Porcine) 5000 UNIT/ML injection 5,000 Units  5,000 Units Subcutaneous 2 times per day    acetaminophen (Tylenol Extra Strength) tab 500 mg  500 mg Oral Q4H PRN    ondansetron (Zofran) 4 MG/2ML injection 4 mg  4 mg Intravenous Q6H PRN     metoclopramide (Reglan) 5 mg/mL injection 10 mg  10 mg Intravenous Q8H PRN    ARIPiprazole (Abilify) tab 2 mg  2 mg Oral QOD    aspirin DR tab 81 mg  81 mg Oral Daily    donepezil (Aricept) tab 10 mg  10 mg Oral Nightly    melatonin tab 2 mg  2 mg Oral Nightly    mirtazapine (REMERON SOL-TAB) disintegrating tab 15 mg  15 mg Oral Nightly    polyethylene glycol (PEG 3350) (Miralax) 17 g oral packet 17 g  17 g Oral QOD    pravastatin (Pravachol) tab 20 mg  20 mg Oral Nightly    venlafaxine (Effexor) tab 37.5 mg  37.5 mg Oral BID     Medications Prior to Admission   Medication Sig    methenamine 1 g Oral Tab Take 1 tablet (1 g total) by mouth 2 (two) times daily.    donepezil 10 MG Oral Tab Take 1 tablet (10 mg total) by mouth nightly. TAKE ONE TABLET BY MOUTH EVERY MORNING    venlafaxine 37.5 MG Oral Tab Take 1 tablet (37.5 mg total) by mouth 2 (two) times daily.    hydrOXYzine Pamoate 25 MG Oral Cap hydroxyzine pamoate 25 mg capsule   TAKE ONE CAPSULE BY MOUTH DAILY AS NEEDED    polyethylene glycol, PEG 3350, 17 g Oral Powd Pack Take 17 g by mouth every other day.    acetaminophen 500 MG Oral Tab Take 2 tablets (1,000 mg total) by mouth every 8 (eight) hours as needed for Pain.    melatonin 1 MG Oral Tab Take 2 tablets (2 mg total) by mouth nightly.    ARIPiprazole 2 MG Oral Tab Take 1 tablet (2 mg total) by mouth every other day.    Cholecalciferol (VITAMIN D) 50 MCG (2000 UT) Oral Tab Take by mouth nightly.    aspirin 81 MG Oral Tab Take 1 tablet (81 mg total) by mouth daily.    mirtazapine 15 MG Oral Tab TAKE ONE TABLET BY MOUTH AT BEDTIME    Coenzyme Q10 (COQ-10) 100 MG Oral Cap Take 100 mg by mouth daily.    Simvastatin 40 MG Oral Tab Take 1 tablet (40 mg total) by mouth nightly.    [] cephalexin 500 MG Oral Cap Take 1 capsule (500 mg total) by mouth 3 (three) times daily for 7 days.       Allergies  No Known Allergies    Review of Systems:   As by Admitting/Attending    Results:   Laboratory Data:  Lab  Results   Component Value Date    WBC 3.7 (L) 01/19/2024    HGB 10.9 (L) 01/19/2024    HCT 32.7 (L) 01/19/2024    .0 01/19/2024    CREATSERUM 0.62 (L) 01/19/2024    BUN 6 (L) 01/19/2024     (H) 01/19/2024    K 4.1 01/19/2024     (H) 01/19/2024    CO2 30.0 01/19/2024    GLU 85 01/19/2024    CA 9.4 01/19/2024    ALB 3.4 01/19/2024    ALKPHO 97 01/17/2024    TP 7.2 01/17/2024    AST 29 01/17/2024    ALT 21 01/17/2024    INR 1.18 (H) 03/23/2023    PTP 14.9 (H) 03/23/2023    TSH 1.562 01/17/2024    LIP 48 09/21/2023    ESRML 26 (H) 05/03/2021    CRP 1.22 (H) 05/03/2021    MG 2.0 01/17/2024    PHOS 2.8 01/19/2024     03/14/2023    B12 427 01/17/2024         Imaging:  CT BRAIN OR HEAD ([id number])    Result Date: 1/17/2024  CONCLUSION: No acute finding    Dictated by (CST): Ryne Moulton MD on 1/17/2024 at 1:45 PM     Finalized by (CST): Ryne Moulton MD on 1/17/2024 at 2:03 PM          XR CHEST AP PORTABLE  (CPT=71045)    Result Date: 1/17/2024  CONCLUSION:  1. No acute airspace disease.  Minimal left basal scarring/atelectasis.    Dictated by (CST): Mehul Rondon MD on 1/17/2024 at 11:56 AM     Finalized by (CST): Mehul Rondon MD on 1/17/2024 at 12:01 PM           Vital Signs:   Blood pressure (!) 161/70, pulse 67, temperature 98.1 °F (36.7 °C), temperature source Oral, resp. rate 18, height 68\", weight 65.9 kg (145 lb 3.2 oz), SpO2 93%.    Mental Status Exam:   Appearance: Stated age male, in hospital gown, laying down in his recliner  Psychomotor: No psychomotor agitation, or retardation.  Noticeable tremors, restlessness and akathisia.  Orientation: Alert and oriented to person, place, time and somewhat to condition.  Gait: Not evaluated.  Attitude/Coorperation: Cooperative and attentive.  Behavior: Difficulty engaging otherwise pleasant and smiling  Speech: Scattered speech with some limitation.  Mood: Patient reporting feeling good.  Affect: Congruent with the mood.  She presented with a  smile  Thought process: Furtive speech.  Thought content: Patient denies any suicidal or homicidal ideation.  Perceptions: Patient denies any auditory or visual hallucinations.  Patient has been demonstrating some alternation in his cognition  Concentration: Grossly distracted  Memory: Grossly moderate impairment  Intellect: Average.  Judgment and Insight: Questionable.     Impression:     Delirium due to another medical condition.  Major depressive disorder, recurrent in remission.  Neuroleptic induced parkinsonian.  Urinary tract infection without hematuria, site unspecified  Weakness      The patient is an 88-year-old   male with a chronic history of depression with multiple medical condition who has been on psychotropic medication for many years and has discover induced parkinsonian.  Patient has been diagnosed with some dementia otherwise brain scan is negative.  According to wife his cognition fluctuate indicate the possibility of some vascular dementia.  Otherwise the patient has been demonstrating some confusion during this admission which is common in his condition especially with a frequent UTI.  There is some improvement today.    There is no agitation and no indication for medication adjustment but patient will benefit from outpatient follow-up.    Discussed risk and benefit, acknowledging the current symptom and severity.  At this point, I would recommend the following approach:     Focus on safety  Focus on education and support.  Focus on insight orientation helping the patient understand diagnosis and treatment plan.  Continue Abilify 2 mg daily.  Continue Remeron 15 mg nightly.  Start vitamin E 400 unit daily  Coordinate plan with team    Orders This Visit:  Orders Placed This Encounter   Procedures    Urinalysis, Routine    CBC With Differential With Platelet    Comp Metabolic Panel (14)    Magnesium    Troponin I (High Sensitivity)    TSH W Reflex To Free T4    Lactic Acid, Plasma     Basic Metabolic Panel (8)    CBC With Differential With Platelet    Vitamin B12    Lactic Acid Reflex Post Positive    Lactic Acid Post Positive    Lactic Acid, Plasma    CBC With Differential With Platelet    Renal Function Panel    SARS-CoV-2/Flu A and B/RSV by PCR (GeneXpert)    Blood Culture       Meds This Visit:  Requested Prescriptions      No prescriptions requested or ordered in this encounter       Pop Ruffin MD  1/20/2024    This note was prepared using Dragon Medical voice recognition dictation software and as a result, errors may occur. When identified, these errors have been corrected. While every attempt is made to correct errors during dictation, discrepancies may still exist.      Electronically signed by Pop Ruffin MD on 1/20/2024  1:12 AM           D/C Summary    No notes of this type exist for this encounter.        Physical Therapy Notes (last 72 hours)      Physical Therapy Note signed by Sacksteder, Kathleen M, PT at 1/19/2024 11:49 AM  Version 1 of 1    Author: Sacksteder, Kathleen M, PT Service: Rehab Author Type: Physical Therapist    Filed: 1/19/2024 11:49 AM Date of Service: 1/19/2024 11:31 AM Status: Signed    : Sacksteder, Kathleen M, PT (Physical Therapist)       PHYSICAL THERAPY TREATMENT NOTE - INPATIENT     Room Number: 543/543-A       Presenting Problem: UTI, weaikness    Hx of Dementia with behavior disturbance/ Parkinsonism  -- Neurology and psychology on consult  Open sores / wounds on B toes feet area ( RN Informed to inform MD )     Problem List  Principal Problem:    Urinary tract infection without hematuria, site unspecified  Active Problems:    Weakness    Parkinson's disease without dyskinesia or fluctuating manifestations      PHYSICAL THERAPY ASSESSMENT   Chart reviewed. RN  approved participation in physical therapy.  Spouse present in room . PPE worn by therapist: mask, gloves, and N95. Patient was not wearing a mask during session. Patient  presented in bed with 0/10 pain.       Pt is alert oriented to person only agreeable to mobility .  Pt can get irritated during fxn mobility but is easily distracted and cooperative throughout session.       Pt is sensitive to touch throughout UE and LE during assistance for fxn mobility .  Pt when repositioning LE at end of session noting redness in distal left LE and sensitivity to touch .  B socks removed to assess feet ---pt noted to have multiple significant B feet toe open skin area and wounds with drainage noted observed left foot / toe area .   Poor feet skin integrity .  RN contacted to assess B feet and toes to inform MD .  Per spouse is room they were supposed to go to the foot MD today and have missed multiple prior appts as unable to get patient to MD .       Prior to this visit there were no WB restrictions noted in chart and  medical team unaware of B toe / feet sores   ---RN to contact MD to assess B feet / toes wounds .  Pt may need an updated activity clarification / WB Status pending MD assessment / RN aware of request .     Patient with fair  progress towards goals during this session. Education provided on Physical therapy plan of care, physiological benefits of out of bed mobility, and B AP , B UE AROM , R / L SAQ , LAQ , fxn mobility training , fall risk prevention and DC planning.  . Patient with poor carryover.    Bed mobility: Max assist of one supine to sit.  Pt initially leaning posteriorly with mod assist needed.  Mod assist to scoot to EOB .  Pt once at EOB with sitting tolerance progressed to sitting at EOB with close CGA / min assist   Transfers: Mod assist of one for sit to stand using RW with second person present for safety providing close CGA as needed.   Pt with flexed standing postured able to stand with min support of one for safety .  CNA provided skin care and re-bandage on sacral area during standing activity .  SPT mod assist of one with CGA support of second person for  safety bed to chair .   Rest provided .  Able to progress to ambulation as below.   Gait Assistance: Maximum assistance (2A used for safety with spouse managing IV)  Distance (ft): 50 ft x 1  Assistive Device: Rolling walker  Pattern: Shuffle;Ataxic (flexed trunk and LE gait  --chair follow used for pt safety --2 A needed for ambulation mod assist of pt / spouse with IV /  CNA pushing close chair follow)          Patient was left in bedside chair, alarm activated, and spouse present , B feel elevated  at end of session with all needs in reach. The patient's Approx Degree of Impairment: 72.57% has been calculated based on documentation in the [facility] '6 clicks' Inpatient Basic Mobility Short Form.  Research supports that patients with this level of impairment may benefit from Subacute Rehab.  Spouse reports a goal for DC to [facility] as next level of care . SW team working with RN on optimal DC Plan.  RN aware of patient status post session.    DISCHARGE RECOMMENDATIONS  PT Discharge Recommendations: Sub-acute rehabilitation     PLAN  PT Treatment Plan: Bed mobility;Body mechanics;Endurance;Energy conservation;Patient education;Family education;Gait training;Balance training;Transfer training;Strengthening    SUBJECTIVE  Agreeable to mobility     OBJECTIVE  Precautions: Bed/chair alarm    WEIGHT BEARING RESTRICTION  Weight Bearing Restriction:  (none currently  discovered during session  B toes sores and wounds --_RN informed for assessment .)                PAIN ASSESSMENT   Rating: Other (Comment)  Location: Pt denies any pain at rest .   Pt is sensitive to touch in LE ---denies any B foot pain  Management Techniques: Activity promotion;Body mechanics;Breathing techniques;Relaxation;Other (Comment);Repositioning    BALANCE                                                                                                                       Static Sitting: Poor +  Dynamic Sitting: Poor           Static Standing: Poor  +  Dynamic Standing: Poor +    ACTIVITY TOLERANCE  Pulse: 75 (activity to chair)        BP: (!) 163/84 (post actvity to chair    resting 150/64)             O2 WALK  Oxygen Therapy  SPO2% Ambulation on Room Air: 96    AM-PAC '6-Clicks' INPATIENT SHORT FORM - BASIC MOBILITY  How much difficulty does the patient currently have...  Patient Difficulty: Turning over in bed (including adjusting bedclothes, sheets and blankets)?: A Lot   Patient Difficulty: Sitting down on and standing up from a chair with arms (e.g., wheelchair, bedside commode, etc.): A Lot   Patient Difficulty: Moving from lying on back to sitting on the side of the bed?: A Lot   How much help from another person does the patient currently need...   Help from Another: Moving to and from a bed to a chair (including a wheelchair)?: A Lot   Help from Another: Need to walk in hospital room?: A Lot   Help from Another: Climbing 3-5 steps with a railing?: Total     AM-PAC Score:  Raw Score: 11   Approx Degree of Impairment: 72.57%   Standardized Score (AM-PAC Scale): 33.86   CMS Modifier (G-Code): CL    Patient End of Session: Up in chair;Needs met;Call light within reach;RN aware of session/findings;All patient questions and concerns addressed;Alarm set;Family present    CURRENT GOALS     Goals to be met by: 1/30/2024  Patient Goal Patient's self-stated goal is: return home   Goal #1 Patient is able to demonstrate supine - sit EOB @ level: minimum assistance      Goal #1   Current Status  as above  max assist overall    Goal #2 Patient is able to demonstrate transfers Sit to/from Stand at assistance level: minimum assistance with walker - rolling      Goal #2  Current Status  as above    Goal #3 Patient is able to ambulate 10 feet with assist device: walker - standard and walker - rolling at assistance level: minimum assistance   Goal #3   Current Status  as above    Goal #4     Goal #4   Current Status     Goal #5 Patient to demonstrate independence with  home activity/exercise instructions provided to patient in preparation for discharge.   Goal #5   Current Status  in progress    Goal #6     Goal #6  Current Status     Therapy activity 2 units              Occupational Therapy Notes (last 72 hours)  Notes from 1/19/2024  8:36 AM through 1/22/2024  8:36 AM   No notes of this type exist for this encounter.     Video Swallow Study Notes    No notes of this type exist for this encounter.        SLP Note - SLP Notes      SLP Note signed by Leeann Duarte SLP at 1/19/2024 12:30 PM  Version 1 of 1    Author: Leeann Duarte SLP Service: Rehab Author Type: SPEECH-LANGUAGE PATHOLOGIST    Filed: 1/19/2024 12:30 PM Date of Service: 1/19/2024 12:11 PM Status: Signed    : Leeann Duarte SLP (SPEECH-LANGUAGE PATHOLOGIST)       SPEECH DAILY NOTE - INPATIENT    ASSESSMENT & PLAN   ASSESSMENT      Proper PPE worn. Hands sanitized upon entrance/exit Pt room.       Pt alert, afebrile and on room air. Pt seen for swallow analysis per BSE recommendations (after consulting with RN). Spouse present. Pt agreed to participate.Pt's preferred method of learning verbal. Pt upright in chair; observed with current diet of minced & moist/mildly-thick liquids via tsp. Swallowing precautions/strategies discussed with Pt and spouse as SLP directed oral trials. Oral skills functional for mastication and lingual preparation of food trials. No significant oral retention. Pharyngeal response appeared to trigger within 1-2 sec per hyolaryngeal elevation to completion (functional rise/strength per palpation). No overt CSA on minced & moist nor mildly thick liquid trials (no coughing, no throat clearing, clear vocal quality and no SOB). Collaborated with RN regarding Pt's swallowing plan of care. Per RN, Pt with good tolerance of current diet. No report of difficulty taking meds. Sp02 ~98% during this session. Call light within Pt's reach upon SLP discharge from room.      CXR 1/  17/24:  CONCLUSION:   1. No acute airspace disease.  Minimal left basal scarring/atelectasis.       PLAN: Pt is discharged from skilled swallowing intervention secondary to functional swallowing skills on least restrictive SAFEST diet, family education completed and goal achievement.          Diet Recommendations - Solids: Mechanical soft ground/ Minced & Moist  Diet Recommendations - Liquids: Nectar thick liquids/ Mildly thick    Compensatory Strategies Recommended: No straws;Slow rate;Alternate consistencies;Small bites and sips  Aspiration Precautions: Upright position;Slow rate;Small bites and sips;No straw  Medication Administration Recommendations: Crushed in puree    Patient Experiencing Pain: No  Discharge Recommendations  Discharge Recommendations/Plan: Undetermined  Treatment Plan  Treatment Plan/Recommendations: Aspiration precautions  Interdisciplinary Communication: Discussed with RN    GOALS  Goal #1 The patient will tolerate minced and moist consistency and mildly thick liquids without overt signs or symptoms of aspiration with 100 % accuracy over 1 session(s).    No CSA on current diet.        In Progress   Goal #2 The patient/family/caregiver will demonstrate understanding and implementation of aspiration precautions and swallow strategies independently over 1 session(s).    Education completed with spouse; v/u. Swallowing precautions posted in room.     In Progress   FOLLOW UP  Follow Up Needed (Documentation Required): No  SLP Follow-up Date: 01/19/24  Number of Visits to Meet Established Goals: 1    Session: 1    If you have any questions, please contact   Leeann Duarte M.S. [facility]/SLP  Speech-Language Pathologist  [facility]  #[phone]      Electronically signed by Leeann Duarte, SLP on 1/19/2024 12:30 PM           Immunizations     Name Date      Covid-19 Moderna 01/06/22     Covid-19 Moderna 05/16/21     Covid-19 Moderna 04/18/21     Covid-19 Moderna Booster 06/18/22      INFLUENZA 12/05/23       Multidisciplinary Problems     Active Goals     Not on file          Resolved Goals        Problem: Patient/Family Goals    Goal Priority Disciplines Outcome Interventions   Patient/Family Long Term Goal   (Resolved)     Interdisciplinary Adequate for Discharge    Description: Patient's Long Term Goal: ***    Interventions:  - ***  - See additional Care Plan goals for specific interventions   Patient/Family Short Term Goal   (Resolved)     Interdisciplinary Adequate for Discharge    Description: Patient's Short Term Goal: ***    Interventions:   - ***  - See additional Care Plan goals for specific interventions

## (undated) NOTE — IP AVS SNAPSHOT
[facility]            (For Outpatient Use Only) Initial Admit Date: 3/21/2022   Inpt/Obs Admit Date: Inpt: 3/21/22 / Obs: N/A   Discharge Date:    Lennie Fletcher:  [de-identified]   MRN: [de-identified]   CSN: [id number]   CEID: [id number]        ENCOUNTER  Patient Class: Inpatient Admitting Provider: Luis Manuel Masters MD Unit: [address] [place] [address]//SE   Hospital Service: Ortho/Spine Attending Provider: Olga Gan DO   Bed: 408-A   Visit Type:   Referring Physician: No ref. provider found Billing Flag:    Admit Diagnosis: Closed fracture of neck of right femur, initial encounter [address]      PATIENT  Legal Name:   Amador Anand   Legal Sex: Male  Gender ID:              [address] [place] [address] Name:    PCP:  Clarice Rued:     Address:  [address] : 1935 (86 yrs) Mobile: [phone]         City/State/Zip: [place] [address] Marital:  Language: Antonio Keith: Dave SSN4: [id number] Rastafarian: Teetee Halo Not L*     Race: White Ethnicity: Non  Or [address] [place] [address]   Name Relationship Legal Guardian? Home Phone Work Phone Mobile Phone   1. Serena Ramos  2. *No Contact Specified* Spouse            [phone]       GUARANTOR  Guarantor: Rm HodgsonAva Santizo : 1935 Home Phone:     Address: [address] Sex: Male Work Phone:    City/State/Zip: [place] [address]   Rel. to Patient: Self Guarantor ID: [id number]   [address]   Employer:  Status: RETIRED     COVERAGE  PRIMARY INSURANCE   Payor: MEDICARE Plan: MEDICARE PART A&B   Group Number:  Insurance Type: INDEMNITY   Subscriber Name: Rm HodgsonJillian Escalantesteven : 1935   Subscriber ID: [id number] Pt Rel to Subscriber: Self   SECONDARY INSURANCE   Payor: [place] PPO Plan: [facility]   Group Number: [id number] Insurance Type: [id number] Name: Jillian Cho : 1935   Subscriber ID: [id number] Pt Rel to Subscriber: SELF   TERTIARY INSURANCE   Payor:  Plan:    Group Number:  Insurance Type:    Subscriber Name:  Subscriber :    Subscriber ID:  Pt Rel to Subscriber:    Hospital Account Financial Class: Medicare    2022

## (undated) NOTE — LETTER
[facility]  Administration of Anesthesia  1. Gisselle Cabezas., or _________________________________ acting on his behalf, (Patient) (Dependent/Representative) request to receive anesthesia for my pending procedure/operation/treatment. A physician (anesthesiologist) alone or an anesthesiologist working with a nurse anesthetist may administer my anesthesia. 2. I understand that my anesthesiologist is not an employee or agent of the hospital, but is an independent medical practitioner who has been permitted to use its facilities for the care and treatment of his/her patients. 3. I acknowledge that a physician from [facility] or their designate(s), recommended anesthesia for me using her/his medical judgment. The type(s) of anesthesia I may receive include:                a) General Anesthesia, b) Spinal/Epidural Anesthesia, c) Regional Anesthesia or d) Monitored Anesthesia Care. 4. If my spinal, regional or monitored anesthesia care (local) is not satisfactory for my comfort, or if my medical condition requires, I consent to the administration of general anesthesia. 5. I am aware that the practice of anesthesiology is not an exact science and that some foreseeable risks or consequences may occur. Some common risks/consequences include sore throat and hoarseness, nausea and vomiting, muscle soreness, backache, damage to the mouth/teeth/vocal cords and eye injury. I understand that more rare but serious potential risks of anesthesia include blood pressure changes, drug reactions, cardiac arrest, brain damage, paralysis or death. These risks apply to whether I have general, spinal/epidural, regional or monitored anesthesia care. 6. OBSTETRIC PATIENTS: Specific risks/consequences of spinal/epidural anesthesia may include itching, low blood pressure, difficulty urinating, slowing of the baby's heart rate and headache.  Rare risks include infections, high spinal block, spinal bleeding, seizure, cardiac arrest and death. 7. AWARENESS: I understand that it is possible (but unlikely) to have explicit memory of events from the operating room while under general anesthesia. 8. ELECTROCONVULSIVE THERAPY PATIENTS: This consent serves for all treatments in a single course of therapy. 9. I understand that I must inform my anesthesiologist when I last ate and/or drank to minimize the risk of anesthesia. 10. If I am pregnant, or may pregnant, I understand that elective surgery should be postponed until after the baby is born. Anesthetics cross the placenta and may temporarily anesthetize the baby. Although fetal complications of anesthesia during pregnancy are rare, they may include birth defects, premature labor, brain damage and death. 11. I certify that I informed the anesthesiologist, to the best of my ability, about medication I take including blood thinners, anticoagulants, herbal remedies, narcotics and recreational drugs (e.g. cocaine, marijuana, PCP). Failure to inform my anesthesiologist about these medicines may increase my risk of anesthetic complications. The nature and purpose of my anesthetic management was explained to me. I had the opportunity to ask questions and the answers and information provided meet my satisfaction.   I retain the right to withdraw this consent at any time prior to the administration of said anesthetic.    ___________________________________________________           _____________________________________________________  Patient Signature                                                                                      Witness Signature                ___________________________________________________           _____________________________________________________  Date/Time                                                                                               Responsible person in case of minor/ unconscious pt /Relationship    My signature below affirms that prior to the time of the procedure, I have explained to the patient and/or his/her guardian, the risks and benefits of undergoing anesthesia, as well as any reasonable alternatives. ___________________________________________________            _____________________________________________________  Physician Signature                            Date/Time  Patient Name: Miguel Chance.      : 1935     Printed: 2022      Medical Record #: [id number]                              Page 1 of 1    ----------ANESTHESIA CONSENT----------

## (undated) NOTE — ED AVS SNAPSHOT
[facility] Emergency Department    [address]     [address] [place] [address]    Phone:  [phone]    Fax:  Ju Hearn   MRN: [id number]    Department:  [facility] Emergency Department   Date of Visit:  5/17/ and Class Registration line at [phone] or find a doctor online by visiting [email or web address].    IF THERE IS ANY CHANGE OR WORSENING OF YOUR CONDITION, CALL YOUR PRIMARY CARE PHYSICIAN AT ONCE OR RETURN IMMEDIATELY TO [address] [place] [address].     If

## (undated) NOTE — LETTER
[facility]  Administration of Anesthesia  1. Aracelis Romero., or _________________________________ acting on his behalf, (Patient) (Dependent/Representative) request to receive anesthesia for my pending procedure/operation/treatment. A physician (anesthesiologist) alone or an anesthesiologist working with a nurse anesthetist may administer my anesthesia. 2. I understand that my anesthesiologist is not an employee or agent of the hospital, but is an independent medical practitioner who has been permitted to use its facilities for the care and treatment of his/her patients. 3. I acknowledge that a physician from [facility] or their designate(s), recommended anesthesia for me using her/his medical judgment. The type(s) of anesthesia I may receive include:                a) General Anesthesia, b) Spinal/Epidural Anesthesia, c) Regional Anesthesia or d) Monitored Anesthesia Care. 4. If my spinal, regional or monitored anesthesia care (local) is not satisfactory for my comfort, or if my medical condition requires, I consent to the administration of general anesthesia. 5. I am aware that the practice of anesthesiology is not an exact science and that some foreseeable risks or consequences may occur. Some common risks/consequences include sore throat and hoarseness, nausea and vomiting, muscle soreness, backache, damage to the mouth/teeth/vocal cords and eye injury. I understand that more rare but serious potential risks of anesthesia include blood pressure changes, drug reactions, cardiac arrest, brain damage, paralysis or death. These risks apply to whether I have general, spinal/epidural, regional or monitored anesthesia care. 6. OBSTETRIC PATIENTS: Specific risks/consequences of spinal/epidural anesthesia may include itching, low blood pressure, difficulty urinating, slowing of the baby's heart rate and headache.  Rare risks include infections, high spinal block, spinal bleeding, seizure, cardiac arrest and death. 7. AWARENESS: I understand that it is possible (but unlikely) to have explicit memory of events from the operating room while under general anesthesia. 8. ELECTROCONVULSIVE THERAPY PATIENTS: This consent serves for all treatments in a single course of therapy. 9. I understand that I must inform my anesthesiologist when I last ate and/or drank to minimize the risk of anesthesia. 10. If I am pregnant, or may pregnant, I understand that elective surgery should be postponed until after the baby is born. Anesthetics cross the placenta and may temporarily anesthetize the baby. Although fetal complications of anesthesia during pregnancy are rare, they may include birth defects, premature labor, brain damage and death. 11. I certify that I informed the anesthesiologist, to the best of my ability, about medication I take including blood thinners, anticoagulants, herbal remedies, narcotics and recreational drugs (e.g. cocaine, marijuana, PCP). Failure to inform my anesthesiologist about these medicines may increase my risk of anesthetic complications. The nature and purpose of my anesthetic management was explained to me. I had the opportunity to ask questions and the answers and information provided meet my satisfaction.   I retain the right to withdraw this consent at any time prior to the administration of said anesthetic.    ___________________________________________________           _____________________________________________________  Patient Signature                                                                                      Witness Signature                ___________________________________________________           _____________________________________________________  Date/Time                                                                                               Responsible person in case of minor/ unconscious pt /Relationship    My signature below affirms that prior to the time of the procedure, I have explained to the patient and/or his/her guardian, the risks and benefits of undergoing anesthesia, as well as any reasonable alternatives. ___________________________________________________            _____________________________________________________  Physician Signature                            Date/Time  Patient Name: Ole Umana.      : 1935     Printed: 2022      Medical Record #: [id number]                              Page 1 of 1    ----------ANESTHESIA CONSENT----------

## (undated) NOTE — IP AVS SNAPSHOT
[facility]            (For Outpatient Use Only) Initial Admit Date: 2022   Inpt/Obs Admit Date: Inpt: 22 / Obs: N/A   Discharge Date:    Manjit Blandon:  [de-identified]   MRN: [de-identified]   CSN: [id number]   CEID: [id number]        ENCOUNTER  Patient Class: Inpatient Admitting Provider: Deepika Camilo MD Unit: [address]   [facility] Service: Medical Attending Provider: Deb Travis MD   Bed: 551-A   Visit Type:   Referring Physician: No ref. provider found Billing Flag:    Admit Diagnosis: Weakness generalized [R53.1]      PATIENT  Legal Name:   Armani Martinez   Legal Sex: Male  Gender ID:              Pref Name:    PCP:  Herson Jc:     Address:  [address] : 1935 (87 yrs) Mobile: [phone]         City/State/Zip: [place] [address] Marital:  Language: Tessa park: Dave SSN4: [id number] Amish: Leopoldo Nieto Not L*     Race: White Ethnicity: Non  Or [address]   Name Relationship Legal Guardian? Home Phone Work Phone Mobile Phone   1. Serena Ramos  2. *No Contact Specified* Spouse            [phone]       GUARANTOR  Guarantor: Anne-Marie Tiwari Maegan Regan : 1935 Home Phone: [phone]   Address: [address] Sex: Male Work Phone:    City/State/Zip: [place] [address]   Rel. to Patient: Self Guarantor ID: [id number]   [address]   Employer:  Status: RETIRED     COVERAGE  PRIMARY INSURANCE   Payor: MEDICARE Plan: MEDICARE PART A&B   Group Number:  Insurance Type: INDEMNITY   Subscriber Name: Cassidy Kilgore : 1935   Subscriber ID: [id number] Pt Rel to Subscriber: Self   SECONDARY INSURANCE   Payor: [facility] PPO Plan: [facility]   Group Number: [id number] Insurance Type: [address] Name: Cassidy Kilgore : 1935   Subscriber ID: [id number] Pt Rel to Subscriber: SELF   TERTIARY INSURANCE   Payor:  Plan:    Group Number:  Insurance Type:    Subscriber Name:  Subscriber :    Subscriber ID:  Pt Rel to Subscriber:    Hospital Account Financial Class: Medicare    January 3, 2023

## (undated) NOTE — ED AVS SNAPSHOT
[facility] Emergency Department    [address]     [address] [place] [address]    Phone:  [phone]    Fax:  Ju Hearn   MRN: [id number]    Department:  [facility] Emergency Department   Date of Visit:  5/17/ service to you, we would appreciate any positive or negative feedback related to the care you received in our emergency department. Please call our [address] at [phone]. Your Emergency Department team is here to serve you.   You are our top priori I certified that I have received a copy of the aftercare instructions; that these instructions have been explained to me; all questions pertaining to these instructions have been answered in a satisfactory manner.         [id number]

## (undated) NOTE — LETTER
[facility], [place]  Authorization for Invasive Procedures  1. I hereby authorize Dr. Warren Wray , my physician and whomever may be designated as the doctor's assistant, to perform the following operation and/or procedure:  ESOPHAGOGASTRODUODENOSCOPY with botox injection on Horacio Moser. at [facility].    2. My physician has explained to me the nature and purpose of the operation or other procedure, possible alternative methods of treatment, the risks involved and the possibility of complications to me. I understand the probable consequences of declining the recommended procedure and the alternative methods of treatment. I acknowledge that no guarantee has been made as to the result that may be obtained. 3. I recognize that during the course of this operation or other procedure, unforeseen conditions may necessitate additional or different procedures than those listed above. I, therefore, further authorize and request that the above-named physician, his/her physician assistants, or designees perform such procedures as are, in his/her professional opinion, necessary and desirable. If I have a Do Not Attempt Resuscitation (DNAR) order in place, that status will be suspended while in the operating room, procedural suite, and during the recovery period unless otherwise explicitly stated by me (or a person authorized to consent on my behalf). The surgeon or my attending physician will determine when the applicable recovery period ends for purposes of reinstating the DNAR order. 4. Should the need arise during my operation or immediate post-operative period; I also consent to the administration of blood and/or blood products.  Further, I understand that despite careful testing and screening of blood and blood products, I may still be subject to ill effects as a result of recieving a blood transfusion an/or blood producst. The following are some, but not all, of the potential risks that can occur: fever and allergic reactions, hemolytic reactions, transmission of disease such as hepatitis, AIDS, cytomegalovirus (CMV), and flluid overload. In the event that I wish to have autologous transfusions of my own blood, or a directed donor transfusion, I will discuss this with my physician. 5. I consent to the photographing of the operations or procedures to be performed for the purposes of advancing medicine, science, and/or education, provided my identity is not revealed. If the procedure has been videotaped, the physician/surgeon will obtain the original videotape. [facility] will not be responsible for storage or maintenance of this tape. 6. I consent to the presence of a  or observer as deemed necessary by my physician or his designee. 7. Any tissues or organs removed in the operation or other procedure may be disposed of by and at the discretion of [facility].    8. I understand that the physician and his/her physician assistants may not be employees or agents of [facility], [facility], nor [facility], but are independent medical practitioners who have been permitted to use its facilities for the care and treatment of their patients. 9. Patients having a sterilization procedure: I understand that if the procedure is successful the results will be permanent and it will therefore be impossible for me to inseminate, conceive or bear children. I also understand that the procedure is intended to result in sterility, although the result has not been guaranteed. 10. I CERTIFY THAT I HAVE READ AND FULLY UNDERSTAND THE ABOVE CONSENT TO OPERATION and/or OTHER PROCEDURE. 11. I acknowledge that my physician has explained sedation/analgesia administration to me including the risks and benefits.  I consent to the administration of sedation/analgesia as may be necessary or desirable in the judgment of my physician. Signature of Patient:  ________________________________________________ Date: _________Time: _________    Responsible person in case of minor or unconscious: _____________________________Relationship: ____________     Witness Signature: ____________________________________________ Date: __________ Time: ___________    Statement of Physician  My signature below affirms that prior to the time of the procedure, I have explained to the patient and/or his legal representative, the risks and benefits involved in the proposed treatment and any reasonable alternative to the proposed treatment. I have also explained the risks and benefits involved in the refusal of the proposed treatment and have answered the patient's questions. If I have a significant financial interest in this procedure/surgery, I have disclosed this and had a discussion with my patient. Signature of Physician:   ________________________________________Date: _________Time:_______ Patient Name: Jazmyn Green.   : 1935   Printed: 2022    Medical Record #: [id number]

## (undated) NOTE — LETTER
[address], [place]  Authorization for Invasive Procedures  1. I hereby authorize Dr. Rosa Lamas, my physician and whomever may be designated as the doctor's assistant, to perform the following operation and/or procedure: right hip hemiarthroplasty on Jaye Mckeon. at [facility].    2. My physician has explained to me the nature and purpose of the operation or other procedure, possible alternative methods of treatment, the risks involved and the possibility of complications to me. I understand the probable consequences of declining the recommended procedure and the alternative methods of treatment. I acknowledge that no guarantee has been made as to the result that may be obtained. 3. I recognize that during the course of this operation or other procedure, unforeseen conditions may necessitate additional or different procedures than those listed above. I, therefore, further authorize and request that the above-named physician, his/her physician assistants, or designees perform such procedures as are, in his/her professional opinion, necessary and desirable. If I have a Do Not Attempt Resuscitation (DNAR) order in place, that status will be suspended while in the operating room, procedural suite, and during the recovery period unless otherwise explicitly stated by me (or a person authorized to consent on my behalf). The surgeon or my attending physician will determine when the applicable recovery period ends for purposes of reinstating the DNAR order. 4. Should the need arise during my operation or immediate post-operative period; I also consent to the administration of blood and/or blood products.  Further, I understand that despite careful testing and screening of blood and blood products, I may still be subject to ill effects as a result of recieving a blood transfusion an/or blood producst. The following are some, but not all, of the potential risks that can occur: fever and allergic reactions, hemolytic reactions, transmission of disease such as hepatitis, AIDS, cytomegalovirus (CMV), and flluid overload. In the event that I wish to have autologous transfusions of my own blood, or a directed donor transfusion, I will discuss this with my physician. 5. I consent to the photographing of the operations or procedures to be performed for the purposes of advancing medicine, science, and/or education, provided my identity is not revealed. If the procedure has been videotaped, the physician/surgeon will obtain the original videotape. The [facility] will not be responsible for storage or maintenance of this tape. 6. I consent to the presence of a  or observer as deemed necessary by my physician or his designee. 7. Any tissues or organs removed in the operation or other procedure may be disposed of by and at the discretion of [facility].    8. I understand that the physician and his/her physician assistants may not be employees or agents of [facility], [facility], nor [facility], but are independent medical practitioners who have been permitted to use its facilities for the care and treatment of their patients. 9. Patients having a sterilization procedure: I understand that if the procedure is successful the results will be permanent and it will therefore be impossible for me to inseminate, conceive or bear children. I also understand that the procedure is intended to result in sterility, although the result has not been guaranteed. 10. I CERTIFY THAT I HAVE READ AND FULLY UNDERSTAND THE ABOVE CONSENT TO OPERATION and/or OTHER PROCEDURE. 11. I acknowledge that my physician has explained sedation/analgesia administration to me including the risks and benefits.  I consent to the administration of sedation/analgesia as may be necessary or desirable in the judgment of my physician. Signature of Patient:  ________________________________________________ Date: _________Time: _________    Responsible person in case of minor or unconscious: _____________________________Relationship: ____________     Witness Signature: ____________________________________________ Date: __________ Time: ___________    Statement of Physician  My signature below affirms that prior to the time of the procedure, I have explained to the patient and/or his legal representative, the risks and benefits involved in the proposed treatment and any reasonable alternative to the proposed treatment. I have also explained the risks and benefits involved in the refusal of the proposed treatment and have answered the patient's questions. If I have a significant financial interest in this procedure/surgery, I have disclosed this and had a discussion with my patient. Signature of Physician:   ________________________________________Date: _________Time:_______ Patient Name: Ricardo Flaherty.   : 1935   Printed: 2022    Medical Record #: [id number]

## (undated) NOTE — LETTER
[address], [place]  Authorization for Invasive Procedures  1.  I hereby authorize Dr. Cheryl Duncan , my physician and whomever may be designated as the doctor's assistant, to perform the following operation and/or procedure:  Esophagogastr potential risks that can occur: fever and allergic reactions, hemolytic reactions, transmission of disease such as hepatitis, AIDS, cytomegalovirus (CMV), and flluid overload.  In the event that I wish to have autologous transfusions of my own blood, or a d judgment of my physician.      Signature of Patient:  ________________________________________________ Date: _________Time: _________    Responsible person in case of minor or unconscious: _____________________________Relationship: ____________     Witness

## (undated) NOTE — IP AVS SNAPSHOT
[facility]            (For Outpatient Use Only) Initial Admit Date: 7/23/2021   Inpt/Obs Admit Date: Inpt: N/A / Obs: 07/24/21   Discharge Date:    Cherry Colon:  [de-identified]   MRN: [de-identified]   CSN: [id number]   CEID: [id number]        EN [id number] Pt Rel to Subscriber: SELF   TERTIARY INSURANCE   Payor:  Plan:    Group Number:  Insurance Type:    Subscriber Name:  Subscriber :    Subscriber ID:  Pt Rel to Subscriber:    Hospital Account Financial Class: Medicare    2021

## (undated) NOTE — IP AVS SNAPSHOT
Patient Demographics     Address  [address]  [address] [place] [address] Phone  [phone] [facility])  [phone] (Mobile) *Preferred* E-mail Address  [email or web address]      Emergency Contact(s)     Name Relation Home Work Mobile    Talle Next dose due: Tonight      Take 40 mg by mouth nightly. Venlafaxine HCl 75 MG Tabs  Commonly known as: EFFEXOR  Next dose due: tonight      Take 1 tablet (75 mg total) by mouth 2 (two) times daily.    Reba Kocher, MD         Vitamin D 50 MCG ( Microbiology Results (All)     Procedure Component Value Units Date/Time    Urine Culture, Routine Once [[id number]] Collected: 10/07/20 1538    Order Status: Completed Lab Status: Final result Updated: 10/08/20 1350    Specimen: Urine, clean catch      [facility] History:  Past Medical History:   Diagnosis Date   • BPH (benign prostatic hyperplasia)    • Kidney stones    • Other and unspecified hyperlipidemia    • Post partum depression    • Skin cancer    • Unspecified essential hypertension      Past Surgical His •  Coenzyme Q10 (COQ-10) 100 MG Oral Cap, Take by mouth 2 (two) times a day.  , Disp: , Rfl: , 10/7/2020    •  Simvastatin 40 MG Oral Tab, Take 40 mg by mouth nightly., Disp: , Rfl: , 10/6/2020        Review of Systems:  Constitutional: negative for fatigu Patient Active Problem List:     Major depressive disorder, recurrent episode, in full remission ([facility] [address]     Community acquired pneumonia of right lower lobe of lung     Weakness of both lower extremities     Cystitis[BK.1]      1. UTI  2.   ADL dysfunction, Exam: Pupils react to light. Visual fields are full. Cranial nerves normal.  Strength in the upper extremities normal.  Strength in the lower extremities reveals moderate proximal leg weakness.   Distal strength normal.  It took 2 people to have him stand Patient is a 81year old male admitted 10/7/2020 for weakness, falls, UTI. Pt cleared for therapy evaluation by RN. PT/OT co-evaluation, both therapists provided assist and cues throughout session. PT donned droplet mask and gloves for session.  Pt received DISCHARGE RECOMMENDATIONS  PT Discharge Recommendations: Sub-acute rehabilitation    PLAN  PT Treatment Plan: Bed mobility; Endurance; Energy conservation;Patient education;Gait training;Strengthening;Transfer training;Balance training  Rehab Potential : Mak Management Techniques:  Activity promotion;Relaxation;Repositioning    COGNITION  · Overall Cognitive Status:  Impaired  · Orientation Level:  oriented to person, disoriented to place, disoriented to time and disoriented to situation  · Memory:  decreased r AM-PAC Score:  Raw Score: 10   Approx Degree of Impairment: 76.75%   Standardized Score (AM-PAC Scale): 32.29   CMS Modifier (G-Code): CL    FUNCTIONAL ABILITY STATUS  Gait Assessment   Gait Assistance: Not tested  Stoop/Curb Assistance: Not tested    Be Occupational Therapy Note signed by Elias Chavez OT at 10/8/2020  5:14 PM  Version 1 of 1    Author: Elias Chavez OT Service: — Author Type: Occupational Therapist    Filed: 10/8/2020  5:14 PM Date of Service: 10/8/2020  4:40 PM Status: Signed RN contacted prior to start of care. Treatment coordinated w/ PT. Mask and gloves worn for treatment session. Pt received in bed alert and oriented to self.  On initial contact pt required max a to roll R<>L for tolieting and clothing management; pt inconti Diagnosis Date   • BPH (benign prostatic hyperplasia)    • Kidney stones    • Other and unspecified hyperlipidemia    • Post partum depression    • Skin cancer     melanoma   • Unspecified essential hypertension[NIKITA.2]      Past Surgical History[NIKITA.1]  Past CMS Modifier (G-Code):  CM[NIKITA.2]    FUNCTIONAL TRANSFER ASSESSMENT[NIKITA.1]  Supine to Sit : Maximum assistance(x2)  Sit to Stand: Maximum assistance(x2)[NIKITA.2]    Bedroom Mobility: max a x 1/min a x 1 spt bed to chair    BALANCE ASSESSMENT  Static Sitting: cga This BSE was ordered d/t Pt on modified consistency with pills at home (takes pills with NTL). Pt self-feeds solid/thin liquids. Per spouse, partials will remain at home \"don't want to lose them. \"         PMH of dysphagia:  VFSS 3/02/20 recommended solid Pt presents with mild oral dysphagia and possible pharyngeal dysfunction. Per [facility] - ROSHAN Scale, Pt's swallow function is Level 5 of 7. Collaborated with RN regarding Pt's swallowing plan of care.  RN to place diet orders for solid (spouse to select \"softer foods\ SWALLOWING HISTORY[MD.1]  Current Diet Consistency: Regular; Thin liquids[MD.2]      OBJECTIVE   ORAL MOTOR EXAMINATION[MD.1]  Dentition: Natural(upper and lower partials at home (\"don't want to lose\")   )  Symmetry: Within Functional Limits  Strength:  [facility]  #[id number][MD.1]        Electronically signed by SULEMA Johnson on 10/8/2020 11:34 AM   Attribution Tavia VAUGHN.1 - SULEMA Johnson on 10/8/2020 11:19 AM  MD.2 - SULEMA Johnson on 10/8/2020 11:20 AM

## (undated) NOTE — LETTER
Ruchi Daigle [address], [place]  [address]  INFORMED CONSENT FOR TRANSFUSION OF BLOOD OR BLOOD PRODUCTS  My physician has informed me of the nature, purpose, benefits and risks of transfusion for blood and blood components that he/she may deem necessary during my treatment or hospitalization. He/she has also discussed alternatives to receiving blood from the voluntary blood supply with me, such as self-donation (autologous) and directed donation (blood donated by family or friends to be used specifically for me). I further understand that while the [address] [place] [address] will attempt to supply any autologous or directed donor blood prior to transfusion of blood from the routine blood supply, medical circumstances may require that other or additional blood components may be required for my care. In giving consent, I acknowledge that my physician has also informed me that despite careful screening and testing in accordance with national and regional regulations, there is still a small risk of transmission of infectious agents including hepatitis, HIV-1/2, cytomegalovirus and other viruses or diseases as yet unknown for which licensed definitive screening tests do not currently exist. Additionally, my physician has informed me of the potential for transfusion reactions not related to an infectious agent. [  ]  Check here for Recurring Outpatient Transfusion Therapy (valid for 1 year) In addition to the above, my physician has informed me that I shall receive numerous transfusions over a period of time and that these can lead to other increased risks. I hereby authorize the transfusion of blood and/or blood products to me as deemed necessary and ordered by physicians participating in my care.  My physician has given me the opportunity to ask questions and any questions asked have been answered to my satisfaction  __________________________________________ ______________________________________________  (Signature of Patient)                                                            (Responsible party in case of Minor,                                                                                                 Incompetent, or unconscious Patient)  ___________________________________________       ________ ______________________________________  (Relationship to Patient)                                                       (Signature of Witness)  ______________________________________________________________________________________________   (Date)                                                                           (Time)  REFUSAL OF CONSENT FOR BLOOD TRANSFUSIONS   Sign only if Refusing   [  ] I understand refusal of blood or blood products as deemed necessary by my physician may have serious consequences to my condition to include possible death. I hereby assume responsibility for my refusal and release the hospital, its personnel, and my physicians from any responsibility for the consequences of my refusal.    ________________________________________________________________________________  (Signature of Patient)                                                         (Responsible Party/Relationship to Patient)    ________________________________________________________________________________  (Signature of Witness)                                                       (Date/Time)     Patient Name: Arsalan Andrea.      : 1935                 Printed: 3/22/2022      Medical Record #: [id number]                                 Page 1 of 1

## (undated) NOTE — LETTER
Ruchi Daigle [address], [facility]  [address]  INFORMED CONSENT FOR TRANSFUSION OF BLOOD OR BLOOD PRODUCTS  My physician has informed me of the nature, purpose, benefits and risks of transfusion for blood and blood components that he/she may deem necessary during my treatment or hospitalization. He/she has also discussed alternatives to receiving blood from the voluntary blood supply with me, such as self-donation (autologous) and directed donation (blood donated by family or friends to be used specifically for me). I further understand that while the [address] [place] [address] will attempt to supply any autologous or directed donor blood prior to transfusion of blood from the routine blood supply, medical circumstances may require that other or additional blood components may be required for my care. In giving consent, I acknowledge that my physician has also informed me that despite careful screening and testing in accordance with national and regional regulations, there is still a small risk of transmission of infectious agents including hepatitis, HIV-1/2, cytomegalovirus and other viruses or diseases as yet unknown for which licensed definitive screening tests do not currently exist. Additionally, my physician has informed me of the potential for transfusion reactions not related to an infectious agent. [  ]  Check here for Recurring Outpatient Transfusion Therapy (valid for 1 year) In addition to the above, my physician has informed me that I shall receive numerous transfusions over a period of time and that these can lead to other increased risks. I hereby authorize the transfusion of blood and/or blood products to me as deemed necessary and ordered by physicians participating in my care.  My physician has given me the opportunity to ask questions and any questions asked have been answered to my satisfaction  __________________________________________ ______________________________________________  (Signature of Patient)                                                            (Responsible party in case of Minor,                                                                                                 Incompetent, or unconscious Patient)  ___________________________________________       ________ ______________________________________  (Relationship to Patient)                                                       (Signature of Witness)  ______________________________________________________________________________________________   (Date)                                                                           (Time)  REFUSAL OF CONSENT FOR BLOOD TRANSFUSIONS   Sign only if Refusing   [  ] I understand refusal of blood or blood products as deemed necessary by my physician may have serious consequences to my condition to include possible death. I hereby assume responsibility for my refusal and release the hospital, its personnel, and my physicians from any responsibility for the consequences of my refusal.    ________________________________________________________________________________  (Signature of Patient)                                                         (Responsible Party/Relationship to Patient)    ________________________________________________________________________________  (Signature of Witness)                                                       (Date/Time)     Patient Name: Jessica Peralta.      : 1935                 Printed: 3/22/2022      Medical Record #: [id number]                                 Page 1 of 1

## (undated) NOTE — LETTER
[address], [place]  Authorization for Invasive Procedures  1. I hereby authorize Dr. Naresh Clinton , my physician and whomever may be designated as the doctor's assistant, to perform the following operation and/or procedure:  Esophagogastroduodenoscopy (EGD) with Botox Injection on Rollo Ray. at [facility].    2. My physician has explained to me the nature and purpose of the operation or other procedure, possible alternative methods of treatment, the risks involved and the possibility of complications to me. I understand the probable consequences of declining the recommended procedure and the alternative methods of treatment. I acknowledge that no guarantee has been made as to the result that may be obtained. 3. I recognize that during the course of this operation or other procedure, unforeseen conditions may necessitate additional or different procedures than those listed above. I, therefore, further authorize and request that the above-named physician, his/her physician assistants, or designees perform such procedures as are, in his/her professional opinion, necessary and desirable. If I have a Do Not Attempt Resuscitation (DNAR) order in place, that status will be suspended while in the operating room, procedural suite, and during the recovery period unless otherwise explicitly stated by me (or a person authorized to consent on my behalf). The surgeon or my attending physician will determine when the applicable recovery period ends for purposes of reinstating the DNAR order. 4. Should the need arise during my operation or immediate post-operative period; I also consent to the administration of blood and/or blood products.  Further, I understand that despite careful testing and screening of blood and blood products, I may still be subject to ill effects as a result of recieving a blood transfusion an/or blood producst. The following are some, but not all, of the potential risks that can occur: fever and allergic reactions, hemolytic reactions, transmission of disease such as hepatitis, AIDS, cytomegalovirus (CMV), and flluid overload. In the event that I wish to have autologous transfusions of my own blood, or a directed donor transfusion, I will discuss this with my physician. 5. I consent to the photographing of the operations or procedures to be performed for the purposes of advancing medicine, science, and/or education, provided my identity is not revealed. If the procedure has been videotaped, the physician/surgeon will obtain the original videotape. The [facility] will not be responsible for storage or maintenance of this tape. 6. I consent to the presence of a  or observer as deemed necessary by my physician or his designee. 7. Any tissues or organs removed in the operation or other procedure may be disposed of by and at the discretion of [facility].    8. I understand that the physician and his/her physician assistants may not be employees or agents of [facility], [facility], [address], but are independent medical practitioners who have been permitted to use its facilities for the care and treatment of their patients. 9. Patients having a sterilization procedure: I understand that if the procedure is successful the results will be permanent and it will therefore be impossible for me to inseminate, conceive or bear children. I also understand that the procedure is intended to result in sterility, although the result has not been guaranteed. 10. I CERTIFY THAT I HAVE READ AND FULLY UNDERSTAND THE ABOVE CONSENT TO OPERATION and/or OTHER PROCEDURE. 11. I acknowledge that my physician has explained sedation/analgesia administration to me including the risks and benefits.  I consent to the administration of sedation/analgesia as may be necessary or desirable in the judgment of my physician. Signature of Patient:  ________________________________________________ Date: _________Time: _________    Responsible person in case of minor or unconscious: _____________________________Relationship: ____________     Witness Signature: ____________________________________________ Date: __________ Time: ___________    Statement of Physician  My signature below affirms that prior to the time of the procedure, I have explained to the patient and/or his legal representative, the risks and benefits involved in the proposed treatment and any reasonable alternative to the proposed treatment. I have also explained the risks and benefits involved in the refusal of the proposed treatment and have answered the patient's questions. If I have a significant financial interest in this procedure/surgery, I have disclosed this and had a discussion with my patient. Signature of Physician:   ________________________________________Date: _________Time:_______ Patient Name: Elisa Wood.   : 1935   Printed: 2022    Medical Record #: [id number]

## (undated) NOTE — LETTER
[address] [place] [address]      Authorization for Surgical Operation and Procedure     Date:___________                                                                                                         Time:__________  1. I hereby authorize* Dr Radha Silvestre*, my physician and his/her assistants (if applicable), which may include medical students, residents, and/or fellows, to perform the following surgical operation/ procedure and administer such anesthesia as may be determined necessary by my physician:  Operation/Procedure name (s) Right Hip Reduction and Internal Fixation versus Hemiarthroplasty versus Total Hip Arthroplasty  on Shameka Strickland.   2.   I recognize that during the surgical operation/procedure, unforeseen conditions may necessitate additional or different procedures than those listed above. I, therefore, further authorize and request that the above-named surgeon, assistants, or designees perform such procedures as are, in their judgment, necessary and desirable. 3.   My surgeon/physician has discussed prior to my surgery the potential benefits, risks and side effects of this procedure; the likelihood of achieving goals; and potential problems that might occur during recuperation. They also discussed reasonable alternatives to the procedure, including risks, benefits, and side effects related to the alternatives and risks related to not receiving this procedure. I have had all my questions answered and I acknowledge that no guarantee has been made as to the result that may be obtained. 4.   Should the need arise during my operation or immediate post-operative period, I also consent to the administration of blood and/or blood products.   Further, I understand that despite careful testing and screening of blood or blood products by collecting agencies, I may still be subject to ill effects as a result of receiving a blood transfusion and/or blood products. The following are some, but not all, of the potential risks that can occur: fever and allergic reactions, hemolytic reactions, transmission of diseases such as Hepatitis, AIDS and Cytomegalovirus (CMV) and fluid overload. In the event that I wish to have an autologous transfusion of my own blood, or a directed donor transfusion. I will discuss this with my physician. 5.   I authorize the use of any specimen, organs, tissues, body parts or foreign objects that may be removed from my body during the operation/procedure for diagnosis, research or teaching purposes and their subsequent disposal by hospital authorities. I also authorize the release of specimen test results and/or written reports to my treating physician on the hospital medical staff or other referring or consulting physicians involved in my care, at the discretion of the Pathologist or my treating physician. 6.   I consent to the photographing or videotaping of the operations or procedures to be performed, including appropriate portions of my body for medical, scientific, or educational purposes, provided my identity is not revealed by the pictures or by descriptive texts accompanying them. If the procedure has been photographed/videotaped, the surgeon will obtain the original picture, image, videotape or CD. The hospital will not be responsible for storage, release or maintenance of the picture, image, tape or CD.    7.   I consent to the presence of a  or observers in the operating room as deemed necessary by my physician or their designees. 8.   I recognize that in the event my procedure results in extended X-Ray/fluoroscopy time, I may develop a skin reaction. 9.  If I have a Do Not Attempt Resuscitation (DNAR) order in place, that status will be suspended while in the operating room, procedural suite, and during the recovery period unless otherwise explicitly stated by me (or a person authorized to consent on my behalf). The surgeon or my attending physician will determine when the applicable recovery period ends for purposes of reinstating the DNAR order. 10. Patients having a sterilization procedure: I understand that if the procedure is successful the results will be permanent and it will therefore be impossible for me to inseminate, conceive, or bear children. I also understand that the procedure is intended to result in sterility, although the result has not been guaranteed. 11. I acknowledge that my physician has explained sedation/analgesia administration to me including the risk and benefits I consent to the administration of sedation/analgesia as may be necessary or desirable in the judgment of my physician. I CERTIFY THAT I HAVE READ AND FULLY UNDERSTAND THE ABOVE CONSENT TO OPERATION and/or OTHER PROCEDURE.    _________________________________________  __________________________________  Signature of Patient     Signature of Responsible Person         ___________________________________         Printed Name of Responsible Person           _________________________________                  Relationship to Patient  _________________________________________  ______________________________  Signature of Witness          Date  Time    STATEMENT OF PHYSICIAN My signature below affirms that prior to the time of the procedure; I have explained to the patient and/or his/her legal representative, the risks and benefits involved in the proposed treatment and any reasonable alternative to the proposed treatment. I have also explained the risks and benefits involved in refusal of the proposed treatment and alternatives to the proposed treatment and have answered the patient's questions.  If I have a significant financial interest in a co-management agreement or a significant financial interest in any product or implant, or other significant relationship used in this procedure/surgery, I have disclosed this and had a discussion with my patient.     _______________________________________________________________ _____________________________  Gem Flock of Physician)                                                                                         (Date)                                   (Time)        Patient Name: Mikhail Hernandez.     : 1935   Printed: 3/22/2022      Medical Record #: [id number]                                              Page 1 of 1

## (undated) NOTE — IP AVS SNAPSHOT
Patient Demographics     Address  [address]  [address]  [facility] [address] Phone  [phone] (Home)  [phone] (Mobile) *Preferred* E-mail Address  [email or web address]      Patient Contacts     Name Relation Home Work Mobile    Serena Ramos Spouse   [phone]      Allergies as of 5/17/2024  Review status set to Review Complete on 5/13/2024   No Known Allergies     Code Status Information     Code Status    Full Code      Patient Instructions    None      Follow-up Information     Yaakov Zepeda. Schedule an appointment as soon as possible for a visit.    Specialties: Cardiovascular Diseases, CARDIOLOGY  Why: After discharge from rehab           Urology Follow up in 3 week(s).    Why: Post Discharge Followup                      Your Home Meds List      TAKE these medications       Instructions Authorizing Provider Morning Afternoon Evening As Needed   acetaminophen 500 MG Tabs  Commonly known as: Tylenol Extra Strength      Take 2 tablets (1,000 mg total) by mouth every 8 (eight) hours as needed for Pain.          amLODIPine 5 MG Tabs  Commonly known as: Norvasc  Next dose due: Tomorrow morning      Take 1 tablet (5 mg total) by mouth daily.   KAYLEN BARFIELD         ARIPiprazole 2 MG Tabs  Commonly known as: Abilify  Next dose due: Sunday morning      Take 1 tablet (2 mg total) by mouth every other day.   Catrina Kohler         aspirin 81 MG Tabs  Next dose due: Tomorrow morning      Take 1 tablet (81 mg total) by mouth daily.          carbidopa-levodopa  MG Tabs  Commonly known as: SINEMET  Next dose due: Tonight      Take 0.5 tablets by mouth 3 (three) times daily.   RONNIE HODGES         cephalexin 500 MG Caps  Commonly known as: Keflex  Next dose due: Today at 6pm  Notes to patient: Started on 5/16 6am x 7 doses, will complete regimen tonight      Take 1 capsule (500 mg total) by mouth every 6 (six) hours.   RONNIE HODGES         CoQ-10 100 MG Caps  Notes to patient: Continue home  schedule      Take 100 mg by mouth daily.          donepezil 10 MG Tabs  Commonly known as: Aricept  Next dose due: Tonight      Take 1 tablet (10 mg total) by mouth nightly.   Asrar Ahmed         hydrOXYzine Pamoate 25 MG Caps  Commonly known as: VISTARIL      hydroxyzine pamoate 25 mg capsule   TAKE ONE CAPSULE BY MOUTH DAILY AS NEEDED          melatonin 1 MG Tabs  Next dose due: Tonight      Take 2 tablets (2 mg total) by mouth nightly.          methenamine 1 g Tabs  Commonly known as: Hiprex  Notes to patient: Continue home schedule      Take 1 tablet (1 g total) by mouth 2 (two) times daily.   Woo Cartagena         mirtazapine 15 MG Tabs  Commonly known as: Remeron  Next dose due: Tonight      TAKE ONE TABLET BY MOUTH AT BEDTIME   Niko Dunbar         polyethylene glycol (PEG 3350) 17 g Pack  Commonly known as: Miralax  Next dose due: Tomorrow morning      Take 17 g by mouth every other day. Patient takes every three days          simvastatin 40 MG Tabs  Commonly known as: Zocor  Next dose due: Tonight      Take 1 tablet (40 mg total) by mouth nightly.          venlafaxine 37.5 MG Tabs  Commonly known as: Effexor  Next dose due: Tonight      Take 1 tablet (37.5 mg total) by mouth 2 (two) times daily.          Vitamin D 50 MCG (2000 UT) Tabs  Notes to patient: Continue home schedule      Take by mouth nightly.                   [id number] - MAR ACTION REPORT  (last 48 hrs)    ** SITE UNKNOWN **     Order ID Medication Name Action Time Action Reason Comments    [id number] ARIPiprazole (Abilify) tab 2 mg 05/17/24 0939 Given      [id number] amLODIPine (Norvasc) tab 5 mg 05/16/24 0823 Given      [id number] amLODIPine (Norvasc) tab 5 mg 05/17/24 0939 Given      [id number] aspirin DR tab 81 mg 05/16/24 0823 Given      [id number] aspirin DR tab 81 mg 05/17/24 0939 Given      [id number] atorvastatin (Lipitor) tab 20 mg 05/15/24 2135 Given      [id number] atorvastatin (Lipitor) tab 20 mg 05/16/24 2126 Given      [id number]  carbidopa-levodopa (SINEMET)  MG per tab 0.5 tablet 05/15/24 1723 Given      [id number] carbidopa-levodopa (SINEMET)  MG per tab 0.5 tablet 05/15/24 2138 Given      [id number] carbidopa-levodopa (SINEMET)  MG per tab 0.5 tablet 05/16/24 0824 Given      [id number] carbidopa-levodopa (SINEMET)  MG per tab 0.5 tablet 05/16/24 1611 Given      [id number] carbidopa-levodopa (SINEMET)  MG per tab 0.5 tablet 05/16/24 2126 Given      [id number] carbidopa-levodopa (SINEMET)  MG per tab 0.5 tablet 05/17/24 0939 Given      [id number] cephalexin (Keflex) cap 500 mg 05/16/24 0621 Given      [id number] cephalexin (Keflex) cap 500 mg 05/16/24 1204 Given      [id number] cephalexin (Keflex) cap 500 mg 05/16/24 1819 Given      [id number] cephalexin (Keflex) cap 500 mg 05/16/24 2333 Given      [id number] cephalexin (Keflex) cap 500 mg 05/17/24 0531 Given      [id number] cephalexin (Keflex) cap 500 mg 05/17/24 1209 Given      [id number] donepezil (Aricept) tab 10 mg 05/15/24 2135 Given      [id number] donepezil (Aricept) tab 10 mg 05/16/24 2126 Given      [id number] hydrALAzine (Apresoline) 20 mg/mL injection 10 mg 05/15/24 1722 Given      [id number] melatonin tab 2 mg 05/15/24 2135 Given      [id number] melatonin tab 2 mg 05/16/24 2127 Given      [id number] mirtazapine (Remeron) tab 15 mg 05/15/24 2135 Given      [id number] mirtazapine (Remeron) tab 15 mg 05/16/24 2127 Given      [id number] venlafaxine (Effexor) tab 37.5 mg 05/15/24 2135 Given      [id number] venlafaxine (Effexor) tab 37.5 mg 05/16/24 0823 Given      [id number] venlafaxine (Effexor) tab 37.5 mg 05/16/24 2127 Given      [id number] venlafaxine (Effexor) tab 37.5 mg 05/17/24 0939 Given            LEFT LOWER ABDOMEN     Order ID Medication Name Action Time Action Reason Comments    [id number] heparin (Porcine) 5000 UNIT/ML injection 5,000 Units 05/16/24 2127 Given            LEFT UPPER ARM     Order ID Medication Name Action Time Action Reason Comments     [id number] heparin (Porcine) 5000 UNIT/ML injection 5,000 Units 05/15/24 2135 Given            RIGHT LOWER ABDOMEN     Order ID Medication Name Action Time Action Reason Comments    [id number] heparin (Porcine) 5000 UNIT/ML injection 5,000 Units 05/16/24 0824 Given      [id number] heparin (Porcine) 5000 UNIT/ML injection 5,000 Units 05/17/24 0939 Given              Recent Vital Signs    Flowsheet Row Most Recent Value   /64 Filed at 05/17/2024 0757   Pulse 59 Filed at 05/17/2024 0757   Resp 14 Filed at 05/17/2024 0757   Temp 97.5 °F (36.4 °C) Filed at 05/17/2024 0524   SpO2 96 % Filed at 05/17/2024 0757      Patient's Most Recent Weight    Flowsheet Row Most Recent Value   Patient Weight 69 kg (152 lb 3.2 oz)      Lab Results Last 24 Hours    No matching results found     Microbiology Results (All)     None         H&P - H&P Note      H&P signed by Otilio Francis MD at 5/14/2024  6:15 AM   Version 1 of 1    Author: Otilio Francis MD Service: Hospitalist Author Type: Physician    Filed: 5/14/2024  6:15 AM Status: Signed    : Otilio Francis MD (Physician)       [facility]    PATIENT'S NAME: VIET MCKINLEY    ATTENDING PHYSICIAN: Rahul Cramer MD   PATIENT ACCOUNT#:   [id number]    LOCATION:  [place] [address]  MEDICAL RECORD #:   [id number]       YOB: 1935  ADMISSION DATE:       05/13/2024    HISTORY AND PHYSICAL EXAMINATION    CHIEF COMPLAINT:  Urinary tract infection and generalized weakness.    HISTORY OF PRESENT ILLNESS:  The patient is an 89-year-old  male with underlying dementia, Parkinson disease, and generalized musculoskeletal deconditioning with recurrent urinary tract infections.  He was seen in the emergency room on May 12 and diagnosed with a urinary tract infection, started on antibiotics, discharged home.  A urine culture from May 12 growing gram negative rods.  Today his wife brought him back because she cannot even get him up to use the walker  at home.  CBC and chemistry were unremarkable.  The patient was started on IV Rocephin, and he will be admitted to the hospital for further management.    PAST MEDICAL HISTORY:  He has a history of bilateral staghorn kidney stones with recurrent Proteus mirabilis urinary tract infections, history of Parkinson disease, hypertension, hyperlipidemia, dementia, benign prostatic hypertrophy, anxiety, seizure disorder, and musculoskeletal deconditioning.    PAST SURGICAL HISTORY:  Cystoscopy and lithotripsy, TURP.    MEDICATIONS:  Please see medication reconciliation list.     ALLERGIES:  No known drug allergies.    FAMILY HISTORY:  Father had hypertension.    SOCIAL HISTORY:  Ex-tobacco user.  No current tobacco, alcohol, or drug use.  Lives with his wife.  Requires assistance in his basic activities of daily living.    REVIEW OF SYSTEMS:  The patient has dementia, is not able to give me much.  Per his wife, he has been weak for the last 2 to 3 days.  Usually he is able to stand up with help and use the walker with help, but he has not been able to stand up per his wife.  No other focal review of systems is positive.        PHYSICAL EXAMINATION:    GENERAL:  Alert.  Able to answer questions.  Does not appear to be in distress.  VITAL SIGNS:  Temperature 98.2, pulse 59, respiratory rate 16, blood pressure 156/81, pulse ox 93% on room air.  HEENT:  Atraumatic.  Oropharynx clear.  Moist mucous membranes.  Normal hard and soft palate.  Eyes:  Anicteric sclerae.  NECK:  Supple.  No lymphadenopathy.    LUNGS:  Clear to auscultation bilaterally.  Normal respiratory effort.    HEART:  Regular rate and rhythm.  S1 and S2 auscultated.  No murmur.  ABDOMEN:  Soft, nondistended.  No tenderness.  Positive bowel sounds.  EXTREMITIES:  No edema, clubbing, or cyanosis.  Cogwheel rigidity noted in upper extremities.    ASSESSMENT:    1.   Urinary tract infection.  2.   Musculoskeletal deconditioning and generalized weakness.  3.    Parkinson disease.  4.   Dementia.    PLAN:  The patient will be admitted to general medical floor.  IV antibiotic, Rocephin.  Known underlying bilateral staghorn calculi serving as source for recurrent urinary tract infections with Proteus mirabilis.  Obtain physical and occupational therapy.  Fall precautions.  Monitor his clinical status.    Dictated By Otilio Francis MD  d: 2024 11:29:06  t: 2024 11:35:43  Job [id number]  FB/    Electronically signed by Otilio Francis MD on 2024  6:15 AM              Consults - MD Consult Notes      Consults signed by Red Au MD at 5/15/2024 12:18 PM     Author: Red Au MD Service: Infectious Disease Author Type: Physician    Filed: 5/15/2024 12:18 PM Date of Service: 5/15/2024 11:55 AM Status: Signed    : Red Au MD (Physician)     Consult Orders    1. IP consult to Infectious Disease [[id number]] ordered by Han Luevano MD at 05/15/24 1058               [facility]  part of [facility] ID CONSULT NOTE    Fernando Ramos . Patient Status:  Inpatient    1935 MRN [id number]   Location [facility] 4W/SW/SE Attending Han Luevano MD   Hosp Day # 2 PCP [facility]       Reason for Consultation:  UTI    ASSESSMENT:    Antibiotics: IV ceftriaxone    # Pyuria with bacteruria P. Mirabilis - same as previous cx in setting of L staghorn calculus and chronic bilateral nephrolithiasis w/o hydro   - suspect symptoms mostly related to underlying dementia. UTI less likely  # Fatigue, weakness w/o signs of infectious process  # Previous MSSA bacteremia 3/2023 from skin source (knee abrasion, buttock wound) with negative TTE s/p IV cefazolin x2 weeks  # Seizure disorder  # Dementia      PLAN:    -  on IV ceftriaxone - day #3 - stop and start Keflex - EOT 24  -  patient to follow up with Urology outpatient for possible bilateral ureteral stents  -  no plans for  lithotripsy at this time per wife  -  Follow fever curve, wbc  -  Reviewed labs, micro, imaging reports, available old records  -  d/w patient, wife, RN, Primary    History of Present Illness:  Fernando Ramos Jr. is a a(n) 89 year old male. Patient is a 89-year-old male with history of dementia, HTN seizure, BPH, nephrolithiasis, previous MSSA bacteremia in March 2003 from skin source and seen most recently in December for pyuria and monitor off antibiotics.  Most recent admitted in March of this year came in for lethargy and weakness again with abnormal UA and treated for UTI. Now comes mg on 5/13 weakness.  Was seen in the ED 1 day prior and discharged on Keflex.  Afebrile here, room air, normal WBC and renal function.  Started on IV ceftriaxone.  Most recent urine culture Proteus mirabilis.  Renal ultrasound a few months ago with bilateral stones without hydro.  Has a staghorn calculus in left kidney.    History:  Past Medical History:    Anxiety state    BPH (benign prostatic hyperplasia)    Dementia (HCC)    Depression    Dysphagia    Febrile illness    High blood pressure    Kidney stones    Other and unspecified hyperlipidemia    Pneumonia due to organism    Seizure disorder (HCC)    because of venalfaxine    Skin cancer    melanoma    Unspecified essential hypertension     Past Surgical History:   Procedure Laterality Date    Egd  05/23/2022    ESOPHAGOGASTRODUODENOSCOPY with Botox injection    Egd  07/23/2021    Large food impaction in the mid and distal esophagus    Hemiarthroplasty hip Right 03/22/2022    Right Cemented Hemiarthroplasty    Lithotripsy      Transurethral destruction, prostate tissue; water-induc       Family History   Problem Relation Age of Onset    Alcohol and Other Disorders Associated Father     Heart Disorder Father     Depression Father     Heart Disorder Mother       reports that he quit smoking about 63 years ago. His smoking use included cigarettes. He started smoking about 66  years ago. He has never used smokeless tobacco. He reports that he does not currently use alcohol. He reports that he does not use drugs.    Allergies:  No Known Allergies    Medications:    Current Facility-Administered Medications:     hydrALAzine (Apresoline) 20 mg/mL injection 10 mg, 10 mg, Intravenous, Q4H PRN    cefTRIAXone (Rocephin) 1 g in D5W 100 mL IVPB-ADD, 1 g, Intravenous, Q24H    sodium chloride 0.9% infusion, , Intravenous, Continuous    heparin (Porcine) 5000 UNIT/ML injection 5,000 Units, 5,000 Units, Subcutaneous, 2 times per day    acetaminophen (Tylenol Extra Strength) tab 500 mg, 500 mg, Oral, Q4H PRN    ondansetron (Zofran) 4 MG/2ML injection 4 mg, 4 mg, Intravenous, Q6H PRN    amLODIPine (Norvasc) tab 5 mg, 5 mg, Oral, Daily    ARIPiprazole (Abilify) tab 2 mg, 2 mg, Oral, QOD    aspirin DR tab 81 mg, 81 mg, Oral, Daily    carbidopa-levodopa (SINEMET)  MG per tab 0.5 tablet, 0.5 tablet, Oral, TID    donepezil (Aricept) tab 10 mg, 10 mg, Oral, Nightly    hydrOXYzine (Atarax) tab 25 mg, 25 mg, Oral, Q6H PRN    melatonin tab 2 mg, 2 mg, Oral, Nightly    mirtazapine (Remeron) tab 15 mg, 15 mg, Oral, Nightly    polyethylene glycol (PEG 3350) (Miralax) 17 g oral packet 17 g, 17 g, Oral, Daily PRN    atorvastatin (Lipitor) tab 20 mg, 20 mg, Oral, Nightly    venlafaxine (Effexor) tab 37.5 mg, 37.5 mg, Oral, BID    Review of Systems:  Unable to obtain 2/2 clinical status    Physical Exam:  Vital signs: Blood pressure 134/83, pulse 65, temperature 97.5 °F (36.4 °C), temperature source Axillary, resp. rate 14, height 172.7 cm (5' 8\"), weight 152 lb 3.2 oz (69 kg), SpO2 93%.    General: in bed, room air, nad, confused  HEENT: Moist mucous membranes. EOMI  Neck: No lymphadenopathy.  Supple.  Cardiovascular: No chest wall tenderness  Respiratory: Symmetric expansion  Abdomen: Soft, nontender, nondistended.   Musculoskeletal: No edema noted  Integument: No lesions. No erythema.    Laboratory Data:  Reviewed in EMR    Microbiology: Reviewed in EMR    Radiology: Reviewed    Thank you for allowing us to participate in the care of this patient. Please do not hesitate to call if you have any questions.     We will continue to follow with you and will make further recommendations based on his progress.    Red Au MD   [facility]  [phone]  5/15/2024      Electronically signed by Red Au MD on 5/15/2024 12:18 PM           D/C Summary    No notes of this type exist for this encounter.        Physical Therapy Notes (last 72 hours)      Physical Therapy Note signed by Kayla Muse PT at 5/17/2024  1:09 PM  Version 1 of 1    Author: Kayla Muse PT Service: Rehab Author Type: Physical Therapist    Filed: 5/17/2024  1:09 PM Date of Service: 5/17/2024 11:55 AM Status: Signed    : Kayla Muse PT (Physical Therapist)       RN approving pt participation in therapy. Contact coordinated w/ OT. Pt received in bed alert and oriented to self. When asked where he was he stated that he didn't care. Pt refusing all attempts to engage in sitting eob or transferring to chair. Pt becoming upset when activity was encouraged. PT treatment deferred. RN aware                   Occupational Therapy Notes (last 72 hours)      Occupational Therapy Note signed by Jeni Caputo OT at 5/17/2024  1:06 PM  Version 1 of 1    Author: Jeni Caputo OT Service: Rehab Author Type: Occupational Therapist    Filed: 5/17/2024  1:06 PM Date of Service: 5/17/2024 11:57 AM Status: Signed    : Jeni Caputo OT (Occupational Therapist)       RN approving pt participation in therapy. Contact coordinated w/ PT. Pt received in bed alert and oriented to self. When asked where he was he stated that he didn't care. Pt refusing all attempts to engage in sitting eob or transferring to chair. Pt becoming upset when activity was encouraged. OT treatment deferred. RN aware        Occupational Therapy Note signed by Kelly Taylor OT at 5/14/2024  4:10 PM  Version 1 of 1    Author: Kelly Taylor OT Service: Rehab Author Type: Occupational Therapist    Filed: 5/14/2024  4:10 PM Date of Service: 5/14/2024  8:45 AM Status: Signed    : Kelly Taylor OT (Occupational Therapist)       OCCUPATIONAL THERAPY EVALUATION - INPATIENT     Room Number: 407/407-A  Evaluation Date: 5/14/2024  Type of Evaluation: Initial  Presenting Problem: Urinary tract infection and generalized weakness    Physician Order: IP Consult to Occupational Therapy  Reason for Therapy: ADL/IADL Dysfunction and Discharge Planning    OCCUPATIONAL THERAPY ASSESSMENT   Patient is a 89 year old male admitted 5/13/2024 for weakness, UTI.      Prior to admission, patient's baseline is assist with ADLs as needed, CGA with functional mobility with RW.  Patient is currently functioning below baseline with ADLs and functional mobility.  Patient is requiring maximum assist as a result of the following impairments: fatigue, decreased functional strength, decreased functional reach, decreased endurance, and impaired standing balance. Occupational Therapy will continue to follow for duration of hospitalization.    Patient will benefit from continued skilled OT Services to promote return to prior level of function and safety with continuous assistance and gradual rehabilitative therapy     PLAN  OT Treatment Plan: Balance activities;Energy conservation/work simplification techniques;ADL training;Functional transfer training;Endurance training;Equipment eval/education  OT Device Recommendations: TBD    OCCUPATIONAL THERAPY MEDICAL/SOCIAL HISTORY   Problem List  Principal Problem:    Acute cystitis without hematuria  Active Problems:    Weakness    UTI (urinary tract infection)    HOME SITUATION  Type of Home: Apartment  Home Layout: One level  Lives With: Spouse  Drives: No  Patient Regularly Uses:  None      SUBJECTIVE  \"Ok\"  \"I don't know\"   Pt soft spoken, pt's spouse reported pt looking more tired than usual     OCCUPATIONAL THERAPY EXAMINATION    OBJECTIVE  Precautions: Bed/chair alarm  Fall Risk: High fall risk    PAIN ASSESSMENT  Rating: Non-verbal signs of pain/discomfort observed (grimacing and yelling out with supine>sit but denied pain)    ACTIVITY TOLERANCE  Room air    COGNITION  Impaired, lethargic, followed one-step cues with additional time, tactile cues to initiate tasks    RANGE OF MOTION   Upper extremity ROM is within functional limits     STRENGTH ASSESSMENT  Upper extremity strength is within functional limits     COORDINATION  Gross Motor: WFL   Fine Motor: WFL     ACTIVITIES OF DAILY LIVING ASSESSMENT  AM-PAC ‘6-Clicks’ Inpatient Daily Activity Short Form  How much help from another person does the patient currently need…  -   Putting on and taking off regular lower body clothing?: Total  -   Bathing (including washing, rinsing, drying)?: Total  -   Toileting, which includes using toilet, bedpan or urinal? : Total  -   Putting on and taking off regular upper body clothing?: A Lot  -   Taking care of personal grooming such as brushing teeth?: A Little  -   Eating meals?: A Little    AM-PAC Score:  Score: 11  Approx Degree of Impairment: 70.42%  Standardized Score (AM-PAC Scale): 29.04  CMS Modifier (G-Code): CL    FUNCTIONAL TRANSFER ASSESSMENT  Sit to Stand: Edge of Bed  Edge of Bed: Moderate Assist (; Max A x 2 spt)    BED MOBILITY  Supine to Sit : Maximum Assist (x2)  FUNCTIONAL ADL ASSESSMENT  Grooming Seated: Minimal Assist  LB Dressing Seated: Dependent (bed level)    Skilled Therapy Provided: Pt completed ADLs and functional mobility between Mod - Max A x 2. Pt benefited from additional time in between 1 step cues due to delayed processing , tactile cues to initiate tasks . Pt benefited from the presence of his spouse who was very motivating and encouraging for pt.     EDUCATION  PROVIDED  Patient: Role of Occupational Therapy; Plan of Care; Discharge Recommendations; Functional Transfer Techniques; Compensatory ADL Techniques  Patient's Response to Education: Verbalized Understanding; Returned Demonstration    The patient's Approx Degree of Impairment: 70.42% has been calculated based on documentation in the [facility] '6 clicks' Inpatient Daily Activity Short Form.  Research supports that patients with this level of impairment may benefit from rehab.  Final disposition will be made by interdisciplinary medical team.     Patient End of Session: Up in chair;Needs met;Call light within reach;RN aware of session/findings;Alarm set;Family present    OT Goals  Patients self stated goal is: home     Patient will complete functional transfer with sba  Comment:     Patient will complete toileting with sba  Comment:     Patient will tolerate standing for 4 minutes in prep for adls with sba   Comment:      Comment:          Goals  on:  24  Frequency: 3x/w    Patient Evaluation Complexity Level:   Occupational Profile/Medical History MODERATE - Expanded review of history including review of medical or therapy record   Specific performance deficits impacting engagement in ADL/IADL MODERATE  3 - 5 performance deficits   Client Assessment/Performance Deficits MODERATE - Comorbidities and min to mod modifications of tasks    Clinical Decision Making MODERATE - Analysis of occupational profile, detailed assessments, several treatment options    Overall Complexity MODERATE     OT Session Time: 25 minutes  Self-Care Home Management: 15 minutes  Therapeutic Activity: 10 minutes             Video Swallow Study Notes    No notes of this type exist for this encounter.        SLP Note - SLP Notes      SLP Note signed by Chitra Kapoor SLP at 5/15/2024 10:37 AM  Version 1 of 1    Author: Chambers, Chitra, SLP Service: Rehab Author Type: Speech and Language Pathologist    Filed: 5/15/2024 10:37 AM Date of  Service: 5/15/2024 10:28 AM Status: Signed    : Chitra Kapoor, SLP (Speech and Language Pathologist)    Summary:minced & moist/mildly thick liquids             SPEECH DAILY NOTE - INPATIENT    ASSESSMENT & PLAN   ASSESSMENT  PPE REQUIRED. THIS THERAPIST WORE GLOVES AND MASK FOR DURATION OF EVALUATION. HANDS WASHED UPON ENTRANCE/EXIT.    SLP f/u for ongoing dysphagia tx/meal assessment per recommendations of soft bite sized/mildly thick liquids per BSE. RN reports pt tolerates diet and medication well with no overt clinical s/s aspiration. Pt denies any swallowing challenges. Wife reports difficulty with soft bite sized consistency, requesting minced & moist consistency.    Pt positioned upright in bed, alert/cooperative/wife present. Pt afebrile, tolerating room air with oxygen status 93% prior to the start of oral trials. SLP reviewed aspiration precautions and safe swallowing compensatory strategies with the patient/wife. Safe swallow guidelines remain written on the white board in purple. Patient and family v/u. Provided min assistance, pt tolerates minced & moist consistency and mildly thick liquids via cup with no overt clinical signs/symptoms of aspiration. Oxygen status remained stable t/o the entire session. Recommend downgrade to minced & moist consistency, remain on mildly thick liquids with adherence to aspiration precautions and swallow strategies.     SLP to f/u with meal assessment x1-2, monitor  CXR, and VFSS if any overt CSA and/or decline in CXR. RN alerted with results and recommendations.     MOST RECENT CXR - none on this admission         Diet Recommendations - Solids: Mechanical soft ground/ Minced & Moist  Diet Recommendations - Liquids: Nectar thick liquids/ Mildly thick    Compensatory Strategies Recommended: No straws;Slow rate;Alternate consistencies;Small bites and sips  Aspiration Precautions: Upright position;Slow rate;Small bites and sips;No straw  Medication Administration  Recommendations: One pill at a time    Patient Experiencing Pain: No                Treatment Plan  Treatment Plan/Recommendations: Aspiration precautions    Interdisciplinary Communication: Discussed with RN  Plan posted at bedside            GOALS  Goal #1 The patient will tolerate minced & moist consistency and mildly thick liquids without overt signs or symptoms of aspiration with 100 % accuracy over 1-2 session(s).  Revised 5/15   Goal #2 The patient/family/caregiver will demonstrate understanding and implementation of aspiration precautions and swallow strategies independently over 1-2 session(s).    In Progress   Goal #3 The patient will utilize compensatory strategies as outlined by  BSSE (clinical evaluation) including Slow rate, Small bites, Small sips, Multiple swallows, Alternate liquids/solids, No straws, Upright 90 degrees, Feed patient with moderate assistance 100 % of the time across 2 sessions.  In Progress     FOLLOW UP  Follow Up Needed (Documentation Required): Yes  SLP Follow-up Date: 05/16/24  Number of Visits to Meet Established Goals:  (1-2)    Session: 2    If you have any questions, please contact SULEMA Man M.S. CCC-SLP  Speech Language Pathologist  Phone Number Ext. [phone]      Electronically signed by Chitra Kapoor SLP on 5/15/2024 10:37 AM           Immunizations     Name Date      Covid-19 Moderna 01/06/22     Covid-19 Moderna 05/16/21     Covid-19 Moderna 04/18/21     Covid-19 Moderna Booster 06/18/22     INFLUENZA 12/05/23       Multidisciplinary Problems     Active Goals     Not on file          Resolved Goals        Problem: Patient/Family Goals    Goal Priority Disciplines Outcome Interventions   Patient/Family Long Term Goal   (Resolved)     Interdisciplinary Adequate for Discharge    Description: Patient's Long Term Goal: Go home with wife    Interventions:  - Antibiotics  - PT/OT  - See additional Care Plan goals for specific interventions    Patient/Family Short Term Goal   (Resolved)     Interdisciplinary Adequate for Discharge    Description: Patient's Short Term Goal: out of bed with PT/OT    Interventions:   - See additional Care Plan goals for specific interventions

## (undated) NOTE — LETTER
[facility]  Administration of Anesthesia  1.  Cliff Toribio., or _________________________________ acting on his behalf, (Patient) (Dependent/Representative) request to receive anesthesia for my pending procedure/operation/treatmen spinal bleeding, seizure, cardiac arrest and death. 7. AWARENESS: I understand that it is possible (but unlikely) to have explicit memory of events from the operating room while under general anesthesia.   8. ELECTROCONVULSIVE THERAPY PATIENTS: This consen signature below affirms that prior to the time of the procedure, I have explained to the patient and/or his/her guardian, the risks and benefits of undergoing anesthesia, as well as any reasonable alternatives.     __________________________________________

## (undated) NOTE — LETTER
[facility]  Administration of Anesthesia  1. Bhavin Moura., or _________________________________ acting on his behalf, (Patient) (Dependent/Representative) request to receive anesthesia for my pending procedure/operation/treatment. A physician (anesthesiologist) alone or an anesthesiologist working with a nurse anesthetist may administer my anesthesia. 2. I understand that my anesthesiologist is not an employee or agent of the hospital, but is an independent medical practitioner who has been permitted to use its facilities for the care and treatment of his/her patients. 3. I acknowledge that a physician from [facility] or their designate(s), recommended anesthesia for me using her/his medical judgment. The type(s) of anesthesia I may receive include:                a) General Anesthesia, b) Spinal/Epidural Anesthesia, c) Regional Anesthesia or d) Monitored Anesthesia Care. 4. If my spinal, regional or monitored anesthesia care (local) is not satisfactory for my comfort, or if my medical condition requires, I consent to the administration of general anesthesia. 5. I am aware that the practice of anesthesiology is not an exact science and that some foreseeable risks or consequences may occur. Some common risks/consequences include sore throat and hoarseness, nausea and vomiting, muscle soreness, backache, damage to the mouth/teeth/vocal cords and eye injury. I understand that more rare but serious potential risks of anesthesia include blood pressure changes, drug reactions, cardiac arrest, brain damage, paralysis or death. These risks apply to whether I have general, spinal/epidural, regional or monitored anesthesia care. 6. OBSTETRIC PATIENTS: Specific risks/consequences of spinal/epidural anesthesia may include itching, low blood pressure, difficulty urinating, slowing of the baby's heart rate and headache.  Rare risks include infections, high spinal block, spinal bleeding, seizure, cardiac arrest and death. 7. AWARENESS: I understand that it is possible (but unlikely) to have explicit memory of events from the operating room while under general anesthesia. 8. ELECTROCONVULSIVE THERAPY PATIENTS: This consent serves for all treatments in a single course of therapy. 9. I understand that I must inform my anesthesiologist when I last ate and/or drank to minimize the risk of anesthesia. 10. If I am pregnant, or may pregnant, I understand that elective surgery should be postponed until after the baby is born. Anesthetics cross the placenta and may temporarily anesthetize the baby. Although fetal complications of anesthesia during pregnancy are rare, they may include birth defects, premature labor, brain damage and death. 11. I certify that I informed the anesthesiologist, to the best of my ability, about medication I take including blood thinners, anticoagulants, herbal remedies, narcotics and recreational drugs (e.g. cocaine, marijuana, PCP). Failure to inform my anesthesiologist about these medicines may increase my risk of anesthetic complications. The nature and purpose of my anesthetic management was explained to me. I had the opportunity to ask questions and the answers and information provided meet my satisfaction.   I retain the right to withdraw this consent at any time prior to the administration of said anesthetic.    ___________________________________________________           _____________________________________________________  Patient Signature                                                                                      Witness Signature                ___________________________________________________           _____________________________________________________  Date/Time                                                                                               Responsible person in case of minor/ unconscious pt /Relationship    My signature below affirms that prior to the time of the procedure, I have explained to the patient and/or his/her guardian, the risks and benefits of undergoing anesthesia, as well as any reasonable alternatives. ___________________________________________________            _____________________________________________________  Physician Signature                            Date/Time  Patient Name: Ricardo Flaherty.      : 1935     Printed: 3/22/2022      Medical Record #: [id number]                              Page 1 of 1    ----------ANESTHESIA CONSENT----------